# Patient Record
Sex: MALE | Race: WHITE | NOT HISPANIC OR LATINO | Employment: OTHER | URBAN - METROPOLITAN AREA
[De-identification: names, ages, dates, MRNs, and addresses within clinical notes are randomized per-mention and may not be internally consistent; named-entity substitution may affect disease eponyms.]

---

## 2017-01-04 ENCOUNTER — ALLSCRIPTS OFFICE VISIT (OUTPATIENT)
Dept: OTHER | Facility: OTHER | Age: 72
End: 2017-01-04

## 2017-01-04 DIAGNOSIS — R00.1 BRADYCARDIA: ICD-10-CM

## 2017-01-04 DIAGNOSIS — G62.9 POLYNEUROPATHY: ICD-10-CM

## 2017-01-04 DIAGNOSIS — I50.22 CHRONIC SYSTOLIC CONGESTIVE HEART FAILURE (HCC): ICD-10-CM

## 2017-01-04 DIAGNOSIS — Z79.899 OTHER LONG TERM (CURRENT) DRUG THERAPY: ICD-10-CM

## 2017-01-06 ENCOUNTER — APPOINTMENT (OUTPATIENT)
Dept: LAB | Facility: CLINIC | Age: 72
End: 2017-01-06
Payer: COMMERCIAL

## 2017-01-06 ENCOUNTER — TRANSCRIBE ORDERS (OUTPATIENT)
Dept: LAB | Facility: CLINIC | Age: 72
End: 2017-01-06

## 2017-01-06 DIAGNOSIS — G62.9 POLYNEUROPATHY: ICD-10-CM

## 2017-01-06 DIAGNOSIS — G62.9 ACQUIRED POLYNEUROPATHY: Primary | ICD-10-CM

## 2017-01-06 DIAGNOSIS — Z79.899 OTHER LONG TERM (CURRENT) DRUG THERAPY: ICD-10-CM

## 2017-01-06 DIAGNOSIS — R00.1 BRADYCARDIA: ICD-10-CM

## 2017-01-06 DIAGNOSIS — I50.22 CHRONIC SYSTOLIC CONGESTIVE HEART FAILURE (HCC): ICD-10-CM

## 2017-01-06 LAB
ALBUMIN SERPL BCP-MCNC: 3.1 G/DL (ref 3.5–5)
ALP SERPL-CCNC: 54 U/L (ref 46–116)
ALT SERPL W P-5'-P-CCNC: 30 U/L (ref 12–78)
ANION GAP SERPL CALCULATED.3IONS-SCNC: 7 MMOL/L (ref 4–13)
AST SERPL W P-5'-P-CCNC: 17 U/L (ref 5–45)
BACTERIA UR QL AUTO: NORMAL /HPF
BASOPHILS # BLD AUTO: 0.05 THOUSANDS/ΜL (ref 0–0.1)
BASOPHILS NFR BLD AUTO: 1 % (ref 0–1)
BILIRUB SERPL-MCNC: 0.43 MG/DL (ref 0.2–1)
BILIRUB UR QL STRIP: NEGATIVE
BUN SERPL-MCNC: 17 MG/DL (ref 5–25)
CALCIUM SERPL-MCNC: 8.3 MG/DL (ref 8.3–10.1)
CHLORIDE SERPL-SCNC: 107 MMOL/L (ref 100–108)
CLARITY UR: CLEAR
CO2 SERPL-SCNC: 28 MMOL/L (ref 21–32)
COLOR UR: ABNORMAL
CREAT SERPL-MCNC: 1.15 MG/DL (ref 0.6–1.3)
EOSINOPHIL # BLD AUTO: 0.14 THOUSAND/ΜL (ref 0–0.61)
EOSINOPHIL NFR BLD AUTO: 2 % (ref 0–6)
ERYTHROCYTE [DISTWIDTH] IN BLOOD BY AUTOMATED COUNT: 13.9 % (ref 11.6–15.1)
ERYTHROCYTE [SEDIMENTATION RATE] IN BLOOD: 51 MM/HOUR (ref 0–10)
EST. AVERAGE GLUCOSE BLD GHB EST-MCNC: 120 MG/DL
FOLATE SERPL-MCNC: >20 NG/ML (ref 3.1–17.5)
GFR SERPL CREATININE-BSD FRML MDRD: >60 ML/MIN/1.73SQ M
GLUCOSE SERPL-MCNC: 89 MG/DL (ref 65–140)
GLUCOSE UR STRIP-MCNC: NEGATIVE MG/DL
HBA1C MFR BLD: 5.8 % (ref 4.2–6.3)
HCT VFR BLD AUTO: 33.1 % (ref 36.5–49.3)
HGB BLD-MCNC: 10.7 G/DL (ref 12–17)
HGB UR QL STRIP.AUTO: NEGATIVE
IGA SERPL-MCNC: 356 MG/DL (ref 70–400)
IGG SERPL-MCNC: 774 MG/DL (ref 700–1600)
IGM SERPL-MCNC: 147 MG/DL (ref 40–230)
IRON SERPL-MCNC: 57 UG/DL (ref 65–175)
KETONES UR STRIP-MCNC: NEGATIVE MG/DL
LEUKOCYTE ESTERASE UR QL STRIP: NEGATIVE
LYMPHOCYTES # BLD AUTO: 1.29 THOUSANDS/ΜL (ref 0.6–4.47)
LYMPHOCYTES NFR BLD AUTO: 18 % (ref 14–44)
MAGNESIUM SERPL-MCNC: 1.8 MG/DL (ref 1.6–2.6)
MCH RBC QN AUTO: 29.8 PG (ref 26.8–34.3)
MCHC RBC AUTO-ENTMCNC: 32.3 G/DL (ref 31.4–37.4)
MCV RBC AUTO: 92 FL (ref 82–98)
MONOCYTES # BLD AUTO: 0.33 THOUSAND/ΜL (ref 0.17–1.22)
MONOCYTES NFR BLD AUTO: 5 % (ref 4–12)
NEUTROPHILS # BLD AUTO: 5.32 THOUSANDS/ΜL (ref 1.85–7.62)
NEUTS SEG NFR BLD AUTO: 74 % (ref 43–75)
NITRITE UR QL STRIP: NEGATIVE
NON-SQ EPI CELLS URNS QL MICRO: NORMAL /HPF
NRBC BLD AUTO-RTO: 0 /100 WBCS
PH UR STRIP.AUTO: 6 [PH] (ref 4.5–8)
PLATELET # BLD AUTO: 339 THOUSANDS/UL (ref 149–390)
PMV BLD AUTO: 10.5 FL (ref 8.9–12.7)
POTASSIUM SERPL-SCNC: 3.9 MMOL/L (ref 3.5–5.3)
PROT SERPL-MCNC: 7.1 G/DL (ref 6.4–8.2)
PROT UR STRIP-MCNC: ABNORMAL MG/DL
RBC # BLD AUTO: 3.59 MILLION/UL (ref 3.88–5.62)
RBC #/AREA URNS AUTO: NORMAL /HPF
SODIUM SERPL-SCNC: 142 MMOL/L (ref 136–145)
SP GR UR STRIP.AUTO: 1.03 (ref 1–1.03)
T4 FREE SERPL-MCNC: 0.98 NG/DL (ref 0.76–1.46)
TSH SERPL DL<=0.05 MIU/L-ACNC: 0.17 UIU/ML (ref 0.36–3.74)
UROBILINOGEN UR QL STRIP.AUTO: 1 E.U./DL
VIT B12 SERPL-MCNC: 631 PG/ML (ref 100–900)
WBC # BLD AUTO: 7.17 THOUSAND/UL (ref 4.31–10.16)
WBC #/AREA URNS AUTO: NORMAL /HPF

## 2017-01-06 PROCEDURE — 84165 PROTEIN E-PHORESIS SERUM: CPT

## 2017-01-06 PROCEDURE — 81001 URINALYSIS AUTO W/SCOPE: CPT

## 2017-01-06 PROCEDURE — 84443 ASSAY THYROID STIM HORMONE: CPT

## 2017-01-06 PROCEDURE — 84207 ASSAY OF VITAMIN B-6: CPT

## 2017-01-06 PROCEDURE — 82746 ASSAY OF FOLIC ACID SERUM: CPT

## 2017-01-06 PROCEDURE — 86618 LYME DISEASE ANTIBODY: CPT

## 2017-01-06 PROCEDURE — 83735 ASSAY OF MAGNESIUM: CPT

## 2017-01-06 PROCEDURE — 83036 HEMOGLOBIN GLYCOSYLATED A1C: CPT

## 2017-01-06 PROCEDURE — 85652 RBC SED RATE AUTOMATED: CPT

## 2017-01-06 PROCEDURE — 83540 ASSAY OF IRON: CPT

## 2017-01-06 PROCEDURE — 85025 COMPLETE CBC W/AUTO DIFF WBC: CPT

## 2017-01-06 PROCEDURE — 36415 COLL VENOUS BLD VENIPUNCTURE: CPT

## 2017-01-06 PROCEDURE — 82607 VITAMIN B-12: CPT

## 2017-01-06 PROCEDURE — 86592 SYPHILIS TEST NON-TREP QUAL: CPT

## 2017-01-06 PROCEDURE — 82784 ASSAY IGA/IGD/IGG/IGM EACH: CPT

## 2017-01-06 PROCEDURE — 84439 ASSAY OF FREE THYROXINE: CPT

## 2017-01-06 PROCEDURE — 80053 COMPREHEN METABOLIC PANEL: CPT

## 2017-01-09 ENCOUNTER — APPOINTMENT (OUTPATIENT)
Dept: LAB | Facility: CLINIC | Age: 72
End: 2017-01-09
Payer: COMMERCIAL

## 2017-01-09 DIAGNOSIS — G62.9 ACQUIRED POLYNEUROPATHY: ICD-10-CM

## 2017-01-09 LAB
ALBUMIN SERPL ELPH-MCNC: 3.54 G/DL (ref 3.5–5)
ALBUMIN SERPL ELPH-MCNC: 53.6 % (ref 52–65)
ALPHA1 GLOB SERPL ELPH-MCNC: 0.4 G/DL (ref 0.1–0.4)
ALPHA1 GLOB SERPL ELPH-MCNC: 6.1 % (ref 2.5–5)
ALPHA2 GLOB SERPL ELPH-MCNC: 0.94 G/DL (ref 0.4–1.2)
ALPHA2 GLOB SERPL ELPH-MCNC: 14.2 % (ref 7–13)
B BURGDOR IGG SER IA-ACNC: 0.21
B BURGDOR IGM SER IA-ACNC: 0.2
BETA GLOB ABNORMAL SERPL ELPH-MCNC: 0.51 G/DL (ref 0.4–0.8)
BETA1 GLOB SERPL ELPH-MCNC: 7.8 % (ref 5–13)
BETA2 GLOB SERPL ELPH-MCNC: 6.1 % (ref 2–8)
BETA2+GAMMA GLOB SERPL ELPH-MCNC: 0.4 G/DL (ref 0.2–0.5)
CORTIS AM PEAK SERPL-MCNC: 11.6 UG/ML (ref 4.2–22.4)
GAMMA GLOB ABNORMAL SERPL ELPH-MCNC: 0.81 G/DL (ref 0.5–1.6)
GAMMA GLOB SERPL ELPH-MCNC: 12.2 % (ref 12–22)
IGG/ALB SER: 1.16 {RATIO} (ref 1.1–1.8)
PROT PATTERN SERPL ELPH-IMP: ABNORMAL
PROT SERPL-MCNC: 6.6 G/DL (ref 6.4–8.2)
RPR SER QL: NORMAL
VIT B6 SERPL-MCNC: 17.6 UG/L (ref 5.3–46.7)

## 2017-01-09 PROCEDURE — 82533 TOTAL CORTISOL: CPT

## 2017-01-10 ENCOUNTER — ALLSCRIPTS OFFICE VISIT (OUTPATIENT)
Dept: OTHER | Facility: OTHER | Age: 72
End: 2017-01-10

## 2017-01-25 ENCOUNTER — ALLSCRIPTS OFFICE VISIT (OUTPATIENT)
Dept: OTHER | Facility: OTHER | Age: 72
End: 2017-01-25

## 2017-01-25 PROCEDURE — 88342 IMHCHEM/IMCYTCHM 1ST ANTB: CPT | Performed by: DERMATOLOGY

## 2017-01-25 PROCEDURE — 88341 IMHCHEM/IMCYTCHM EA ADD ANTB: CPT | Performed by: DERMATOLOGY

## 2017-01-25 PROCEDURE — 88313 SPECIAL STAINS GROUP 2: CPT | Performed by: DERMATOLOGY

## 2017-01-25 PROCEDURE — 88305 TISSUE EXAM BY PATHOLOGIST: CPT | Performed by: DERMATOLOGY

## 2017-01-26 ENCOUNTER — LAB REQUISITION (OUTPATIENT)
Dept: LAB | Facility: HOSPITAL | Age: 72
End: 2017-01-26
Payer: COMMERCIAL

## 2017-01-26 DIAGNOSIS — L98.9 DISORDER OF SKIN OR SUBCUTANEOUS TISSUE: ICD-10-CM

## 2017-02-21 ENCOUNTER — ALLSCRIPTS OFFICE VISIT (OUTPATIENT)
Dept: OTHER | Facility: OTHER | Age: 72
End: 2017-02-21

## 2017-02-21 ENCOUNTER — GENERIC CONVERSION - ENCOUNTER (OUTPATIENT)
Dept: OTHER | Facility: OTHER | Age: 72
End: 2017-02-21

## 2017-02-24 ENCOUNTER — GENERIC CONVERSION - ENCOUNTER (OUTPATIENT)
Dept: OTHER | Facility: OTHER | Age: 72
End: 2017-02-24

## 2017-02-27 RX ORDER — SODIUM CHLORIDE 9 MG/ML
20 INJECTION, SOLUTION INTRAVENOUS CONTINUOUS
Status: DISCONTINUED | OUTPATIENT
Start: 2017-02-28 | End: 2017-03-03 | Stop reason: HOSPADM

## 2017-02-28 ENCOUNTER — HOSPITAL ENCOUNTER (OUTPATIENT)
Dept: INFUSION CENTER | Facility: CLINIC | Age: 72
Discharge: HOME | End: 2017-02-28
Payer: COMMERCIAL

## 2017-02-28 VITALS
RESPIRATION RATE: 18 BRPM | HEART RATE: 76 BPM | DIASTOLIC BLOOD PRESSURE: 68 MMHG | TEMPERATURE: 98.5 F | SYSTOLIC BLOOD PRESSURE: 108 MMHG

## 2017-02-28 PROCEDURE — 96365 THER/PROPH/DIAG IV INF INIT: CPT

## 2017-02-28 RX ORDER — ATORVASTATIN CALCIUM 80 MG/1
80 TABLET, FILM COATED ORAL DAILY
COMMUNITY
End: 2018-03-25 | Stop reason: SDUPTHER

## 2017-02-28 RX ORDER — LISINOPRIL 2.5 MG/1
2.5 TABLET ORAL DAILY
COMMUNITY
End: 2017-09-04 | Stop reason: HOSPADM

## 2017-02-28 RX ORDER — SPIRONOLACTONE 25 MG/1
25 TABLET ORAL DAILY
COMMUNITY
End: 2017-09-04 | Stop reason: HOSPADM

## 2017-02-28 RX ORDER — LEVOTHYROXINE SODIUM 0.12 MG/1
125 TABLET ORAL DAILY
COMMUNITY
End: 2018-01-30 | Stop reason: SDUPTHER

## 2017-02-28 RX ORDER — ALBUTEROL SULFATE 2.5 MG/3ML
2.5 SOLUTION RESPIRATORY (INHALATION) EVERY 6 HOURS PRN
COMMUNITY
End: 2018-07-13 | Stop reason: SDUPTHER

## 2017-02-28 RX ORDER — POTASSIUM CHLORIDE 750 MG/1
20 TABLET, FILM COATED, EXTENDED RELEASE ORAL DAILY PRN
COMMUNITY
End: 2018-02-22 | Stop reason: SDUPTHER

## 2017-02-28 RX ORDER — TORSEMIDE 20 MG/1
20 TABLET ORAL DAILY PRN
Status: ON HOLD | COMMUNITY
End: 2017-09-04

## 2017-02-28 RX ORDER — MELOXICAM 15 MG/1
15 TABLET ORAL DAILY
COMMUNITY
End: 2017-09-04 | Stop reason: HOSPADM

## 2017-02-28 RX ORDER — METOPROLOL SUCCINATE 25 MG/1
50 TABLET, EXTENDED RELEASE ORAL DAILY
COMMUNITY
End: 2017-09-04 | Stop reason: HOSPADM

## 2017-02-28 RX ORDER — LANOLIN ALCOHOL/MO/W.PET/CERES
1000 CREAM (GRAM) TOPICAL DAILY
COMMUNITY

## 2017-02-28 RX ADMIN — SODIUM CHLORIDE 20 ML/HR: 0.9 INJECTION, SOLUTION INTRAVENOUS at 14:05

## 2017-02-28 RX ADMIN — IRON SUCROSE 200 MG: 20 INJECTION, SOLUTION INTRAVENOUS at 14:13

## 2017-03-02 ENCOUNTER — ALLSCRIPTS OFFICE VISIT (OUTPATIENT)
Dept: OTHER | Facility: OTHER | Age: 72
End: 2017-03-02

## 2017-03-05 ENCOUNTER — ANESTHESIA EVENT (OUTPATIENT)
Dept: PERIOP | Facility: HOSPITAL | Age: 72
End: 2017-03-05

## 2017-03-06 ENCOUNTER — ANESTHESIA (OUTPATIENT)
Dept: PERIOP | Facility: HOSPITAL | Age: 72
End: 2017-03-06

## 2017-03-06 RX ORDER — SODIUM CHLORIDE 9 MG/ML
20 INJECTION, SOLUTION INTRAVENOUS CONTINUOUS
Status: DISCONTINUED | OUTPATIENT
Start: 2017-03-07 | End: 2017-03-10 | Stop reason: HOSPADM

## 2017-03-06 NOTE — ANESTHESIA PREPROCEDURE EVALUATION
Patient summary reviewed  Mallampati: II  TM distance: >3 FB  Neck ROM: full    PULMONARY  Patient's breath sounds clear to auscultation.   PULMONARY Positives: COPD    NEURO/PSYCH - negative    ENDO/OTHER  Additional Endo/Other Comments: Anemia      BPH (benign prostatic hyperplasia)     Bradycardia     Cardiomyopathy     Chronic bronchitis     Hyperlipidemia     HTN (hypertension)     Insomnia     Hx of lymphoma     COPD (chronic obstructive pulmonary disease)     ICD (implantable cardioverter-defibrillator) in place       ENDO/OTHER Positives: hypothyroidism,     DENTAL     CARDIOVASCULAR  CARDIOVASCULAR Positives: regular normalpacemaker, hypertension     Comments:  Additional Cardiovascular Comments: SUMMARY   LEFT VENTRICLE:   The ventricle was dilated.   Systolic function was severely reduced. Ejection fraction was estimated to be   30 % - 35%   There was severe diffuse hypokinesis.   Left ventricular diastolic function parameters were normal.   RIGHT VENTRICLE:   The size was normal.   Systolic function was normal.   A pacing wire was present.   LEFT ATRIUM:   The atrium was dilated.   RIGHT ATRIUM:   A pacing wire was present.   MITRAL VALVE:   There was mild to moderate regurgitation    2015      GI/HEPATIC/RENAL - negative    Anesthesia type: IV sedation with anesthesia  intravenous induction   Risks, benefits, and alternatives discussed with patient.  ASA 3

## 2017-03-07 ENCOUNTER — HOSPITAL ENCOUNTER (OUTPATIENT)
Dept: INFUSION CENTER | Facility: CLINIC | Age: 72
Discharge: HOME | End: 2017-03-07
Payer: COMMERCIAL

## 2017-03-07 VITALS
TEMPERATURE: 98.3 F | HEART RATE: 66 BPM | DIASTOLIC BLOOD PRESSURE: 58 MMHG | SYSTOLIC BLOOD PRESSURE: 102 MMHG | RESPIRATION RATE: 20 BRPM

## 2017-03-07 PROCEDURE — 96365 THER/PROPH/DIAG IV INF INIT: CPT

## 2017-03-07 RX ORDER — ALBUTEROL SULFATE 90 UG/1
2 AEROSOL, METERED RESPIRATORY (INHALATION) EVERY 4 HOURS PRN
COMMUNITY
End: 2020-06-24 | Stop reason: SDUPTHER

## 2017-03-07 RX ORDER — TRIAMCINOLONE ACETONIDE 1 MG/G
1 CREAM TOPICAL 2 TIMES DAILY
COMMUNITY
End: 2018-02-06 | Stop reason: CLARIF

## 2017-03-07 RX ADMIN — SODIUM CHLORIDE 20 ML/HR: 900 INJECTION, SOLUTION INTRAVENOUS at 12:23

## 2017-03-07 RX ADMIN — IRON SUCROSE 200 MG: 20 INJECTION, SOLUTION INTRAVENOUS at 12:23

## 2017-03-07 NOTE — PLAN OF CARE
Problem: Potential for Falls  Goal: Patient will remain free of falls  INTERVENTIONS:  - Assess patient frequently for physical needs  - Identify cognitive and physical deficits and behaviors that affect risk of falls  - [place] fall precautions as indicated by assessment   - Educate patient/family on patient safety including physical limitations  - Instruct patient to call for assistance with activity based on assessment  - Modify environment to reduce risk of injury  - Consider OT/PT consult to assist with strengthening/mobility   Outcome: Progressing    Problem: Knowledge Deficit  Goal: Patient/family/caregiver demonstrates understanding of disease process, treatment plan, medications, and discharge instructions  Complete learning assessment and assess knowledge base    Interventions:  - Provide teaching at level of understanding  - Provide teaching via preferred learning methods   Outcome: Progressing

## 2017-03-16 ENCOUNTER — HOSPITAL ENCOUNTER (OUTPATIENT)
Dept: CT IMAGING | Facility: HOSPITAL | Age: 72
Discharge: HOME | End: 2017-03-16
Attending: INTERNAL MEDICINE
Payer: COMMERCIAL

## 2017-03-16 DIAGNOSIS — R91.1 SOLITARY PULMONARY NODULE: ICD-10-CM

## 2017-03-16 PROCEDURE — 71250 CT THORAX DX C-: CPT

## 2017-03-20 RX ORDER — SODIUM CHLORIDE 9 MG/ML
20 INJECTION, SOLUTION INTRAVENOUS CONTINUOUS
Status: DISCONTINUED | OUTPATIENT
Start: 2017-03-21 | End: 2017-03-24 | Stop reason: HOSPADM

## 2017-03-21 ENCOUNTER — HOSPITAL ENCOUNTER (OUTPATIENT)
Dept: INFUSION CENTER | Facility: CLINIC | Age: 72
Discharge: HOME | End: 2017-03-21
Payer: COMMERCIAL

## 2017-03-21 VITALS
OXYGEN SATURATION: 97 % | TEMPERATURE: 98 F | DIASTOLIC BLOOD PRESSURE: 68 MMHG | SYSTOLIC BLOOD PRESSURE: 97 MMHG | RESPIRATION RATE: 14 BRPM | HEART RATE: 66 BPM

## 2017-03-21 PROCEDURE — 96365 THER/PROPH/DIAG IV INF INIT: CPT

## 2017-03-21 RX ADMIN — SODIUM CHLORIDE 20 ML/HR: 0.9 INJECTION, SOLUTION INTRAVENOUS at 14:45

## 2017-03-21 RX ADMIN — IRON SUCROSE 200 MG: 20 INJECTION, SOLUTION INTRAVENOUS at 14:50

## 2017-03-23 ENCOUNTER — ALLSCRIPTS OFFICE VISIT (OUTPATIENT)
Dept: OTHER | Facility: OTHER | Age: 72
End: 2017-03-23

## 2017-04-04 RX ORDER — SODIUM CHLORIDE 9 MG/ML
20 INJECTION, SOLUTION INTRAVENOUS CONTINUOUS
Status: DISCONTINUED | OUTPATIENT
Start: 2017-04-05 | End: 2017-04-08 | Stop reason: HOSPADM

## 2017-04-05 ENCOUNTER — HOSPITAL ENCOUNTER (OUTPATIENT)
Dept: INFUSION CENTER | Facility: CLINIC | Age: 72
Discharge: HOME | End: 2017-04-05
Payer: COMMERCIAL

## 2017-04-05 VITALS
RESPIRATION RATE: 16 BRPM | OXYGEN SATURATION: 97 % | SYSTOLIC BLOOD PRESSURE: 100 MMHG | TEMPERATURE: 97.6 F | DIASTOLIC BLOOD PRESSURE: 62 MMHG | HEART RATE: 62 BPM

## 2017-04-05 PROCEDURE — 96365 THER/PROPH/DIAG IV INF INIT: CPT

## 2017-04-05 RX ADMIN — IRON SUCROSE 200 MG: 20 INJECTION, SOLUTION INTRAVENOUS at 11:32

## 2017-04-05 RX ADMIN — SODIUM CHLORIDE 20 ML/HR: 0.9 INJECTION, SOLUTION INTRAVENOUS at 11:25

## 2017-04-11 RX ORDER — SODIUM CHLORIDE 9 MG/ML
20 INJECTION, SOLUTION INTRAVENOUS CONTINUOUS
Status: DISCONTINUED | OUTPATIENT
Start: 2017-04-12 | End: 2017-04-15 | Stop reason: HOSPADM

## 2017-04-12 ENCOUNTER — HOSPITAL ENCOUNTER (OUTPATIENT)
Dept: INFUSION CENTER | Facility: CLINIC | Age: 72
Discharge: HOME | End: 2017-04-12
Payer: COMMERCIAL

## 2017-04-12 VITALS
DIASTOLIC BLOOD PRESSURE: 72 MMHG | RESPIRATION RATE: 16 BRPM | TEMPERATURE: 97.3 F | OXYGEN SATURATION: 99 % | HEART RATE: 63 BPM | SYSTOLIC BLOOD PRESSURE: 112 MMHG

## 2017-04-12 PROCEDURE — 96365 THER/PROPH/DIAG IV INF INIT: CPT

## 2017-04-12 RX ADMIN — IRON SUCROSE 200 MG: 20 INJECTION, SOLUTION INTRAVENOUS at 15:00

## 2017-04-12 RX ADMIN — SODIUM CHLORIDE 20 ML/HR: 0.9 INJECTION, SOLUTION INTRAVENOUS at 14:35

## 2017-04-12 NOTE — PROGRESS NOTES
Tolerated infusion without incident: No adverse reactions noted: Verified follow up appt with patient

## 2017-04-12 NOTE — PROGRESS NOTES
Patient to Kimberly Rosario: Offers no complaints at present time: Right FA PIV initiated without incident

## 2017-04-17 RX ORDER — SODIUM CHLORIDE 9 MG/ML
20 INJECTION, SOLUTION INTRAVENOUS CONTINUOUS
Status: DISCONTINUED | OUTPATIENT
Start: 2017-04-18 | End: 2017-04-21 | Stop reason: HOSPADM

## 2017-04-18 ENCOUNTER — HOSPITAL ENCOUNTER (OUTPATIENT)
Dept: INFUSION CENTER | Facility: CLINIC | Age: 72
Discharge: HOME | End: 2017-04-18
Payer: COMMERCIAL

## 2017-04-18 VITALS
TEMPERATURE: 97.2 F | SYSTOLIC BLOOD PRESSURE: 118 MMHG | OXYGEN SATURATION: 97 % | HEART RATE: 72 BPM | DIASTOLIC BLOOD PRESSURE: 64 MMHG | RESPIRATION RATE: 18 BRPM

## 2017-04-18 PROCEDURE — 96365 THER/PROPH/DIAG IV INF INIT: CPT

## 2017-04-18 RX ADMIN — SODIUM CHLORIDE 20 ML/HR: 0.9 INJECTION, SOLUTION INTRAVENOUS at 11:58

## 2017-04-18 RX ADMIN — IRON SUCROSE 200 MG: 20 INJECTION, SOLUTION INTRAVENOUS at 12:05

## 2017-04-18 NOTE — PROGRESS NOTES
Pt tolerated his venofer without any adverse reactons  Today was his last treatment so no further appointments at this time   Pt declined avs

## 2017-04-20 ENCOUNTER — ALLSCRIPTS OFFICE VISIT (OUTPATIENT)
Dept: OTHER | Facility: OTHER | Age: 72
End: 2017-04-20

## 2017-04-24 ENCOUNTER — GENERIC CONVERSION - ENCOUNTER (OUTPATIENT)
Dept: OTHER | Facility: OTHER | Age: 72
End: 2017-04-24

## 2017-04-26 ENCOUNTER — ANESTHESIA EVENT (OUTPATIENT)
Dept: PERIOP | Facility: HOSPITAL | Age: 72
End: 2017-04-26

## 2017-04-27 ENCOUNTER — ANESTHESIA (OUTPATIENT)
Dept: PERIOP | Facility: HOSPITAL | Age: 72
End: 2017-04-27

## 2017-05-03 ENCOUNTER — ALLSCRIPTS OFFICE VISIT (OUTPATIENT)
Dept: OTHER | Facility: OTHER | Age: 72
End: 2017-05-03

## 2017-05-04 ENCOUNTER — ALLSCRIPTS OFFICE VISIT (OUTPATIENT)
Dept: OTHER | Facility: OTHER | Age: 72
End: 2017-05-04

## 2017-05-04 ENCOUNTER — TRANSCRIBE ORDERS (OUTPATIENT)
Dept: LAB | Facility: CLINIC | Age: 72
End: 2017-05-04

## 2017-05-04 ENCOUNTER — APPOINTMENT (OUTPATIENT)
Dept: LAB | Facility: CLINIC | Age: 72
End: 2017-05-04
Payer: COMMERCIAL

## 2017-05-04 DIAGNOSIS — Z79.899 OTHER LONG TERM (CURRENT) DRUG THERAPY: ICD-10-CM

## 2017-05-04 DIAGNOSIS — C84.00 MYCOSIS FUNGOIDES (HCC): ICD-10-CM

## 2017-05-04 DIAGNOSIS — E61.1 IRON DEFICIENCY: ICD-10-CM

## 2017-05-04 LAB
ALBUMIN SERPL BCP-MCNC: 3.2 G/DL (ref 3.5–5)
ALP SERPL-CCNC: 58 U/L (ref 46–116)
ALT SERPL W P-5'-P-CCNC: 34 U/L (ref 12–78)
ANION GAP SERPL CALCULATED.3IONS-SCNC: 9 MMOL/L (ref 4–13)
AST SERPL W P-5'-P-CCNC: 21 U/L (ref 5–45)
BASOPHILS # BLD AUTO: 0.03 THOUSANDS/ΜL (ref 0–0.1)
BASOPHILS NFR BLD AUTO: 1 % (ref 0–1)
BILIRUB SERPL-MCNC: 0.32 MG/DL (ref 0.2–1)
BUN SERPL-MCNC: 25 MG/DL (ref 5–25)
CALCIUM SERPL-MCNC: 8.9 MG/DL (ref 8.3–10.1)
CHLORIDE SERPL-SCNC: 103 MMOL/L (ref 100–108)
CHOLEST SERPL-MCNC: 228 MG/DL (ref 50–200)
CO2 SERPL-SCNC: 29 MMOL/L (ref 21–32)
CREAT SERPL-MCNC: 1.01 MG/DL (ref 0.6–1.3)
EOSINOPHIL # BLD AUTO: 0.14 THOUSAND/ΜL (ref 0–0.61)
EOSINOPHIL NFR BLD AUTO: 2 % (ref 0–6)
ERYTHROCYTE [DISTWIDTH] IN BLOOD BY AUTOMATED COUNT: 14.5 % (ref 11.6–15.1)
FERRITIN SERPL-MCNC: 582 NG/ML (ref 8–388)
GFR SERPL CREATININE-BSD FRML MDRD: >60 ML/MIN/1.73SQ M
GLUCOSE P FAST SERPL-MCNC: 104 MG/DL (ref 65–99)
HCT VFR BLD AUTO: 39.2 % (ref 36.5–49.3)
HDLC SERPL-MCNC: 57 MG/DL (ref 40–60)
HGB BLD-MCNC: 12.3 G/DL (ref 12–17)
IRON SATN MFR SERPL: 27 %
IRON SERPL-MCNC: 104 UG/DL (ref 65–175)
LDLC SERPL CALC-MCNC: 141 MG/DL (ref 0–100)
LYMPHOCYTES # BLD AUTO: 1.66 THOUSANDS/ΜL (ref 0.6–4.47)
LYMPHOCYTES NFR BLD AUTO: 26 % (ref 14–44)
MCH RBC QN AUTO: 29.6 PG (ref 26.8–34.3)
MCHC RBC AUTO-ENTMCNC: 31.4 G/DL (ref 31.4–37.4)
MCV RBC AUTO: 95 FL (ref 82–98)
MONOCYTES # BLD AUTO: 0.4 THOUSAND/ΜL (ref 0.17–1.22)
MONOCYTES NFR BLD AUTO: 6 % (ref 4–12)
NEUTROPHILS # BLD AUTO: 4.27 THOUSANDS/ΜL (ref 1.85–7.62)
NEUTS SEG NFR BLD AUTO: 65 % (ref 43–75)
NRBC BLD AUTO-RTO: 0 /100 WBCS
PLATELET # BLD AUTO: 288 THOUSANDS/UL (ref 149–390)
PMV BLD AUTO: 11.1 FL (ref 8.9–12.7)
POTASSIUM SERPL-SCNC: 4.2 MMOL/L (ref 3.5–5.3)
PROT SERPL-MCNC: 7.6 G/DL (ref 6.4–8.2)
RBC # BLD AUTO: 4.15 MILLION/UL (ref 3.88–5.62)
SODIUM SERPL-SCNC: 141 MMOL/L (ref 136–145)
T4 FREE SERPL-MCNC: 0.89 NG/DL (ref 0.76–1.46)
TIBC SERPL-MCNC: 391 UG/DL (ref 250–450)
TRIGL SERPL-MCNC: 148 MG/DL
TSH SERPL DL<=0.05 MIU/L-ACNC: 0.07 UIU/ML (ref 0.36–3.74)
WBC # BLD AUTO: 6.51 THOUSAND/UL (ref 4.31–10.16)

## 2017-05-04 PROCEDURE — 84439 ASSAY OF FREE THYROXINE: CPT

## 2017-05-04 PROCEDURE — 83918 ORGANIC ACIDS TOTAL QUANT: CPT

## 2017-05-04 PROCEDURE — 83540 ASSAY OF IRON: CPT

## 2017-05-04 PROCEDURE — 85025 COMPLETE CBC W/AUTO DIFF WBC: CPT

## 2017-05-04 PROCEDURE — 80053 COMPREHEN METABOLIC PANEL: CPT

## 2017-05-04 PROCEDURE — 84443 ASSAY THYROID STIM HORMONE: CPT

## 2017-05-04 PROCEDURE — 83550 IRON BINDING TEST: CPT

## 2017-05-04 PROCEDURE — 36415 COLL VENOUS BLD VENIPUNCTURE: CPT

## 2017-05-04 PROCEDURE — 80061 LIPID PANEL: CPT

## 2017-05-04 PROCEDURE — 82728 ASSAY OF FERRITIN: CPT

## 2017-05-09 ENCOUNTER — GENERIC CONVERSION - ENCOUNTER (OUTPATIENT)
Dept: OTHER | Facility: OTHER | Age: 72
End: 2017-05-09

## 2017-05-09 LAB — METHYLMALONATE SERPL-SCNC: 154 NMOL/L (ref 0–378)

## 2017-05-17 ENCOUNTER — GENERIC CONVERSION - ENCOUNTER (OUTPATIENT)
Dept: OTHER | Facility: OTHER | Age: 72
End: 2017-05-17

## 2017-05-19 DIAGNOSIS — E61.1 IRON DEFICIENCY: ICD-10-CM

## 2017-05-30 ENCOUNTER — ALLSCRIPTS OFFICE VISIT (OUTPATIENT)
Dept: OTHER | Facility: OTHER | Age: 72
End: 2017-05-30

## 2017-05-31 ENCOUNTER — GENERIC CONVERSION - ENCOUNTER (OUTPATIENT)
Dept: OTHER | Facility: OTHER | Age: 72
End: 2017-05-31

## 2017-06-07 ENCOUNTER — GENERIC CONVERSION - ENCOUNTER (OUTPATIENT)
Dept: OTHER | Facility: OTHER | Age: 72
End: 2017-06-07

## 2017-07-05 ENCOUNTER — ALLSCRIPTS OFFICE VISIT (OUTPATIENT)
Dept: OTHER | Facility: OTHER | Age: 72
End: 2017-07-05

## 2017-07-05 ENCOUNTER — APPOINTMENT (OUTPATIENT)
Dept: RADIOLOGY | Facility: CLINIC | Age: 72
End: 2017-07-05
Payer: COMMERCIAL

## 2017-07-05 DIAGNOSIS — J44.1 CHRONIC OBSTRUCTIVE PULMONARY DISEASE WITH ACUTE EXACERBATION (HCC): ICD-10-CM

## 2017-07-05 PROCEDURE — 71020 HB CHEST X-RAY 2VW FRONTAL&LATL: CPT

## 2017-07-10 ENCOUNTER — TRANSCRIBE ORDERS (OUTPATIENT)
Dept: ADMINISTRATIVE | Facility: HOSPITAL | Age: 72
End: 2017-07-10

## 2017-07-10 ENCOUNTER — HOSPITAL ENCOUNTER (OUTPATIENT)
Dept: RADIOLOGY | Facility: HOSPITAL | Age: 72
Discharge: HOME | End: 2017-07-10
Payer: COMMERCIAL

## 2017-07-10 ENCOUNTER — ALLSCRIPTS OFFICE VISIT (OUTPATIENT)
Dept: OTHER | Facility: OTHER | Age: 72
End: 2017-07-10

## 2017-07-10 DIAGNOSIS — J44.1 CHRONIC OBSTRUCTIVE PULMONARY DISEASE WITH ACUTE EXACERBATION (HCC): ICD-10-CM

## 2017-07-10 PROCEDURE — 70220 X-RAY EXAM OF SINUSES: CPT

## 2017-08-07 ENCOUNTER — ALLSCRIPTS OFFICE VISIT (OUTPATIENT)
Dept: OTHER | Facility: OTHER | Age: 72
End: 2017-08-07

## 2017-08-11 ENCOUNTER — ALLSCRIPTS OFFICE VISIT (OUTPATIENT)
Dept: OTHER | Facility: OTHER | Age: 72
End: 2017-08-11

## 2017-08-16 ENCOUNTER — ALLSCRIPTS OFFICE VISIT (OUTPATIENT)
Dept: OTHER | Facility: OTHER | Age: 72
End: 2017-08-16

## 2017-08-31 ENCOUNTER — APPOINTMENT (EMERGENCY)
Dept: RADIOLOGY | Facility: HOSPITAL | Age: 72
DRG: 280 | End: 2017-08-31
Payer: COMMERCIAL

## 2017-08-31 ENCOUNTER — HOSPITAL ENCOUNTER (INPATIENT)
Facility: HOSPITAL | Age: 72
LOS: 4 days | Discharge: HOME | DRG: 280 | End: 2017-09-04
Attending: EMERGENCY MEDICINE | Admitting: ANESTHESIOLOGY
Payer: COMMERCIAL

## 2017-08-31 ENCOUNTER — ALLSCRIPTS OFFICE VISIT (OUTPATIENT)
Dept: OTHER | Facility: OTHER | Age: 72
End: 2017-08-31

## 2017-08-31 DIAGNOSIS — I50.9 CHF, ACUTE ON CHRONIC (HCC): Primary | ICD-10-CM

## 2017-08-31 LAB
ALBUMIN SERPL BCP-MCNC: 3.4 G/DL (ref 3.5–5)
ALP SERPL-CCNC: 54 U/L (ref 46–116)
ALT SERPL W P-5'-P-CCNC: 45 U/L (ref 12–78)
ANION GAP SERPL CALCULATED.3IONS-SCNC: 13 MMOL/L (ref 4–13)
APTT PPP: 47 SECONDS (ref 23–35)
AST SERPL W P-5'-P-CCNC: 42 U/L (ref 5–45)
BASOPHILS # BLD AUTO: 0.05 THOUSANDS/ΜL (ref 0–0.1)
BASOPHILS NFR BLD AUTO: 1 % (ref 0–1)
BILIRUB SERPL-MCNC: 0.5 MG/DL (ref 0.2–1)
BUN SERPL-MCNC: 28 MG/DL (ref 5–25)
CALCIUM SERPL-MCNC: 8.5 MG/DL (ref 8.3–10.1)
CHLORIDE SERPL-SCNC: 104 MMOL/L (ref 100–108)
CO2 SERPL-SCNC: 22 MMOL/L (ref 21–32)
CREAT SERPL-MCNC: 1.27 MG/DL (ref 0.6–1.3)
DEPRECATED D DIMER PPP: 305 NG/ML (FEU) (ref 0–424)
EOSINOPHIL # BLD AUTO: 0.03 THOUSAND/ΜL (ref 0–0.61)
EOSINOPHIL NFR BLD AUTO: 0 % (ref 0–6)
ERYTHROCYTE [DISTWIDTH] IN BLOOD BY AUTOMATED COUNT: 14.1 % (ref 11.6–15.1)
GFR SERPL CREATININE-BSD FRML MDRD: 57 ML/MIN/1.73SQ M
GLUCOSE SERPL-MCNC: 146 MG/DL (ref 65–140)
HCT VFR BLD AUTO: 40.3 % (ref 36.5–49.3)
HGB BLD-MCNC: 12.9 G/DL (ref 12–17)
INR PPP: 1.54 (ref 0.86–1.16)
LACTATE SERPL-SCNC: 2 MMOL/L (ref 0.5–2)
LYMPHOCYTES # BLD AUTO: 1.09 THOUSANDS/ΜL (ref 0.6–4.47)
LYMPHOCYTES NFR BLD AUTO: 12 % (ref 14–44)
MAGNESIUM SERPL-MCNC: 1.8 MG/DL (ref 1.6–2.6)
MCH RBC QN AUTO: 29.3 PG (ref 26.8–34.3)
MCHC RBC AUTO-ENTMCNC: 32 G/DL (ref 31.4–37.4)
MCV RBC AUTO: 92 FL (ref 82–98)
MONOCYTES # BLD AUTO: 0.52 THOUSAND/ΜL (ref 0.17–1.22)
MONOCYTES NFR BLD AUTO: 6 % (ref 4–12)
NEUTROPHILS # BLD AUTO: 7.25 THOUSANDS/ΜL (ref 1.85–7.62)
NEUTS SEG NFR BLD AUTO: 81 % (ref 43–75)
NRBC BLD AUTO-RTO: 0 /100 WBCS
NT-PROBNP SERPL-MCNC: ABNORMAL PG/ML
PLATELET # BLD AUTO: 262 THOUSANDS/UL (ref 149–390)
PMV BLD AUTO: 11.1 FL (ref 8.9–12.7)
POTASSIUM SERPL-SCNC: 4.7 MMOL/L (ref 3.5–5.3)
PROT SERPL-MCNC: 7.6 G/DL (ref 6.4–8.2)
PROTHROMBIN TIME: 18.9 SECONDS (ref 12.1–14.4)
RBC # BLD AUTO: 4.4 MILLION/UL (ref 3.88–5.62)
SODIUM SERPL-SCNC: 139 MMOL/L (ref 136–145)
TROPONIN I SERPL-MCNC: 0.05 NG/ML
TROPONIN I SERPL-MCNC: 0.06 NG/ML
TROPONIN I SERPL-MCNC: 0.07 NG/ML
TSH SERPL DL<=0.05 MIU/L-ACNC: 0.44 UIU/ML (ref 0.36–3.74)
WBC # BLD AUTO: 8.97 THOUSAND/UL (ref 4.31–10.16)

## 2017-08-31 PROCEDURE — 84484 ASSAY OF TROPONIN QUANT: CPT | Performed by: EMERGENCY MEDICINE

## 2017-08-31 PROCEDURE — 71020 HB CHEST X-RAY 2VW FRONTAL&LATL: CPT

## 2017-08-31 PROCEDURE — 96365 THER/PROPH/DIAG IV INF INIT: CPT

## 2017-08-31 PROCEDURE — 80053 COMPREHEN METABOLIC PANEL: CPT | Performed by: EMERGENCY MEDICINE

## 2017-08-31 PROCEDURE — 93005 ELECTROCARDIOGRAM TRACING: CPT | Performed by: EMERGENCY MEDICINE

## 2017-08-31 PROCEDURE — 99285 EMERGENCY DEPT VISIT HI MDM: CPT

## 2017-08-31 PROCEDURE — 85610 PROTHROMBIN TIME: CPT | Performed by: EMERGENCY MEDICINE

## 2017-08-31 PROCEDURE — 83880 ASSAY OF NATRIURETIC PEPTIDE: CPT | Performed by: EMERGENCY MEDICINE

## 2017-08-31 PROCEDURE — 36415 COLL VENOUS BLD VENIPUNCTURE: CPT | Performed by: EMERGENCY MEDICINE

## 2017-08-31 PROCEDURE — 94664 DEMO&/EVAL PT USE INHALER: CPT

## 2017-08-31 PROCEDURE — 85025 COMPLETE CBC W/AUTO DIFF WBC: CPT | Performed by: EMERGENCY MEDICINE

## 2017-08-31 PROCEDURE — 83605 ASSAY OF LACTIC ACID: CPT | Performed by: NURSE PRACTITIONER

## 2017-08-31 PROCEDURE — 84484 ASSAY OF TROPONIN QUANT: CPT | Performed by: NURSE PRACTITIONER

## 2017-08-31 PROCEDURE — 94760 N-INVAS EAR/PLS OXIMETRY 1: CPT

## 2017-08-31 PROCEDURE — 83735 ASSAY OF MAGNESIUM: CPT | Performed by: NURSE PRACTITIONER

## 2017-08-31 PROCEDURE — 96374 THER/PROPH/DIAG INJ IV PUSH: CPT

## 2017-08-31 PROCEDURE — 85730 THROMBOPLASTIN TIME PARTIAL: CPT | Performed by: EMERGENCY MEDICINE

## 2017-08-31 PROCEDURE — 71010 HB CHEST X-RAY 1 VIEW FRONTAL (PORTABLE): CPT

## 2017-08-31 PROCEDURE — 96366 THER/PROPH/DIAG IV INF ADDON: CPT

## 2017-08-31 PROCEDURE — 85379 FIBRIN DEGRADATION QUANT: CPT | Performed by: EMERGENCY MEDICINE

## 2017-08-31 PROCEDURE — 94640 AIRWAY INHALATION TREATMENT: CPT

## 2017-08-31 PROCEDURE — 84443 ASSAY THYROID STIM HORMONE: CPT | Performed by: NURSE PRACTITIONER

## 2017-08-31 RX ORDER — ACETAMINOPHEN 325 MG/1
650 TABLET ORAL ONCE
Status: COMPLETED | OUTPATIENT
Start: 2017-08-31 | End: 2017-08-31

## 2017-08-31 RX ORDER — TRIAMCINOLONE ACETONIDE 1 MG/G
1 CREAM TOPICAL 2 TIMES DAILY
Status: DISCONTINUED | OUTPATIENT
Start: 2017-08-31 | End: 2017-09-04 | Stop reason: HOSPADM

## 2017-08-31 RX ORDER — LISINOPRIL 2.5 MG/1
2.5 TABLET ORAL DAILY
Status: DISCONTINUED | OUTPATIENT
Start: 2017-09-01 | End: 2017-09-02

## 2017-08-31 RX ORDER — LEVOTHYROXINE SODIUM 0.12 MG/1
125 TABLET ORAL DAILY
Status: DISCONTINUED | OUTPATIENT
Start: 2017-09-01 | End: 2017-09-04 | Stop reason: HOSPADM

## 2017-08-31 RX ORDER — CHOLECALCIFEROL (VITAMIN D3) 125 MCG
1000 CAPSULE ORAL DAILY
Status: DISCONTINUED | OUTPATIENT
Start: 2017-09-01 | End: 2017-09-04 | Stop reason: HOSPADM

## 2017-08-31 RX ORDER — FUROSEMIDE 10 MG/ML
40 INJECTION INTRAMUSCULAR; INTRAVENOUS ONCE
Status: COMPLETED | OUTPATIENT
Start: 2017-08-31 | End: 2017-08-31

## 2017-08-31 RX ORDER — ALBUTEROL SULFATE 2.5 MG/3ML
5 SOLUTION RESPIRATORY (INHALATION) ONCE
Status: COMPLETED | OUTPATIENT
Start: 2017-08-31 | End: 2017-08-31

## 2017-08-31 RX ORDER — DOCUSATE SODIUM 100 MG/1
100 CAPSULE, LIQUID FILLED ORAL 2 TIMES DAILY
Status: DISCONTINUED | OUTPATIENT
Start: 2017-08-31 | End: 2017-09-04 | Stop reason: HOSPADM

## 2017-08-31 RX ORDER — FERROUS SULFATE 325(65) MG
325 TABLET ORAL
COMMUNITY
End: 2018-02-15 | Stop reason: SDUPTHER

## 2017-08-31 RX ORDER — ASPIRIN 81 MG/1
81 TABLET, CHEWABLE ORAL DAILY
Status: DISCONTINUED | OUTPATIENT
Start: 2017-09-01 | End: 2017-09-04 | Stop reason: HOSPADM

## 2017-08-31 RX ORDER — FUROSEMIDE 10 MG/ML
40 INJECTION INTRAMUSCULAR; INTRAVENOUS 2 TIMES DAILY
Status: DISCONTINUED | OUTPATIENT
Start: 2017-08-31 | End: 2017-09-01

## 2017-08-31 RX ORDER — ATORVASTATIN CALCIUM 40 MG/1
80 TABLET, FILM COATED ORAL DAILY
Status: DISCONTINUED | OUTPATIENT
Start: 2017-09-01 | End: 2017-09-04 | Stop reason: HOSPADM

## 2017-08-31 RX ORDER — FERROUS SULFATE 325(65) MG
325 TABLET ORAL
Status: DISCONTINUED | OUTPATIENT
Start: 2017-09-01 | End: 2017-09-04 | Stop reason: HOSPADM

## 2017-08-31 RX ORDER — LEVALBUTEROL 1.25 MG/.5ML
1.25 SOLUTION, CONCENTRATE RESPIRATORY (INHALATION)
Status: DISCONTINUED | OUTPATIENT
Start: 2017-08-31 | End: 2017-09-04 | Stop reason: HOSPADM

## 2017-08-31 RX ORDER — PROMETHAZINE HYDROCHLORIDE AND CODEINE PHOSPHATE 6.25; 1 MG/5ML; MG/5ML
5 SYRUP ORAL EVERY 4 HOURS PRN
COMMUNITY
End: 2018-02-15 | Stop reason: SDUPTHER

## 2017-08-31 RX ORDER — ACETAMINOPHEN 325 MG/1
650 TABLET ORAL EVERY 6 HOURS PRN
Status: DISCONTINUED | OUTPATIENT
Start: 2017-08-31 | End: 2017-09-04 | Stop reason: HOSPADM

## 2017-08-31 RX ORDER — ONDANSETRON 2 MG/ML
4 INJECTION INTRAMUSCULAR; INTRAVENOUS EVERY 6 HOURS PRN
Status: DISCONTINUED | OUTPATIENT
Start: 2017-08-31 | End: 2017-09-04 | Stop reason: HOSPADM

## 2017-08-31 RX ORDER — NITROGLYCERIN 20 MG/100ML
5-200 INJECTION INTRAVENOUS
Status: DISCONTINUED | OUTPATIENT
Start: 2017-08-31 | End: 2017-09-01

## 2017-08-31 RX ORDER — HEPARIN SODIUM 5000 [USP'U]/ML
5000 INJECTION, SOLUTION INTRAVENOUS; SUBCUTANEOUS EVERY 8 HOURS SCHEDULED
Status: DISCONTINUED | OUTPATIENT
Start: 2017-08-31 | End: 2017-09-04 | Stop reason: HOSPADM

## 2017-08-31 RX ORDER — SPIRONOLACTONE 25 MG/1
25 TABLET ORAL DAILY
Status: DISCONTINUED | OUTPATIENT
Start: 2017-09-01 | End: 2017-09-03

## 2017-08-31 RX ORDER — CHLORAL HYDRATE 500 MG
1000 CAPSULE ORAL DAILY
Status: DISCONTINUED | OUTPATIENT
Start: 2017-09-01 | End: 2017-09-04 | Stop reason: HOSPADM

## 2017-08-31 RX ORDER — SODIUM CHLORIDE FOR INHALATION 0.9 %
VIAL, NEBULIZER (ML) INHALATION
Status: COMPLETED
Start: 2017-08-31 | End: 2017-08-31

## 2017-08-31 RX ORDER — MAGNESIUM SULFATE HEPTAHYDRATE 40 MG/ML
2 INJECTION, SOLUTION INTRAVENOUS ONCE
Status: COMPLETED | OUTPATIENT
Start: 2017-08-31 | End: 2017-09-03

## 2017-08-31 RX ADMIN — LEVALBUTEROL HYDROCHLORIDE 1.25 MG: 1.25 SOLUTION, CONCENTRATE RESPIRATORY (INHALATION) at 19:41

## 2017-08-31 RX ADMIN — ACETAMINOPHEN 650 MG: 325 TABLET ORAL at 17:25

## 2017-08-31 RX ADMIN — HEPARIN SODIUM 5000 UNITS: 5000 INJECTION, SOLUTION INTRAVENOUS; SUBCUTANEOUS at 21:25

## 2017-08-31 RX ADMIN — ISODIUM CHLORIDE 3 ML: 0.03 SOLUTION RESPIRATORY (INHALATION) at 19:41

## 2017-08-31 RX ADMIN — NITROGLYCERIN 50 MCG/MIN: 20 INJECTION INTRAVENOUS at 11:46

## 2017-08-31 RX ADMIN — IPRATROPIUM BROMIDE 0.5 MG: 0.5 SOLUTION RESPIRATORY (INHALATION) at 14:23

## 2017-08-31 RX ADMIN — METOPROLOL TARTRATE 12.5 MG: 25 TABLET ORAL at 21:25

## 2017-08-31 RX ADMIN — ACETAMINOPHEN 650 MG: 325 TABLET ORAL at 22:48

## 2017-08-31 RX ADMIN — ALBUTEROL SULFATE 5 MG: 2.5 SOLUTION RESPIRATORY (INHALATION) at 14:23

## 2017-08-31 RX ADMIN — FUROSEMIDE 40 MG: 10 INJECTION, SOLUTION INTRAMUSCULAR; INTRAVENOUS at 19:47

## 2017-08-31 RX ADMIN — MAGNESIUM SULFATE HEPTAHYDRATE 2 G: 40 INJECTION, SOLUTION INTRAVENOUS at 22:19

## 2017-08-31 RX ADMIN — FUROSEMIDE 40 MG: 10 INJECTION, SOLUTION INTRAMUSCULAR; INTRAVENOUS at 11:21

## 2017-09-01 ENCOUNTER — APPOINTMENT (INPATIENT)
Dept: NON INVASIVE DIAGNOSTICS | Facility: HOSPITAL | Age: 72
DRG: 280 | End: 2017-09-01
Payer: COMMERCIAL

## 2017-09-01 ENCOUNTER — APPOINTMENT (INPATIENT)
Dept: RADIOLOGY | Facility: HOSPITAL | Age: 72
DRG: 280 | End: 2017-09-01
Payer: COMMERCIAL

## 2017-09-01 PROBLEM — I50.23 ACUTE ON CHRONIC SYSTOLIC HEART FAILURE (HCC): Status: ACTIVE | Noted: 2017-09-01

## 2017-09-01 LAB
ALBUMIN SERPL BCP-MCNC: 3.1 G/DL (ref 3.5–5)
ALP SERPL-CCNC: 47 U/L (ref 46–116)
ALT SERPL W P-5'-P-CCNC: 51 U/L (ref 12–78)
ANION GAP SERPL CALCULATED.3IONS-SCNC: 9 MMOL/L (ref 4–13)
APTT PPP: 40 SECONDS (ref 23–35)
AST SERPL W P-5'-P-CCNC: 54 U/L (ref 5–45)
BASE EX.OXY STD BLDV CALC-SCNC: 81 % (ref 60–80)
BASE EXCESS BLDV CALC-SCNC: 3.2 MMOL/L
BASOPHILS # BLD AUTO: 0.07 THOUSANDS/ΜL (ref 0–0.1)
BASOPHILS NFR BLD AUTO: 1 % (ref 0–1)
BILIRUB SERPL-MCNC: 0.6 MG/DL (ref 0.2–1)
BUN SERPL-MCNC: 30 MG/DL (ref 5–25)
CA-I BLD-SCNC: 1.03 MMOL/L (ref 1.12–1.32)
CALCIUM SERPL-MCNC: 8.2 MG/DL (ref 8.3–10.1)
CHLORIDE SERPL-SCNC: 101 MMOL/L (ref 100–108)
CO2 SERPL-SCNC: 30 MMOL/L (ref 21–32)
CREAT SERPL-MCNC: 1.43 MG/DL (ref 0.6–1.3)
EOSINOPHIL # BLD AUTO: 0.08 THOUSAND/ΜL (ref 0–0.61)
EOSINOPHIL NFR BLD AUTO: 1 % (ref 0–6)
ERYTHROCYTE [DISTWIDTH] IN BLOOD BY AUTOMATED COUNT: 14.2 % (ref 11.6–15.1)
EST. AVERAGE GLUCOSE BLD GHB EST-MCNC: 131 MG/DL
GFR SERPL CREATININE-BSD FRML MDRD: 50 ML/MIN/1.73SQ M
GLUCOSE SERPL-MCNC: 110 MG/DL (ref 65–140)
HBA1C MFR BLD: 6.2 % (ref 4.2–6.3)
HCO3 BLDV-SCNC: 30.2 MMOL/L (ref 24–30)
HCT VFR BLD AUTO: 38.6 % (ref 36.5–49.3)
HGB BLD-MCNC: 12.4 G/DL (ref 12–17)
INR PPP: 1.53 (ref 0.86–1.16)
LYMPHOCYTES # BLD AUTO: 1.38 THOUSANDS/ΜL (ref 0.6–4.47)
LYMPHOCYTES NFR BLD AUTO: 14 % (ref 14–44)
MAGNESIUM SERPL-MCNC: 2.4 MG/DL (ref 1.6–2.6)
MCH RBC QN AUTO: 29.1 PG (ref 26.8–34.3)
MCHC RBC AUTO-ENTMCNC: 32.1 G/DL (ref 31.4–37.4)
MCV RBC AUTO: 91 FL (ref 82–98)
MONOCYTES # BLD AUTO: 0.72 THOUSAND/ΜL (ref 0.17–1.22)
MONOCYTES NFR BLD AUTO: 7 % (ref 4–12)
NEUTROPHILS # BLD AUTO: 7.61 THOUSANDS/ΜL (ref 1.85–7.62)
NEUTS SEG NFR BLD AUTO: 77 % (ref 43–75)
NRBC BLD AUTO-RTO: 0 /100 WBCS
O2 CT BLDV-SCNC: 15.1 ML/DL
PCO2 BLDV: 56.3 MM HG (ref 42–50)
PH BLDV: 7.35 [PH] (ref 7.3–7.4)
PHOSPHATE SERPL-MCNC: 3.4 MG/DL (ref 2.3–4.1)
PLATELET # BLD AUTO: 231 THOUSANDS/UL (ref 149–390)
PMV BLD AUTO: 11 FL (ref 8.9–12.7)
PO2 BLDV: 53.3 MM HG (ref 35–45)
POTASSIUM SERPL-SCNC: 3.9 MMOL/L (ref 3.5–5.3)
PROT SERPL-MCNC: 6.8 G/DL (ref 6.4–8.2)
PROTHROMBIN TIME: 18.8 SECONDS (ref 12.1–14.4)
RBC # BLD AUTO: 4.26 MILLION/UL (ref 3.88–5.62)
SODIUM SERPL-SCNC: 140 MMOL/L (ref 136–145)
TROPONIN I SERPL-MCNC: 0.07 NG/ML
TROPONIN I SERPL-MCNC: 0.08 NG/ML
TROPONIN I SERPL-MCNC: 0.08 NG/ML
WBC # BLD AUTO: 9.88 THOUSAND/UL (ref 4.31–10.16)

## 2017-09-01 PROCEDURE — 97163 PT EVAL HIGH COMPLEX 45 MIN: CPT

## 2017-09-01 PROCEDURE — 84484 ASSAY OF TROPONIN QUANT: CPT | Performed by: NURSE PRACTITIONER

## 2017-09-01 PROCEDURE — 82805 BLOOD GASES W/O2 SATURATION: CPT | Performed by: NURSE PRACTITIONER

## 2017-09-01 PROCEDURE — 85025 COMPLETE CBC W/AUTO DIFF WBC: CPT | Performed by: NURSE PRACTITIONER

## 2017-09-01 PROCEDURE — 94640 AIRWAY INHALATION TREATMENT: CPT

## 2017-09-01 PROCEDURE — C8929 TTE W OR WO FOL WCON,DOPPLER: HCPCS

## 2017-09-01 PROCEDURE — 94760 N-INVAS EAR/PLS OXIMETRY 1: CPT

## 2017-09-01 PROCEDURE — 71010 HB CHEST X-RAY 1 VIEW FRONTAL (PORTABLE): CPT

## 2017-09-01 PROCEDURE — 85730 THROMBOPLASTIN TIME PARTIAL: CPT | Performed by: NURSE PRACTITIONER

## 2017-09-01 PROCEDURE — 80053 COMPREHEN METABOLIC PANEL: CPT | Performed by: NURSE PRACTITIONER

## 2017-09-01 PROCEDURE — 85610 PROTHROMBIN TIME: CPT | Performed by: NURSE PRACTITIONER

## 2017-09-01 PROCEDURE — 84100 ASSAY OF PHOSPHORUS: CPT | Performed by: NURSE PRACTITIONER

## 2017-09-01 PROCEDURE — 83036 HEMOGLOBIN GLYCOSYLATED A1C: CPT | Performed by: NURSE PRACTITIONER

## 2017-09-01 PROCEDURE — 82330 ASSAY OF CALCIUM: CPT | Performed by: NURSE PRACTITIONER

## 2017-09-01 PROCEDURE — 83735 ASSAY OF MAGNESIUM: CPT | Performed by: NURSE PRACTITIONER

## 2017-09-01 PROCEDURE — G8979 MOBILITY GOAL STATUS: HCPCS

## 2017-09-01 PROCEDURE — G8978 MOBILITY CURRENT STATUS: HCPCS

## 2017-09-01 RX ORDER — DIPHENHYDRAMINE HCL 25 MG
25 TABLET ORAL EVERY 6 HOURS PRN
Status: DISCONTINUED | OUTPATIENT
Start: 2017-09-01 | End: 2017-09-04 | Stop reason: HOSPADM

## 2017-09-01 RX ORDER — FUROSEMIDE 10 MG/ML
40 INJECTION INTRAMUSCULAR; INTRAVENOUS DAILY
Status: DISCONTINUED | OUTPATIENT
Start: 2017-09-02 | End: 2017-09-04 | Stop reason: HOSPADM

## 2017-09-01 RX ORDER — SODIUM CHLORIDE FOR INHALATION 0.9 %
VIAL, NEBULIZER (ML) INHALATION
Status: COMPLETED
Start: 2017-09-01 | End: 2017-09-01

## 2017-09-01 RX ORDER — SODIUM CHLORIDE FOR INHALATION 0.9 %
3 VIAL, NEBULIZER (ML) INHALATION
Status: DISCONTINUED | OUTPATIENT
Start: 2017-09-01 | End: 2017-09-04 | Stop reason: HOSPADM

## 2017-09-01 RX ORDER — POTASSIUM CHLORIDE 20 MEQ/1
20 TABLET, EXTENDED RELEASE ORAL ONCE
Status: COMPLETED | OUTPATIENT
Start: 2017-09-01 | End: 2017-09-01

## 2017-09-01 RX ADMIN — HEPARIN SODIUM 5000 UNITS: 5000 INJECTION, SOLUTION INTRAVENOUS; SUBCUTANEOUS at 05:42

## 2017-09-01 RX ADMIN — ISODIUM CHLORIDE 3 ML: 0.03 SOLUTION RESPIRATORY (INHALATION) at 10:07

## 2017-09-01 RX ADMIN — CYANOCOBALAMIN TAB 500 MCG 1000 MCG: 500 TAB at 08:48

## 2017-09-01 RX ADMIN — METOPROLOL TARTRATE 12.5 MG: 25 TABLET ORAL at 08:48

## 2017-09-01 RX ADMIN — DIPHENHYDRAMINE HCL 25 MG: 25 TABLET ORAL at 21:47

## 2017-09-01 RX ADMIN — HEPARIN SODIUM 5000 UNITS: 5000 INJECTION, SOLUTION INTRAVENOUS; SUBCUTANEOUS at 21:47

## 2017-09-01 RX ADMIN — CALCIUM GLUCONATE 1 G: 94 INJECTION, SOLUTION INTRAVENOUS at 08:00

## 2017-09-01 RX ADMIN — Medication 325 MG: at 08:00

## 2017-09-01 RX ADMIN — HEPARIN SODIUM 5000 UNITS: 5000 INJECTION, SOLUTION INTRAVENOUS; SUBCUTANEOUS at 13:21

## 2017-09-01 RX ADMIN — SPIRONOLACTONE 25 MG: 25 TABLET, FILM COATED ORAL at 08:47

## 2017-09-01 RX ADMIN — METOPROLOL TARTRATE 12.5 MG: 25 TABLET ORAL at 20:29

## 2017-09-01 RX ADMIN — ISODIUM CHLORIDE 3 ML: 0.03 SOLUTION RESPIRATORY (INHALATION) at 17:01

## 2017-09-01 RX ADMIN — FUROSEMIDE 40 MG: 10 INJECTION, SOLUTION INTRAMUSCULAR; INTRAVENOUS at 08:48

## 2017-09-01 RX ADMIN — POTASSIUM CHLORIDE 20 MEQ: 1500 TABLET, EXTENDED RELEASE ORAL at 07:30

## 2017-09-01 RX ADMIN — PERFLUTREN 1 ML/MIN: 6.52 INJECTION, SUSPENSION INTRAVENOUS at 09:43

## 2017-09-01 RX ADMIN — LEVALBUTEROL HYDROCHLORIDE 1.25 MG: 1.25 SOLUTION, CONCENTRATE RESPIRATORY (INHALATION) at 20:21

## 2017-09-01 RX ADMIN — DOCUSATE SODIUM 100 MG: 100 CAPSULE, LIQUID FILLED ORAL at 17:28

## 2017-09-01 RX ADMIN — ISODIUM CHLORIDE 3 ML: 0.03 SOLUTION RESPIRATORY (INHALATION) at 20:20

## 2017-09-01 RX ADMIN — Medication 1000 MG: at 08:48

## 2017-09-01 RX ADMIN — DOCUSATE SODIUM 100 MG: 100 CAPSULE, LIQUID FILLED ORAL at 08:47

## 2017-09-01 RX ADMIN — TIOTROPIUM BROMIDE 18 MCG: 18 CAPSULE ORAL; RESPIRATORY (INHALATION) at 08:49

## 2017-09-01 RX ADMIN — LEVOTHYROXINE SODIUM 125 MCG: 125 TABLET ORAL at 08:53

## 2017-09-01 RX ADMIN — ATORVASTATIN CALCIUM 80 MG: 40 TABLET, FILM COATED ORAL at 08:48

## 2017-09-01 RX ADMIN — LEVALBUTEROL HYDROCHLORIDE 1.25 MG: 1.25 SOLUTION, CONCENTRATE RESPIRATORY (INHALATION) at 17:01

## 2017-09-01 RX ADMIN — ASPIRIN 81 MG 81 MG: 81 TABLET ORAL at 08:48

## 2017-09-01 RX ADMIN — LEVALBUTEROL HYDROCHLORIDE 1.25 MG: 1.25 SOLUTION, CONCENTRATE RESPIRATORY (INHALATION) at 10:06

## 2017-09-01 RX ADMIN — LISINOPRIL 2.5 MG: 2.5 TABLET ORAL at 08:48

## 2017-09-02 LAB
ANION GAP SERPL CALCULATED.3IONS-SCNC: 8 MMOL/L (ref 4–13)
BUN SERPL-MCNC: 32 MG/DL (ref 5–25)
CALCIUM SERPL-MCNC: 8.1 MG/DL (ref 8.3–10.1)
CHLORIDE SERPL-SCNC: 101 MMOL/L (ref 100–108)
CO2 SERPL-SCNC: 30 MMOL/L (ref 21–32)
CREAT SERPL-MCNC: 1.3 MG/DL (ref 0.6–1.3)
GFR SERPL CREATININE-BSD FRML MDRD: 56 ML/MIN/1.73SQ M
GLUCOSE SERPL-MCNC: 112 MG/DL (ref 65–140)
MAGNESIUM SERPL-MCNC: 2 MG/DL (ref 1.6–2.6)
NT-PROBNP SERPL-MCNC: 6068 PG/ML
POTASSIUM SERPL-SCNC: 3.5 MMOL/L (ref 3.5–5.3)
SODIUM SERPL-SCNC: 139 MMOL/L (ref 136–145)

## 2017-09-02 PROCEDURE — 94640 AIRWAY INHALATION TREATMENT: CPT

## 2017-09-02 PROCEDURE — 80048 BASIC METABOLIC PNL TOTAL CA: CPT | Performed by: PHYSICIAN ASSISTANT

## 2017-09-02 PROCEDURE — 83880 ASSAY OF NATRIURETIC PEPTIDE: CPT | Performed by: PHYSICIAN ASSISTANT

## 2017-09-02 PROCEDURE — 83735 ASSAY OF MAGNESIUM: CPT | Performed by: NURSE PRACTITIONER

## 2017-09-02 PROCEDURE — 94760 N-INVAS EAR/PLS OXIMETRY 1: CPT

## 2017-09-02 RX ORDER — METOPROLOL SUCCINATE 50 MG/1
50 TABLET, EXTENDED RELEASE ORAL DAILY
Status: DISCONTINUED | OUTPATIENT
Start: 2017-09-03 | End: 2017-09-03

## 2017-09-02 RX ORDER — BEXAROTENE 75 MG/1
600 CAPSULE, LIQUID FILLED ORAL DAILY
Status: DISCONTINUED | OUTPATIENT
Start: 2017-09-03 | End: 2017-09-04 | Stop reason: HOSPADM

## 2017-09-02 RX ORDER — POTASSIUM CHLORIDE 20 MEQ/1
40 TABLET, EXTENDED RELEASE ORAL ONCE
Status: COMPLETED | OUTPATIENT
Start: 2017-09-02 | End: 2017-09-02

## 2017-09-02 RX ORDER — LISINOPRIL 2.5 MG/1
2.5 TABLET ORAL DAILY
Status: DISCONTINUED | OUTPATIENT
Start: 2017-09-02 | End: 2017-09-03

## 2017-09-02 RX ORDER — POTASSIUM CHLORIDE 20 MEQ/1
20 TABLET, EXTENDED RELEASE ORAL ONCE
Status: DISCONTINUED | OUTPATIENT
Start: 2017-09-02 | End: 2017-09-02

## 2017-09-02 RX ORDER — ZOLPIDEM TARTRATE 5 MG/1
10 TABLET ORAL
Status: DISCONTINUED | OUTPATIENT
Start: 2017-09-02 | End: 2017-09-04 | Stop reason: HOSPADM

## 2017-09-02 RX ADMIN — ZOLPIDEM TARTRATE 10 MG: 5 TABLET, COATED ORAL at 01:30

## 2017-09-02 RX ADMIN — LEVALBUTEROL HYDROCHLORIDE 1.25 MG: 1.25 SOLUTION, CONCENTRATE RESPIRATORY (INHALATION) at 08:29

## 2017-09-02 RX ADMIN — LEVOTHYROXINE SODIUM 125 MCG: 125 TABLET ORAL at 09:00

## 2017-09-02 RX ADMIN — DIPHENHYDRAMINE HCL 25 MG: 25 TABLET ORAL at 21:45

## 2017-09-02 RX ADMIN — DOCUSATE SODIUM 100 MG: 100 CAPSULE, LIQUID FILLED ORAL at 17:35

## 2017-09-02 RX ADMIN — HEPARIN SODIUM 5000 UNITS: 5000 INJECTION, SOLUTION INTRAVENOUS; SUBCUTANEOUS at 21:44

## 2017-09-02 RX ADMIN — METOPROLOL TARTRATE 12.5 MG: 25 TABLET ORAL at 09:00

## 2017-09-02 RX ADMIN — CALCIUM GLUCONATE 1 G: 94 INJECTION, SOLUTION INTRAVENOUS at 07:45

## 2017-09-02 RX ADMIN — DOCUSATE SODIUM 100 MG: 100 CAPSULE, LIQUID FILLED ORAL at 09:00

## 2017-09-02 RX ADMIN — ISODIUM CHLORIDE 3 ML: 0.03 SOLUTION RESPIRATORY (INHALATION) at 08:29

## 2017-09-02 RX ADMIN — FUROSEMIDE 40 MG: 10 INJECTION, SOLUTION INTRAMUSCULAR; INTRAVENOUS at 09:00

## 2017-09-02 RX ADMIN — TIOTROPIUM BROMIDE 18 MCG: 18 CAPSULE ORAL; RESPIRATORY (INHALATION) at 09:01

## 2017-09-02 RX ADMIN — ATORVASTATIN CALCIUM 80 MG: 40 TABLET, FILM COATED ORAL at 09:00

## 2017-09-02 RX ADMIN — HEPARIN SODIUM 5000 UNITS: 5000 INJECTION, SOLUTION INTRAVENOUS; SUBCUTANEOUS at 13:05

## 2017-09-02 RX ADMIN — ASPIRIN 81 MG 81 MG: 81 TABLET ORAL at 09:00

## 2017-09-02 RX ADMIN — LISINOPRIL 2.5 MG: 2.5 TABLET ORAL at 09:01

## 2017-09-02 RX ADMIN — POTASSIUM CHLORIDE 40 MEQ: 1500 TABLET, EXTENDED RELEASE ORAL at 07:45

## 2017-09-02 RX ADMIN — HEPARIN SODIUM 5000 UNITS: 5000 INJECTION, SOLUTION INTRAVENOUS; SUBCUTANEOUS at 06:04

## 2017-09-02 RX ADMIN — ISODIUM CHLORIDE 3 ML: 0.03 SOLUTION RESPIRATORY (INHALATION) at 14:14

## 2017-09-02 RX ADMIN — CYANOCOBALAMIN TAB 500 MCG 1000 MCG: 500 TAB at 09:00

## 2017-09-02 RX ADMIN — SPIRONOLACTONE 25 MG: 25 TABLET, FILM COATED ORAL at 09:00

## 2017-09-02 RX ADMIN — LEVALBUTEROL HYDROCHLORIDE 1.25 MG: 1.25 SOLUTION, CONCENTRATE RESPIRATORY (INHALATION) at 20:06

## 2017-09-02 RX ADMIN — LEVALBUTEROL HYDROCHLORIDE 1.25 MG: 1.25 SOLUTION, CONCENTRATE RESPIRATORY (INHALATION) at 14:14

## 2017-09-02 RX ADMIN — Medication 1000 MG: at 09:00

## 2017-09-02 RX ADMIN — Medication 325 MG: at 08:00

## 2017-09-02 RX ADMIN — ZOLPIDEM TARTRATE 10 MG: 5 TABLET, COATED ORAL at 21:45

## 2017-09-03 LAB
ANION GAP SERPL CALCULATED.3IONS-SCNC: 6 MMOL/L (ref 4–13)
BUN SERPL-MCNC: 29 MG/DL (ref 5–25)
CALCIUM SERPL-MCNC: 8.2 MG/DL (ref 8.3–10.1)
CHLORIDE SERPL-SCNC: 101 MMOL/L (ref 100–108)
CO2 SERPL-SCNC: 31 MMOL/L (ref 21–32)
CREAT SERPL-MCNC: 1.11 MG/DL (ref 0.6–1.3)
GFR SERPL CREATININE-BSD FRML MDRD: 67 ML/MIN/1.73SQ M
GLUCOSE SERPL-MCNC: 110 MG/DL (ref 65–140)
MAGNESIUM SERPL-MCNC: 1.9 MG/DL (ref 1.6–2.6)
POTASSIUM SERPL-SCNC: 3.9 MMOL/L (ref 3.5–5.3)
SODIUM SERPL-SCNC: 138 MMOL/L (ref 136–145)

## 2017-09-03 PROCEDURE — 94760 N-INVAS EAR/PLS OXIMETRY 1: CPT

## 2017-09-03 PROCEDURE — 80048 BASIC METABOLIC PNL TOTAL CA: CPT | Performed by: NURSE PRACTITIONER

## 2017-09-03 PROCEDURE — 83735 ASSAY OF MAGNESIUM: CPT | Performed by: NURSE PRACTITIONER

## 2017-09-03 PROCEDURE — 94640 AIRWAY INHALATION TREATMENT: CPT

## 2017-09-03 RX ORDER — POTASSIUM CHLORIDE 20 MEQ/1
20 TABLET, EXTENDED RELEASE ORAL ONCE
Status: COMPLETED | OUTPATIENT
Start: 2017-09-03 | End: 2017-09-03

## 2017-09-03 RX ORDER — MAGNESIUM SULFATE HEPTAHYDRATE 40 MG/ML
2 INJECTION, SOLUTION INTRAVENOUS ONCE
Status: COMPLETED | OUTPATIENT
Start: 2017-09-03 | End: 2017-09-03

## 2017-09-03 RX ADMIN — HEPARIN SODIUM 5000 UNITS: 5000 INJECTION, SOLUTION INTRAVENOUS; SUBCUTANEOUS at 13:32

## 2017-09-03 RX ADMIN — LEVALBUTEROL HYDROCHLORIDE 1.25 MG: 1.25 SOLUTION, CONCENTRATE RESPIRATORY (INHALATION) at 15:43

## 2017-09-03 RX ADMIN — BEXAROTENE 600 MG: 75 CAPSULE, LIQUID FILLED ORAL at 09:44

## 2017-09-03 RX ADMIN — ZOLPIDEM TARTRATE 10 MG: 5 TABLET, COATED ORAL at 21:11

## 2017-09-03 RX ADMIN — MAGNESIUM SULFATE HEPTAHYDRATE 2 G: 40 INJECTION, SOLUTION INTRAVENOUS at 06:16

## 2017-09-03 RX ADMIN — HEPARIN SODIUM 5000 UNITS: 5000 INJECTION, SOLUTION INTRAVENOUS; SUBCUTANEOUS at 21:10

## 2017-09-03 RX ADMIN — POTASSIUM CHLORIDE 20 MEQ: 1500 TABLET, EXTENDED RELEASE ORAL at 06:16

## 2017-09-03 RX ADMIN — ATORVASTATIN CALCIUM 80 MG: 40 TABLET, FILM COATED ORAL at 08:48

## 2017-09-03 RX ADMIN — Medication 1000 MG: at 08:53

## 2017-09-03 RX ADMIN — HEPARIN SODIUM 5000 UNITS: 5000 INJECTION, SOLUTION INTRAVENOUS; SUBCUTANEOUS at 05:18

## 2017-09-03 RX ADMIN — TIOTROPIUM BROMIDE 18 MCG: 18 CAPSULE ORAL; RESPIRATORY (INHALATION) at 10:00

## 2017-09-03 RX ADMIN — DOCUSATE SODIUM 100 MG: 100 CAPSULE, LIQUID FILLED ORAL at 08:54

## 2017-09-03 RX ADMIN — TRIAMCINOLONE ACETONIDE 1 APPLICATION: 1 CREAM TOPICAL at 18:21

## 2017-09-03 RX ADMIN — TRIAMCINOLONE ACETONIDE 1 APPLICATION: 1 CREAM TOPICAL at 10:00

## 2017-09-03 RX ADMIN — ISODIUM CHLORIDE 3 ML: 0.03 SOLUTION RESPIRATORY (INHALATION) at 19:22

## 2017-09-03 RX ADMIN — ISODIUM CHLORIDE 3 ML: 0.03 SOLUTION RESPIRATORY (INHALATION) at 08:27

## 2017-09-03 RX ADMIN — SACUBITRIL AND VALSARTAN 1 TABLET: 24; 26 TABLET, FILM COATED ORAL at 18:18

## 2017-09-03 RX ADMIN — LEVALBUTEROL HYDROCHLORIDE 1.25 MG: 1.25 SOLUTION, CONCENTRATE RESPIRATORY (INHALATION) at 19:22

## 2017-09-03 RX ADMIN — DOCUSATE SODIUM 100 MG: 100 CAPSULE, LIQUID FILLED ORAL at 18:18

## 2017-09-03 RX ADMIN — CYANOCOBALAMIN TAB 500 MCG 1000 MCG: 500 TAB at 08:50

## 2017-09-03 RX ADMIN — ISODIUM CHLORIDE 3 ML: 0.03 SOLUTION RESPIRATORY (INHALATION) at 15:43

## 2017-09-03 RX ADMIN — SPIRONOLACTONE 25 MG: 25 TABLET, FILM COATED ORAL at 08:47

## 2017-09-03 RX ADMIN — LISINOPRIL 2.5 MG: 2.5 TABLET ORAL at 08:49

## 2017-09-03 RX ADMIN — LEVOTHYROXINE SODIUM 125 MCG: 125 TABLET ORAL at 08:50

## 2017-09-03 RX ADMIN — METOPROLOL SUCCINATE 50 MG: 50 TABLET, FILM COATED, EXTENDED RELEASE ORAL at 08:51

## 2017-09-03 RX ADMIN — Medication 325 MG: at 08:47

## 2017-09-03 RX ADMIN — FUROSEMIDE 40 MG: 10 INJECTION, SOLUTION INTRAMUSCULAR; INTRAVENOUS at 08:57

## 2017-09-03 RX ADMIN — LEVALBUTEROL HYDROCHLORIDE 1.25 MG: 1.25 SOLUTION, CONCENTRATE RESPIRATORY (INHALATION) at 08:27

## 2017-09-03 RX ADMIN — ASPIRIN 81 MG 81 MG: 81 TABLET ORAL at 08:47

## 2017-09-04 VITALS
TEMPERATURE: 97.8 F | HEART RATE: 80 BPM | OXYGEN SATURATION: 93 % | HEIGHT: 72 IN | SYSTOLIC BLOOD PRESSURE: 97 MMHG | BODY MASS INDEX: 31.41 KG/M2 | DIASTOLIC BLOOD PRESSURE: 66 MMHG | RESPIRATION RATE: 17 BRPM | WEIGHT: 231.92 LBS

## 2017-09-04 LAB
ANION GAP SERPL CALCULATED.3IONS-SCNC: 9 MMOL/L (ref 4–13)
BUN SERPL-MCNC: 27 MG/DL (ref 5–25)
CALCIUM SERPL-MCNC: 8.4 MG/DL (ref 8.3–10.1)
CHLORIDE SERPL-SCNC: 99 MMOL/L (ref 100–108)
CO2 SERPL-SCNC: 26 MMOL/L (ref 21–32)
CREAT SERPL-MCNC: 1.26 MG/DL (ref 0.6–1.3)
GFR SERPL CREATININE-BSD FRML MDRD: 58 ML/MIN/1.73SQ M
GLUCOSE SERPL-MCNC: 124 MG/DL (ref 65–140)
MAGNESIUM SERPL-MCNC: 2.2 MG/DL (ref 1.6–2.6)
POTASSIUM SERPL-SCNC: 4.5 MMOL/L (ref 3.5–5.3)
SODIUM SERPL-SCNC: 134 MMOL/L (ref 136–145)

## 2017-09-04 PROCEDURE — 94640 AIRWAY INHALATION TREATMENT: CPT

## 2017-09-04 PROCEDURE — 80048 BASIC METABOLIC PNL TOTAL CA: CPT | Performed by: NURSE PRACTITIONER

## 2017-09-04 PROCEDURE — 83735 ASSAY OF MAGNESIUM: CPT | Performed by: NURSE PRACTITIONER

## 2017-09-04 PROCEDURE — 94760 N-INVAS EAR/PLS OXIMETRY 1: CPT

## 2017-09-04 RX ORDER — METOPROLOL SUCCINATE 25 MG/1
75 TABLET, EXTENDED RELEASE ORAL DAILY
Qty: 90 TABLET | Refills: 0 | Status: SHIPPED | OUTPATIENT
Start: 2017-09-04 | End: 2018-02-22 | Stop reason: SDUPTHER

## 2017-09-04 RX ORDER — TORSEMIDE 20 MG/1
20 TABLET ORAL DAILY
Qty: 30 TABLET | Refills: 0 | Status: SHIPPED | OUTPATIENT
Start: 2017-09-04 | End: 2021-04-01 | Stop reason: SDUPTHER

## 2017-09-04 RX ADMIN — METOPROLOL SUCCINATE 75 MG: 25 TABLET, FILM COATED, EXTENDED RELEASE ORAL at 09:00

## 2017-09-04 RX ADMIN — Medication 325 MG: at 08:30

## 2017-09-04 RX ADMIN — SACUBITRIL AND VALSARTAN 1 TABLET: 24; 26 TABLET, FILM COATED ORAL at 09:37

## 2017-09-04 RX ADMIN — BEXAROTENE 600 MG: 75 CAPSULE, LIQUID FILLED ORAL at 08:46

## 2017-09-04 RX ADMIN — DOCUSATE SODIUM 100 MG: 100 CAPSULE, LIQUID FILLED ORAL at 08:46

## 2017-09-04 RX ADMIN — LEVOTHYROXINE SODIUM 125 MCG: 125 TABLET ORAL at 08:46

## 2017-09-04 RX ADMIN — LEVALBUTEROL HYDROCHLORIDE 1.25 MG: 1.25 SOLUTION, CONCENTRATE RESPIRATORY (INHALATION) at 13:13

## 2017-09-04 RX ADMIN — ATORVASTATIN CALCIUM 80 MG: 40 TABLET, FILM COATED ORAL at 08:45

## 2017-09-04 RX ADMIN — ISODIUM CHLORIDE 3 ML: 0.03 SOLUTION RESPIRATORY (INHALATION) at 07:14

## 2017-09-04 RX ADMIN — Medication 1000 MG: at 08:45

## 2017-09-04 RX ADMIN — LEVALBUTEROL HYDROCHLORIDE 1.25 MG: 1.25 SOLUTION, CONCENTRATE RESPIRATORY (INHALATION) at 07:14

## 2017-09-04 RX ADMIN — HEPARIN SODIUM 5000 UNITS: 5000 INJECTION, SOLUTION INTRAVENOUS; SUBCUTANEOUS at 05:18

## 2017-09-04 RX ADMIN — ISODIUM CHLORIDE 3 ML: 0.03 SOLUTION RESPIRATORY (INHALATION) at 13:13

## 2017-09-04 RX ADMIN — ASPIRIN 81 MG 81 MG: 81 TABLET ORAL at 08:46

## 2017-09-04 RX ADMIN — FUROSEMIDE 40 MG: 10 INJECTION, SOLUTION INTRAMUSCULAR; INTRAVENOUS at 08:45

## 2017-09-04 RX ADMIN — TIOTROPIUM BROMIDE 18 MCG: 18 CAPSULE ORAL; RESPIRATORY (INHALATION) at 08:46

## 2017-09-04 RX ADMIN — CYANOCOBALAMIN TAB 500 MCG 1000 MCG: 500 TAB at 08:46

## 2017-09-09 ENCOUNTER — ALLSCRIPTS OFFICE VISIT (OUTPATIENT)
Dept: OTHER | Facility: OTHER | Age: 72
End: 2017-09-09

## 2017-09-12 ENCOUNTER — ALLSCRIPTS OFFICE VISIT (OUTPATIENT)
Dept: OTHER | Facility: OTHER | Age: 72
End: 2017-09-12

## 2017-09-14 ENCOUNTER — ALLSCRIPTS OFFICE VISIT (OUTPATIENT)
Dept: OTHER | Facility: OTHER | Age: 72
End: 2017-09-14

## 2017-09-14 ENCOUNTER — TRANSCRIBE ORDERS (OUTPATIENT)
Dept: LAB | Facility: CLINIC | Age: 72
End: 2017-09-14

## 2017-09-14 ENCOUNTER — APPOINTMENT (OUTPATIENT)
Dept: LAB | Facility: CLINIC | Age: 72
End: 2017-09-14
Payer: COMMERCIAL

## 2017-09-14 DIAGNOSIS — I50.20 SYSTOLIC CONGESTIVE HEART FAILURE, UNSPECIFIED CONGESTIVE HEART FAILURE CHRONICITY: ICD-10-CM

## 2017-09-14 DIAGNOSIS — Z79.899 ENCOUNTER FOR LONG-TERM (CURRENT) USE OF OTHER MEDICATIONS: ICD-10-CM

## 2017-09-14 DIAGNOSIS — C84.00 MYCOSIS FUNGOIDES, UNSPECIFIED BODY REGION (HCC): ICD-10-CM

## 2017-09-14 DIAGNOSIS — C84.00 MYCOSIS FUNGOIDES, UNSPECIFIED BODY REGION (HCC): Primary | ICD-10-CM

## 2017-09-14 LAB
ALBUMIN SERPL BCP-MCNC: 3.2 G/DL (ref 3.5–5)
ALP SERPL-CCNC: 48 U/L (ref 46–116)
ALT SERPL W P-5'-P-CCNC: 28 U/L (ref 12–78)
ANION GAP SERPL CALCULATED.3IONS-SCNC: 5 MMOL/L (ref 4–13)
AST SERPL W P-5'-P-CCNC: 22 U/L (ref 5–45)
BASOPHILS # BLD AUTO: 0.06 THOUSANDS/ΜL (ref 0–0.1)
BASOPHILS NFR BLD AUTO: 1 % (ref 0–1)
BILIRUB DIRECT SERPL-MCNC: 0.11 MG/DL (ref 0–0.2)
BILIRUB SERPL-MCNC: 0.29 MG/DL (ref 0.2–1)
BUN SERPL-MCNC: 36 MG/DL (ref 5–25)
CALCIUM SERPL-MCNC: 8.1 MG/DL (ref 8.3–10.1)
CHLORIDE SERPL-SCNC: 105 MMOL/L (ref 100–108)
CHOLEST SERPL-MCNC: 195 MG/DL (ref 50–200)
CO2 SERPL-SCNC: 29 MMOL/L (ref 21–32)
CREAT SERPL-MCNC: 1.61 MG/DL (ref 0.6–1.3)
EOSINOPHIL # BLD AUTO: 0.22 THOUSAND/ΜL (ref 0–0.61)
EOSINOPHIL NFR BLD AUTO: 4 % (ref 0–6)
ERYTHROCYTE [DISTWIDTH] IN BLOOD BY AUTOMATED COUNT: 14.9 % (ref 11.6–15.1)
GFR SERPL CREATININE-BSD FRML MDRD: 43 ML/MIN/1.73SQ M
GLUCOSE P FAST SERPL-MCNC: 107 MG/DL (ref 65–99)
HCT VFR BLD AUTO: 42.9 % (ref 36.5–49.3)
HGB BLD-MCNC: 13.4 G/DL (ref 12–17)
LYMPHOCYTES # BLD AUTO: 1.48 THOUSANDS/ΜL (ref 0.6–4.47)
LYMPHOCYTES NFR BLD AUTO: 28 % (ref 14–44)
MCH RBC QN AUTO: 29.4 PG (ref 26.8–34.3)
MCHC RBC AUTO-ENTMCNC: 31.2 G/DL (ref 31.4–37.4)
MCV RBC AUTO: 94 FL (ref 82–98)
MONOCYTES # BLD AUTO: 0.42 THOUSAND/ΜL (ref 0.17–1.22)
MONOCYTES NFR BLD AUTO: 8 % (ref 4–12)
NEUTROPHILS # BLD AUTO: 3.14 THOUSANDS/ΜL (ref 1.85–7.62)
NEUTS SEG NFR BLD AUTO: 59 % (ref 43–75)
NRBC BLD AUTO-RTO: 0 /100 WBCS
PLATELET # BLD AUTO: 256 THOUSANDS/UL (ref 149–390)
PMV BLD AUTO: 11.5 FL (ref 8.9–12.7)
POTASSIUM SERPL-SCNC: 4.7 MMOL/L (ref 3.5–5.3)
PROT SERPL-MCNC: 7.3 G/DL (ref 6.4–8.2)
RBC # BLD AUTO: 4.56 MILLION/UL (ref 3.88–5.62)
SODIUM SERPL-SCNC: 139 MMOL/L (ref 136–145)
T4 FREE SERPL-MCNC: 0.71 NG/DL (ref 0.76–1.46)
TRIGL SERPL-MCNC: 218 MG/DL
WBC # BLD AUTO: 5.33 THOUSAND/UL (ref 4.31–10.16)

## 2017-09-14 PROCEDURE — 82465 ASSAY BLD/SERUM CHOLESTEROL: CPT

## 2017-09-14 PROCEDURE — 80048 BASIC METABOLIC PNL TOTAL CA: CPT

## 2017-09-14 PROCEDURE — 84439 ASSAY OF FREE THYROXINE: CPT

## 2017-09-14 PROCEDURE — 84478 ASSAY OF TRIGLYCERIDES: CPT

## 2017-09-14 PROCEDURE — 36415 COLL VENOUS BLD VENIPUNCTURE: CPT

## 2017-09-14 PROCEDURE — 80076 HEPATIC FUNCTION PANEL: CPT

## 2017-09-14 PROCEDURE — 85025 COMPLETE CBC W/AUTO DIFF WBC: CPT

## 2017-09-29 DIAGNOSIS — E61.1 IRON DEFICIENCY: ICD-10-CM

## 2017-09-29 DIAGNOSIS — I42.9 CARDIOMYOPATHY (HCC): ICD-10-CM

## 2017-09-29 DIAGNOSIS — I49.3 VENTRICULAR PREMATURE DEPOLARIZATION: ICD-10-CM

## 2017-10-04 ENCOUNTER — GENERIC CONVERSION - ENCOUNTER (OUTPATIENT)
Dept: OTHER | Facility: OTHER | Age: 72
End: 2017-10-04

## 2017-10-19 ENCOUNTER — ALLSCRIPTS OFFICE VISIT (OUTPATIENT)
Dept: OTHER | Facility: OTHER | Age: 72
End: 2017-10-19

## 2017-10-20 NOTE — PROGRESS NOTES
Plan  Cardiomyopathy    · From  Torsemide 20 MG Oral Tablet Take 1 tablet daily To Torsemide 20 MG  Oral Tablet take 1 tablet every other day   Rx By: Nelson Garcia; Dispense: 30 Days ; #:15 Tablet; Refill: 0;For: Cardiomyopathy; [place] = N; Record  Cardiomyopathy, Chronic obstructive pulmonary disease, Chronic systolic congestive  heart failure, PVC (premature ventricular contraction)    · Cardiopulmonary Exercise Test; Status:Active; Requested KVNG:[id number];    Perform:[facility]; Due:19Oct2018; Ordered; For:Cardiomyopathy, Chronic obstructive pulmonary disease, Chronic systolic congestive heart failure, PVC (premature ventricular contraction); Ordered By:Brenden Paige;  Cardiomyopathy, PVC (premature ventricular contraction)    · HOLTER MONITOR - 24 HOUR; Status:Hold For - Scheduling; Requested MEKA:[id number];     Perform:[facility]; Due:19Oct2018; Ordered; For:Cardiomyopathy, PVC (premature ventricular contraction); Ordered By:Brenden Paige;    Discussion/Summary  Cardiology Discussion Summary Free Text Note Form [facility]:   # NIDCM (diagnosed >15 years ago) w/ LVEF --15%, LVIDd 6 7 cm, reduced RVSF, NYHA III, ACC/AHA Stage C/D s/p BiV AICD: Unclear etiology, however, has been long standing  Arrhythmia induced (PVCs) +/- HTN +/- toxin (unclear exposure hx in the past as patient was a , unclear EtOH or other toxins) +/- idopathic  Last TTE (9/2017) shows LVEF --15% w/ reduced RVSF, PASP 50 mmHg, AMADA, and mod MR w/ mild TR  Likely overdiuresed in clinic today based on exam, low BP, and worsening LH  Hx c/w possible low output (poor exercise tolerance, narrow pulse pressure this AM -although can be overdiuresis as well, and poor appetite)  BiV pacing--95%   Plan as below:to assess degree of lung and cardiac dysfunctionhour holter to assess PVC burdenconsider RHC in the future depending on patient's GOCMetoprolol succinate 25 mg PO BID, with additional dose based on PVC sensationEntresto 24-26 mg PO BIDtorsemide today and tomorrow  Then restart QOD, as likely has enhanced diuretic effect of sacubitril; ideal home weight probably closer to 210-214to torsemide 20 mg PO QOD w/ QOD K repletion  Therapies: Hx is c/w borderline to low output  Given age, he is a candidate for LVAD and/or OHT  He is not sure he is interested in any of those therapies  We discussed pros and cons of each therapy  He has family, but all live in [place], which complicates his care, and would make either LVAD or OHT difficult  With respect to LVAD, his RV shows reduced RVSF which would need to be assessed further should he be interested in LVAD  We will continue to follow closely  ELLYN on last lab check: ? overdiuresis +/- Entresto  Baseline --1 2  Was 1 6 on last check  Continue to monitor  Hx of PVCs (symptomatic) and VT: Last device interrogation 9/2017 w/ 4 episodes of 6-7 beats of NSVT @ varying rates  Dr Dona Sifuentes, Given likely scar, no fleicainide  Sotalol vs Amio  as abovehold off on uptitration of BB at this timeChronic bronchitis/COPD (30 pack year smoking history): most recent PFTs done 11/2016 show FEV1 46%, FVC 65%, post broncho dilator -> 56% and 79%  , respectively  TLC 91%  DLCOuncorrected reduced @ 50%  6MWT - walked --800 feet w/ saturations staying above 90%  HR increased from resting --87->124 beats/min  Done on RA  per pulmonaryHTNHLDHx of SqCCHx of mycosis fungoidesHx of NH lymphomapolyneuropathyAnemia: Workup per Heme  in 1 month        Chief Complaint  Chief Complaint Free Text Note Form: F/U      History of Present Illness  Cardiology \[facility] [place] [facility]\"" Free Text Note Form [facility]: Very pleasant 74 y/o gentleman w/ PMHx of NICM (diagnosed >15 years ago) w/ LVEF --15%, NYHA III, ACC/AHA Stage C s/p BiV AICD, Hx of PVCs (symptomatic) and VT, COPD (30 pack year smoking history - quit one year ago), hx of NH lymphoma (agent orange related), polyneuropathy, HTN, HLD, Hx of SqCC, CÉSAR on nocturnal O2 p/f evaluation and management of HFrEF   recently admitted 8/31 to 9/4/2017 for CHF exacerbation at which time he was transitioned to [facility]  Last device check on 9/12/17 showed BiV paced --85% of the time  He did have 4 6-7 beat runs of NSVT on device interrogation  states he feels well  Especially since he started [facility]  He has lost --16 lbs  He states today for the first time he became [facility] and dizzy  His BP @ home is 90/60 mmHg typically  He can walk about 100 yards and then gets SOB  It is limited by weather as well  He has been walking farther and faster than he could a month ago, since starting Entresto and getting fluid off in the hospital  He states that he feels his PVCs --1x/week  No CP, presyncope, or syncope  He denies orthopnea, PND, or LE edema currently  He sleeps well  He is on nocturnal O2   is 208 lbs on home scale today  Was at 65 when he left the hospital  Appetite is ok  Becomes full easily  Review of Systems  Cardiology Male ROS:     Cardiac: as noted in HPI  Skin: No complaints of nonhealing sores or skin rash  Genitourinary: No complaints of recurrent urinary tract infections, frequent urination at night, difficult urination, blood in urine, kidney stones, loss of bladder control, no kidney or prostate problems, no erectile dysfunction  Psychological: No complaints of feeling depressed, anxiety, panic attacks, or difficulty concentrating  General: as noted in HPI  Respiratory: as noted in HPI  HEENT: No complaints of serious problems, hearing problems, nose problems, throat problems, or snoring  Gastrointestinal: No complaints of liver problems, nausea, vomiting, heartburn, constipation, bloody stools, diarrhea, problems swallowing, adbominal pain, or rectal bleeding  Hematologic: No complaints of bleeding disorders, anemia, blood clots, or excessive brusing     Neurological: No complaints of numbness, tingling, dizziness, weakness, seizures, headaches, syncope or fainting, AM fatigue, daytime sleepiness, no witnessed apnea episodes  Musculoskeletal: No complaints of arthritis, back pain, or painfull swelling  ROS Reviewed:   ROS reviewed  Active Problems  Problems    1  Anemia of chronic disease (285 29) (D63 8)   2  Basal cell carcinoma of skin of forearm, right (173 61) (C44 612)   3  BPH (benign prostatic hyperplasia) (600 00) (N40 0)   4  Bradycardia (427 89) (R00 1)   5  Bronchitis (490) (J40)   6  Cardiomyopathy (425 4) (I42 9)   7  Changing skin lesion (709 9) (L98 9)   8  Chest discomfort (786 59) (R07 89)   9  Chronic bronchitis (491 9) (J42)   10  Chronic obstructive pulmonary disease (496) (J44 9)   11  Chronic systolic congestive heart failure (428 22,428 0) (I50 22)   12  Constipation (564 00) (K59 00)   13  COPD exacerbation (491 21) (J44 1)   14  Encounter for prostate cancer screening (V76 44) (Z12 5)   15  Encounter for screening colonoscopy (V76 51) (Z12 11)   16  Erectile dysfunction (607 84) (N52 9)   17  Flu vaccine need (V04 81) (Z23)   18  Hyperlipidemia (272 4) (E78 5)   19  Hypertension (401 9) (I10)   20  Hypertensive heart disease (402 90) (I11 9)   21  ICD (implantable cardioverter-defibrillator) in place (V45 02) (Z95 810)   22  Insomnia (780 52) (G47 00)   23  Iron deficiency (280 9) (E61 1)   24  Lightheadedness (780 4) (R42)   25  Long term use of drug (V58 69) (Z79 899)   26  Lung nodule, solitary (793 11) (R91 1)   27  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)   28  Musculoskeletal thigh pain, right (729 5) (M79 651)   29  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)   30  Nocturnal hypoxia (327 24) (G47 34)   31  Paroxysmal ventricular tachycardia (427 1) (I47 2)   32  Peripheral polyneuropathy (356 9) (G62 9)   33  Polydipsia (783 5) (R63 1)   34  Screening for genitourinary condition (V81 6) (Z13 89)   35  Screening for neurological condition (V80 09) (Z13 89)   36  Screening for skin condition (V82 0) (Z13 89)   37  Seborrheic keratosis (702 19) (L82 1)   38   Shortness of breath (786 05) (R06 02)   39  Squamous cell cancer of skin of forearm, left (173 62) (C44 629)   40  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 (173 22) (C44 222)   41  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 (173 62) (C44 629)   42  Tobacco use (305 1) (Z72 0)    Past Medical History  Problems    1  History of Benign colon polyp (211 3) (K63 5)   2  Chronic obstructive pulmonary disease (496) (J44 9)   3  H/O nonmelanoma skin cancer (V10 83) (T35 235)   4  History of Urinary retention (788 20) (R33 9)  Active Problems And Past Medical History Reviewed: The active problems and past medical history were reviewed and updated today  Surgical History  Problems    1  History of Pacemaker Placement  Surgical History Reviewed: The surgical history was reviewed and updated today  Family History  Mother    1  Family history of Diabetes  Father    2  Family history of Colon cancer  Family History    3  Family history of Diabetes mellitus (250 00) (E11 9)  Family History Reviewed: The family history was reviewed and updated today  Social History  Problems    · Current every day smoker (305 1) (F17 200)   · No alcohol use   · No drug use   · Tobacco use (305 1) (Z72 0)  Social History Reviewed: The social history was reviewed and updated today  The social history was reviewed and is unchanged  Current Meds   1  Albuterol Sulfate (2 5 MG/3ML) 0 083% Inhalation Nebulization Solution; USE ONE VIAL   IN NEBULIZER 4 TIMES DAILY AS NEEDED; Therapy: 09Iwf8711 to (Carola Jordan)  Requested for: 46UEL5455; Last   Rx:11Oct2017 Ordered   2  Aspirin 81 MG TABS; TAKE 1 TABLET DAILY; Therapy: 43Kcw4204 to (Evaluate:80Oeq6376); Last Rx:10Tml0357 Ordered   3  Atorvastatin Calcium 80 MG Oral Tablet; TAKE 1/2 TABLET DAILY; Last Rx:12Jan2016   Ordered   4  [employer] Vitamin D3 CAPS; TAKE 1 CAPSULE Daily Recorded   5  DrRx Phenergan with Codeine 118 ML; 5 ml po q 4-6 hrs prn cough;    Therapy: 84PQK0173 to (Last Rx:06Lne8423) Ordered   6  Entresto 24-26 MG Oral Tablet; TAKE 1 TABLET BY MOUTH TWICE A DAY; Therapy: 69ULU2606 to (Evaluate:11Jun2018)  Requested for: 33Bjm7612; Last   Rx:14Sep2017 Ordered   7  Ferrous Sulfate 325 (65 Fe) MG Oral Tablet; TAKE 1 TABLET TWICE DAILY WITH MEALS; Therapy: 25Hce5998 to (Evaluate:30Sep2015); Last Rx:76Hph2451 Ordered   8  Levothyroxine Sodium 125 MCG Oral Tablet; Take 1 tablet daily; Therapy: 15Azw7257 to (Last Rx:90Xec8219)  Requested for: 51Uuf2177 Ordered   9  Lovaza 1 GM Oral Capsule; TAKE 2 CAPSULES TWICE DAILY Recorded   10  Metoprolol Succinate ER 25 MG Oral Tablet Extended Release 24 Hour; TAKE 1 TABLET    3 times daily; Therapy: 51IAC1617 to (Hunter Gautam)  Requested for: 54TZE8058; Last    Rx:06Aoq6475 Ordered   11  PEG 3350-KCl-Na Bicarb-NaCl 420 GM Oral Solution Reconstituted; TAKE AS    DIRECTED; Therapy: 60FZB3872 to (Last Rx:02Mar2017)  Requested for: 65ZBG5634 Ordered   12  Potassium Chloride Ev ER 20 MEQ Oral Tablet Extended Release; TAKE 1 TABLET    DAILY; Therapy: 72UUD8183 to (Evaluate:13Mar2018)  Requested for: 14Sep2017; Last    Rx:14Sep2017 Ordered   13  Spiriva Respimat 2 5 MCG/ACT Inhalation Aerosol Solution; USE 2 INHALATIONS DAILY; Therapy: 50XHN0331 to (Last Rx:19Jun2017)  Requested for: 72IIA6827 Ordered   14  Targretin 75 MG Oral Capsule; TAKE 8 pills q am with breakfast  Requested for:    10Aug2017; Last Rx:10Aug2017 Ordered   15  Torsemide 20 MG Oral Tablet; Take 1 tablet daily; Therapy: 89Vvw5376 to (Last Rx:24Apr2017)  Requested for: 24Apr2017 Ordered   16  Triamcinolone Acetonide 0 1 % External Ointment; APPLY AND GENTLY MASSAGE INTO    AFFECTED AREA(S) TWICE DAILY; Therapy: 96APJ0191 to (Last HR:36FZZ9483)  Requested for: 01GYS0465 Ordered   17  Ventolin  (90 Base) MCG/ACT Inhalation Aerosol Solution; USE 1 TO 2    INHALATIONS EVERY 4 TO 6 HOURS AS NEEDED;     Therapy: 31Aug2015 to (Last Rx:22Aug2017) Requested for: 54Cxg0584 Ordered   18  Vitamin B-12 1000 MCG Oral Tablet; TAKE 1 TABLET DAILY AS DIRECTED; Therapy: 36Loh7302 to (Evaluate:30Sep2015); Last Rx:64Nqb3922 Ordered   19  Zolpidem Tartrate 10 MG Oral Tablet; take 1 tablet daily at bedtime; Therapy: 00TKB6492 to (Evaluate:07Jan2018); Last Rx:90Sgb0295 Ordered  Medication List Reviewed: The medication list was reviewed and updated today  Allergies  Medication    1  No Known Drug Allergies    Vitals  Vital Signs    Recorded: 62SNJ8503 11:25AM   Heart Rate 66   Systolic 80   Diastolic 62   Height 5 ft 11 in   Weight 229 lb 4 0 oz   BMI Calculated 31 97   BSA Calculated 2 23   O2 Saturation 97     Physical Exam    Constitutional   General appearance: No acute distress, well appearing and well nourished  Eyes   Conjunctiva and Sclera examination: Conjunctiva pink, sclera anicteric  Ears, Nose, Mouth, and Throat - Oropharynx: Clear, nares are clear, mucous membranes are moist    Neck   Neck and thyroid: Normal, supple, trachea midline, no thyromegaly  -- JVD not visible w/o HJR  Pulmonary   Respiratory effort: No increased work of breathing or signs of respiratory distress  Auscultation of lungs: Clear to auscultation, no rales, no rhonchi, no wheezing, good air movement  Cardiovascular   Auscultation of heart: Abnormal  -- displaced PMI  Carotid pulses: Normal, 2+ bilaterally  Peripheral vascular exam: Normal pulses throughout, no tenderness, erythema or swelling  Pedal pulses: Normal, 2+ bilaterally  Examination of extremities for edema and/or varicosities: Normal  -- WWP  Chest - Chest: Normal    Abdomen   Abdomen: Non-tender and no distention  Liver and spleen: No hepatomegaly or splenomegaly  Musculoskeletal Gait and station: Normal gait  Skin - Skin and subcutaneous tissue: Normal without rashes or lesions  Skin is warm and well perfused, normal turgor      Neurologic - Speech: Normal     Psychiatric - Orientation to person, place, and time: Normal -- Mood and affect: Normal       Results/Data  Diagnostic Studies Reviewed Cardio: I personally reviewed the recording/images in the office today  My interpretation follows  Health Management  Encounter for screening colonoscopy   COLONOSCOPY; every 5 years; Last 75ARL2454; Next Due: 67Htu8614; Active    Future Appointments    Date/Time Provider Specialty Site   12/18/2017 09:30 AM Cardiology, Device Remote   [address]   03/19/2018 01:30 PM Cardiology, Device Remote   [address]   06/20/2018 09:00 AM Cardiology, Device Remote   [address]   04/16/2018 12:45 PM GENARO Alba  Pulmonary Medicine [facility]   12/14/2017 02:00 PM GENARO Gallo  Cardiology [facility]OLOG[facility]   11/07/2017 03:15 PM GENARO Jackson  Dermatology  [facility]     Signatures   Electronically signed by : GENARO Stevens  Oct 19 2017 12:09PM EST                       (Author)

## 2017-10-23 ENCOUNTER — GENERIC CONVERSION - ENCOUNTER (OUTPATIENT)
Dept: OTHER | Facility: OTHER | Age: 72
End: 2017-10-23

## 2017-10-23 ENCOUNTER — TRANSCRIBE ORDERS (OUTPATIENT)
Dept: PULMONOLOGY | Facility: HOSPITAL | Age: 72
End: 2017-10-23

## 2017-10-23 DIAGNOSIS — I42.9 FAMILIAL CARDIOMYOPATHY (HCC): Primary | ICD-10-CM

## 2017-10-24 ENCOUNTER — GENERIC CONVERSION - ENCOUNTER (OUTPATIENT)
Dept: OTHER | Facility: OTHER | Age: 72
End: 2017-10-24

## 2017-10-24 ENCOUNTER — HOSPITAL ENCOUNTER (OUTPATIENT)
Dept: NON INVASIVE DIAGNOSTICS | Facility: CLINIC | Age: 72
Discharge: HOME | End: 2017-10-24
Payer: COMMERCIAL

## 2017-10-24 DIAGNOSIS — I49.3 VENTRICULAR PREMATURE DEPOLARIZATION: ICD-10-CM

## 2017-10-24 DIAGNOSIS — I42.9 CARDIOMYOPATHY (HCC): ICD-10-CM

## 2017-10-24 PROCEDURE — 93226 XTRNL ECG REC<48 HR SCAN A/R: CPT

## 2017-10-24 PROCEDURE — 93225 XTRNL ECG REC<48 HRS REC: CPT

## 2017-10-26 ENCOUNTER — GENERIC CONVERSION - ENCOUNTER (OUTPATIENT)
Dept: OTHER | Facility: OTHER | Age: 72
End: 2017-10-26

## 2017-11-02 ENCOUNTER — HOSPITAL ENCOUNTER (OUTPATIENT)
Dept: PULMONOLOGY | Facility: HOSPITAL | Age: 72
Discharge: HOME | End: 2017-11-02
Payer: COMMERCIAL

## 2017-11-02 ENCOUNTER — GENERIC CONVERSION - ENCOUNTER (OUTPATIENT)
Dept: OTHER | Facility: OTHER | Age: 72
End: 2017-11-02

## 2017-11-02 DIAGNOSIS — I42.9 FAMILIAL CARDIOMYOPATHY (HCC): ICD-10-CM

## 2017-11-02 PROCEDURE — 94729 DIFFUSING CAPACITY: CPT

## 2017-11-02 PROCEDURE — 94060 EVALUATION OF WHEEZING: CPT

## 2017-11-02 PROCEDURE — 94727 GAS DIL/WSHOT DETER LNG VOL: CPT

## 2017-11-02 RX ORDER — ALBUTEROL SULFATE 2.5 MG/3ML
2.5 SOLUTION RESPIRATORY (INHALATION) ONCE
Status: COMPLETED | OUTPATIENT
Start: 2017-11-02 | End: 2017-11-02

## 2017-11-02 RX ADMIN — ALBUTEROL SULFATE 2.5 MG: 2.5 SOLUTION RESPIRATORY (INHALATION) at 10:33

## 2017-11-07 ENCOUNTER — ALLSCRIPTS OFFICE VISIT (OUTPATIENT)
Dept: OTHER | Facility: OTHER | Age: 72
End: 2017-11-07

## 2017-11-08 NOTE — PROGRESS NOTES
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)   2  Screening for skin condition (V82 0) (Z13 89)   3  Seborrheic keratosis (702 19) (L82 1)   4  H/O nonmelanoma skin cancer (V10 83) (Z85 828)    Plan   · Ventolin  (90 Base) MCG/ACT Inhalation Aerosol Solution; USE 1 TO 2  INHALATIONS EVERY 4 TO 6 HOURS AS NEEDED   · Levothyroxine Sodium 125 MCG Oral Tablet; Take 1 tablet daily   · Targretin 75 MG Oral Capsule (Bexarotene); TAKE 8 pills q am with breakfast   · Triamcinolone Acetonide 0 1 % External Ointment; APPLY AND GENTLY  MASSAGE INTO AFFECTED AREA(S) TWICE DAILY   · Follow-up visit in 3 months Evaluation and Treatment  Follow-up  Status: Complete   Done: 23VHE1501    Discussion/Summary  Discussion Summary- [facility]:   Assessment #1: Mycosis fungoides  Care Plan:   Appears stable and less indurated than previous continue to monitor his thyroid function tests and other blood tests related to his treatment with Targretin  Tolerating medications without problems  Assessment #2: History of skin cancer  Care Plan:   No recurrence nothing else atypical continue to monitor closely  Assessment #3: Screening for dermatologic disorders  Care Plan:   Nothing else of concern noted continue to monitor three-month basis  Chief Complaint  Chief Complaint Free Text Note Form: 3 month follow up      History of Present Illness  HPI: 61-year-old male with history of stage IB cutaneous T-cell lymphoma and history of numerous skin cancers  Presents for follow-up  Patient notes no real changes on his skin   Patient has been taking the bexarotene without problems his thyroid seems to be under control with replacement therapy  His skin appears to be less irritated at this time and appears to be less symptomatic  Patient recently in the hospital for congestive heart failure      Review of Systems  Complete Male Dermatology Janee Casarez Binh Patient:   Constitutional: Denies constitutional symptoms     Eyes: Denies eye symptoms  ENT:  denies ear symptoms, nasal symptoms, mouth or throat symptoms  Cardiovascular: Denies cardiovascular symptoms  Respiratory: Denies respiratory symptoms  Gastrointestinal: Denies gastrointestinal symptoms  Musculoskeletal: Denies musculoskeletal symptoms  Integumentary: Denies skin, hair and nail symptoms  Neurological: Denies neurologic symptoms  Psychiatric: Denies psychiatric symptoms  Endocrine: Denies endocrine symptoms  Hematologic/Lymphatic: Denies hematologic symptoms  Active Problems  1  Anemia of chronic disease (285 29) (D63 8)   2  Basal cell carcinoma of skin of forearm, right (173 61) (C44 612)   3  BPH (benign prostatic hyperplasia) (600 00) (N40 0)   4  Bradycardia (427 89) (R00 1)   5  Bronchitis (490) (J40)   6  Cardiomyopathy (425 4) (I42 9)   7  Changing skin lesion (709 9) (L98 9)   8  Chest discomfort (786 59) (R07 89)   9  Chronic bronchitis (491 9) (J42)   10  Chronic obstructive pulmonary disease (496) (J44 9)   11  Chronic systolic congestive heart failure (428 22,428 0) (I50 22)   12  Constipation (564 00) (K59 00)   13  COPD exacerbation (491 21) (J44 1)   14  Encounter for prostate cancer screening (V76 44) (Z12 5)   15  Encounter for screening colonoscopy (V76 51) (Z12 11)   16  Erectile dysfunction (607 84) (N52 9)   17  Flu vaccine need (V04 81) (Z23)   18  Hyperlipidemia (272 4) (E78 5)   19  Hypertension (401 9) (I10)   20  Hypertensive heart disease (402 90) (I11 9)   21  ICD (implantable cardioverter-defibrillator) in place (V45 02) (Z95 810)   22  Insomnia (780 52) (G47 00)   23  Iron deficiency (280 9) (E61 1)   24  Lightheadedness (780 4) (R42)   25  Long term use of drug (V58 69) (Z79 899)   26  Lung nodule, solitary (793 11) (R91 1)   27  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)   28  Musculoskeletal thigh pain, right (729 5) (M79 651)   29  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)   30  Nocturnal hypoxia (327 24) (G47 34)   31   Paroxysmal ventricular tachycardia (427 1) (I47 2)   32  Peripheral polyneuropathy (356 9) (G62 9)   33  Polydipsia (783 5) (R63 1)   34  PVC (premature ventricular contraction) (427 69) (I49 3)   35  Screening for genitourinary condition (V81 6) (Z13 89)   36  Screening for neurological condition (V80 09) (Z13 89)   37  Screening for skin condition (V82 0) (Z13 89)   38  Seborrheic keratosis (702 19) (L82 1)   39  Shortness of breath (786 05) (R06 02)   40  Squamous cell cancer of skin of forearm, left (173 62) (C44 629)   41  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 (173 22) (C44 222)   42  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 (173 62) (C44 629)   43  Tobacco use (305 1) (Z72 0)    Past Medical History  1  History of Benign colon polyp (211 3) (K63 5)   2  Chronic obstructive pulmonary disease (496) (J44 9)   3  H/O nonmelanoma skin cancer (V10 83) (M32 302)   4  History of Urinary retention (788 20) (R33 9)  Past Medical History Reviewed- Derm:   The past medical history was reviewed  Surgical History  1  History of Pacemaker Placement  Surgical History Reviewed [address] [place] [address] 14- Derm:   Surgical History reviewed      Family History  Mother    1  Family history of Diabetes  Father    2  Family history of Colon cancer  Family History    3  Family history of Diabetes mellitus (250 00) (E11 9)  Family History Reviewed- Derm:   Family History was reviewed      Social History   · Current every day smoker (305 1) (F17 200)   · No alcohol use   · No drug use   · Tobacco use (305 1) (Z72 0)  Social History Reviewed [address] [place] [address] 14- Derm: The social history was reviewed      Current Meds   1  Albuterol Sulfate (2 5 MG/3ML) 0 083% Inhalation Nebulization Solution; USE ONE VIAL   IN NEBULIZER 4 TIMES DAILY AS NEEDED; Therapy: 71Vhe1724 to [phone])  Requested for: 45AIN4061; Last   Rx:11Oct2017 Ordered   2  Aspirin 81 MG TABS; TAKE 1 TABLET DAILY; Therapy: 47Sxi4255 to (Evaluate:40Lov9345);  Last [employer] Ordered   3  Atorvastatin Calcium 80 MG Oral Tablet; TAKE 1/2 TABLET DAILY; Last Rx:12Jan2016   Ordered   4  [facility] Vitamin D3 CAPS; TAKE 1 CAPSULE Daily Recorded   5  DrRx Phenergan with Codeine 118 ML; 5 ml po q 4-6 hrs prn cough; Therapy: 34VPL8389 to (Last Rx:68Foo6073) Ordered   6  Entresto 24-26 MG Oral Tablet; TAKE 1 TABLET BY MOUTH TWICE A DAY; Therapy: 39KLZ7426 to (Evaluate:11Jun2018)  Requested for: 91Ogj9506; Last   Rx:14Sep2017 Ordered   7  Ferrous Sulfate 325 (65 Fe) MG Oral Tablet; TAKE 1 TABLET TWICE DAILY WITH MEALS; Therapy: 19Stn7522 to (Evaluate:30Sep2015); Last Rx:10Lzo9098 Ordered   8  Levothyroxine Sodium 125 MCG Oral Tablet; Take 1 tablet daily; Therapy: 73Tyh3201 to (Last Rx:01Nov2017)  Requested for: 98EXF7183 Ordered   9  Lovaza 1 GM Oral Capsule; TAKE 2 CAPSULES TWICE DAILY Recorded   10  Metoprolol Succinate ER 25 MG Oral Tablet Extended Release 24 Hour; TAKE 1 TABLET    3 times daily; Therapy: 56WLT1657 to (03 [phone])  Requested for: 84RUA5284; Last    Rx:09Sep2017 Ordered   11  PEG 3350-KCl-Na Bicarb-NaCl 420 GM Oral Solution Reconstituted; TAKE AS    DIRECTED; Therapy: 62AUZ8664 to (Last Rx:02Mar2017)  Requested for: 65OFL7791 Ordered   12  Potassium Chloride Ev ER 20 MEQ Oral Tablet Extended Release; TAKE 1 TABLET    DAILY; Therapy: 76CXF8719 to (Evaluate:13Mar2018)  Requested for: 39Bps4449; Last    Rx:81Xcp1797 Ordered   13  Spiriva Respimat 2 5 MCG/ACT Inhalation Aerosol Solution; USE 2 INHALATIONS DAILY; Therapy: 35XFY5152 to (Last Rx:19Jun2017)  Requested for: 07KUP6323 Ordered   14  Targretin 75 MG Oral Capsule; TAKE 8 pills q am with breakfast  Requested for:    10Aug2017; Last Rx:10Aug2017 Ordered   15  Torsemide 20 MG Oral Tablet; take 1/2 tablet daily; Therapy: 71Yir1632 to (Evaluate:03Pgr6544)  Requested for: 26Oct2017 Recorded   16   Triamcinolone Acetonide 0 1 % External Ointment; APPLY AND GENTLY MASSAGE INTO    AFFECTED AREA(S) TWICE DAILY; Therapy: 91IUV6123 to (Last IQ:27FIG0791)  Requested for: 22MJP8531 Ordered   17  Ventolin  (90 Base) MCG/ACT Inhalation Aerosol Solution; USE 1 TO 2    INHALATIONS EVERY 4 TO 6 HOURS AS NEEDED; Therapy: 31Kkv3373 to (Last Wade Damian)  Requested for: 65Zcy7419 Ordered   18  Vitamin B-12 1000 MCG Oral Tablet; TAKE 1 TABLET DAILY AS DIRECTED; Therapy: 95Tuw0853 to (Evaluate:30Sep2015); Last Rx:61Xqh2675 Ordered   19  Zolpidem Tartrate 10 MG Oral Tablet; take 1 tablet daily at bedtime; Therapy: 16NER0547 to (Evaluate:07Jan2018); Last Rx:43Pzv0988 Ordered  Medication List Reviewed: The medication list was reviewed and updated today  Allergies  1  No Known Drug Allergies    Physical Exam    Constitutional   General appearance: Appears healthy and well developed  Lymphatic   Palpation of lymph nodes in neck: No lymphadenopathy  Palpation of lymph nodes in axillae: No lymphadenopathy  Palpation of lymph nodes in groin: No lymphadenopathy  No visible disturbance  Musculoskeletal   Digits and nails: No clubbing, cyanosis or edema  Cutaneous and nail exam normal     Skin   Scalp skin texture and hair distribution: Normal skin texture on scalp, normal hair distribution  Head: Normal turgor, no rashes, no lesions  Neck: Abnormal     Chest: Abnormal     Abdomen: Abnormal     Genitalia, groin, and buttocks: Abnormal     Back: Abnormal     Right upper extremity: Abnormal     Left upper extremity: Abnormal     Right lower extremity: Abnormal     Left lower extremity: Abnormal     Neuro/Psych   Alert and oriented x 3  Displays comfort and cooperation during encounterl   Affect is normal     Finding previous sites of skin cancers well-healed without recurrence normal keratotic papules with greasy stuck on appearance arciform scaling poikilodermatous patches involving 50% body surface area no significant changes noted no lymphadenopathy or hepatosplenomegaly noted overall areas look less inflamed  Health Management  Encounter for screening colonoscopy   COLONOSCOPY; every 5 years; Last 51NTF9210; Next Due: 03Qbe5789; Active    Future Appointments    Date/Time Provider Specialty Site   11/30/2017 11:00 AM GENARO Barber , PhD Cardiology [facility]   12/14/2017 02:00 PM GENARO Reich  Cardiology [facility] C[facility]OLOG[facility]   12/18/2017 09:30 AM Cardiology, Device Remote   [address]   02/06/2018 02:35 PM GENARO Vo  Dermatology [facility] MED [facility]   03/19/2018 01:30 PM Cardiology, [address] [place] [address]   04/16/2018 12:45 PM GENARO Hinds   [facility]   06/20/2018 09:00 AM Cardiology, Device Remote   [address]     Signatures   Electronically signed by : GENARO Gonzalez ; Nov 7 2017  5:25PM EST                       (Author)

## 2017-11-09 ENCOUNTER — HOSPITAL ENCOUNTER (OUTPATIENT)
Dept: PULMONOLOGY | Facility: HOSPITAL | Age: 72
Discharge: HOME | End: 2017-11-09
Attending: INTERNAL MEDICINE

## 2017-11-22 ENCOUNTER — GENERIC CONVERSION - ENCOUNTER (OUTPATIENT)
Dept: OTHER | Facility: OTHER | Age: 72
End: 2017-11-22

## 2017-11-30 ENCOUNTER — ALLSCRIPTS OFFICE VISIT (OUTPATIENT)
Dept: OTHER | Facility: OTHER | Age: 72
End: 2017-11-30

## 2017-12-05 NOTE — PROGRESS NOTES
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) w/ LVEF ~15%, LVIDd 6 7 cm, reduced RVSF, NYHA III, ACC/AHA Stage C/D s/p BiV AICD: Unclear etiology, however, has been long standing  Arrhythmia induced (PVCs) +/- HTN +/- toxin (unclear exposure hx in the past as patient was a , unclear EtOH or other toxins) +/- idopathic  Last TTE (9/2017) shows LVEF ~15% w/ reduced RVSF, PASP 50 mmHg, AMADA, and mod MR w/ mild TR  Euvolemic on exam  Hx c/w possible low output (poor exercise tolerance, narrow pulse pressure)  BiV pacing~95%  Plan as below:  Diag:  --24 hour holter shows significant PVC burden -->31%  --Does not want to go to [place] [facility] so precludes CPET and admission for Sotalol  --WIll consider RHC in the future depending on patient's GOC and response to therapies   --PFTs w/ mixed obstructive (predominant) and restrictive pattern w/ DLCO 54%  1    Tx:  --Continue Metoprolol succinate 25 mg PO BID, with additional dose based on PVC sensation  --Continue Entresto 24-26 mg PO BID  --Continue torsemide 20 mg PO QOD w/ QOD K repletion  --Patient does not want admission for in [facility]  Will d/w Dr Jennifer Browne and Angelita Griffiths re: amiodarone  --Repeat 24 hour Holter after amiodarone load complete - in ~ 1-2 months; if PVC burden decreased, then repeat TTE 1 month later to see if EF improving   --Continue to F/U with pulmonary for optimization  1    Advanced Therapies: Hx is c/w borderline to low output  Given age, he is a candidate for LVAD and/or OHT  He is currently not interested as he feels better   Also, does not want to go to [facility] for care as he wants to stay local, this would preclude LVAD as well  Should he be willing to travel, we discussed pros and cons of each therapy  He has family, but all live in [place], which complicates his care, and would make either LVAD or OHT difficult  With respect to LVAD, his RV shows reduced RVSF which would need to be assessed further should he be interested in LVAD  We will continue to follow closely  # Pulmonary HTN: Review of TTE shows reduced RVSF w/ e/o RVOT notching (although signal is suboptimal)  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 overdiuresis +/- Entresto  Baseline ~1 2  Was 1 6 on last check  Continue to monitor  # Hx of PVCs (symptomatic) and VT: Last device interrogation 9/2017 w/ 4 episodes of 6-7 beats of NSVT @ varying rates  --Per Dr Willian Randle, Given likely scar, no fleicainide  Sotalol vs Amio  --Will hold off on uptitration of BB at this time given BP  # Chronic bronchitis/COPD (30 pack year smoking history): most recent PFTs done 11/2016 show FEV1 46%, FVC 65%, post broncho dilator -> 56% and 79%  , respectively  TLC 91%  DLCOuncorrected reduced @ 50%  6MWT - walked ~800 feet w/ saturations staying above 90%  HR increased from resting ~87->124 beats/min  Done on RA    --Mgmt per pulmonary  # HTN  # HLD  # Hx of SqCC  # Hx of mycosis fungoides  # Hx of NH lymphoma  # polyneuropathy  # Anemia: Workup per Heme    RTC in 3 months       1 Amended By: Fred Yeh;  Nov 30 2017 11:17 AM EST    Chief Complaint  Chief Complaint Free Text Note Form: F/U      History of Present Illness  Cardiology HPI Free Text Note Form [facility]: Very pleasant 74 y/o gentleman w/ PMHx of NICM (diagnosed >15 years ago) w/ LVEF ~15%, NYHA III, ACC/AHA Stage C s/p BiV AICD, Hx of PVCs (symptomatic) and VT, COPD (30 pack year smoking history - quit one year ago), hx of NH lymphoma (agent orange related), polyneuropathy, HTN, HLD, Hx of SqCC, CÉSAR on nocturnal O2 p/f evaluation and management of HFrEF  Was recently admitted 8/31 to 9/4/2017 for CHF exacerbation at which time he was transitioned to [facility]  Last device check on 9/12/17 showed BiV paced ~85% of the time  He did have 4 6-7 beat runs of NSVT on device interrogation  He states he feels well  Especially since he started [facility]  He has lost ~16 lbs  He states today for the first time he became [address] and dizzy  His BP @ home is 90/60 mmHg typically  He can walk about 100 yards and then gets SOB  It is limited by weather as well  He has been walking farther and faster than he could a month ago, since starting Entresto and getting fluid off in the hospital  He states that he feels his PVCs ~1x/week  No CP, presyncope, or syncope  He denies orthopnea, PND, or LE edema currently  He sleeps well  He is on nocturnal O2  Today 11/30/17, he states he is feeling better  He is doing better with cardio rehab  His weight is stable from last visit  He does not want to go to [place] for any admissions  He feels much better since starting Entresto  He occasionally gets LH and Dz  He His PFTs showed a mixed obstructive/restrictive pattern, but predominately obstructive  Had good response to bronchodilators  DLCO was 54%  He states he is walking much further as well  1        1 Amended By: Silvia Araiza; Nov 30 2017 11:16 AM EST    Review of Systems  Cardiology Male ROS:     Cardiac: as noted in HPI  Skin: No complaints of nonhealing sores or skin rash     Genitourinary: No complaints of recurrent urinary tract infections, frequent urination at night, difficult urination, blood in urine, kidney stones, loss of bladder control, no kidney or prostate problems, no erectile dysfunction  Psychological: No complaints of feeling depressed, anxiety, panic attacks, or difficulty concentrating  General: as noted in HPI  Respiratory: as noted in HPI  HEENT: No complaints of serious problems, hearing problems, nose problems, throat problems, or snoring  Gastrointestinal: No complaints of liver problems, nausea, vomiting, heartburn, constipation, bloody stools, diarrhea, problems swallowing, adbominal pain, or rectal bleeding  Hematologic: No complaints of bleeding disorders, anemia, blood clots, or excessive brusing  Neurological: No complaints of numbness, tingling, dizziness, weakness, seizures, headaches, syncope or fainting, AM fatigue, daytime sleepiness, no witnessed apnea episodes  Musculoskeletal: No complaints of arthritis, back pain, or painfull swelling  ROS Reviewed:   ROS reviewed  Active Problems  Problems   1  Anemia of chronic disease (285 29) (D63 8)  2  Basal cell carcinoma of skin of forearm, right (173 61) (C44 612)  3  BPH (benign prostatic hyperplasia) (600 00) (N40 0)  4  Bradycardia (427 89) (R00 1)  5  Bronchitis (490) (J40)  6  Cardiomyopathy (425 4) (I42 9)  7  Changing skin lesion (709 9) (L98 9)  8  Chest discomfort (786 59) (R07 89)  9  Chronic bronchitis (491 9) (J42)  10  Chronic obstructive pulmonary disease (496) (J44 9)  11  Chronic systolic congestive heart failure (428 22,428 0) (I50 22)  12  Constipation (564 00) (K59 00)  13  COPD exacerbation (491 21) (J44 1)  14  Encounter for prostate cancer screening (V76 44) (Z12 5)  15  Encounter for screening colonoscopy (V76 51) (Z12 11)  16  Erectile dysfunction (607 84) (N52 9)  17  Flu vaccine need (V04 81) (Z23)  18  Hyperlipidemia (272 4) (E78 5)  19  Hypertension (401 9) (I10)  20  Hypertensive heart disease (402 90) (I11 9)  21  ICD (implantable cardioverter-defibrillator) in place (V45 02) (Z95 810)  22  Insomnia (780 52) (G47 00)  23  Iron deficiency (280 9) (E61 1)  24  Lightheadedness (780 4) (R42)  25  Long term use of drug (V58 69) (Z79 899)  26  Lung nodule, solitary (793 11) (R91 1)  27  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)  28  Musculoskeletal thigh pain, right (729 5) (M79 651)  29  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)  30  Nocturnal hypoxia (327 24) (G47 34)  31  Paroxysmal ventricular tachycardia (427 1) (I47 2)  32  Peripheral polyneuropathy (356 9) (G62 9)  33  Polydipsia (783 5) (R63 1)  34  PVC (premature ventricular contraction) (427 69) (I49 3)  35  Screening for genitourinary condition (V81 6) (Z13 89)  36  Screening for neurological condition (V80 09) (Z13 89)  37  Screening for skin condition (V82 0) (Z13 89)  38  Seborrheic keratosis (702 19) (L82 1)  39  Shortness of breath (786 05) (R06 02)  40  Squamous cell cancer of skin of forearm, left (173 62) (C44 629)  41  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 (173 22) (C44 222)  42  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 (173 62) (C44 629)  43  Tobacco use (305 1) (Z72 0)    Past Medical History  Problems   1  History of Benign colon polyp (211 3) (K63 5)  2  Chronic obstructive pulmonary disease (496) (J44 9)  3  H/O nonmelanoma skin cancer (V10 83) (F30 596)  4  History of Urinary retention (788 20) (R33 9)  Active Problems And Past Medical History Reviewed: The active problems and past medical history were reviewed and updated today  Surgical History  Problems   1  History of Pacemaker Placement  Surgical History Reviewed: The surgical history was reviewed and updated today  Family History  Mother   1  Family history of Diabetes  Father   2  Family history of Colon cancer  Family History   3  Family history of Diabetes mellitus (250 00) (E11 9)  Family History Reviewed: The family history was reviewed and updated today         Social History  Problems    · Current every day smoker (305 1) (F17 200)   · No alcohol use   · No drug use   · Tobacco use (305 1) (Z72 0)  Social History Reviewed: The social history was reviewed and updated today  The social history was reviewed and is unchanged  Current Meds  1  Albuterol Sulfate (2 5 MG/3ML) 0 083% Inhalation Nebulization Solution; USE ONE VIAL   IN NEBULIZER 4 TIMES DAILY AS NEEDED; Therapy: 79Bzp0606 to (Frank Clementine)  Requested for: 16IIJ6412; Last   Rx:11Oct2017 Ordered  2  Aspirin 81 MG TABS; TAKE 1 TABLET DAILY; Therapy: 11Zkg2247 to (Evaluate:05Wei9728); Last Rx:44Nex3423 Ordered  3  Atorvastatin Calcium 80 MG Oral Tablet; TAKE 1/2 TABLET DAILY; Last Rx:12Jan2016   Ordered  4  [facility] Vitamin D3 CAPS; TAKE 1 CAPSULE Daily Recorded  5  DrRx Phenergan with Codeine 118 ML; 5 ml po q 4-6 hrs prn cough; Therapy: 94GYD3413 to (Last Rx:40Vun0462) Ordered  6  Entresto 24-26 MG Oral Tablet; TAKE 1 TABLET BY MOUTH TWICE A DAY; Therapy: 35ROM4744 to (Evaluate:11Jun2018)  Requested for: 36Vau8025; Last   Rx:92Lak6598 Ordered  7  Ferrous Sulfate 325 (65 Fe) MG Oral Tablet; TAKE 1 TABLET TWICE DAILY WITH MEALS; Therapy: 01Lfk6075 to (Evaluate:15Mby9496); Last Rx:08Ltt8730 Ordered  8  Levothyroxine Sodium 125 MCG Oral Tablet; Take 1 tablet daily; Therapy: 48Ohm0708 to (Last AQ:71FTD6828)  Requested for: 32YJA4959 Ordered  9  Lovaza 1 GM Oral Capsule; TAKE 2 CAPSULES TWICE DAILY Recorded  10  Metoprolol Succinate ER 50 MG Oral Tablet Extended Release 24 Hour; TAKE 1 TABLET    DAILY; Therapy: 81HNR5051 to (Evaluate:25Nov2018) Recorded  11  PEG 3350-KCl-Na Bicarb-NaCl 420 GM Oral Solution Reconstituted; TAKE AS    DIRECTED; Therapy: 69DCX7573 to (Last Rx:02Mar2017)  Requested for: 34KZR8811 Ordered  12  Potassium Chloride Ev ER 20 MEQ Oral Tablet Extended Release; TAKE 1 TABLET    DAILY; Therapy: 64LBK6511 to (Evaluate:13Mar2018)  Requested for: 02Nrp3785; Last    Rx:91Jpf5162 Ordered  13   Spiriva Respimat 2 5 MCG/ACT Inhalation Aerosol Solution; USE 2 INHALATIONS DAILY; Therapy: 89HEQ0990 to (Last Rx:19Jun2017)  Requested for: 53EMM6416 Ordered  14  Targretin 75 MG Oral Capsule; TAKE 8 pills q am with breakfast  Requested for:    29AZF3460; Last Rx:07Nov2017 Ordered  15  Torsemide 20 MG Oral Tablet; TAKE 1 TABLET ONCE DAILY; Therapy: 70SKW1198 to (Evaluate:16Sgl8934) Recorded  16  Triamcinolone Acetonide 0 1 % External Ointment; APPLY AND GENTLY MASSAGE INTO    AFFECTED AREA(S) TWICE DAILY; Therapy: 50QQH6074 to (Last VO:75ORP8212)  Requested for: 18ZVE6007 Ordered  17  Ventolin  (90 Base) MCG/ACT Inhalation Aerosol Solution; USE 1 TO 2    INHALATIONS EVERY 4 TO 6 HOURS AS NEEDED; Therapy: 11Ngd0931 to (Last Rx:07Nov2017)  Requested for: 69BIZ3544 Ordered  18  Vitamin B-12 1000 MCG Oral Tablet; TAKE 1 TABLET DAILY AS DIRECTED; Therapy: 94Osb8789 to (Evaluate:30Sep2015); Last Rx:48Qcz3194 Ordered  19  Zolpidem Tartrate 10 MG Oral Tablet; take 1 tablet daily at bedtime; Therapy: 61GGL8264 to (Evaluate:07Jan2018); Last Rx:67Coj6532 Ordered  Medication List Reviewed: The medication list was reviewed and updated today  Allergies  Medication   1  No Known Drug Allergies    Vitals  Vital Signs    Recorded: 06BVI0387 10:55AM   Heart Rate 76   Systolic 98   Diastolic 62   Height 5 ft 11 in   Weight 228 lb 6 0 oz   BMI Calculated 31 85   BSA Calculated 2 23   O2 Saturation 98     Physical Exam    Constitutional   General appearance: No acute distress, well appearing and well nourished  Eyes   Conjunctiva and Sclera examination: Conjunctiva pink, sclera anicteric  Ears, Nose, Mouth, and Throat - Oropharynx: Clear, nares are clear, mucous membranes are moist    Neck   Neck and thyroid: Normal, supple, trachea midline, no thyromegaly  JVD not visible w/o HJR  Pulmonary   Respiratory effort: No increased work of breathing or signs of respiratory distress      Auscultation of lungs: Clear to auscultation, no rales, no rhonchi, no wheezing, good air movement  Cardiovascular   Auscultation of heart: Abnormal   displaced PMI  Carotid pulses: Normal, 2+ bilaterally  Peripheral vascular exam: Normal pulses throughout, no tenderness, erythema or swelling  Pedal pulses: Normal, 2+ bilaterally  Examination of extremities for edema and/or varicosities: Normal   WWP  Chest - Chest: Normal    Abdomen   Abdomen: Non-tender and no distention  Liver and spleen: No hepatomegaly or splenomegaly  Musculoskeletal Gait and station: Normal gait  Skin - Skin and subcutaneous tissue: Normal without rashes or lesions  Skin is warm and well perfused, normal turgor  Neurologic - Speech: Normal     Psychiatric - Orientation to person, place, and time: Normal  Mood and affect: Normal       Results/Data  Diagnostic Studies Reviewed Cardio: I personally reviewed the recording/images in the office today  My interpretation follows  Health Management  Encounter for screening colonoscopy   COLONOSCOPY; every 5 years; Last 69RIU2014; Next Due: 76Xue7820; Active    Future Appointments    Date/Time Provider Specialty Site   12/18/2017 09:30 AM Cardiology, Device Remote   [address]   03/19/2018 01:30 PM Cardiology, Device Remote   [address]   06/20/2018 09:00 AM Cardiology, Device Remote   [address]   04/16/2018 12:45 PM GENARO Apodaca  Pulmonary Medicine [facility] PULMONARY ASS[address]   12/14/2017 02:00 PM GENARO Rae  [facility]   02/06/2018 02:35 PM GENARO Appiah  Dermatology [address]     Signatures   Electronically signed by : GENARO Crews PhD; Nov 30 2017 11:14AM EST                       (Author)    Electronically signed by : GENARO Crews PhD; Nov 30 2017 11:24AM EST                       (Author)

## 2017-12-14 ENCOUNTER — ALLSCRIPTS OFFICE VISIT (OUTPATIENT)
Dept: OTHER | Facility: OTHER | Age: 72
End: 2017-12-14

## 2017-12-14 ENCOUNTER — APPOINTMENT (OUTPATIENT)
Dept: LAB | Facility: CLINIC | Age: 72
End: 2017-12-14
Payer: COMMERCIAL

## 2017-12-14 DIAGNOSIS — I10 ESSENTIAL (PRIMARY) HYPERTENSION: ICD-10-CM

## 2017-12-14 DIAGNOSIS — I11.9 HYPERTENSIVE HEART DISEASE WITHOUT HEART FAILURE: ICD-10-CM

## 2017-12-14 LAB
ANION GAP SERPL CALCULATED.3IONS-SCNC: 9 MMOL/L (ref 4–13)
BUN SERPL-MCNC: 27 MG/DL (ref 5–25)
CALCIUM SERPL-MCNC: 8.6 MG/DL (ref 8.3–10.1)
CHLORIDE SERPL-SCNC: 103 MMOL/L (ref 100–108)
CO2 SERPL-SCNC: 26 MMOL/L (ref 21–32)
CREAT SERPL-MCNC: 1.3 MG/DL (ref 0.6–1.3)
GFR SERPL CREATININE-BSD FRML MDRD: 55 ML/MIN/1.73SQ M
GLUCOSE SERPL-MCNC: 90 MG/DL (ref 65–140)
POTASSIUM SERPL-SCNC: 4.2 MMOL/L (ref 3.5–5.3)
SODIUM SERPL-SCNC: 138 MMOL/L (ref 136–145)

## 2017-12-14 PROCEDURE — 36415 COLL VENOUS BLD VENIPUNCTURE: CPT

## 2017-12-14 PROCEDURE — 80048 BASIC METABOLIC PNL TOTAL CA: CPT

## 2017-12-15 ENCOUNTER — GENERIC CONVERSION - ENCOUNTER (OUTPATIENT)
Dept: OTHER | Facility: OTHER | Age: 72
End: 2017-12-15

## 2017-12-15 ENCOUNTER — GENERIC CONVERSION - ENCOUNTER (OUTPATIENT)
Dept: CARDIOLOGY CLINIC | Facility: CLINIC | Age: 72
End: 2017-12-15

## 2017-12-15 NOTE — PROGRESS NOTES
Assessment  Assessed    1  Cardiomyopathy (425 4) (I42 9)   2  Chronic systolic congestive heart failure (428 22,428 0) (I50 22)   3  PVC (premature ventricular contraction) (427 69) (I49 3)   4  Fatigue (780 79) (R53 83)   5  Hyperlipidemia (272 4) (E78 5)   6  ICD (implantable cardioverter-defibrillator) in place (V45 02) (Z95 810)   7  Pulmonary hypertension (416 8) (I27 20)   8  Non-rheumatic mitral regurgitation (424 0) (I34 0)    Discussion/Summary  Goals and Barriers: The patient has the current Goals: Acute HF free  The patent has the current Barriers: None  Patient's Capacity to Self-Care: Patient is able to Self-Care  Medication SE Review and Pt Understands Tx: Possible side effects of new medications were reviewed with the patient/guardian today  The treatment plan was reviewed with the patient/guardian  The patient/guardian understands and agrees with the treatment plan   Counseling Documentation With Imm: The patient was counseled regarding instructions for management,-- risk factor reductions,-- patient and family education,-- importance of compliance with treatment  total time of encounter was 40 minutes-- and-- 35 minutes was spent counseling  Discussion Summary:   2D echo (Sep 2017) - dilated LV  EF 15%  Severe diffuse hypokinesis  RV systolic function mildly reduced  PASP 50 mmHg  Dilated LA and RA  Moderate MR  Mild TR  Chronic systolic HF - patient doing better since the start of Entresto though now getting symptoms of low CO state  Continue Torsemide, potassium, BB  Low salt diet  Daily weights  Fluid restriction  All these discussed in detail with the patient  Pt to continue cardiac rehab  BMP script given as his last blood work had showed increasing creatinine valueNICMP s/p CRT-D - no c/o ICD shocks  Patient on home monitoring of his ICD  F/U with EP and Dr Collin Gonzales - input reviewed  Continue beta blocker, Entresto   Patient does not want to be considered for cardiac transplant and also states that he is not ready for an LVAD yetHLP - continue Tc Paul  Dr Nancy Conroy closely monitors his blood work  HTN, HHD - BP soft  Recommend aggressive risk factor modification and therapeutic lifestyle changes  Discussed concepts of atherosclerosis, including signs and symptoms of cardiac disease  Previous studies were reviewed  Safety measures were reviewed  Questions were entertained and answered  Patient was advised to report any problem requiring medical attention  Follow up with appropriate specialists and lab work as discussed  Return for follow up visit as scheduled or earlier, if needed  Thank you for allowing me to participate in the care and evaluation of your patient  Should you have any questions, please feel free to contact me  Chief Complaint  Chief Complaint Chronic Condition [facility]: Patient is here today for follow up of chronic conditions described in HPI  History of Present Illness  HPI: Patient comes for followup stating that he is feeling ok but a couple of times he felt lightheaded, though he did not pass out  He is feeling better after the start of Entresto  Chronic systolic HF - States currently his wt has gone down to his baseline  Patient denies any chest pain/pressure, palpitations, syncope, swelling feet, orthopnea, PND, claudication  2 episodes of lightheadedness  SOB at baseline  Occasional fatigue  On BB, Entretso, torsemide, potassium  Following up with Dr Bam Corea   input appreciated  NICMP s/p CRT-D - He had a [employer] BiV ICD implanted in March 2011 and changed in Feb 2014  He has been following up with Dr Carine Anna for the devise  On BB, EntrestoHTN, HHD - Taking his meds regularly now  BP has been stableHLP - On Lovaza and Lipitor  Dr Nancy Conroy closely monitors his blood work      Review of Systems  Cardiology Male ROS:    Cardiac: as noted in HPI  Skin: No complaints of nonhealing sores or skin rash    Genitourinary: No complaints of recurrent urinary tract infections, frequent urination at night, difficult urination, blood in urine, kidney stones, loss of bladder control, no kidney or prostate problems, no erectile dysfunction  Psychological: No complaints of feeling depressed, anxiety, panic attacks, or difficulty concentrating  General: as noted in HPI  Respiratory: No complaints of shortness of breath, cough with sputum, or wheezing  HEENT: No complaints of serious problems, hearing problems, nose problems, throat problems, or snoring  Gastrointestinal: No complaints of liver problems, nausea, vomiting, heartburn, constipation, bloody stools, diarrhea, problems swallowing, adbominal pain, or rectal bleeding  Hematologic: No complaints of bleeding disorders, anemia, blood clots, or excessive brusing  Neurological: No complaints of numbness, tingling, dizziness, weakness, seizures, headaches, syncope or fainting, AM fatigue, daytime sleepiness, no witnessed apnea episodes  Musculoskeletal: No complaints of arthritis, back pain, or painfull swelling  ROS Reviewed:   ROS reviewed  Active Problems  Problems    1  Anemia of chronic disease (285 29) (D63 8)   2  Basal cell carcinoma of skin of forearm, right (173 61) (C44 612)   3  BPH (benign prostatic hyperplasia) (600 00) (N40 0)   4  Bradycardia (427 89) (R00 1)   5  Bronchitis (490) (J40)   6  Cardiomyopathy (425 4) (I42 9)   7  Changing skin lesion (709 9) (L98 9)   8  Chest discomfort (786 59) (R07 89)   9  Chronic bronchitis (491 9) (J42)   10  Chronic obstructive pulmonary disease (496) (J44 9)   11  Chronic systolic congestive heart failure (428 22,428 0) (I50 22)   12  Constipation (564 00) (K59 00)   13  COPD exacerbation (491 21) (J44 1)   14  Encounter for prostate cancer screening (V76 44) (Z12 5)   15  Encounter for screening colonoscopy (V76 51) (Z12 11)   16  Erectile dysfunction (607 84) (N52 9)   17  Flu vaccine need (V04 81) (Z23)   18  Hyperlipidemia (272 4) (E78 5)   19   Hypertension (401 9) (I10)   20  Hypertensive heart disease (402 90) (I11 9)   21  ICD (implantable cardioverter-defibrillator) in place (V45 02) (Z95 810)   22  Insomnia (780 52) (G47 00)   23  Iron deficiency (280 9) (E61 1)   24  Lightheadedness (780 4) (R42)   25  Long term use of drug (V58 69) (Z79 899)   26  Lung nodule, solitary (793 11) (R91 1)   27  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)   28  Musculoskeletal thigh pain, right (729 5) (M79 651)   29  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)   30  Nocturnal hypoxia (327 24) (G47 34)   31  Paroxysmal ventricular tachycardia (427 1) (I47 2)   32  Peripheral polyneuropathy (356 9) (G62 9)   33  Polydipsia (783 5) (R63 1)   34  PVC (premature ventricular contraction) (427 69) (I49 3)   35  Screening for genitourinary condition (V81 6) (Z13 89)   36  Screening for neurological condition (V80 09) (Z13 89)   37  Screening for skin condition (V82 0) (Z13 89)   38  Seborrheic keratosis (702 19) (L82 1)   39  Shortness of breath (786 05) (R06 02)   40  Squamous cell cancer of skin of forearm, left (173 62) (C44 629)   41  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 (173 22) (C44 222)   42  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 (173 62) (C44 629)   43  Tobacco use (305 1) (Z72 0)    Past Medical History  Problems    1  History of Benign colon polyp (211 3) (K63 5)   2  Chronic obstructive pulmonary disease (496) (J44 9)   3  H/O nonmelanoma skin cancer (V10 83) (K55 319)   4  History of Urinary retention (788 20) (R33 9)  Active Problems And Past Medical History Reviewed: The active problems and past medical history were reviewed and updated today  Surgical History  Problems    1  History of Pacemaker Placement  Surgical History Reviewed: The surgical history was reviewed and updated today  Family History  Mother    1  Family history of Diabetes  Father    2  Family history of Colon cancer  Family History    3   Family history of Diabetes mellitus (250 00) (E11 9)  Family History Reviewed: The family history was reviewed and updated today  Social History  Problems    · Current every day smoker (305 1) (F17 200)   · No alcohol use   · No drug use   · Tobacco use (305 1) (Z72 0)  Social History Reviewed: The social history was reviewed and updated today  The social history was reviewed and is unchanged  Current Meds   1  Albuterol Sulfate (2 5 MG/3ML) 0 083% Inhalation Nebulization Solution; USE ONE VIAL IN NEBULIZER 4 TIMES DAILY AS NEEDED; Therapy: 51Mwj6462 to (Rigo Tabor)  Requested for: 63CJZ4987; Last Rx:11Oct2017 Ordered   2  Aspirin 81 MG TABS; TAKE 1 TABLET DAILY; Therapy: 28Uwb0013 to (Evaluate:70Sjc7162); Last Rx:18Xhx4967 Ordered   3  Atorvastatin Calcium 80 MG Oral Tablet; TAKE 1/2 TABLET DAILY  Requested for: 84UYG7378; Last Rx:30Nov2017 Ordered   4  [employer] Vitamin D3 CAPS; TAKE 1 CAPSULE Daily Recorded   5  DrRx Phenergan with Codeine 118 ML; 5 ml po q 4-6 hrs prn cough; Therapy: 28CME3804 to (Last Rx:30Gqa7586) Ordered   6  Entresto 24-26 MG Oral Tablet; TAKE 1 TABLET BY MOUTH TWICE A DAY; Therapy: 00ERE5761 to (Evaluate:11Jun2018)  Requested for: 86Tsk3839; Last Rx:32Ytw6730 Ordered   7  Ferrous Sulfate 325 (65 Fe) MG Oral Tablet; TAKE 1 TABLET TWICE DAILY WITH MEALS; Therapy: 51Zrs8347 to (Evaluate:14Kpd4547); Last Rx:26Grc1695 Ordered   8  Levothyroxine Sodium 125 MCG Oral Tablet; Take 1 tablet daily; Therapy: 11Clt7285 to (Last CT:72FWS0750)  Requested for: 40FJO7830 Ordered   9  Lovaza 1 GM Oral Capsule; TAKE 2 CAPSULES TWICE DAILY Recorded   10  Metoprolol Succinate ER 50 MG Oral Tablet Extended Release 24 Hour; TAKE 1 TABLET  DAILY; Therapy: 40IOT6858 to (Evaluate:25Nov2018) Recorded   11  PEG 3350-KCl-Na Bicarb-NaCl 420 GM Oral Solution Reconstituted; TAKE AS  DIRECTED; Therapy: 82VDX1059 to (Last Rx:02Mar2017)  Requested for: 86JVV9195 Ordered   12   Potassium Chloride Ev ER 20 MEQ Oral Tablet Extended Release; TAKE 1 TABLET  DAILY; Therapy: 89CMK1720 to (Evaluate:13Mar2018)  Requested for: 85Xno4705; Last  Rx:16Pde0564 Ordered   13  Spiriva Respimat 2 5 MCG/ACT Inhalation Aerosol Solution; USE 2 INHALATIONS DAILY; Therapy: 78SQA7954 to (Last Rx:19Jun2017)  Requested for: 22SNJ8234 Ordered   14  Targretin 75 MG Oral Capsule; TAKE 8 pills q am with breakfast  Requested for:  75NBX7783; Last Rx:07Nov2017 Ordered   15  Torsemide 20 MG Oral Tablet; TAKE 1 TABLET ONCE DAILY; Therapy: 60APR7129 to (Evaluate:25Xrx8001) Recorded   16  Triamcinolone Acetonide 0 1 % External Ointment; APPLY AND GENTLY MASSAGE INTO  AFFECTED AREA(S) TWICE DAILY; Therapy: 83NSP1735 to (Last WC:87TMG1051)  Requested for: 38HOB0053 Ordered   17  Ventolin  (90 Base) MCG/ACT Inhalation Aerosol Solution; USE 1 TO 2  INHALATIONS EVERY 4 TO 6 HOURS AS NEEDED; Therapy: 13Kiq6704 to (Last Rx:07Nov2017)  Requested for: 28SBN9679 Ordered   18  Vitamin B-12 1000 MCG Oral Tablet; TAKE 1 TABLET DAILY AS DIRECTED; Therapy: 65Vic4431 to (Evaluate:30Sep2015); Last Rx:14Tcj6462 Ordered   19  Zolpidem Tartrate 10 MG Oral Tablet; take 1 tablet daily at bedtime; Therapy: 31KFV3524 to (Evaluate:07Jan2018); Last Rx:75Pvw1006 Ordered  Medication List Reviewed: The medication list was reviewed and updated today  Medication List Reviewed: The medication list was reviewed and updated today  Allergies  Medication    1  No Known Drug Allergies    Vitals  Vital Signs    Recorded: 26Jpb9332 01:56PM   Heart Rate 85   Systolic 854   Diastolic 62   Height 5 ft 11 in   Weight 235 lb    BMI Calculated 32 78   BSA Calculated 2 26   O2 Saturation 97     Physical Exam   Constitutional  General appearance: No acute distress, well appearing and well nourished  Eyes  Conjunctiva and Sclera examination: Conjunctiva pink, sclera anicteric  Ears, Nose, Mouth, and Throat - External inspection of ears and nose: Normal without deformities or discharge  -- Oropharynx: Clear, nares are clear, mucous membranes are moist   Neck  Neck and thyroid: Normal, supple, trachea midline, no thyromegaly  Pulmonary  Respiratory effort: No increased work of breathing or signs of respiratory distress  Auscultation of lungs: Clear to auscultation, no rales, no rhonchi, no wheezing, good air movement  Cardiovascular  Palpation of heart: Normal PMI, no thrills  Auscultation of heart: Normal rate and rhythm, normal S1 and S2, no murmurs  Carotid pulses: Normal, 2+ bilaterally  Pedal pulses: Normal, 2+ bilaterally  Examination of extremities for edema and/or varicosities: Normal    Chest - Chest: Abnormal -- ICD scar +  Abdomen  Abdomen: Non-tender and no distention  Liver and spleen: No hepatomegaly or splenomegaly  Musculoskeletal Gait and station: Normal gait  -- Digits and nails: Normal without clubbing or cyanosis  -- Inspection/palpation of joints, bones, and muscles: Normal, ROM normal    Skin - Skin and subcutaneous tissue: Normal without rashes or lesions  Skin is warm and well perfused, normal turgor  Neurologic - Speech: Normal    Psychiatric - Orientation to person, place, and time: Normal -- Mood and affect: Normal       Health Management  Encounter for screening colonoscopy   COLONOSCOPY; every 5 years; Last 59MUW2687; Next Due: 37Htp3214; Active    Future Appointments    Date/Time Provider Specialty Site   12/18/2017 09:30 AM Cardiology, Device Remote   [address]   03/19/2018 01:30 PM Cardiology, Device Remote   [address]   06/20/2018 09:00 AM Cardiology, Device Remote   [address]   04/16/2018 12:45 PM GENARO Pantoja  Pulmonary Medicine [facility]   02/22/2018 11:00 AM GENARO Duque , PhD Cardiology [address]   02/06/2018 02:35 PM GENARO Sherman   Dermatology  [facility]     Signatures   Electronically signed by : GENARO Morrell ; Dec 14 2017  3:56PM EST                       (Author)

## 2017-12-18 ENCOUNTER — ALLSCRIPTS OFFICE VISIT (OUTPATIENT)
Dept: OTHER | Facility: OTHER | Age: 72
End: 2017-12-18

## 2017-12-18 ENCOUNTER — GENERIC CONVERSION - ENCOUNTER (OUTPATIENT)
Dept: OTHER | Facility: OTHER | Age: 72
End: 2017-12-18

## 2018-01-10 NOTE — PROGRESS NOTES
History of Present Illness  Care Coordination Encounter Information:   Type of Encounter: Telephonic   Contact: Initial Contact    Spoke to Patient  Care Coordination SL Nurse [facility]:   The reason for call is to discuss outreach for follow up/needed services and coordination of meeting care plan treatment goals  Patient alert and in stable condition  Denied pain or discomfort  Tolerating medications as ordered  Educated patient on good medication management; verbalized understanding  Stated vitals have been stable  Did not have any questions, concerns or needed services at this time  Does not want another f/u phone call but took down contact information again in case any may arise  Will close from care coordination  Active Problems    1  Anemia of chronic disease (285 29) (D63 8)   2  Basal cell carcinoma of skin of forearm, right (173 61) (C44 612)   3  BPH (benign prostatic hyperplasia) (600 00) (N40 0)   4  Bradycardia (427 89) (R00 1)   5  Cardiomyopathy (425 4) (I42 9)   6  Changing skin lesion (709 9) (L98 9)   7  Chest discomfort (786 59) (R07 89)   8  Chronic bronchitis (491 9) (J42)   9  Chronic obstructive pulmonary disease (496) (J44 9)   10  Chronic systolic congestive heart failure (428 22,428 0) (I50 22)   11  Constipation (564 00) (K59 00)   12  Encounter for prostate cancer screening (V76 44) (Z12 5)   13  Encounter for screening colonoscopy (V76 51) (Z12 11)   14  Erectile dysfunction (607 84) (N52 9)   15  Hyperlipidemia (272 4) (E78 5)   16  Hypertension (401 9) (I10)   17  Hypertensive heart disease (402 90) (I11 9)   18  ICD (implantable cardioverter-defibrillator) in place (V45 02) (Z95 810)   19  Insomnia (780 52) (G47 00)   20  Iron deficiency (280 9) (E61 1)   21  Lightheadedness (780 4) (R42)   22  Long term use of drug (V58 69) (Z79 899)   23  Lung nodule, solitary (793 11) (R91 1)   24  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)   25   Musculoskeletal thigh pain, right (729 5) (M79 651) 26  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)   27  Nocturnal hypoxia (327 24) (G47 34)   28  Paroxysmal ventricular tachycardia (427 1) (I47 2)   29  Peripheral polyneuropathy (356 9) (G62 9)   30  Polydipsia (783 5) (R63 1)   31  Screening for genitourinary condition (V81 6) (Z13 89)   32  Screening for neurological condition (V80 09) (Z13 89)   33  Screening for skin condition (V82 0) (Z13 89)   34  Seborrheic keratosis (702 19) (L82 1)   35  Shortness of breath (786 05) (R06 02)   36  Squamous cell cancer of skin of forearm, left (173 62) (C44 629)   37  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 (173 22) (C44 222)   38  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 (173 62) (C44 629)   39  Tobacco use (305 1) (Z72 0)    Past Medical History    1  History of Benign colon polyp (211 3) (K63 5)   2  Chronic obstructive pulmonary disease (496) (J44 9)   3  H/O nonmelanoma skin cancer (V10 83) (E50 428)   4  History of Urinary retention (788 20) (R33 9)    Surgical History    1  History of Pacemaker Placement    Family History  Mother    1  Family history of Diabetes  Father    2  Family history of Colon cancer  Family History    3  Family history of Diabetes mellitus (250 00) (E11 9)    Social History    · Current every day smoker (305 1) (F17 200)   · No alcohol use   · No drug use   · Tobacco use (305 1) (Z72 0)    Current Meds    1  Polyethylene Glycol 3350 Oral Powder (MiraLax); mix 1 capful in 8 ounces of water and   drink at bedtime as needed for constipation; Therapy: 48ANA4490 to (Evaluate:30Jun2017); Last Rx:02Mar2017 Ordered    2  Aspirin 81 MG TABS; TAKE 1 TABLET DAILY; Therapy: 80Pls8195 to (Evaluate:06Fiq0415); Last Rx:09Ctw0951 Ordered   3  Metoprolol Succinate ER 50 MG Oral Tablet Extended Release 24 Hour; TAKE 1 TABLET   DAILY; Therapy: 75YYU5586 to (Evaluate:18Jan2018)  Requested for: 57LPZ4147; Last   Rx:23Jan2017 Ordered   4   Potassium Chloride Ev ER 20 MEQ Oral Tablet Extended Release; Take 1 tablet daily; Therapy: 39KCN9577 to (Last Rx:20Jan2017)  Requested for: 20Jan2017 Ordered   5  Torsemide 20 MG Oral Tablet; Take 1 tablet daily; Therapy: 10Zmj2704 to (Last Rx:24Apr2017)  Requested for: 50Kht5324 Ordered    6  Albuterol Sulfate (2 5 MG/3ML) 0 083% Inhalation Nebulization Solution; USE ONE VIAL   IN NEBULIZER 4 TIMES DAILY AS NEEDED; Therapy: 80Zag7902 to (Evaluate:79Gcz3413)  Requested for: 20ERF8258; Last   Rx:15May2017 Ordered   7  Bevespi Aerosphere 9-4 8 MCG/ACT Inhalation Aerosol; INHALE 2 PUFFS Every twelve   hours PRN; Therapy: 20Apr2017 to (Evaluate:20May2017); Last Rx:20Apr2017 Ordered   8  Spiriva Respimat 2 5 MCG/ACT Inhalation Aerosol Solution; USE 2 INHALATIONS DAILY; Therapy: 11ZIM2998 to (Last Rx:22Sep2016)  Requested for: 28ISK1456 Ordered   9  Ventolin  (90 Base) MCG/ACT Inhalation Aerosol Solution; USE 1 TO 2   INHALATIONS EVERY 4 TO 6 HOURS AS NEEDED; Therapy: 52Won5649 to (Last Rx:25Nov2016)  Requested for: 45XCM5398 Ordered    10  Spironolactone 25 MG Oral Tablet; take 1 tablet every day; Therapy: 33CLX6506 to (Evaluate:07Lrw1175)  Requested for: 08WIA8697; Last    Rx:84Kvc5202 Ordered    11  Lisinopril 2 5 MG Oral Tablet; TAKE 1 TABLET DAILY; Therapy: 54Cir4250 to [phone])  Requested for: 12CIV2745; Last    Rx:02May2017 Ordered    12  PEG 3350-KCl-Na Bicarb-NaCl 420 GM Oral Solution Reconstituted (Nulytely with Flavor    Packs); TAKE AS DIRECTED; Therapy: 90WSW4929 to (Last Rx:02Mar2017)  Requested for: 03CBF4078 Ordered    13  Ferrous Sulfate 325 (65 Fe) MG Oral Tablet; TAKE 1 TABLET TWICE DAILY WITH MEALS; Therapy: 88Rxq1411 to (Evaluate:37Fch9400); Last Rx:19Gss6278 Ordered   14  Vitamin B-12 1000 MCG Oral Tablet; TAKE 1 TABLET DAILY AS DIRECTED; Therapy: 31Aug2015 to (Evaluate:30Sep2015); Last Rx:31Aug2015 Ordered    15  Atorvastatin Calcium 80 MG Oral Tablet (Lipitor); TAKE 1/2 TABLET DAILY;  Last    Rx:12Jan2016 Ordered    16  Zolpidem Tartrate 10 MG Oral Tablet (Ambien); TAKE 1 TABLET DAILY AT BEDTIME; Therapy: 35SDV9927 to (Evaluate:17Jun2016); Last Rx:18Feb2016 Ordered    17  Meloxicam 15 MG Oral Tablet; TAKE 1 TABLET DAILY; Therapy: 37GCI8921 to (Evaluate:19Mar2016)  Requested for: 41MBP1540; Last    Rx:18Feb2016 Ordered    18  Levothyroxine Sodium 125 MCG Oral Tablet; TAKE 1 TABLET DAILY  Requested for:    90UUW2774; Last Rx:03May2017 Ordered   19  Targretin 75 MG Oral Capsule (Bexarotene); TAKE 8 pills q am with breakfast  Requested    for: 02Jun2017; Last Rx:03May2017 Ordered   20  Triamcinolone Acetonide 0 1 % External Ointment; APPLY AND GENTLY MASSAGE INTO    AFFECTED AREA(S) TWICE DAILY; Therapy: 94IOB7931 to (Last QC:52WJO7223)  Requested for: 24MXB7418 Ordered    21  [employer] Vitamin D3 CAPS; TAKE 1 CAPSULE Daily Recorded   22  Lovaza 1 GM Oral Capsule (Omega-3-acid Ethyl Esters); TAKE 2 CAPSULES TWICE    DAILY Recorded    Allergies    1  No Known Drug Allergies    Health Management   COLONOSCOPY; every 5 years; Last 47TBC0895; Next Due: 88Fpv9812; Active    End of Encounter Meds    1  Polyethylene Glycol 3350 Oral Powder (MiraLax); mix 1 capful in 8 ounces of water and   drink at bedtime as needed for constipation; Therapy: 32GHB6390 to (Evaluate:30Jun2017); Last Rx:02Mar2017 Ordered    2  Aspirin 81 MG TABS; TAKE 1 TABLET DAILY; Therapy: 05Snx3400 to (Evaluate:36Fqo4304); Last Rx:68Wpq7115 Ordered   3  Metoprolol Succinate ER 50 MG Oral Tablet Extended Release 24 Hour; TAKE 1 TABLET   DAILY; Therapy: 82HEO6068 to (Evaluate:18Jan2018)  Requested for: 63HTY8383; Last   Rx:23Jan2017 Ordered   4  Potassium Chloride Ev ER 20 MEQ Oral Tablet Extended Release; Take 1 tablet daily; Therapy: 27USI1670 to (Last Rx:20Jan2017)  Requested for: 20Jan2017 Ordered   5  Torsemide 20 MG Oral Tablet; Take 1 tablet daily; Therapy: 83Wgc2371 to (Last Rx:24Apr2017)  Requested for: 24Apr2017 Ordered    6   Albuterol Sulfate (2 5 MG/3ML) 0 083% Inhalation Nebulization Solution; USE ONE VIAL   IN NEBULIZER 4 TIMES DAILY AS NEEDED; Therapy: 34Dgp5942 to (Evaluate:47Ine3881)  Requested for: 79IDF9426; Last   Rx:48Klf7508 Ordered   7  Bevespi Aerosphere 9-4 8 MCG/ACT Inhalation Aerosol; INHALE 2 PUFFS Every twelve   hours PRN; Therapy: 53Uma2113 to (Evaluate:61Yxy7209); Last Rx:35Umq1517 Ordered   8  Spiriva Respimat 2 5 MCG/ACT Inhalation Aerosol Solution; USE 2 INHALATIONS DAILY; Therapy: 60STB3596 to (Last Rx:10Bsm5146)  Requested for: 62KPC3844 Ordered   9  Ventolin  (90 Base) MCG/ACT Inhalation Aerosol Solution; USE 1 TO 2   INHALATIONS EVERY 4 TO 6 HOURS AS NEEDED; Therapy: 65Itq9754 to (Last Rx:25Nov2016)  Requested for: 24MUR6421 Ordered    10  Spironolactone 25 MG Oral Tablet; take 1 tablet every day; Therapy: 46TZT2182 to (Evaluate:02Cxl1681)  Requested for: 52CNW9456; Last    Rx:30May2017 Ordered    11  Lisinopril 2 5 MG Oral Tablet; TAKE 1 TABLET DAILY; Therapy: 49Jnb6234 to [phone]  Requested for: 18MLW7270; Last    Rx:02May2017 Ordered    12  PEG 3350-KCl-Na Bicarb-NaCl 420 GM Oral Solution Reconstituted (Nulytely with Flavor    Packs); TAKE AS DIRECTED; Therapy: 77TBW0989 to (Last Rx:02Mar2017)  Requested for: 64VIJ1922 Ordered    13  Ferrous Sulfate 325 (65 Fe) MG Oral Tablet; TAKE 1 TABLET TWICE DAILY WITH MEALS; Therapy: 18Ymv7139 to (Evaluate:17Alq7037); Last Rx:85Uxc8989 Ordered   14  Vitamin B-12 1000 MCG Oral Tablet; TAKE 1 TABLET DAILY AS DIRECTED; Therapy: 55Uko1331 to (Evaluate:86Pls2659); Last Rx:82Yec1261 Ordered    15  Atorvastatin Calcium 80 MG Oral Tablet (Lipitor); TAKE 1/2 TABLET DAILY; Last    Rx:12Jan2016 Ordered    16  Zolpidem Tartrate 10 MG Oral Tablet (Ambien); TAKE 1 TABLET DAILY AT BEDTIME; Therapy: 32CNM1052 to (Evaluate:17Jun2016); Last Rx:18Feb2016 Ordered    17  Meloxicam 15 MG Oral Tablet; TAKE 1 TABLET DAILY;     Therapy: 83NVR0844 to (Evaluate:19Mar2016)  Requested for: 31IVB9794; Last    Rx:18Feb2016 Ordered    18  Levothyroxine Sodium 125 MCG Oral Tablet; TAKE 1 TABLET DAILY  Requested for:    68ZOO0965; Last Rx:03May2017 Ordered   19  Targretin 75 MG Oral Capsule (Bexarotene); TAKE 8 pills q am with breakfast  Requested    for: 02Jun2017; Last Rx:03May2017 Ordered   20  Triamcinolone Acetonide 0 1 % External Ointment; APPLY AND GENTLY MASSAGE INTO    AFFECTED AREA(S) TWICE DAILY; Therapy: 67JVK6192 to (Last UJ:83UPC0130)  Requested for: 92XVU8941 Ordered    21  [employer] Vitamin D3 CAPS; TAKE 1 CAPSULE Daily Recorded   22  Lovaza 1 GM Oral Capsule (Omega-3-acid Ethyl Esters); TAKE 2 CAPSULES TWICE    DAILY Recorded    Future Appointments    Date/Time Provider Specialty Site   10/10/2017 01:45 PM GENARO Link  Hematology [facility]   04/16/2018 12:45 PM GENARO Urias  Pulmonary Medicine [facility]   07/10/2017 03:00 PM GENARO Shah  Internal Medicine [facility]   07/13/2017 10:40 AM GENARO Olmos  Cardiology [address]   08/07/2017 03:45 PM GENARO Joseph  Dermatology  [facility]     Patient Care Team    Care Team Member Role Specialty Office Number   Maggy HANSON  Dermatology [phone]   Soila HANSON  Cardiology [phone]   [facility]  Cardiology [phone]   Jessica HANSON    Pulmonary Medicine [phone]   CardiologyJayy MD  Internal Medicine [phone]     Signatures   Electronically signed by : Mei Irene RN; Jun 7 2017  1:58PM EST                       (Author)

## 2018-01-11 ENCOUNTER — ALLSCRIPTS OFFICE VISIT (OUTPATIENT)
Dept: OTHER | Facility: OTHER | Age: 73
End: 2018-01-11

## 2018-01-12 VITALS
DIASTOLIC BLOOD PRESSURE: 75 MMHG | DIASTOLIC BLOOD PRESSURE: 70 MMHG | HEIGHT: 71 IN | WEIGHT: 236.13 LBS | HEIGHT: 71 IN | SYSTOLIC BLOOD PRESSURE: 118 MMHG | WEIGHT: 243 LBS | SYSTOLIC BLOOD PRESSURE: 112 MMHG | BODY MASS INDEX: 34.02 KG/M2 | HEART RATE: 65 BPM | BODY MASS INDEX: 33.06 KG/M2

## 2018-01-12 VITALS
HEART RATE: 76 BPM | DIASTOLIC BLOOD PRESSURE: 62 MMHG | WEIGHT: 228.38 LBS | BODY MASS INDEX: 31.97 KG/M2 | SYSTOLIC BLOOD PRESSURE: 98 MMHG | HEIGHT: 71 IN | OXYGEN SATURATION: 98 %

## 2018-01-12 NOTE — RESULT NOTES
PFT Results v2:   Diagnosis/Reason For Study: COPD   Referring Provider: Dr Roberto Gutierrez   Spirometry: Forced vital capacity: 2 96L and 65% Predicted Values  Forced expiratory volume in one second: 1 57L and 46% Predicted Value  FEV1/FVC ratio is 71% Predicted Values  F EF 25-75 percent, 0 47 L, 18% predicted    Post Bronchodilator Spirometry: Forced vital capacity : 3 61L and 79% Predicted Values  Forced expiratory volume in one second : 1 90L and 56% Predicted Value  FEV1/FVC ratio is 70% Predicted Values  F EF 25-75 percent, 1 02 L, 39% predicted    Lung Volumes: Total lung capacity : 6 52L and 91% Predicted Values  RV: 126% Predicted Values  RV/T% Predicted Values  DLCO:   DLCO 50% Predicted Values  DLCO corrected for volume is 64%     PFT Interpretation:   Patient had a full lung function testing with spirometry lung volumes and DLCO  Patient gave a good effort  Results meet the ATS standards for acceptability and repeat ability  The flow volume curve demonstrates an obstructive pattern  There is moderate obstructive ventilatory limitation with an appreciable response to the bronchodilator  The lung volumes are normal   The residual volume is increased consistent with hyperinflation  The DLCO is mildly decreased  Findings are consistent with COPD  Clinical correlation is required  Future Appointments    Date/Time Provider Specialty Site   2017 12:30 PM [facility]   2017 02:30 PM GENARO Hollingsworth  Cardiology [facility]ARDIOLOGY [facility]   2016 02:00 PM GENARO Willoughby  Dermatology [facility] MED [facility]   2017 03:35 PM GENARO Willoughby   Dermatology [facility]      Electronically signed by : GENARO Gonzalez ; Nov 15 2016  9:02AM EST                       (Author)

## 2018-01-12 NOTE — PROGRESS NOTES
Assessment   Assessed    1  Cardiomyopathy (425 4) (I42 9)   2  ICD (implantable cardioverter-defibrillator) in place (V45 02) (Z95 810)   3  Paroxysmal ventricular tachycardia (427 1) (I47 2)   4  PVC (premature ventricular contraction) (427 69) (I49 3)   5  Tobacco use (305 1) (Z72 0)    Plan   Bradycardia    · EKG/ECG- POC; Status:Complete;   Done: 40NCY7934   Perform: In Office; Due:11Jan2019; Last Updated By:Lexus Kolb; 1/11/2018 10:30:51 AM;Ordered; For:Bradycardia; Ordered By:Jack Sampson; Discussion/Summary   Cardiology Discussion Summary Free Text Note Form [facility]:    1  Nonischemic cardiomyopathy, biventricular ICD,    stable, improving with aggressive management of CHF cardiac rehab   patient does have a history of nonischemic cardiomyopathy >15 years is currently relatively asymptomatic, though limited in physical activities    is on a combined regimen of entresto, metoprolol succinate, torsemide  with the same   headed with sudden orthostatic changes - recommend gradual changes needs diuresis to prevent heart failure symptoms       Premature ventricular contractions, ventricular tachycardia   are decreased today compared to prior EKG strips  1 every 10 beats now compared to 1 every 3 beats previously    pvc's are coming from the left ventricle, free wall, base most likely epicardial   repeat the device check   the current time the following changes have been done: metoprolol succinate 50 mg daily    In days when he feels palpitations or forceful beat he is to take an additional 25 mg   He has about 95% biventricular pacing In his last device interrogation in oct 2016 interfering with same  treatment of PVC blocker Increased  flecainide to be avoided as scar is expected sotalol can be tried , His creatinine is 1 15 Amiodarone is a possibility but will be 2nd choice , DLCO is 54%          smoking cessation patient finally quit smoking in October 2016 has remained away from the same has used nicotine patch for only a week       the current time summary of my recommendations are: continue cardiac rehabilitation and f/u with Dr Marina Hatfield  repeat a device check PVC better controlled now, wait and watch  Chief Complaint   follow up of CMP, PVC    Chief Complaint Free Text Note Form: Patient is here for a follow up and has stated that he is has some dizzy spells  EKG was completed today        History of Present Illness    Patient doing well with aggressive management of heart failure     Also doing cardiac rehab     Occasionally gets light headed if gets up too fast          He quit smoking about a year ago     Still continuing same - not smoking               He does have a history of nonischemic cardiomyopathy And has a biventricular ICD in place     He occasionally feels a forceful beat     However he has not had any significant palpitations, presyncope or syncope     There is no history of getting shocked recently     He does not complain of angina like chest pain     He is not complaining of newly worsening orthopnea or paroxysmal nocturnal dyspnea               The patient does complain of fatigue     chronic and lasting more than 2 years and currently improving     He has a history of smoking for over 55 years and recently quit in October 2016     He does have COPD     He also has mycosis fungoides and is on therapy for the same, Follows up at [facility]     He does have nonischemic cardiomyopathy               The patient is a pleasant 61-year-old who has moved from [place] to [facility]     He has been seen by Dr Altagracia Williamson and referred to me for management of his nonischemic cardiomyopathy and biventricular ICD     The patient informs that he has this diagnosis of nonischemic cardiomyopathy for almost 15 years     He has the device for a long period and recently in 2011 it was changed to a [employer] device               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     This has been diagnosed for almost 15 years      he is on a medication targretin / bexarotene For the same         Review of Systems   As described in my history of present illness     All other systems reviewed and currently negative     Patient's activity is limited by the heat outside as he prefers to use the air conditioning         Active Problems   Problems    1  Anemia of chronic disease (285 29) (D63 8)   2  Basal cell carcinoma of skin of forearm, right (173 61) (C44 612)   3  BPH (benign prostatic hyperplasia) (600 00) (N40 0)   4  Bradycardia (427 89) (R00 1)   5  Bronchitis (490) (J40)   6  Cardiomyopathy (425 4) (I42 9)   7  Changing skin lesion (709 9) (L98 9)   8  Chest discomfort (786 59) (R07 89)   9  Chronic bronchitis (491 9) (J42)   10  Chronic obstructive pulmonary disease (496) (J44 9)   11  Chronic systolic congestive heart failure (428 22,428 0) (I50 22)   12  Constipation (564 00) (K59 00)   13  COPD exacerbation (491 21) (J44 1)   14  Encounter for prostate cancer screening (V76 44) (Z12 5)   15  Encounter for screening colonoscopy (V76 51) (Z12 11)   16  Erectile dysfunction (607 84) (N52 9)   17  Fatigue (780 79) (R53 83)   18  Flu vaccine need (V04 81) (Z23)   19  Hyperlipidemia (272 4) (E78 5)   20  Hypertension (401 9) (I10)   21  Hypertensive heart disease (402 90) (I11 9)   22  ICD (implantable cardioverter-defibrillator) in place (V45 02) (Z95 810)   23  Insomnia (780 52) (G47 00)   24  Iron deficiency (280 9) (E61 1)   25  Lightheadedness (780 4) (R42)   26  Long term use of drug (V58 69) (Z79 899)   27  Lung nodule, solitary (793 11) (R91 1)   28  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)   29  Musculoskeletal thigh pain, right (729 5) (M79 651)   30  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)   31  Nocturnal hypoxia (327 24) (G47 34)   32  Non-rheumatic mitral regurgitation (424 0) (I34 0)   33  Paroxysmal ventricular tachycardia (427 1) (I47 2)   34  Peripheral polyneuropathy (356 9) (G62 9)   35   Polydipsia (783  5) (R63 1)   36  Pulmonary hypertension (416 8) (I27 20)   37  PVC (premature ventricular contraction) (427 69) (I49 3)   38  Screening for genitourinary condition (V81 6) (Z13 89)   39  Screening for neurological condition (V80 09) (Z13 89)   40  Screening for skin condition (V82 0) (Z13 89)   41  Seborrheic keratosis (702 19) (L82 1)   42  Shortness of breath (786 05) (R06 02)   43  Squamous cell cancer of skin of forearm, left (173 62) (C44 629)   44  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 (173 22) (C44 222)   45  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 (173 62) (C44 629)   46  Tobacco use (305 1) (Z72 0)    Past Medical History   Problems    1  History of Benign colon polyp (211 3) (K63 5)   2  Chronic obstructive pulmonary disease (496) (J44 9)   3  H/O nonmelanoma skin cancer (V10 83) (A74 891)   4  History of Urinary retention (788 20) (R33 9)  Active Problems And Past Medical History Reviewed: The active problems and past medical history were reviewed and updated today  Surgical History   Problems    1  History of Pacemaker Placement  Surgical History Reviewed: The surgical history was reviewed and updated today  Family History   Mother    1  Family history of Diabetes  Father    2  Family history of Colon cancer  Family History    3  Family history of Diabetes mellitus (250 00) (E11 9)  Family History Reviewed: The family history was reviewed and updated today  Social History   Problems    · Current every day smoker (305 1) (F17 200)   · No alcohol use   · No drug use   · Tobacco use (305 1) (Z72 0)  Social History Reviewed: The social history was reviewed and updated today  Current Meds    1  Albuterol Sulfate (2 5 MG/3ML) 0 083% Inhalation Nebulization Solution; USE ONE VIAL     IN NEBULIZER 4 TIMES DAILY AS NEEDED; Therapy: 80Xzy7053 to (Dayana Allred)  Requested for: 26UDH1780; Last     Rx:11Oct2017 Ordered   2   Aspirin 81 MG TABS; TAKE 1 TABLET DAILY; Therapy: 86Wfa7405 to (Evaluate:10Shh4898); Last Rx:25Lsz9579 Ordered   3  Atorvastatin Calcium 80 MG Oral Tablet; TAKE 1/2 TABLET DAILY  Requested for:     65OBG2582; Last Rx:2017 Ordered   4  [employer] Vitamin D3 CAPS; TAKE 1 CAPSULE Daily Recorded   5  DrRx Phenergan with Codeine 118 ML; 5 ml po q 4-6 hrs prn cough; Therapy: 07AJC7266 to (Last Rx:71Xum5743) Ordered   6  Entresto 24-26 MG Oral Tablet; TAKE 1 TABLET BY MOUTH TWICE A DAY; Therapy: 85KJS8674 to (Evaluate:2018)  Requested for: 15Krq1178; Last     Rx:21Ybn7313 Ordered   7  Ferrous Sulfate 325 (65 Fe) MG Oral Tablet; TAKE 1 TABLET TWICE DAILY WITH MEALS; Therapy: 63Gbs8084 to (Evaluate:2015); Last Rx:48Hlc9292 Ordered   8  Levothyroxine Sodium 125 MCG Oral Tablet; Take 1 tablet daily; Therapy: 20Ffw5848 to (Last X14PCQ9987)  Requested for: 43JUZ5758 Ordered   9  Lovaza 1 GM Oral Capsule; TAKE 2 CAPSULES TWICE DAILY Recorded   10  Metoprolol Succinate ER 50 MG Oral Tablet Extended Release 24 Hour; TAKE 1 TABLET      DAILY (CHANGING FROM 25 MG TWICE A DAY TO 50 MG DAILY); Therapy: 46RJO0590 to (Last Rx:2018)  Requested for: 2018 Ordered   11  PEG 3350-KCl-Na Bicarb-NaCl 420 GM Oral Solution Reconstituted; TAKE AS      DIRECTED; Therapy: 50BGF9068 to (Last Rx:2017)  Requested for: 51XNW9265 Ordered   12  Potassium Chloride Ev ER 20 MEQ Oral Tablet Extended Release; TAKE 1 TABLET      DAILY; Therapy: 12NPO9746 to (Evaluate:2018)  Requested for: 57Mfv0514; Last      Rx:36Eho6326 Ordered   13  Spiriva Respimat 2 5 MCG/ACT Inhalation Aerosol Solution; USE 2 INHALATIONS DAILY; Therapy: 66LWZ5463 to (Last Rx:02Oce9142)  Requested for: 95VWI4164 Ordered   14  Targretin 75 MG Oral Capsule; TAKE 8 pills q am with breakfast  Requested for:      51OOQ8657; Last Rx:2017 Ordered   15  Torsemide 20 MG Oral Tablet; TAKE 1 TABLET ONCE DAILY;       Therapy: 25YMG0166 to (Moisés Hernandez) Recorded   16  Triamcinolone Acetonide 0 1 % External Ointment; APPLY AND GENTLY MASSAGE INTO      AFFECTED AREA(S) TWICE DAILY; Therapy: 92QEL1724 to (Last RZ:12JYL5053)  Requested for: 80BUA6134 Ordered   17  Ventolin  (90 Base) MCG/ACT Inhalation Aerosol Solution; USE 1 TO 2      INHALATIONS EVERY 4 TO 6 HOURS AS NEEDED; Therapy: 30Fcm6527 to (Last Rx:07Nov2017)  Requested for: 57JFU1072 Ordered   18  Vitamin B-12 1000 MCG Oral Tablet; TAKE 1 TABLET DAILY AS DIRECTED; Therapy: 15Ijh4855 to (Evaluate:30Sep2015); Last Rx:56Tqs8054 Ordered   19  Zolpidem Tartrate 10 MG Oral Tablet; take 1 tablet daily at bedtime; Therapy: 43BXA4492 to (Evaluate:07Jan2018); Last Rx:20Kqe8184 Ordered  Medication List Reviewed: The medication list was reviewed and updated today  Allergies   Medication    1  No Known Drug Allergies    Vitals   Vital Signs    Recorded: 82VMY6000 10:32AM   Heart Rate 88   Systolic 791, LUE, Sitting   Diastolic 76, LUE, Sitting   Height 5 ft 11 in   Weight 236 lb 6 oz   BMI Calculated 32 97   BSA Calculated 2 26   O2 Saturation 98     Physical Exam        Constitutional      General appearance: No acute distress, well appearing and well nourished  -- Not in any acute distress at the current time, smells of tobacco       Eyes      Conjunctiva and Sclera examination: Conjunctiva pink, sclera anicteric  Ears, Nose, Mouth, and Throat - nicotine staining of gums  -- Oropharynx: Clear, nares are clear, mucous membranes are moist       Neck      Neck and thyroid: Normal, supple, trachea midline, no thyromegaly  Pulmonary air entry decreased at bases  No significant crackles  Cardiovascular S1 S2 well heard  infrequent ectopics  No S3 S4 -- mild edema  Abdomen      Abdomen: Non-tender and no distention  Musculoskeletal Gait and station: Normal gait  Skin - mycoses fungoides        Neurologic - facial symmetry retained, EOM full -- Speech: Normal        Psychiatric - Orientation to person, place, and time: Normal -- Mood and affect: Normal       Results/Data   ECG Report: EKG reviewed by myself, Bi ventricular pacing, significant decline in PVC in rhythm strip      Health Management   Encounter for screening colonoscopy   COLONOSCOPY; every 5 years; Last 31ZLQ4752; Next Due: 74Wea9914; Active    Future Appointments      Date/Time Provider Specialty Site   03/19/2018 01:30 PM Cardiology, Device Remote   [address]   06/20/2018 09:00 AM Cardiology, Device Remote  [address]   04/16/2018 12:45 PM GENARO Wilhelm  Pulmonary Medicine [facility] [address] [place] [address]   02/22/2018 11:00 AM GENARO De La Rosa , PhD Cardiology [facility]   02/06/2018 02:35 PM GENARO Colon   Dermatology [facility] OF [address] [place] [address] INPATIENT REHABILITATION     Signatures    Electronically signed by : GENARO Bartholomew ; Jan 11 2018 11:06AM EST                       (Author)

## 2018-01-12 NOTE — PROGRESS NOTES
History of Present Illness  Care Coordination Encounter Information:   Type of Encounter: Telephonic   Contact: Initial Contact    Spoke to Patient  Care Coordination SL Nurse Kathie Eddy:   The reason for call is to discuss outreach for follow up/needed services and coordination of meeting care plan treatment goals  Patient auto enrolled to care coordination  Patient alert and in stable condition  Denied pain or discomfort  Stated that he is doing well at home  Patient has been f/u with pulmonogist due to history of COPD  Patient stated he is tolerating medications and is doing well  Had no questions or concerns at this time but took down contact information in case any questions may arise in the future  Does have a f/u appointment on 5/3/17 with PCP scheduled  Active Problems    1  Anemia of chronic disease (285 29) (D63 8)   2  Basal cell carcinoma of skin of forearm, right (173 61) (C44 612)   3  BPH (benign prostatic hyperplasia) (600 00) (N40 0)   4  Bradycardia (427 89) (R00 1)   5  Cardiomyopathy (425 4) (I42 9)   6  Changing skin lesion (709 9) (L98 9)   7  Chest discomfort (786 59) (R07 89)   8  Chronic bronchitis (491 9) (J42)   9  Chronic obstructive pulmonary disease (496) (J44 9)   10  Chronic systolic congestive heart failure (428 22,428 0) (I50 22)   11  Constipation (564 00) (K59 00)   12  Encounter for prostate cancer screening (V76 44) (Z12 5)   13  Encounter for screening colonoscopy (V76 51) (Z12 11)   14  Erectile dysfunction (607 84) (N52 9)   15  Hyperlipidemia (272 4) (E78 5)   16  Hypertension (401 9) (I10)   17  Hypertensive heart disease (402 90) (I11 9)   18  ICD (implantable cardioverter-defibrillator) in place (V45 02) (Z95 810)   19  Insomnia (780 52) (G47 00)   20  Iron deficiency (280 9) (E61 1)   21  Lightheadedness (780 4) (R42)   22  Long term use of drug (V58 69) (Z79 899)   23  Lung nodule, solitary (793 11) (R91 1)   24  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)   25  Musculoskeletal thigh pain, right (729 5) (M79 651)   26  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)   27  Nocturnal hypoxia (327 24) (G47 34)   28  Paroxysmal ventricular tachycardia (427 1) (I47 2)   29  Peripheral polyneuropathy (356 9) (G62 9)   30  Polydipsia (783 5) (R63 1)   31  Screening for genitourinary condition (V81 6) (Z13 89)   32  Screening for neurological condition (V80 09) (Z13 89)   33  Screening for skin condition (V82 0) (Z13 89)   34  Seborrheic keratosis (702 19) (L82 1)   35  Shortness of breath (786 05) (R06 02)   36  Squamous cell cancer of skin of forearm, left (173 62) (C44 629)   37  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 (173 22) (C44 222)   38  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 (173 62) (C44 629)   39  Tobacco use (305 1) (Z72 0)    Past Medical History    1  History of Benign colon polyp (211 3) (K63 5)   2  Chronic obstructive pulmonary disease (496) (J44 9)   3  H/O nonmelanoma skin cancer (V10 83) (V25 446)   4  History of Urinary retention (788 20) (R33 9)    Surgical History    1  History of Pacemaker Placement    Family History  Mother    1  Family history of Diabetes  Father    2  Family history of Colon cancer  Family History    3  Family history of Diabetes mellitus (250 00) (E11 9)    Social History    · Current every day smoker (305 1) (F17 200)   · No alcohol use   · No drug use   · Tobacco use (305 1) (Z72 0)    Current Meds    1  Polyethylene Glycol 3350 Oral Powder (MiraLax); mix 1 capful in 8 ounces of water and   drink at bedtime as needed for constipation; Therapy: 75CGS1121 to (Evaluate:30Jun2017); Last Rx:02Mar2017 Ordered    2  Aspirin 81 MG TABS; TAKE 1 TABLET DAILY; Therapy: 02Vwq4474 to (Evaluate:94Urp2940); Last Rx:27Jal3125 Ordered   3  Metoprolol Succinate ER 50 MG Oral Tablet Extended Release 24 Hour; TAKE 1 TABLET   DAILY; Therapy: 92BYM1328 to (Evaluate:18Jan2018)  Requested for: 67BJY0482; Last   Rx:23Jan2017 Ordered   4  Potassium Chloride Ev ER 20 MEQ Oral Tablet Extended Release; Take 1 tablet daily; Therapy: 87SOW9132 to (Last Rx:20Jan2017)  Requested for: 20Jan2017 Ordered   5  Torsemide 20 MG Oral Tablet; Take 1 tablet daily; Therapy: 18Tkm9621 to (Last Rx:26Jan2017)  Requested for: 26Jan2017 Ordered    6  Albuterol Sulfate (2 5 MG/3ML) 0 083% Inhalation Nebulization Solution; INHALE 1 VIAL   VIA NEBULIZER EVERY 6 HOURS; Therapy: 30Qhu8629 to (Evaluate:12Jan2017)  Requested for: 14Oct2016; Last   Rx:14Oct2016 Ordered   7  Bevespi Aerosphere 9-4 8 MCG/ACT Inhalation Aerosol; INHALE 2 PUFFS Every twelve   hours PRN; Therapy: 20Apr2017 to (Evaluate:20May2017); Last Rx:20Apr2017 Ordered   8  Spiriva Respimat 2 5 MCG/ACT Inhalation Aerosol Solution; USE 2 INHALATIONS DAILY; Therapy: 42HIQ9808 to (Last Rx:22Sep2016)  Requested for: 79GWM6094 Ordered   9  Ventolin  (90 Base) MCG/ACT Inhalation Aerosol Solution; USE 1 TO 2   INHALATIONS EVERY 4 TO 6 HOURS AS NEEDED; Therapy: 24Znh6288 to (Last Rx:25Nov2016)  Requested for: 16FFJ0431 Ordered    10  Spironolactone 25 MG Oral Tablet; take 1 tablet every day; Therapy: 60JHR1175 to (Evaluate:04Jun2017)  Requested for: 36PGB4198; Last    Rx:06Mar2017 Ordered    11  Lisinopril 2 5 MG Oral Tablet; TAKE 1 TABLET DAILY; Therapy: 25Met7053 to (Evaluate:29Apr2017)  Requested for: 31Oct2016; Last    Rx:31Oct2016 Ordered    12  PEG 3350-KCl-Na Bicarb-NaCl 420 GM Oral Solution Reconstituted (Nulytely with Flavor    Packs); TAKE AS DIRECTED; Therapy: 85XIK6816 to (Last Rx:02Mar2017)  Requested for: 03ACO1777 Ordered    13  Ferrous Sulfate 325 (65 Fe) MG Oral Tablet; TAKE 1 TABLET TWICE DAILY WITH MEALS; Therapy: 41Svz5930 to (Evaluate:44Oqb0945); Last Rx:53Syl5726 Ordered   14  Vitamin B-12 1000 MCG Oral Tablet; TAKE 1 TABLET DAILY AS DIRECTED; Therapy: 31Aug2015 to (Evaluate:30Sep2015); Last Rx:31Aug2015 Ordered    15   Atorvastatin Calcium 80 MG Oral Tablet (Lipitor); TAKE 1/2 TABLET DAILY; Last    Rx:12Jan2016 Ordered    16  Zolpidem Tartrate 10 MG Oral Tablet (Ambien); TAKE 1 TABLET DAILY AT BEDTIME; Therapy: 20BHX3291 to (Evaluate:17Jun2016); Last Rx:18Feb2016 Ordered    17  Meloxicam 15 MG Oral Tablet; TAKE 1 TABLET DAILY; Therapy: 44HMH4987 to (Evaluate:19Mar2016)  Requested for: 52NJO3650; Last    Rx:18Feb2016 Ordered    18  Levothyroxine Sodium 125 MCG Oral Tablet; TAKE 1 TABLET DAILY  Requested for:    93Tly3917; Last Rx:27Feb2017 Ordered   19  Targretin 75 MG Oral Capsule (Bexarotene); TAKE 8 pills q am with breakfast  Requested    for: 65BPX6851; Last Rx:25Jan2017 Ordered   20  Triamcinolone Acetonide 0 1 % External Ointment; APPLY AND GENTLY MASSAGE INTO    AFFECTED AREA(S) TWICE DAILY; Therapy: 04RQS0413 to (Last Ángela Rylan)  Requested for: 33DBU7853 Ordered    21  [employer] Vitamin D3 CAPS; TAKE 1 CAPSULE Daily Recorded   22  Lovaza 1 GM Oral Capsule (Omega-3-acid Ethyl Esters); TAKE 2 CAPSULES TWICE    DAILY Recorded    Allergies    1  No Known Drug Allergies    Health Management   COLONOSCOPY; every 5 years; Last 62ATW1405; Next Due: 72Tqt2674; Active    End of Encounter Meds    1  Polyethylene Glycol 3350 Oral Powder (MiraLax); mix 1 capful in 8 ounces of water and   drink at bedtime as needed for constipation; Therapy: 29ABV7292 to (Evaluate:30Jun2017); Last Rx:02Mar2017 Ordered    2  Aspirin 81 MG TABS; TAKE 1 TABLET DAILY; Therapy: 12Swx1461 to (Evaluate:11Cbd1978); Last Rx:20Buh9157 Ordered   3  Metoprolol Succinate ER 50 MG Oral Tablet Extended Release 24 Hour; TAKE 1 TABLET   DAILY; Therapy: 63RCH6555 to (Evaluate:18Jan2018)  Requested for: 56BCU1212; Last   Rx:23Jan2017 Ordered   4  Potassium Chloride Ev ER 20 MEQ Oral Tablet Extended Release; Take 1 tablet daily; Therapy: 37WDW3570 to (Last Rx:20Jan2017)  Requested for: 20Jan2017 Ordered   5  Torsemide 20 MG Oral Tablet; Take 1 tablet daily;    Therapy: 72Ind0205 to (Last GZ:12ADT2239)  Requested for: 38HIC9737 Ordered    6  Albuterol Sulfate (2 5 MG/3ML) 0 083% Inhalation Nebulization Solution; INHALE 1 VIAL   VIA NEBULIZER EVERY 6 HOURS; Therapy: 91Kin6476 to (Evaluate:12Jan2017)  Requested for: 14Oct2016; Last   Rx:14Oct2016 Ordered   7  Bevespi Aerosphere 9-4 8 MCG/ACT Inhalation Aerosol; INHALE 2 PUFFS Every twelve   hours PRN; Therapy: 89Baj1411 to (Evaluate:68Sfl6915); Last Rx:00Usv4866 Ordered   8  Spiriva Respimat 2 5 MCG/ACT Inhalation Aerosol Solution; USE 2 INHALATIONS DAILY; Therapy: 21TFG1437 to (Last Rx:22Sep2016)  Requested for: 35RWI7219 Ordered   9  Ventolin  (90 Base) MCG/ACT Inhalation Aerosol Solution; USE 1 TO 2   INHALATIONS EVERY 4 TO 6 HOURS AS NEEDED; Therapy: 48Qwi0371 to (Last Rx:25Nov2016)  Requested for: 53XOL5625 Ordered    10  Spironolactone 25 MG Oral Tablet; take 1 tablet every day; Therapy: 12YBQ5466 to (Evaluate:04Jun2017)  Requested for: 12SYU7045; Last    Rx:06Mar2017 Ordered    11  Lisinopril 2 5 MG Oral Tablet; TAKE 1 TABLET DAILY; Therapy: 87Oyy1951 to (Evaluate:29Apr2017)  Requested for: 31Oct2016; Last    Rx:31Oct2016 Ordered    12  PEG 3350-KCl-Na Bicarb-NaCl 420 GM Oral Solution Reconstituted (Nulytely with Flavor    Packs); TAKE AS DIRECTED; Therapy: 73VAR0190 to (Last Rx:02Mar2017)  Requested for: 99WLK9601 Ordered    13  Ferrous Sulfate 325 (65 Fe) MG Oral Tablet; TAKE 1 TABLET TWICE DAILY WITH MEALS; Therapy: 09Ikr4418 to (Evaluate:87Jsc9636); Last Rx:65Yji6702 Ordered   14  Vitamin B-12 1000 MCG Oral Tablet; TAKE 1 TABLET DAILY AS DIRECTED; Therapy: 22Trh4372 to (Evaluate:20Lwa1241); Last Rx:01Uom4123 Ordered    15  Atorvastatin Calcium 80 MG Oral Tablet (Lipitor); TAKE 1/2 TABLET DAILY; Last    Rx:12Jan2016 Ordered    16  Zolpidem Tartrate 10 MG Oral Tablet (Ambien); TAKE 1 TABLET DAILY AT BEDTIME; Therapy: 65BMS5072 to (Evaluate:17Jun2016); Last Rx:18Feb2016 Ordered    17   Meloxicam 15 MG Oral Tablet; TAKE 1 TABLET DAILY; Therapy: 94FRI7383 to (Evaluate:19Mar2016)  Requested for: 93FMA6172; Last    Rx:31Ren8451 Ordered    18  Levothyroxine Sodium 125 MCG Oral Tablet; TAKE 1 TABLET DAILY  Requested for:    76Vef4061; Last Rx:13Ntj9371 Ordered   19  Targretin 75 MG Oral Capsule (Bexarotene); TAKE 8 pills q am with breakfast  Requested    for: 66DOS8615; Last Rx:25Jan2017 Ordered   20  Triamcinolone Acetonide 0 1 % External Ointment; APPLY AND GENTLY MASSAGE INTO    AFFECTED AREA(S) TWICE DAILY; Therapy: 12AGM5547 to (Last Collene Loveless)  Requested for: 54ZDJ1090 Ordered    21  CVS Vitamin D3 CAPS; TAKE 1 CAPSULE Daily Recorded   22  Lovaza 1 GM Oral Capsule (Omega-3-acid Ethyl Esters); TAKE 2 CAPSULES TWICE    DAILY Recorded    Future Appointments    Date/Time Provider Specialty Site   05/30/2017 01:15 PM GENARO Reese  Hematology [facility]   04/16/2018 12:45 PM GENARO Hinds  Pulmonary Medicine [facility]   07/10/2017 03:00 PM GENARO Fuller  [address] [place] [address] MED [facility]   05/04/2017 10:20 AM EGNARO Reich  Cardiology [facility] CARDIOLOGY ASS[facility]   04/27/2017 07:30 AM GENARO Yoon  Gastroenterology Adult [place] [address] OUTPATIENT   05/03/2017 03:45 PM GENARO Vo  Dermatology [address] REHABILITATION     Patient Care Team    Care Team Member Role Specialty Office Number   Braden HANSON  Dermatology [phone]   Adalid HANSON  Cardiology [phone]   [facility]  Cardiology [phone]   Tanja HANSON    Pulmonary Medicine [phone]   Cardiology, Aida Ac MD  Internal Medicine [phone]     Signatures   Electronically signed by : Josselyn Crawford RN; Apr 24 2017  1:57PM EST                       (Author)
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
## 2018-01-13 VITALS
DIASTOLIC BLOOD PRESSURE: 72 MMHG | WEIGHT: 242.25 LBS | SYSTOLIC BLOOD PRESSURE: 108 MMHG | HEART RATE: 96 BPM | BODY MASS INDEX: 33.79 KG/M2

## 2018-01-13 VITALS
BODY MASS INDEX: 32.1 KG/M2 | WEIGHT: 229.25 LBS | HEIGHT: 71 IN | OXYGEN SATURATION: 97 % | SYSTOLIC BLOOD PRESSURE: 80 MMHG | HEART RATE: 66 BPM | DIASTOLIC BLOOD PRESSURE: 62 MMHG

## 2018-01-13 VITALS
HEIGHT: 71 IN | BODY MASS INDEX: 33.92 KG/M2 | HEART RATE: 87 BPM | OXYGEN SATURATION: 96 % | WEIGHT: 242.31 LBS | SYSTOLIC BLOOD PRESSURE: 104 MMHG | DIASTOLIC BLOOD PRESSURE: 70 MMHG

## 2018-01-13 VITALS
WEIGHT: 244.13 LBS | OXYGEN SATURATION: 94 % | BODY MASS INDEX: 34.18 KG/M2 | SYSTOLIC BLOOD PRESSURE: 124 MMHG | HEART RATE: 65 BPM | DIASTOLIC BLOOD PRESSURE: 80 MMHG | HEIGHT: 71 IN

## 2018-01-13 VITALS
HEIGHT: 71 IN | OXYGEN SATURATION: 95 % | WEIGHT: 232 LBS | BODY MASS INDEX: 32.48 KG/M2 | DIASTOLIC BLOOD PRESSURE: 68 MMHG | HEART RATE: 45 BPM | SYSTOLIC BLOOD PRESSURE: 124 MMHG

## 2018-01-13 VITALS
HEART RATE: 52 BPM | WEIGHT: 239.31 LBS | HEIGHT: 71 IN | BODY MASS INDEX: 33.5 KG/M2 | DIASTOLIC BLOOD PRESSURE: 64 MMHG | SYSTOLIC BLOOD PRESSURE: 112 MMHG | OXYGEN SATURATION: 97 %

## 2018-01-13 VITALS
BODY MASS INDEX: 33.34 KG/M2 | SYSTOLIC BLOOD PRESSURE: 118 MMHG | HEART RATE: 52 BPM | OXYGEN SATURATION: 91 % | WEIGHT: 238.13 LBS | DIASTOLIC BLOOD PRESSURE: 74 MMHG | HEIGHT: 71 IN

## 2018-01-13 VITALS
HEART RATE: 77 BPM | DIASTOLIC BLOOD PRESSURE: 70 MMHG | BODY MASS INDEX: 33.74 KG/M2 | HEIGHT: 71 IN | WEIGHT: 241 LBS | OXYGEN SATURATION: 98 % | SYSTOLIC BLOOD PRESSURE: 96 MMHG

## 2018-01-13 NOTE — RESULT NOTES
Verified Results  (1) IgG,IgA,IgM QUANTITATIVE, BLOOD 21BZM4979 10:51AM Buster Fritz     Test Name Result Flag Reference    0 mg/dL  70 0-400 0   GAMMAGLOBULIN;  0 mg/dL  700 0-1600 0    0 mg/dL  40 0-230 0   - Patient Instructions: This bloodwork is non-fasting  Please drink two glasses of water morning of bloodwork

## 2018-01-14 VITALS
OXYGEN SATURATION: 98 % | DIASTOLIC BLOOD PRESSURE: 78 MMHG | TEMPERATURE: 97.1 F | RESPIRATION RATE: 16 BRPM | BODY MASS INDEX: 33.04 KG/M2 | WEIGHT: 236 LBS | SYSTOLIC BLOOD PRESSURE: 142 MMHG | HEIGHT: 71 IN | HEART RATE: 92 BPM

## 2018-01-14 VITALS
BODY MASS INDEX: 33.9 KG/M2 | HEART RATE: 81 BPM | WEIGHT: 242.13 LBS | OXYGEN SATURATION: 97 % | DIASTOLIC BLOOD PRESSURE: 68 MMHG | TEMPERATURE: 97.6 F | SYSTOLIC BLOOD PRESSURE: 130 MMHG | HEIGHT: 71 IN

## 2018-01-14 VITALS
TEMPERATURE: 97.8 F | HEART RATE: 73 BPM | DIASTOLIC BLOOD PRESSURE: 76 MMHG | SYSTOLIC BLOOD PRESSURE: 118 MMHG | HEIGHT: 71 IN | WEIGHT: 245.25 LBS | BODY MASS INDEX: 34.33 KG/M2 | OXYGEN SATURATION: 95 % | RESPIRATION RATE: 16 BRPM

## 2018-01-14 VITALS
WEIGHT: 235 LBS | SYSTOLIC BLOOD PRESSURE: 130 MMHG | HEART RATE: 97 BPM | HEIGHT: 71 IN | DIASTOLIC BLOOD PRESSURE: 76 MMHG | BODY MASS INDEX: 32.9 KG/M2

## 2018-01-15 NOTE — RESULT NOTES
Verified Results  HOLTER MONITOR - [facility] 29JGA1809 10:14AM Gela Arango Order Number: [id number]    - Patient Instructions: To schedule this appointment, please contact Central Scheduling at [phone]  Test Name Result Flag Reference   HOLTER MONITOR - 24 HOUR (Report)     INDICATIONS:    cardiomyopathy, PVCs     1  This Holter monitoring and analysis was done for a period of 23 hours and 59 minutes  2  The rhythm was sinus  Average heart rate was 96 bpm  Minimum heart rate was 74 bpm  Maximum heart rate was 120 bpm    3  Ventricular ectopic activity consisted of 83202 beats (30 6%), of which 346 were in 80 runs, 4999 were single PVCs, 2984 were in ventricular couplets, 8480 were in ventricular bigeminy, 94744 were in ventricular trigeminy, 2 were interpolated PVCs,    1248 were in triplets, 5650 were single VEs, 12 were in late  The longest ventricular run occurred at 7:26:14 am consisting of 7 beats with maximum heart rate of 148 bpm  The fastest ventricular run occurred at 2:12:00 am consisting of 4 beats with    maximum heart rate of 182 bpm   4  Supraventricular ectopic activity consisted of 501 beats, of which 479 were single PACs, 4 were in atrial couplets, 18 were late beats  5  No irregular rhythm episodes were detected  6  Patient's rhythm included 10 minutes 19 seconds of tachycardia  The fastest single episode of tachycardia lasted 6 seconds with a maximum heart rate of 120 bpm    7  No complaints were noted in the patient's diary during the study  CONCLUSIONS:   Sinus rhythm with significant ventricular ectopic activity and minimal supraventricular ectopic activity as detailed above     Absence of symptoms during the study

## 2018-01-15 NOTE — RESULT NOTES
Message   margins clear     Verified Results  (1) TISSUE EXAM [id number] 03:12PM Christi Jacobo Order Number: [id number]     Test Name Result Flag Reference   LAB AP CASE REPORT (Report)     Surgical Pathology Report             Case: [id number]                   Authorizing Provider: Davon Shaikh MD     Collected:      11/22/2016 1512        Pathologist:      Pernell Carty MD      Received:      11/25/2016 1136        Specimen:  Skin, Other, Left arm   LAB AP FINAL DIAGNOSIS (Report)     A  Skin, Left arm, excision:  - No residual invasive squamous cell carcinoma identified; negative for   malignancy    - No perineural or lymph-vascular invasion identified   - Scar and changes consistent with prior biopsy site  - Hypertrophic actinic keratosis and solar lentigo  Interpretation performed at [facility], [address] [place] [address]  Electronically signed by Pernell Carty MD on 11/29/2016 at 5:11 PM   LAB AP SURGICAL ADDITIONAL INFORMATION (Report)     These tests were developed and their performance characteristics   determined by [facility] or [facility]  They may not be cleared or approved by the [place]  Food and   Drug Administration  The FDA has determined that such clearance or   approval is not necessary  These tests are used for clinical purposes  They should not be regarded as investigational or for research  This   laboratory has been approved by [facility], designated as a high-complexity   laboratory and is qualified to perform these tests  LAB AP GROSS DESCRIPTION (Report)     A  The specimen is received in formalin, labeled with the patient's name   and hospital number, and is designated left arm  The specimen consists   of an unoriented tan ellipse of skin measuring 2 5 x 1 1 cm, excised to a   maximum depth of 0 2 cm  The skin surface appears somewhat keratotic; no   discrete lesions are seen   The specimen is sectioned revealing unremarkable tan cut surfaces  Entirely submitted  Three cassettes  Tips   in cassette 1; center sequentially submitted in cassettes 2-3, 3 pieces in   each cassette  Note: The estimated total formalin fixation time based upon information   provided by the submitting clinician and the standard processing schedule   is over 72 hours   [facility] [place]   [place] AP CLINICAL INFORMATION      TW Order Number: [id number]  [facility] check margins

## 2018-01-16 NOTE — MISCELLANEOUS
Assessment    1  Chronic systolic congestive heart failure (428 22,428 0) (I50 22)   2  Cardiomyopathy (425 4) (I42 9)   3  Bradycardia (427 89) (R00 1)   4  Chronic obstructive pulmonary disease (496) (J44 9)   5  Hyperlipidemia (272 4) (E78 5)   6  Insomnia (780 52) (G47 00)   7  ICD (implantable cardioverter-defibrillator) in place (V45 02) (Z95 810)   8  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)    Plan  Cardiomyopathy    · Metoprolol Succinate ER 25 MG Oral Tablet Extended Release 24 Hour; TAKE 1  TABLET 3 times daily   Rx By: Melody Thapa; Dispense: 30 Days ; #:90 Tablet Extended Release 24 Hour; Refill: 0; For: Cardiomyopathy; [place] = N; Record; Msg to Pharmacy: patient switching from 25mg b i d  to 50mg q d  Chronic systolic congestive heart failure    · Follow-up as previously scheduled Evaluation and Treatment  Follow-up  Status:  Complete  Done: 73PXX5074   Ordered; For: Chronic systolic congestive heart failure; Ordered By: Melody Thapa Performed:  Due: 14Sep2017  Flu vaccine need    · Fluzone High-Dose 0 5 ML Intramuscular Suspension Prefilled Syringe   For: Flu vaccine need; Ordered By:Jovanny Avery; Effective Date:09Sep2017; Administered by: Kalen Jimenez: 9/9/2017 9:55:00 AM; Last Updated By: Alek Rain; 9/9/2017 10:02:29 AM  Insomnia    · From  Zolpidem Tartrate 10 MG Oral Tablet TAKE 1 TABLET DAILY AT BEDTIME  To Zolpidem Tartrate 10 MG Oral Tablet (Ambien) take 1 tablet daily at bedtime   Rx By: Melody Thapa; Dispense: 30 Days ; #:90 Tablet; Refill: 3; For: Insomnia; [place] = N; Print Rx    Discussion/Summary  Discussion Summary:   He is doing very well I reviewed all of his hospital data  He is taking medications as listed above  He is following closely with cardiology  Medication SE Review and Pt Understands Tx: Possible side effects of new medications were reviewed with the patient/guardian today  The treatment plan was reviewed with the patient/guardian   The patient/guardian understands and agrees with the treatment plan      Chief Complaint  Chief Complaint Free Text Note Form: Hospital follow-up      History of Present Illness  TCM Communication [facility]: The patient is being contacted for follow-up after hospitalization and needs appt  He was hospitalized at [facility]  The date of admission: 8/31, date of discharge: 9/4  Diagnosis: CHF exac  He was discharged to home  Medications reviewed and updated today  He did not schedule a follow up appointment  Follow-up appointments with other specialists: cardiology  Counseling was provided to the patient  doing OK  Communication performed and completed by amber salguero   HPI: He was hospitalizedFor 3 days last week because of worsening shortness of breath orthopnea  Due to CHF  Known severe cardiomyopathy  History of COPD He has a defibrillator  He was treated with diuretics  His medications were adjusted  Since his discharge he has been doing very well  He has not felt this good in a long time no shortness of breath orthopnea or PND his weight is stable his appetite is good ambulatory no chest pain palpitations or dizziness   Congestive Heart Failure (Initial): The patient is being seen for a routine clinic follow-up of congestive heart failure  The patient has previously been diagnosed with acute on chronic heart failure  The patient is currently asymptomatic  No associated symptoms are reported  Insomnia (Brief): The patient is being seen for a routine clinic follow-up of insomnia  Symptoms:  difficulty falling asleep  The patient is currently experiencing symptoms  No associated symptoms are reported  COPD (Follow-Up): The patient states he has been doing well with his COPD control since the last visit  He has no comorbid illnesses  He has no significant interval events  Symptoms: The patient is currently asymptomatic  Disease Management: the patient is doing well with his COPD goals  Hyperlipidemia (Follow-Up):  The patient states his hyperlipidemia has been under good control since the last visit  He has no significant interval events  Symptoms: The patient is currently asymptomatic  The patient is doing well with his hyperlipidemia goals  Review of Systems  Complete-Male:   Constitutional: No fever or chills, feels well, no tiredness, no recent weight gain or weight loss  Eyes: No complaints of eye pain, no red eyes, no discharge from eyes, no itchy eyes  ENT: no complaints of earache, no hearing loss, no nosebleeds, no nasal discharge, no sore throat, no hoarseness  Cardiovascular: as noted in HPI  Respiratory: as noted in HPI  Gastrointestinal: No complaints of abdominal pain, no constipation, no nausea or vomiting, no diarrhea or bloody stools  Genitourinary: No complaints of dysuria, no incontinence, no hesitancy, no nocturia, no genital lesion, no testicular pain  Musculoskeletal: No complaints of arthralgia, no myalgias, no joint swelling or stiffness, no limb pain or swelling  Integumentary: No complaints of skin rash or skin lesions, no itching, no skin wound, no dry skin  Neurological: No compliants of headache, no confusion, no convulsions, no numbness or tingling, no dizziness or fainting, no limb weakness, no difficulty walking  Psychiatric: Is not suicidal, no sleep disturbances, no anxiety or depression, no change in personality, no emotional problems  Endocrine: No complaints of proptosis, no hot flashes, no muscle weakness, no erectile dysfunction, no deepening of the voice, no feelings of weakness  Hematologic/Lymphatic: No complaints of swollen glands, no swollen glands in the neck, does not bleed easily, no easy bruising  ROS Reviewed:   ROS reviewed  Active Problems    1  Anemia of chronic disease (285 29) (D63 8)   2  Basal cell carcinoma of skin of forearm, right (173 61) (C44 612)   3  BPH (benign prostatic hyperplasia) (600 00) (N40 0)   4  Bradycardia (427 89) (R00 1)   5  Bronchitis (490) (J40)   6  Cardiomyopathy (425 4) (I42 9)   7  Changing skin lesion (709 9) (L98 9)   8  Chest discomfort (786 59) (R07 89)   9  Chronic bronchitis (491 9) (J42)   10  Chronic obstructive pulmonary disease (496) (J44 9)   11  Chronic systolic congestive heart failure (428 22,428 0) (I50 22)   12  Constipation (564 00) (K59 00)   13  COPD exacerbation (491 21) (J44 1)   14  Encounter for prostate cancer screening (V76 44) (Z12 5)   15  Encounter for screening colonoscopy (V76 51) (Z12 11)   16  Erectile dysfunction (607 84) (N52 9)   17  Hyperlipidemia (272 4) (E78 5)   18  Hypertension (401 9) (I10)   19  Hypertensive heart disease (402 90) (I11 9)   20  ICD (implantable cardioverter-defibrillator) in place (V45 02) (Z95 810)   21  Insomnia (780 52) (G47 00)   22  Iron deficiency (280 9) (E61 1)   23  Lightheadedness (780 4) (R42)   24  Long term use of drug (V58 69) (Z79 899)   25  Lung nodule, solitary (793 11) (R91 1)   26  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)   27  Musculoskeletal thigh pain, right (729 5) (M79 651)   28  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)   29  Nocturnal hypoxia (327 24) (G47 34)   30  Paroxysmal ventricular tachycardia (427 1) (I47 2)   31  Peripheral polyneuropathy (356 9) (G62 9)   32  Polydipsia (783 5) (R63 1)   33  Screening for genitourinary condition (V81 6) (Z13 89)   34  Screening for neurological condition (V80 09) (Z13 89)   35  Screening for skin condition (V82 0) (Z13 89)   36  Seborrheic keratosis (702 19) (L82 1)   37  Shortness of breath (786 05) (R06 02)   38  Squamous cell cancer of skin of forearm, left (173 62) (C44 629)   39  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 (173 22) (C44 222)   40  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 (173 62) (C41 629)   41  Tobacco use (305 1) (Z72 0)    Past Medical History    1  History of Benign colon polyp (211 3) (K63 5)   2  Chronic obstructive pulmonary disease (496) (J44 9)   3   H/O nonmelanoma skin cancer (V10 83) (Z85 828)   4  History of Urinary retention (788 20) (R33 9)    Surgical History    1  History of Pacemaker Placement  Surgical History Reviewed: The surgical history was reviewed and updated today  Family History  Mother    1  Family history of Diabetes  Father    2  Family history of Colon cancer  Family History    3  Family history of Diabetes mellitus (250 00) (E11 9)  Family History Reviewed: The family history was reviewed and updated today  Social History    · Current every day smoker (305 1) (F17 200)   · No alcohol use   · No drug use   · Tobacco use (305 1) (Z72 0)  Social History Reviewed: The social history was reviewed and updated today  Current Meds   1  Albuterol Sulfate (2 5 MG/3ML) 0 083% Inhalation Nebulization Solution; USE ONE VIAL   IN NEBULIZER 4 TIMES DAILY AS NEEDED; Therapy: 09Cpz1710 to (Evaluate:79Qbd3681)  Requested for: 26Tlj9584; Last   Rx:78Ajq9226 Ordered   2  Aspirin 81 MG TABS; TAKE 1 TABLET DAILY; Therapy: 36Hwq1950 to (Evaluate:62Ouq5504); Last Rx:09Cks2665 Ordered   3  Atorvastatin Calcium 80 MG Oral Tablet; TAKE 1/2 TABLET DAILY; Last Rx:12Jan2016   Ordered   4  [employer] Vitamin D3 CAPS; TAKE 1 CAPSULE Daily Recorded   5  DrRx Phenergan with Codeine 118 ML; 5 ml po q 4-6 hrs prn cough; Therapy: 65EXD3382 to (Last Rx:22Byc4363) Ordered   6  Entresto 24-26 MG Oral Tablet; TAKE 1 TABLET BY MOUTH TWICE A DAY; Therapy: 55XRL5146 to Recorded   7  Ferrous Sulfate 325 (65 Fe) MG Oral Tablet; TAKE 1 TABLET TWICE DAILY WITH MEALS; Therapy: 02Mec4269 to (Evaluate:97Smc4942); Last Rx:96Qve9576 Ordered   8  Gabapentin 100 MG Oral Capsule; take 1 capsule by mouth three times a day; Therapy: 58EIN7735 to (Pilar Sparrow)  Requested for: 14FHN1400; Last   Rx:22Twr9543 Ordered   9  Levothyroxine Sodium 125 MCG Oral Tablet; Take 1 tablet daily; Therapy: 30Nzt8438 to (Last Rx:55Mbv9117)  Requested for: 99Fek4529 Ordered   10   Lisinopril 2 5 MG Oral Tablet; TAKE 1 TABLET DAILY; Therapy: 18Apr2016 to (21 )  Requested for: 48UGB3198; Last    Rx:02May2017 Ordered   11  Lovaza 1 GM Oral Capsule; TAKE 2 CAPSULES TWICE DAILY Recorded   12  Meloxicam 15 MG Oral Tablet; TAKE 1 TABLET DAILY; Therapy: 61JUP2993 to (Evaluate:19Mar2016)  Requested for: 50ZBZ1537; Last    Rx:45Wcq8695 Ordered   13  Metoprolol Succinate ER 50 MG Oral Tablet Extended Release 24 Hour; TAKE 1 TABLET    DAILY; Therapy: 18UWJ0717 to (Evaluate:18Jan2018)  Requested for: 81TTS5769; Last    PZ:37PSJ5571 Ordered   14  PEG 3350-KCl-Na Bicarb-NaCl 420 GM Oral Solution Reconstituted; TAKE AS    DIRECTED; Therapy: 82NFO2111 to (Last Rx:02Mar2017)  Requested for: 27DFF5885 Ordered   15  Potassium Chloride Ev ER 20 MEQ Oral Tablet Extended Release; Take 1 tablet daily; Therapy: 71GYX3149 to (Last Rx:20Jan2017)  Requested for: 20Jan2017 Ordered   16  Spiriva Respimat 2 5 MCG/ACT Inhalation Aerosol Solution; USE 2 INHALATIONS DAILY; Therapy: 81CPJ5268 to (Last Rx:19Jun2017)  Requested for: 27LDV3633 Ordered   17  Spironolactone 25 MG Oral Tablet; take 1 tablet every day; Therapy: 69MJW6042 to (Evaluate:69Usg4387)  Requested for: 61FOR7353; Last    Rx:53Ugw8864 Ordered   18  Targretin 75 MG Oral Capsule; TAKE 8 pills q am with breakfast  Requested for:    10Aug2017; Last Rx:10Aug2017 Ordered   19  Torsemide 20 MG Oral Tablet; Take 1 tablet daily; Therapy: 69Pyy6472 to (Last Rx:24Apr2017)  Requested for: 24Apr2017 Ordered   20  Triamcinolone Acetonide 0 1 % External Ointment; APPLY AND GENTLY MASSAGE INTO    AFFECTED AREA(S) TWICE DAILY; Therapy: 57KPB8773 to (Last KB:13XEP2626)  Requested for: 47WHH7928 Ordered   21  Ventolin  (90 Base) MCG/ACT Inhalation Aerosol Solution; USE 1 TO 2    INHALATIONS EVERY 4 TO 6 HOURS AS NEEDED; Therapy: 22Amy4806 to (Last Lyell Barges)  Requested for: 43Jcm7791 Ordered   22   Vitamin B-12 1000 MCG Oral Tablet; TAKE 1 TABLET DAILY AS DIRECTED; Therapy: 11Bdw0846 to (Evaluate:30Sep2015); Last Rx:62Kvm2106 Ordered   23  Zolpidem Tartrate 10 MG Oral Tablet; TAKE 1 TABLET DAILY AT BEDTIME; Therapy: 13LOV5849 to (Evaluate:17Jun2016); Last Rx:18Feb2016 Ordered  Medication List Reviewed: The medication list was reviewed and updated today  Allergies    1  No Known Drug Allergies    Vitals  Signs   Recorded: 09Sep2017 09:07AM   Temperature: 97 5 F  Heart Rate: 48  Respiration: 20  Systolic: 843  Diastolic: 68  Weight: 624 lb 2 08 oz  O2 Saturation: 95    Physical Exam    Constitutional   General appearance: No acute distress, well appearing and well nourished  Eyes   Conjunctiva and lids: No swelling, erythema, or discharge  Pupils and irises: Equal, round and reactive to light  Ears, Nose, Mouth, and Throat   External inspection of ears and nose: Normal     Otoscopic examination: Tympanic membrance translucent with normal light reflex  Canals patent without erythema  Nasal mucosa, septum, and turbinates: Normal without edema or erythema  Oropharynx: Normal with no erythema, edema, exudate or lesions  Pulmonary   Respiratory effort: No increased work of breathing or signs of respiratory distress  Auscultation of lungs: Clear to auscultation, equal breath sounds bilaterally, no wheezes, no rales, no rhonci  Cardiovascular   Palpation of heart: Normal PMI, no thrills  Auscultation of heart: Abnormal   The heart rate was bradycardic  The rhythm was regular  Defibrillator left upper chest    Examination of extremities for edema and/or varicosities: Abnormal   bilateral ankle tr+ pitting edema  Carotid pulses: Normal     Abdomen   Abdomen: Non-tender, no masses  Liver and spleen: No hepatomegaly or splenomegaly  Lymphatic   Palpation of lymph nodes in neck: No lymphadenopathy  Musculoskeletal   Gait and station: Normal     Digits and nails: Normal without clubbing or cyanosis      Inspection/palpation of joints, bones, and muscles: Normal     Skin   Skin and subcutaneous tissue: Abnormal   Scattered areas of reddish scaly plaque mycosis fungoides  Neurologic   Cranial nerves: Cranial nerves 2-12 intact  Reflexes: 2+ and symmetric  Sensation: No sensory loss  Psychiatric   Orientation to person, place and time: Normal     Mood and affect: Normal          Health Management  Encounter for screening colonoscopy   COLONOSCOPY; every 5 years; Last 93QZW5764; Next Due: 21Rmt3777; Active    Future Appointments    Date/Time Provider Specialty Site   10/10/2017 01:45 PM GENARO Szymanski  Hematology [facility]  [facility]   09/12/2017 02:00 PM Cardiology, [address]   12/18/2017 09:30 AM Cardiology, Device Remote   [address]   03/19/2018 01:30 PM Cardiology, Device Remote   [address]   06/20/2018 09:00 AM Cardiology, Device Remote   [address]   04/16/2018 12:45 PM GENARO Ovalles  Pulmonary Medicine [facility]   09/14/2017 10:00 AM GENARO Cain  [facility]   11/07/2017 03:15 PM GENARO Gonzalez   Dermatology [facility]     Signatures   Electronically signed by : Laurel Dubin, [address]; Sep  9 2017 10:44AM EST                       (Author)    Electronically signed by : GENARO Lawrence ; Sep 18 2017  9:54AM EST

## 2018-01-16 NOTE — PROGRESS NOTES
History of Present Illness  Care Coordination Encounter Information:   Type of Encounter: Telephonic    Spoke to Patient  Care Coordination SL Nurse [facility]:   The reason for call is to discuss outreach for follow up/needed services and coordination of meeting care plan treatment goals  Contacted patient for f/u regarding health  Patient in good spirits  Denied pain or discomfort  Tolerating medications as ordered  Educated patient on good medication management  Also educated patient on the importance of a low sodium diet with some examples  Patient verbalized understanding  Patient stated his vitals have been stabled and has no cough, weight gain, swelling, SOB noted  Patient does not need any services at this time and had no questions or concerns  Will f/u with patient regarding health in a few weeks  Active Problems    1  Anemia of chronic disease (285 29) (D63 8)   2  Basal cell carcinoma of skin of forearm, right (173 61) (C44 612)   3  BPH (benign prostatic hyperplasia) (600 00) (N40 0)   4  Bradycardia (427 89) (R00 1)   5  Cardiomyopathy (425 4) (I42 9)   6  Changing skin lesion (709 9) (L98 9)   7  Chest discomfort (786 59) (R07 89)   8  Chronic bronchitis (491 9) (J42)   9  Chronic obstructive pulmonary disease (496) (J44 9)   10  Chronic systolic congestive heart failure (428 22,428 0) (I50 22)   11  Constipation (564 00) (K59 00)   12  Encounter for prostate cancer screening (V76 44) (Z12 5)   13  Encounter for screening colonoscopy (V76 51) (Z12 11)   14  Erectile dysfunction (607 84) (N52 9)   15  Hyperlipidemia (272 4) (E78 5)   16  Hypertension (401 9) (I10)   17  Hypertensive heart disease (402 90) (I11 9)   18  ICD (implantable cardioverter-defibrillator) in place (V45 02) (Z95 810)   19  Insomnia (780 52) (G47 00)   20  Iron deficiency (280 9) (E61 1)   21  Lightheadedness (780 4) (R42)   22  Long term use of drug (V58 69) (Z79 899)   23  Lung nodule, solitary (793 11) (R91 1)   24  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)   25  Musculoskeletal thigh pain, right (729 5) (M79 651)   26  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)   27  Nocturnal hypoxia (327 24) (G47 34)   28  Paroxysmal ventricular tachycardia (427 1) (I47 2)   29  Peripheral polyneuropathy (356 9) (G62 9)   30  Polydipsia (783 5) (R63 1)   31  Screening for genitourinary condition (V81 6) (Z13 89)   32  Screening for neurological condition (V80 09) (Z13 89)   33  Screening for skin condition (V82 0) (Z13 89)   34  Seborrheic keratosis (702 19) (L82 1)   35  Shortness of breath (786 05) (R06 02)   36  Squamous cell cancer of skin of forearm, left (173 62) (C44 629)   37  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 (173 22) (C44 222)   38  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 (173 62) (C44 629)   39  Tobacco use (305 1) (Z72 0)    Past Medical History    1  History of Benign colon polyp (211 3) (K63 5)   2  Chronic obstructive pulmonary disease (496) (J44 9)   3  H/O nonmelanoma skin cancer (V10 83) (P93 383)   4  History of Urinary retention (788 20) (R33 9)    Surgical History    1  History of Pacemaker Placement    Family History  Mother    1  Family history of Diabetes  Father    2  Family history of Colon cancer  Family History    3  Family history of Diabetes mellitus (250 00) (E11 9)    Social History    · Current every day smoker (305 1) (F17 200)   · No alcohol use   · No drug use   · Tobacco use (305 1) (Z72 0)    Current Meds    1  Polyethylene Glycol 3350 Oral Powder (MiraLax); mix 1 capful in 8 ounces of water and   drink at bedtime as needed for constipation; Therapy: 86KID9055 to (Evaluate:30Jun2017); Last Rx:02Mar2017 Ordered    2  Aspirin 81 MG TABS; TAKE 1 TABLET DAILY; Therapy: 68Txs8549 to (Evaluate:47Bww7375); Last Rx:34Kcy4566 Ordered   3  Metoprolol Succinate ER 50 MG Oral Tablet Extended Release 24 Hour; TAKE 1 TABLET   DAILY;    Therapy: 78GSE4769 to [phone])  Requested for: 88OTG5731; Last FL:67WSQ1739 Ordered   4  Potassium Chloride Ev ER 20 MEQ Oral Tablet Extended Release; Take 1 tablet daily; Therapy: 81VVV8281 to (Last Rx:20Jan2017)  Requested for: 20Jan2017 Ordered   5  Torsemide 20 MG Oral Tablet; Take 1 tablet daily; Therapy: 04Twj2206 to (Last Rx:24Apr2017)  Requested for: 24Apr2017 Ordered    6  Albuterol Sulfate (2 5 MG/3ML) 0 083% Inhalation Nebulization Solution; INHALE 1 VIAL   VIA NEBULIZER EVERY 6 HOURS; Therapy: 56Dax0730 to (Evaluate:12Jan2017)  Requested for: 14Oct2016; Last   Rx:14Oct2016 Ordered   7  Bevespi Aerosphere 9-4 8 MCG/ACT Inhalation Aerosol; INHALE 2 PUFFS Every twelve   hours PRN; Therapy: 20Apr2017 to (Evaluate:20May2017); Last Rx:20Apr2017 Ordered   8  Spiriva Respimat 2 5 MCG/ACT Inhalation Aerosol Solution; USE 2 INHALATIONS DAILY; Therapy: 99COP2156 to (Last Rx:22Sep2016)  Requested for: 72CCM0935 Ordered   9  Ventolin  (90 Base) MCG/ACT Inhalation Aerosol Solution; USE 1 TO 2   INHALATIONS EVERY 4 TO 6 HOURS AS NEEDED; Therapy: 87Fzq3700 to (Last Rx:25Nov2016)  Requested for: 41IBL1315 Ordered    10  Spironolactone 25 MG Oral Tablet; take 1 tablet every day; Therapy: 95XLY3312 to (Evaluate:04Jun2017)  Requested for: 60FXS8172; Last    Rx:06Mar2017 Ordered    11  Lisinopril 2 5 MG Oral Tablet; TAKE 1 TABLET DAILY; Therapy: 18Apr2016 to ([phone])  Requested for: 93VGO8781; Last    Rx:02May2017 Ordered    12  PEG 3350-KCl-Na Bicarb-NaCl 420 GM Oral Solution Reconstituted (Nulytely with Flavor    Packs); TAKE AS DIRECTED; Therapy: 35IWH7252 to (Last Rx:02Mar2017)  Requested for: 99KES2522 Ordered    13  Ferrous Sulfate 325 (65 Fe) MG Oral Tablet; TAKE 1 TABLET TWICE DAILY WITH MEALS; Therapy: 02Msm0007 to (Evaluate:30Sep2015); Last Rx:16Ljy3449 Ordered   14  Vitamin B-12 1000 MCG Oral Tablet; TAKE 1 TABLET DAILY AS DIRECTED; Therapy: 31Aug2015 to (Evaluate:30Sep2015); Last Rx:31Aug2015 Ordered    15   Atorvastatin Calcium 80 MG Oral Tablet (Lipitor); TAKE 1/2 TABLET DAILY; Last    Rx:12Jan2016 Ordered    16  Zolpidem Tartrate 10 MG Oral Tablet (Ambien); TAKE 1 TABLET DAILY AT BEDTIME; Therapy: 81SUV2123 to (Evaluate:17Jun2016); Last Rx:18Feb2016 Ordered    17  Meloxicam 15 MG Oral Tablet; TAKE 1 TABLET DAILY; Therapy: 11TMM6135 to (Evaluate:19Mar2016)  Requested for: 14TFI9909; Last    Rx:18Feb2016 Ordered    18  Levothyroxine Sodium 125 MCG Oral Tablet; TAKE 1 TABLET DAILY  Requested for:    59CWY6039; Last Rx:03May2017 Ordered   19  Targretin 75 MG Oral Capsule (Bexarotene); TAKE 8 pills q am with breakfast  Requested    for: 94DTN9672; Last TI:12VIR9074 Ordered   20  Triamcinolone Acetonide 0 1 % External Ointment; APPLY AND GENTLY MASSAGE INTO    AFFECTED AREA(S) TWICE DAILY; Therapy: 24FZB9474 to (Last FT:16PSR1746)  Requested for: 38RNB5147 Ordered    21  [employer] Vitamin D3 CAPS; TAKE 1 CAPSULE Daily Recorded   22  Lovaza 1 GM Oral Capsule (Omega-3-acid Ethyl Esters); TAKE 2 CAPSULES TWICE    DAILY Recorded    Allergies    1  No Known Drug Allergies    Health Management   COLONOSCOPY; every 5 years; Last 06TOK2063; Next Due: 12Qwq9986; Active    End of Encounter Meds    1  Polyethylene Glycol 3350 Oral Powder (MiraLax); mix 1 capful in 8 ounces of water and   drink at bedtime as needed for constipation; Therapy: 42ABJ0122 to (Evaluate:30Jun2017); Last Rx:02Mar2017 Ordered    2  Aspirin 81 MG TABS; TAKE 1 TABLET DAILY; Therapy: 30Dbq3320 to (Evaluate:74Pck3545); Last Rx:37Ybk9642 Ordered   3  Metoprolol Succinate ER 50 MG Oral Tablet Extended Release 24 Hour; TAKE 1 TABLET   DAILY; Therapy: 75RTE6268 to (Evaluate:18Jan2018)  Requested for: 54WVQ2797; Last   Rx:23Jan2017 Ordered   4  Potassium Chloride Ev ER 20 MEQ Oral Tablet Extended Release; Take 1 tablet daily; Therapy: 78EPB4580 to (Last Rx:20Jan2017)  Requested for: 08Wtl6436 Ordered   5  Torsemide 20 MG Oral Tablet; Take 1 tablet daily;    Therapy: 85Pji8376 to (Last Rx:24Apr2017)  Requested for: 80Krm8277 Ordered    6  Albuterol Sulfate (2 5 MG/3ML) 0 083% Inhalation Nebulization Solution; INHALE 1 VIAL   VIA NEBULIZER EVERY 6 HOURS; Therapy: 58Vsh4698 to (Evaluate:12Jan2017)  Requested for: 14Oct2016; Last   Rx:14Oct2016 Ordered   7  Bevespi Aerosphere 9-4 8 MCG/ACT Inhalation Aerosol; INHALE 2 PUFFS Every twelve   hours PRN; Therapy: 20Apr2017 to (Evaluate:20May2017); Last Rx:20Apr2017 Ordered   8  Spiriva Respimat 2 5 MCG/ACT Inhalation Aerosol Solution; USE 2 INHALATIONS DAILY; Therapy: 10EXW5415 to (Last Rx:22Sep2016)  Requested for: 55LHV9373 Ordered   9  Ventolin  (90 Base) MCG/ACT Inhalation Aerosol Solution; USE 1 TO 2   INHALATIONS EVERY 4 TO 6 HOURS AS NEEDED; Therapy: 73Prt2457 to (Last Rx:25Nov2016)  Requested for: 43WCG4753 Ordered    10  Spironolactone 25 MG Oral Tablet; take 1 tablet every day; Therapy: 79HMB3190 to (Evaluate:04Jun2017)  Requested for: 38MFK6922; Last    Rx:06Mar2017 Ordered    11  Lisinopril 2 5 MG Oral Tablet; TAKE 1 TABLET DAILY; Therapy: 18Apr2016 to ([phone] [id number])  Requested for: 83YBJ2202; Last    Rx:02May2017 Ordered    12  PEG 3350-KCl-Na Bicarb-NaCl 420 GM Oral Solution Reconstituted (Nulytely with Flavor    Packs); TAKE AS DIRECTED; Therapy: 45UDU1236 to (Last Rx:02Mar2017)  Requested for: 87QGN7414 Ordered    13  Ferrous Sulfate 325 (65 Fe) MG Oral Tablet; TAKE 1 TABLET TWICE DAILY WITH MEALS; Therapy: 43Min9081 to (Evaluate:62Dqs7413); Last Rx:36Rrs3237 Ordered   14  Vitamin B-12 1000 MCG Oral Tablet; TAKE 1 TABLET DAILY AS DIRECTED; Therapy: 50Iqk2559 to (Evaluate:05Qti6876); Last Rx:69Nmm7853 Ordered    15  Atorvastatin Calcium 80 MG Oral Tablet (Lipitor); TAKE 1/2 TABLET DAILY; Last    Rx:12Jan2016 Ordered    16  Zolpidem Tartrate 10 MG Oral Tablet (Ambien); TAKE 1 TABLET DAILY AT BEDTIME; Therapy: 49JVQ0772 to (Evaluate:17Jun2016); Last Rx:18Feb2016 Ordered    17   Meloxicam 15 MG Oral Tablet; TAKE 1 TABLET DAILY; Therapy: 33TYC4807 to (Evaluate:19Mar2016)  Requested for: 68DVQ8237; Last    Rx:09Qpf1037 Ordered    18  Levothyroxine Sodium 125 MCG Oral Tablet; TAKE 1 TABLET DAILY  Requested for:    78AHA0709; Last Rx:52Htw3769 Ordered   19  Targretin 75 MG Oral Capsule (Bexarotene); TAKE 8 pills q am with breakfast  Requested    for: 37AFK9429; Last ZI:84SOX1301 Ordered   20  Triamcinolone Acetonide 0 1 % External Ointment; APPLY AND GENTLY MASSAGE INTO    AFFECTED AREA(S) TWICE DAILY; Therapy: 94UAI9933 to (Last OU:75YVD8098)  Requested for: 97CGD8981 Ordered    21  CVS Vitamin D3 CAPS; TAKE 1 CAPSULE Daily Recorded   22  Lovaza 1 GM Oral Capsule (Omega-3-acid Ethyl Esters); TAKE 2 CAPSULES TWICE    DAILY Recorded    Future Appointments    Date/Time Provider Specialty Site   05/30/2017 01:15 PM GENARO Benson  Hematology [facility]  [facility]   04/16/2018 12:45 PM GENARO Perez  Pulmonary Medicine [facility]   07/10/2017 03:00 PM GENARO Shirley  Internal Medicine [facility] MED [facility]   08/07/2017 03:45 PM GENARO Torres  Dermatology [address] REHABILITATION     Patient Care Team    Care Team Member Role Specialty Office Number   Beti HANSON  Dermatology [phone]   Carroll HANSON  Cardiology [phone]   [facility]  Cardiology [phone]   Christy HANSON    Pulmonary Medicine [phone]   Cardiology, Hannah Lincoln MD  Internal Medicine [phone]     Signatures   Electronically signed by : Sandeep Cruz RN; May  9 2017 11:33AM EST                       (Author)

## 2018-01-16 NOTE — RESULT NOTES
Message   discussed with pt     Verified Results  (1) TISSUE EXAM [id number] 12:00AM Christi Jacobo Order Number: [id number]     Test Name Result Flag Reference   LAB AP CASE REPORT (Report)     Surgical Pathology Report             Case: [id number]                   Authorizing Provider: Davon Shaikh MD     Collected:      01/25/2017 0000        Pathologist:      Carvin Bence, MD        Received:      01/27/2017 1952        Specimen:  Skin, Other, Left arm   LAB AP FINAL DIAGNOSIS (Report)     A  Skin, Left arm, punch biopsy:  - CD30+ lymphomatoid infiltrate, see note  Note: A dense lichenoid infiltrate is seen mainly in one of the two   tissue profiles of this bisected punch biopsy  The infiltrate is in the   upper dermis, with overlying ulcerated epidermis  The cells are large,   with irregular nuclear contour (cerebriform nuclei)  The atypical cells   are positive for CD3, and diffusely positive for CD30; negative for ALK-1  At least some atypical cells stained for BF-1  TIA-1 stain seems to be   mostly positive in the small lymphocytes  Per discussion with Dr Rajan Anthony, the patient has a known history of mycosis fungoides  With an   isolated, relatively small lesion, this could represent CD30+   lymphoproliferative disorder, however, the differential diagnosis includes   a CD30+ tumor lesion of mycosis fungoides  The lack of TIA-1 expression in   the lesional cells is less typical of a CD30+ lymphoproliferative   disorder  Clinical correlation is recommended  Interpretation performed at [facility], [address] [place], [place], [address]        Electronically signed by Carvin Bence, MD on 2/9/2017 at [address] [place] result electronically signed by Carvin Bence, MD on 2/2/2017 at 12:58 PM   LAB AP SURGICAL ADDITIONAL INFORMATION (Report)     These tests were developed and their performance characteristics   determined by [facility] or [facility]  They may not be cleared or approved by the [place]  Food and   Drug Administration  The FDA has determined that such clearance or   approval is not necessary  These tests are used for clinical purposes  They should not be regarded as investigational or for research  This   laboratory has been approved by [place], designated as a high-complexity   laboratory and is qualified to perform these tests  LAB AP GROSS DESCRIPTION (Report)     A  The specimen is received in formalin, labeled with the patient's name   and hospital number, and is designated skin punch biopsy L arm  The   specimen consists of a tan fragment of skin measuring 0 4 x 0 3 cm of   attached underlying soft tissue to a depth of 0 4 centimeters  The   epithelial surface is not grossly remarkable  The margin of resection is   painted with blue ink, and the specimen is bisected revealing no grossly   remarkable findings  Entirely submitted  One cassette  Note: The estimated total formalin fixation time based upon information   provided by the submitting clinician and the standard processing schedule   is over 72 hours      BMV   LAB AP CLINICAL INFORMATION      TW Order Number: [id number]

## 2018-01-16 NOTE — RESULT NOTES
Message   appt made     Verified Results  (1) TISSUE EXAM 25Oct2016 12:00AM Pinky Sharp     Test Name Result Flag Reference   LAB AP CASE REPORT (Report)     Surgical Pathology Report             Case: [id number]                   Authorizing Provider: Yaquelin Boles MD     Collected:      10/25/2016           Pathologist:      Omar Diamond MD        Received:      10/27/2016 1221        Specimen:  Skin, Other, Left forearm   LAB AP FINAL DIAGNOSIS (Report)     A  Skin, Left forearm, shave biopsy:  - Squamous cell carcinoma, extends to the base of the biopsy  Interpretation performed at [facility], [address] [place] [address], [address] [place] [address] Drive        Electronically signed by Omar Diamond MD on 11/1/2016 at [address] [place] [address],[facility] INFORMATION (Report)     These tests were developed and their performance characteristics   determined by [facility] or [facility]  They may not be cleared or approved by the [place]  Food and   Drug Administration  The FDA has determined that such clearance or   approval is not necessary  These tests are used for clinical purposes  They should not be regarded as investigational or for research  This   laboratory has been approved by [place], designated as a high-complexity   laboratory and is qualified to perform these tests  LAB AP GROSS DESCRIPTION (Report)     A  The specimen is received in formalin, labeled with the patient's name   and hospital number, and is designated skin shave left forearm  The   specimen consists of a tan superficial shave of skin measuring 0 8 x 0 6 x   0 1 cm  The skin surface appears keratotic and exhibits a pale tan to   white papule measuring 0 6 x 0 5 x 0 1 cm  The margin is inked blue  The   skin surface is inked red  The specimen is bisected lengthwise  Entirely   submitted  One cassette      Note: The estimated total formalin fixation time based upon information   provided by the submitting clinician and the standard processing schedule   is 51 5 hours      MAC

## 2018-01-16 NOTE — RESULT NOTES
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 border and base of biopsy  LAB AP SURGICAL ADDITIONAL INFORMATION (Report)     These tests were developed and their performance characteristics   determined by [address] [place] [address] or [facility]  They may not be cleared or approved by the [place]  Food and   Drug Administration  The FDA has determined that such clearance or   approval is not necessary  These tests are used for clinical purposes  They should not be regarded as investigational or for research  This   laboratory has been approved by [place] [address], designated as a high-complexity   laboratory and is qualified to perform these tests  LAB AP GROSS DESCRIPTION (Report)     A  The specimen is received in formalin, labeled with the patient's name   and hospital number, and is designated right ear  The specimen consists   of a tan superficial shave of skin measuring 0 4 x 0 2 x 0 1 cm  The skin   surface appears keratotic  The margin is inked blue  The skin surface is   inked red  Entirely submitted  One cassette  Note: The estimated total formalin fixation time based upon information   provided by the submitting clinician and the standard processing schedule   is over 72 hours   MAC   LAB AP CLINICAL INFORMATION R/O BCC

## 2018-01-22 VITALS
RESPIRATION RATE: 20 BRPM | SYSTOLIC BLOOD PRESSURE: 104 MMHG | WEIGHT: 230.13 LBS | OXYGEN SATURATION: 95 % | DIASTOLIC BLOOD PRESSURE: 68 MMHG | HEART RATE: 48 BPM | BODY MASS INDEX: 31.21 KG/M2 | TEMPERATURE: 97.5 F

## 2018-01-23 VITALS
SYSTOLIC BLOOD PRESSURE: 112 MMHG | OXYGEN SATURATION: 98 % | HEART RATE: 88 BPM | WEIGHT: 236.38 LBS | HEIGHT: 71 IN | DIASTOLIC BLOOD PRESSURE: 76 MMHG | BODY MASS INDEX: 33.09 KG/M2

## 2018-01-23 VITALS
HEIGHT: 71 IN | WEIGHT: 235 LBS | BODY MASS INDEX: 32.9 KG/M2 | HEART RATE: 85 BPM | SYSTOLIC BLOOD PRESSURE: 100 MMHG | OXYGEN SATURATION: 97 % | DIASTOLIC BLOOD PRESSURE: 62 MMHG

## 2018-01-23 NOTE — PROGRESS NOTES
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)   3  Screening for skin condition (V82 0) (Z13 89)   4  Seborrheic keratosis (702 19) (L82 1)   5  H/O nonmelanoma skin cancer (V10 83) (Z85 828)    Plan    · Wound care as instructed ; Status:Complete;   Done: 38DTK5728    · Targretin 75 MG Oral Capsule (Bexarotene); TAKE 8 pills q am with breakfast   · Triamcinolone Acetonide 0 1 % External Ointment; APPLY AND GENTLY  MASSAGE INTO AFFECTED AREA(S) TWICE DAILY    · Follow-up visit in 3 months Evaluation and Treatment  Follow-up  Status: Complete   Done: 45DLN1548    · Use a sun block product with an SPF of 15 or more ; Status:Complete;   Done:  17BOE9457    Discussion/Summary  Discussion Summary- [facility]:   Assessment #1: Changing skin lesion  Care Plan:   Since lesion appears indurated I'm not sure if this is inflammatory we'll go ahead and obtain biopsied to rule out possible involvement with T-cell lymphoma versus squamous cell carcinoma  Assessment #2: Mycosis fungoides  Care Plan:   Appears stable continue same therapy with topical steroids and bexarotene no changes at this time  Assessment #3: History of skin cancer  Care Plan:   No recurrence nothing else atypical sunblock recommended follow-up in 3 months  Assessment #4: Seborrheic keratosis  Care Plan:   Patient reassured these are normal growths we acquire with age no treatment needed  Assessment #5: Screening for dermatologic disorders  Care Plan:   Nothing else of concern noted on exam follow-up in 3 months  Chief Complaint  Chief Complaint Free Text Note Form: 3 MONTH SKIN CANCER EXAM      History of Present Illness  HPI: 51-year-old male with history of stage IB cutaneous T-cell lymphoma and history of numerous skin cancers  Presents for follow-up  Patient notes the area on his left arm has not resolved appears to be indurated   No other concerns noted skin appears to be the same from his point of view taking the bexarotene without problems his thyroid seems to be under control with replacement therapy      Review of Systems  Complete Male Dermatology Ketan Purcell Patient:   Constitutional: Denies constitutional symptoms  Eyes: Denies eye symptoms  ENT:  denies ear symptoms, nasal symptoms, mouth or throat symptoms  Cardiovascular: Denies cardiovascular symptoms  Respiratory: Denies respiratory symptoms  Gastrointestinal: Denies gastrointestinal symptoms  Musculoskeletal: Denies musculoskeletal symptoms  Integumentary: Denies skin, hair and nail symptoms  Neurological: Denies neurologic symptoms  Psychiatric: Denies psychiatric symptoms  Endocrine: Denies endocrine symptoms  Hematologic/Lymphatic: Denies hematologic symptoms  Active Problems    1  Anemia of chronic disease (285 29) (D63 8)   2  Basal cell carcinoma of skin of forearm, right (173 61) (C44 612)   3  BPH (benign prostatic hyperplasia) (600 00) (N40 0)   4  Bradycardia (427 89) (R00 1)   5  Cardiomyopathy (425 4) (I42 9)   6  Changing skin lesion (709 9) (L98 9)   7  Chronic bronchitis (491 9) (J42)   8  Chronic obstructive pulmonary disease (496) (J44 9)   9  Chronic systolic congestive heart failure (428 22,428 0) (I50 22)   10  Encounter for prostate cancer screening (V76 44) (Z12 5)   11  Encounter for screening colonoscopy (V76 51) (Z12 11)   12  Erectile dysfunction (607 84) (N52 9)   13  Hyperlipidemia (272 4) (E78 5)   14  Hypertension (401 9) (I10)   15  Hypertensive heart disease (402 90) (I11 9)   16  ICD (implantable cardioverter-defibrillator) in place (V45 02) (Z95 810)   17  Insomnia (780 52) (G47 00)   18  Lightheadedness (780 4) (R42)   19  Long term use of drug (V58 69) (Z79 899)   20  Lung nodule, solitary (793 11) (R91 1)   21  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)   22  Musculoskeletal thigh pain, right (729 5) (M79 651)   23  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)   24   Nocturnal hypoxia (327 24) (G47 34)   25  Paroxysmal ventricular tachycardia (427 1) (I47 2)   26  Peripheral polyneuropathy (356 9) (G62 9)   27  Polydipsia (783 5) (R63 1)   28  Screening for genitourinary condition (V81 6) (Z13 89)   29  Screening for neurological condition (V80 09) (Z13 89)   30  Screening for skin condition (V82 0) (Z13 89)   31  Seborrheic keratosis (702 19) (L82 1)   32  Squamous cell cancer of skin of forearm, left (173 62) (C44 629)   33  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 (173 22) (C44 222)   34  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 (173 62) (C44 629)   35  Tobacco use (305 1) (Z72 0)    Past Medical History    1  History of Benign colon polyp (211 3) (K63 5)   2  Chronic obstructive pulmonary disease (496) (J44 9)   3  H/O nonmelanoma skin cancer (V10 83) (Z52 794)   4  History of Urinary retention (788 20) (R33 9)  Past Medical History Reviewed- Derm:   The past medical history was reviewed  Surgical History    1  History of Pacemaker Placement  Surgical History Reviewed Jeanne Guzman- Derm:   Surgical History reviewed      Family History  Mother    1  Family history of Diabetes  Father    2  Family history of Colon cancer  Family History    3  Family history of Diabetes mellitus (250 00) (E11 9)  Family History Reviewed- Derm:   Family History was reviewed      Social History    · Current every day smoker (305 1) (F17 200)   · No alcohol use   · No drug use   · Tobacco use (305 1) (Z72 0)  Social History Reviewed Jeanne Guzman- Derm: The social history was reviewed      Current Meds   1  Albuterol Sulfate (2 5 MG/3ML) 0 083% Inhalation Nebulization Solution; INHALE 1 VIAL   VIA NEBULIZER EVERY 6 HOURS; Therapy: 99Uqa3480 to (Evaluate:12Jan2017)  Requested for: 14Oct2016; Last   Rx:14Oct2016 Ordered   2  Aspirin 81 MG TABS; TAKE 1 TABLET DAILY; Therapy: 01Gqw8057 to (Evaluate:07Djo8964); Last Rx:39Pon5852 Ordered   3   Atorvastatin Calcium 80 MG Oral Tablet; TAKE 1/2 TABLET DAILY; Last Rx:12Jan2016   Ordered   4  [facility] Vitamin D3 CAPS; TAKE 1 CAPSULE Daily Recorded   5  Ferrous Sulfate 325 (65 Fe) MG Oral Tablet; TAKE 1 TABLET TWICE DAILY WITH MEALS; Therapy: 31Tba2290 to (Evaluate:82Qbz2131); Last Rx:02Osc0110 Ordered   6  Levothyroxine Sodium 125 MCG Oral Tablet; Take 1 tablet daily Recorded   7  Lisinopril 2 5 MG Oral Tablet; TAKE 1 TABLET DAILY; Therapy: 34Tnt3491 to (Evaluate:29Apr2017)  Requested for: 31Oct2016; Last   Rx:31Oct2016 Ordered   8  Lovaza 1 GM Oral Capsule; TAKE 2 CAPSULES TWICE DAILY Recorded   9  Meloxicam 15 MG Oral Tablet; TAKE 1 TABLET DAILY; Therapy: 23AEA2446 to (Evaluate:19Mar2016)  Requested for: 13ERB0063; Last   Rx:80Fxu2581 Ordered   10  Metoprolol Succinate ER 25 MG Oral Tablet Extended Release 24 Hour; take 2 tablet    daily; Therapy: 34GIN2263 to ([facility])  Requested for: 90YTQ2751; Last    Rx:69Kiz0812 Ordered   11  Metoprolol Succinate ER 50 MG Oral Tablet Extended Release 24 Hour; TAKE 1 TABLET    DAILY; Therapy: 38FDU8644 to (Evaluate:18Jan2018)  Requested for: 22MKH8654; Last    FU:72DZF8203 Ordered   12  Nicotine 21 MG/24HR Transdermal Patch 24 Hour; APPLY 1 PATCH DAILY AS    DIRECTED; Therapy: 69SIB5989 to (Evaluate:00Ahr2448)  Requested for: 76UTS2803; Last    Rx:20Oct2016 Ordered   13  Potassium Chloride Ev ER 20 MEQ Oral Tablet Extended Release; Take 1 tablet daily; Therapy: 43IEG6150 to (Last Rx:20Jan2017)  Requested for: 20Jan2017 Ordered   14  Spiriva Respimat 2 5 MCG/ACT Inhalation Aerosol Solution; USE 2 INHALATIONS DAILY; Therapy: 65JAI2159 to (Last Rx:79Wrw8660)  Requested for: 52ONK7691 Ordered   15  Spironolactone 25 MG Oral Tablet; take 1 tablet every day; Therapy: 12HXO8537 to (Evaluate:50Fpf5605)  Requested for: 24QEI9297; Last    Rx:29Nov2016 Ordered   16  Targretin 75 MG Oral Capsule; TAKE 8 pills q am with breakfast  Requested for:    72MCN7829; Last Rx:25Oct2016 Ordered   17   Torsemide 20 MG Oral Tablet; Take 1 tablet daily; Therapy: 55Gso0421 to (Last Rx:28Jjt1334)  Requested for: 01Aug2016 Ordered   18  Triamcinolone Acetonide 0 1 % External Ointment; APPLY AND GENTLY MASSAGE INTO    AFFECTED AREA(S) TWICE DAILY; Therapy: 09XTW1200 to (Last Juanen Billyro)  Requested for: 25Oct2016 Ordered   19  Ventolin  (90 Base) MCG/ACT Inhalation Aerosol Solution; USE 1 TO 2    INHALATIONS EVERY 4 TO 6 HOURS AS NEEDED; Therapy: 95Lko0918 to (Last Rx:25Nov2016)  Requested for: 81XIJ0872 Ordered   20  Vitamin B-12 1000 MCG Oral Tablet; TAKE 1 TABLET DAILY AS DIRECTED; Therapy: 67Ihg2006 to (Evaluate:02Vlb2957); Last Rx:65Jxv7532 Ordered   21  Zolpidem Tartrate 10 MG Oral Tablet; TAKE 1 TABLET DAILY AT BEDTIME; Therapy: 60SSB4046 to (Evaluate:17Jun2016); Last Rx:94Waj5603 Ordered  Medication List Reviewed: The medication list was reviewed and updated today  Allergies    1  No Known Drug Allergies    Physical Exam    Constitutional   General appearance: Appears healthy and well developed  Lymphatic   No visible disturbance  Musculoskeletal   Digits and nails: No clubbing, cyanosis or edema  Cutaneous and nail exam normal     Skin   Scalp skin texture and hair distribution: Normal skin texture on scalp, normal hair distribution  Head: Normal turgor, no rashes, no lesions  Neck: Abnormal     Chest: Abnormal     Abdomen: Abnormal     Genitalia, groin, and buttocks: Abnormal     Back: Abnormal     Right upper extremity: Abnormal     Left upper extremity: Abnormal     Right lower extremity: Abnormal     Left lower extremity: Abnormal     Neuro/Psych   Alert and oriented x 3  Displays comfort and cooperation during encounterl   Affect is normal     Finding Indurated 6 mm erythematous papule noted on the left upper arm previous sites of skin cancers well-healed without recurrence normal keratotic papules with greasy stuck on appearance arciform scaling photodermatitis patches involving 30% body surface area no significant changes noted  Procedure    Procedure: skin biopsy  Indications for the procedure include the lesion has changed  Risks, benefits, alternatives, infection risk, bleeding risk, risk of allergic reaction and risk of scarring were discussed with the patient   verbal consent was obtained prior to the procedure  Procedure Note:   Anesthesia: 1 ml of lidocaine 1% with epinephrine  The lesion was located on the Left upper arm  The patient was prepped using alcohol  a 4 mm punch biopsy of the lesion was taken  The hemostasis of the wound was achieved with aluminum chloride  Dressing: The wound was cleaned and a sterile gel foam dressing was placed  Specimen: the excised lesion was place in buffered formalin and sent for pathology  Post-Procedure:   Patient Status: the patient tolerated the procedure well  Complications: there were no complications  Follow-up in the office  patient will be called in event skin cancer is found  Patient can call the office in 7-10 days for results if not contacted  Health Management  Encounter for screening colonoscopy   COLONOSCOPY; every 5 years; Last 55DEZ3480; Next Due: 04Qwo2487; Active    Future Appointments    Date/Time Provider Specialty Site   02/21/2017 02:45 PM GENARO Reaves  Hematology [facility]   04/20/2017 12:30 PM GENARO Manzanares  Pulmonary Medicine [facility]   05/02/2017 02:30 PM GENARO Robert  Cardiology [facility]ARDI[facility]   05/03/2017 03:45 PM GENARO De La Fuente  Dermatology [facility]   07/10/2017 03:00 PM GENARO Berrios   Internal Medicine [facility] ASS[facility] OF [facility]     Signatures   Electronically signed by : GENARO Santa ; Jan 25 2017  5:20PM EST                       (Author)

## 2018-01-30 DIAGNOSIS — E03.2 HYPOTHYROIDISM DUE TO MEDICATION: Primary | ICD-10-CM

## 2018-01-30 RX ORDER — LEVOTHYROXINE SODIUM 0.12 MG/1
TABLET ORAL
Qty: 90 TABLET | Refills: 0 | Status: SHIPPED | OUTPATIENT
Start: 2018-01-30 | End: 2018-02-06

## 2018-02-06 ENCOUNTER — OFFICE VISIT (OUTPATIENT)
Dept: DERMATOLOGY | Facility: CLINIC | Age: 73
End: 2018-02-06
Payer: COMMERCIAL

## 2018-02-06 DIAGNOSIS — E03.2 HYPOTHYROIDISM DUE TO MEDICATION: ICD-10-CM

## 2018-02-06 DIAGNOSIS — Z85.828 HISTORY OF SKIN CANCER: ICD-10-CM

## 2018-02-06 DIAGNOSIS — Z13.89 SCREENING FOR SKIN CONDITION: ICD-10-CM

## 2018-02-06 DIAGNOSIS — C84.08 MYCOSIS FUNGOIDES INVOLVING LYMPH NODES OF MULTIPLE REGIONS (HCC): Primary | ICD-10-CM

## 2018-02-06 DIAGNOSIS — L82.1 SEBORRHEIC KERATOSIS: ICD-10-CM

## 2018-02-06 PROCEDURE — 99214 OFFICE O/P EST MOD 30 MIN: CPT | Performed by: DERMATOLOGY

## 2018-02-06 RX ORDER — TORSEMIDE 20 MG/1
1 TABLET ORAL DAILY
COMMUNITY
Start: 2017-11-30 | End: 2018-02-15 | Stop reason: SDUPTHER

## 2018-02-06 RX ORDER — ALBUTEROL SULFATE 2.5 MG/3ML
SOLUTION RESPIRATORY (INHALATION)
COMMUNITY
Start: 2015-09-15 | End: 2018-02-15 | Stop reason: SDUPTHER

## 2018-02-06 RX ORDER — POTASSIUM CHLORIDE 20 MEQ/1
1 TABLET, EXTENDED RELEASE ORAL DAILY
COMMUNITY
Start: 2016-02-01 | End: 2020-03-24 | Stop reason: SDUPTHER

## 2018-02-06 RX ORDER — OMEGA-3-ACID ETHYL ESTERS 1 G/1
2 CAPSULE, LIQUID FILLED ORAL 2 TIMES DAILY
COMMUNITY
End: 2018-02-15 | Stop reason: SDUPTHER

## 2018-02-06 RX ORDER — ALBUTEROL SULFATE 90 UG/1
AEROSOL, METERED RESPIRATORY (INHALATION)
COMMUNITY
Start: 2015-08-31 | End: 2018-02-15 | Stop reason: SDUPTHER

## 2018-02-06 RX ORDER — BEXAROTENE 75 MG/1
CAPSULE, LIQUID FILLED ORAL
COMMUNITY
End: 2018-02-06 | Stop reason: SDUPTHER

## 2018-02-06 RX ORDER — ATORVASTATIN CALCIUM 80 MG/1
0.5 TABLET, FILM COATED ORAL DAILY
COMMUNITY
End: 2018-02-15 | Stop reason: SDUPTHER

## 2018-02-06 RX ORDER — FERROUS SULFATE TAB EC 324 MG (65 MG FE EQUIVALENT) 324 (65 FE) MG
1 TABLET DELAYED RESPONSE ORAL 2 TIMES DAILY
COMMUNITY
Start: 2015-08-31

## 2018-02-06 RX ORDER — METOPROLOL SUCCINATE 50 MG/1
TABLET, EXTENDED RELEASE ORAL DAILY
COMMUNITY
Start: 2017-11-30 | End: 2019-02-06 | Stop reason: SDUPTHER

## 2018-02-06 RX ORDER — BEXAROTENE 75 MG/1
8 CAPSULE, LIQUID FILLED ORAL DAILY
Qty: 720 EACH | Refills: 3 | Status: SHIPPED | OUTPATIENT
Start: 2018-02-06 | End: 2019-04-02 | Stop reason: SDUPTHER

## 2018-02-06 RX ORDER — ZOLPIDEM TARTRATE 10 MG/1
1 TABLET ORAL
COMMUNITY
Start: 2015-06-01 | End: 2018-03-13 | Stop reason: SDUPTHER

## 2018-02-06 RX ORDER — LEVOTHYROXINE SODIUM 0.12 MG/1
1 TABLET ORAL DAILY
COMMUNITY
Start: 2017-08-03 | End: 2018-02-06

## 2018-02-06 RX ORDER — LEVOTHYROXINE SODIUM 0.12 MG/1
125 TABLET ORAL DAILY
Qty: 90 TABLET | Refills: 3 | Status: SHIPPED | OUTPATIENT
Start: 2018-02-06 | End: 2018-02-15 | Stop reason: SDUPTHER

## 2018-02-06 RX ORDER — PROMETHAZINE HYDROCHLORIDE 12.5 MG/1
5 SUPPOSITORY RECTAL
COMMUNITY
Start: 2017-07-05 | End: 2018-05-29 | Stop reason: ALTCHOICE

## 2018-02-06 RX ORDER — POLYETHYLENE GLYCOL 3350, SODIUM CHLORIDE, SODIUM BICARBONATE, POTASSIUM CHLORIDE 420; 11.2; 5.72; 1.48 G/4L; G/4L; G/4L; G/4L
POWDER, FOR SOLUTION ORAL
COMMUNITY
Start: 2017-03-02 | End: 2018-05-29 | Stop reason: ALTCHOICE

## 2018-02-06 RX ORDER — MAGNESIUM 200 MG
1 TABLET ORAL DAILY
COMMUNITY
Start: 2015-08-31 | End: 2018-02-15 | Stop reason: SDUPTHER

## 2018-02-06 NOTE — PATIENT INSTRUCTIONS
Mycosis fungoides appears stable no evidence of any clearing but no progression noted    The itching on the scalp does not appear to be related no real treatment at this point indicated for this process  Hyperthyroidism related to bexarotene continue thyroid replacement will repeat blood work at next visit  Seborrheic keratosis patient reassured these are normal growths we acquire with age no treatment needed  History of skin cancer in no recurrence nothing else atypical sunblock recommended follow-up in 6 months  Screening for dermatologic disorders nothing else of concern noted on complete exam follow-up in 6 months

## 2018-02-06 NOTE — PROGRESS NOTES
[address] [facility]  [address] [place] [address]     MRN: [id number] : 1945  Encounter: [id number]  Patient Information: Sidra Means  Chief complaint: 3 month check up    History of present illness: 81-year-old male with history of stage IB cutaneous T-cell lymphoma and history of numerous skin cancers  Presents for follow-up  Patient notes no real changes on his skin   Patient has been taking the bexarotene without problems his thyroid seems to be under control with replacement therapy  His skin appears to be less irritated at this time and appears to be less symptomatic  Patient complains of itching scalp  Patient getting cardiac rehab  Past Medical History:   Diagnosis Date    Agent orange exposure     Anemia     BPH (benign prostatic hyperplasia)     Bradycardia     Cardiomyopathy [facility] [address] )     [facility]     COPD (chronic obstructive pulmonary disease) (HCC)     HTN (hypertension)     Hx of lymphoma     Hyperlipidemia     ICD (implantable cardioverter-defibrillator) in place     Insomnia     Mycosis fungoides ([facility] )      Past Surgical History:   Procedure Laterality Date    CARDIAC PACEMAKER PLACEMENT       Social History   History   Alcohol Use    Yes     Comment: 2-3 drinks per night     History   Drug Use No     History   Smoking Status    Former Smoker   Smokeless Tobacco    Not on file     No family history on file  Meds/Allergies   No Known Allergies    Meds:  Prior to Admission medications    Medication Sig Start Date End Date Taking?  Authorizing Provider   albuterol (2 5 mg/3 mL) 0 083 % nebulizer solution Inhale 9/15/15  Yes Historical Provider, MD   albuterol (VENTOLIN HFA) 90 mcg/act inhaler Inhale 8/31/15  Yes Historical Provider, MD   aspirin 81 MG tablet Take 1 tablet by mouth daily 8/28/15  Yes Historical Provider, MD   Cyanocobalamin (VITAMIN B-12) 1000 MCG SUBL Take 1 tablet by mouth daily 8/31/15  Yes Historical Provider, MD   ferrous sulfate 324 (65 Fe) mg Take 1 tablet by mouth Twice daily 8/31/15  Yes Historical Provider, MD   levothyroxine 125 mcg tablet Take 1 tablet by mouth daily 8/3/17  Yes Historical Provider, MD   metoprolol succinate (TOPROL-XL) 50 mg 24 hr tablet Take by mouth Daily 11/30/17  Yes Historical Provider, MD   polyethylene glycol-electrolytes (NULYTELY) 4000 mL solution Take by mouth 3/2/17  Yes Historical Provider, MD   potassium chloride (K-DUR,KLOR-CON) 20 mEq tablet Take 1 tablet by mouth daily 2/1/16  Yes Historical Provider, MD   promethazine (PHENERGAN) 12 5 mg suppository Take 5 mL by mouth 7/5/17  Yes Historical Provider, MD   sacubitril-valsartan (ENTRESTO) 24-26 MG TABS Take 1 tablet by mouth 2 (two) times a day 9/9/17  Yes Historical Provider, MD   Tiotropium Bromide Monohydrate (SPIRIVA RESPIMAT) 2 5 MCG/ACT AERS Inhale daily 5/12/15  Yes Historical Provider, MD   torsemide (DEMADEX) 20 mg tablet Take 1 tablet by mouth daily 11/30/17  Yes Historical Provider, MD   triamcinolone (KENALOG) 0 1 % ointment Apply topically 2 (two) times a day 1/26/16  Yes Historical Provider, MD   zolpidem (AMBIEN) 10 mg tablet Take 1 tablet by mouth 6/1/15  Yes Historical Provider, MD   albuterol (2 5 mg/3 mL) 0 083 % nebulizer solution Take 2 5 mg by nebulization every 6 (six) hours as needed for wheezing    Historical Provider, MD   albuterol (PROVENTIL HFA) 90 mcg/act inhaler Inhale 2 puffs every 4 (four) hours as needed for wheezing    Historical Provider, MD   aspirin 81 MG tablet Take 81 mg by mouth daily    Historical Provider, MD   atorvastatin (LIPITOR) 80 mg tablet Take 80 mg by mouth daily Half tablet daily    Historical Provider, MD   atorvastatin (LIPITOR) 80 mg tablet Take 0 5 tablets by mouth daily    Historical Provider, MD   Bexarotene (TARGRETIN PO) Take 75 mg by mouth 8 tablets daily    Historical Provider, MD   Bexarotene (TARGRETIN) 75 MG CAPS Take by mouth    Historical Provider, MD   Cholecalciferol ([facility]S D3) 1000 units capsule Take 1,000 Units by mouth daily    Historical Provider, MD   Cholecalciferol (CVS VITAMIN D3) 1000 units capsule Take 1 capsule by mouth daily    Historical Provider, MD   cyanocobalamin (VITAMIN B-12) 1,000 mcg tablet Take 1,000 mcg by mouth daily    Historical Provider, MD   ferrous sulfate 325 (65 Fe) mg tablet Take 325 mg by mouth daily with breakfast    Historical Provider, MD   levothyroxine 125 mcg tablet TAKE 1 TABLET DAILY 1/30/18   Toy Conde MD   metoprolol succinate (TOPROL-XL) 25 mg 24 hr tablet Take 3 tablets by mouth daily 9/4/17   SHRUTHI Boyd   Omega-3-acid Ethyl Esters (LOVAZA PO) Take 1,000 mg by mouth 2 (two) times a day      Historical Provider, MD   omega-3-acid ethyl esters (LOVAZA) 1 g capsule Take 2 capsules by mouth 2 (two) times a day    Historical Provider, MD   PEG 3350-KCL-NA BICARB-NACL PO Take by mouth    Historical Provider, MD   potassium chloride (K-DUR) 10 mEq tablet Take 20 mEq by mouth daily as needed      Historical Provider, MD   promethazine-codeine (PHENERGAN WITH CODEINE) 6 25-10 mg/5 mL syrup Take 5 mL by mouth every 4 (four) hours as needed for cough    Historical Provider, MD   sacubitril-valsartan (ENTRESTO) 24-26 MG TABS Take 1 tablet by mouth 2 (two) times a day 9/4/17   SHRUTHI Boyd   tiotropium (SPIRIVA) 18 mcg inhalation capsule Place 18 mcg into inhaler and inhale daily    Historical Provider, MD   torsemide (DEMADEX) 20 mg tablet Take 1 tablet by mouth daily 9/4/17   SHRUTHI Boyd   triamcinolone (KENALOG) 0 1 % cream Apply 1 application topically 2 (two) times a day    Historical Provider, MD   Zolpidem Tartrate 10 MG SUBL Place under the tongue    Historical Provider, MD   nicotine (NICODERM CQ) 21 mg/24 hr TD 24 hr patch Place 1 patch on the skin every 24 hours  3/6/17  Historical Provider, MD       Subjective:     Review of Systems:    General: negative for - chills, fatigue, fever,  weight gain or weight loss  Psychological: negative for - anxiety, behavioral disorder, concentration difficulties, decreased libido, depression, irritability, memory difficulties, mood swings, sleep disturbances or suicidal ideation  ENT: negative for - hearing difficulties , nasal congestion, nasal discharge, oral lesions, sinus pain, sneezing, sore throat  Allergy and Immunology: negative for - hives, insect bite sensitivity,  Hematological and Lymphatic: negative for - bleeding problems, blood clots,bruising, swollen lymph nodes  Endocrine: negative for - hair pattern changes, hot flashes, malaise/lethargy, mood swings, palpitations, polydipsia/polyuria, skin changes, temperature intolerance or unexpected weight change  Respiratory: negative for - cough, hemoptysis, orthopnea, shortness of breath, or wheezing  Cardiovascular: negative for - chest pain, dyspnea on exertion, edema,  Gastrointestinal: negative for - abdominal pain, nausea/vomiting  Genito-Urinary: negative for - dysuria, incontinence, irregular/heavy menses or urinary frequency/urgency  Musculoskeletal: negative for - gait disturbance, joint pain, joint stiffness, joint swelling, muscle pain, muscular weakness  Dermatological:  As in HPI  Neurological: negative for confusion, dizziness, headaches, impaired coordination/balance, memory loss, numbness/tingling, seizures, speech problems, tremors or weakness       Objective: There were no vitals taken for this visit      Physical Exam:    General Appearance:    Alert, cooperative, no distress   Head:    Normocephalic, without obvious abnormality, atraumatic   Lymphatics:    No lymphadenopathy noted      Abdomen:   No hepatosplenomegaly   Skin:   A full skin exam was performed including scalp, head scalp, eyes, ears, nose, lips, neck, chest, axilla, abdomen, back, buttocks, bilateral upper extremities, bilateral lower extremities, hands, feet, fingers, toes, fingernails, and toenails  erythema arciform geographic scaling patches noted on widespread area of his back chest and rest of his body probably close to 50% of involvement at this time no plaques noted  Normal keratotic papules with greasy stuck on appearance  Previous sites of skin cancer well healed without recurrence nothing else atypical noted on exam no sign of any scaling rash noted on the scalp     Assessment:     1  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)  Bexarotene (TARGRETIN) 75 MG CAPS    triamcinolone (KENALOG) 0 1 % ointment   2  Seborrheic keratosis     3  History of skin cancer     4  Screening for skin condition     5  Hypothyroidism due to medication  levothyroxine 125 mcg tablet         Plan:   Mycosis fungoides appears stable no evidence of any clearing but no progression noted  The itching on the scalp does not appear to be related no real treatment at this point indicated for this process  Hyperthyroidism related to bexarotene continue thyroid replacement will repeat blood work at next visit  Seborrheic keratosis patient reassured these are normal growths we acquire with age no treatment needed  History of skin cancer in no recurrence nothing else atypical sunblock recommended follow-up in 6 months  Screening for dermatologic disorders nothing else of concern noted on complete exam follow-up in 6 months    Mono Lima MD  2/6/2018,3:56 PM    Portions of the record may have been created with voice recognition software   Occasional wrong word or "sound a like" substitutions may have occurred due to the inherent limitations of voice recognition software   Read the chart carefully and recognize, using context, where substitutions have occurred

## 2018-02-07 ENCOUNTER — TELEPHONE (OUTPATIENT)
Dept: DERMATOLOGY | Facility: CLINIC | Age: 73
End: 2018-02-07

## 2018-02-07 DIAGNOSIS — C84.08 MYCOSIS FUNGOIDES INVOLVING LYMPH NODES OF MULTIPLE REGIONS (HCC): Primary | ICD-10-CM

## 2018-02-07 NOTE — TELEPHONE ENCOUNTER
----- Message from Adriana Stahl MD sent at 2/7/2018  7:53 AM EST -----  Regarding: blood work  Call pt to let him know I ordered bloodwork

## 2018-02-07 NOTE — TELEPHONE ENCOUNTER
----- Message from Yoel Klein MD sent at 2/7/2018  7:53 AM EST -----  Regarding: blood work  Call pt to let him know I ordered bloodwork

## 2018-02-12 ENCOUNTER — LAB (OUTPATIENT)
Dept: LAB | Facility: CLINIC | Age: 73
End: 2018-02-12
Payer: COMMERCIAL

## 2018-02-12 DIAGNOSIS — C84.08 MYCOSIS FUNGOIDES INVOLVING LYMPH NODES OF MULTIPLE REGIONS (HCC): ICD-10-CM

## 2018-02-12 LAB
ALBUMIN SERPL BCP-MCNC: 3.5 G/DL (ref 3.5–5)
ALP SERPL-CCNC: 71 U/L (ref 46–116)
ALT SERPL W P-5'-P-CCNC: 102 U/L (ref 12–78)
ANION GAP SERPL CALCULATED.3IONS-SCNC: 8 MMOL/L (ref 4–13)
AST SERPL W P-5'-P-CCNC: 64 U/L (ref 5–45)
BASOPHILS # BLD AUTO: 0.04 THOUSANDS/ΜL (ref 0–0.1)
BASOPHILS NFR BLD AUTO: 1 % (ref 0–1)
BILIRUB SERPL-MCNC: 0.25 MG/DL (ref 0.2–1)
BUN SERPL-MCNC: 36 MG/DL (ref 5–25)
CALCIUM SERPL-MCNC: 7.7 MG/DL (ref 8.3–10.1)
CHLORIDE SERPL-SCNC: 103 MMOL/L (ref 100–108)
CHOLEST SERPL-MCNC: 232 MG/DL (ref 50–200)
CO2 SERPL-SCNC: 29 MMOL/L (ref 21–32)
CREAT SERPL-MCNC: 1.31 MG/DL (ref 0.6–1.3)
EOSINOPHIL # BLD AUTO: 0.14 THOUSAND/ΜL (ref 0–0.61)
EOSINOPHIL NFR BLD AUTO: 3 % (ref 0–6)
ERYTHROCYTE [DISTWIDTH] IN BLOOD BY AUTOMATED COUNT: 13.3 % (ref 11.6–15.1)
GFR SERPL CREATININE-BSD FRML MDRD: 55 ML/MIN/1.73SQ M
GLUCOSE P FAST SERPL-MCNC: 96 MG/DL (ref 65–99)
HCT VFR BLD AUTO: 38 % (ref 36.5–49.3)
HGB BLD-MCNC: 12.2 G/DL (ref 12–17)
LYMPHOCYTES # BLD AUTO: 1.48 THOUSANDS/ΜL (ref 0.6–4.47)
LYMPHOCYTES NFR BLD AUTO: 28 % (ref 14–44)
MCH RBC QN AUTO: 30.5 PG (ref 26.8–34.3)
MCHC RBC AUTO-ENTMCNC: 32.1 G/DL (ref 31.4–37.4)
MCV RBC AUTO: 95 FL (ref 82–98)
MONOCYTES # BLD AUTO: 0.35 THOUSAND/ΜL (ref 0.17–1.22)
MONOCYTES NFR BLD AUTO: 7 % (ref 4–12)
NEUTROPHILS # BLD AUTO: 3.28 THOUSANDS/ΜL (ref 1.85–7.62)
NEUTS SEG NFR BLD AUTO: 61 % (ref 43–75)
NRBC BLD AUTO-RTO: 0 /100 WBCS
PLATELET # BLD AUTO: 295 THOUSANDS/UL (ref 149–390)
PMV BLD AUTO: 10.5 FL (ref 8.9–12.7)
POTASSIUM SERPL-SCNC: 3.9 MMOL/L (ref 3.5–5.3)
PROT SERPL-MCNC: 7.6 G/DL (ref 6.4–8.2)
RBC # BLD AUTO: 4 MILLION/UL (ref 3.88–5.62)
SODIUM SERPL-SCNC: 140 MMOL/L (ref 136–145)
T4 FREE SERPL-MCNC: 0.74 NG/DL (ref 0.76–1.46)
TRIGL SERPL-MCNC: 285 MG/DL
TSH SERPL DL<=0.05 MIU/L-ACNC: 0.02 UIU/ML (ref 0.36–3.74)
WBC # BLD AUTO: 5.3 THOUSAND/UL (ref 4.31–10.16)

## 2018-02-12 PROCEDURE — 84439 ASSAY OF FREE THYROXINE: CPT

## 2018-02-12 PROCEDURE — 80053 COMPREHEN METABOLIC PANEL: CPT

## 2018-02-12 PROCEDURE — 84443 ASSAY THYROID STIM HORMONE: CPT

## 2018-02-12 PROCEDURE — 85025 COMPLETE CBC W/AUTO DIFF WBC: CPT

## 2018-02-12 PROCEDURE — 36415 COLL VENOUS BLD VENIPUNCTURE: CPT

## 2018-02-12 PROCEDURE — 82465 ASSAY BLD/SERUM CHOLESTEROL: CPT

## 2018-02-12 PROCEDURE — 84478 ASSAY OF TRIGLYCERIDES: CPT

## 2018-02-15 ENCOUNTER — OFFICE VISIT (OUTPATIENT)
Dept: INTERNAL MEDICINE CLINIC | Facility: CLINIC | Age: 73
End: 2018-02-15
Payer: COMMERCIAL

## 2018-02-15 ENCOUNTER — APPOINTMENT (OUTPATIENT)
Dept: LAB | Facility: CLINIC | Age: 73
End: 2018-02-15
Payer: COMMERCIAL

## 2018-02-15 VITALS
RESPIRATION RATE: 16 BRPM | HEIGHT: 72 IN | DIASTOLIC BLOOD PRESSURE: 62 MMHG | WEIGHT: 240 LBS | HEART RATE: 62 BPM | TEMPERATURE: 97.6 F | BODY MASS INDEX: 32.51 KG/M2 | SYSTOLIC BLOOD PRESSURE: 122 MMHG

## 2018-02-15 DIAGNOSIS — R74.02 NONSPECIFIC ELEVATION OF LEVELS OF TRANSAMINASE OR LACTIC ACID DEHYDROGENASE (LDH): Primary | ICD-10-CM

## 2018-02-15 DIAGNOSIS — R74.01 ELEVATED TRANSAMINASE LEVEL: ICD-10-CM

## 2018-02-15 DIAGNOSIS — E03.2 HYPOTHYROIDISM DUE TO MEDICATION: Primary | ICD-10-CM

## 2018-02-15 DIAGNOSIS — R74.01 NONSPECIFIC ELEVATION OF LEVELS OF TRANSAMINASE OR LACTIC ACID DEHYDROGENASE (LDH): Primary | ICD-10-CM

## 2018-02-15 PROCEDURE — 87340 HEPATITIS B SURFACE AG IA: CPT

## 2018-02-15 PROCEDURE — 86803 HEPATITIS C AB TEST: CPT

## 2018-02-15 PROCEDURE — 99214 OFFICE O/P EST MOD 30 MIN: CPT | Performed by: PHYSICIAN ASSISTANT

## 2018-02-15 PROCEDURE — 86704 HEP B CORE ANTIBODY TOTAL: CPT

## 2018-02-15 PROCEDURE — 80074 ACUTE HEPATITIS PANEL: CPT

## 2018-02-15 PROCEDURE — 86705 HEP B CORE ANTIBODY IGM: CPT

## 2018-02-15 PROCEDURE — 36415 COLL VENOUS BLD VENIPUNCTURE: CPT

## 2018-02-15 RX ORDER — LEVOTHYROXINE SODIUM 112 UG/1
112 TABLET ORAL DAILY
Qty: 90 TABLET | Refills: 3 | Status: SHIPPED | OUTPATIENT
Start: 2018-02-15 | End: 2018-06-26 | Stop reason: SDUPTHER

## 2018-02-16 LAB
HAV IGM SER QL: NORMAL
HBV CORE AB SER QL: NORMAL
HBV CORE IGM SER QL: NORMAL
HBV CORE IGM SER QL: NORMAL
HBV SURFACE AG SER QL: NORMAL
HBV SURFACE AG SER QL: NORMAL
HCV AB SER QL: NORMAL
HCV AB SER QL: NORMAL

## 2018-02-16 NOTE — PROGRESS NOTES
Assessment/Plan:  Will adjust his thyroid supplement based on his lab results  Mildly elevated transaminase will check for hepatitis and followed up in 6 months    Cardiomyopathy ([address] )  Followed by Cardiology stable very low ejection fraction but he does well       Diagnoses and all orders for this visit:    Hypothyroidism due to medication  -     levothyroxine 112 mcg tablet; Take 1 tablet (112 mcg total) by mouth daily for 90 days    Elevated transaminase level  -     Acute Hepatitis Panel (Refl); Future  -     Chronic Hepatitis Panel; Future          Subjective:      Patient ID: Lili Steele  is a 81 y o  male  He feels well has no complaints he was followed by Dermatology regarding his mycosis fungoides  He was cold at home until the come in for follow-up of some abnormals in his lab work  I reviewed all of his lab work his triglycerides are somewhat elevated but stable he was noted to have slightly elevated transaminase and TSH was a bit low      Hypertension   Pertinent negatives include no chest pain, headaches, neck pain, palpitations or shortness of breath  The following portions of the patient's history were reviewed and updated as appropriate: allergies, current medications, past family history, past medical history, past social history, past surgical history and problem list     Review of Systems   Constitutional: Negative for activity change, appetite change, chills, diaphoresis, fatigue, fever and unexpected weight change  HENT: Negative for congestion, dental problem, drooling, ear discharge, ear pain, facial swelling, hearing loss, nosebleeds, postnasal drip, rhinorrhea, sinus pain, sinus pressure, sneezing, sore throat, tinnitus, trouble swallowing and voice change  Eyes: Negative for photophobia, pain, discharge, redness, itching and visual disturbance  Respiratory: Negative for apnea, cough, choking, chest tightness, shortness of breath, wheezing and stridor  Cardiovascular: Negative for chest pain, palpitations and leg swelling  Gastrointestinal: Negative for abdominal distention, abdominal pain, anal bleeding, blood in stool, constipation, diarrhea, nausea, rectal pain and vomiting  Endocrine: Negative for cold intolerance, heat intolerance, polydipsia, polyphagia and polyuria  Genitourinary: Negative for decreased urine volume, difficulty urinating, dysuria, enuresis, flank pain, frequency, genital sores, hematuria and urgency  Musculoskeletal: Negative for arthralgias, back pain, gait problem, joint swelling, myalgias, neck pain and neck stiffness  Skin: Positive for rash  Negative for color change, pallor and wound  Neurological: Negative for dizziness, tremors, seizures, syncope, facial asymmetry, speech difficulty, weakness, light-headedness, numbness and headaches  Hematological: Negative for adenopathy  Does not bruise/bleed easily  Psychiatric/Behavioral: Negative for agitation, behavioral problems, confusion, decreased concentration, dysphoric mood, hallucinations, self-injury, sleep disturbance and suicidal ideas  The patient is not nervous/anxious and is not hyperactive  Objective:    /62 (BP Location: Left arm, Patient Position: Sitting)   Pulse 62   Temp 97 6 °F (36 4 °C)   Resp 16   Ht 6' (1 829 m)   Wt 109 kg (240 lb)   BMI 32 55 kg/m²      Physical Exam   Constitutional: He is oriented to person, place, and time  He appears well-developed  HENT:   Head: Normocephalic  Right Ear: External ear normal    Left Ear: External ear normal    Mouth/Throat: Oropharynx is clear and moist    Eyes: Conjunctivae are normal  Pupils are equal, round, and reactive to light  Neck: Neck supple  No thyromegaly present  Cardiovascular: Normal rate, regular rhythm and intact distal pulses  Murmur heard  Pulmonary/Chest: Effort normal and breath sounds normal  Tenderness: ICD noted right upper chest    Abdominal: Soft   Bowel sounds are normal    Musculoskeletal: Normal range of motion  Neurological: He is alert and oriented to person, place, and time  He has normal reflexes  Skin: Skin is warm and dry   Erythema: Scattered areas mycosis fungoides

## 2018-02-22 ENCOUNTER — OFFICE VISIT (OUTPATIENT)
Dept: CARDIOLOGY CLINIC | Facility: CLINIC | Age: 73
End: 2018-02-22
Payer: COMMERCIAL

## 2018-02-22 VITALS
HEIGHT: 72 IN | SYSTOLIC BLOOD PRESSURE: 90 MMHG | BODY MASS INDEX: 32.37 KG/M2 | HEART RATE: 81 BPM | DIASTOLIC BLOOD PRESSURE: 60 MMHG | OXYGEN SATURATION: 97 % | WEIGHT: 239 LBS

## 2018-02-22 DIAGNOSIS — I50.23 ACUTE ON CHRONIC SYSTOLIC HEART FAILURE (HCC): Primary | ICD-10-CM

## 2018-02-22 DIAGNOSIS — I42.8 OTHER CARDIOMYOPATHY (HCC): ICD-10-CM

## 2018-02-22 PROCEDURE — 99214 OFFICE O/P EST MOD 30 MIN: CPT | Performed by: INTERNAL MEDICINE

## 2018-02-22 NOTE — PROGRESS NOTES
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 ([place] [address] ) 02/06/2018    History of skin cancer 02/06/2018    Screening for skin condition 02/06/2018    Hypothyroidism due to medication 02/06/2018    Insomnia     ICD (implantable cardioverter-defibrillator) in place     Hyperlipidemia     Hx of lymphoma     HTN (hypertension)     COPD (chronic obstructive pulmonary disease) (HCC)     Chronic bronchitis (HCC)     Cardiomyopathy ([place] [address] )     BPH (benign prostatic hyperplasia)     Acute on chronic systolic heart failure ([place] [address] ) 09/01/2017    Seborrheic keratosis 07/26/2016     # NIDCM (diagnosed >15 years ago) w/ LVEF ~15%, LVIDd 6 7 cm, reduced RVSF, NYHA III, ACC/AHA Stage C/D s/p BiV AICD: Unclear etiology, however, has been long standing  Arrhythmia induced (PVCs) +/- HTN +/- toxin (unclear exposure hx in the past as patient was a , unclear EtOH or other toxins) +/- idopathic  Last TTE (9/2017) shows LVEF ~15% w/ reduced RVSF, PASP 50 mmHg, AMADA, and mod MR w/ mild TR  Euvolemic on exam  Hx c/w possible low output (poor exercise tolerance, narrow pulse pressure)  BiV pacing~95%  Plan as below:  Diag:  --24 hour holter shows significant PVC burden -->31%; Last ECG w/ Dr Mayur Mabry showed PVCs ~1 q10 beats which is significantly reduced  If needed in the future, fleicainide to be avoided  Sotalol vs amiodarone    --Does not want to go to [facility] so precludes CPET  --Repeat TTE in 3 months as PVC burden decreased  --WIll consider RHC in the future depending on patient's GOC and response to therapies  --PFTs w/ mixed obstructive (predominant) and restrictive pattern w/ DLCO 54%    Tx:  --2g sodium diet  --2 L fluid restriction  --Continue to F/U with pulmonary for optimization    Neurohormonal Blockade:  --Beta-Blocker: Continue metoprolol succinate 50 mg PO daily, with additional dose based on PVC sensation  --ACEi, ARB or ARNi: Continue Entresto 24-26 mg PO BID; BP precludes uptitration  --Aldosterone Receptor Blocker: BP precludes MRA  --Diuretic: Continue torsemide 20 mg PO QOD w/ QOD K repletion    Electrical Resynchronization/Sudden Cardiac Death Risk Reduction:  --BiV-ICD: BiV paced>95% based on last check    Advanced Therapies: Hx is c/w borderline to low output  Given age, he is a candidate for LVAD and/or OHT  He is currently not interested as he feels better  Also, does not want to go to [facility] for care as he wants to stay local, this would preclude LVAD as well  Should he be willing to travel, we discussed pros and cons of each therapy  He has family, but all live in [place], which complicates his care, and would make either LVAD or OHT difficult  With respect to LVAD, his RV shows reduced RVSF which would need to be assessed further should he be interested in LVAD  We will continue to follow closely  # Pulmonary HTN: Review of TTE shows reduced RVSF w/ e/o RVOT notching (although signal is suboptimal)  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/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 and lung disease  Also DLCO reduced out of proportion to lung disease (CT scan from 2017 shows unremarkable pleura) which is suggestive of possible pulmonary vascular disease  He may benefit from [facility] specific therapies  --After adjustment of medications above, would consider referral for RHC to evaluate PVR if patient is agreeable    # ELLYN resolved: Cr ~1 3 Continue to monitor  # Hx of PVCs (symptomatic) and VT: Last device interrogation 9/2017 w/ 4 episodes of 6-7 beats of NSVT @ varying rates  --Per Dr Andie Duran, given likely scar, no fleicainide  Sotalol vs Amio     --Will hold off on uptitration of BB at this time given BP    # Chronic bronchitis/COPD (30 pack year smoking history): most recent PFTs done 11/2016 show FEV1 46%, FVC 65%, post broncho dilator -> 56% and 79%  , respectively  TLC 91%  DLCOuncorrected reduced @ 50%  6MWT - walked ~800 feet w/ saturations staying above 90%  HR increased from resting ~87->124 beats/min  Done on RA    --Mgmt per pulmonary  # HTN  # HLD  # Hx of SqCC  # Hx of mycosis fungoides  # Hx of NH lymphoma  # polyneuropathy  # Anemia: Workup per Heme     RTC in 6 months; F/U w/ Dr Lyudmila Ramirez in 3 months; TTE after visit w/ Dr Lyudmila Ramirez    HPI: Very pleasant 82 y/o gentleman w/ PMHx of NICM (diagnosed >15 years ago) w/ LVEF ~15%, NYHA III, ACC/AHA Stage C s/p BiV AICD, Hx of PVCs (symptomatic) and VT, COPD (30 pack year smoking history - quit one year ago), hx of NH lymphoma (agent orange related), polyneuropathy, HTN, HLD, Hx of SqCC, CÉSAR on nocturnal O2 p/f evaluation and management of HFrEF  Was recently admitted 8/31 to 9/4/2017 for CHF exacerbation at which time he was transitioned to [facility]  Last device check on 9/12/17 showed BiV paced ~85% of the time  He did have 4 6-7 beat runs of NSVT on device interrogation  He states he feels well  Especially since he started [facility]  He has lost ~16 lbs  He states today for the first time he became [facility] and dizzy  His BP @ home is 90/60 mmHg typically  He can walk about 100 yards and then gets SOB  It is limited by weather as well  He has been walking farther and faster than he could a month ago, since starting Entresto and getting fluid off in the hospital  He states that he feels his PVCs ~1x/week  No CP, presyncope, or syncope  He denies orthopnea, PND, or LE edema currently  He sleeps well  He is on nocturnal O2      Today 11/30/17, he states he is feeling better  He is doing better with cardio rehab  His weight is stable from last visit  He does not want to go to [place] for any admissions  He feels much better since starting Entresto  He occasionally gets LH and Dz   He His PFTs showed a mixed obstructive/restrictive pattern, but predominately obstructive  Had good response to bronchodilators  DLCO was 54%  He states he is walking much further as well  Today 2/22/18, he states he is feeling better  He is doing better with cardio rehab  His weight is stable from last visit  He states he is walking about 4000 steps per day  He can walk about 1/2 a mile at a time without stopping  Stairs are ok  [place]  He is able to do it, but he has to walk slower  Past Medical History:   Diagnosis Date    Agent orange exposure     Anemia     BPH (benign prostatic hyperplasia)     Bradycardia     [facility]     Chronic bronchitis (HCC)     COPD (chronic obstructive pulmonary disease) (HCC)     HTN (hypertension)     Hx of lymphoma     Hyperlipidemia     ICD (implantable cardioverter-defibrillator) in place     Insomnia     Mycosis fungoides ([facility] )        Review of Systems - 12 point ROS was done and is negative, except as noted above  No Known Allergies        Current Outpatient Prescriptions:     albuterol (2 5 mg/3 mL) 0 083 % nebulizer solution, Take 2 5 mg by nebulization every 6 (six) hours as needed for wheezing, Disp: , Rfl:     albuterol (PROVENTIL HFA) 90 mcg/act inhaler, Inhale 2 puffs every 4 (four) hours as needed for wheezing, Disp: , Rfl:     aspirin 81 MG tablet, Take 81 mg by mouth daily, Disp: , Rfl:     atorvastatin (LIPITOR) 80 mg tablet, Take 80 mg by mouth daily Half tablet daily, Disp: , Rfl:     Bexarotene (TARGRETIN) 75 MG CAPS, Take 8 capsules (600 mg total) by mouth daily, Disp: 720 each, Rfl: 3    Cholecalciferol (CVS VITAMIN D3) 1000 units capsule, Take 1 capsule by mouth daily, Disp: , Rfl:     cyanocobalamin (VITAMIN B-12) 1,000 mcg tablet, Take 1,000 mcg by mouth daily, Disp: , Rfl:     ferrous sulfate 324 (65 Fe) mg, Take 1 tablet by mouth Twice daily, Disp: , Rfl:     levothyroxine 112 mcg tablet, Take 1 tablet (112 mcg total) by mouth daily for 90 days, Disp: 90 tablet, Rfl: 3    metoprolol succinate (TOPROL-XL) 50 mg 24 hr tablet, Take by mouth Daily, Disp: , Rfl:     Omega-3-acid Ethyl Esters (LOVAZA PO), Take 1,000 mg by mouth 2 (two) times a day  , Disp: , Rfl:     potassium chloride (K-DUR,KLOR-CON) 20 mEq tablet, Take 1 tablet by mouth daily, Disp: , Rfl:     sacubitril-valsartan (ENTRESTO) 24-26 MG TABS, Take 1 tablet by mouth 2 (two) times a day, Disp: 60 tablet, Rfl: 0    tiotropium (SPIRIVA) 18 mcg inhalation capsule, Place 18 mcg into inhaler and inhale daily, Disp: , Rfl:     torsemide (DEMADEX) 20 mg tablet, Take 1 tablet by mouth daily, Disp: 30 tablet, Rfl: 0    zolpidem (AMBIEN) 10 mg tablet, Take 1 tablet by mouth, Disp: , Rfl:     PEG 3350-KCL-NA BICARB-NACL PO, Take by mouth, Disp: , Rfl:     polyethylene glycol-electrolytes (NULYTELY) 4000 mL solution, Take by mouth, Disp: , Rfl:     promethazine (PHENERGAN) 12 5 mg suppository, Take 5 mL by mouth, Disp: , Rfl:     triamcinolone (KENALOG) 0 1 % ointment, Apply topically 2 (two) times 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, Disp: 454 g, Rfl: 3    Social History     Social History    Marital status:      Spouse name: N/A    Number of children: N/A    Years of education: N/A     Occupational History    Not on file  Social History Main Topics    Smoking status: Former Smoker    Smokeless tobacco: Never Used    Alcohol use Yes      Comment: 2-3 drinks per night    Drug use: No    Sexual activity: Not on file     Other Topics Concern    Not on file     Social History Narrative    No narrative on file       History reviewed  No pertinent family history  Physical Exam:    Vitals: Blood pressure 90/60, pulse 81, height 6' (1 829 m), weight 108 kg (239 lb), SpO2 97 %  , Body mass index is 32 41 kg/m² ,   Wt Readings from Last 3 Encounters:   02/22/18 108 kg (239 lb)   02/15/18 109 kg (240 lb)   01/11/18 107 kg (236 lb 6 oz)         Physical Exam:  Vitals:    02/22/18 1125   BP: 90/60   Pulse: 81   SpO2: 97%   Weight: 108 kg (239 lb)   Height: 6' (1 829 m)       GEN: Ching Carter  appears well, alert and oriented x 3, pleasant and cooperative   HEENT: pupils equal, round, and reactive to light; extraocular muscles intact  NECK: supple, no carotid bruits   HEART: regular rhythm, normal S1 and S2, no murmurs, clicks, gallops or rubs, JVD is flat    LUNGS: clear to auscultation bilaterally; no wheezes, rales, or rhonchi   ABDOMEN: normal bowel sounds, soft, no tenderness, no distention  EXTREMITIES: peripheral pulses normal; no clubbing, cyanosis, or edema  NEURO: no focal findings   SKIN: normal without suspicious lesions on exposed skin    Labs & Results:  Lab Results   Component Value Date    WBC 5 30 02/12/2018    HGB 12 2 02/12/2018    HCT 38 0 02/12/2018    MCV 95 02/12/2018     02/12/2018       Chemistry        Component Value Date/Time     02/12/2018 1023     12/11/2015 1410    K 3 9 02/12/2018 1023    K 4 1 12/11/2015 1410     02/12/2018 1023     12/11/2015 1410    CO2 29 02/12/2018 1023    CO2 28 12/11/2015 1410    BUN 36 (H) 02/12/2018 1023    BUN 27 (H) 12/11/2015 1410    CREATININE 1 31 (H) 02/12/2018 1023    CREATININE 1 02 12/11/2015 1410        Component Value Date/Time    CALCIUM 7 7 (L) 02/12/2018 1023    CALCIUM 8 8 12/11/2015 1410    ALKPHOS 71 02/12/2018 1023    ALKPHOS 71 06/01/2015 1133    AST 64 (H) 02/12/2018 1023    AST 19 06/01/2015 1133     (H) 02/12/2018 1023    ALT 27 06/01/2015 1133    BILITOT 0 25 02/12/2018 1023    BILITOT 0 3 06/01/2015 1133        EKG personally reviewed by Roz Lion MD      Counseling / Coordination of Care  Total floor / unit time spent today 40 minutes  Greater than 50% of total time was spent with the patient and / or family counseling and / or coordination of care  A description of the counseling / coordination of care: 20 min        Thank you for the opportunity to participate in the care of this patient      Joanne Zarate MD, PhD   [facility]

## 2018-02-28 NOTE — RESULT NOTES
Verified Results  (1) BASIC METABOLIC PROFILE [id number] 02:41PM Gracie Nunn Order Number: [id number]     Test Name Result Flag Reference   GLUCOSE,RANDM 90 mg/dL     If the patient is fasting, the ADA then defines impaired fasting glucose as > 100 mg/dL and diabetes as > or equal to 123 mg/dL  Specimen collection should occur prior to Sulfasalazine administration due to the potential for falsely depressed results  Specimen collection should occur prior to Sulfapyridine administration due to the potential for falsely elevated results  SODIUM 138 mmol/L  136-145   POTASSIUM 4 2 mmol/L  3 5-5 3   CHLORIDE 103 mmol/L  100-108   CARBON DIOXIDE 26 mmol/L  21-32   ANION GAP (CALC) 9 mmol/L  4-13   BLOOD UREA NITROGEN 27 mg/dL H 5-25   CREATININE 1 30 mg/dL  0 60-1 30   Standardized to IDMS reference method   CALCIUM 8 6 mg/dL  8 3-10 1   eGFR 55 ml/min/1 73sq [facility] Disease Education Program recommendations are as follows:  GFR calculation is accurate only with a steady state creatinine  Chronic Kidney disease less than 60 ml/min/1 73 sq  meters  Kidney failure less than 15 ml/min/1 73 sq  meters

## 2018-03-07 NOTE — PROGRESS NOTES
"  Discussion/Summary  Normal device function     Will bring VT 1 zone down to 160bpm since having long NSVT 166bpm  Will maximimze NID >70beats to avoid inappropriate shocks and add 3 ATP  Results/Data  Cardiac Device Remote [id number] 05:25AM Guillermo Diaz     Test Name Result Flag Reference   MISCELLANEOUS COMMENT (Report)     CARELINK TRANSMISSION: BATTERY VOLTAGE ADEQUATE (15 MOS)  AP-96%, BVP-95% (TOTAL -98 5% + VSR PACE 7%)  ALL AVAILABLE LEAD PARAMETERS WITHIN NORMAL LIMITS  4 NSVT EPISODES, LONGEST 20 BEATS, AVG CL~360MS  NO THERAPIES  EF-30-35% (ECHO 6/29/15)  PT ON ASA 81MG & METOPROLOL  1 VENT SENSING EPISODE FOR NSVT  OPTI-VOL WITHIN NORMAL LIMITS  NORMAL DEVICE FUNCTION  PT RECENTLY SEEN BY DR JANESSA MAYERS   Cardiac Electrophysiology Report      [id number]  pdf   DEVICE TYPE CRT-D       Cardiac Electrophysiology Report [id number] 05:25AM Guillermo Diaz     Test Name Result Flag Reference   Cardiac Electrophysiology Report      [id number]  pdf     Signatures   Electronically signed by : Maya Alaniz RN; Aug 17 2017 11:38AM EST                       (Author)    Electronically signed by : GENARO Sanchez ; Aug 17 2017  1:39PM EST                       (Author)    "

## 2018-03-07 NOTE — PROGRESS NOTES
"  Discussion/Summary  Normal device function     Multiple NSVT  admit to try sotalol  Results/Data  Cardiac Device Remote 20Jun2016 10:39PM [facility]     Test Name Result Flag Reference   MISCELLANEOUS COMMENT (Report)     CARELINK TRANSMISSION: BATTERY STATUS "OK"  AP 95 2% BVP 91 3% ( 86 1 VSR PACE 5 2)  1,184 (UP FROM 1/2016) VT-NS NOTED, LONGEST 2 SECS  [address] PT ON METO SUCC & EF 30% (2015)  PVC COUNT DOWN FROM PREVIOUS 1/2016 REMOTE CHECK  OPTI-VOL FLUID THRESHOLD CROSSED  ALL AVAILABLE LEAD PARAMETERS WITHIN NORMAL LIMITS  S/W DR RIDDLE WHO SAW PT IN [place]  NORMAL DEVICE FUNCTION  NC   Cardiac Electrophysiology Report      [id number]  pdf   DEVICE TYPE CRT-D       Cardiac Electrophysiology Report 20Jun2016 10:39PM [facility]     Test Name Result Flag Reference   Cardiac Electrophysiology Report      [id number]     Signatures   Electronically signed by : Ana Nicholas, ; Jun 22 2016  3:33PM EST                       (Author)    Electronically signed by : GENARO Fine ; Jul 24 2016 10:36PM EST                       (Author)    "

## 2018-03-07 NOTE — PROGRESS NOTES
"  Discussion/Summary  Normal device function     Adequate BiV     limited egms to evaluate  in those available SVT and NSVT with NSVT being very brief      Beta blockers increased for NSVT  device reprogrammed will lower detection and therapy zones  Results/Data  Cardiac Device Remote [id number] 05:16AM Airam Verdugo     Test Name Result Flag Reference   MISCELLANEOUS COMMENT (Report)     CARELINK TRANSMISSION: BATTERY VOLTAGE ADEQUATE (12 MOS)  AP - 95 1% BVP - 97 7% [ - 89 % + VSRP - 7 8%]  ALL AVAILABLE LEAD PARAMETERS APPEAR WITHIN NORMAL LIMITS & STABLE  120 NSVT EPISODES WITH MOST RECENT EPISODE DURATION @ 12 BEAT/AVG RATE 168 BPM  SOME RATES NOTED >= 200 BPM WITH NO EGRM STORED FOR REVIEW  EF - 15% (9/2017 ECHO)  PT TAKES ASA 81, METOPROLOL SUCC  TASK TO DR  AS  104 VENT  SENSING EPISODES ALSO NOTED ([employer]ERS) FOR V FUSION, VSRP, PAT, NSVT  OPTI-VOL WITHIN NORMAL LIMITS  APPROPRIATELY FUNCTIONING ICD  NORMAL DEVICE FUNCTION  eb   Cardiac Electrophysiology Report      [id number]  pdf   DEVICE TYPE CRT-D       Cardiac Electrophysiology Report [id number] 05:16AM Airam Verdugo     Test Name Result Flag Reference   Cardiac Electrophysiology Report      [id number]  pdf     Signatures   Electronically signed by : Myrna Chaparro, ; Dec 20 2017  2:31PM EST                       (Author)    Electronically signed by : GENARO Del Real ; Dec 22 2017  7:59AM EST                       (Author)    "

## 2018-03-07 NOTE — PROGRESS NOTES
"  Discussion/Summary  Normal device function     Beta blockers increased for NSVT  device reprogrammed will lower detection and therapy zones  Results/Data  Cardiac Device In Clinic 12Sep2017 06:17PM Zubican Rebelles     Test Name Result Flag Reference   MISCELLANEOUS COMMENT (Report)     *NONBILLABLE* DEVICE INTERROGATED IN THE [place] OFFICE FOR REPROGRAMMING A/P DR RUELAS  BATTERY VOLTAGE ADEQUATE (14 MOS)  AP 96 7% BP 95 4% ( 87 6+VSRP 7 8%)  ALL LEAD PARAMETERS WITHIN NORMAL LIMITS  4 VT-NS EPISODES WITH EGMS SHOWING NSVT (6 @ 200BPM, 6 @ 197BPM, 7 @ 194BPM, 6 @ 162BPM)  PT TAKES METOPROLOL SUCC    EF 30 -35% (ECHO 6/2015)  VT SET  BPM, NID = 32 (MAX ALLOWED WITHOUT CONFLICTS), 3 ATP A/P DR RUELAS  NO OTHER PROGRAMMING CHANGES MADE TO DEVICE PARAMETERS  NORMAL DEVICE FUNCTION  RG   Cardiac Electrophysiology Report      [id number]  pdf   DEVICE TYPE CRT-D       Cardiac Electrophysiology Report [id number] 06:17PM Zubican Rebelles     Test Name Result Flag Reference   Cardiac Electrophysiology Report      [id number]  pdf     Signatures   Electronically signed by : Memory Balls, ; Sep 12 2017  3:52PM EST                       (Author)    Electronically signed by : GENARO Fine ; Sep 17 2017  5:08PM EST                       (Author)    "

## 2018-03-07 NOTE — PROGRESS NOTES
"  Discussion/Summary  Normal device function      Results/Data  Cardiac Device Remote 24Oct2016 04:22AM Edwin Nguyễn     Test Name Result Flag Reference   MISCELLANEOUS COMMENT (Report)     CARELINK TRANSMISSION: BATTERY VOLTAGE ADEQUATE  (2 7 YRS)  AP 95% BVP 94%  ALL AVAILABLE LEAD PARAMETERS WITHIN NORMAL LIMITS  288 NSVT EPISODES DETECTED  NO THERAPIES GIVEN  PATIENT IS ON METOPROLOL SUCC  VENTRICULAR SENSING EPISODES DETECTED  OPTI-VOL WITHIN NORMAL LIMITS  NORMAL DEVICE FUNCTION  ---DINERO   Cardiac Electrophysiology Report      [id number]  pdf   DEVICE TYPE CRT-D       Cardiac Electrophysiology Report [id number] 04:22AM Edwin SurgeonKidz     Test Name Result Flag Reference   Cardiac Electrophysiology Report      [id number]  pdf     Signatures   Electronically signed by : Brent Alston, ; Oct 25 2016 12:34PM EST                       (Author)    Electronically signed by : GENARO Lilly ; Oct 27 2016  9:57PM EST                       (Author)    "

## 2018-03-07 NOTE — PROGRESS NOTES
"  Discussion/Summary  Normal device function     Increase beta blockers to further increase BiV pacing  Results/Data  Results   Cardiac Device Remote 20Jan2016 06:25AM Sari Leyden     Test Name Result Flag Reference   MISCELLANEOUS COMMENT      CARELINK TRANSMISSION: BATTERY STATUS "OK"  BVP 94 8% ( 91 1 VSR PACE 3 7) AP 95 1%  NO SIGNIFICANT HIGH RATE EPISODES  ALL AVAILABLE LEAD PARAMETERS WITHIN NORMAL LIMITS  NORMAL DEVICE FUNCTION  NC   Cardiac Electrophysiology Report      [id number]}  pdf   DEVICE TYPE CRT-D       Cardiac Electrophysiology Report [id number] 06:25AM Sari Leyden     Test Name Result Flag Reference   Cardiac Electrophysiology Report      [id number]  pdf     Signatures   Electronically signed by : Jaswinder Arrieta, ; Jan 22 2016  9:33AM EST                       (Author)    Electronically signed by : GENARO Yang ; Jan 24 2016  2:46PM EST                       (Author)    "

## 2018-03-13 DIAGNOSIS — G47.00 INSOMNIA, UNSPECIFIED TYPE: Primary | ICD-10-CM

## 2018-03-14 RX ORDER — ZOLPIDEM TARTRATE 10 MG/1
TABLET ORAL
Qty: 90 TABLET | Refills: 0 | Status: SHIPPED | OUTPATIENT
Start: 2018-03-14 | End: 2018-08-28 | Stop reason: SDUPTHER

## 2018-03-19 ENCOUNTER — CLINICAL SUPPORT (OUTPATIENT)
Dept: CARDIOLOGY CLINIC | Facility: CLINIC | Age: 73
End: 2018-03-19
Payer: COMMERCIAL

## 2018-03-19 DIAGNOSIS — I50.23 ACUTE ON CHRONIC SYSTOLIC HEART FAILURE (HCC): ICD-10-CM

## 2018-03-19 DIAGNOSIS — Z95.810 AICD (AUTOMATIC CARDIOVERTER/DEFIBRILLATOR) PRESENT: ICD-10-CM

## 2018-03-19 DIAGNOSIS — I25.5 ISCHEMIC CARDIOMYOPATHY: Primary | ICD-10-CM

## 2018-03-19 PROCEDURE — 93296 REM INTERROG EVL PM/IDS: CPT | Performed by: INTERNAL MEDICINE

## 2018-03-19 PROCEDURE — 93295 DEV INTERROG REMOTE 1/2/MLT: CPT | Performed by: INTERNAL MEDICINE

## 2018-03-20 NOTE — PROGRESS NOTES
CARELINK TRANSMISSION: BATTERY VOLTAGE NEARING LIZZY   WILL SCHEDULE MONTHLY BATTERY CHECKS  AP 97%  95 3% VSRP 3 6%  ALL AVAILABLE LEAD PARAMETERS WITHIN NORMAL LIMITS  11 VHRS NOTED, NO THERAPIES GIVEN  AVAIL EGRAMS PRESENT AS NSVT & SVT  [address], MARKERS ONLY  OPTI-VOL WITHIN NORMAL LIMITS  NORMAL DEVICE FUNCTION   NC

## 2018-03-21 DIAGNOSIS — R91.1 LUNG NODULE: Primary | ICD-10-CM

## 2018-03-25 DIAGNOSIS — E78.5 HYPERLIPIDEMIA, UNSPECIFIED HYPERLIPIDEMIA TYPE: Primary | ICD-10-CM

## 2018-03-26 RX ORDER — ATORVASTATIN CALCIUM 80 MG/1
TABLET, FILM COATED ORAL
Qty: 15 TABLET | Refills: 3 | Status: SHIPPED | OUTPATIENT
Start: 2018-03-26 | End: 2018-05-20 | Stop reason: SDUPTHER

## 2018-04-09 ENCOUNTER — HOSPITAL ENCOUNTER (OUTPATIENT)
Dept: CT IMAGING | Facility: CLINIC | Age: 73
Discharge: HOME | End: 2018-04-09
Payer: COMMERCIAL

## 2018-04-09 DIAGNOSIS — R91.1 LUNG NODULE: ICD-10-CM

## 2018-04-09 PROCEDURE — 71250 CT THORAX DX C-: CPT

## 2018-04-16 ENCOUNTER — OFFICE VISIT (OUTPATIENT)
Dept: PULMONOLOGY | Facility: CLINIC | Age: 73
End: 2018-04-16
Payer: COMMERCIAL

## 2018-04-16 VITALS
SYSTOLIC BLOOD PRESSURE: 104 MMHG | OXYGEN SATURATION: 95 % | WEIGHT: 244 LBS | HEIGHT: 71 IN | BODY MASS INDEX: 34.16 KG/M2 | HEART RATE: 91 BPM | DIASTOLIC BLOOD PRESSURE: 84 MMHG

## 2018-04-16 DIAGNOSIS — Z87.891 FORMER SMOKER: ICD-10-CM

## 2018-04-16 DIAGNOSIS — J43.2 CENTRILOBULAR EMPHYSEMA (HCC): ICD-10-CM

## 2018-04-16 DIAGNOSIS — J41.0 SIMPLE CHRONIC BRONCHITIS (HCC): Primary | ICD-10-CM

## 2018-04-16 DIAGNOSIS — R91.1 LUNG NODULE: ICD-10-CM

## 2018-04-16 PROCEDURE — 99214 OFFICE O/P EST MOD 30 MIN: CPT | Performed by: INTERNAL MEDICINE

## 2018-04-18 NOTE — PROGRESS NOTES
Assessment/Plan:   Diagnoses and all orders for this visit:    Simple chronic bronchitis ([address] )    Centrilobular emphysema ([address] )    Lung nodule    Former smoker        Chronic bronchitis/COPD patient has recently quit smoking  Congratulated the patient on that  He is currently using Spiriva 1 puff daily  He will continue to use the albuterol via nebulizer 4 times daily as needed  He does have nocturnal oxygen 2 liters/minute to continue lung nodule currently stable, repeat CT of the chest in bilateral centrilobular emphysema, stable lung nodule, which is calcified which does not need follow-up   Repeat CT of the chest in 1 year for lung cancer screening  Recent PFTs in November of 2017 with severe small airway obstructive ventilatory limitation with response to the bronchodilator and increased lung volumes consistent with hyper inflation and air trapping and moderately decreased DLCO  Vaccinations up-to-date  Follow-up in 1 year or p r n  earlier as needed  Return in about 1 year (around 4/16/2019)  All questions are answered to the patient's satisfaction and understanding  He verbalizes understanding  He is encouraged to call with any further questions or concerns  Portions of the record may have been created with voice recognition software  Occasional wrong word or "sound a like" substitutions may have occurred due to the inherent limitations of voice recognition software  Read the chart carefully and recognize, using context, where substitutions have occurred  ______________________________________________________________________    Chief Complaint:   Chief Complaint   Patient presents with    Follow-up       Patient ID: Deana Richter is a 81 y o  y o  male has a past medical history of Agent orange exposure; Anemia; BPH (benign prostatic hyperplasia); Bradycardia; Cardiomyopathy ([address] );  Chronic bronchitis ([address] ); COPD (chronic obstructive pulmonary disease) ([address] ); HTN (hypertension); lymphoma; Hyperlipidemia; ICD (implantable cardioverter-defibrillator) in place; Insomnia; and Mycosis fungoides ([facility] )  4/16/2018  Patient is a very pleasant 59-year-old gentleman former smoker who quit recently, with history of chronic bronchitis COPD on bronchodilators here for follow-up  Review of Systems   Constitutional: Positive for fatigue  Negative for activity change, appetite change, chills, diaphoresis, fever and unexpected weight change  HENT: Negative for congestion, dental problem, drooling, nosebleeds, postnasal drip, rhinorrhea, sinus pressure, sore throat and voice change  Eyes: Negative for discharge, itching and visual disturbance  Respiratory: Positive for cough (clear sputum, no hemoptysis), shortness of breath and wheezing  Cardiovascular: Negative for chest pain, palpitations and leg swelling  Endocrine: Negative for cold intolerance and heat intolerance  Allergic/Immunologic: Negative for food allergies and immunocompromised state  Neurological: Negative for dizziness, facial asymmetry, speech difficulty and weakness  Hematological: Negative for adenopathy  Psychiatric/Behavioral: Negative for agitation, confusion and sleep disturbance  The patient is not nervous/anxious  Smoking history: He reports that he has quit smoking   He has never used smokeless tobacco     The following portions of the patient's history were reviewed and updated as appropriate: allergies, current medications, past family history, past medical history, past social history, past surgical history and problem list     Immunization History   Administered Date(s) Administered    Influenza Split High Dose Preservative Free IM 10/17/2016, 09/09/2017    Influenza TIV (IM) 10/14/2015    Pneumococcal Conjugate PCV 7 10/14/2015    Tdap 1945     Current Outpatient Prescriptions   Medication Sig Dispense Refill    albuterol (2 5 mg/3 mL) 0 083 % nebulizer solution Take 2 5 mg by nebulization every 6 (six) hours as needed for wheezing      albuterol (PROVENTIL HFA) 90 mcg/act inhaler Inhale 2 puffs every 4 (four) hours as needed for wheezing      aspirin 81 MG tablet Take 81 mg by mouth daily      atorvastatin (LIPITOR) 80 mg tablet TAKE 1/2 TABLET BY MOUTH DAILY  15 tablet 3    Bexarotene (TARGRETIN) 75 MG CAPS Take 8 capsules (600 mg total) by mouth daily 720 each 3    Cholecalciferol ([employer] VITAMIN D3) 1000 units capsule Take 1 capsule by mouth daily      cyanocobalamin (VITAMIN B-12) 1,000 mcg tablet Take 1,000 mcg by mouth daily      ferrous sulfate 324 (65 Fe) mg Take 1 tablet by mouth Twice daily      levothyroxine 112 mcg tablet Take 1 tablet (112 mcg total) by mouth daily for 90 days 90 tablet 3    metoprolol succinate (TOPROL-XL) 50 mg 24 hr tablet Take by mouth Daily      Omega-3-acid Ethyl Esters (LOVAZA PO) Take 1,000 mg by mouth 2 (two) times a day        potassium chloride (K-DUR,KLOR-CON) 20 mEq tablet Take 1 tablet by mouth daily      sacubitril-valsartan (ENTRESTO) 24-26 MG TABS Take 1 tablet by mouth 2 (two) times a day 60 tablet 0    tiotropium (SPIRIVA) 18 mcg inhalation capsule Place 18 mcg into inhaler and inhale daily      torsemide (DEMADEX) 20 mg tablet Take 1 tablet by mouth daily 30 tablet 0    triamcinolone (KENALOG) 0 1 % ointment Apply topically 2 (two) times 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 454 g 3    zolpidem (AMBIEN) 10 mg tablet TAKE 1 TABLET BY MOUTH AT BEDTIME 90 tablet 0    PEG 3350-KCL-NA BICARB-NACL PO Take by mouth      polyethylene glycol-electrolytes (NULYTELY) 4000 mL solution Take by mouth      promethazine (PHENERGAN) 12 5 mg suppository Take 5 mL by mouth       No current facility-administered medications for this visit  Allergies: Patient has no known allergies  Objective:  Vitals:    04/16/18 1245   BP: 104/84   Pulse: 91   SpO2: 95%   Weight: 111 kg (244 lb)   Height: 5' 11" (1 803 m)   Oxygen Therapy  SpO2: 95 %      Wt Readings from Last 3 Encounters:   04/16/18 111 kg (244 lb)   02/22/18 108 kg (239 lb)   02/15/18 109 kg (240 lb)     Body mass index is 34 03 kg/m²  Physical Exam   Constitutional: He is oriented to person, place, and time  He appears well-developed and well-nourished  HENT:   Head: Normocephalic and atraumatic  Eyes: Conjunctivae are normal  Pupils are equal, round, and reactive to light  Neck: Normal range of motion  Neck supple  No JVD present  No thyromegaly present  Cardiovascular: Normal rate, regular rhythm and normal heart sounds  Exam reveals no gallop and no friction rub  No murmur heard  Pulmonary/Chest: Effort normal and breath sounds normal  No respiratory distress  He has no wheezes  He has no rales  He exhibits no tenderness  Abdominal: Soft  Bowel sounds are normal    Musculoskeletal: Normal range of motion  He exhibits no edema, tenderness or deformity  Lymphadenopathy:     He has no cervical adenopathy  Neurological: He is alert and oriented to person, place, and time  Skin: Skin is warm and dry  Psychiatric: He has a normal mood and affect  Nursing note and vitals reviewed       :Ct Chest Without Contrast    Result Date: 4/12/2018  Narrative: CT CHEST WITHOUT IV CONTRAST INDICATION:   R91 1: Solitary pulmonary nodule  COMPARISON: 3/16/2017 TECHNIQUE: CT examination of the chest was performed without intravenous contrast   Axial, sagittal, and coronal 2D reformatted images were created from the source data and submitted for interpretation  Radiation dose length product (DLP) for this visit:  202 4 mGy-cm   This examination, like all CT scans performed in the [facility], was performed utilizing techniques to minimize radiation dose exposure, including the use of iterative reconstruction and automated exposure control  FINDINGS: LUNGS:  Right lung base 2 mm pulmonary nodule in 205/47 with coarse calcification consistent with granuloma is redemonstrated    Lungs otherwise appear clear  Right calcified 5 mm granuloma on 201/44 also appears stable  Minimal background emphysema  PLEURA:  Unremarkable  HEART/GREAT VESSELS:  Unremarkable for patient's age  MEDIASTINUM AND HINA:  Right hilar calcifications noted as before  Overall findings consistent with previous granulomatous infection  CHEST WALL AND LOWER NECK:   Unremarkable  VISUALIZED STRUCTURES IN THE UPPER ABDOMEN:  Unremarkable  OSSEOUS STRUCTURES:  No acute fracture or destructive osseous lesion  Impression: 1  Stable right hilar calcifications and stable 10 mm right basilar calcified granuloma  Findings are consistent with prior granulomatous infection  2   Mild background COPD as before   Workstation performed: [id number]

## 2018-04-26 DIAGNOSIS — G62.9 NEUROPATHY: Primary | ICD-10-CM

## 2018-04-26 RX ORDER — GABAPENTIN 100 MG/1
100 CAPSULE ORAL 3 TIMES DAILY
Qty: 270 CAPSULE | Refills: 0 | Status: SHIPPED | OUTPATIENT
Start: 2018-04-26 | End: 2018-05-14 | Stop reason: SDUPTHER

## 2018-04-26 RX ORDER — GABAPENTIN 100 MG/1
100 CAPSULE ORAL 3 TIMES DAILY
Refills: 3 | COMMUNITY
Start: 2018-04-08 | End: 2018-04-26 | Stop reason: SDUPTHER

## 2018-04-26 NOTE — TELEPHONE ENCOUNTER
NEEDS  GABAPENTIN 100MG TID #270  INSURANCE REQUIRES A 90 DAY SUPPLY  [facility]   [phone]

## 2018-05-07 ENCOUNTER — OFFICE VISIT (OUTPATIENT)
Dept: DERMATOLOGY | Facility: CLINIC | Age: 73
End: 2018-05-07
Payer: COMMERCIAL

## 2018-05-07 DIAGNOSIS — Z13.89 SCREENING FOR SKIN CONDITION: ICD-10-CM

## 2018-05-07 DIAGNOSIS — Z85.828 HISTORY OF SKIN CANCER: ICD-10-CM

## 2018-05-07 DIAGNOSIS — C84.00 MYCOSIS FUNGOIDES, UNSPECIFIED BODY REGION (HCC): Primary | ICD-10-CM

## 2018-05-07 DIAGNOSIS — L82.1 SEBORRHEIC KERATOSIS: ICD-10-CM

## 2018-05-07 PROBLEM — Z12.83 SCREENING FOR SKIN CANCER: Status: ACTIVE | Noted: 2018-02-06

## 2018-05-07 PROCEDURE — 99214 OFFICE O/P EST MOD 30 MIN: CPT | Performed by: DERMATOLOGY

## 2018-05-07 NOTE — PROGRESS NOTES
[address] [facility] [address] [facility] DERMATOLOGY  [address] [facility] [address]     MRN: [id number] : 1945  Encounter: [id number]  Patient Information: Dante Wylie  Chief complaint:3 month check up    History of present illness:  74-year-old male with history of skin mycosis fungoides and skin cancers presents for follow-up patient continues to have involvement of the skin no real clearing noted but however no progression is noted either patient bothered by occasional itching especially on the scalp no other problems noted he controls his itching right now with Head and Shoulder shampoo  Past Medical History:   Diagnosis Date    Agent orange exposure     Anemia     BPH (benign prostatic hyperplasia)     Bradycardia     [facility]     Chronic bronchitis ([facility] [address] )     COPD (chronic obstructive pulmonary disease) ([facility] )     HTN (hypertension)     Hx of lymphoma     Hyperlipidemia     ICD (implantable cardioverter-defibrillator) in place     Insomnia     [facility]      Past Surgical History:   Procedure Laterality Date    CARDIAC PACEMAKER PLACEMENT       Social History   History   Alcohol Use    0 6 oz/week    1 Glasses of wine per week     Comment: 2-3 drinks per night     History   Drug Use No     History   Smoking Status    Former Smoker   Smokeless Tobacco    Never Used     No family history on file  Meds/Allergies   No Known Allergies    Meds:  Prior to Admission medications    Medication Sig Start Date End Date Taking?  Authorizing Provider   albuterol (2 5 mg/3 mL) 0 083 % nebulizer solution Take 2 5 mg by nebulization every 6 (six) hours as needed for wheezing   Yes Historical Provider, MD   albuterol (PROVENTIL HFA) 90 mcg/act inhaler Inhale 2 puffs every 4 (four) hours as needed for wheezing   Yes Historical Provider, MD   aspirin 81 MG tablet Take 81 mg by mouth daily   Yes Historical Provider, MD   atorvastatin (LIPITOR) 80 mg tablet TAKE 1/2 TABLET BY MOUTH DAILY   3/26/18  Yes Jeremy Clinton MD   Bexarotene (TARGRETIN) 75 MG CAPS Take 8 capsules (600 mg total) by mouth daily 2/6/18  Yes Vincent North MD   Cholecalciferol ([employer]S VITAMIN D3) 1000 units capsule Take 1 capsule by mouth daily   Yes Historical Provider, MD   cyanocobalamin (VITAMIN B-12) 1,000 mcg tablet Take 1,000 mcg by mouth daily   Yes Historical Provider, MD   ferrous sulfate 324 (65 Fe) mg Take 1 tablet by mouth Twice daily 8/31/15  Yes Historical Provider, MD   gabapentin (NEURONTIN) 100 mg capsule Take 1 capsule (100 mg total) by mouth 3 (three) times a day 4/26/18  Yes Naheed Lang MD   levothyroxine 112 mcg tablet Take 1 tablet (112 mcg total) by mouth daily for 90 days 2/15/18 5/16/18 Yes Sasha Webb PA-C   metoprolol succinate (TOPROL-XL) 50 mg 24 hr tablet Take by mouth Daily 11/30/17  Yes Historical Provider, MD   Omega-3-acid Ethyl Esters (LOVAZA PO) Take 1,000 mg by mouth 2 (two) times a day     Yes Historical Provider, MD   PEG 3350-KCL-NA BICARB-NACL PO Take by mouth   Yes Historical Provider, MD   polyethylene glycol-electrolytes (NULYTELY) 4000 mL solution Take by mouth 3/2/17  Yes Historical Provider, MD   potassium chloride (K-DUR,KLOR-CON) 20 mEq tablet Take 1 tablet by mouth daily 2/1/16  Yes Historical Provider, MD   promethazine (PHENERGAN) 12 5 mg suppository Take 5 mL by mouth 7/5/17  Yes Historical Provider, MD   sacubitril-valsartan (ENTRESTO) 24-26 MG TABS Take 1 tablet by mouth 2 (two) times a day 9/4/17  Yes SHRUTHI Steele   tiotropium (SPIRIVA) 18 mcg inhalation capsule Place 18 mcg into inhaler and inhale daily   Yes Historical Provider, MD   torsemide (DEMADEX) 20 mg tablet Take 1 tablet by mouth daily 9/4/17  Yes SHRUTHI Steele   triamcinolone (KENALOG) 0 1 % ointment Apply topically 2 (two) times 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 2/6/18  Yes Vincent North MD   zolpidem (AMBIEN) 10 mg tablet TAKE 1 TABLET BY MOUTH AT BEDTIME 3/14/18  Yes Alyssa Gardner PA-C   nicotine (NICODERM CQ) 21 mg/24 hr TD 24 hr patch Place 1 patch on the skin every 24 hours  3/6/17  Historical Provider, MD       Subjective:     Review of Systems:    General: negative for - chills, fatigue, fever,  weight gain or weight loss  Psychological: negative for - anxiety, behavioral disorder, concentration difficulties, decreased libido, depression, irritability, memory difficulties, mood swings, sleep disturbances or suicidal ideation  ENT: negative for - hearing difficulties , nasal congestion, nasal discharge, oral lesions, sinus pain, sneezing, sore throat  Allergy and Immunology: negative for - hives, insect bite sensitivity,  Hematological and Lymphatic: negative for - bleeding problems, blood clots,bruising, swollen lymph nodes  Endocrine: negative for - hair pattern changes, hot flashes, malaise/lethargy, mood swings, palpitations, polydipsia/polyuria, skin changes, temperature intolerance or unexpected weight change  Respiratory: negative for - cough, hemoptysis, orthopnea, shortness of breath, or wheezing  Cardiovascular: negative for - chest pain, dyspnea on exertion, edema,  Gastrointestinal: negative for - abdominal pain, nausea/vomiting  Genito-Urinary: negative for - dysuria, incontinence, irregular/heavy menses or urinary frequency/urgency  Musculoskeletal: negative for - gait disturbance, joint pain, joint stiffness, joint swelling, muscle pain, muscular weakness  Dermatological:  As in HPI  Neurological: negative for confusion, dizziness, headaches, impaired coordination/balance, memory loss, numbness/tingling, seizures, speech problems, tremors or weakness       Objective: There were no vitals taken for this visit      Physical Exam:    General Appearance:    Alert, cooperative, no distress   Head:    Normocephalic, without obvious abnormality, atraumatic   Lymphatics:    No lymphadenopathy noted      Abdomen:   No hepatosplenomegaly   Skin: A full skin exam was performed including scalp, head scalp, eyes, ears, nose, lips, neck, chest, axilla, abdomen, back, buttocks, bilateral upper extremities, bilateral lower extremities, hands, feet, fingers, toes, fingernails, and toenails  Scaling erythematous patches noted on widespread areas of  Body involving probably 40% of surface area no real plaques noted no lymphadenopathy noted appears to be similar than previous normal keratotic papules with greasy stuck on appearance previous sites of skin cancer well healed without recurrence     Assessment:     1  Mycosis fungoides, unspecified body region ([facility] [address] )     2  Seborrheic keratosis     3  Screening for skin condition     4  History of skin cancer           Plan:   A history of mycosis fungoides still quite active but does not appear to be progressing lymphadenopathy noted will continue same treatment  With bexarotene,  Topical steroid continue to monitor for thyroid issues and will continue same treatment at this point and re-evaluate in 3 months  Seborrheic keratosis patient reassured these are normal growths we acquire with age no treatment needed  History of skin cancer in no recurrence nothing else atypical sunblock recommended follow-up in 3 months  Screening for dermatologic disorders nothing else of concern noted on complete exam follow-up in 3 months will repeat blood work at this time    Davon Shaikh MD  5/7/2018,3:24 PM    Portions of the record may have been created with voice recognition software   Occasional wrong word or "sound a like" substitutions may have occurred due to the inherent limitations of voice recognition software   Read the chart carefully and recognize, using context, where substitutions have occurred

## 2018-05-07 NOTE — PATIENT INSTRUCTIONS
A history of mycosis fungoides still quite active but does not appear to be progressing lymphadenopathy noted will continue same treatment  With bexarotene,  Topical steroid continue to monitor for thyroid issues and will continue same treatment at this point and re-evaluate in 3 months  Seborrheic keratosis patient reassured these are normal growths we acquire with age no treatment needed  History of skin cancer in no recurrence nothing else atypical sunblock recommended follow-up in 3 months  Screening for dermatologic disorders nothing else of concern noted on complete exam follow-up in 3 months will repeat blood work at this time

## 2018-05-14 DIAGNOSIS — G62.9 NEUROPATHY: ICD-10-CM

## 2018-05-14 RX ORDER — GABAPENTIN 100 MG/1
100 CAPSULE ORAL 3 TIMES DAILY
Qty: 270 CAPSULE | Refills: 3 | Status: SHIPPED | OUTPATIENT
Start: 2018-05-14 | End: 2019-05-03 | Stop reason: SDUPTHER

## 2018-05-17 DIAGNOSIS — I50.22 CHRONIC SYSTOLIC CONGESTIVE HEART FAILURE (HCC): Primary | ICD-10-CM

## 2018-05-17 RX ORDER — SACUBITRIL AND VALSARTAN 24; 26 MG/1; MG/1
TABLET, FILM COATED ORAL
Qty: 180 TABLET | Refills: 1 | Status: SHIPPED | OUTPATIENT
Start: 2018-05-17 | End: 2018-06-11 | Stop reason: SDUPTHER

## 2018-05-20 DIAGNOSIS — E78.5 HYPERLIPIDEMIA, UNSPECIFIED HYPERLIPIDEMIA TYPE: ICD-10-CM

## 2018-05-21 RX ORDER — ATORVASTATIN CALCIUM 80 MG/1
TABLET, FILM COATED ORAL
Qty: 15 TABLET | Refills: 1 | Status: SHIPPED | OUTPATIENT
Start: 2018-05-21 | End: 2018-06-14 | Stop reason: SDUPTHER

## 2018-05-29 ENCOUNTER — OFFICE VISIT (OUTPATIENT)
Dept: CARDIOLOGY CLINIC | Facility: CLINIC | Age: 73
End: 2018-05-29
Payer: COMMERCIAL

## 2018-05-29 VITALS
WEIGHT: 244.4 LBS | BODY MASS INDEX: 34.22 KG/M2 | OXYGEN SATURATION: 95 % | HEIGHT: 71 IN | DIASTOLIC BLOOD PRESSURE: 50 MMHG | HEART RATE: 87 BPM | SYSTOLIC BLOOD PRESSURE: 92 MMHG

## 2018-05-29 DIAGNOSIS — I50.22 CHRONIC SYSTOLIC CHF (CONGESTIVE HEART FAILURE) (HCC): Primary | ICD-10-CM

## 2018-05-29 DIAGNOSIS — E78.5 HYPERLIPIDEMIA, UNSPECIFIED HYPERLIPIDEMIA TYPE: ICD-10-CM

## 2018-05-29 DIAGNOSIS — Z95.810 ICD (IMPLANTABLE CARDIOVERTER-DEFIBRILLATOR) IN PLACE: ICD-10-CM

## 2018-05-29 DIAGNOSIS — I10 HYPERTENSION, UNSPECIFIED TYPE: ICD-10-CM

## 2018-05-29 DIAGNOSIS — I42.8 NONISCHEMIC CARDIOMYOPATHY (HCC): ICD-10-CM

## 2018-05-29 PROCEDURE — 99214 OFFICE O/P EST MOD 30 MIN: CPT | Performed by: INTERNAL MEDICINE

## 2018-05-29 NOTE — PROGRESS NOTES
[facility] [address]  [facility] [address]  Cardiology Consultation     Evaristo Nichols   [id number]  1945      1  Chronic systolic CHF (congestive heart failure) ([facility] [address] )     2  Nonischemic cardiomyopathy ([facility] )     3  ICD (implantable cardioverter-defibrillator) in place     4  Hypertension, unspecified type     5  Hyperlipidemia, unspecified hyperlipidemia type         Chief Complaint   Patient presents with    Follow-up     6 month       HPI:  Patient with history of chronic systolic CHF, nonischemic CM EF 15% s/p ICD, HTN, HLD, hx of PVCs and VT, COPD presents for follow up visit  Denies chest pain, SOB, lightheadedness, palpitations, leg swelling, syncope  No issues  Compliant with medications  Follows Dr Yo Morel for EP and has scheduled ICD interrogation  Follows Dr Kwasi Goodwin for CHF  Chronic systolic CHF- euvolemic, asymptomatic  Not on BB due to hypotension  States currently his wt has gone down to his baseline  Patient denies any chest pain/pressure, palpitations, syncope, swelling feet, LH, orthopnea, PND, claudication  SOB at baseline  Occasional fatigue  On Entretso, torsemide, potassium  Following up with Dr Kwasi Goodwin   input appreciated  Nonischemic CM s/p CRT-D - last EF 15% per ECHO in 2017  ICD interrogated remotely  He had a [employer] BiV ICD implanted in March 2011 and changed in Feb 2014  He has been following up with Dr Yo Morel for the devise  On Entresto    HTN, HHD - BP on soft side today       HLD-on statin        Patient Active Problem List   Diagnosis    Acute on chronic systolic heart failure ([facility] )    Insomnia    ICD (implantable cardioverter-defibrillator) in place    Hyperlipidemia    Hx of lymphoma    HTN (hypertension)    COPD (chronic obstructive pulmonary disease) (HCC)    Chronic bronchitis ([facility] )    Cardiomyopathy ([facility] )    BPH (benign prostatic hyperplasia)    Seborrheic keratosis    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 [facility])    History of skin cancer    Screening for skin cancer    Hypothyroidism due to medication    Mycosis fungoides ([place] [address] )    Screening for skin condition     Past Medical History:   Diagnosis Date    Agent orange exposure     Anemia     BPH (benign prostatic hyperplasia)     Bradycardia     Cardiomyopathy ([place] [address] )     Chest discomfort     Chronic bronchitis (HCC)     COPD (chronic obstructive pulmonary disease) (HCC)     HHD (hypertensive heart disease)     HTN (hypertension)     Hx of lymphoma     Hyperlipidemia     ICD (implantable cardioverter-defibrillator) in place     Insomnia     Mycosis fungoides (HCC)     PVC (premature ventricular contraction)     PVT (paroxysmal ventricular tachycardia) (HCC)     SOB (shortness of breath)      Social History     Social History    Marital status:      Spouse name: N/A    Number of children: 0    Years of education: N/A     Occupational History    retired      Social History Main Topics    Smoking status: Former Smoker    Smokeless tobacco: Never Used    Alcohol use 0 6 oz/week     1 Glasses of wine per week      Comment: 2-3 drinks per night    Drug use: No    Sexual activity: Not on file     Other Topics Concern    Not on file     Social History Narrative    No narrative on file      History reviewed  No pertinent family history  Past Surgical History:   Procedure Laterality Date    CARDIAC PACEMAKER PLACEMENT         Current Outpatient Prescriptions:     albuterol (2 5 mg/3 mL) 0 083 % nebulizer solution, Take 2 5 mg by nebulization every 6 (six) hours as needed for wheezing, Disp: , Rfl:     albuterol (PROVENTIL HFA) 90 mcg/act inhaler, Inhale 2 puffs every 4 (four) hours as needed for wheezing, Disp: , Rfl:     aspirin 81 MG tablet, Take 81 mg by mouth daily, Disp: , Rfl:     atorvastatin (LIPITOR) 80 mg tablet, TAKE 1/2 TABLET BY MOUTH DAILY  , Disp: 15 tablet, Rfl: 1    Bexarotene (TARGRETIN) 75 MG CAPS, Take 8 capsules (600 mg total) by mouth daily, Disp: 720 each, Rfl: 3    Cholecalciferol ([employer]S VITAMIN D3) 1000 units capsule, Take 1 capsule by mouth daily, Disp: , Rfl:     cyanocobalamin (VITAMIN B-12) 1,000 mcg tablet, Take 1,000 mcg by mouth daily, Disp: , Rfl:     ENTRESTO 24-26 MG TABS, TAKE 1 TABLET TWICE A DAY, Disp: 180 tablet, Rfl: 1    ferrous sulfate 324 (65 Fe) mg, Take 1 tablet by mouth Twice daily, Disp: , Rfl:     gabapentin (NEURONTIN) 100 mg capsule, Take 1 capsule (100 mg total) by mouth 3 (three) times a day, Disp: 270 capsule, Rfl: 3    levothyroxine 112 mcg tablet, Take 1 tablet (112 mcg total) by mouth daily for 90 days, Disp: 90 tablet, Rfl: 3    metoprolol succinate (TOPROL-XL) 50 mg 24 hr tablet, Take by mouth Daily, Disp: , Rfl:     Omega-3-acid Ethyl Esters (LOVAZA PO), Take 1,000 mg by mouth 2 (two) times a day  , Disp: , Rfl:     potassium chloride (K-DUR,KLOR-CON) 20 mEq tablet, Take 1 tablet by mouth daily, Disp: , Rfl:     tiotropium (SPIRIVA) 18 mcg inhalation capsule, Place 18 mcg into inhaler and inhale daily, Disp: , Rfl:     torsemide (DEMADEX) 20 mg tablet, Take 1 tablet by mouth daily, Disp: 30 tablet, Rfl: 0    triamcinolone (KENALOG) 0 1 % ointment, Apply topically 2 (two) times 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, Disp: 454 g, Rfl: 3    zolpidem (AMBIEN) 10 mg tablet, TAKE 1 TABLET BY MOUTH AT BEDTIME, Disp: 90 tablet, Rfl: 0  No Known Allergies  Vitals:    05/29/18 1327   BP: 92/50   BP Location: Left arm   Patient Position: Sitting   Cuff Size: Adult   Pulse: 87   SpO2: 95%   Weight: 111 kg (244 lb 6 4 oz)   Height: 5' 11" (1 803 m)       Labs:  No visits with results within 2 Month(s) from this visit     Latest known visit with results is:   Orders Only on 02/15/2018   Component Date Value    Hepatitis B Surface Ag 02/15/2018 Non-reactive     Hep A IgM 02/15/2018 Non-reactive     Hepatitis C Ab 02/15/2018 Non-reactive     Hep B C IgM 02/15/2018 Non-reactive Imaging: No results found  Review of Systems:  Review of Systems   Constitutional: Negative for diaphoresis, fatigue and fever  HENT: Negative for congestion, ear discharge, hearing loss, sinus pain and sore throat  Eyes: Negative for pain, redness and visual disturbance  Respiratory: Negative for cough, chest tightness, shortness of breath and wheezing  Cardiovascular: Negative for chest pain, palpitations and leg swelling  Gastrointestinal: Negative for abdominal distention, abdominal pain, constipation, diarrhea, nausea and vomiting  Endocrine: Negative for cold intolerance and heat intolerance  Genitourinary: Negative for difficulty urinating and hematuria  Musculoskeletal: Negative for arthralgias, back pain, gait problem, joint swelling, myalgias, neck pain and neck stiffness  Skin: Negative for color change, pallor, rash and wound  Neurological: Negative for dizziness, syncope, weakness, numbness and headaches  Hematological: Does not bruise/bleed easily  Psychiatric/Behavioral: Negative for agitation, behavioral problems and confusion  The patient is not nervous/anxious  BP 92/50 (BP Location: Left arm, Patient Position: Sitting, Cuff Size: Adult)   Pulse 87   Ht 5' 11" (1 803 m)   Wt 111 kg (244 lb 6 4 oz)   SpO2 95%   BMI 34 09 kg/m²     Physical Exam:  Physical Exam   Constitutional: He is oriented to person, place, and time  He appears well-developed and well-nourished  HENT:   Head: Normocephalic and atraumatic  Eyes: Conjunctivae and EOM are normal  Pupils are equal, round, and reactive to light  Neck: Normal range of motion  Neck supple  Cardiovascular: Normal rate, regular rhythm, normal heart sounds and intact distal pulses  Exam reveals no gallop and no friction rub  No murmur heard  Pulmonary/Chest: Effort normal and breath sounds normal  No respiratory distress  He has no wheezes  He has no rales  He exhibits no tenderness     Abdominal: Soft  Bowel sounds are normal  He exhibits no distension  There is no rebound  Musculoskeletal: Normal range of motion  He exhibits no edema  Neurological: He is alert and oriented to person, place, and time  He has normal reflexes  Skin: Skin is warm and dry  Psychiatric: He has a normal mood and affect  His behavior is normal  Judgment and thought content normal        Discussion/Summary:  1  Chronic systolic CHF, nonischemic CM s/p ICD:  -Euvolemic  -Last EF 15%  -Continue Entresto per CHF specialist Dr Hayde Serrano  -No BB due to hypotension  -Low salt diet, daily weights  -ICD interrogated remotely, has scheduled interrogation with Dr Franny Robbins    2  HTN: BP on soft side  3  HLD: Continue statin    Recommend aggressive risk factor modification and therapeutic lifestyle changes  Low salt, low calorie, low fat, low cholesterol diet with regular exercise and to optimize weight  I will defer the ordering and monitoring of necessary lab studies to you, but I am available and happy to review and manage any of that data at your request in the future  Discussed concepts of atherosclerosis, including signs and symptoms of cardiac disease  Previous studies were reviewed  Safety measures were reviewed  Questions were entertained and answered  Patient was advised to report any problem requiring medical attention  Follow up with appropriate specialists and lab work as discussed  Return for follow up visit as scheduled or earlier, if needed  Thank you for allowing me to participate in the care and evaluation of your patient  Should you have any questions, please feel free to contact me      F/u 6 months, patient has scheduled appointment with Dr Hayde Serarno in 3 months

## 2018-06-11 DIAGNOSIS — I50.22 CHRONIC SYSTOLIC CONGESTIVE HEART FAILURE (HCC): ICD-10-CM

## 2018-06-14 DIAGNOSIS — E78.5 HYPERLIPIDEMIA, UNSPECIFIED HYPERLIPIDEMIA TYPE: ICD-10-CM

## 2018-06-15 RX ORDER — ATORVASTATIN CALCIUM 80 MG/1
TABLET, FILM COATED ORAL
Qty: 15 TABLET | Refills: 0 | Status: SHIPPED | OUTPATIENT
Start: 2018-06-15 | End: 2018-08-03 | Stop reason: SDUPTHER

## 2018-06-20 ENCOUNTER — IN-CLINIC DEVICE VISIT (OUTPATIENT)
Dept: CARDIOLOGY CLINIC | Facility: CLINIC | Age: 73
End: 2018-06-20
Payer: COMMERCIAL

## 2018-06-20 DIAGNOSIS — I42.9 CARDIOMYOPATHY, UNSPECIFIED TYPE (HCC): Primary | ICD-10-CM

## 2018-06-20 DIAGNOSIS — Z95.810 PRESENCE OF IMPLANTABLE CARDIOVERTER-DEFIBRILLATOR (ICD): ICD-10-CM

## 2018-06-20 DIAGNOSIS — I50.23 ACUTE ON CHRONIC SYSTOLIC CONGESTIVE HEART FAILURE (HCC): ICD-10-CM

## 2018-06-20 PROCEDURE — 93296 REM INTERROG EVL PM/IDS: CPT | Performed by: INTERNAL MEDICINE

## 2018-06-20 PROCEDURE — 93295 DEV INTERROG REMOTE 1/2/MLT: CPT | Performed by: INTERNAL MEDICINE

## 2018-06-20 NOTE — PROGRESS NOTES
MDT/BIV-ICD  CARELINK TRANSMISSION:  BATTERY VOLTAGE NEARING LIZZY (8 MOS  )   WILL SCHEDULE MONTHLY BATTERY CHECKS   AP 97 7% BP 94 0%   ALL LEAD PARAMETERS WITHIN NORMAL LIMITS   13 NEW VT-NS EPISODES WITH 4 EGMS SHOWING NSVT (7 @ 192 BPM, 12 @ 162 BPM, 16 @ 211 BPM, 12 @ 150 BPM) AND 1 EGM SHOWING PAT (12 @ 154 BPM)    38 V SENSE EPISODES (3 SEC/D) WITH 5 AVAILABLE MARKERS SHOWING PAT WITH  - 150 BPM   OPTI-VOL WITHIN NORMAL LIMITS   NORMAL DEVICE FUNCTION   RG

## 2018-06-26 DIAGNOSIS — E03.2 HYPOTHYROIDISM DUE TO MEDICATION: ICD-10-CM

## 2018-06-26 RX ORDER — LEVOTHYROXINE SODIUM 112 UG/1
112 TABLET ORAL DAILY
Qty: 90 TABLET | Refills: 0 | Status: SHIPPED | OUTPATIENT
Start: 2018-06-26 | End: 2018-09-12 | Stop reason: SDUPTHER

## 2018-07-13 DIAGNOSIS — J44.9 CHRONIC OBSTRUCTIVE PULMONARY DISEASE, UNSPECIFIED COPD TYPE (HCC): Primary | ICD-10-CM

## 2018-07-13 RX ORDER — ALBUTEROL SULFATE 2.5 MG/3ML
SOLUTION RESPIRATORY (INHALATION)
Qty: 120 VIAL | Refills: 2 | Status: SHIPPED | OUTPATIENT
Start: 2018-07-13 | End: 2018-08-28 | Stop reason: SDUPTHER

## 2018-07-17 ENCOUNTER — TELEPHONE (OUTPATIENT)
Dept: PULMONOLOGY | Facility: CLINIC | Age: 73
End: 2018-07-17

## 2018-07-23 ENCOUNTER — REMOTE DEVICE CLINIC VISIT (OUTPATIENT)
Dept: CARDIOLOGY CLINIC | Facility: CLINIC | Age: 73
End: 2018-07-23
Payer: COMMERCIAL

## 2018-07-23 DIAGNOSIS — Z95.810 AICD (AUTOMATIC CARDIOVERTER/DEFIBRILLATOR) PRESENT: ICD-10-CM

## 2018-07-23 DIAGNOSIS — I42.9 CARDIOMYOPATHY, UNSPECIFIED TYPE (HCC): ICD-10-CM

## 2018-07-23 DIAGNOSIS — I50.22 CHRONIC SYSTOLIC CONGESTIVE HEART FAILURE (HCC): Primary | ICD-10-CM

## 2018-07-23 PROCEDURE — 93299 PR REM INTERROG ICPMS/SCRMS <30 D TECH REVIEW: CPT | Performed by: INTERNAL MEDICINE

## 2018-07-23 PROCEDURE — 93297 REM INTERROG DEV EVAL ICPMS: CPT | Performed by: INTERNAL MEDICINE

## 2018-07-23 NOTE — PROGRESS NOTES
Results for orders placed or performed in visit on 07/23/18   Cardiac EP device report    Narrative    MDT/BIV-ICD  CARELINK TRANSMISSION - OPTI-VOL ONLY: OPTI-VOL FLUID THRESHOLD CROSSED & ONGOING  PT ON TORSEMIDE  WILL RECHECK IN 1 MONTH  TASKED NP  BATTERY VOLTAGE NEARING LIZZY (7 MOS)  WILL SCHEDULE MONTHLY BATTERY CHECKS  AP-98%, BVP-99% (TOTAL -95 6%+VSR PACE-3 4%)  ALL AVAILABLE LEAD PARAMETERS WITHIN NORMAL LIMITS  8 DEVICE CLASSIFIED NSVT EPISODES- NSVT & SVT ON EGM'S, LONGEST NSVT (EPISODE#2767) FOR 10 BEATS, AVG CL~350MS  EF-15% (ECHO 9/1/17)  PT ON ASA 81MG & METOPROLOL  [facility]  NORMAL DEVICE FUNCTION   GV

## 2018-08-03 DIAGNOSIS — E78.5 HYPERLIPIDEMIA, UNSPECIFIED HYPERLIPIDEMIA TYPE: ICD-10-CM

## 2018-08-03 RX ORDER — ATORVASTATIN CALCIUM 80 MG/1
40 TABLET, FILM COATED ORAL DAILY
Qty: 45 TABLET | Refills: 3 | Status: SHIPPED | OUTPATIENT
Start: 2018-08-03 | End: 2018-09-19 | Stop reason: SDUPTHER

## 2018-08-03 RX ORDER — ATORVASTATIN CALCIUM 80 MG/1
40 TABLET, FILM COATED ORAL DAILY
Qty: 15 TABLET | Refills: 0 | Status: CANCELLED | OUTPATIENT
Start: 2018-08-03

## 2018-08-13 ENCOUNTER — OFFICE VISIT (OUTPATIENT)
Dept: DERMATOLOGY | Facility: CLINIC | Age: 73
End: 2018-08-13
Payer: COMMERCIAL

## 2018-08-13 DIAGNOSIS — L82.1 SEBORRHEIC KERATOSIS: ICD-10-CM

## 2018-08-13 DIAGNOSIS — Z13.89 SCREENING FOR SKIN CONDITION: ICD-10-CM

## 2018-08-13 DIAGNOSIS — C84.00 MYCOSIS FUNGOIDES, UNSPECIFIED BODY REGION (HCC): Primary | ICD-10-CM

## 2018-08-13 DIAGNOSIS — L98.9 UNKNOWN SKIN LESION: ICD-10-CM

## 2018-08-13 DIAGNOSIS — Z85.828 HISTORY OF SKIN CANCER: ICD-10-CM

## 2018-08-13 PROCEDURE — 99214 OFFICE O/P EST MOD 30 MIN: CPT | Performed by: DERMATOLOGY

## 2018-08-13 PROCEDURE — 88305 TISSUE EXAM BY PATHOLOGIST: CPT | Performed by: PATHOLOGY

## 2018-08-13 PROCEDURE — 11100 PR BIOPSY OF SKIN LESION: CPT | Performed by: DERMATOLOGY

## 2018-08-13 PROCEDURE — 11101 PR BIOPSY, EACH ADDED LESION: CPT | Performed by: DERMATOLOGY

## 2018-08-13 NOTE — PROGRESS NOTES
[facility] DERMATOLOGY  [facility]  [facility] [address]  [phone]  [phone]     MRN: [id number] : 1945  Encounter: [id number]  Patient Information: Abhijeet Alejandra    Chief complaint: follow-up for mycosis fungoides these and concerned regarding nonhealing lesions    History of present illness:  81-year-old male with known mycosis fungoides these on topical treatment along with  Targretin presents for follow-up concerned regarding couple lesions that have not resolved patient with history of skin ca  Past Medical History:   Diagnosis Date    Agent orange exposure     Anemia     Benign colon polyp     BPH (benign prostatic hyperplasia)     Bradycardia     Cardiomyopathy ([facility] [address] )     Chest discomfort     Chronic bronchitis ([facility] [address] )     [facility]     LAST ASSESSED: 17    H/O nonmelanoma skin cancer     LAST ASSESSED: 17    HHD (hypertensive heart disease)     HTN (hypertension)     Hx of lymphoma     Hyperlipidemia     ICD (implantable cardioverter-defibrillator) in place     Insomnia     Mycosis fungoides ([facility] [address] )     PVC (premature ventricular contraction)     PVT (paroxysmal ventricular tachycardia) (HCC)     SOB (shortness of breath)     Urinary retention      Past Surgical History:   Procedure Laterality Date    CARDIAC PACEMAKER PLACEMENT       Social History   History   Alcohol Use    0 6 oz/week    1 Glasses of wine per week     Comment: 2-3 drinks per night; NO ALCOHOL USE AS PER [employer]     History   Drug Use No     History   Smoking Status    Former Smoker   Smokeless Tobacco    Never Used     Comment: CURRENT EVERY DAY SMOKER, TOBACCO USE AS PER [employer]SCRIPTS     Family History   Problem Relation Age of Onset    Diabetes Mother     Colon cancer Father         DIAGNOSED IN HIS [de-identified]    Diabetes Family         MELLITUS     Meds/Allergies   No Known Allergies    Meds:  Prior to Admission medications    Medication Sig Start Date End Date Taking?  Authorizing Provider   albuterol (2 5 mg/3 mL) 0 083 % nebulizer solution USE 1 VIAL IN NEBULIZER  FOUR TIMES A DAY AS NEEDED 7/13/18   Jhon Pickens PA-C   albuterol (PROVENTIL HFA) 90 mcg/act inhaler Inhale 2 puffs every 4 (four) hours as needed for wheezing    Historical Provider, MD   aspirin 81 MG tablet Take 81 mg by mouth daily    Historical Provider, MD   atorvastatin (LIPITOR) 80 mg tablet Take 0 5 tablets (40 mg total) by mouth daily 8/3/18   Elyssa Pichardo MD   Bexarotene (TARGRETIN) 75 MG CAPS Take 8 capsules (600 mg total) by mouth daily 2/6/18   Nicole Cohen MD   Cholecalciferol (CVS VITAMIN D3) 1000 units capsule Take 1 capsule by mouth daily    Historical Provider, MD   cyanocobalamin (VITAMIN B-12) 1,000 mcg tablet Take 1,000 mcg by mouth daily    Historical Provider, MD   ferrous sulfate 324 (65 Fe) mg Take 1 tablet by mouth Twice daily 8/31/15   Historical Provider, MD   gabapentin (NEURONTIN) 100 mg capsule Take 1 capsule (100 mg total) by mouth 3 (three) times a day 5/14/18   Jl Woodson MD   levothyroxine 112 mcg tablet Take 1 tablet (112 mcg total) by mouth daily for 90 days 6/26/18 9/24/18  Gray Brown PA-C   metoprolol succinate (TOPROL-XL) 50 mg 24 hr tablet Take by mouth Daily 11/30/17   Historical Provider, MD   Omega-3-acid Ethyl Esters (LOVAZA PO) Take 1,000 mg by mouth 2 (two) times a day      Historical Provider, MD   potassium chloride (K-DUR,KLOR-CON) 20 mEq tablet Take 1 tablet by mouth daily 2/1/16   Historical Provider, MD   sacubitril-valsartan (ENTRESTO) 24-26 MG TABS Take 1 tablet twice a day 6/11/18   Elyssa Pichardo MD   tiotropium [facility]) 18 mcg inhalation capsule Place 18 mcg into inhaler and inhale daily    Historical Provider, MD   torsemide (DEMADEX) 20 mg tablet Take 1 tablet by mouth daily 9/4/17   SHRUTHI Powers   triamcinolone (KENALOG) 0 1 % ointment Apply topically 2 (two) times 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 2/6/18   Nick Rai MD   zolpidem (AMBIEN) 10 mg tablet TAKE 1 TABLET BY MOUTH AT BEDTIME 3/14/18   Alla Ramirez PA-C   nicotine (NICODERM CQ) 21 mg/24 hr TD 24 hr patch Place 1 patch on the skin every 24 hours  3/6/17  Historical Provider, MD       Subjective:     Review of Systems:    General: negative for - chills, fatigue, fever,  weight gain or weight loss  Psychological: negative for - anxiety, behavioral disorder, concentration difficulties, decreased libido, depression, irritability, memory difficulties, mood swings, sleep disturbances or suicidal ideation  ENT: negative for - hearing difficulties , nasal congestion, nasal discharge, oral lesions, sinus pain, sneezing, sore throat  Allergy and Immunology: negative for - hives, insect bite sensitivity,  Hematological and Lymphatic: negative for - bleeding problems, blood clots,bruising, swollen lymph nodes  Endocrine: negative for - hair pattern changes, hot flashes, malaise/lethargy, mood swings, palpitations, polydipsia/polyuria, skin changes, temperature intolerance or unexpected weight change  Respiratory: negative for - cough, hemoptysis, orthopnea, shortness of breath, or wheezing  Cardiovascular: negative for - chest pain, dyspnea on exertion, edema,  Gastrointestinal: negative for - abdominal pain, nausea/vomiting  Genito-Urinary: negative for - dysuria, incontinence, irregular/heavy menses or urinary frequency/urgency  Musculoskeletal: negative for - gait disturbance, joint pain, joint stiffness, joint swelling, muscle pain, muscular weakness  Dermatological:  As in HPI  Neurological: negative for confusion, dizziness, headaches, impaired coordination/balance, memory loss, numbness/tingling, seizures, speech problems, tremors or weakness       Objective: There were no vitals taken for this visit      Physical Exam:    General Appearance:    Alert, cooperative, no distress   Head:    Normocephalic, without obvious abnormality, atraumatic   Lymphatics:    No lymphadenopathy noted      Abdomen:   No hepatosplenomegaly   Skin:   A full skin exam was performed including scalp, head scalp, eyes, ears, nose, lips, neck, chest, axilla, abdomen, back, buttocks, bilateral upper extremities, bilateral lower extremities, hands, feet, fingers, toes, fingernails, and toenails Indurated 4 mm papule red noted on the left forearm indurated with 6 mm erythematous process I papule noted on the right thigh dry scaling erythematous patches noted on 40% of body surface area pretty much unchanged from previous nothing else atypical no plaques noted at this time           Shave Biopsy Procedure Note    Pre-operative Diagnosis: rule out squamous cell/basal cell carcinoma    Plan:  1  Instructed to keep the wound dry and covered for 24 and clean thereafter  2  Warning signs of infection were reviewed  3  Recommended that the patient use OTC acetaminophen as needed for pain  4  Return  Pending results of biopsy(ies)    Locations: left forearm and right thigh    Indications:  Nonhealing lesion    Anesthesia: Lidocaine 1% without epinephrine without added sodium bicarbonate    Procedure Details     Patient informed of the risks (including bleeding and infection) and benefits of the   procedure and Verbal informed consent obtained  The lesion and surrounding area were given a sterile prep using alcohol and draped in the usual sterile fashion  A Blue blade razor was used to obtain a specimen  Hemostasis achieved with aluminum chloride  Petrolatum and a sterile dressing applied  The specimen was sent for pathologic examination  The patient tolerated the procedure(s) well  Complications:  none  Assessment:     1  Mycosis fungoides, unspecified body region ([facility] [address] )     2  Screening for skin condition     3  Unknown skin lesion     4  History of skin cancer     5   Seborrheic keratosis           Plan:    skin lesions possible squamous cell/basal cell carcinoma may require excision if positive   mycosis fungoides appears stable no plaques at this time no progression no really no clearing either will continue same treatment  Seborrheic keratosis patient reassured these are normal growths we acquire with age no treatment needed  History of skin cancer in no recurrence nothing else atypical sunblock recommended follow-up in 3 months  Screening for dermatologic disorders nothing else of concern noted on complete exam follow-up in 3 months    Yaquelin Boles MD  8/13/2018,3:01 PM    Portions of the record may have been created with voice recognition software   Occasional wrong word or "sound a like" substitutions may have occurred due to the inherent limitations of voice recognition software   Read the chart carefully and recognize, using context, where substitutions have occurred

## 2018-08-13 NOTE — PATIENT INSTRUCTIONS
skin lesions possible squamous cell/basal cell carcinoma may require excision if positive   mycosis fungoides appears stable no plaques at this time no progression no really no clearing either will continue same treatment  Seborrheic keratosis patient reassured these are normal growths we acquire with age no treatment needed  History of skin cancer in no recurrence nothing else atypical sunblock recommended follow-up in 3 months  Screening for dermatologic disorders nothing else of concern noted on complete exam follow-up in 3 months  Wound care instructions given to patient

## 2018-08-16 ENCOUNTER — TELEPHONE (OUTPATIENT)
Dept: CARDIOLOGY CLINIC | Facility: CLINIC | Age: 73
End: 2018-08-16

## 2018-08-27 RX ORDER — OLOPATADINE HYDROCHLORIDE 7 MG/ML
SOLUTION OPHTHALMIC
COMMUNITY
Start: 2018-08-08 | End: 2022-01-10

## 2018-08-27 RX ORDER — TIOTROPIUM BROMIDE INHALATION SPRAY 3.12 UG/1
SPRAY, METERED RESPIRATORY (INHALATION)
COMMUNITY
Start: 2018-06-21 | End: 2019-04-16 | Stop reason: ALTCHOICE

## 2018-08-28 ENCOUNTER — OFFICE VISIT (OUTPATIENT)
Dept: INTERNAL MEDICINE CLINIC | Facility: CLINIC | Age: 73
End: 2018-08-28
Payer: COMMERCIAL

## 2018-08-28 ENCOUNTER — REMOTE DEVICE CLINIC VISIT (OUTPATIENT)
Dept: CARDIOLOGY CLINIC | Facility: CLINIC | Age: 73
End: 2018-08-28
Payer: COMMERCIAL

## 2018-08-28 VITALS
BODY MASS INDEX: 34.86 KG/M2 | HEART RATE: 93 BPM | DIASTOLIC BLOOD PRESSURE: 90 MMHG | HEIGHT: 71 IN | OXYGEN SATURATION: 94 % | WEIGHT: 249 LBS | SYSTOLIC BLOOD PRESSURE: 102 MMHG

## 2018-08-28 DIAGNOSIS — J44.9 CHRONIC OBSTRUCTIVE PULMONARY DISEASE, UNSPECIFIED COPD TYPE (HCC): ICD-10-CM

## 2018-08-28 DIAGNOSIS — E03.9 ACQUIRED HYPOTHYROIDISM: ICD-10-CM

## 2018-08-28 DIAGNOSIS — Z12.11 COLON CANCER SCREENING: ICD-10-CM

## 2018-08-28 DIAGNOSIS — Z12.5 PROSTATE CANCER SCREENING: ICD-10-CM

## 2018-08-28 DIAGNOSIS — Z00.00 HEALTH CARE MAINTENANCE: ICD-10-CM

## 2018-08-28 DIAGNOSIS — E78.5 HYPERLIPIDEMIA, UNSPECIFIED HYPERLIPIDEMIA TYPE: ICD-10-CM

## 2018-08-28 DIAGNOSIS — I50.22 HYPERTENSIVE HEART DISEASE WITH CHRONIC SYSTOLIC CONGESTIVE HEART FAILURE (HCC): ICD-10-CM

## 2018-08-28 DIAGNOSIS — N40.1 BENIGN PROSTATIC HYPERPLASIA WITH URINARY FREQUENCY: ICD-10-CM

## 2018-08-28 DIAGNOSIS — I11.0 HYPERTENSIVE HEART DISEASE WITH CHRONIC SYSTOLIC CONGESTIVE HEART FAILURE (HCC): ICD-10-CM

## 2018-08-28 DIAGNOSIS — C84.08 MYCOSIS FUNGOIDES INVOLVING LYMPH NODES OF MULTIPLE REGIONS (HCC): ICD-10-CM

## 2018-08-28 DIAGNOSIS — G47.00 INSOMNIA, UNSPECIFIED TYPE: ICD-10-CM

## 2018-08-28 DIAGNOSIS — I47.2 VENTRICULAR TACHYCARDIA (HCC): ICD-10-CM

## 2018-08-28 DIAGNOSIS — I10 HYPERTENSION, UNSPECIFIED TYPE: ICD-10-CM

## 2018-08-28 DIAGNOSIS — I42.9 CARDIOMYOPATHY, UNSPECIFIED TYPE (HCC): ICD-10-CM

## 2018-08-28 DIAGNOSIS — I42.8 NON-ISCHEMIC CARDIOMYOPATHY (HCC): Primary | ICD-10-CM

## 2018-08-28 DIAGNOSIS — R35.0 BENIGN PROSTATIC HYPERPLASIA WITH URINARY FREQUENCY: ICD-10-CM

## 2018-08-28 DIAGNOSIS — Z95.810 BIVENTRICULAR IMPLANTABLE CARDIOVERTER-DEFIBRILLATOR IN SITU: ICD-10-CM

## 2018-08-28 DIAGNOSIS — I50.22 CHRONIC SYSTOLIC CONGESTIVE HEART FAILURE (HCC): ICD-10-CM

## 2018-08-28 PROBLEM — I11.9 HHD (HYPERTENSIVE HEART DISEASE): Status: ACTIVE | Noted: 2018-08-28

## 2018-08-28 PROCEDURE — 93299 PR REM INTERROG ICPMS/SCRMS <30 D TECH REVIEW: CPT | Performed by: INTERNAL MEDICINE

## 2018-08-28 PROCEDURE — 3008F BODY MASS INDEX DOCD: CPT | Performed by: INTERNAL MEDICINE

## 2018-08-28 PROCEDURE — 99214 OFFICE O/P EST MOD 30 MIN: CPT | Performed by: INTERNAL MEDICINE

## 2018-08-28 PROCEDURE — G0438 PPPS, INITIAL VISIT: HCPCS | Performed by: INTERNAL MEDICINE

## 2018-08-28 PROCEDURE — 1125F AMNT PAIN NOTED PAIN PRSNT: CPT | Performed by: INTERNAL MEDICINE

## 2018-08-28 PROCEDURE — 1170F FXNL STATUS ASSESSED: CPT | Performed by: INTERNAL MEDICINE

## 2018-08-28 PROCEDURE — 93297 REM INTERROG DEV EVAL ICPMS: CPT | Performed by: INTERNAL MEDICINE

## 2018-08-28 RX ORDER — ZOLPIDEM TARTRATE 10 MG/1
TABLET ORAL
Qty: 90 TABLET | Refills: 0 | Status: SHIPPED | OUTPATIENT
Start: 2018-08-28 | End: 2018-08-28 | Stop reason: SDUPTHER

## 2018-08-28 RX ORDER — ALBUTEROL SULFATE 2.5 MG/3ML
SOLUTION RESPIRATORY (INHALATION)
Qty: 120 VIAL | Refills: 0 | Status: SHIPPED | OUTPATIENT
Start: 2018-08-28 | End: 2018-10-16 | Stop reason: SDUPTHER

## 2018-08-28 RX ORDER — ZOLPIDEM TARTRATE 10 MG/1
TABLET ORAL
Qty: 90 TABLET | Refills: 1 | Status: SHIPPED | OUTPATIENT
Start: 2018-08-28 | End: 2019-08-30 | Stop reason: SDUPTHER

## 2018-08-28 NOTE — PROGRESS NOTES
Assessment and Plan:    Problem List Items Addressed This Visit     Insomnia    COPD (chronic obstructive pulmonary disease) ([facility] [address] )    Relevant Medications    SPIRIVA RESPIMAT 2 5 MCG/ACT AERS inhaler        Health Maintenance Due   Topic Date Due    Depression Screening PHQ-9  1945    Medicare Annual Wellness Visit (AWV)  1945    CRC Screening: Colonoscopy  1945    DTaP,Tdap,and Td Vaccines (1 - Tdap) 11/10/1968    Fall Risk  11/10/2012    Pneumococcal PPSV23/PCV13 65+ Years / High and Highest Risk (1 of 2 - PCV13) 11/10/2012         HPI:  Sbaa Lee  is a 81 y o  male here for his Subsequent Wellness Visit      Patient Active Problem List   Diagnosis    Acute on chronic systolic heart failure ([facility] [address] )    Insomnia    ICD (implantable cardioverter-defibrillator) in place    Hyperlipidemia    Hx of lymphoma    HTN (hypertension)    COPD (chronic obstructive pulmonary disease) (HCC)    Chronic bronchitis ([facility] [address] )    Cardiomyopathy ([facility] [address] [facility] )    BPH (benign prostatic hyperplasia)    Seborrheic keratosis    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 ([facility] [address] )    History of skin cancer    Screening for skin cancer    Hypothyroidism due to medication    Mycosis fungoides ([facility] [address] )    Screening for skin condition     Past Medical History:   Diagnosis Date    Agent orange exposure     Anemia     Benign colon polyp     BPH (benign prostatic hyperplasia)     Bradycardia     Cardiomyopathy ([facility] [address] )     Chest discomfort     Chronic bronchitis ([facility] [address] )     COPD (chronic obstructive pulmonary disease) ([facility] [address] )     LAST ASSESSED: 9/9/17    H/O nonmelanoma skin cancer     LAST ASSESSED: 11/7/17    HHD (hypertensive heart disease)     HTN (hypertension)     Hx of lymphoma     Hyperlipidemia     ICD (implantable cardioverter-defibrillator) in place     Insomnia     Mycosis fungoides (HCC)     PVC (premature ventricular contraction)     PVT (paroxysmal ventricular tachycardia) ([facility] [address] [facility]     SOB (shortness of breath)     Urinary retention      Past Surgical History:   Procedure Laterality Date    CARDIAC PACEMAKER PLACEMENT       Family History   Problem Relation Age of Onset    Diabetes Mother     Colon cancer Father         DIAGNOSED IN HIS [de-identified]    Diabetes Family         MELLITUS     History   Smoking Status    Former Smoker   Smokeless Tobacco    Never Used     Comment: CURRENT EVERY DAY SMOKER, TOBACCO USE AS PER [employer]     History   Alcohol Use    0 6 oz/week    1 Glasses of wine per week     Comment: 2-3 drinks per night; NO ALCOHOL USE AS PER [employer]      History   Drug Use No       Current Outpatient Prescriptions   Medication Sig Dispense Refill    albuterol (2 5 mg/3 mL) 0 083 % nebulizer solution USE 1 VIAL IN NEBULIZER  FOUR TIMES A DAY AS NEEDED 120 vial 2    albuterol (PROVENTIL HFA) 90 mcg/act inhaler Inhale 2 puffs every 4 (four) hours as needed for wheezing      aspirin 81 MG tablet Take 81 mg by mouth daily      atorvastatin (LIPITOR) 80 mg tablet Take 0 5 tablets (40 mg total) by mouth daily 45 tablet 3    Bexarotene (TARGRETIN) 75 MG CAPS Take 8 capsules (600 mg total) by mouth daily 720 each 3    Cholecalciferol ([employer] VITAMIN D3) 1000 units capsule Take 1 capsule by mouth daily      cyanocobalamin (VITAMIN B-12) 1,000 mcg tablet Take 1,000 mcg by mouth daily      ferrous sulfate 324 (65 Fe) mg Take 1 tablet by mouth Twice daily      gabapentin (NEURONTIN) 100 mg capsule Take 1 capsule (100 mg total) by mouth 3 (three) times a day 270 capsule 3    levothyroxine 112 mcg tablet Take 1 tablet (112 mcg total) by mouth daily for 90 days 90 tablet 0    metoprolol succinate (TOPROL-XL) 50 mg 24 hr tablet Take by mouth Daily      Omega-3-acid Ethyl Esters (LOVAZA PO) Take 1,000 mg by mouth 2 (two) times a day        PAZEO 0 7 % SOLN       potassium chloride (K-DUR,KLOR-CON) 20 mEq tablet Take 1 tablet by mouth daily      sacubitril-valsartan (ENTRESTO) 24-26 MG TABS Take 1 tablet twice a day 180 tablet 3    SPIRIVA RESPIMAT 2 5 MCG/ACT AERS inhaler       tiotropium (SPIRIVA) 18 mcg inhalation capsule Place 18 mcg into inhaler and inhale daily      torsemide (DEMADEX) 20 mg tablet Take 1 tablet by mouth daily 30 tablet 0    triamcinolone (KENALOG) 0 1 % ointment Apply topically 2 (two) times 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 454 g 3    zolpidem (AMBIEN) 10 mg tablet TAKE 1 TABLET BY MOUTH AT BEDTIME 90 tablet 0     No current facility-administered medications for this visit  No Known Allergies  Immunization History   Administered Date(s) Administered    Influenza Split High Dose Preservative Free IM 10/17/2016, 09/09/2017    Influenza TIV (IM) 10/14/2015    Pneumococcal Conjugate PCV 7 10/14/2015    Tdap 1945       Patient Care Team:  Vanessa Diggs MD as PCP - MD Keith Newman MD Amanda Heinz, MD Laurinda Sleet, MD Gailen Nip, MD    Medicare Screening Tests and Risk Assessments:  Abdelrahman Chavez is here for his Subsequent Wellness visit  Health Risk Assessment:  Patient rates overall health as fair  Patient feels that their physical health rating is Slightly better  Eyesight was rated as Slightly worse  Hearing was rated as Same  Patient feels that their emotional and mental health rating is Same  Pain experienced by patient in the last 7 days has been None  Patient states that he has experienced weight loss or gain in last 6 months  Emotional/Mental Health:  Patient has been feeling nervous/anxious  PHQ-9 Depression Screening:    Frequency of the following problems over the past two weeks:      1  Little interest or pleasure in doing things: 0 - not at all      2  Feeling down, depressed, or hopeless: 0 - not at all  PHQ-2 Score: 0          Broken Bones/Falls:     Fall Risk Assessment:    In the past year, patient has experienced: No history of falling in past year          Bladder/Bowel:  Patient has not leaked urine accidently in the last six months  Patient reports no loss of bowel control  Immunizations:  Patient has had a flu vaccination within the last year  Patient has not received a pneumonia shot  Patient has not received a shingles shot  Patient has not received tetanus/diphtheria shot  Home Safety:  Patient has trouble with stairs inside or outside of their home  Patient currently reports that there are no safety hazards present in home, working smoke alarms, working carbon monoxide detectors  Preventative Screenings:   prostate cancer screen performed, colon cancer screen completed, 2/17/2015  cholesterol screen completed, glaucoma eye exam completed,     Nutrition:  Current diet: Regular with servings of the following:    Medications:  Patient is currently taking over-the-counter supplements  List of OTC medications includes: melotin   Patient is able to manage medications  Lifestyle Choices:  Patient reports no tobacco use  Patient has smoked or used tobacco in the past   Patient has stopped his tobacco use  Tobacco use quit date: 3 yrs ago   Patient reports alcohol use  Alcohol use per week: max 21   Patient drives a vehicle  Patient wears seat belt  Current level of exercise of physical activity described by patient as: minimal         Activities of Daily Living:  Can get out of bed by his or her self, able to dress self, able to make own meals, able to do own shopping, able to bathe self, can do own laundry/housekeeping, can manage own money, pay bills and track expenses    Previous Hospitalizations:  Hospitalization or ED visit in past 12 months  Number of hospitalizations within the last year: 1-2        Advanced Directives:  Patient has decided on a power of   Patient has spoken to designated power of   Patient has completed advanced directive          Preventative Screening/Counseling:      Cardiovascular:      General: Screening Not Indicated          Diabetes:      Due for labs: Blood Glucose          Colorectal Cancer:      General: Risks and Benefits Discussed      Due for studies: Fecal Occult Blood          Prostate Cancer:      General: Risks and Benefits Discussed      Due for labs: PSA          Osteoporosis:      Counseling: Calcium and Vitamin D Intake          AAA:      General: Screening Current          Glaucoma:      General: Screening Current          HIV:      General: Screening Not Indicated          Hepatitis C:      General: Screening Not Indicated        Advanced Directives:   Patient has no living will for healthcare, has durable POA for healthcare, patient does not have an advanced directive  Information on ACP and/or AD provided  5 wishes given  End of life assessment reviewed with patient  Provider agrees with end of life decisions        Immunizations:      Influenza: Influenza Recommended Annually      Pneumococcal: Lifetime Vaccine Completed      Shingrix: Patient Declines      Hepatitis B (Low risk patients): Series Not Indicated

## 2018-08-28 NOTE — PROGRESS NOTES
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)    Benign prostatic hyperplasia with urinary frequency    Other orders  -     PAZEO 0 7 % SOLN;   -     SPIRIVA RESPIMAT 2 5 MCG/ACT AERS inhaler; There are no Patient Instructions on file for this visit  Subjective:      Patient ID: Gage Becerril  is a 81 y o  male  A patient with chronic obstructive lung disease,l mycosis fungoides and chronic systolic heart failure on multiple medications     Postnasal drip is chronic    Neuropathy with tingling and burning pain of his legs ;nocturnal paresthesias and itching of the feet    Excessive thirst and dry mouth but he did not have diabetes    Iron deficiency and treated by hematology with intravenous iron    On multiple pharmaceuticals including a chemotherapy agent  Denies alcohol consumption  Denies illicit drugs  Is not working and has no known exposure to organic chemicals or heavy metals  He stopped smoking abou a year ago  The following portions of the patient's history were reviewed and updated as appropriate:   He has a past medical history of Agent orange exposure; Anemia; Benign colon polyp; BPH (benign prostatic hyperplasia); Bradycardia; Cardiomyopathy ([address] ); Chest discomfort; Chronic bronchitis ([address] ); COPD (chronic obstructive pulmonary disease) ([address] ); H/O nonmelanoma skin cancer; HHD (hypertensive heart disease); HTN (hypertension); lymphoma;  Hyperlipidemia; ICD (implantable cardioverter-defibrillator) in place; Insomnia; Mycosis fungoides ([facility] [address] ); PVC (premature ventricular contraction); PV[facility]oxysmal ventricular tachycardia) [facility] ); SOB (shortness of breath); and Urinary retention  ,   does not have any pertinent problems on file  ,   has a past surgical history that includes Cardiac pacemaker placement  ,  family history includes Colon cancer in his father; Diabetes in his family and mother  ,   reports that he has quit smoking  He has never used smokeless tobacco  He reports that he drinks about 0 6 oz of alcohol per week   He reports that he does not use drugs  ,  has No Known Allergies     Current Outpatient Prescriptions   Medication Sig Dispense Refill    albuterol (2 5 mg/3 mL) 0 083 % nebulizer solution USE 1 VIAL IN NEBULIZER  FOUR TIMES A DAY AS NEEDED 120 vial 2    albuterol (PROVENTIL HFA) 90 mcg/act inhaler Inhale 2 puffs every 4 (four) hours as needed for wheezing      aspirin 81 MG tablet Take 81 mg by mouth daily      atorvastatin (LIPITOR) 80 mg tablet Take 0 5 tablets (40 mg total) by mouth daily 45 tablet 3    Bexarotene (TARGRETIN) 75 MG CAPS Take 8 capsules (600 mg total) by mouth daily 720 each 3    Cholecalciferol ([employer]S VITAMIN D3) 1000 units capsule Take 1 capsule by mouth daily      cyanocobalamin (VITAMIN B-12) 1,000 mcg tablet Take 1,000 mcg by mouth daily      ferrous sulfate 324 (65 Fe) mg Take 1 tablet by mouth Twice daily      gabapentin (NEURONTIN) 100 mg capsule Take 1 capsule (100 mg total) by mouth 3 (three) times a day 270 capsule 3    levothyroxine 112 mcg tablet Take 1 tablet (112 mcg total) by mouth daily for 90 days 90 tablet 0    metoprolol succinate (TOPROL-XL) 50 mg 24 hr tablet Take by mouth Daily      Omega-3-acid Ethyl Esters (LOVAZA PO) Take 1,000 mg by mouth 2 (two) times a day        PAZEO 0 7 % SOLN       potassium chloride (K-DUR,KLOR-CON) 20 mEq tablet Take 1 tablet by mouth daily      sacubitril-valsartan (ENTRESTO) 24-26 MG TABS Take 1 tablet twice a day 180 tablet 3    SPIRIVA RESPIMAT 2 5 MCG/ACT AERS inhaler       tiotropium (SPIRIVA) 18 mcg inhalation capsule Place 18 mcg into inhaler and inhale daily      torsemide (DEMADEX) 20 mg tablet Take 1 tablet by mouth daily 30 tablet 0    triamcinolone (KENALOG) 0 1 % ointment Apply topically 2 (two) times 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 454 g 3    zolpidem (AMBIEN) 10 mg tablet TAKE 1 TABLET BY MOUTH AT BEDTIME 90 tablet 0     No current facility-administered medications for this visit          Review of Systems      Objective:  Vitals:    08/28/18 1511   BP: 102/90   Pulse: 93   SpO2: 94%      Physical Exam

## 2018-08-28 NOTE — PROGRESS NOTES
Assessment/Plan:       Diagnoses and all orders for this visit:    Insomnia, unspecified type  -     zolpidem (AMBIEN) 10 mg tablet; 1 tablet at bedtime    Chronic obstructive pulmonary disease, unspecified COPD type (HCC)  -     albuterol (2 5 mg/3 mL) 0 083 % nebulizer solution; 4 times daily as needed    Acquired hypothyroidism  -     CBC and differential; Future  -     Lipid Panel with Direct LDL reflex; Future  -     Comprehensive metabolic panel; Future  -     TSH, 3rd generation with Free T4 reflex; Future  -     Urinalysis with reflex to microscopic    Hypertension, unspecified type    Hypertensive heart disease with chronic systolic congestive heart failure (HCC)  -     CBC and differential; Future  -     Lipid Panel with Direct LDL reflex; Future  -     Comprehensive metabolic panel; Future  -     TSH, 3rd generation with Free T4 reflex; Future  -     Urinalysis with reflex to microscopic    Cardiomyopathy, unspecified type ([facility] [address] )    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 ([facility] )    Benign prostatic hyperplasia with urinary frequency    Hyperlipidemia, unspecified hyperlipidemia type    Health care maintenance    Colon cancer screening  -     Occult Blood, Fecal Immunochemical; Future    Prostate cancer screening  -     PSA, Total Screen; Future    Other orders  -     PAZEO 0 7 % SOLN;   -     SPIRIVA RESPIMAT 2 5 MCG/ACT AERS inhaler;           Patient Instructions   A patient with above chronic diagnoses  Continues to follow with specialist  Recommendation is for screening lab work and diagnostic lab work including metabolic panel, CBC, PSA, and F ID  Influenza vaccine recommended yearly  Follow-up with primary care 1/ year or earlier if needed  Subjective:      Patient ID: Jeff Ybarra  is a 81 y o  male  Follow-up visit  A 61-year-old man with chronic systolic heart failure; last documented ejection fraction 15%    He was hospitalized about a year ago for exacerbation of CHF  At the conclusion of that hospitalization he was placed on and tress toe  Since that time, he has been much less symptomatic  Continues to follow with specialty providers including Cardiology, and Pulmonary for COPD, many times a year  Globally speaking we have an individual with bare heart failure who was never the less compensated  He has chronic dyspnea which is worse with more the days but no orthopnea nor PND  He does have chronic exertional fatigue which is his baseline status  Mycosis fungoides he is treated by Dermatology; no immediate issues referable to this  Some minor arthropathy  Globally, considering the severity of his diagnoses, he feels surprisingly well  The following portions of the patient's history were reviewed and updated as appropriate:   He has a past medical history of Agent orange exposure; Anemia; Benign colon polyp; BPH (benign prostatic hyperplasia); Bradycardia; Cardiomyopathy ([address] ); Chest discomfort; Chronic bronchitis ([address] ); COPD (chronic obstructive pulmonary disease) ([address] ); H/O nonmelanoma skin cancer; HHD (hypertensive heart disease); HTN (hypertension); lymphoma; Hyperlipidemia; ICD (implantable cardioverter-defibrillator) in place; Insomnia; Mycosis fungoides ([address] ); PVC (premature ventricular contraction); PVT (paroxysmal ventricular tachycardia) ([address] ); SOB (shortness of breath); and Urinary retention  ,   does not have any pertinent problems on file  ,   has a past surgical history that includes Cardiac pacemaker placement  ,  family history includes Colon cancer in his father; Diabetes in his family and mother  ,   reports that he has quit smoking  He has never used smokeless tobacco  He reports that he drinks about 0 6 oz of alcohol per week   He reports that he does not use drugs  ,  has No Known Allergies     Current Outpatient Prescriptions   Medication Sig Dispense Refill    albuterol (2 5 mg/3 mL) 0 083 % nebulizer solution 4 times daily as needed 120 vial 0    albuterol (PROVENTIL HFA) 90 mcg/act inhaler Inhale 2 puffs every 4 (four) hours as needed for wheezing      aspirin 81 MG tablet Take 81 mg by mouth daily      atorvastatin (LIPITOR) 80 mg tablet Take 0 5 tablets (40 mg total) by mouth daily 45 tablet 3    Bexarotene (TARGRETIN) 75 MG CAPS Take 8 capsules (600 mg total) by mouth daily 720 each 3    Cholecalciferol (CVS VITAMIN D3) 1000 units capsule Take 1 capsule by mouth daily      cyanocobalamin (VITAMIN B-12) 1,000 mcg tablet Take 1,000 mcg by mouth daily      ferrous sulfate 324 (65 Fe) mg Take 1 tablet by mouth Twice daily      gabapentin (NEURONTIN) 100 mg capsule Take 1 capsule (100 mg total) by mouth 3 (three) times a day 270 capsule 3    levothyroxine 112 mcg tablet Take 1 tablet (112 mcg total) by mouth daily for 90 days 90 tablet 0    metoprolol succinate (TOPROL-XL) 50 mg 24 hr tablet Take by mouth Daily      Omega-3-acid Ethyl Esters (LOVAZA PO) Take 1,000 mg by mouth 2 (two) times a day        PAZEO 0 7 % SOLN       potassium chloride (K-DUR,KLOR-CON) 20 mEq tablet Take 1 tablet by mouth daily      sacubitril-valsartan (ENTRESTO) 24-26 MG TABS Take 1 tablet twice a day 180 tablet 3    SPIRIVA RESPIMAT 2 5 MCG/ACT AERS inhaler       tiotropium (SPIRIVA) 18 mcg inhalation capsule Place 18 mcg into inhaler and inhale daily      torsemide (DEMADEX) 20 mg tablet Take 1 tablet by mouth daily 30 tablet 0    triamcinolone (KENALOG) 0 1 % ointment Apply topically 2 (two) times 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 454 g 3    zolpidem (AMBIEN) 10 mg tablet 1 tablet at bedtime 90 tablet 0     No current facility-administered medications for this visit  Review of Systems   Constitutional: Positive for fatigue  Negative for chills and fever  HENT: Negative for sore throat and trouble swallowing  Eyes: Negative for pain  Respiratory: Positive for shortness of breath  Negative for cough      Cardiovascular: Positive for leg swelling  Negative for chest pain and palpitations  Gastrointestinal: Negative for abdominal pain, blood in stool, diarrhea, nausea and vomiting  Endocrine: Negative for cold intolerance and heat intolerance  Genitourinary: Negative for dysuria, frequency and testicular pain  Musculoskeletal: Negative for joint swelling  Allergic/Immunologic: Negative for immunocompromised state  Neurological: Negative for dizziness, syncope and headaches  Hematological: Negative for adenopathy  Does not bruise/bleed easily  Psychiatric/Behavioral: Negative for dysphoric mood  The patient is not nervous/anxious  Objective:  Vitals:    08/28/18 1511   BP: 102/90   Pulse: 93   SpO2: 94%      Physical Exam   Constitutional: He is oriented to person, place, and time  He appears well-developed and well-nourished  An alert cooperative man who appears stated age  He looks tired, but considering the severity of his diagnoses, he never the less looks approximately well  Not in distress  Not tachypneic, not dyspneic, not using accessory muscles  Speaking clearly  HENT:   Head: Normocephalic and atraumatic  Eyes: Pupils are equal, round, and reactive to light  Neck: Normal range of motion  Neck supple  No tracheal deviation present  No thyromegaly present  Cardiovascular: Normal rate and regular rhythm  Exam reveals distant heart sounds  Exam reveals no gallop  No murmur heard  Extremely distant cardiacsounds   Pulmonary/Chest: Effort normal  No respiratory distress  He has decreased breath sounds  He has no wheezes  He has no rales  Abdominal: Soft  Bowel sounds are normal  There is no tenderness  Musculoskeletal: Normal range of motion  He exhibits edema  He exhibits no tenderness or deformity  2+ edema both lower extremities  Neurological: He is alert and oriented to person, place, and time  He has normal reflexes  Coordination normal    Skin: Skin is warm and dry   Rash noted  Rash is macular  There is erythema  Diffuse confluent areas of reddish discoloration of the skin with some these combination  A few areas of much darker brownish red discoloration  These are large confluent patches extending as much as   Psychiatric: He has a normal mood and affect

## 2018-08-28 NOTE — PATIENT INSTRUCTIONS
A patient with above chronic diagnoses  Continues to follow with specialist  Recommendation is for screening lab work and diagnostic lab work including metabolic panel, CBC, PSA, and F ID  Influenza vaccine recommended yearly  Follow-up with primary care 1/ year or earlier if needed

## 2018-08-28 NOTE — PROGRESS NOTES
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 ([facility] )    Acquired hypothyroidism - Primary    HHD (hypertensive heart disease)        Health Maintenance Due   Topic Date Due    Medicare Annual Wellness Visit (AWV)  1945    CRC Screening: Colonoscopy  1945    DTaP,Tdap,and Td Vaccines (1 - Tdap) 11/10/1968    Pneumococcal PPSV23/PCV13 65+ Years / High and Highest Risk (1 of 2 - PCV13) 11/10/2012         HPI:  Sharla Maldonado  is a 81 y o  male here for his Subsequent Wellness Visit      Patient Active Problem List   Diagnosis    Insomnia    ICD (implantable cardioverter-defibrillator) in place    Hyperlipidemia    Hx of lymphoma    HTN (hypertension)    COPD (chronic obstructive pulmonary disease) (HCC)    Chronic bronchitis ([facility] )    Cardiomyopathy ([facility] )    BPH (benign prostatic hyperplasia)    Seborrheic keratosis    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 ([facility]    History of skin cancer    Screening for skin cancer    Screening for skin condition    Acquired hypothyroidism    HHD (hypertensive heart disease)     Past Medical History:   Diagnosis Date    Agent orange exposure     Anemia     Benign colon polyp     BPH (benign prostatic hyperplasia)     Bradycardia     Cardiomyopathy ([facility] )     Chest discomfort     Chronic bronchitis ([facility] )     COPD (chronic obstructive pulmonary disease) ([facility] )     LAST ASSESSED: 9/9/17    H/O nonmelanoma skin cancer     LAST ASSESSED: 11/7/17    HHD (hypertensive heart disease)     HTN (hypertension)     Hx of lymphoma     Hyperlipidemia     ICD (implantable cardioverter-defibrillator) in place     Insomnia     Mycosis fungoides ([facility] )     PVC (premature ventricular contraction)     PVT (paroxysmal ventricular tachycardia) (HCC)     SOB (shortness of breath)     Urinary retention      Past Surgical History:   Procedure Laterality Date    CARDIAC PACEMAKER PLACEMENT       Family History   Problem Relation Age of Onset    Diabetes Mother     Colon cancer Father         DIAGNOSED IN HIS [de-identified]    Diabetes Family         MELLITUS     History   Smoking Status    Former Smoker   Smokeless Tobacco    Never Used     Comment: CURRENT EVERY DAY SMOKER, TOBACCO USE AS PER [employer]     History   Alcohol Use    0 6 oz/week    1 Glasses of wine per week     Comment: 2-3 drinks per night; NO ALCOHOL USE AS PER [employer]      History   Drug Use No       Current Outpatient Prescriptions   Medication Sig Dispense Refill    albuterol (2 5 mg/3 mL) 0 083 % nebulizer solution USE 1 VIAL IN NEBULIZER  FOUR TIMES A DAY AS NEEDED 120 vial 2    albuterol (PROVENTIL HFA) 90 mcg/act inhaler Inhale 2 puffs every 4 (four) hours as needed for wheezing      aspirin 81 MG tablet Take 81 mg by mouth daily      atorvastatin (LIPITOR) 80 mg tablet Take 0 5 tablets (40 mg total) by mouth daily 45 tablet 3    Bexarotene (TARGRETIN) 75 MG CAPS Take 8 capsules (600 mg total) by mouth daily 720 each 3    Cholecalciferol ([facility] VITAMIN D3) 1000 units capsule Take 1 capsule by mouth daily      cyanocobalamin (VITAMIN B-12) 1,000 mcg tablet Take 1,000 mcg by mouth daily      ferrous sulfate 324 (65 Fe) mg Take 1 tablet by mouth Twice daily      gabapentin (NEURONTIN) 100 mg capsule Take 1 capsule (100 mg total) by mouth 3 (three) times a day 270 capsule 3    levothyroxine 112 mcg tablet Take 1 tablet (112 mcg total) by mouth daily for 90 days 90 tablet 0    metoprolol succinate (TOPROL-XL) 50 mg 24 hr tablet Take by mouth Daily      Omega-3-acid Ethyl Esters (LOVAZA PO) Take 1,000 mg by mouth 2 (two) times a day        PAZEO 0 7 % SOLN       potassium chloride (K-DUR,KLOR-CON) 20 mEq tablet Take 1 tablet by mouth daily      sacubitril-valsartan (ENTRESTO) 24-26 MG TABS Take 1 tablet twice a day 180 tablet 3    SPIRIVA RESPIMAT 2 5 MCG/ACT AERS inhaler       tiotropium (SPIRIVA) 18 mcg inhalation capsule Place 18 mcg into inhaler and inhale daily      torsemide (DEMADEX) 20 mg tablet Take 1 tablet by mouth daily 30 tablet 0    triamcinolone (KENALOG) 0 1 % ointment Apply topically 2 (two) times 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 454 g 3    zolpidem (AMBIEN) 10 mg tablet TAKE 1 TABLET BY MOUTH AT BEDTIME 90 tablet 0     No current facility-administered medications for this visit        No Known Allergies  Immunization History   Administered Date(s) Administered    Influenza Split High Dose Preservative Free IM 10/17/2016, 09/09/2017    Influenza TIV (IM) 10/14/2015    Pneumococcal Conjugate PCV 7 10/14/2015    Tdap 1945       Patient Care Team:  Marcin Maxwell MD as PCP - Delories Haff, MD Sid Mcalpine, MD Ltanya Mellow, MD Liana Gitelman, MD Marilee Bring, MD    Medicare Screening Tests and Risk Assessments:  Medicare Annual Wellness Visit

## 2018-08-28 NOTE — PROGRESS NOTES
Results for orders placed or performed in visit on 08/28/18   Cardiac EP device report    Narrative    MDT/BIV-ICD  CARELINK TRANSMISSION - OPTI-VOL ONLY: OPTI-VOL WITHIN NORMAL LIMITS  BATTERY VOLTAGE NEARING LIZZY (6 MOS)  WILL SCHEDULE MONTHLY BATTERY CHECKS  AP-96%, BVP-99% (TOTAL -94 3%+VSR PACE- 4 5%)  ALL AVAILABLE LEAD PARAMETERS WITHIN NORMAL LIMITS  6 DEVICE CLASSIFIED NSVT EPISODES- NSVT & SVT ON EGM'S, LONGEST NSVT (EPISODE#2772) FOR 10 BEATS, AVG CL~350MS  EF-15% (ECHO 9/1/17)  PT ON ASA 81MG, METOPROLOL SUCC  17 V SENSING EPISODE (3 SEC/DAY) MARKERS SHOWING - SVT, NSVT &  V FUSION BEATS  NORMAL DEVICE FUNCTION    eb

## 2018-09-04 ENCOUNTER — PROCEDURE VISIT (OUTPATIENT)
Dept: DERMATOLOGY | Facility: CLINIC | Age: 73
End: 2018-09-04
Payer: COMMERCIAL

## 2018-09-04 DIAGNOSIS — C44.629 SQUAMOUS CELL CANCER OF SKIN OF FOREARM, LEFT: Primary | ICD-10-CM

## 2018-09-04 PROCEDURE — 88305 TISSUE EXAM BY PATHOLOGIST: CPT | Performed by: PATHOLOGY

## 2018-09-04 PROCEDURE — 11602 EXC TR-EXT MAL+MARG 1.1-2 CM: CPT | Performed by: DERMATOLOGY

## 2018-09-04 PROCEDURE — 12032 INTMD RPR S/A/T/EXT 2.6-7.5: CPT | Performed by: DERMATOLOGY

## 2018-09-04 NOTE — PROGRESS NOTES
[facility] DERMATOLOGY  [address] [facility] [address]  [phone]  [phone]     MRN: [id number] : 1945  Encounter: [id number]  Patient Information: [place]  Subjective:     74-year-old male presents for planned excision of previously biopsied invasive squamous cell carcinoma left forearm     Objective: There were no vitals taken for this visit  Physical Exam:    General Appearance:    Alert, cooperative, no distress   Skin:    Previous site of biopsy noted    Procedure name: Excision with intermediate layered closure        Location: left forearm    Diagnosis: Squamous cell carcinoma    Size of lesion: 5 mm    Margins: 4 mm    Size + Margins: 13 mm    I explained the diagnosis to the patient and we recommend an excision of the lesion for diagnosis and/or treatment  Potential complications include, but are not limited to: scarring, bleeding, infection, incomplete removal, recurrence, and nerve damage  The risks, benefits, and alternatives were discussed with the patient in detail  The location of the tumor was identified, circled, and confirmed by the patient  The correct patient, site, and procedure were confirmed  Anesthesia: 1% xylocaine with 1:100,000 epinephrine 6  cc  The patient was brought into the room, prepped and draped sterilely in the usual manner and anesthesia was administered by local infiltration  A fusiform shape was drawn around the lesion, and the margins were incised to the level of the subcutaneous fat with a number 15cc Bard-Octaviano blade  The tissue was removed with sharp and blunt dissection  The lateral margins of the resulting defect were undermined with sharp and blunt dissection  Hemostasis was achieved with electrocautery  The deeper layers of the defect, including subcutaneous fat and fascia, had to be approximated to reduce tension on the suture line    Layered wound closure was performed  The wound was cleaned with saline, dried off, petroleum applied, and the wound was covered with a pressure dressing  The patient was given detailed verbal and written instructions on postoperative care  Suture for adipose/fascial/dermal layer closure: 4-0  vicryl 2 sutures interrupted    Suture used to approximate epidermis: 5-0  nylon 8 sutures interrupted    Final wound length: 4 2 cm    Follow-up in: 9 days  Patient tolerated procedure well without complications  Assessment:     1  Squamous cell cancer of skin of forearm, left           Plan:   Wound care instructions given to patient        Prior to Admission medications    Medication Sig Start Date End Date Taking?  Authorizing Provider   albuterol (2 5 mg/3 mL) 0 083 % nebulizer solution 4 times daily as needed 8/28/18  Yes Megan Lira MD   albuterol (PROVENTIL HFA) 90 mcg/act inhaler Inhale 2 puffs every 4 (four) hours as needed for wheezing   Yes Historical Provider, MD   aspirin 81 MG tablet Take 81 mg by mouth daily   Yes Historical Provider, MD   atorvastatin (LIPITOR) 80 mg tablet Take 0 5 tablets (40 mg total) by mouth daily 8/3/18  Yes Christel Huerta MD   Bexarotene (TARGRETIN) 75 MG CAPS Take 8 capsules (600 mg total) by mouth daily 2/6/18  Yes Edita Da Silva MD   Cholecalciferol ([facility] VITAMIN D3) 1000 units capsule Take 1 capsule by mouth daily   Yes Historical Provider, MD   cyanocobalamin (VITAMIN B-12) 1,000 mcg tablet Take 1,000 mcg by mouth daily   Yes Historical Provider, MD   ferrous sulfate 324 (65 Fe) mg Take 1 tablet by mouth Twice daily 8/31/15  Yes Historical Provider, MD   gabapentin (NEURONTIN) 100 mg capsule Take 1 capsule (100 mg total) by mouth 3 (three) times a day 5/14/18  Yes Megan Lira MD   levothyroxine 112 mcg tablet Take 1 tablet (112 mcg total) by mouth daily for 90 days 6/26/18 9/24/18 Yes Padmini Kumar PA-C   metoprolol succinate (TOPROL-XL) 50 mg 24 hr tablet Take by mouth Daily 11/30/17 Yes Historical Provider, MD   Omega-3-acid Ethyl Esters (LOVAZA PO) Take 1,000 mg by mouth 2 (two) times a day     Yes Historical Provider, MD   PAZEO 0 7 % SOLN  8/8/18  Yes Historical Provider, MD   potassium chloride (K-DUR,KLOR-CON) 20 mEq tablet Take 1 tablet by mouth daily 2/1/16  Yes Historical Provider, MD   sacubitril-valsartan (ENTRESTO) 24-26 MG TABS Take 1 tablet twice a day 6/11/18  Yes Vickey Pink MD   SPIRIVA RESPIMAT 2 5 MCG/ACT AERS inhaler  6/21/18  Yes Historical Provider, MD   tiotropium (SPIRIVA) 18 mcg inhalation capsule Place 18 mcg into inhaler and inhale daily   Yes Historical Provider, MD   torsemide (DEMADEX) 20 mg tablet Take 1 tablet by mouth daily 9/4/17  Yes Rose NeighborSHRUTHI   triamcinolone (KENALOG) 0 1 % ointment Apply topically 2 (two) times 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 2/6/18  Yes Jose Brink MD   zolpidem (AMBIEN) 10 mg tablet 1 tablet at bedtime 8/28/18  Yes Maryjo Garcia MD   nicotine (NICODERM CQ) 21 mg/24 hr TD 24 hr patch Place 1 patch on the skin every 24 hours  3/6/17  Historical Provider, MD     No Known Allergies    Jose Brink MD  9/4/2018,2:32 PM    Portions of the record may have been created with voice recognition software   Occasional wrong word or "sound a like" substitutions may have occurred due to the inherent limitations of voice recognition software   Read the chart carefully and recognize, using context, where substitutions have occurred

## 2018-09-12 ENCOUNTER — APPOINTMENT (OUTPATIENT)
Dept: LAB | Facility: CLINIC | Age: 73
End: 2018-09-12
Payer: COMMERCIAL

## 2018-09-12 ENCOUNTER — TELEPHONE (OUTPATIENT)
Dept: INTERNAL MEDICINE CLINIC | Facility: CLINIC | Age: 73
End: 2018-09-12

## 2018-09-12 ENCOUNTER — TRANSCRIBE ORDERS (OUTPATIENT)
Dept: LAB | Facility: CLINIC | Age: 73
End: 2018-09-12

## 2018-09-12 DIAGNOSIS — I11.0 HYPERTENSIVE HEART DISEASE WITH CHRONIC SYSTOLIC CONGESTIVE HEART FAILURE (HCC): ICD-10-CM

## 2018-09-12 DIAGNOSIS — E03.2 HYPOTHYROIDISM DUE TO MEDICATION: ICD-10-CM

## 2018-09-12 DIAGNOSIS — R97.20 ELEVATED PSA: Primary | ICD-10-CM

## 2018-09-12 DIAGNOSIS — E03.9 ACQUIRED HYPOTHYROIDISM: ICD-10-CM

## 2018-09-12 DIAGNOSIS — I50.22 HYPERTENSIVE HEART DISEASE WITH CHRONIC SYSTOLIC CONGESTIVE HEART FAILURE (HCC): ICD-10-CM

## 2018-09-12 DIAGNOSIS — Z12.5 PROSTATE CANCER SCREENING: ICD-10-CM

## 2018-09-12 DIAGNOSIS — C84.00 MYCOSIS FUNGOIDES, UNSPECIFIED BODY REGION (HCC): Primary | ICD-10-CM

## 2018-09-12 DIAGNOSIS — C84.00 MYCOSIS FUNGOIDES, UNSPECIFIED BODY REGION (HCC): ICD-10-CM

## 2018-09-12 LAB
ALBUMIN SERPL BCP-MCNC: 3.3 G/DL (ref 3.5–5)
ALP SERPL-CCNC: 73 U/L (ref 46–116)
ALT SERPL W P-5'-P-CCNC: 103 U/L (ref 12–78)
ANION GAP SERPL CALCULATED.3IONS-SCNC: 7 MMOL/L (ref 4–13)
AST SERPL W P-5'-P-CCNC: 64 U/L (ref 5–45)
BASOPHILS # BLD AUTO: 0.08 THOUSANDS/ΜL (ref 0–0.1)
BASOPHILS NFR BLD AUTO: 1 % (ref 0–1)
BILIRUB SERPL-MCNC: 0.32 MG/DL (ref 0.2–1)
BUN SERPL-MCNC: 25 MG/DL (ref 5–25)
CALCIUM SERPL-MCNC: 8.1 MG/DL (ref 8.3–10.1)
CHLORIDE SERPL-SCNC: 106 MMOL/L (ref 100–108)
CHOLEST SERPL-MCNC: 216 MG/DL (ref 50–200)
CO2 SERPL-SCNC: 25 MMOL/L (ref 21–32)
CREAT SERPL-MCNC: 1.21 MG/DL (ref 0.6–1.3)
EOSINOPHIL # BLD AUTO: 0.17 THOUSAND/ΜL (ref 0–0.61)
EOSINOPHIL NFR BLD AUTO: 3 % (ref 0–6)
ERYTHROCYTE [DISTWIDTH] IN BLOOD BY AUTOMATED COUNT: 13.6 % (ref 11.6–15.1)
GFR SERPL CREATININE-BSD FRML MDRD: 60 ML/MIN/1.73SQ M
GLUCOSE P FAST SERPL-MCNC: 112 MG/DL (ref 65–99)
HCT VFR BLD AUTO: 38.4 % (ref 36.5–49.3)
HDLC SERPL-MCNC: 39 MG/DL (ref 40–60)
HGB BLD-MCNC: 11.8 G/DL (ref 12–17)
IMM GRANULOCYTES # BLD AUTO: 0.02 THOUSAND/UL (ref 0–0.2)
IMM GRANULOCYTES NFR BLD AUTO: 0 % (ref 0–2)
LDLC SERPL CALC-MCNC: 134 MG/DL (ref 0–100)
LYMPHOCYTES # BLD AUTO: 1.39 THOUSANDS/ΜL (ref 0.6–4.47)
LYMPHOCYTES NFR BLD AUTO: 22 % (ref 14–44)
MCH RBC QN AUTO: 29.6 PG (ref 26.8–34.3)
MCHC RBC AUTO-ENTMCNC: 30.7 G/DL (ref 31.4–37.4)
MCV RBC AUTO: 97 FL (ref 82–98)
MONOCYTES # BLD AUTO: 0.38 THOUSAND/ΜL (ref 0.17–1.22)
MONOCYTES NFR BLD AUTO: 6 % (ref 4–12)
NEUTROPHILS # BLD AUTO: 4.19 THOUSANDS/ΜL (ref 1.85–7.62)
NEUTS SEG NFR BLD AUTO: 68 % (ref 43–75)
NRBC BLD AUTO-RTO: 0 /100 WBCS
PLATELET # BLD AUTO: 273 THOUSANDS/UL (ref 149–390)
PMV BLD AUTO: 10.2 FL (ref 8.9–12.7)
POTASSIUM SERPL-SCNC: 4.2 MMOL/L (ref 3.5–5.3)
PROT SERPL-MCNC: 7.4 G/DL (ref 6.4–8.2)
PSA SERPL-MCNC: 6.2 NG/ML (ref 0–4)
RBC # BLD AUTO: 3.98 MILLION/UL (ref 3.88–5.62)
SODIUM SERPL-SCNC: 138 MMOL/L (ref 136–145)
T3FREE SERPL-MCNC: 1.56 PG/ML (ref 2.3–4.2)
T4 FREE SERPL-MCNC: 0.68 NG/DL (ref 0.76–1.46)
TRIGL SERPL-MCNC: 214 MG/DL
TSH SERPL DL<=0.05 MIU/L-ACNC: 0.06 UIU/ML (ref 0.36–3.74)
WBC # BLD AUTO: 6.23 THOUSAND/UL (ref 4.31–10.16)

## 2018-09-12 PROCEDURE — 36415 COLL VENOUS BLD VENIPUNCTURE: CPT

## 2018-09-12 PROCEDURE — 84481 FREE ASSAY (FT-3): CPT

## 2018-09-12 PROCEDURE — 80061 LIPID PANEL: CPT

## 2018-09-12 PROCEDURE — 80053 COMPREHEN METABOLIC PANEL: CPT

## 2018-09-12 PROCEDURE — 84439 ASSAY OF FREE THYROXINE: CPT

## 2018-09-12 PROCEDURE — 84443 ASSAY THYROID STIM HORMONE: CPT

## 2018-09-12 PROCEDURE — 85025 COMPLETE CBC W/AUTO DIFF WBC: CPT

## 2018-09-12 PROCEDURE — G0103 PSA SCREENING: HCPCS

## 2018-09-12 RX ORDER — LEVOTHYROXINE SODIUM 112 UG/1
TABLET ORAL
Qty: 90 TABLET | Refills: 3 | Status: SHIPPED | OUTPATIENT
Start: 2018-09-12 | End: 2019-08-12 | Stop reason: SDUPTHER

## 2018-09-12 NOTE — TELEPHONE ENCOUNTER
----- Message from Juice Koenig MD sent at 9/12/2018  4:38 PM EDT -----  Elevated PSA of 6 2%; prostate cancer risk at a minimum 20%  Recommend referral to Urology

## 2018-09-13 ENCOUNTER — CLINICAL SUPPORT (OUTPATIENT)
Dept: DERMATOLOGY | Facility: CLINIC | Age: 73
End: 2018-09-13

## 2018-09-13 DIAGNOSIS — C44.629 SQUAMOUS CELL CANCER OF SKIN OF FOREARM, LEFT: Primary | ICD-10-CM

## 2018-09-13 PROCEDURE — 99024 POSTOP FOLLOW-UP VISIT: CPT | Performed by: DERMATOLOGY

## 2018-09-13 NOTE — PATIENT INSTRUCTIONS
previous site of excision healing well await results of excisional biopsy to review with patient follow-up as previously scheduled

## 2018-09-13 NOTE — PROGRESS NOTES
[facility] DERMATOLOGY  [address] [facility] [address]  [phone]     MRN: [id number] : 1945  Encounter: [id number]  Patient Information: Hilary Parson  Subjective:     62-year-old male presents for follow-up secondary to his squamous cell carcinoma excised from left forearm no problems noted     Objective: There were no vitals taken for this visit  Physical Exam:    General Appearance:    Alert, cooperative, no distress   Skin:   Previous site of excision with some inflammation  Procedure:  Suture removal  Site of excision:  Left forearm  Wound was cleansed with saline  Wound is intact  Sutures removed  Steri-Strips applied  Biopsy  Still pending     Assessment:     1  Squamous cell cancer of skin of forearm, left           Plan:    previous site of excision healing well await results of excisional biopsy to review with patient follow-up as previously scheduled      Prior to Admission medications    Medication Sig Start Date End Date Taking?  Authorizing Provider   albuterol (2 5 mg/3 mL) 0 083 % nebulizer solution 4 times daily as needed 18   Grace Tirado MD   albuterol (PROVENTIL HFA) 90 mcg/act inhaler Inhale 2 puffs every 4 (four) hours as needed for wheezing    Historical Provider, MD   aspirin 81 MG tablet Take 81 mg by mouth daily    Historical Provider, MD   atorvastatin (LIPITOR) 80 mg tablet Take 0 5 tablets (40 mg total) by mouth daily 8/3/18   Nehal Denise MD   Bexarotene (TARGRETIN) 75 MG CAPS Take 8 capsules (600 mg total) by mouth daily 18   Semaj Elizabeth MD   Cholecalciferol ([facility]S VITAMIN D3) 1000 units capsule Take 1 capsule by mouth daily    Historical Provider, MD   cyanocobalamin (VITAMIN B-12) 1,000 mcg tablet Take 1,000 mcg by mouth daily    Historical Provider, MD   ferrous sulfate 324 (65 Fe) mg Take 1 tablet by mouth Twice daily 8/31/15   Historical Provider, MD   gabapentin (NEURONTIN) 100 mg capsule Take 1 capsule (100 mg total) by mouth 3 (three) times a day 5/14/18   Mary Shelton MD   levothyroxine 112 mcg tablet TAKE 1 TABLET BY MOUTH  DAILY 9/12/18   Ernesto Hester PA-C   metoprolol succinate (TOPROL-XL) 50 mg 24 hr tablet Take by mouth Daily 11/30/17   Historical Provider, MD   Omega-3-acid Ethyl Esters (LOVAZA PO) Take 1,000 mg by mouth 2 (two) times a day      Historical Provider, MD   PAZEO 0 7 % SOLN  8/8/18   Historical Provider, MD   potassium chloride (K-DUR,KLOR-CON) 20 mEq tablet Take 1 tablet by mouth daily 2/1/16   Historical Provider, MD   sacubitril-valsartan (ENTRESTO) 24-26 MG TABS Take 1 tablet twice a day 6/11/18   Jerica Sandra MD   SPIRIVA RESPIMAT 2 5 MCG/ACT AERS inhaler  6/21/18   Historical Provider, MD   tiotropium (SPIRIVA) 18 mcg inhalation capsule Place 18 mcg into inhaler and inhale daily    Historical Provider, MD   torsemide (DEMADEX) 20 mg tablet Take 1 tablet by mouth daily 9/4/17   SHRUTHI Yang   triamcinolone (KENALOG) 0 1 % ointment Apply topically 2 (two) times 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 2/6/18   Katie Garber MD   zolpidem (AMBIEN) 10 mg tablet 1 tablet at bedtime 8/28/18   Mary Shelton MD   nicotine (NICODERM CQ) 21 mg/24 hr TD 24 hr patch Place 1 patch on the skin every 24 hours  3/6/17  Historical Provider, MD     No Known Allergies    Katie Garber MD  9/13/2018,1:56 PM    Portions of the record may have been created with voice recognition software   Occasional wrong word or "sound a like" substitutions may have occurred due to the inherent limitations of voice recognition software   Read the chart carefully and recognize, using context, where substitutions have occurred

## 2018-09-18 ENCOUNTER — TELEPHONE (OUTPATIENT)
Dept: INTERNAL MEDICINE CLINIC | Facility: CLINIC | Age: 73
End: 2018-09-18

## 2018-09-18 NOTE — TELEPHONE ENCOUNTER
Thyroid numbers continue to so the on situation where TSH, T3, and T4 are all low  I have asked the thyroid doctor to look at his chart

## 2018-09-19 ENCOUNTER — TELEPHONE (OUTPATIENT)
Dept: CARDIOLOGY CLINIC | Facility: CLINIC | Age: 73
End: 2018-09-19

## 2018-09-19 DIAGNOSIS — E78.5 HYPERLIPIDEMIA, UNSPECIFIED HYPERLIPIDEMIA TYPE: ICD-10-CM

## 2018-09-19 RX ORDER — ATORVASTATIN CALCIUM 40 MG/1
40 TABLET, FILM COATED ORAL DAILY
Qty: 90 TABLET | Refills: 3 | Status: SHIPPED | OUTPATIENT
Start: 2018-09-19 | End: 2019-09-11 | Stop reason: SDUPTHER

## 2018-09-19 NOTE — TELEPHONE ENCOUNTER
Please advise  Per pt's last office visit on 8-29-18 atorvastatin is 80mg and is directed to take 1/2 a tablet daily  Would you like me to change atorvastatin to 40mg daily? Med pending

## 2018-09-19 NOTE — TELEPHONE ENCOUNTER
[facility] is requesting a refill on patient's Atorvastatin   His insurance requires it go through as a 90 day supply

## 2018-09-21 ENCOUNTER — OFFICE VISIT (OUTPATIENT)
Dept: CARDIOLOGY CLINIC | Facility: CLINIC | Age: 73
End: 2018-09-21
Payer: COMMERCIAL

## 2018-09-21 VITALS
HEART RATE: 92 BPM | DIASTOLIC BLOOD PRESSURE: 64 MMHG | SYSTOLIC BLOOD PRESSURE: 122 MMHG | HEIGHT: 71 IN | OXYGEN SATURATION: 97 % | BODY MASS INDEX: 34.61 KG/M2 | WEIGHT: 247.2 LBS

## 2018-09-21 DIAGNOSIS — E78.2 MIXED HYPERLIPIDEMIA: ICD-10-CM

## 2018-09-21 DIAGNOSIS — Z95.810 ICD (IMPLANTABLE CARDIOVERTER-DEFIBRILLATOR) IN PLACE: ICD-10-CM

## 2018-09-21 DIAGNOSIS — I42.9 CARDIOMYOPATHY, UNSPECIFIED TYPE (HCC): Primary | ICD-10-CM

## 2018-09-21 DIAGNOSIS — I10 HYPERTENSION, UNSPECIFIED TYPE: ICD-10-CM

## 2018-09-21 PROCEDURE — 99214 OFFICE O/P EST MOD 30 MIN: CPT | Performed by: INTERNAL MEDICINE

## 2018-09-21 PROCEDURE — 93000 ELECTROCARDIOGRAM COMPLETE: CPT | Performed by: INTERNAL MEDICINE

## 2018-09-22 PROBLEM — I49.3 PVC (PREMATURE VENTRICULAR CONTRACTION): Status: ACTIVE | Noted: 2018-09-22

## 2018-09-22 NOTE — PROGRESS NOTES
Cardiology Follow Up    Clari White   1945  [id number]  [address] [facility] [address] [place] [address]  [phone]    1  Cardiomyopathy, unspecified type ([address] )  POCT ECG   2  Hypertension, unspecified type  POCT ECG   3  ICD (implantable cardioverter-defibrillator) in place     4   Mixed hyperlipidemia         Interval History:     Feels relatively okay  Does have shortness of breath with minimal exertion    Patient doing well with aggressive management of heart failure  He quit smoking in 2016    He does have a history of nonischemic cardiomyopathy And has a biventricular ICD in place  He occasionally feels a forceful beat  However he has not had any significant palpitations, presyncope or syncope  There is no history of getting shocked recently  He does not complain of angina like chest pain  He is not complaining of newly worsening orthopnea or paroxysmal nocturnal dyspnea      The patient does complain of fatigue  chronic and lasting more than 2 years and currently improving  He has a history of smoking for over 55 years and recently quit in October 2016  He does have COPD  He also has mycosis fungoides and is on therapy for the same, Follows up at [facility]  He does have nonischemic cardiomyopathy      The patient is a pleasant 47-year-old who has moved from [place] to [facility]  He has been seen by Dr Selam Mcrae and referred to me for management of his nonischemic cardiomyopathy and biventricular ICD  The patient informs that he has this diagnosis of nonischemic cardiomyopathy for almost 15 years  He has the device for a long period and recently in 2011 it was changed to a [employer] device      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  This has been diagnosed for almost 15 years   he is on a medication targretin / bexarotene For the same           Patient Active Problem List   Diagnosis    Insomnia    ICD (implantable cardioverter-defibrillator) in place    Hyperlipidemia    Hx of lymphoma    HTN (hypertension)    COPD (chronic obstructive pulmonary disease) (HCC)    [place] bronchitis ([place] [address] )    Cardiomyopathy ([place] [address] )    BPH (benign prostatic hyperplasia)    Seborrheic keratosis    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 ([place] [address] )    History of skin cancer    Screening for skin cancer    Screening for skin condition    Acquired hypothyroidism    HHD (hypertensive heart disease)    Prostate cancer screening    Colon cancer screening    Health care maintenance    Squamous cell cancer of skin of forearm, left    PVC (premature ventricular contraction)     Past Medical History:   Diagnosis Date    Agent orange exposure     Anemia     Benign colon polyp     BPH (benign prostatic hyperplasia)     Bradycardia     Cardiomyopathy ([place] [address] )     Chest discomfort     Chronic bronchitis (HCC)     COPD (chronic obstructive pulmonary disease) ([place] [address] )     LAST ASSESSED: 9/9/17    H/O nonmelanoma skin cancer     LAST ASSESSED: 11/7/17    HHD (hypertensive heart disease)     HTN (hypertension)     Hx of lymphoma     Hyperlipidemia     ICD (implantable cardioverter-defibrillator) in place     Insomnia     Mycosis fungoides (HCC)     PVC (premature ventricular contraction)     PV[facility] tachycardia) [facility]     SOB (shortness of breath)     Urinary retention      Social History     Social History    Marital status:       Spouse name: N/A    Number of children: 0    Years of education: N/A     Occupational History    retired      Social History Main Topics    Smoking status: Former Smoker    Smokeless tobacco: Never Used      Comment: CURRENT EVERY DAY SMOKER, TOBACCO USE AS PER [employer]    Alcohol use 0 6 oz/week     1 Glasses of wine per week      Comment: 2-3 drinks per night; NO ALCOHOL USE AS PER [employer]    Drug use: No    Sexual activity: Not on file     Other Topics Concern    Not on file     Social History Narrative    No narrative on file      Family History   Problem Relation Age of Onset    Diabetes Mother     Colon cancer Father         DIAGNOSED IN HIS [de-identified]    Heart failure Father     Coronary artery disease Father     Diabetes Family         MELLITUS    Heart attack Neg Hx     Stroke Neg Hx     Anuerysm Neg Hx     Clotting disorder Neg Hx     Arrhythmia Neg Hx     Hypertension Neg Hx         pt unsure     Hyperlipidemia Neg Hx     Sudden death Neg Hx         scd     Past Surgical History:   Procedure Laterality Date    CARDIAC PACEMAKER PLACEMENT      SKIN SURGERY      skin cancer removal        Current Outpatient Prescriptions:     albuterol (2 5 mg/3 mL) 0 083 % nebulizer solution, 4 times daily as needed, Disp: 120 vial, Rfl: 0    albuterol (PROVENTIL HFA) 90 mcg/act inhaler, Inhale 2 puffs every 4 (four) hours as needed for wheezing, Disp: , Rfl:     aspirin 81 MG tablet, Take 81 mg by mouth daily, Disp: , Rfl:     atorvastatin (LIPITOR) 40 mg tablet, Take 1 tablet (40 mg total) by mouth daily, Disp: 90 tablet, Rfl: 3    Bexarotene (TARGRETIN) 75 MG CAPS, Take 8 capsules (600 mg total) by mouth daily, Disp: 720 each, Rfl: 3    Cholecalciferol ([employer] VITAMIN D3) 1000 units capsule, Take 1 capsule by mouth daily, Disp: , Rfl:     cyanocobalamin (VITAMIN B-12) 1,000 mcg tablet, Take 1,000 mcg by mouth daily, Disp: , Rfl:     ferrous sulfate 324 (65 Fe) mg, Take 1 tablet by mouth Twice daily, Disp: , Rfl:     gabapentin (NEURONTIN) 100 mg capsule, Take 1 capsule (100 mg total) by mouth 3 (three) times a day, Disp: 270 capsule, Rfl: 3    levothyroxine 112 mcg tablet, TAKE 1 TABLET BY MOUTH  DAILY, Disp: 90 tablet, Rfl: 3    metoprolol succinate (TOPROL-XL) 50 mg 24 hr tablet, Take by mouth Daily, Disp: , Rfl:     Omega-3-acid Ethyl Esters (LOVAZA PO), Take 1,000 mg by mouth 2 (two) times a day  , Disp: , Rfl:     PAZEO 0 7 % SOLN, , Disp: , Rfl:     potassium chloride (K-DUR,KLOR-CON) 20 mEq tablet, Take 1 tablet by mouth daily, Disp: , Rfl:     sacubitril-valsartan (ENTRESTO) 24-26 MG TABS, Take 1 tablet twice a day, Disp: 180 tablet, Rfl: 3    SPIRIVA RESPIMAT 2 5 MCG/ACT AERS inhaler, , Disp: , Rfl:     tiotropium (SPIRIVA) 18 mcg inhalation capsule, Place 18 mcg into inhaler and inhale daily, Disp: , Rfl:     torsemide (DEMADEX) 20 mg tablet, Take 1 tablet by mouth daily, Disp: 30 tablet, Rfl: 0    triamcinolone (KENALOG) 0 1 % ointment, Apply topically 2 (two) times 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, Disp: 454 g, Rfl: 3    zolpidem (AMBIEN) 10 mg tablet, 1 tablet at bedtime, Disp: 90 tablet, Rfl: 1  No Known Allergies    Labs:  Lab Results   Component Value Date     09/12/2018     12/11/2015    K 4 2 09/12/2018    K 4 1 12/11/2015     09/12/2018     12/11/2015    CO2 25 09/12/2018    CO2 28 12/11/2015    BUN 25 09/12/2018    BUN 27 (H) 12/11/2015    CREATININE 1 21 09/12/2018    CREATININE 1 02 12/11/2015    GLUCOSE 93 12/11/2015    CALCIUM 8 1 (L) 09/12/2018    CALCIUM 8 8 12/11/2015     Lab Results   Component Value Date    CKTOTAL 85 01/28/2016    TROPONINI 0 07 (H) 09/01/2017     Lab Results   Component Value Date    WBC 6 23 09/12/2018    WBC 5 7 06/01/2015    HGB 11 8 (L) 09/12/2018    HGB 13 9 06/01/2015    HCT 38 4 09/12/2018    HCT 41 6 06/01/2015    MCV 97 09/12/2018    MCV 88 06/01/2015     09/12/2018     06/01/2015     Lab Results   Component Value Date    CHOL 197 06/01/2015    TRIG 214 (H) 09/12/2018    TRIG 156 06/01/2015    HDL 39 (L) 09/12/2018    HDL 37 06/01/2015     Imaging: No results found  Review of Systems:  Review of Systems   All other systems reviewed and are negative  as described in my history of present illness      Physical Exam:  Physical Exam   Constitutional: He is oriented to person, place, and time   He appears well-developed and well-nourished  No distress  Not in any distress at the current time   HENT:   Head: Normocephalic and atraumatic  Right Ear: External ear normal    Left Ear: External ear normal    Nose: Nose normal    Mouth/Throat: Uvula is midline and mucous membranes are normal    Posterior pharynx is crowded   Eyes: Conjunctivae, EOM and lids are normal  Pupils are equal, round, and reactive to light  No scleral icterus  No pallor  No cyanosis  No icterus   Neck: Trachea normal and normal range of motion  No JVD present  Carotid bruit is not present  No thyromegaly present  No jugular lymphadenopathy   Cardiovascular: Normal rate, regular rhythm, S1 normal, S2 normal, normal heart sounds, intact distal pulses and normal pulses  PMI is not displaced  Exam reveals no gallop, no S3, no S4 and no friction rub  No murmur heard  Pulmonary/Chest: Effort normal  No accessory muscle usage  No respiratory distress  He has decreased breath sounds in the right middle field, the right lower field, the left middle field and the left lower field  He has wheezes in the right lower field and the left lower field  He has rhonchi in the right middle field, the right lower field, the left middle field and the left lower field  He has no rales  He exhibits no tenderness  Abdominal: Soft  Normal appearance and bowel sounds are normal  He exhibits no distension and no mass  There is no splenomegaly or hepatomegaly  There is no tenderness  Musculoskeletal: Normal range of motion  He exhibits no tenderness or deformity  Lymphadenopathy:     He has no cervical adenopathy  Neurological: He is alert and oriented to person, place, and time  Facial symmetry is retained  Extraocular movements are retained  Head neck tongue and palate movement are retained and symmetric   Skin: Skin is intact  No abrasion, no lesion and no rash noted  No erythema  Nails show no clubbing     Psychiatric: He has a normal mood and affect  His speech is normal and behavior is normal  Thought content normal        Discussion/Summary:    1  Nonischemic cardiomyopathy, biventricular ICD  Approaching LIZZY- regular checks needed     clinically stable, improving with aggressive management of CHF  undergoing cardiac rehab    he patient does have a history of nonischemic cardiomyopathy >15 years  He is currently relatively asymptomatic, though limited in physical activities     He is on a combined regimen of entresto, metoprolol succinate, torsemide   Continue with the same    Light headed with sudden orthostatic changes - recommend gradual changes  He needs diuresis to prevent heart failure symptoms        2  Premature ventricular contractions, ventricular tachycardia  PVCs are decreased today compared to prior EKG strips   None on rhythm strip - compared to only 1 every 10 beats last visit - and  1 every 3 beats previously     The pvc's were coming from the left ventricle, free wall, base most likely epicardial          3  smoking cessation  The patient finally quit smoking in October 2016  he has remained away from the same  He has used nicotine patch for only a week      4  Hypertension  Currently well controlled      5   Hyperlipidemia   On statin

## 2018-10-01 ENCOUNTER — REMOTE DEVICE CLINIC VISIT (OUTPATIENT)
Dept: CARDIOLOGY CLINIC | Facility: CLINIC | Age: 73
End: 2018-10-01
Payer: COMMERCIAL

## 2018-10-01 DIAGNOSIS — I47.2 VENTRICULAR TACHYARRHYTHMIA (HCC): ICD-10-CM

## 2018-10-01 DIAGNOSIS — Z95.810 PRESENCE OF AUTOMATIC CARDIOVERTER/DEFIBRILLATOR (AICD): ICD-10-CM

## 2018-10-01 DIAGNOSIS — I50.22 CHRONIC SYSTOLIC HEART FAILURE (HCC): Primary | ICD-10-CM

## 2018-10-01 PROCEDURE — 93295 DEV INTERROG REMOTE 1/2/MLT: CPT | Performed by: INTERNAL MEDICINE

## 2018-10-01 PROCEDURE — 93297 REM INTERROG DEV EVAL ICPMS: CPT | Performed by: INTERNAL MEDICINE

## 2018-10-01 PROCEDURE — 93296 REM INTERROG EVL PM/IDS: CPT | Performed by: INTERNAL MEDICINE

## 2018-10-01 NOTE — PROGRESS NOTES
Results for orders placed or performed in visit on 10/01/18   Cardiac EP device report    Narrative    MDT/BIV-ICD  CARELINK TRANSMISSION: BATTERY VOLTAGE NEARING LIZZY  WILL SCHEDULE MONTHLY BATTERY CHECKS  (4 MON)  AP 98% BVP 95 8% VSR 3 4%  ALL AVAILABLE LEAD PARAMETERS WITHIN NORMAL LIMITS  6 NSVT EPISODES DETECTED  MOST RECENT EGM SHOWS SVT  LONGEST NSVT EPISODE 17 BEATS @ 300ms  NO THERAPIES GIVEN  VENTRICULAR SENSE EPISODES NOTED  PATIENT IS ON ASA 81mg AND METOPROLOL SUCC  NORMAL DEVICE FUNCTION  ---DINERO

## 2018-10-04 ENCOUNTER — OFFICE VISIT (OUTPATIENT)
Dept: CARDIOLOGY CLINIC | Facility: CLINIC | Age: 73
End: 2018-10-04
Payer: COMMERCIAL

## 2018-10-04 VITALS
OXYGEN SATURATION: 98 % | BODY MASS INDEX: 34.66 KG/M2 | HEIGHT: 71 IN | DIASTOLIC BLOOD PRESSURE: 70 MMHG | HEART RATE: 80 BPM | SYSTOLIC BLOOD PRESSURE: 100 MMHG | WEIGHT: 247.6 LBS

## 2018-10-04 DIAGNOSIS — I50.22 CHRONIC SYSTOLIC HEART FAILURE (HCC): ICD-10-CM

## 2018-10-04 DIAGNOSIS — R06.81 APNEA: Primary | ICD-10-CM

## 2018-10-04 PROCEDURE — 99214 OFFICE O/P EST MOD 30 MIN: CPT | Performed by: INTERNAL MEDICINE

## 2018-10-04 NOTE — PROGRESS NOTES
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 ([facility] ) 02/06/2018    History of skin cancer 02/06/2018    Screening for skin cancer 02/06/2018    Insomnia     ICD (implantable cardioverter-defibrillator) in place     Hyperlipidemia     Hx of lymphoma     HTN (hypertension)     COPD (chronic obstructive pulmonary disease) (HCC)     Chronic bronchitis (HCC)     Cardiomyopathy ([facility] )     BPH (benign prostatic hyperplasia)     Seborrheic keratosis 07/26/2016     # Poor sleep: Unclear  Is taking ambien currently, can't fall asleep and wakes up at night multiple times  Also notes muscle cramps  Very tired during the day  Dry mouth in AM  Oral anatomy consistent with possible CÉSAR   --PSG as noted below    # NIDCM (diagnosed >15 years ago) w/ LVEF ~15%, LVIDd 6 7 cm, reduced RVSF, NYHA III, ACC/AHA Stage C/D s/p BiV AICD: Unclear etiology, however, has been long standing  Arrhythmia induced (PVCs) +/- HTN +/- toxin (unclear exposure hx in the past as patient was a , unclear EtOH or other toxins) +/- idopathic  Last TTE (9/2017) shows LVEF ~15% w/ reduced RVSF, PASP 50 mmHg, AMADA, and mod MR w/ mild TR  Euvolemic on exam  Hx c/w possible low output (poor exercise tolerance, narrow pulse pressure)  BiV pacing~96%  Plan as below:  Diag:  --24 hour holter shows significant PVC burden -->31%; now resolved with medication adjustment   Last ECG w/   Ananth Lucas showed PVCs ~1 q10 beats which is significantly reduced  If needed in the future, fleicainide to be avoided  Sotalol vs amiodarone  --PFTs w/ mixed obstructive (predominant) and restrictive pattern w/ DLCO 54%  --Does not want to go to [facility] so precludes CPET  --WIll consider RHC in the future depending on patient's GOC and response to therapies    To do:  --PSG  --TTE    Treatment:  --2g sodium diet  --2 L fluid restriction  --Continue to F/U with pulmonary for optimization    Neurohormonal Blockade:  --Beta-Blocker: Continue metoprolol succinate 50 mg PO daily, with additional dose based on PVC sensation  --ACEi, ARB or ARNi: Continue Entresto 24-26 mg PO BID; BP precludes uptitration  --Aldosterone Receptor Blocker: BP precludes MRA  --Diuretic: Continue torsemide 20 mg PO QOD w/ QOD K repletion    Electrical Resynchronization/Sudden Cardiac Death Risk Reduction:  --BiV-ICD: BiV paced>95% based on last check    Advanced Therapies: Hx is c/w borderline to low output  Given age, he is a candidate for LVAD and/or OHT  He is currently not interested as he feels better  Also, does not want to go to \[facility]\"" for care as he wants to stay local, this would preclude LVAD as well  Should he be willing to travel, we discussed pros and cons of each therapy  He has family, but all live in [place], which complicates his care, and would make either LVAD or OHT difficult  With respect to LVAD, his RV shows reduced RVSF which would need to be assessed further should he be interested in LVAD  We will continue to follow closely  # Pulmonary HTN: Review of TTE shows reduced RVSF w/ e/o RVOT notching (although signal is suboptimal)  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/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 and lung disease  CT scan from 2017 shows unremarkable pleura  Warrents further evaluation    --After adjustment of medications above, would consider referral for RHC to evaluate PVR if patient is agreeable    # Hx of PVCs (symptomatic) and VT: Decreased burden  None on rhythm strip in Dr Christianson Lyme office on 9/21/2018  --Per Dr Anton Verde, given likely scar, no fleicainide  Sotalol vs Amio  --Will hold off on uptitration of BB at this time given BP    # Chronic bronchitis/COPD (30 pack year smoking history): Stable  PFTs 11/2016: FEV1 46%, FVC 52%, FEV1/FVC 89%, TLC 91%, RV %, DLCO 50%  PFTs 11/2/2017: FEV1 40%, FVC 52%, FEV1/FVC 77%, TLC 83%, %, DLCO 54%  6MWT - walked ~800 feet w/ saturations staying above 90%  HR increased from resting ~87->124 beats/min  Done on RA   --Mgmt per pulmonary    # HTN  # HLD  # Hx of SqCC  # Hx of mycosis fungoides  # Hx of NH lymphoma  # polyneuropathy  # Anemia: Workup per Heme    RTC in 6 months; F/U w/ Dr Maria Ines Raygoza in 3 months; TTE after visit w/ Dr Maria Ines Raygoza    HPI: Very pleasant 82 y/o gentleman w/ PMHx of NICM (diagnosed >15 years ago) w/ LVEF ~15%, NYHA III, ACC/AHA Stage C s/p BiV AICD, Hx of PVCs (symptomatic) and VT, COPD (30 pack year smoking history - quit one year ago), hx of NH lymphoma (agent orange related), polyneuropathy, HTN, HLD, Hx of SqCC, CÉSAR on nocturnal O2 p/f evaluation and management of HFrEF  Was recently admitted 8/31 to 9/4/2017 for CHF exacerbation at which time he was transitioned to Loral[facility] Thompson  Last device check on 9/12/17 showed BiV paced ~85% of the time  He did have 4 6-7 beat runs of NSVT on device interrogation  He states he feels well  Especially since he started Loralie Thompson  He has lost ~16 lbs  He states today for the first time he became [facility] and dizzy  His BP @ home is 90/60 mmHg typically  He can walk about 100 yards and then gets SOB  It is limited by weather as well  He has been walking farther and faster than he could a month ago, since starting Entresto and getting fluid off in the hospital  He states that he feels his PVCs ~1x/week  No CP, presyncope, or syncope  He denies orthopnea, PND, or LE edema currently  He sleeps well   He is on nocturnal O2      Today 11/30/17, he states he is feeling better  He is doing better with cardio rehab  His weight is stable from last visit  He does not want to go to [place] for any admissions  He feels much better since starting Entresto  He occasionally gets LH and Dz  He His PFTs showed a mixed obstructive/restrictive pattern, but predominately obstructive  Had good response to bronchodilators  DLCO was 54%  He states he is walking much further as well  Today 2/22/18, he states he is feeling better  He is doing better with cardio rehab  His weight is stable from last visit  He states he is walking about 4000 steps per day  He can walk about 1/2 a mile at a time without stopping  Stairs are ok  [place]  He is [place] to do it, but he has to walk slower  Today, 10/4/2018, he states he feels worse because he is not walking due to the heat  He also is not sleeping  He wakes up with muscle cramps  Past Medical History:   Diagnosis Date    Agent orange exposure     Anemia     Benign colon polyp     BPH (benign prostatic hyperplasia)     Bradycardia     [facility]omy[facility] )     Chest discomfort     Chronic bronchitis (HCC)     COPD (chronic obstructive pulmonary disease) ([facility] )     LAST ASSESSED: 9/9/17    H/O nonmelanoma skin cancer     LAST ASSESSED: 11/7/17    HHD (hypertensive heart disease)     HTN (hypertension)     Hx of lymphoma     Hyperlipidemia     ICD (implantable cardioverter-defibrillator) in place     Insomnia     Mycosis fungoides (HCC)     PVC (premature ventricular contraction)     PVT (paroxysmal ventricular tachycardia) (HCC)     SOB (shortness of breath)     Urinary retention        Review of Systems - 12 point ROS was done and is negative, except as noted above       No Known Allergies      Current Outpatient Prescriptions:     albuterol (2 5 mg/3 mL) 0 083 % nebulizer solution, 4 times daily as needed, Disp: 120 vial, Rfl: 0    albuterol (PROVENTIL HFA) 90 mcg/act inhaler, Inhale 2 puffs every 4 (four) hours as needed for wheezing, Disp: , Rfl:     aspirin 81 MG tablet, Take 81 mg by mouth daily, Disp: , Rfl:     atorvastatin (LIPITOR) 40 mg tablet, Take 1 tablet (40 mg total) by mouth daily, Disp: 90 tablet, Rfl: 3    Bexarotene (TARGRETIN) 75 MG CAPS, Take 8 capsules (600 mg total) by mouth daily, Disp: 720 each, Rfl: 3    Cholecalciferol (CVS VITAMIN D3) 1000 units capsule, Take 1 capsule by mouth daily, Disp: , Rfl:     cyanocobalamin (VITAMIN B-12) 1,000 mcg tablet, Take 1,000 mcg by mouth daily, Disp: , Rfl:     ferrous sulfate 324 (65 Fe) mg, Take 1 tablet by mouth Twice daily, Disp: , Rfl:     gabapentin (NEURONTIN) 100 mg capsule, Take 1 capsule (100 mg total) by mouth 3 (three) times a day, Disp: 270 capsule, Rfl: 3    levothyroxine 112 mcg tablet, TAKE 1 TABLET BY MOUTH  DAILY, Disp: 90 tablet, Rfl: 3    metoprolol succinate (TOPROL-XL) 50 mg 24 hr tablet, Take by mouth Daily, Disp: , Rfl:     Omega-3-acid Ethyl Esters (LOVAZA PO), Take 1,000 mg by mouth 2 (two) times a day  , Disp: , Rfl:     potassium chloride (K-DUR,KLOR-CON) 20 mEq tablet, Take 1 tablet by mouth daily, Disp: , Rfl:     sacubitril-valsartan (ENTRESTO) 24-26 MG TABS, Take 1 tablet twice a day, Disp: 180 tablet, Rfl: 3    SPIRIVA RESPIMAT 2 5 MCG/ACT AERS inhaler, , Disp: , Rfl:     tiotropium (SPIRIVA) 18 mcg inhalation capsule, Place 18 mcg into inhaler and inhale daily, Disp: , Rfl:     torsemide (DEMADEX) 20 mg tablet, Take 1 tablet by mouth daily, Disp: 30 tablet, Rfl: 0    zolpidem (AMBIEN) 10 mg tablet, 1 tablet at bedtime, Disp: 90 tablet, Rfl: 1    PAZEO 0 7 % SOLN, , Disp: , Rfl:     triamcinolone (KENALOG) 0 1 % ointment, Apply topically 2 (two) times 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 (Patient not taking: Reported on 10/4/2018 ), Disp: 454 g, Rfl: 3    Social History     Social History    Marital status:       Spouse name: N/A    Number of children: 0    Years of education: N/A     Occupational History    retired      Social History Main Topics    Smoking status: Former Smoker    Smokeless tobacco: Never Used      Comment: CURRENT EVERY DAY SMOKER, TOBACCO USE AS PER [employer]    Alcohol use 0 6 oz/week     1 Glasses of wine per week      Comment: 2-3 drinks per night; NO ALCOHOL USE AS PER [employer]    Drug use: No    Sexual activity: Not on file     Other Topics Concern    Not on file     Social History Narrative    No narrative on file       Family History   Problem Relation Age of Onset    Diabetes Mother     Colon cancer Father         DIAGNOSED IN HIS [de-identified]    Heart failure Father     Coronary artery disease Father     Diabetes Family         MELLITUS    Heart attack Neg Hx     Stroke Neg Hx     Anuerysm Neg Hx     Clotting disorder Neg Hx     Arrhythmia Neg Hx     Hypertension Neg Hx         pt unsure     Hyperlipidemia Neg Hx     Sudden death Neg Hx         scd       Physical Exam:    Vitals: Blood pressure 100/70, pulse 80, height 5' 11" (1 803 m), weight 112 kg (247 lb 9 6 oz), SpO2 98 %  , Body mass index is 34 53 kg/m² ,   Wt Readings from Last 3 Encounters:   10/04/18 112 kg (247 lb 9 6 oz)   09/21/18 112 kg (247 lb 3 2 oz)   08/28/18 113 kg (249 lb)     Vitals:    10/04/18 1051   BP: 100/70   BP Location: Left arm   Patient Position: Sitting   Cuff Size: Large   Pulse: 80   SpO2: 98%   Weight: 112 kg (247 lb 9 6 oz)   Height: 5' 11" (1 803 m)       GEN: Alaina Smith   appears well, alert and oriented x 3, pleasant and cooperative   HEENT: pupils equal, round, and reactive to light; extraocular muscles intact  NECK: supple, no carotid bruits   HEART: regular rhythm, normal S1 and S2, no murmurs, clicks, gallops or rubs, JVD is flat  LUNGS: clear to auscultation bilaterally; no wheezes, rales, or rhonchi   ABDOMEN: normal bowel sounds, soft, no tenderness, no distention  EXTREMITIES: peripheral pulses normal; no clubbing, cyanosis, or edema  NEURO: no focal findings   SKIN: normal without suspicious lesions on exposed skin    Labs & Results:  Lab Results   Component Value Date    WBC 6 23 09/12/2018    HGB 11 8 (L) 09/12/2018    HCT 38 4 09/12/2018    MCV 97 09/12/2018     09/12/2018       Chemistry        Component Value Date/Time     09/12/2018 1126     12/11/2015 1410    K 4 2 09/12/2018 1126    K 4 1 12/11/2015 1410     09/12/2018 1126     12/11/2015 1410    CO2 25 09/12/2018 1126    CO2 28 12/11/2015 1410    BUN 25 09/12/2018 1126    BUN 27 (H) 12/11/2015 1410    CREATININE 1 21 09/12/2018 1126    CREATININE 1 02 12/11/2015 1410        Component Value Date/Time    CALCIUM 8 1 (L) 09/12/2018 1126    CALCIUM 8 8 12/11/2015 1410    ALKPHOS 73 09/12/2018 1126    ALKPHOS 71 06/01/2015 1133    AST 64 (H) 09/12/2018 1126    AST 19 06/01/2015 1133     (H) 09/12/2018 1126    ALT 27 06/01/2015 1133    BILITOT 0 3 06/01/2015 1133        EKG personally reviewed by Heather Lemus MD      Counseling / Coordination of Care  Total floor / unit time spent today 40 minutes  Greater than 50% of total time was spent with the patient and / or family counseling and / or coordination of care  A description of the counseling / coordination of care: 20 min  Thank you for the opportunity to participate in the care of this patient      Heather Lemus MD, PhD   Betty Dominguez

## 2018-10-16 DIAGNOSIS — J44.9 CHRONIC OBSTRUCTIVE PULMONARY DISEASE, UNSPECIFIED COPD TYPE (HCC): ICD-10-CM

## 2018-10-17 RX ORDER — ALBUTEROL SULFATE 2.5 MG/3ML
SOLUTION RESPIRATORY (INHALATION)
Qty: 120 VIAL | Refills: 2 | Status: SHIPPED | OUTPATIENT
Start: 2018-10-17 | End: 2019-03-25 | Stop reason: SDUPTHER

## 2018-10-17 NOTE — TELEPHONE ENCOUNTER
From: Chante Gerber  Sent: 10/16/2018 7:26 PM EDT  Subject: Medication Renewal Request    Sherly GENARO   Juan C García  would like a refill of the following medications:     albuterol (2 5 mg/3 mL) 0 083 % nebulizer solution Bre Bunch MD]   Patient Comment: Please send to [facility] my insurance requires prior authorization to cover    Preferred pharmacy: [id number]

## 2018-10-18 ENCOUNTER — IN-CLINIC DEVICE VISIT (OUTPATIENT)
Dept: CARDIOLOGY CLINIC | Facility: CLINIC | Age: 73
End: 2018-10-18
Payer: COMMERCIAL

## 2018-10-18 DIAGNOSIS — I50.22 CHRONIC SYSTOLIC CONGESTIVE HEART FAILURE (HCC): ICD-10-CM

## 2018-10-18 DIAGNOSIS — I42.8 NONISCHEMIC CARDIOMYOPATHY (HCC): Primary | ICD-10-CM

## 2018-10-18 DIAGNOSIS — I47.2 VENTRICULAR TACHYCARDIA (HCC): ICD-10-CM

## 2018-10-18 DIAGNOSIS — Z95.810 BIVENTRICULAR IMPLANTABLE CARDIOVERTER-DEFIBRILLATOR IN SITU: ICD-10-CM

## 2018-10-18 PROCEDURE — 93284 PRGRMG EVAL IMPLANTABLE DFB: CPT | Performed by: INTERNAL MEDICINE

## 2018-10-18 NOTE — PROGRESS NOTES
Results for orders placed or performed in visit on 10/18/18   Cardiac EP device report    Narrative    MDT/BIV-ICD  DEVICE INTERROGATED IN THE [facility] OFFICE: BATTERY VOLTAGE NEARING LIZZY (~ 4 MOS )  WILL CONTINUE MONTHLY BATTERY CHECKS  AP 97% BVP 98 7% ( - 93 8% + VSR PACE 4 9%)  ALL LEAD PARAMETERS TEST WITHIN NORMAL LIMITS & STABLE  ALL OTHER TESTING WITHIN NORMAL LIMITS  2 NEW VT-NS EPISODES DETECTED  MOST RECENT EGRM FOR NSVT @ 7 BEATS/AVG RATE 168 BPM  OTHER STORED FOR SVT @ ~ 17 BEATS/AVG RATE 169 BPM  VENT  SENSE EPISODES ALSO NOTED (MARKERS) FOR SVT, V FUSION, VSR PACE, NSVT  EF - 15% (9/2017 ECHO)  PATIENT TAKES ASA 81mg, METOPROLOL SUCC  OPTI-VOL WITHIN NORMAL LIMITS  NO PROGRAMMING CHANGES MADE TO DEVICE PARAMETERS   APPROPRIATELY FUNCTIONING ICD   eb

## 2018-10-25 ENCOUNTER — HOSPITAL ENCOUNTER (OUTPATIENT)
Dept: NON INVASIVE DIAGNOSTICS | Facility: CLINIC | Age: 73
Discharge: HOME | End: 2018-10-25
Payer: COMMERCIAL

## 2018-10-25 DIAGNOSIS — I50.22 CHRONIC SYSTOLIC HEART FAILURE (HCC): ICD-10-CM

## 2018-10-25 PROCEDURE — C8929 TTE W OR WO FOL WCON,DOPPLER: HCPCS

## 2018-10-25 RX ADMIN — PERFLUTREN 0.4 ML/MIN: 6.52 INJECTION, SUSPENSION INTRAVENOUS at 15:41

## 2018-10-26 PROCEDURE — 93306 TTE W/DOPPLER COMPLETE: CPT | Performed by: INTERNAL MEDICINE

## 2018-12-03 ENCOUNTER — OFFICE VISIT (OUTPATIENT)
Dept: CARDIOLOGY CLINIC | Facility: CLINIC | Age: 73
End: 2018-12-03
Payer: COMMERCIAL

## 2018-12-03 VITALS
OXYGEN SATURATION: 98 % | HEIGHT: 71 IN | BODY MASS INDEX: 35.56 KG/M2 | WEIGHT: 254 LBS | DIASTOLIC BLOOD PRESSURE: 68 MMHG | HEART RATE: 64 BPM | SYSTOLIC BLOOD PRESSURE: 126 MMHG

## 2018-12-03 DIAGNOSIS — I50.22 CHRONIC SYSTOLIC CONGESTIVE HEART FAILURE (HCC): Primary | ICD-10-CM

## 2018-12-03 DIAGNOSIS — I47.2 VENTRICULAR TACHYCARDIA (HCC): ICD-10-CM

## 2018-12-03 DIAGNOSIS — E78.2 MIXED HYPERLIPIDEMIA: ICD-10-CM

## 2018-12-03 DIAGNOSIS — E66.9 OBESITY (BMI 30-39.9): ICD-10-CM

## 2018-12-03 DIAGNOSIS — Z95.810 BIVENTRICULAR IMPLANTABLE CARDIOVERTER-DEFIBRILLATOR IN SITU: ICD-10-CM

## 2018-12-03 DIAGNOSIS — I10 ESSENTIAL HYPERTENSION: ICD-10-CM

## 2018-12-03 DIAGNOSIS — I42.8 NONISCHEMIC CARDIOMYOPATHY (HCC): ICD-10-CM

## 2018-12-03 PROCEDURE — 4040F PNEUMOC VAC/ADMIN/RCVD: CPT | Performed by: INTERNAL MEDICINE

## 2018-12-03 PROCEDURE — 99214 OFFICE O/P EST MOD 30 MIN: CPT | Performed by: INTERNAL MEDICINE

## 2018-12-03 NOTE — PROGRESS NOTES
CARDIOLOGY OFFICE VISIT  [facility]  [address] [place] [address], [address] [place] [address], [facility]ly [facility]  Tel: [phone]      NAME: Cynthia Lucas  AGE: 72 y o  SEX: male  : 1945   MRN: [id number]      Chief Complaint:  Chief Complaint   Patient presents with    Follow-up    Shortness of Breath         History of Present Illness:   Patient comes for follow up  States he is doing okay from cardiac stand point  SOB is at baseline  Denies chest pain / pressure, palpitations, lightheadedness, syncope, swelling feet, orthopnea, PND, claudication  Chronic systolic HF - States currently his wt has gone down to his baseline  SOB is at baseline  Occasional fatigue  On BB, Entretso, torsemide, potassium  Following up with Dr Fco Mims   input appreciated  NICMP s/p CRT-D - He had a [employer] BiV ICD implanted in 2011 and changed in 2014  He has been following up with Dr Nora Dumont for the devise  On BB, Entresto    HTN -  Has been hypertensive for many years  Taking medications regularly  Denies lightheadedness, headache, medication side effects  HLP -  Has had hyperlipidemia for many years  Taking statin regularly along with diet control  Denies myalgia  PCP closely monitoring the blood work  Obesity -  Unable to loose fat weight        Past Medical History:  Past Medical History:   Diagnosis Date    Agent orange exposure     Anemia     Benign colon polyp     BPH (benign prostatic hyperplasia)     Bradycardia     Cardiomyopathy ([facility] )     Chest discomfort     Chronic bronchitis (HCC)     COPD (chronic obstructive pulmonary disease) ([facility] )     LAST ASSESSED: 17    H/O nonmelanoma skin cancer     LAST ASSESSED: 17    HHD (hypertensive heart disease)     HTN (hypertension)     Hx of lymphoma     Hyperlipidemia     ICD (implantable cardioverter-defibrillator) in place     Insomnia     Mycosis fungoides (HCC)     PVC (premature ventricular contraction)     PVT (paroxysmal ventricular tachycardia) (HCC)     SOB (shortness of breath)     Urinary retention          Past Surgical History:  Past Surgical History:   Procedure Laterality Date    CARDIAC PACEMAKER PLACEMENT      SKIN SURGERY      skin cancer removal          Family History:  Family History   Problem Relation Age of Onset    Diabetes Mother     Colon cancer Father         DIAGNOSED IN HIS [de-identified]    Heart failure Father     Coronary artery disease Father     Diabetes Family         MELLITUS    Heart attack Neg Hx     Stroke Neg Hx     Anuerysm Neg Hx     Clotting disorder Neg Hx     Arrhythmia Neg Hx     Hypertension Neg Hx         pt unsure     Hyperlipidemia Neg Hx     Sudden death Neg Hx         scd         Social History:  Social History     Social History    Marital status:       Spouse name: N/A    Number of children: 0    Years of education: N/A     Occupational History    retired      Social History Main Topics    Smoking status: Former Smoker    Smokeless tobacco: Never Used      Comment: CURRENT EVERY DAY SMOKER, TOBACCO USE AS PER [employer]    Alcohol use 0 6 oz/week     1 Glasses of wine per week      Comment: 2-3 drinks per night; NO ALCOHOL USE AS PER [employer]    Drug use: No    Sexual activity: Not Asked     Other Topics Concern    None     Social History Narrative    None         Active Problems:  Patient Active Problem List   Diagnosis    Insomnia    ICD (implantable cardioverter-defibrillator) in place    Hyperlipidemia    Hx of lymphoma    HTN (hypertension)    COPD (chronic obstructive pulmonary disease) (HCC)    Chronic bronchitis ([address]    Cardiomyopathy ([address]    BPH (benign prostatic hyperplasia)    Seborrheic keratosis    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)    History of skin cancer    Screening for skin cancer    Screening for skin condition    Acquired hypothyroidism  HHD (hypertensive heart disease)    Prostate cancer screening    Colon cancer screening    Health care maintenance    Squamous cell cancer of skin of forearm, left    PVC (premature ventricular contraction)         The following portions of the patient's history were reviewed and updated as appropriate: past medical history, past surgical history, past family history,  past social history, current medications, allergies and problem list       Review of Systems:  Constitutional: Denies fever, chills  +fatigue  Eyes: Denies eye redness, eye discharge, double vision  ENT: Denies hearing loss, tinnitus, sneezing, nasal discharge, sore throat   Respiratory: Denies cough, expectoration, hemoptysis  +shortness of breath  Cardiovascular: Denies chest pain, palpitations, orthopnea, PND, lower extremity swelling  Gastrointestinal: Denies abdominal pain, nausea, vomiting, hematemesis, diarrhea, bloody stools  Genito-Urinary: Denies dysuria, incontinence  Musculoskeletal: Denies back pain, joint pain, muscle pain  Neurologic: Denies confusion, lightheadedness, syncope, headache, focal weakness, sensory changes, seizures  Endocrine: Denies polyuria, polydipsia, temperature intolerance  Allergy and Immunology: Denies hives, insect bite sensitivity  Hematological and Lymphatic: Denies bleeding problems, swollen glands   Psychological: Denies depression, suicidal ideation, anxiety, panic  Dermatological: Denies pruritus, rash, skin lesion changes      Vitals:  Vitals:    12/03/18 1540   BP: 126/68   Pulse: 64   SpO2: 98%       Body mass index is 35 43 kg/m²  Weight (last 2 days)     Date/Time   Weight    12/03/18 1540  115 (254)                Physical Examination:  General: Patient is not in acute distress  Awake, alert, oriented in time, place and person  Responding to commands  Head: Normocephalic  Atraumatic  Eyes: Both pupils normal sized, round and reactive to light  Nonicteric  ENT: Normal external ear canals  Nares normal, no drainage  Lips and oral mucosa normal  Neck: Supple  JVP not raised  Trachea central  No thyromegaly  Lungs: Bilateral bronchovascular breath sounds with no crackles or rhonchi  Chest wall: No tenderness  Cardiovascular: RRR  S1 and S2 normal  Grade 3/6 PSM at apex  No rub or gallop  Gastrointestinal: Abdomen soft, nontender  No guarding or rigidity  Liver and spleen not palpable  Bowel sounds present  Neurologic: Patient is awake, alert, oriented in time, place and person  Responding to command  Moving all extremities  Integumentary:  No skin rash  Lymphatic: No cervical lymphadenopathy  Back: Symmetric   No CVA tenderness  Extremities: No clubbing, cyanosis or edema      Laboratory Results:  CBC with diff:   Lab Results   Component Value Date    WBC 6 23 09/12/2018    WBC 5 7 06/01/2015    RBC 3 98 09/12/2018    RBC 4 70 06/01/2015    HGB 11 8 (L) 09/12/2018    HGB 13 9 06/01/2015    HCT 38 4 09/12/2018    HCT 41 6 06/01/2015    MCV 97 09/12/2018    MCV 88 06/01/2015    MCH 29 6 09/12/2018    MCH 29 5 06/01/2015    RDW 13 6 09/12/2018    RDW 12 6 06/01/2015     09/12/2018     06/01/2015       CMP:  Lab Results   Component Value Date    CREATININE 1 21 09/12/2018    CREATININE 1 02 12/11/2015    BUN 25 09/12/2018    BUN 27 (H) 12/11/2015     12/11/2015    K 4 2 09/12/2018    K 4 1 12/11/2015     09/12/2018     12/11/2015    CO2 25 09/12/2018    CO2 28 12/11/2015    GLUCOSE 93 12/11/2015    PROT 7 3 06/01/2015    ALKPHOS 73 09/12/2018    ALKPHOS 71 06/01/2015     (H) 09/12/2018    ALT 27 06/01/2015    AST 64 (H) 09/12/2018    AST 19 06/01/2015    BILIDIR 0 11 09/14/2017       Lab Results   Component Value Date    HGBA1C 6 2 09/01/2017    MG 2 2 09/04/2017    PHOS 3 4 09/01/2017       Lab Results   Component Value Date    TROPONINI 0 07 (H) 09/01/2017    TROPONINI 0 08 (H) 09/01/2017    TROPONINI 0 08 (H) 09/01/2017    CKTOTAL 85 01/28/2016       Lipid Profile: Lab Results   Component Value Date    CHOL 197 2015     Lab Results   Component Value Date    HDL 39 (L) 2018    HDL 57 2017    HDL 51 2016     Lab Results   Component Value Date    LDLCALC 134 (H) 2018    LDLCALC 141 (H) 2017    LDLCALC 72 2016     Lab Results   Component Value Date    TRIG 214 (H) 2018    TRIG 285 (H) 2018    TRIG 218 (H) 2017       Cardiac testing:   Results for orders placed during the hospital encounter of 10/25/18   Echo complete with contrast if indicated    Narrative [facility] [facility]  [phone]    Transthoracic Echocardiogram  2D, M-mode, and Color Doppler    Study date:  25-Oct-2018    Patient: Raman Connelly  MR number: [id number]  Account number: [de-identified]  : [id number]  Age: 81 years  Gender: Male  Status: Outpatient  Location: [facility]  Height: 71 in  Weight: 247 lb  BP: 100/ 70 mmHg    Indications: CHF    Diagnoses: E71 09 - Chronic systolic (congestive) heart failure    Sonographer:  Mishra RCS  Interpreting Physician:  Gabe Vang MD  Primary Physician:  Shayla Le MD  Referring Physician:  Sudhir Hager MD  Group:  [facility]    SUMMARY    LEFT VENTRICLE:  The ventricle was dilated  Ejection fraction was estimated to be 20 %  There was severe diffuse hypokinesis  RIGHT VENTRICLE:  The ventricle was mildly dilated  Systolic function was mildly reduced  ICD lead noted  LEFT ATRIUM:  The atrium was mildly dilated  RIGHT ATRIUM:  The atrium was mildly dilated  MITRAL VALVE:  There was at least moderate regurgitation with an eccentric jet  TRICUSPID VALVE:  There was trace to mild regurgitation  HISTORY: PRIOR HISTORY: PVCs  Hyperlipidemia  COPD  Former smoker  NYHA class III congestive heart failure  Cardiomyopathy  Risk factors: a family history of coronary artery disease  PRIOR PROCEDURES: ICD implantation  PROCEDURE: The study was performed in the [address] [place] [address]  This was a routine study  The transthoracic approach was used  The study included complete 2D imaging, M-mode, and color Doppler  Images were obtained from the  parasternal, apical, and subcostal acoustic windows  Intravenous contrast ( 1 2 mL Definity in NSS) was administered to opacify the left ventricle  Echocardiographic views were limited due to poor acoustic window availability, decreased  penetration, and lung interference  This was a technically difficult study  LEFT VENTRICLE: The ventricle was dilated  Ejection fraction was estimated to be 20 %  There was severe diffuse hypokinesis  RIGHT VENTRICLE: The ventricle was mildly dilated  Systolic function was mildly reduced  ICD lead noted  Wall thickness was normal     LEFT ATRIUM: The atrium was mildly dilated  RIGHT ATRIUM: The atrium was mildly dilated  MITRAL VALVE: There was annular calcification  DOPPLER: There was at least moderate regurgitation with an eccentric jet  AORTIC VALVE: The valve was not well visualized  DOPPLER: There was no evidence for stenosis  TRICUSPID VALVE: The valve structure was normal  There was normal leaflet separation  DOPPLER: The transtricuspid velocity was within the normal range  There was no evidence for stenosis  There was trace to mild regurgitation  PULMONIC VALVE: Not well visualized  PERICARDIUM: There was no pericardial effusion  The pericardium was normal in appearance  AORTA: The root exhibited normal size  SYSTEM MEASUREMENT TABLES    Unspecified Scan Mode  Aortic Root Diameter; End Systole;: 42 6 mm  Gradient Pressure, Peak; Simplified Bernoulli; Antegrade Flow; Systole;: 5 9 mm[Hg]  Gradient pressure, average; Simplified Bernoulli; Antegrade Flow; Systole;: 3 8 mm[Hg]  Left Atrium to Aortic Root Ratio;: 1 21  Left atrial diameter;  End Diastole;: 51 5 mm  Interventricular Septum Diastolic Thickness; Teichholz; End Diastole;: 5 6 mm  Left Ventricle Internal End Diastolic Dimension; Teichholz;: 60 6 mm  Left Ventricle Internal Systolic Dimension; Teichholz; End Systole;: 51 mm  Left Ventricle Mass; Mass AVCube with Teichholz; End Diastole;: 178 g  Left Ventricle Posterior Wall Diastolic Thickness; Teichholz; End Diastole;: 9 6 mm  Left Ventricular Ejection Fraction; Teichholz;: 32 8 %  Left Ventricular End Diastolic Volume; Teichholz;: 184 1 ml  Left Ventricular End Systolic Volume; Teichholz;: 123 8 ml  Left Ventricular Fractional Shortening;: 15 8 %  Stroke volume;  Teichholz; Systole;: 60 3 ml  Mitral Valve Area; Area by Pressure Half-Time; Systole;: 4 07 cm2  Mitral Valve E to A Ratio; Systole;: 1 01  Pressure half time; Diastole;: 0 05 s  Maximum Tricuspid valve regurgitation pressure gradient; Regurgitant Flow; Systole;: 23 2 mm[Hg]    [facility]    Prepared and electronically signed by    Reddy Navarrete MD  Signed 26-Oct-2018 22:17:20         Medications:    Current Outpatient Prescriptions:     albuterol (2 5 mg/3 mL) 0 083 % nebulizer solution, 4 times daily as needed, Disp: 120 vial, Rfl: 2    albuterol (PROVENTIL HFA) 90 mcg/act inhaler, Inhale 2 puffs every 4 (four) hours as needed for wheezing, Disp: , Rfl:     aspirin 81 MG tablet, Take 81 mg by mouth daily, Disp: , Rfl:     atorvastatin (LIPITOR) 40 mg tablet, Take 1 tablet (40 mg total) by mouth daily, Disp: 90 tablet, Rfl: 3    Bexarotene (TARGRETIN) 75 MG CAPS, Take 8 capsules (600 mg total) by mouth daily, Disp: 720 each, Rfl: 3    Cholecalciferol ([employer]S VITAMIN D3) 1000 units capsule, Take 1 capsule by mouth daily, Disp: , Rfl:     cyanocobalamin (VITAMIN B-12) 1,000 mcg tablet, Take 1,000 mcg by mouth daily, Disp: , Rfl:     ferrous sulfate 324 (65 Fe) mg, Take 1 tablet by mouth Twice daily, Disp: , Rfl:     gabapentin (NEURONTIN) 100 mg capsule, Take 1 capsule (100 mg total) by mouth 3 (three) times a day, Disp: 270 capsule, Rfl: 3    levothyroxine 112 mcg tablet, TAKE 1 TABLET BY MOUTH  DAILY, Disp: 90 tablet, Rfl: 3    metoprolol succinate (TOPROL-XL) 50 mg 24 hr tablet, Take by mouth Daily, Disp: , Rfl:     Omega-3-acid Ethyl Esters (LOVAZA PO), Take 1,000 mg by mouth 2 (two) times a day  , Disp: , Rfl:     PAZEO 0 7 % SOLN, , Disp: , Rfl:     potassium chloride (K-DUR,KLOR-CON) 20 mEq tablet, Take 1 tablet by mouth daily, Disp: , Rfl:     sacubitril-valsartan (ENTRESTO) 24-26 MG TABS, Take 1 tablet twice a day, Disp: 180 tablet, Rfl: 3    SPIRIVA RESPIMAT 2 5 MCG/ACT AERS inhaler, , Disp: , Rfl:     tiotropium (SPIRIVA) 18 mcg inhalation capsule, Place 18 mcg into inhaler and inhale daily, Disp: , Rfl:     torsemide (DEMADEX) 20 mg tablet, Take 1 tablet by mouth daily, Disp: 30 tablet, Rfl: 0    triamcinolone (KENALOG) 0 1 % ointment, Apply topically 2 (two) times 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, Disp: 454 g, Rfl: 3    zolpidem (AMBIEN) 10 mg tablet, 1 tablet at bedtime, Disp: 90 tablet, Rfl: 1      Allergies:  No Known Allergies      Assessment and Plan:  Chronic systolic HF - patient doing better since the start of Entresto though now getting symptoms of low CO state  Continue Torsemide, potassium, BB  Low salt diet  Daily weights  Fluid restriction  All these have been discussed in detail with the patient  NICMP s/p CRT-D - no recent ICD shocks  Patient on home monitoring of his ICD  F/U with Dr Lila Fairchild and Dr Jesús Hairston - input reviewed  Continue beta blocker, Entresto  Patient does not want to be considered for cardiac transplant and also states that he is not ready for an LVAD yet  He also refused sleep study    HLP - continue lipitor  Dr Kat Alex closely monitors his blood work  HTN, HHD - BP stable  Cont current meds  Recommend aggressive risk factor modification and therapeutic lifestyle changes    Low-salt, low-calorie, low-fat, low-cholesterol diet with regular exercise and to optimize weight  I will defer the ordering and monitoring of necessity lab studies to you, but I am available and happy to review and manage any of the data at your request in the future  Discussed concepts of atherosclerosis, including signs and symptoms of cardiac disease  Previous studies were reviewed  Safety measures were reviewed  Questions were entertained and answered  Patient was advised to report any problems requiring medical attention  Follow-up with PCP and appropriate specialist and lab work as discussed  Return for follow up visit as scheduled or earlier, if needed  Thank you for allowing me to participate in the care and evaluation of your patient  Should you have any questions, please feel free to contact me        Mariella Manzanares MD  12/3/2018,4:20 PM

## 2019-01-02 ENCOUNTER — OFFICE VISIT (OUTPATIENT)
Dept: DERMATOLOGY | Facility: CLINIC | Age: 74
End: 2019-01-02
Payer: COMMERCIAL

## 2019-01-02 DIAGNOSIS — L82.1 SEBORRHEIC KERATOSIS: ICD-10-CM

## 2019-01-02 DIAGNOSIS — Z13.89 SCREENING FOR SKIN CONDITION: ICD-10-CM

## 2019-01-02 DIAGNOSIS — C84.08 MYCOSIS FUNGOIDES INVOLVING LYMPH NODES OF MULTIPLE REGIONS (HCC): Primary | ICD-10-CM

## 2019-01-02 DIAGNOSIS — Z85.828 HISTORY OF SKIN CANCER: ICD-10-CM

## 2019-01-02 PROCEDURE — 99214 OFFICE O/P EST MOD 30 MIN: CPT | Performed by: DERMATOLOGY

## 2019-01-02 NOTE — PATIENT INSTRUCTIONS
Mycosis fungoides on stable no real changes noted continue same treatment repeat blood work    Seborrheic keratosis patient reassured these are normal growths we acquire with age no treatment needed  History of skin cancer in no recurrence nothing else atypical sunblock recommended follow-up in 3months  Screening for dermatologic disorders nothing else of concern noted on complete exam follow-up in 3 months

## 2019-01-02 NOTE — PROGRESS NOTES
[facility] DERMATOLOGY  [address] [place] [address]  [phone]  [phone]     MRN: [id number] : 1945  Encounter: [id number]  Patient Information: Marino Bowman  Chief complaint:  Follow-up for mycosis fungoides and non melanoma skin cancer    History of present illness:  72-year-old male with known history of mycosis fungoides skin overall no changes noted skin previously excised skin cancer well-healed no signs of any recurrence nothing else of concern    Patient continues to be on Targretin  Past Medical History:   Diagnosis Date    Agent orange exposure     Anemia     Benign colon polyp     BPH (benign prostatic hyperplasia)     Bradycardia     Cardiomyopathy ([facility] )     Chest discomfort     Chronic bronchitis (HCC)     COPD (chronic obstructive pulmonary disease) ([facility] )     LAST ASSESSED: 17    H/O nonmelanoma skin cancer     LAST ASSESSED: 17    HHD (hypertensive heart disease)     HTN (hypertension)     Hx of lymphoma     Hyperlipidemia     ICD (implantable cardioverter-defibrillator) in place     Insomnia     Mycosis fungoides (HCC)     PVC (premature ventricular contraction)     PVT (paroxysmal ventricular tachycardia) (HCC)     SOB (shortness of breath)     Urinary retention      Past Surgical History:   Procedure Laterality Date    CARDIAC PACEMAKER PLACEMENT      SKIN SURGERY      skin cancer removal      Social History   History   Alcohol Use    0 6 oz/week    1 Glasses of wine per week     Comment: 2-3 drinks per night; NO ALCOHOL USE AS PER ALLSCRIPTS     History   Drug Use No     History   Smoking Status    Former Smoker   Smokeless Tobacco    Never Used     Comment: CURRENT EVERY DAY SMOKER, TOBACCO USE AS PER ALLSCRIPTS     Family History   Problem Relation Age of Onset    Diabetes Mother     Colon cancer Father         DIAGNOSED IN HIS [de-identified]    Heart failure Father     Coronary artery disease Father     Diabetes Family         MELLITUS    Heart attack Neg Hx     Stroke Neg Hx     Anuerysm Neg Hx     Clotting disorder Neg Hx     Arrhythmia Neg Hx     Hypertension Neg Hx         pt unsure     Hyperlipidemia Neg Hx     Sudden death Neg Hx         scd     Meds/Allergies   No Known Allergies    Meds:  Prior to Admission medications    Medication Sig Start Date End Date Taking?  Authorizing Provider   albuterol (2 5 mg/3 mL) 0 083 % nebulizer solution 4 times daily as needed 10/17/18  Yes Crissie Habermann, MD   albuterol (PROVENTIL HFA) 90 mcg/act inhaler Inhale 2 puffs every 4 (four) hours as needed for wheezing   Yes Historical Provider, MD   aspirin 81 MG tablet Take 81 mg by mouth daily   Yes Historical Provider, MD   atorvastatin (LIPITOR) 40 mg tablet Take 1 tablet (40 mg total) by mouth daily 9/19/18  Yes Jamal Heaton MD   Bexarotene (TARGRETIN) 75 MG CAPS Take 8 capsules (600 mg total) by mouth daily 2/6/18  Yes Krzysztof Graham MD   Cholecalciferol ([employer]S VITAMIN D3) 1000 units capsule Take 1 capsule by mouth daily   Yes Historical Provider, MD   cyanocobalamin (VITAMIN B-12) 1,000 mcg tablet Take 1,000 mcg by mouth daily   Yes Historical Provider, MD   ferrous sulfate 324 (65 Fe) mg Take 1 tablet by mouth Twice daily 8/31/15  Yes Historical Provider, MD   gabapentin (NEURONTIN) 100 mg capsule Take 1 capsule (100 mg total) by mouth 3 (three) times a day 5/14/18  Yes Crissie Habermann, MD   levothyroxine 112 mcg tablet TAKE 1 TABLET BY MOUTH  DAILY 9/12/18  Yes Susi Kuhn PA-C   metoprolol succinate (TOPROL-XL) 50 mg 24 hr tablet Take by mouth Daily 11/30/17  Yes Historical Provider, MD   Omega-3-acid Ethyl Esters (LOVAZA PO) Take 1,000 mg by mouth 2 (two) times a day     Yes Historical Provider, MD   PAZEO 0 7 % SOLN  8/8/18  Yes Historical Provider, MD   potassium chloride (K-DUR,KLOR-CON) 20 mEq tablet Take 1 tablet by mouth daily 2/1/16  Yes Historical Provider, MD   sacubitril-valsartan (ENTRESTO) 24-26 MG TABS Take 1 tablet twice a day 6/11/18  Yes Ashlyn Brunson MD   SPIRIVA RESPIMAT 2 5 MCG/ACT AERS inhaler  6/21/18  Yes Historical Provider, MD   tiotropium (SPIRIVA) 18 mcg inhalation capsule Place 18 mcg into inhaler and inhale daily   Yes Historical Provider, MD   torsemide (DEMADEX) 20 mg tablet Take 1 tablet by mouth daily 9/4/17  Yes SHRUTHI Barraza   triamcinolone (KENALOG) 0 1 % ointment Apply topically 2 (two) times 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 2/6/18  Yes Melonie Herndon MD   zolpidem (AMBIEN) 10 mg tablet 1 tablet at bedtime 8/28/18  Yes Ирина Parsons MD   nicotine (NICODERM CQ) 21 mg/24 hr TD 24 hr patch Place 1 patch on the skin every 24 hours  3/6/17  Historical Provider, MD       Subjective:     Review of Systems:    General: negative for - chills, fatigue, fever,  weight gain or weight loss  Psychological: negative for - anxiety, behavioral disorder, concentration difficulties, decreased libido, depression, irritability, memory difficulties, mood swings, sleep disturbances or suicidal ideation  ENT: negative for - hearing difficulties , nasal congestion, nasal discharge, oral lesions, sinus pain, sneezing, sore throat  Allergy and Immunology: negative for - hives, insect bite sensitivity,  Hematological and Lymphatic: negative for - bleeding problems, blood clots,bruising, swollen lymph nodes  Endocrine: negative for - hair pattern changes, hot flashes, malaise/lethargy, mood swings, palpitations, polydipsia/polyuria, skin changes, temperature intolerance or unexpected weight change  Respiratory: negative for - cough, hemoptysis, orthopnea, shortness of breath, or wheezing  Cardiovascular: negative for - chest pain, dyspnea on exertion, edema,  Gastrointestinal: negative for - abdominal pain, nausea/vomiting  Genito-Urinary: negative for - dysuria, incontinence, irregular/heavy menses or urinary frequency/urgency  Musculoskeletal: negative for - gait disturbance, joint pain, joint stiffness, joint swelling, muscle pain, muscular weakness  Dermatological:  As in HPI  Neurological: negative for confusion, dizziness, headaches, impaired coordination/balance, memory loss, numbness/tingling, seizures, speech problems, tremors or weakness       Objective: There were no vitals taken for this visit  Physical Exam:    General Appearance:    Alert, cooperative, no distress   Head:    Normocephalic, without obvious abnormality, atraumatic   Lymphatics:    No lymphadenopathy noted      Abdomen:   No hepatosplenomegaly   Skin:   A full skin exam was performed including scalp, head scalp, eyes, ears, nose, lips, neck, chest, axilla, abdomen, back, buttocks, bilateral upper extremities, bilateral lower extremities, hands, feet, fingers, toes, fingernails, and toenails scaling erythematous chief graphic patches noted on widespread areas of his body probably close to 40% of surface area no signs of any plaques at this time no signs of any progression no lymphadenopathy or hepatosplenomegaly noted previous sites skin cancers well healed without recurrence nothing else remarkable noted exam     Assessment:     1  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 ([facility] [address] )     2  Screening for skin condition     3  Seborrheic keratosis     4  History of skin cancer           Plan:   Mycosis fungoides on stable no real changes noted continue same treatment repeat blood work    Seborrheic keratosis patient reassured these are normal growths we acquire with age no treatment needed  History of skin cancer in no recurrence nothing else atypical sunblock recommended follow-up in 3months  Screening for dermatologic disorders nothing else of concern noted on complete exam follow-up in 3 months      Stefano Reid MD  1/2/2019,2:35 PM    Portions of the record may have been created with voice recognition software   Occasional wrong word or "sound a like" substitutions may have occurred due to the inherent limitations of voice recognition software   Read the chart carefully and recognize, using context, where substitutions have occurred

## 2019-02-06 DIAGNOSIS — I10 ESSENTIAL HYPERTENSION: Primary | ICD-10-CM

## 2019-02-06 RX ORDER — METOPROLOL SUCCINATE 50 MG/1
50 TABLET, EXTENDED RELEASE ORAL DAILY
Qty: 90 TABLET | Refills: 3 | Status: SHIPPED | OUTPATIENT
Start: 2019-02-06 | End: 2019-05-30

## 2019-02-06 NOTE — TELEPHONE ENCOUNTER
----- Message from Jacquelyn Rai sent at 2/6/2019 12:31 PM EST -----  Regarding: Prescription Question  Contact: [phone]  I went to refill my Metoprolol 50mg and I see it is no longer on m med list  Was I supposed to stop taking it? If not would you please refill it using [facility] my mail order [address] can respond to me here or directly at [email or web address]    Thanks,  Ricardo Dominique

## 2019-02-07 ENCOUNTER — REMOTE DEVICE CLINIC VISIT (OUTPATIENT)
Dept: CARDIOLOGY CLINIC | Facility: CLINIC | Age: 74
End: 2019-02-07
Payer: COMMERCIAL

## 2019-02-07 ENCOUNTER — TELEPHONE (OUTPATIENT)
Dept: CARDIOLOGY CLINIC | Facility: CLINIC | Age: 74
End: 2019-02-07

## 2019-02-07 DIAGNOSIS — I42.9 CARDIOMYOPATHY, UNSPECIFIED TYPE (HCC): Primary | ICD-10-CM

## 2019-02-07 DIAGNOSIS — Z95.810 AICD (AUTOMATIC CARDIOVERTER/DEFIBRILLATOR) PRESENT: ICD-10-CM

## 2019-02-07 DIAGNOSIS — I47.2 VENTRICULAR TACHYARRHYTHMIA (HCC): ICD-10-CM

## 2019-02-07 DIAGNOSIS — I50.22 CHRONIC SYSTOLIC CONGESTIVE HEART FAILURE (HCC): ICD-10-CM

## 2019-02-07 PROCEDURE — 93297 REM INTERROG DEV EVAL ICPMS: CPT | Performed by: INTERNAL MEDICINE

## 2019-02-07 PROCEDURE — 93295 DEV INTERROG REMOTE 1/2/MLT: CPT | Performed by: INTERNAL MEDICINE

## 2019-02-07 PROCEDURE — 93296 REM INTERROG EVL PM/IDS: CPT | Performed by: INTERNAL MEDICINE

## 2019-02-07 NOTE — TELEPHONE ENCOUNTER
----- Message from Bhavna Teran RN sent at 2/7/2019  2:21 PM EST -----  Regarding: OPTIVOL  Fyi:  OPTI-VOL FLUID THRESHOLD CROSSED & ONGOING  PT ON TORSEMIDE  WILL RECHECK IN 1 MONTH  Thanks

## 2019-02-07 NOTE — TELEPHONE ENCOUNTER
S/w Sherly, denied any current complaints  No LE edema and no worsening SOB  Admits to not taking diuretics daily due to not wanting to go to the bathroom  Last time he took Torsemide was Monday  I advised taking Torsemide as prescribed  Daily weights- patient has a scale  Monitoring sodium intake and fluid intake  Discussed importance of medication adherence  Advised when to call office  He will call w/ any concerns

## 2019-02-07 NOTE — PROGRESS NOTES
Results for orders placed or performed in visit on 02/07/19   Cardiac EP device report    Narrative    MDT/BIV-ICD  CARELINK TRANSMISSION: BATTERY VOLTAGE NEARING LIZZY (2 MOS)  WILL SCHEDULE MONTHLY BATTERY CHECKS  AP-98%, BVP>99% (TOTAL -97 5%+VSR PACE-2 1%)  ALL AVAILABLE LEAD PARAMETERS WITHIN NORMAL LIMITS  13 DEVICE CLASSIFIED NSVT EPISODES- MOST RECENT & LONGEST FOR 20 BEAT NSVT, AVG CL~360MS; SVT (1:1) ON EPISODE #0028  EF-20% (ECHO 10/25/18)  PT ON ASA 81MG & METOPROLOL  [address] NSVT, SVT & FUSION BEATS  OPTI-VOL FLUID THRESHOLD CROSSED & ONGOING  PT ON TORSEMIDE  WILL RECHECK IN 1 MONTH  TASKED CHF RN  NORMAL DEVICE FUNCTION   GV

## 2019-02-27 ENCOUNTER — TELEPHONE (OUTPATIENT)
Dept: CARDIOLOGY CLINIC | Facility: CLINIC | Age: 74
End: 2019-02-27

## 2019-02-27 NOTE — TELEPHONE ENCOUNTER
Spoke with patient he said someone called him to schedule with Dr Genesis Orr, he will need to be put on call list to schedule with Genesis Orr in may

## 2019-02-28 ENCOUNTER — APPOINTMENT (OUTPATIENT)
Dept: LAB | Facility: CLINIC | Age: 74
End: 2019-02-28
Payer: COMMERCIAL

## 2019-02-28 DIAGNOSIS — C84.08 MYCOSIS FUNGOIDES INVOLVING LYMPH NODES OF MULTIPLE REGIONS (HCC): ICD-10-CM

## 2019-02-28 DIAGNOSIS — Z12.11 COLON CANCER SCREENING: Primary | ICD-10-CM

## 2019-02-28 LAB
ALBUMIN SERPL BCP-MCNC: 3.6 G/DL (ref 3.5–5)
ALP SERPL-CCNC: 76 U/L (ref 46–116)
ALT SERPL W P-5'-P-CCNC: 134 U/L (ref 12–78)
ANION GAP SERPL CALCULATED.3IONS-SCNC: 7 MMOL/L (ref 4–13)
AST SERPL W P-5'-P-CCNC: 93 U/L (ref 5–45)
BACTERIA UR QL AUTO: ABNORMAL /HPF
BASOPHILS # BLD AUTO: 0.07 THOUSANDS/ΜL (ref 0–0.1)
BASOPHILS NFR BLD AUTO: 1 % (ref 0–1)
BILIRUB SERPL-MCNC: 0.37 MG/DL (ref 0.2–1)
BILIRUB UR QL STRIP: NEGATIVE
BUN SERPL-MCNC: 27 MG/DL (ref 5–25)
CALCIUM SERPL-MCNC: 7.3 MG/DL (ref 8.3–10.1)
CHLORIDE SERPL-SCNC: 110 MMOL/L (ref 100–108)
CHOLEST SERPL-MCNC: 235 MG/DL (ref 50–200)
CLARITY UR: CLEAR
CO2 SERPL-SCNC: 25 MMOL/L (ref 21–32)
COLOR UR: YELLOW
CREAT SERPL-MCNC: 1.03 MG/DL (ref 0.6–1.3)
EOSINOPHIL # BLD AUTO: 0.22 THOUSAND/ΜL (ref 0–0.61)
EOSINOPHIL NFR BLD AUTO: 4 % (ref 0–6)
ERYTHROCYTE [DISTWIDTH] IN BLOOD BY AUTOMATED COUNT: 13.9 % (ref 11.6–15.1)
GFR SERPL CREATININE-BSD FRML MDRD: 73 ML/MIN/1.73SQ M
GLUCOSE P FAST SERPL-MCNC: 105 MG/DL (ref 65–99)
GLUCOSE UR STRIP-MCNC: NEGATIVE MG/DL
HCT VFR BLD AUTO: 38.6 % (ref 36.5–49.3)
HGB BLD-MCNC: 11.5 G/DL (ref 12–17)
HGB UR QL STRIP.AUTO: NEGATIVE
HYALINE CASTS #/AREA URNS LPF: ABNORMAL /LPF
IMM GRANULOCYTES # BLD AUTO: 0.01 THOUSAND/UL (ref 0–0.2)
IMM GRANULOCYTES NFR BLD AUTO: 0 % (ref 0–2)
KETONES UR STRIP-MCNC: NEGATIVE MG/DL
LEUKOCYTE ESTERASE UR QL STRIP: NEGATIVE
LYMPHOCYTES # BLD AUTO: 1.38 THOUSANDS/ΜL (ref 0.6–4.47)
LYMPHOCYTES NFR BLD AUTO: 25 % (ref 14–44)
MCH RBC QN AUTO: 30.1 PG (ref 26.8–34.3)
MCHC RBC AUTO-ENTMCNC: 29.8 G/DL (ref 31.4–37.4)
MCV RBC AUTO: 101 FL (ref 82–98)
MONOCYTES # BLD AUTO: 0.47 THOUSAND/ΜL (ref 0.17–1.22)
MONOCYTES NFR BLD AUTO: 8 % (ref 4–12)
NEUTROPHILS # BLD AUTO: 3.47 THOUSANDS/ΜL (ref 1.85–7.62)
NEUTS SEG NFR BLD AUTO: 62 % (ref 43–75)
NITRITE UR QL STRIP: NEGATIVE
NON-SQ EPI CELLS URNS QL MICRO: ABNORMAL /HPF
NRBC BLD AUTO-RTO: 0 /100 WBCS
PH UR STRIP.AUTO: 5.5 [PH] (ref 4.5–8)
PLATELET # BLD AUTO: 255 THOUSANDS/UL (ref 149–390)
PMV BLD AUTO: 11 FL (ref 8.9–12.7)
POTASSIUM SERPL-SCNC: 4.5 MMOL/L (ref 3.5–5.3)
PROT SERPL-MCNC: 7.6 G/DL (ref 6.4–8.2)
PROT UR STRIP-MCNC: ABNORMAL MG/DL
RBC # BLD AUTO: 3.82 MILLION/UL (ref 3.88–5.62)
RBC #/AREA URNS AUTO: ABNORMAL /HPF
SODIUM SERPL-SCNC: 142 MMOL/L (ref 136–145)
SP GR UR STRIP.AUTO: 1.03 (ref 1–1.03)
T4 FREE SERPL-MCNC: 0.65 NG/DL (ref 0.76–1.46)
TRIGL SERPL-MCNC: 391 MG/DL
UROBILINOGEN UR QL STRIP.AUTO: 0.2 E.U./DL
WBC # BLD AUTO: 5.62 THOUSAND/UL (ref 4.31–10.16)
WBC #/AREA URNS AUTO: ABNORMAL /HPF

## 2019-02-28 PROCEDURE — 84478 ASSAY OF TRIGLYCERIDES: CPT

## 2019-02-28 PROCEDURE — 85025 COMPLETE CBC W/AUTO DIFF WBC: CPT

## 2019-02-28 PROCEDURE — 84439 ASSAY OF FREE THYROXINE: CPT

## 2019-02-28 PROCEDURE — 80053 COMPREHEN METABOLIC PANEL: CPT

## 2019-02-28 PROCEDURE — 82465 ASSAY BLD/SERUM CHOLESTEROL: CPT

## 2019-02-28 PROCEDURE — 36415 COLL VENOUS BLD VENIPUNCTURE: CPT

## 2019-02-28 PROCEDURE — 81001 URINALYSIS AUTO W/SCOPE: CPT | Performed by: INTERNAL MEDICINE

## 2019-03-07 NOTE — PROGRESS NOTES
Patient tolerated treatment well and discharged, next dosing in 1 week  When removing a piece of tape from RFA IV tubing beneath IV site a small amount of patient skin came with the tape  He states he doesn't recall this happening previous but states he has lymphoma in that area and I did note multiple lesions on skin BUE,  Dry dressing applied with securing device to both sites  Patient states he had some burning to skin tear when dressing applied but denies at discharge  Assumed pt care at 1205.  pt awake, alert and oriented x3 sent here from [facility] for hyperglycemia.  pt states he was here yesterday for same symptoms, was prescribed insulin yesterday but was unable to pick it up.  Went to [facility] for his follow up visit today and was told he must come to ED for elevated sugar >500 in office.  Pt c/o increased thirst, dizziness, increased urination.  Respirations even and unlabored, denies chest pain.  Abdomen soft, nontender, nondistended.  Skin warm and dry.  Moving all extremities well and with purpose.

## 2019-03-25 ENCOUNTER — REMOTE DEVICE CLINIC VISIT (OUTPATIENT)
Dept: CARDIOLOGY CLINIC | Facility: CLINIC | Age: 74
End: 2019-03-25
Payer: COMMERCIAL

## 2019-03-25 DIAGNOSIS — I42.9 CARDIOMYOPATHY, UNSPECIFIED TYPE (HCC): Primary | ICD-10-CM

## 2019-03-25 DIAGNOSIS — I47.2 VENTRICULAR TACHYCARDIA (HCC): ICD-10-CM

## 2019-03-25 DIAGNOSIS — Z95.810 PRESENCE OF AUTOMATIC CARDIOVERTER/DEFIBRILLATOR (AICD): ICD-10-CM

## 2019-03-25 DIAGNOSIS — J44.9 CHRONIC OBSTRUCTIVE PULMONARY DISEASE, UNSPECIFIED COPD TYPE (HCC): ICD-10-CM

## 2019-03-25 DIAGNOSIS — I50.22 CHRONIC SYSTOLIC CONGESTIVE HEART FAILURE (HCC): ICD-10-CM

## 2019-03-25 PROCEDURE — 93297 REM INTERROG DEV EVAL ICPMS: CPT | Performed by: INTERNAL MEDICINE

## 2019-03-25 PROCEDURE — 93296 REM INTERROG EVL PM/IDS: CPT | Performed by: INTERNAL MEDICINE

## 2019-03-25 RX ORDER — ALBUTEROL SULFATE 2.5 MG/3ML
SOLUTION RESPIRATORY (INHALATION)
Qty: 120 VIAL | Refills: 0 | Status: SHIPPED | OUTPATIENT
Start: 2019-03-25 | End: 2019-04-16 | Stop reason: SDUPTHER

## 2019-03-25 NOTE — PROGRESS NOTES
Results for orders placed or performed in visit on 03/25/19   Cardiac EP device report    Narrative    MDT/BIV-ICD  CARELINK TRANSMISSION - OPTI-VOL ONLY: BATTERY VOLTAGE LIZZY (REACHED ON 03/10/19~ PT TO BE SCHEDULED FOR H&P IN NEAR FUTURE)  AP: 97 7%  : 97 5% + VSRP: 2 2%  ALL AVAILABLE LEAD PARAMETERS WITHIN NORMAL LIMITS  3 VT-NS EPISODES W/ EGRAMS SHOWING NSVT 7 BEATS @ 162 BPM, 7 BEATS @ 169 BPM, 5 BEATS @ 150 BPM   16 V-SENSE EPISODES (6 SEC/D) W/ AVAIL  MARKERS SHOWING NSVT 13 BEATS @ 130 BPM, 14 BEATS @ 154 BPM, 10 BEATS @ 158 BPM, SVT W/ -154 BPM AND FUSION  PT TAKES METOPROLOL SUCC  EF: 20% (ECHO 10/25/18)  OPTI-VOL WITHIN NORMAL LIMITS  APPROPRIATELY FUNCTIONING BI-V ICD FOR LIZZY    [address] [place] [address]

## 2019-03-27 ENCOUNTER — IN-CLINIC DEVICE VISIT (OUTPATIENT)
Dept: CARDIOLOGY CLINIC | Facility: CLINIC | Age: 74
End: 2019-03-27

## 2019-03-27 DIAGNOSIS — Z95.810 PRESENCE OF IMPLANTABLE CARDIOVERTER-DEFIBRILLATOR (ICD): ICD-10-CM

## 2019-03-27 DIAGNOSIS — I50.22 CHRONIC SYSTOLIC CONGESTIVE HEART FAILURE (HCC): Primary | ICD-10-CM

## 2019-03-27 DIAGNOSIS — I47.2 VT (VENTRICULAR TACHYCARDIA) (HCC): ICD-10-CM

## 2019-03-27 PROCEDURE — 99024 POSTOP FOLLOW-UP VISIT: CPT | Performed by: INTERNAL MEDICINE

## 2019-03-27 NOTE — PROGRESS NOTES
MDT/BIV-ICD  NON-BILLABLE DEVICE INTERROGATED IN THE [place] OFFICE TO DEACTIVATE PT ALARM FOR RRT   NORMAL DEVICE FUNCTION  Celsa Powell

## 2019-03-28 ENCOUNTER — TELEPHONE (OUTPATIENT)
Dept: CARDIOLOGY CLINIC | Facility: CLINIC | Age: 74
End: 2019-03-28

## 2019-03-28 NOTE — TELEPHONE ENCOUNTER
S/w pt to schedule ICD gen change with Dr Anton Verde- pt requested to have procedure done on a Tuesday and he will be on vacation until 4/15  Pt is scheduled for 4/23 @ [facility] with Dr Anton Verde  Instructions mailed to pt

## 2019-04-02 ENCOUNTER — OFFICE VISIT (OUTPATIENT)
Dept: DERMATOLOGY | Facility: CLINIC | Age: 74
End: 2019-04-02
Payer: COMMERCIAL

## 2019-04-02 DIAGNOSIS — L82.1 SEBORRHEIC KERATOSIS: Primary | ICD-10-CM

## 2019-04-02 DIAGNOSIS — Z13.89 SCREENING FOR SKIN CONDITION: ICD-10-CM

## 2019-04-02 DIAGNOSIS — Z85.828 HISTORY OF SKIN CANCER: ICD-10-CM

## 2019-04-02 DIAGNOSIS — C84.08 MYCOSIS FUNGOIDES INVOLVING LYMPH NODES OF MULTIPLE REGIONS (HCC): ICD-10-CM

## 2019-04-02 PROBLEM — C84.00 MYCOSIS FUNGOIDES (HCC): Status: ACTIVE | Noted: 2018-02-06

## 2019-04-02 PROCEDURE — 99214 OFFICE O/P EST MOD 30 MIN: CPT | Performed by: DERMATOLOGY

## 2019-04-02 RX ORDER — BEXAROTENE 75 MG/1
600 CAPSULE, LIQUID FILLED ORAL DAILY
Qty: 240 CAPSULE | Refills: 3 | Status: SHIPPED | OUTPATIENT
Start: 2019-04-02 | End: 2019-07-08 | Stop reason: SDUPTHER

## 2019-04-10 DIAGNOSIS — Z45.02 IMPLANTABLE CARDIOVERTER-DEFIBRILLATOR (ICD) AT END OF BATTERY LIFE: ICD-10-CM

## 2019-04-10 DIAGNOSIS — I42.0 DILATED CARDIOMYOPATHY (HCC): Primary | ICD-10-CM

## 2019-04-16 ENCOUNTER — OFFICE VISIT (OUTPATIENT)
Dept: PULMONOLOGY | Facility: CLINIC | Age: 74
End: 2019-04-16
Payer: COMMERCIAL

## 2019-04-16 VITALS
DIASTOLIC BLOOD PRESSURE: 80 MMHG | HEIGHT: 71 IN | HEART RATE: 98 BPM | BODY MASS INDEX: 36.12 KG/M2 | OXYGEN SATURATION: 95 % | WEIGHT: 258 LBS | SYSTOLIC BLOOD PRESSURE: 104 MMHG

## 2019-04-16 DIAGNOSIS — J41.0 SIMPLE CHRONIC BRONCHITIS (HCC): Primary | ICD-10-CM

## 2019-04-16 DIAGNOSIS — J44.9 CHRONIC OBSTRUCTIVE PULMONARY DISEASE, UNSPECIFIED COPD TYPE (HCC): ICD-10-CM

## 2019-04-16 DIAGNOSIS — Z87.891 FORMER SMOKER: ICD-10-CM

## 2019-04-16 PROCEDURE — 99214 OFFICE O/P EST MOD 30 MIN: CPT | Performed by: INTERNAL MEDICINE

## 2019-04-16 RX ORDER — ALBUTEROL SULFATE 2.5 MG/3ML
SOLUTION RESPIRATORY (INHALATION)
Qty: 120 VIAL | Refills: 1 | Status: SHIPPED | OUTPATIENT
Start: 2019-04-16 | End: 2019-04-22 | Stop reason: SDUPTHER

## 2019-04-22 DIAGNOSIS — J44.9 CHRONIC OBSTRUCTIVE PULMONARY DISEASE, UNSPECIFIED COPD TYPE (HCC): ICD-10-CM

## 2019-04-23 RX ORDER — ALBUTEROL SULFATE 2.5 MG/3ML
SOLUTION RESPIRATORY (INHALATION)
Qty: 120 VIAL | Refills: 0 | Status: SHIPPED | OUTPATIENT
Start: 2019-04-23 | End: 2019-08-22 | Stop reason: SDUPTHER

## 2019-04-25 RX ORDER — CEFAZOLIN SODIUM 2 G/50ML
2000 SOLUTION INTRAVENOUS ONCE
Status: CANCELLED | OUTPATIENT
Start: 2019-04-25 | End: 2019-04-25

## 2019-04-26 ENCOUNTER — ANESTHESIA (OUTPATIENT)
Dept: INPATIENT UNIT | Facility: HOSPITAL | Age: 74
End: 2019-04-26
Payer: COMMERCIAL

## 2019-04-26 ENCOUNTER — TELEPHONE (OUTPATIENT)
Dept: CARDIOLOGY CLINIC | Facility: CLINIC | Age: 74
End: 2019-04-26

## 2019-04-26 ENCOUNTER — ANESTHESIA EVENT (OUTPATIENT)
Dept: INPATIENT UNIT | Facility: HOSPITAL | Age: 74
End: 2019-04-26
Payer: COMMERCIAL

## 2019-04-26 ENCOUNTER — HOSPITAL ENCOUNTER (OUTPATIENT)
Dept: NON INVASIVE DIAGNOSTICS | Facility: HOSPITAL | Age: 74
Discharge: HOME | End: 2019-04-26
Attending: INTERNAL MEDICINE | Admitting: INTERNAL MEDICINE
Payer: COMMERCIAL

## 2019-04-26 ENCOUNTER — TELEPHONE (OUTPATIENT)
Dept: NON INVASIVE DIAGNOSTICS | Facility: HOSPITAL | Age: 74
End: 2019-04-26

## 2019-04-26 VITALS
TEMPERATURE: 98.4 F | BODY MASS INDEX: 35.14 KG/M2 | RESPIRATION RATE: 18 BRPM | HEIGHT: 71 IN | OXYGEN SATURATION: 95 % | DIASTOLIC BLOOD PRESSURE: 46 MMHG | HEART RATE: 84 BPM | SYSTOLIC BLOOD PRESSURE: 90 MMHG | WEIGHT: 251 LBS

## 2019-04-26 DIAGNOSIS — I42.0 DILATED CARDIOMYOPATHY (HCC): ICD-10-CM

## 2019-04-26 DIAGNOSIS — Z45.02 IMPLANTABLE CARDIOVERTER-DEFIBRILLATOR (ICD) AT END OF BATTERY LIFE: ICD-10-CM

## 2019-04-26 LAB
ANION GAP SERPL CALCULATED.3IONS-SCNC: 7 MMOL/L (ref 4–13)
ATRIAL RATE: 79 BPM
BASOPHILS # BLD AUTO: 0.07 THOUSANDS/ΜL (ref 0–0.1)
BASOPHILS NFR BLD AUTO: 1 % (ref 0–1)
BUN SERPL-MCNC: 23 MG/DL (ref 5–25)
CALCIUM SERPL-MCNC: 7.6 MG/DL (ref 8.3–10.1)
CHLORIDE SERPL-SCNC: 107 MMOL/L (ref 100–108)
CO2 SERPL-SCNC: 25 MMOL/L (ref 21–32)
CREAT SERPL-MCNC: 1.09 MG/DL (ref 0.6–1.3)
EOSINOPHIL # BLD AUTO: 0.16 THOUSAND/ΜL (ref 0–0.61)
EOSINOPHIL NFR BLD AUTO: 3 % (ref 0–6)
ERYTHROCYTE [DISTWIDTH] IN BLOOD BY AUTOMATED COUNT: 13.9 % (ref 11.6–15.1)
GFR SERPL CREATININE-BSD FRML MDRD: 68 ML/MIN/1.73SQ M
GLUCOSE P FAST SERPL-MCNC: 105 MG/DL (ref 65–99)
GLUCOSE SERPL-MCNC: 105 MG/DL (ref 65–140)
HCT VFR BLD AUTO: 37.8 % (ref 36.5–49.3)
HGB BLD-MCNC: 11.8 G/DL (ref 12–17)
IMM GRANULOCYTES # BLD AUTO: 0.01 THOUSAND/UL (ref 0–0.2)
IMM GRANULOCYTES NFR BLD AUTO: 0 % (ref 0–2)
INR PPP: 1.23 (ref 0.86–1.17)
LYMPHOCYTES # BLD AUTO: 1.67 THOUSANDS/ΜL (ref 0.6–4.47)
LYMPHOCYTES NFR BLD AUTO: 29 % (ref 14–44)
MCH RBC QN AUTO: 31.2 PG (ref 26.8–34.3)
MCHC RBC AUTO-ENTMCNC: 31.2 G/DL (ref 31.4–37.4)
MCV RBC AUTO: 100 FL (ref 82–98)
MONOCYTES # BLD AUTO: 0.39 THOUSAND/ΜL (ref 0.17–1.22)
MONOCYTES NFR BLD AUTO: 7 % (ref 4–12)
NEUTROPHILS # BLD AUTO: 3.41 THOUSANDS/ΜL (ref 1.85–7.62)
NEUTS SEG NFR BLD AUTO: 60 % (ref 43–75)
NRBC BLD AUTO-RTO: 0 /100 WBCS
P AXIS: 77 DEGREES
PLATELET # BLD AUTO: 255 THOUSANDS/UL (ref 149–390)
PMV BLD AUTO: 10.2 FL (ref 8.9–12.7)
POTASSIUM SERPL-SCNC: 4.1 MMOL/L (ref 3.5–5.3)
PROTHROMBIN TIME: 15.6 SECONDS (ref 11.8–14.2)
QRS AXIS: 168 DEGREES
QRSD INTERVAL: 146 MS
QT INTERVAL: 470 MS
QTC INTERVAL: 538 MS
RBC # BLD AUTO: 3.78 MILLION/UL (ref 3.88–5.62)
SODIUM SERPL-SCNC: 139 MMOL/L (ref 136–145)
T WAVE AXIS: 87 DEGREES
VENTRICULAR RATE: 79 BPM
WBC # BLD AUTO: 5.71 THOUSAND/UL (ref 4.31–10.16)

## 2019-04-26 PROCEDURE — 85610 PROTHROMBIN TIME: CPT | Performed by: PHYSICIAN ASSISTANT

## 2019-04-26 PROCEDURE — 85025 COMPLETE CBC W/AUTO DIFF WBC: CPT | Performed by: PHYSICIAN ASSISTANT

## 2019-04-26 PROCEDURE — 33264 RMVL & RPLCMT DFB GEN MLT LD: CPT | Performed by: INTERNAL MEDICINE

## 2019-04-26 PROCEDURE — 93005 ELECTROCARDIOGRAM TRACING: CPT

## 2019-04-26 PROCEDURE — 80048 BASIC METABOLIC PNL TOTAL CA: CPT | Performed by: PHYSICIAN ASSISTANT

## 2019-04-26 PROCEDURE — 93010 ELECTROCARDIOGRAM REPORT: CPT | Performed by: INTERNAL MEDICINE

## 2019-04-26 PROCEDURE — C1882 AICD, OTHER THAN SING/DUAL: HCPCS

## 2019-04-26 PROCEDURE — 99024 POSTOP FOLLOW-UP VISIT: CPT | Performed by: INTERNAL MEDICINE

## 2019-04-26 RX ORDER — ACETAMINOPHEN 325 MG/1
650 TABLET ORAL EVERY 4 HOURS PRN
Status: DISCONTINUED | OUTPATIENT
Start: 2019-04-26 | End: 2019-04-26 | Stop reason: HOSPADM

## 2019-04-26 RX ORDER — CEFAZOLIN SODIUM 2 G/50ML
2000 SOLUTION INTRAVENOUS ONCE
Status: COMPLETED | OUTPATIENT
Start: 2019-04-26 | End: 2019-04-26

## 2019-04-26 RX ORDER — SODIUM CHLORIDE 9 MG/ML
INJECTION, SOLUTION INTRAVENOUS CONTINUOUS PRN
Status: DISCONTINUED | OUTPATIENT
Start: 2019-04-26 | End: 2019-04-26 | Stop reason: SURG

## 2019-04-26 RX ORDER — PROPOFOL 10 MG/ML
INJECTION, EMULSION INTRAVENOUS CONTINUOUS PRN
Status: DISCONTINUED | OUTPATIENT
Start: 2019-04-26 | End: 2019-04-26 | Stop reason: SURG

## 2019-04-26 RX ORDER — GENTAMICIN SULFATE 40 MG/ML
INJECTION, SOLUTION INTRAMUSCULAR; INTRAVENOUS CODE/TRAUMA/SEDATION MEDICATION
Status: COMPLETED | OUTPATIENT
Start: 2019-04-26 | End: 2019-04-26

## 2019-04-26 RX ORDER — LIDOCAINE HYDROCHLORIDE 10 MG/ML
INJECTION, SOLUTION INFILTRATION; PERINEURAL CODE/TRAUMA/SEDATION MEDICATION
Status: COMPLETED | OUTPATIENT
Start: 2019-04-26 | End: 2019-04-26

## 2019-04-26 RX ORDER — KETAMINE HCL IN NACL, ISO-OSM 100MG/10ML
SYRINGE (ML) INJECTION AS NEEDED
Status: DISCONTINUED | OUTPATIENT
Start: 2019-04-26 | End: 2019-04-26 | Stop reason: SURG

## 2019-04-26 RX ORDER — MIDAZOLAM HYDROCHLORIDE 1 MG/ML
INJECTION INTRAMUSCULAR; INTRAVENOUS AS NEEDED
Status: DISCONTINUED | OUTPATIENT
Start: 2019-04-26 | End: 2019-04-26 | Stop reason: SURG

## 2019-04-26 RX ADMIN — Medication 10 MG: at 11:51

## 2019-04-26 RX ADMIN — PROPOFOL 20 MCG/KG/MIN: 10 INJECTION, EMULSION INTRAVENOUS at 11:40

## 2019-04-26 RX ADMIN — SODIUM CHLORIDE: 9 INJECTION, SOLUTION INTRAVENOUS at 11:10

## 2019-04-26 RX ADMIN — CEFAZOLIN SODIUM 2000 MG: 2 SOLUTION INTRAVENOUS at 11:28

## 2019-04-26 RX ADMIN — LIDOCAINE HYDROCHLORIDE 20 ML: 10 INJECTION, SOLUTION INFILTRATION; PERINEURAL at 11:51

## 2019-04-26 RX ADMIN — Medication 20 MG: at 11:41

## 2019-04-26 RX ADMIN — GENTAMICIN SULFATE 80 MG: 40 INJECTION, SOLUTION INTRAMUSCULAR; INTRAVENOUS at 12:10

## 2019-04-26 RX ADMIN — MIDAZOLAM 1 MG: 1 INJECTION INTRAMUSCULAR; INTRAVENOUS at 11:31

## 2019-04-27 DIAGNOSIS — I50.22 CHRONIC SYSTOLIC CONGESTIVE HEART FAILURE (HCC): ICD-10-CM

## 2019-05-03 DIAGNOSIS — G62.9 NEUROPATHY: ICD-10-CM

## 2019-05-03 RX ORDER — GABAPENTIN 100 MG/1
CAPSULE ORAL
Qty: 270 CAPSULE | Refills: 3 | Status: SHIPPED | OUTPATIENT
Start: 2019-05-03 | End: 2020-04-30

## 2019-05-09 ENCOUNTER — IN-CLINIC DEVICE VISIT (OUTPATIENT)
Dept: CARDIOLOGY CLINIC | Facility: CLINIC | Age: 74
End: 2019-05-09

## 2019-05-09 DIAGNOSIS — I50.22 CHRONIC SYSTOLIC CONGESTIVE HEART FAILURE (HCC): ICD-10-CM

## 2019-05-09 DIAGNOSIS — Z95.810 PRESENCE OF IMPLANTABLE CARDIOVERTER-DEFIBRILLATOR (ICD): ICD-10-CM

## 2019-05-09 DIAGNOSIS — I47.2 VT (VENTRICULAR TACHYCARDIA) (HCC): ICD-10-CM

## 2019-05-09 DIAGNOSIS — I42.9 CARDIOMYOPATHY, UNSPECIFIED TYPE (HCC): Primary | ICD-10-CM

## 2019-05-09 PROCEDURE — 99024 POSTOP FOLLOW-UP VISIT: CPT | Performed by: INTERNAL MEDICINE

## 2019-05-21 DIAGNOSIS — I42.0 DILATED CARDIOMYOPATHY (HCC): Primary | ICD-10-CM

## 2019-05-21 DIAGNOSIS — Z95.810 ICD (IMPLANTABLE CARDIOVERTER-DEFIBRILLATOR) IN PLACE: ICD-10-CM

## 2019-05-30 ENCOUNTER — OFFICE VISIT (OUTPATIENT)
Dept: CARDIOLOGY CLINIC | Facility: CLINIC | Age: 74
End: 2019-05-30

## 2019-05-30 VITALS
OXYGEN SATURATION: 97 % | WEIGHT: 252 LBS | HEIGHT: 71 IN | SYSTOLIC BLOOD PRESSURE: 128 MMHG | BODY MASS INDEX: 35.28 KG/M2 | HEART RATE: 89 BPM | DIASTOLIC BLOOD PRESSURE: 70 MMHG

## 2019-05-30 DIAGNOSIS — I50.22 CHRONIC SYSTOLIC CONGESTIVE HEART FAILURE (HCC): ICD-10-CM

## 2019-05-30 DIAGNOSIS — I10 ESSENTIAL HYPERTENSION: ICD-10-CM

## 2019-05-30 PROCEDURE — 99024 POSTOP FOLLOW-UP VISIT: CPT | Performed by: INTERNAL MEDICINE

## 2019-05-30 RX ORDER — METOPROLOL SUCCINATE 50 MG/1
50 TABLET, EXTENDED RELEASE ORAL EVERY 12 HOURS
Qty: 180 TABLET | Refills: 3 | Status: SHIPPED | OUTPATIENT
Start: 2019-05-30 | End: 2020-03-31 | Stop reason: SDUPTHER

## 2019-05-31 ENCOUNTER — HOSPITAL ENCOUNTER (OUTPATIENT)
Dept: CT IMAGING | Facility: HOSPITAL | Age: 74
Discharge: HOME | End: 2019-05-31
Attending: INTERNAL MEDICINE
Payer: COMMERCIAL

## 2019-05-31 DIAGNOSIS — Z87.891 FORMER SMOKER: ICD-10-CM

## 2019-06-12 ENCOUNTER — TELEPHONE (OUTPATIENT)
Dept: CARDIOLOGY CLINIC | Facility: CLINIC | Age: 74
End: 2019-06-12

## 2019-06-26 ENCOUNTER — APPOINTMENT (OUTPATIENT)
Dept: LAB | Facility: CLINIC | Age: 74
End: 2019-06-26
Payer: COMMERCIAL

## 2019-06-26 LAB
ANION GAP SERPL CALCULATED.3IONS-SCNC: 7 MMOL/L (ref 4–13)
BUN SERPL-MCNC: 23 MG/DL (ref 5–25)
CALCIUM SERPL-MCNC: 8.4 MG/DL (ref 8.3–10.1)
CHLORIDE SERPL-SCNC: 106 MMOL/L (ref 100–108)
CO2 SERPL-SCNC: 25 MMOL/L (ref 21–32)
CREAT SERPL-MCNC: 1.16 MG/DL (ref 0.6–1.3)
GFR SERPL CREATININE-BSD FRML MDRD: 63 ML/MIN/1.73SQ M
GLUCOSE P FAST SERPL-MCNC: 117 MG/DL (ref 65–99)
POTASSIUM SERPL-SCNC: 4.1 MMOL/L (ref 3.5–5.3)
SODIUM SERPL-SCNC: 138 MMOL/L (ref 136–145)

## 2019-06-26 PROCEDURE — 80048 BASIC METABOLIC PNL TOTAL CA: CPT | Performed by: INTERNAL MEDICINE

## 2019-06-26 PROCEDURE — 36415 COLL VENOUS BLD VENIPUNCTURE: CPT | Performed by: INTERNAL MEDICINE

## 2019-06-27 ENCOUNTER — TELEPHONE (OUTPATIENT)
Dept: CARDIOLOGY CLINIC | Facility: CLINIC | Age: 74
End: 2019-06-27

## 2019-07-08 ENCOUNTER — OFFICE VISIT (OUTPATIENT)
Dept: DERMATOLOGY | Facility: CLINIC | Age: 74
End: 2019-07-08
Payer: COMMERCIAL

## 2019-07-08 ENCOUNTER — APPOINTMENT (OUTPATIENT)
Dept: LAB | Facility: CLINIC | Age: 74
End: 2019-07-08
Payer: COMMERCIAL

## 2019-07-08 DIAGNOSIS — C84.08 MYCOSIS FUNGOIDES INVOLVING LYMPH NODES OF MULTIPLE REGIONS (HCC): ICD-10-CM

## 2019-07-08 DIAGNOSIS — Z12.83 SCREENING FOR SKIN CANCER: ICD-10-CM

## 2019-07-08 DIAGNOSIS — C84.08 MYCOSIS FUNGOIDES INVOLVING LYMPH NODES OF MULTIPLE REGIONS (HCC): Primary | ICD-10-CM

## 2019-07-08 DIAGNOSIS — L82.1 SEBORRHEIC KERATOSIS: ICD-10-CM

## 2019-07-08 LAB
ALBUMIN SERPL BCP-MCNC: 3.6 G/DL (ref 3.5–5)
ALP SERPL-CCNC: 75 U/L (ref 46–116)
ALT SERPL W P-5'-P-CCNC: 78 U/L (ref 12–78)
ANION GAP SERPL CALCULATED.3IONS-SCNC: 6 MMOL/L (ref 4–13)
AST SERPL W P-5'-P-CCNC: 47 U/L (ref 5–45)
BASOPHILS # BLD AUTO: 0.08 THOUSANDS/ΜL (ref 0–0.1)
BASOPHILS NFR BLD AUTO: 1 % (ref 0–1)
BILIRUB SERPL-MCNC: 0.23 MG/DL (ref 0.2–1)
BUN SERPL-MCNC: 23 MG/DL (ref 5–25)
CALCIUM SERPL-MCNC: 8.6 MG/DL (ref 8.3–10.1)
CHLORIDE SERPL-SCNC: 106 MMOL/L (ref 100–108)
CO2 SERPL-SCNC: 27 MMOL/L (ref 21–32)
CREAT SERPL-MCNC: 1.15 MG/DL (ref 0.6–1.3)
EOSINOPHIL # BLD AUTO: 0.09 THOUSAND/ΜL (ref 0–0.61)
EOSINOPHIL NFR BLD AUTO: 1 % (ref 0–6)
ERYTHROCYTE [DISTWIDTH] IN BLOOD BY AUTOMATED COUNT: 13.3 % (ref 11.6–15.1)
GFR SERPL CREATININE-BSD FRML MDRD: 64 ML/MIN/1.73SQ M
GLUCOSE SERPL-MCNC: 104 MG/DL (ref 65–140)
HCT VFR BLD AUTO: 43.8 % (ref 36.5–49.3)
HGB BLD-MCNC: 13.5 G/DL (ref 12–17)
IMM GRANULOCYTES # BLD AUTO: 0.03 THOUSAND/UL (ref 0–0.2)
IMM GRANULOCYTES NFR BLD AUTO: 0 % (ref 0–2)
LYMPHOCYTES # BLD AUTO: 1.95 THOUSANDS/ΜL (ref 0.6–4.47)
LYMPHOCYTES NFR BLD AUTO: 29 % (ref 14–44)
MCH RBC QN AUTO: 30.8 PG (ref 26.8–34.3)
MCHC RBC AUTO-ENTMCNC: 30.8 G/DL (ref 31.4–37.4)
MCV RBC AUTO: 100 FL (ref 82–98)
MONOCYTES # BLD AUTO: 0.42 THOUSAND/ΜL (ref 0.17–1.22)
MONOCYTES NFR BLD AUTO: 6 % (ref 4–12)
NEUTROPHILS # BLD AUTO: 4.27 THOUSANDS/ΜL (ref 1.85–7.62)
NEUTS SEG NFR BLD AUTO: 63 % (ref 43–75)
NRBC BLD AUTO-RTO: 0 /100 WBCS
PLATELET # BLD AUTO: 272 THOUSANDS/UL (ref 149–390)
PMV BLD AUTO: 10.9 FL (ref 8.9–12.7)
POTASSIUM SERPL-SCNC: 4.8 MMOL/L (ref 3.5–5.3)
PROT SERPL-MCNC: 7.6 G/DL (ref 6.4–8.2)
RBC # BLD AUTO: 4.39 MILLION/UL (ref 3.88–5.62)
SODIUM SERPL-SCNC: 139 MMOL/L (ref 136–145)
TSH SERPL DL<=0.05 MIU/L-ACNC: 0.57 UIU/ML (ref 0.36–3.74)
WBC # BLD AUTO: 6.84 THOUSAND/UL (ref 4.31–10.16)

## 2019-07-08 PROCEDURE — 85025 COMPLETE CBC W/AUTO DIFF WBC: CPT

## 2019-07-08 PROCEDURE — 88305 TISSUE EXAM BY PATHOLOGIST: CPT | Performed by: PATHOLOGY

## 2019-07-08 PROCEDURE — 88341 IMHCHEM/IMCYTCHM EA ADD ANTB: CPT | Performed by: PATHOLOGY

## 2019-07-08 PROCEDURE — 88342 IMHCHEM/IMCYTCHM 1ST ANTB: CPT | Performed by: PATHOLOGY

## 2019-07-08 PROCEDURE — 99214 OFFICE O/P EST MOD 30 MIN: CPT | Performed by: DERMATOLOGY

## 2019-07-08 PROCEDURE — 11104 PUNCH BX SKIN SINGLE LESION: CPT | Performed by: DERMATOLOGY

## 2019-07-08 PROCEDURE — 84443 ASSAY THYROID STIM HORMONE: CPT

## 2019-07-08 PROCEDURE — 36415 COLL VENOUS BLD VENIPUNCTURE: CPT

## 2019-07-08 PROCEDURE — 80053 COMPREHEN METABOLIC PANEL: CPT

## 2019-07-08 RX ORDER — BEXAROTENE 75 MG/1
600 CAPSULE, LIQUID FILLED ORAL DAILY
Qty: 240 CAPSULE | Refills: 3 | Status: SHIPPED | OUTPATIENT
Start: 2019-07-08 | End: 2019-07-10 | Stop reason: SDUPTHER

## 2019-07-08 NOTE — PROGRESS NOTES
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 also history of non melanoma skin cancer presents for overall checkup patient's condition has overall been improving however he now notes some itching nodules that seem to be developing scattered on the skin account of all the sudden appear and are quite itchy  Patient notes them for last 3 months    No other concerns noted  Past Medical History:   Diagnosis Date    Agent orange exposure     Anemia     Benign colon polyp     BPH (benign prostatic hyperplasia)     Bradycardia     Cardiomyopathy ([facility] )     Chest discomfort     Chronic bronchitis (HCC)     [facility]     LAST ASSESSED: 17    H/O nonmelanoma skin cancer     LAST ASSESSED: 17    HHD (hypertensive heart disease)     HTN (hypertension)     Hx of lymphoma     Hyperlipidemia     ICD (implantable cardioverter-defibrillator) in place     Insomnia     Mycosis fungoides (HCC)     PVC (premature ventricular contraction)     PVT (paroxysmal ventricular tachycardia) (HCC)     SOB (shortness of breath)     Urinary retention      Past Surgical History:   Procedure Laterality Date    CARDIAC PACEMAKER PLACEMENT      SKIN SURGERY      skin cancer removal      Social History   Social History     Substance and Sexual Activity   Alcohol Use Yes    Alcohol/week: 1 0 standard drinks    Types: 1 Glasses of wine per week    Comment: 2-3 drinks per night; NO ALCOHOL USE AS PER ALLSCRIPTS     Social History     Substance and Sexual Activity   Drug Use No     Social History     Tobacco Use   Smoking Status Former Smoker    Last attempt to quit: 2017  Years since quittin 2   Smokeless Tobacco Never Used   Tobacco Comment    CURRENT EVERY DAY SMOKER, TOBACCO USE AS PER ALLSCRIPTS     Family History   Problem Relation Age of Onset    Diabetes Mother     Colon cancer Father         DIAGNOSED IN HIS [de-identified]    Heart failure Father     Coronary artery disease Father     Diabetes Family         MELLITUS    Heart attack Neg Hx     Stroke Neg Hx     Anuerysm Neg Hx     Clotting disorder Neg Hx     Arrhythmia Neg Hx     Hypertension Neg Hx         pt unsure     Hyperlipidemia Neg Hx     Sudden death Neg Hx         scd     Meds/Allergies   No Known Allergies    Meds:  Prior to Admission medications    Medication Sig Start Date End Date Taking? Authorizing Provider   albuterol (2 5 mg/3 mL) 0 083 % nebulizer solution 4 times daily as needed 19   Trev Malloy PA-C   albuterol (PROVENTIL HFA) 90 mcg/act inhaler Inhale 2 puffs every 4 (four) hours as needed for wheezing    Historical Provider, MD   aspirin 81 MG tablet Take 81 mg by mouth daily    Historical Provider, MD   atorvastatin (LIPITOR) 40 mg tablet Take 1 tablet (40 mg total) by mouth daily 18   Ermelinda Lester MD   bexarotene (TARGRETIN) 75 mg capsule Take 8 capsules (600 mg total) by mouth daily 19   Allen Curran MD   Cholecalciferol (CVS VITAMIN D3) 1000 units capsule Take 1 capsule by mouth daily    Historical Provider, MD   cyanocobalamin (VITAMIN B-12) 1,000 mcg tablet Take 1,000 mcg by mouth daily    Historical Provider, MD   ferrous sulfate 324 (65 Fe) mg Take 1 tablet by mouth Twice daily 8/31/15   Historical Provider, MD   fluticasone-umeclidinium-vilanterol (TRELEGY ELLIPTA) 100-62 5-25 MCG/INH inhaler Inhale 1 puff daily Rinse mouth after use   19   Zainab Roman MD   gabapentin (NEURONTIN) 100 mg capsule TAKE 1 CAPSULE BY MOUTH THREE TIMES A DAY 5/3/19   Ja Levine PA-C   levothyroxine 112 mcg tablet TAKE 1 TABLET BY MOUTH  DAILY 18   Ja Levine PA-C metoprolol succinate (TOPROL-XL) 50 mg 24 hr tablet Take 1 tablet (50 mg total) by mouth every 12 (twelve) hours 5/30/19   Suri San MD   Omega-3-acid Ethyl Esters (LOVAZA PO) Take 1,000 mg by mouth 2 (two) times a day      Historical Provider, MD   PAZEO 0 7 % SOLN  8/8/18   Historical Provider, MD   potassium chloride (K-DUR,KLOR-CON) 20 mEq tablet Take 1 tablet by mouth daily 2/1/16   Historical Provider, MD   sacubitril-valsartan (ENTRESTO) 49-51 MG TABS Take 1 tablet by mouth 2 (two) times a day 5/30/19   Suri San MD   torsemide (DEMADEX) 20 mg tablet Take 1 tablet by mouth daily 9/4/17   SHRUTHI Schneider   triamcinolone (KENALOG) 0 1 % ointment Apply topically 2 (two) times 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 2/6/18   Magui Andrea MD   zolpidem Fredia Vesta) 10 mg tablet 1 tablet at bedtime 8/28/18   Alexandria Rdz MD   Bexarotene (TARGRETIN) 75 MG CAPS Take 8 capsules (600 mg total) by mouth daily 4/2/19 7/8/19  Magui Andrea MD   nicotine (NICODERM CQ) 21 mg/24 hr TD 24 hr patch Place 1 patch on the skin every 24 hours  3/6/17  Historical Provider, MD       Subjective:     Review of Systems:    General: negative for - chills, fatigue, fever,  weight gain or weight loss  Psychological: negative for - anxiety, behavioral disorder, concentration difficulties, decreased libido, depression, irritability, memory difficulties, mood swings, sleep disturbances or suicidal ideation  ENT: negative for - hearing difficulties , nasal congestion, nasal discharge, oral lesions, sinus pain, sneezing, sore throat  Allergy and Immunology: negative for - hives, insect bite sensitivity,  Hematological and Lymphatic: negative for - bleeding problems, blood clots,bruising, swollen lymph nodes  Endocrine: negative for - hair pattern changes, hot flashes, malaise/lethargy, mood swings, palpitations, polydipsia/polyuria, skin changes, temperature intolerance or unexpected weight change  Respiratory: negative for - cough, hemoptysis, orthopnea, shortness of breath, or wheezing  Cardiovascular: negative for - chest pain, dyspnea on exertion, edema,  Gastrointestinal: negative for - abdominal pain, nausea/vomiting  Genito-Urinary: negative for - dysuria, incontinence, irregular/heavy menses or urinary frequency/urgency  Musculoskeletal: negative for - gait disturbance, joint pain, joint stiffness, joint swelling, muscle pain, muscular weakness  Dermatological:  As in HPI  Neurological: negative for confusion, dizziness, headaches, impaired coordination/balance, memory loss, numbness/tingling, seizures, speech problems, tremors or weakness       Objective: There were no vitals taken for this visit  Physical Exam:    General Appearance:    Alert, cooperative, no distress   Head:    Normocephalic, without obvious abnormality, atraumatic   Lymphatics:    No lymphadenopathy noted      Abdomen:   No hepatosplenomegaly   Skin:   A full skin exam was performed including scalp, head scalp, eyes, ears, nose, lips, neck, chest, axilla, abdomen, back, buttocks, bilateral upper extremities, bilateral lower extremities, hands, feet, fingers, toes, fingernails, and toenails areas of erythema scaling appears diminished and less plaque like appearance to most the areas however there are occasional papule areas some with some breakdown of the overlying skin noted on the abdomen and also on the arm and scattered smaller papules noted elsewhere  Punch Biopsy Procedure Note    Pre-operative Diagnosis:  T-cell lymphoma  Plan:  1  Instructed to keep the wound dry and covered for 24-48h and clean thereafter  2  Warning signs of infection were reviewed  3  Recommended that the patient use acetaminophen as needed for pain     Location(s):  Left abdomen    Indications:  Delineate any possible change regarding his disease    Anesthesia: Lidocaine 1% with epinephrine without added sodium bicarbonate    Procedure Details     Patient informed of the risks (including bleeding,scaring and infection) and benefits of the procedure explained  Verbal informed consent obtained  The lesion and surrounding area was given a sterile prep using alcohol  The skin was then stretched perpendicular to the skin tension lines and the specimen obtained  using the 4mm punch with a forceps and iris scissor  Gel foam was used for hemostasis along with a pressure bandage  The specimen was sent for pathologic examination  The patient tolerated the procedure well  Wound care instructions given to patient  Condition:  Stable    Complications:  none  Assessment:     1  Screening for skin cancer     2  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)  bexarotene (TARGRETIN) 75 mg capsule   3  Seborrheic keratosis           Plan:   Skin lesion question related to his mycosis fungoides or possible transformation to a CD 30 disease at present will continue same treatment continue to monitor with blood work  Seborrheic Keratosis  Patient reasurred these are normal growths we acquire with age no treatment needed  Screening for Dermatologic Disorders: Nothing else of concern noted on complete exam follow up in 3 months    Edita Da Silva MD  7/8/2019,3:00 PM    Portions of the record may have been created with voice recognition software   Occasional wrong word or "sound a like" substitutions may have occurred due to the inherent limitations of voice recognition software   Read the chart carefully and recognize, using context, where substitutions have occurred

## 2019-07-08 NOTE — PATIENT INSTRUCTIONS
Skin lesion question related to his mycosis fungoides or possible transformation to a CD 30 disease at present will continue same treatment continue to monitor with blood work  Seborrheic Keratosis  Patient reasurred these are normal growths we acquire with age no treatment needed    Screening for Dermatologic Disorders: Nothing else of concern noted on complete exam follow up in 3 months

## 2019-07-10 DIAGNOSIS — C84.08 MYCOSIS FUNGOIDES INVOLVING LYMPH NODES OF MULTIPLE REGIONS (HCC): ICD-10-CM

## 2019-07-10 RX ORDER — BEXAROTENE 75 MG/1
600 CAPSULE, LIQUID FILLED ORAL DAILY
Qty: 240 CAPSULE | Refills: 3 | Status: SHIPPED | OUTPATIENT
Start: 2019-07-10 | End: 2019-12-06 | Stop reason: SDUPTHER

## 2019-08-01 ENCOUNTER — TELEPHONE (OUTPATIENT)
Dept: CARDIOLOGY CLINIC | Facility: CLINIC | Age: 74
End: 2019-08-01

## 2019-08-01 NOTE — TELEPHONE ENCOUNTER
SPOKE TO  & [facility] W/ SE[facility] NEXT AVAIL & THAT IS 9/25/19

## 2019-08-01 NOTE — TELEPHONE ENCOUNTER
----- Message from Tati Jones MA sent at 8/1/2019  8:13 AM EDT -----  Quick Question was this patient on recall list for august henri with SE6? Dr Luis Daniel saw him in May and wanted a three month f/u with SE6 and then wants him to have an echo after he follows up with SE6  I would like to try and get his echo scheduled so I can do his auth but I did not see any henri with SE6  Can someone check on this for me?   Thanks   Odalis Thurston

## 2019-08-12 DIAGNOSIS — E03.2 HYPOTHYROIDISM DUE TO MEDICATION: ICD-10-CM

## 2019-08-13 RX ORDER — LEVOTHYROXINE SODIUM 112 UG/1
TABLET ORAL
Qty: 90 TABLET | Refills: 3 | Status: SHIPPED | OUTPATIENT
Start: 2019-08-13 | End: 2020-06-19

## 2019-08-22 DIAGNOSIS — J44.9 CHRONIC OBSTRUCTIVE PULMONARY DISEASE, UNSPECIFIED COPD TYPE (HCC): ICD-10-CM

## 2019-08-22 RX ORDER — ALBUTEROL SULFATE 2.5 MG/3ML
SOLUTION RESPIRATORY (INHALATION)
Qty: 120 VIAL | Refills: 3 | Status: SHIPPED | OUTPATIENT
Start: 2019-08-22 | End: 2020-06-22 | Stop reason: SDUPTHER

## 2019-08-23 DIAGNOSIS — J41.0 SIMPLE CHRONIC BRONCHITIS (HCC): ICD-10-CM

## 2019-08-23 RX ORDER — FLUTICASONE FUROATE, UMECLIDINIUM BROMIDE AND VILANTEROL TRIFENATATE 100; 62.5; 25 UG/1; UG/1; UG/1
POWDER RESPIRATORY (INHALATION)
Qty: 180 EACH | Refills: 3 | Status: SHIPPED | OUTPATIENT
Start: 2019-08-23 | End: 2019-09-18 | Stop reason: SDUPTHER

## 2019-08-28 ENCOUNTER — REMOTE DEVICE CLINIC VISIT (OUTPATIENT)
Dept: CARDIOLOGY CLINIC | Facility: CLINIC | Age: 74
End: 2019-08-28
Payer: COMMERCIAL

## 2019-08-28 DIAGNOSIS — Z95.810 PRESENCE OF IMPLANTABLE CARDIOVERTER-DEFIBRILLATOR (ICD): Primary | ICD-10-CM

## 2019-08-28 PROCEDURE — 93295 DEV INTERROG REMOTE 1/2/MLT: CPT | Performed by: INTERNAL MEDICINE

## 2019-08-28 PROCEDURE — 93296 REM INTERROG EVL PM/IDS: CPT | Performed by: INTERNAL MEDICINE

## 2019-08-29 NOTE — PROGRESS NOTES
MDT/BIV-ICD  CARELINK TRANSMISSION:  BATTERY VOLTAGE ADEQUATE (4 3 YR)   AP 98 6% BP 98 6% (VSRP 1 0%)   ALL LEAD PARAMETERS WITHIN NORMAL LIMITS   NO SIGNIFICANT HIGH RATE EPISODES   1 VT-NS EPISODE WITH EGM SHOWING NSVT (8 @ 188 BPM)    1 V SENSE EPISODE WITH MARKERS SHOWING NSVT (5 @ 140 BPM)   OPTI-VOL WITHIN NORMAL LIMITS   NORMAL DEVICE FUNCTION   RG

## 2019-08-30 ENCOUNTER — OFFICE VISIT (OUTPATIENT)
Dept: INTERNAL MEDICINE CLINIC | Facility: CLINIC | Age: 74
End: 2019-08-30
Payer: COMMERCIAL

## 2019-08-30 ENCOUNTER — APPOINTMENT (OUTPATIENT)
Dept: LAB | Facility: CLINIC | Age: 74
End: 2019-08-30
Payer: COMMERCIAL

## 2019-08-30 VITALS
OXYGEN SATURATION: 93 % | WEIGHT: 244 LBS | DIASTOLIC BLOOD PRESSURE: 60 MMHG | HEIGHT: 71 IN | SYSTOLIC BLOOD PRESSURE: 100 MMHG | HEART RATE: 77 BPM | BODY MASS INDEX: 34.16 KG/M2

## 2019-08-30 DIAGNOSIS — J41.0 SIMPLE CHRONIC BRONCHITIS (HCC): ICD-10-CM

## 2019-08-30 DIAGNOSIS — I10 ESSENTIAL HYPERTENSION: ICD-10-CM

## 2019-08-30 DIAGNOSIS — E03.9 ACQUIRED HYPOTHYROIDISM: ICD-10-CM

## 2019-08-30 DIAGNOSIS — G47.00 INSOMNIA, UNSPECIFIED TYPE: ICD-10-CM

## 2019-08-30 DIAGNOSIS — I50.22 HYPERTENSIVE HEART DISEASE WITH CHRONIC SYSTOLIC CONGESTIVE HEART FAILURE (HCC): ICD-10-CM

## 2019-08-30 DIAGNOSIS — I11.0 HYPERTENSIVE HEART DISEASE WITH CHRONIC SYSTOLIC CONGESTIVE HEART FAILURE (HCC): Primary | ICD-10-CM

## 2019-08-30 DIAGNOSIS — I11.0 HYPERTENSIVE HEART DISEASE WITH CHRONIC SYSTOLIC CONGESTIVE HEART FAILURE (HCC): ICD-10-CM

## 2019-08-30 DIAGNOSIS — I50.22 HYPERTENSIVE HEART DISEASE WITH CHRONIC SYSTOLIC CONGESTIVE HEART FAILURE (HCC): Primary | ICD-10-CM

## 2019-08-30 LAB
ALBUMIN SERPL BCP-MCNC: 3.7 G/DL (ref 3.5–5)
ALP SERPL-CCNC: 71 U/L (ref 46–116)
ALT SERPL W P-5'-P-CCNC: 74 U/L (ref 12–78)
ANION GAP SERPL CALCULATED.3IONS-SCNC: 8 MMOL/L (ref 4–13)
AST SERPL W P-5'-P-CCNC: 43 U/L (ref 5–45)
BASOPHILS # BLD AUTO: 0.07 THOUSANDS/ΜL (ref 0–0.1)
BASOPHILS NFR BLD AUTO: 1 % (ref 0–1)
BILIRUB SERPL-MCNC: 0.19 MG/DL (ref 0.2–1)
BUN SERPL-MCNC: 32 MG/DL (ref 5–25)
CALCIUM SERPL-MCNC: 7.9 MG/DL (ref 8.3–10.1)
CHLORIDE SERPL-SCNC: 110 MMOL/L (ref 100–108)
CHOLEST SERPL-MCNC: 206 MG/DL (ref 50–200)
CO2 SERPL-SCNC: 26 MMOL/L (ref 21–32)
CREAT SERPL-MCNC: 1.23 MG/DL (ref 0.6–1.3)
EOSINOPHIL # BLD AUTO: 0.11 THOUSAND/ΜL (ref 0–0.61)
EOSINOPHIL NFR BLD AUTO: 2 % (ref 0–6)
ERYTHROCYTE [DISTWIDTH] IN BLOOD BY AUTOMATED COUNT: 13.7 % (ref 11.6–15.1)
GFR SERPL CREATININE-BSD FRML MDRD: 59 ML/MIN/1.73SQ M
GLUCOSE SERPL-MCNC: 102 MG/DL (ref 65–140)
HCT VFR BLD AUTO: 43.2 % (ref 36.5–49.3)
HDLC SERPL-MCNC: 39 MG/DL (ref 40–60)
HGB BLD-MCNC: 13.4 G/DL (ref 12–17)
IMM GRANULOCYTES # BLD AUTO: 0.02 THOUSAND/UL (ref 0–0.2)
IMM GRANULOCYTES NFR BLD AUTO: 0 % (ref 0–2)
LDLC SERPL CALC-MCNC: 99 MG/DL (ref 0–100)
LYMPHOCYTES # BLD AUTO: 1.75 THOUSANDS/ΜL (ref 0.6–4.47)
LYMPHOCYTES NFR BLD AUTO: 25 % (ref 14–44)
MCH RBC QN AUTO: 31 PG (ref 26.8–34.3)
MCHC RBC AUTO-ENTMCNC: 31 G/DL (ref 31.4–37.4)
MCV RBC AUTO: 100 FL (ref 82–98)
MONOCYTES # BLD AUTO: 0.37 THOUSAND/ΜL (ref 0.17–1.22)
MONOCYTES NFR BLD AUTO: 5 % (ref 4–12)
NEUTROPHILS # BLD AUTO: 4.81 THOUSANDS/ΜL (ref 1.85–7.62)
NEUTS SEG NFR BLD AUTO: 67 % (ref 43–75)
NRBC BLD AUTO-RTO: 0 /100 WBCS
PLATELET # BLD AUTO: 307 THOUSANDS/UL (ref 149–390)
PMV BLD AUTO: 10.4 FL (ref 8.9–12.7)
POTASSIUM SERPL-SCNC: 4.4 MMOL/L (ref 3.5–5.3)
PROT SERPL-MCNC: 7.6 G/DL (ref 6.4–8.2)
RBC # BLD AUTO: 4.32 MILLION/UL (ref 3.88–5.62)
SODIUM SERPL-SCNC: 144 MMOL/L (ref 136–145)
T4 FREE SERPL-MCNC: 0.82 NG/DL (ref 0.76–1.46)
TRIGL SERPL-MCNC: 338 MG/DL
TSH SERPL DL<=0.05 MIU/L-ACNC: 0.29 UIU/ML (ref 0.36–3.74)
WBC # BLD AUTO: 7.13 THOUSAND/UL (ref 4.31–10.16)

## 2019-08-30 PROCEDURE — 84443 ASSAY THYROID STIM HORMONE: CPT

## 2019-08-30 PROCEDURE — 84439 ASSAY OF FREE THYROXINE: CPT

## 2019-08-30 PROCEDURE — 80061 LIPID PANEL: CPT

## 2019-08-30 PROCEDURE — 80053 COMPREHEN METABOLIC PANEL: CPT

## 2019-08-30 PROCEDURE — 1101F PT FALLS ASSESS-DOCD LE1/YR: CPT | Performed by: INTERNAL MEDICINE

## 2019-08-30 PROCEDURE — 85025 COMPLETE CBC W/AUTO DIFF WBC: CPT

## 2019-08-30 PROCEDURE — 36415 COLL VENOUS BLD VENIPUNCTURE: CPT

## 2019-08-30 PROCEDURE — 99213 OFFICE O/P EST LOW 20 MIN: CPT | Performed by: INTERNAL MEDICINE

## 2019-08-30 RX ORDER — ZOLPIDEM TARTRATE 10 MG/1
TABLET ORAL
Qty: 90 TABLET | Refills: 1 | Status: SHIPPED | OUTPATIENT
Start: 2019-08-30 | End: 2020-05-26 | Stop reason: SDUPTHER

## 2019-08-30 NOTE — PROGRESS NOTES
Assessment/Plan:       Diagnoses and all orders for this visit:    Hypertensive heart disease with chronic systolic congestive heart failure (HCC)  -     CBC and differential; Future  -     Lipid Panel with Direct LDL reflex; Future  -     Comprehensive metabolic panel; Future  -     TSH, 3rd generation with Free T4 reflex; Future  -     Urinalysis with reflex to microscopic    Acquired hypothyroidism  -     CBC and differential; Future  -     Lipid Panel with Direct LDL reflex; Future  -     Comprehensive metabolic panel; Future  -     TSH, 3rd generation with Free T4 reflex; Future  -     Urinalysis with reflex to microscopic    Simple chronic bronchitis (HCC)    Essential hypertension  -     CBC and differential; Future  -     Lipid Panel with Direct LDL reflex; Future  -     Comprehensive metabolic panel; Future  -     TSH, 3rd generation with Free T4 reflex; Future  -     Urinalysis with reflex to microscopic          Patient Instructions   A patient with severe heart failure nevertheless doing quite well in the sense of minimal symptoms  For a person with a low EF of 20% he is getting about quite well  He has some exertional fatigue and exertional dyspnea with humidity but no chest pain  Addition of diuresis has alleviated severe edema  He needs to do some routine lab work  I think colonoscopy should not be done because of the risk of decompensation  Follow-up here yearly or as needed  Subjective:      Patient ID: Lane Oscar is a 72 y o  male  Follow-up visit  A 57-year-old man with chronic systolic heart failure; last documented ejection fraction 15%  The last echo done on October almost a year ago showed EF 20%  He was hospitalized 2 years ago for exacerbation of CHF  At the conclusion of that hospitalization he was placed Entresto  Since that time, he has been much less symptomatic    In the interval year, the patient was placed on regular diuresis and his edema which was noticeable last year is much less apparent    Continues to follow with specialty providers including Cardiology, and Pulmonary for COPD, many times a year  Globally speaking we have an individual with bare heart failure who was never the less compensated  He has chronic dyspnea which is worse with more the days but no orthopnea nor PND  He does have chronic exertional fatigue which is his baseline status  Mycosis fungoides he is treated by Dermatology; no immediate issues referable to this  Some minor arthropathy  Globally, considering the severity of his diagnoses, he feels surprisingly well  The following portions of the patient's history were reviewed and updated as appropriate:   He has a past medical history of Agent orange exposure, Anemia, Benign colon polyp, BPH (benign prostatic hyperplasia), Bradycardia, Cardiomyopathy ([address] ), Chest discomfort, Chronic bronchitis ([address] ), COPD (chronic obstructive pulmonary disease) ([address] ), H/O nonmelanoma skin cancer, HHD (hypertensive heart disease), HTN (hypertension), lymphoma, Hyperlipidemia, ICD (implantable cardioverter-defibrillator) in place, Insomnia, Mycosis fungoides ([address] ), PVC (premature ventricular contraction), PVT (paroxysmal ventricular tachycardia) ([address] ), and SOB (shortness of breath)  ,  does not have any pertinent problems on file  ,   has a past surgical history that includes Cardiac pacemaker placement and Skin surgery  ,  family history includes Colon cancer in his father; Coronary artery disease in his father; Diabetes in his family and mother; Heart failure in his father  ,   reports that he quit smoking about 2 years ago  He has never used smokeless tobacco  He reports that he drinks about 1 0 standard drinks of alcohol per week  He reports that he does not use drugs  ,  has No Known Allergies     Current Outpatient Medications   Medication Sig Dispense Refill    albuterol (2 5 mg/3 mL) 0 083 % nebulizer solution USE 1 VIAL VIA NEBULIZER 4 TIMES A DAY AS NEEDED 120 vial 3    albuterol (PROVENTIL HFA) 90 mcg/act inhaler Inhale 2 puffs every 4 (four) hours as needed for wheezing      aspirin 81 MG tablet Take 81 mg by mouth daily      atorvastatin (LIPITOR) 40 mg tablet Take 1 tablet (40 mg total) by mouth daily 90 tablet 3    bexarotene (TARGRETIN) 75 mg capsule Take 8 capsules (600 mg total) by mouth daily 240 capsule 3    Cholecalciferol ([employer] VITAMIN D3) 1000 units capsule Take 1 capsule by mouth daily      cyanocobalamin (VITAMIN B-12) 1,000 mcg tablet Take 1,000 mcg by mouth daily      ferrous sulfate 324 (65 Fe) mg Take 1 tablet by mouth Twice daily      gabapentin (NEURONTIN) 100 mg capsule TAKE 1 CAPSULE BY MOUTH THREE TIMES A  capsule 3    levothyroxine 112 mcg tablet TAKE 1 TABLET BY MOUTH  DAILY 90 tablet 3    metoprolol succinate (TOPROL-XL) 50 mg 24 hr tablet Take 1 tablet (50 mg total) by mouth every 12 (twelve) hours 180 tablet 3    Omega-3-acid Ethyl Esters (LOVAZA PO) Take 1,000 mg by mouth 2 (two) times a day        potassium chloride (K-DUR,KLOR-CON) 20 mEq tablet Take 1 tablet by mouth daily      sacubitril-valsartan (ENTRESTO) 49-51 MG TABS Take 1 tablet by mouth 2 (two) times a day 180 tablet 3    torsemide (DEMADEX) 20 mg tablet Take 1 tablet by mouth daily 30 tablet 0    TRELEGY ELLIPTA 100-62 5-25 MCG/INH inhaler INHALE 1 PUFF BY MOUTH  DAILY, RINSE MOUTH AFTER  USE  180 each 3    triamcinolone (KENALOG) 0 1 % ointment Apply topically 2 (two) times 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 454 g 3    zolpidem (AMBIEN) 10 mg tablet 1 tablet at bedtime 90 tablet 1    PAZEO 0 7 % SOLN        No current facility-administered medications for this visit  Review of Systems   Constitutional: Positive for fatigue  Negative for chills and fever  HENT: Negative for sore throat and trouble swallowing  Eyes: Negative for pain  Respiratory: Positive for shortness of breath  Negative for cough      Cardiovascular: Positive for leg swelling  Negative for chest pain and palpitations  Gastrointestinal: Negative for abdominal pain, blood in stool, diarrhea, nausea and vomiting  Endocrine: Negative for cold intolerance and heat intolerance  Genitourinary: Negative for dysuria, frequency and testicular pain  Musculoskeletal: Negative for joint swelling  Allergic/Immunologic: Negative for immunocompromised state  Neurological: Negative for dizziness, syncope and headaches  Hematological: Negative for adenopathy  Does not bruise/bleed easily  Psychiatric/Behavioral: Negative for dysphoric mood  The patient is not nervous/anxious  Objective:  Vitals:    08/30/19 1425   BP: 100/60   Pulse: 77   SpO2: 93%      Physical Exam   Constitutional: He is oriented to person, place, and time  He appears well-developed and well-nourished  An alert cooperative man who appears stated age  He looks tired, but considering the severity of his diagnoses, he never the less looks approximately well  Not in distress  Not tachypneic, not dyspneic, not using accessory muscles  Speaking clearly  HENT:   Head: Normocephalic and atraumatic  Eyes: Pupils are equal, round, and reactive to light  Neck: Normal range of motion  Neck supple  No tracheal deviation present  No thyromegaly present  Cardiovascular: Normal rate and regular rhythm  Exam reveals distant heart sounds  Exam reveals no gallop  No murmur heard  Extremely distant cardiacsounds   Pulmonary/Chest: Effort normal  No respiratory distress  He has decreased breath sounds  He has no wheezes  He has no rales  Abdominal: Soft  Bowel sounds are normal  There is no tenderness  Musculoskeletal: Normal range of motion  He exhibits edema  He exhibits no tenderness or deformity  2+ edema both lower extremities  Neurological: He is alert and oriented to person, place, and time  He has normal reflexes  Coordination normal    Skin: Skin is warm and dry  Rash noted   Rash is macular  There is erythema  Diffuse confluent areas of reddish discoloration of the skin with some these combination  A few areas of much darker brownish red discoloration  These are large confluent patches extending as much as   Psychiatric: He has a normal mood and affect  28-May-2019 13:07

## 2019-09-03 ENCOUNTER — TELEPHONE (OUTPATIENT)
Dept: INTERNAL MEDICINE CLINIC | Facility: CLINIC | Age: 74
End: 2019-09-03

## 2019-09-03 NOTE — TELEPHONE ENCOUNTER
She rec'ed RX for Zolpidem 10 mg    & needs  needs DX code for this    Please refer to case [id number]

## 2019-09-03 NOTE — TELEPHONE ENCOUNTER
Called [facility] and spoke to Terese Kapadia Pt prior auth for Zolpidem was approved until 9/3/2020   [place] [phone]

## 2019-09-11 DIAGNOSIS — E78.5 HYPERLIPIDEMIA, UNSPECIFIED HYPERLIPIDEMIA TYPE: ICD-10-CM

## 2019-09-11 RX ORDER — ATORVASTATIN CALCIUM 40 MG/1
TABLET, FILM COATED ORAL
Qty: 90 TABLET | Refills: 3 | Status: SHIPPED | OUTPATIENT
Start: 2019-09-11 | End: 2019-09-24 | Stop reason: SDUPTHER

## 2019-09-18 DIAGNOSIS — J41.0 SIMPLE CHRONIC BRONCHITIS (HCC): ICD-10-CM

## 2019-09-19 DIAGNOSIS — J41.0 SIMPLE CHRONIC BRONCHITIS (HCC): ICD-10-CM

## 2019-09-19 RX ORDER — FLUTICASONE FUROATE, UMECLIDINIUM BROMIDE AND VILANTEROL TRIFENATATE 100; 62.5; 25 UG/1; UG/1; UG/1
1 POWDER RESPIRATORY (INHALATION) DAILY
Qty: 3 INHALER | Refills: 3 | Status: SHIPPED | OUTPATIENT
Start: 2019-09-19 | End: 2020-07-10

## 2019-09-24 DIAGNOSIS — E78.5 HYPERLIPIDEMIA, UNSPECIFIED HYPERLIPIDEMIA TYPE: ICD-10-CM

## 2019-09-24 RX ORDER — ATORVASTATIN CALCIUM 40 MG/1
40 TABLET, FILM COATED ORAL DAILY
Qty: 90 TABLET | Refills: 3 | Status: SHIPPED | OUTPATIENT
Start: 2019-09-24 | End: 2020-12-22 | Stop reason: SDUPTHER

## 2019-09-25 ENCOUNTER — HOSPITAL ENCOUNTER (OUTPATIENT)
Dept: NON INVASIVE DIAGNOSTICS | Facility: CLINIC | Age: 74
Discharge: HOME | End: 2019-09-25
Payer: COMMERCIAL

## 2019-09-25 ENCOUNTER — OFFICE VISIT (OUTPATIENT)
Dept: CARDIOLOGY CLINIC | Facility: CLINIC | Age: 74
End: 2019-09-25
Payer: COMMERCIAL

## 2019-09-25 VITALS
HEIGHT: 71 IN | OXYGEN SATURATION: 93 % | DIASTOLIC BLOOD PRESSURE: 66 MMHG | SYSTOLIC BLOOD PRESSURE: 100 MMHG | WEIGHT: 245 LBS | BODY MASS INDEX: 34.3 KG/M2 | HEART RATE: 82 BPM

## 2019-09-25 DIAGNOSIS — Z95.810 ICD (IMPLANTABLE CARDIOVERTER-DEFIBRILLATOR) IN PLACE: ICD-10-CM

## 2019-09-25 DIAGNOSIS — E78.2 MIXED HYPERLIPIDEMIA: ICD-10-CM

## 2019-09-25 DIAGNOSIS — E66.9 OBESITY (BMI 30-39.9): ICD-10-CM

## 2019-09-25 DIAGNOSIS — I11.9 HYPERTENSIVE HEART DISEASE WITHOUT HEART FAILURE: ICD-10-CM

## 2019-09-25 DIAGNOSIS — I10 ESSENTIAL HYPERTENSION: ICD-10-CM

## 2019-09-25 DIAGNOSIS — I50.22 CHRONIC SYSTOLIC CONGESTIVE HEART FAILURE (HCC): Primary | ICD-10-CM

## 2019-09-25 DIAGNOSIS — I50.22 CHRONIC SYSTOLIC CONGESTIVE HEART FAILURE (HCC): ICD-10-CM

## 2019-09-25 DIAGNOSIS — I42.8 NON-ISCHEMIC CARDIOMYOPATHY (HCC): ICD-10-CM

## 2019-09-25 PROCEDURE — 3078F DIAST BP <80 MM HG: CPT | Performed by: INTERNAL MEDICINE

## 2019-09-25 PROCEDURE — 93321 DOPPLER ECHO F-UP/LMTD STD: CPT | Performed by: INTERNAL MEDICINE

## 2019-09-25 PROCEDURE — 93325 DOPPLER ECHO COLOR FLOW MAPG: CPT | Performed by: INTERNAL MEDICINE

## 2019-09-25 PROCEDURE — 3074F SYST BP LT 130 MM HG: CPT | Performed by: INTERNAL MEDICINE

## 2019-09-25 PROCEDURE — C8929 TTE W OR WO FOL WCON,DOPPLER: HCPCS

## 2019-09-25 PROCEDURE — 93308 TTE F-UP OR LMTD: CPT | Performed by: INTERNAL MEDICINE

## 2019-09-25 PROCEDURE — 99214 OFFICE O/P EST MOD 30 MIN: CPT | Performed by: INTERNAL MEDICINE

## 2019-09-25 RX ADMIN — PERFLUTREN 1.2 ML/MIN: 6.52 INJECTION, SUSPENSION INTRAVENOUS at 14:46

## 2019-09-25 NOTE — PROGRESS NOTES
CARDIOLOGY OFFICE VISIT  [facility]  [address], [facility], [address] [facility], [place], [facility] Romeo Page  Tel: [phone]      NAME: Wang Rogers  AGE: 72 y o  SEX: male  : 1945   MRN: [id number]      Chief Complaint:  Chief Complaint   Patient presents with    Follow-up     6 month         History of Present Illness:   Patient comes for follow up  States he is doing okay from cardiac stand point  SOB is at baseline  Denies chest pain / pressure, palpitations, lightheadedness, syncope, swelling feet, orthopnea, PND, claudication  Chronic systolic HF - States currently his wt has gone down to his baseline  SOB is at baseline  Occasional fatigue  On BB, Entretso, torsemide, potassium  Following up with Dr Kalia Reid   input appreciated  NICMP s/p CRT-D - He had a [employer] BiV ICD implanted in 2011 and changed in 2014  He has been following up with Dr Bren Maya for the devise  On BB, Entresto    HTN -  Has been hypertensive for many years  Taking medications regularly  Denies lightheadedness, headache, medication side effects  HLP -  Has had hyperlipidemia for many years  Taking statin regularly along with diet control  Denies myalgia  PCP closely monitoring the blood work  Obesity -  Unable to loose fat weight        Past Medical History:  Past Medical History:   Diagnosis Date    Agent orange exposure     Anemia     Benign colon polyp     BPH (benign prostatic hyperplasia)     Bradycardia     Cardiomyopathy ([address] )     Chest discomfort     Chronic bronchitis (HCC)     COPD [facility]ronic obstructive [facility] disease) [facility] )     LAST ASSESSED: 17    H/O nonmelanoma skin cancer     LAST ASSESSED: 17    HHD (hypertensive heart disease)     HTN (hypertension)     Hx of lymphoma     Hyperlipidemia     ICD (implantable cardioverter-defibrillator) in place     Insomnia     Mycosis fungoides (HCC)     PVC (premature ventricular contraction)     PVT (paroxysmal ventricular tachycardia) (HCC)     SOB (shortness of breath)          Past Surgical History:  Past Surgical History:   Procedure Laterality Date    CARDIAC PACEMAKER PLACEMENT      SKIN SURGERY      skin cancer removal          Family History:  Family History   Problem Relation Age of Onset    Diabetes Mother     Colon cancer Father         DIAGNOSED IN HIS [de-identified]    Heart failure Father     Coronary artery disease Father     Diabetes Family         MELLITUS    Heart attack Neg Hx     Stroke Neg Hx     Anuerysm Neg Hx     Clotting disorder Neg Hx     Arrhythmia Neg Hx     Hypertension Neg Hx         pt unsure     Hyperlipidemia Neg Hx     Sudden death Neg Hx         scd         Social History:  Social History     Socioeconomic History    Marital status:      Spouse name: None    Number of children: 0    Years of education: None    Highest education level: None   Occupational History    Occupation: retired   Social Needs    Financial resource strain: None    Food insecurity:     Worry: None     Inability: None    Transportation needs:     Medical: None     Non-medical: None   Tobacco Use    Smoking status: Former Smoker     Last attempt to quit: 2017     Years since quittin 4    Smokeless tobacco: Never Used    Tobacco comment: CURRENT EVERY DAY SMOKER, TOBACCO USE AS PER [employer]CR[employer]TS   Substance and Sexual Activity    Alcohol use:  Yes     Alcohol/week: 1 0 standard drinks     Types: 1 Glasses of wine per week     Frequency: 4 or more times a week     Drinks per session: 3 or 4     Comment: 2-3 drinks per night; NO ALCOHOL USE AS PER [employer]TS    Drug use: No    Sexual activity: None   Lifestyle    Physical activity:     Days per week: 7 days     Minutes per session: 30 min    Stress: Not at all   Relationships    Social connections:     Talks on phone: None     Gets together: None     Attends Druze service: None     Active member of club or organization: None     Attends meetings of clubs or organizations: None     Relationship status: None    Intimate partner violence:     Fear of current or ex partner: None     Emotionally abused: None     Physically abused: None     Forced sexual activity: None   Other Topics Concern    None   Social History Narrative    None         Active Problems:  Patient Active Problem List   Diagnosis    Insomnia    ICD (implantable cardioverter-defibrillator) in place    Hyperlipidemia    Hx of lymphoma    HTN (hypertension)    [facility] disease) ([facility] )    Chronic bronchitis ([facility] )    Cardiomyopathy ([place] [address] )    BPH (benign prostatic hyperplasia)    Seborrheic keratosis    Mycosis fungoides ([place] [address] )    History of skin cancer    Screening for skin cancer    Screening for skin condition    Acquired hypothyroidism    HHD (hypertensive heart disease)    Prostate cancer screening    Colon cancer screening    Health care maintenance    Squamous cell cancer of skin of forearm, left    PVC (premature ventricular contraction)         The following portions of the patient's history were reviewed and updated as appropriate: past medical history, past surgical history, past family history,  past social history, current medications, allergies and problem list       Review of Systems:  Constitutional: Denies fever, chills  +fatigue  Eyes: Denies eye redness, eye discharge, double vision  ENT: Denies hearing loss, tinnitus, sneezing, nasal discharge, sore throat   Respiratory: Denies cough, expectoration, hemoptysis   +shortness of breath  Cardiovascular: Denies chest pain, palpitations, orthopnea, PND, lower extremity swelling  Gastrointestinal: Denies abdominal pain, nausea, vomiting, hematemesis, diarrhea, bloody stools  Genito-Urinary: Denies dysuria, incontinence  Musculoskeletal: Denies back pain, joint pain, muscle pain  Neurologic: Denies confusion, lightheadedness, syncope, headache, focal weakness, sensory changes, seizures  Endocrine: Denies polyuria, polydipsia, temperature intolerance  Allergy and Immunology: Denies hives, insect bite sensitivity  Hematological and Lymphatic: Denies bleeding problems, swollen glands   Psychological: Denies depression, suicidal ideation, anxiety, panic  Dermatological: Denies pruritus, rash, skin lesion changes      Vitals:  Vitals:    09/25/19 1259   BP: 100/66   Pulse: 82   SpO2: 93%       Body mass index is 34 17 kg/m²  Weight (last 2 days)     Date/Time   Weight    09/25/19 1259   111 (245)                Physical Examination:  General: Patient is not in acute distress  Awake, alert, oriented in time, place and person  Responding to commands  Head: Normocephalic  Atraumatic  Eyes: Both pupils normal sized, round and reactive to light  Nonicteric  ENT: Normal external ear canals  Nares normal, no drainage  Lips and oral mucosa normal  Neck: Supple  JVP not raised  Trachea central  No thyromegaly  Lungs: Bilateral bronchovascular breath sounds with no crackles or rhonchi  Chest wall: No tenderness  Cardiovascular: RRR  S1 and S2 normal  Grade 3/6 PSM at apex  No rub or gallop  Gastrointestinal: Abdomen soft, nontender  No guarding or rigidity  Liver and spleen not palpable  Bowel sounds present  Neurologic: Patient is awake, alert, oriented in time, place and person  Responding to command  Moving all extremities  Integumentary:  No skin rash  Lymphatic: No cervical lymphadenopathy  Back: Symmetric   No CVA tenderness  Extremities: No clubbing, cyanosis or edema      Laboratory Results:  CBC with diff:   Lab Results   Component Value Date    WBC 7 13 08/30/2019    WBC 5 7 06/01/2015    RBC 4 32 08/30/2019    RBC 4 70 06/01/2015    HGB 13 4 08/30/2019    HGB 13 9 06/01/2015    HCT 43 2 08/30/2019    HCT 41 6 06/01/2015     (H) 08/30/2019    MCV 88 06/01/2015    MCH 31 0 08/30/2019    MCH 29 5 06/01/2015    RDW 13 7 08/30/2019    RDW 12 6 2015     2019     2015       CMP:  Lab Results   Component Value Date    CREATININE 1 23 2019    CREATININE 1 02 2015    BUN 32 (H) 2019    BUN 27 (H) 2015     2015    K 4 4 2019    K 4 1 2015     (H) 2019     2015    CO2 26 2019    CO2 28 2015    GLUCOSE 93 2015    PROT 7 3 2015    ALKPHOS 71 2019    ALKPHOS 71 2015    ALT 74 2019    ALT 27 2015    AST 43 2019    AST 19 2015    BILIDIR 0 11 2017       Lab Results   Component Value Date    HGBA1C 6 2 2017    MG 2 2 2017    PHOS 3 4 2017       Lab Results   Component Value Date    TROPONINI 0 07 (H) 2017    TROPONINI 0 08 (H) 2017    TROPONINI 0 08 (H) 2017    CKTOTAL 85 2016       Lipid Profile:   Lab Results   Component Value Date    CHOL 197 2015     Lab Results   Component Value Date    HDL 39 (L) 2019    HDL 39 (L) 2018    HDL 57 2017     Lab Results   Component Value Date    LDLCALC 99 2019    LDLCALC 134 (H) 2018    LDLCALC 141 (H) 2017     Lab Results   Component Value Date    TRIG 338 (H) 2019    TRIG 391 (H) 2019    TRIG 214 (H) 2018       Cardiac testing:   Results for orders placed during the hospital encounter of 10/25/18   Echo complete with contrast if indicated    Narrative [facility], [facility]  [phone]    Transthoracic Echocardiogram  2D, M-mode, and Color Doppler    Study date:  25-Oct-2018    Patient: Liya Burt  MR number: [id number]  Account number: [de-identified]  : [id number]  Age: 81 years  Gender: Male  Status: Outpatient  Location: [facility]  Height: 71 in  Weight: 247 lb  BP: 100/ 70 mmHg    Indications: CHF    Diagnoses: O93 54 - Chronic systolic (congestive) heart failure    Sonographer:  Larry Owens Leonel Cancino,, [facility]  Interpreting Physician:  Steffen Turner MD  Primary Physician:  Juanito Simmons MD  Referring Physician:  Tiffany Tran MD  Group:  [facility]    SUMMARY    LEFT VENTRICLE:  The ventricle was dilated  Ejection fraction was estimated to be 20 %  There was severe diffuse hypokinesis  RIGHT VENTRICLE:  The ventricle was mildly dilated  Systolic function was mildly reduced  ICD lead noted  LEFT ATRIUM:  The atrium was mildly dilated  RIGHT ATRIUM:  The atrium was mildly dilated  MITRAL VALVE:  There was at least moderate regurgitation with an eccentric jet  TRICUSPID VALVE:  There was trace to mild regurgitation  HISTORY: PRIOR HISTORY: PVCs  Hyperlipidemia  COPD  Former smoker  NYHA class III congestive heart failure  Cardiomyopathy  Risk factors: a family history of coronary artery disease  PRIOR PROCEDURES: ICD implantation  PROCEDURE: The study was performed in the [address] [place] [address]  This was a routine study  The transthoracic approach was used  The study included complete 2D imaging, M-mode, and color Doppler  Images were obtained from the  parasternal, apical, and subcostal acoustic windows  Intravenous contrast ( 1 2 mL Definity in NSS) was administered to opacify the left ventricle  Echocardiographic views were limited due to poor acoustic window availability, decreased  penetration, and lung interference  This was a technically difficult study  LEFT VENTRICLE: The ventricle was dilated  Ejection fraction was estimated to be 20 %  There was severe diffuse hypokinesis  RIGHT VENTRICLE: The ventricle was mildly dilated  Systolic function was mildly reduced  ICD lead noted  Wall thickness was normal     LEFT ATRIUM: The atrium was mildly dilated  RIGHT ATRIUM: The atrium was mildly dilated  MITRAL VALVE: There was annular calcification  DOPPLER: There was at least moderate regurgitation with an eccentric jet      AORTIC VALVE: The valve was not well visualized  DOPPLER: There was no evidence for stenosis  TRICUSPID VALVE: The valve structure was normal  There was normal leaflet separation  DOPPLER: The transtricuspid velocity was within the normal range  There was no evidence for stenosis  There was trace to mild regurgitation  PULMONIC VALVE: Not well visualized  PERICARDIUM: There was no pericardial effusion  The pericardium was normal in appearance  AORTA: The root exhibited normal size  SYSTEM MEASUREMENT TABLES    Unspecified Scan Mode  Aortic Root Diameter; End Systole;: 42 6 mm  Gradient Pressure, Peak; Simplified Bernoulli; Antegrade Flow; Systole;: 5 9 mm[Hg]  Gradient pressure, average; Simplified Bernoulli; Antegrade Flow; Systole;: 3 8 mm[Hg]  Left Atrium to Aortic Root Ratio;: 1 21  Left atrial diameter; End Diastole;: 51 5 mm  Interventricular Septum Diastolic Thickness; Teichholz; End Diastole;: 5 6 mm  Left Ventricle Internal End Diastolic Dimension; Teichholz;: 60 6 mm  Left Ventricle Internal Systolic Dimension; Teichholz; End Systole;: 51 mm  Left Ventricle Mass; Mass AVCube with Teichholz; End Diastole;: 178 g  Left Ventricle Posterior Wall Diastolic Thickness; Teichholz; End Diastole;: 9 6 mm  Left Ventricular Ejection Fraction; Teichholz;: 32 8 %  Left Ventricular End Diastolic Volume; Teichholz;: 184 1 ml  Left Ventricular End Systolic Volume; Teichholz;: 123 8 ml  Left Ventricular Fractional Shortening;: 15 8 %  Stroke volume;  Teichholz; Systole;: 60 3 ml  Mitral Valve Area; Area by Pressure Half-Time; Systole;: 4 07 cm2  Mitral Valve E to A Ratio; Systole;: 1 01  Pressure half time; Diastole;: 0 05 s  Maximum Tricuspid valve regurgitation pressure gradient; Regurgitant Flow; Systole;: 23 2 mm[Hg]    [employer] Commission Accredited Echocardiography Laboratory    Prepared and electronically signed by    Dinah Mcfadden MD  Signed 26-Oct-2018 22:17:20         Medications:    Current Outpatient Medications:     albuterol (2 5 mg/3 mL) 0 083 % nebulizer solution, USE 1 VIAL VIA NEBULIZER 4 TIMES A DAY AS NEEDED, Disp: 120 vial, Rfl: 3    albuterol (PROVENTIL HFA) 90 mcg/act inhaler, Inhale 2 puffs every 4 (four) hours as needed for wheezing, Disp: , Rfl:     aspirin 81 MG tablet, Take 81 mg by mouth daily, Disp: , Rfl:     atorvastatin (LIPITOR) 40 mg tablet, Take 1 tablet (40 mg total) by mouth daily, Disp: 90 tablet, Rfl: 3    bexarotene (TARGRETIN) 75 mg capsule, Take 8 capsules (600 mg total) by mouth daily, Disp: 240 capsule, Rfl: 3    Cholecalciferol (CVS VITAMIN D3) 1000 units capsule, Take 1 capsule by mouth daily, Disp: , Rfl:     cyanocobalamin (VITAMIN B-12) 1,000 mcg tablet, Take 1,000 mcg by mouth daily, Disp: , Rfl:     ferrous sulfate 324 (65 Fe) mg, Take 1 tablet by mouth Twice daily, Disp: , Rfl:     gabapentin (NEURONTIN) 100 mg capsule, TAKE 1 CAPSULE BY MOUTH THREE TIMES A DAY, Disp: 270 capsule, Rfl: 3    levothyroxine 112 mcg tablet, TAKE 1 TABLET BY MOUTH  DAILY, Disp: 90 tablet, Rfl: 3    metoprolol succinate (TOPROL-XL) 50 mg 24 hr tablet, Take 1 tablet (50 mg total) by mouth every 12 (twelve) hours, Disp: 180 tablet, Rfl: 3    Omega-3-acid Ethyl Esters (LOVAZA PO), Take 1,000 mg by mouth 2 (two) times a day  , Disp: , Rfl:     PAZEO 0 7 % SOLN, , Disp: , Rfl:     potassium chloride (K-DUR,KLOR-CON) 20 mEq tablet, Take 1 tablet by mouth daily, Disp: , Rfl:     sacubitril-valsartan (ENTRESTO) 49-51 MG TABS, Take 1 tablet by mouth 2 (two) times a day, Disp: 180 tablet, Rfl: 3    torsemide (DEMADEX) 20 mg tablet, Take 1 tablet by mouth daily, Disp: 30 tablet, Rfl: 0    TRELEGY ELLIPTA 100-62 5-25 MCG/INH inhaler, Inhale 1 puff daily Rinse mouth after use , Disp: 3 Inhaler, Rfl: 3    triamcinolone (KENALOG) 0 1 % ointment, Apply topically 2 (two) times 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, Disp: 454 g, Rfl: 3    zolpidem (AMBIEN) 10 mg tablet, 1 tablet at bedtime, Disp: 90 tablet, Rfl: 1      Allergies:  No Known Allergies      Assessment and Plan:  Chronic systolic HF - patient doing better since the start of Entresto though now getting symptoms of low CO state  Continue Torsemide, potassium, BB  Low salt diet  Daily weights  Fluid restriction  All these have been discussed in detail with the patient  NICMP s/p CRT-D - no recent ICD shocks  Patient on home monitoring of his ICD  F/U with Dr Lexus Reyes and Dr Rodolfo Zamarripa - input reviewed  Continue beta blocker, Entresto  Patient does not want to be considered for cardiac transplant and also states that he is not ready for an LVAD yet  He also refused sleep study    HLP - continue lipitor  Dr Catrachita Lamas closely monitors his blood work  HTN, HHD - BP stable  Cont current meds  Recommend aggressive risk factor modification and therapeutic lifestyle changes  Low-salt, low-calorie, low-fat, low-cholesterol diet with regular exercise and to optimize weight  I will defer the ordering and monitoring of necessity lab studies to you, but I am available and happy to review and manage any of the data at your request in the future  Discussed concepts of atherosclerosis, including signs and symptoms of cardiac disease  Previous studies were reviewed  Safety measures were reviewed  Questions were entertained and answered  Patient was advised to report any problems requiring medical attention  Follow-up with PCP and appropriate specialist and lab work as discussed  Return for follow up visit as scheduled or earlier, if needed  Thank you for allowing me to participate in the care and evaluation of your patient  Should you have any questions, please feel free to contact me        Christel Huerta MD  9/25/2019,1:19 PM

## 2019-10-17 ENCOUNTER — OFFICE VISIT (OUTPATIENT)
Dept: PULMONOLOGY | Facility: CLINIC | Age: 74
End: 2019-10-17
Payer: COMMERCIAL

## 2019-10-17 VITALS
SYSTOLIC BLOOD PRESSURE: 104 MMHG | HEIGHT: 71 IN | WEIGHT: 248 LBS | BODY MASS INDEX: 34.72 KG/M2 | OXYGEN SATURATION: 94 % | DIASTOLIC BLOOD PRESSURE: 80 MMHG | HEART RATE: 96 BPM

## 2019-10-17 DIAGNOSIS — Z72.0 TOBACCO USE: ICD-10-CM

## 2019-10-17 DIAGNOSIS — R91.1 LUNG NODULE: ICD-10-CM

## 2019-10-17 DIAGNOSIS — J41.0 SIMPLE CHRONIC BRONCHITIS (HCC): Primary | ICD-10-CM

## 2019-10-17 DIAGNOSIS — J43.2 CENTRILOBULAR EMPHYSEMA (HCC): ICD-10-CM

## 2019-10-17 PROCEDURE — 99214 OFFICE O/P EST MOD 30 MIN: CPT | Performed by: INTERNAL MEDICINE

## 2019-10-17 NOTE — PROGRESS NOTES
Assessment/Plan:   Diagnoses and all orders for this visit:    Simple chronic bronchitis (HCC)    Lung nodule    Centrilobular emphysema (HCC)    Tobacco use  -     CT lung screening program; Future          Chronic bronchitis/COPD patient has  quit smoking a year ago    Congratulated the patient on that  He is currently using trelegy  1 puff daily  He will continue to use the albuterol via nebulizer 4 times daily as needed  He does have nocturnal oxygen 2 liters/minute to continue lung nodule currently stable, repeat CT of the chest in bilateral centrilobular emphysema, stable lung nodule, which is calcified   CT lung screening and follow-up  Recent PFTs in November of 2017 with severe small airway obstructive ventilatory limitation with response to the bronchodilator and increased lung volumes consistent with hyper inflation and air trapping and moderately decreased DLCO  Vaccinations up-to-date  Follow-up in 8 months or p r n  earlier as needed    Return in about 8 months (around 6/17/2020)  All questions are answered to the patient's satisfaction and understanding  He verbalizes understanding  He is encouraged to call with any further questions or concerns  Portions of the record may have been created with voice recognition software  Occasional wrong word or "sound a like" substitutions may have occurred due to the inherent limitations of voice recognition software  Read the chart carefully and recognize, using context, where substitutions have occurred      Electronically Signed by Carola Webber MD    ______________________________________________________________________    Chief Complaint:   Chief Complaint   Patient presents with    Follow-up       Patient ID: Agustina Taylor is a 72 y o  y o  male has a past medical history of Agent orange exposure, Anemia, Benign colon polyp, BPH (benign prostatic hyperplasia), Bradycardia, Cardiomyopathy ([address] ), Chest discomfort, Chronic bronchitis ([address] ), COPD (chronic obstructive pulmonary disease) ([facility] ), H/O nonmelanoma skin cancer, HHD (hypertensive heart disease), HTN (hypertension), lymphoma, Hyperlipidemia, ICD (implantable cardioverter-defibrillator) in place, Insomnia, Mycosis fungoides ([facility] ), PVC (premature ventricular contraction), PVT (paroxysmal ventricular tachycardia) ([facility] ), and SOB (shortness of breath)  10/17/2019  Patient presents today for follow-up visit  Patient is a very pleasant 26-year-old gentleman former smoker who quit recently, with history of chronic bronchitis COPD on bronchodilators here for follow-up  Seems to be doing well with his trelegy 1 puff a day  Using the nebulizer once daily with albuterol, he states his shortness of breath has improved  Review of Systems   Constitutional: Positive for fatigue  Negative for activity change, appetite change, chills, diaphoresis, fever and unexpected weight change  HENT: Negative for congestion, dental problem, drooling, nosebleeds, postnasal drip, rhinorrhea, sinus pressure, sore throat and voice change  Eyes: Negative for discharge, itching and visual disturbance  Respiratory: Positive for cough (clear sputum, no hemoptysis), shortness of breath and wheezing  Cardiovascular: Negative for chest pain, palpitations and leg swelling  Endocrine: Negative for cold intolerance and heat intolerance  Allergic/Immunologic: Negative for food allergies and immunocompromised state  Neurological: Negative for dizziness, facial asymmetry, speech difficulty and weakness  Hematological: Negative for adenopathy  Psychiatric/Behavioral: Negative for agitation, confusion and sleep disturbance  The patient is not nervous/anxious  Smoking history: He reports that he has been smoking cigarettes  He has a 25 00 pack-year smoking history   He has never used smokeless tobacco     The following portions of the patient's history were reviewed and updated as appropriate: allergies, current medications, past family history, past medical history, past social history, past surgical history and problem list     Immunization History   Administered Date(s) Administered    INFLUENZA 09/24/2018    Influenza Split High Dose Preservative Free IM 10/17/2016, 09/09/2017, 09/24/2018    Influenza TIV (IM) 10/14/2015    Pneumococcal Conjugate PCV 7 10/14/2015    Tdap 1945     Current Outpatient Medications   Medication Sig Dispense Refill    albuterol (2 5 mg/3 mL) 0 083 % nebulizer solution USE 1 VIAL VIA NEBULIZER 4 TIMES A DAY AS NEEDED 120 vial 3    aspirin 81 MG tablet Take 81 mg by mouth daily      atorvastatin (LIPITOR) 40 mg tablet Take 1 tablet (40 mg total) by mouth daily 90 tablet 3    bexarotene (TARGRETIN) 75 mg capsule Take 8 capsules (600 mg total) by mouth daily 240 capsule 3    Cholecalciferol ([employer] VITAMIN D3) 1000 units capsule Take 1 capsule by mouth daily      cyanocobalamin (VITAMIN B-12) 1,000 mcg tablet Take 1,000 mcg by mouth daily      ferrous sulfate 324 (65 Fe) mg Take 1 tablet by mouth Twice daily      gabapentin (NEURONTIN) 100 mg capsule TAKE 1 CAPSULE BY MOUTH THREE TIMES A  capsule 3    levothyroxine 112 mcg tablet TAKE 1 TABLET BY MOUTH  DAILY 90 tablet 3    metoprolol succinate (TOPROL-XL) 50 mg 24 hr tablet Take 1 tablet (50 mg total) by mouth every 12 (twelve) hours 180 tablet 3    Omega-3-acid Ethyl Esters (LOVAZA PO) Take 1,000 mg by mouth 2 (two) times a day        PAZEO 0 7 % SOLN       potassium chloride (K-DUR,KLOR-CON) 20 mEq tablet Take 1 tablet by mouth daily      sacubitril-valsartan (ENTRESTO) 49-51 MG TABS Take 1 tablet by mouth 2 (two) times a day 180 tablet 3    torsemide (DEMADEX) 20 mg tablet Take 1 tablet by mouth daily 30 tablet 0    TRELEGY ELLIPTA 100-62 5-25 MCG/INH inhaler Inhale 1 puff daily Rinse mouth after use   3 Inhaler 3    triamcinolone (KENALOG) 0 1 % ointment Apply topically 2 (two) times 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 454 g 3    zolpidem (AMBIEN) 10 mg tablet 1 tablet at bedtime 90 tablet 1    albuterol (PROVENTIL HFA) 90 mcg/act inhaler Inhale 2 puffs every 4 (four) hours as needed for wheezing       No current facility-administered medications for this visit  Allergies: Patient has no known allergies  Objective:  Vitals:    10/17/19 0942   BP: 104/80   Pulse: 96   SpO2: 94%   Weight: 112 kg (248 lb)   Height: 5' 11" (1 803 m)   Oxygen Therapy  SpO2: 94 %    Wt Readings from Last 3 Encounters:   10/17/19 112 kg (248 lb)   09/25/19 111 kg (245 lb)   08/30/19 111 kg (244 lb)     Body mass index is 34 59 kg/m²  Physical Exam   Constitutional: He is oriented to person, place, and time  He appears well-developed and well-nourished  HENT:   Head: Normocephalic and atraumatic  Eyes: Pupils are equal, round, and reactive to light  EOM are normal    Neck: Normal range of motion  Neck supple  Cardiovascular: Normal rate and regular rhythm  Pulmonary/Chest: Effort normal  He has wheezes (occ expiratory rhonchi)  He has no rales  He exhibits no tenderness  Abdominal: Soft  Bowel sounds are normal    Musculoskeletal: Normal range of motion  Neurological: He is alert and oriented to person, place, and time  Skin: Skin is warm and dry  Psychiatric: He has a normal mood and affect   His behavior is normal

## 2019-10-23 ENCOUNTER — OFFICE VISIT (OUTPATIENT)
Dept: DERMATOLOGY | Facility: CLINIC | Age: 74
End: 2019-10-23
Payer: COMMERCIAL

## 2019-10-23 DIAGNOSIS — Z13.89 SCREENING FOR SKIN CONDITION: ICD-10-CM

## 2019-10-23 DIAGNOSIS — C84.08 MYCOSIS FUNGOIDES INVOLVING LYMPH NODES OF MULTIPLE REGIONS (HCC): Primary | ICD-10-CM

## 2019-10-23 DIAGNOSIS — L82.1 SEBORRHEIC KERATOSIS: ICD-10-CM

## 2019-10-23 DIAGNOSIS — Z85.828 HISTORY OF SKIN CANCER: ICD-10-CM

## 2019-10-23 PROCEDURE — 99214 OFFICE O/P EST MOD 30 MIN: CPT | Performed by: DERMATOLOGY

## 2019-10-23 NOTE — PROGRESS NOTES
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    No real changes noted  Past Medical History:   Diagnosis Date    Agent orange exposure     Anemia     Benign colon polyp     BPH (benign prostatic hyperplasia)     Bradycardia     Cardiomy[facility] )     Chest discomfort     Chronic bronchitis (HCC)     COPD (chronic obstructive [facility] disease) ([facility] )     LAST ASSESSED: 17    H/O nonmelanoma skin cancer     LAST ASSESSED: 17    HHD (hypertensive heart disease)     HTN (hypertension)     Hx of lymphoma     Hyperlipidemia     ICD (implantable cardioverter-defibrillator) in place     Insomnia     Mycosis fungoides (HCC)     PVC (premature ventricular contraction)     PVT (paroxysmal ventricular tachycardia) (HCC)     SOB (shortness of breath)      Past Surgical History:   Procedure Laterality Date    CARDIAC PACEMAKER PLACEMENT      SKIN SURGERY      skin cancer removal      Social History   Social History     Substance and Sexual Activity   Alcohol Use Yes    Alcohol/week: 1 0 standard drinks    Types: 1 Glasses of wine per week    Frequency: 4 or more times a week    Drinks per session: 3 or 4     Social History     Substance and Sexual Activity   Drug Use No     Social History     Tobacco Use   Smoking Status Current Every Day Smoker    Packs/day: 0 50    Years: 50 00    Pack years: 25 00    Types: Cigarettes   Smokeless Tobacco Never Used     Family History   Problem Relation Age of Onset    Diabetes Mother     Colon cancer Father         DIAGNOSED IN HIS [de-identified]    Heart failure Father     Coronary artery disease Father     Diabetes Family         MELLITUS    Heart attack Neg Hx     Stroke Neg Hx     Anuerysm Neg Hx     Clotting disorder Neg Hx     Arrhythmia Neg Hx     Hypertension Neg Hx         pt unsure     Hyperlipidemia Neg Hx     Sudden death Neg Hx         scd     Meds/Allergies   No Known Allergies    Meds:  Prior to Admission medications    Medication Sig Start Date End Date Taking?  Authorizing Provider   albuterol (2 5 mg/3 mL) 0 083 % nebulizer solution USE 1 VIAL VIA NEBULIZER 4 TIMES A DAY AS NEEDED 8/22/19  Yes Donita Pichardo PA-C   albuterol (PROVENTIL HFA) 90 mcg/act inhaler Inhale 2 puffs every 4 (four) hours as needed for wheezing   Yes Historical Provider, MD   aspirin 81 MG tablet Take 81 mg by mouth daily   Yes Historical Provider, MD   atorvastatin (LIPITOR) 40 mg tablet Take 1 tablet (40 mg total) by mouth daily 9/24/19  Yes Ro Alexandra MD   bexarotene (TARGRETIN) 75 mg capsule Take 8 capsules (600 mg total) by mouth daily 7/10/19  Yes Tia Hester MD   Cholecalciferol ([employer] VITAMIN D3) 1000 units capsule Take 1 capsule by mouth daily   Yes Historical Provider, MD   cyanocobalamin (VITAMIN B-12) 1,000 mcg tablet Take 1,000 mcg by mouth daily   Yes Historical Provider, MD   ferrous sulfate 324 (65 Fe) mg Take 1 tablet by mouth Twice daily 8/31/15  Yes Historical Provider, MD   gabapentin (NEURONTIN) 100 mg capsule TAKE 1 CAPSULE BY MOUTH THREE TIMES A DAY 5/3/19  Yes Yumiko Luque PA-C   levothyroxine 112 mcg tablet TAKE 1 TABLET BY MOUTH  DAILY 8/13/19  Yes Yumiko Luque PA-C   metoprolol succinate (TOPROL-XL) 50 mg 24 hr tablet Take 1 tablet (50 mg total) by mouth every 12 (twelve) hours 5/30/19  Yes Ree Irizarry MD   Tzldc-2-lriq Ethyl Esters (LOVAZA PO) Take 1,000 mg by mouth 2 (two) times a day     Yes Historical Provider, MD   PAZEO 0 7 % SOLN  8/8/18  Yes Historical Provider, MD   potassium chloride (K-DUR,KLOR-CON) 20 mEq tablet Take 1 tablet by mouth daily 2/1/16  Yes Historical Provider, MD   sacubitril-valsartan (ENTRESTO) 49-51 MG TABS Take 1 tablet by mouth 2 (two) times a day 5/30/19  Yes Nadya Lizarraga MD   torsemide (DEMADEX) 20 mg tablet Take 1 tablet by mouth daily 9/4/17  Yes SHRUTHI CastellanosLEGY ELLIPTA 100-62 5-25 MCG/INH inhaler Inhale 1 puff daily Rinse mouth after use   9/19/19  Yes Marshal Morales PA-C   triamcinolone (KENALOG) 0 1 % ointment Apply topically 2 (two) times 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 2/6/18  Yes Derik Lim MD   zolpidem (AMBIEN) 10 mg tablet 1 tablet at bedtime 8/30/19  Yes Silas Villela MD   nicotine (NICODERM CQ) 21 mg/24 hr TD 24 hr patch Place 1 patch on the skin every 24 hours  3/6/17  Historical Provider, MD       Subjective:     Review of Systems:    General: negative for - chills, fatigue, fever,  weight gain or weight loss  Psychological: negative for - anxiety, behavioral disorder, concentration difficulties, decreased libido, depression, irritability, memory difficulties, mood swings, sleep disturbances or suicidal ideation  ENT: negative for - hearing difficulties , nasal congestion, nasal discharge, oral lesions, sinus pain, sneezing, sore throat  Allergy and Immunology: negative for - hives, insect bite sensitivity,  Hematological and Lymphatic: negative for - bleeding problems, blood clots,bruising, swollen lymph nodes  Endocrine: negative for - hair pattern changes, hot flashes, malaise/lethargy, mood swings, palpitations, polydipsia/polyuria, skin changes, temperature intolerance or unexpected weight change  Respiratory: negative for - cough, hemoptysis, orthopnea, shortness of breath, or wheezing  Cardiovascular: negative for - chest pain, dyspnea on exertion, edema,  Gastrointestinal: negative for - abdominal pain, nausea/vomiting  Genito-Urinary: negative for - dysuria, incontinence, irregular/heavy menses or urinary frequency/urgency  Musculoskeletal: negative for - gait disturbance, joint pain, joint stiffness, joint swelling, muscle pain, muscular weakness  Dermatological:  As in HPI  Neurological: negative for confusion, dizziness, headaches, impaired coordination/balance, memory loss, numbness/tingling, seizures, speech problems, tremors or weakness       Objective: There were no vitals taken for this visit  Physical Exam:    General Appearance:    Alert, cooperative, no distress   Head:    Normocephalic, without obvious abnormality, atraumatic   Lymphatics:    No lymphadenopathy noted      Abdomen:   No hepatosplenomegaly   Skin:   A full skin exam was performed including scalp, head scalp, eyes, ears, nose, lips, neck, chest, axilla, abdomen, back, buttocks, bilateral upper extremities, bilateral lower extremities, hands, feet, fingers, toes, fingernails, and toenails previous sites of skin cancer well healed over recurring recurrence normal keratotic papules greasy stuck on appearance scaling erythematous patches noted involving 70 percent of body surface area no nodules noted at this time     Assessment:     1  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)     2  Seborrheic keratosis     3  History of skin cancer     4   Screening for skin condition           Plan:   T-cell lymphoma under control contain use same treatment obtain some screening blood work in the face of his numerous treatments especially Targretin  Seborrheic keratosis patient reassured these are normal growths we acquire with age no treatment needed  History of skin cancer in no recurrence nothing else atypical sunblock recommended follow-up in 3months  Screening for dermatologic disorders nothing else of concern noted on complete exam follow-up in 3 months    Hector Varner MD  10/23/2019,2:50 PM    Portions of the record may have been created with voice recognition software   Occasional wrong word or "sound a like" substitutions may have occurred due to the inherent limitations of voice recognition software   Read the chart carefully and recognize, using context, where substitutions have occurred

## 2019-10-23 NOTE — PATIENT INSTRUCTIONS
T-cell lymphoma under control contain use same treatment obtain some screening blood work in the face of his numerous treatments especially Targretin  Seborrheic keratosis patient reassured these are normal growths we acquire with age no treatment needed  History of skin cancer in no recurrence nothing else atypical sunblock recommended follow-up in 3months  Screening for dermatologic disorders nothing else of concern noted on complete exam follow-up in 3 months

## 2019-11-06 ENCOUNTER — TELEPHONE (OUTPATIENT)
Dept: CARDIOLOGY CLINIC | Facility: CLINIC | Age: 74
End: 2019-11-06

## 2019-11-06 NOTE — TELEPHONE ENCOUNTER
----- Message from Zeina Rosales sent at 11/6/2019 11:10 AM EST -----  Regarding: Prescription Question  Contact: [phone]  Could you please send a prescription to [facility] for my omega 3 fish oil as I am out of that meds    Thank you,  Christiana Rodriguez

## 2019-11-20 ENCOUNTER — REMOTE DEVICE CLINIC VISIT (OUTPATIENT)
Dept: CARDIOLOGY CLINIC | Facility: CLINIC | Age: 74
End: 2019-11-20
Payer: COMMERCIAL

## 2019-11-20 DIAGNOSIS — Z95.810 PRESENCE OF IMPLANTABLE CARDIOVERTER-DEFIBRILLATOR (ICD): Primary | ICD-10-CM

## 2019-11-20 PROCEDURE — 93296 REM INTERROG EVL PM/IDS: CPT | Performed by: INTERNAL MEDICINE

## 2019-11-20 PROCEDURE — 93295 DEV INTERROG REMOTE 1/2/MLT: CPT | Performed by: INTERNAL MEDICINE

## 2019-11-20 NOTE — PROGRESS NOTES
MDT/BIV-ICD  CARELINK TRANSMISSION:  BATTERY VOLTAGE ADEQUATE (5 8 YR)   AP 98 2% BP 99 0% (VSRP 0 6%)   ALL LEAD PARAMETERS WITHIN NORMAL LIMITS   6 VT-NS EPISODES FOR NSVT (15 @ 165 BPM, 11 @ 163 BPM, 13 @ 163 BPM, 7 @ 182 BPM, 7 @ 194 BPM)    20 V SENSE EPISODES WITH AVAILABLE MARKERS SHOWING PAT (10 @ 128 BPM, 12 @ 146 BPM, 17 @ 146 BPM, 15 @ 130 BPM), AND NSVT (#104 - 10 @ 1154 BPM)   OPTI-VOL WITHIN NORMAL LIMITS   NORMAL DEVICE FUNCTION   RG

## 2019-12-05 ENCOUNTER — OFFICE VISIT (OUTPATIENT)
Dept: CARDIOLOGY CLINIC | Facility: CLINIC | Age: 74
End: 2019-12-05
Payer: COMMERCIAL

## 2019-12-05 VITALS
HEIGHT: 71 IN | SYSTOLIC BLOOD PRESSURE: 92 MMHG | WEIGHT: 248 LBS | DIASTOLIC BLOOD PRESSURE: 62 MMHG | BODY MASS INDEX: 34.72 KG/M2 | HEART RATE: 86 BPM

## 2019-12-05 DIAGNOSIS — I10 ESSENTIAL HYPERTENSION: ICD-10-CM

## 2019-12-05 DIAGNOSIS — I50.22 CHRONIC SYSTOLIC CONGESTIVE HEART FAILURE (HCC): ICD-10-CM

## 2019-12-05 DIAGNOSIS — Z95.810 ICD (IMPLANTABLE CARDIOVERTER-DEFIBRILLATOR) IN PLACE: ICD-10-CM

## 2019-12-05 DIAGNOSIS — I42.0 DILATED CARDIOMYOPATHY (HCC): Primary | ICD-10-CM

## 2019-12-05 DIAGNOSIS — E78.2 MIXED HYPERLIPIDEMIA: ICD-10-CM

## 2019-12-05 DIAGNOSIS — I49.3 PVC (PREMATURE VENTRICULAR CONTRACTION): ICD-10-CM

## 2019-12-05 DIAGNOSIS — J41.0 SIMPLE CHRONIC BRONCHITIS (HCC): ICD-10-CM

## 2019-12-05 PROCEDURE — 3078F DIAST BP <80 MM HG: CPT | Performed by: INTERNAL MEDICINE

## 2019-12-05 PROCEDURE — 99213 OFFICE O/P EST LOW 20 MIN: CPT | Performed by: INTERNAL MEDICINE

## 2019-12-05 PROCEDURE — 3074F SYST BP LT 130 MM HG: CPT | Performed by: INTERNAL MEDICINE

## 2019-12-05 PROCEDURE — 93000 ELECTROCARDIOGRAM COMPLETE: CPT | Performed by: INTERNAL MEDICINE

## 2019-12-05 NOTE — PROGRESS NOTES
Cardiology Follow Up    Xochilt Carpenter  1945  [id number]  [address] [facility]  [address] [place]  [address]  [phone]    1  Dilated cardiomyopathy ([address] )     2  Chronic systolic congestive heart failure (HCC)  POCT ECG   3  Essential hypertension     4  Simple chronic bronchitis ([address] )     5  PVC (premature ventricular contraction)     6  ICD (implantable cardioverter-defibrillator) in place     7  Mixed hyperlipidemia         HPI:  Xochilt Carpenter is a 69 y o  male presents to the office today for 1 year follow-up  Patient has a history of COPD, hypertension, cardiomyopathy, hyperthyroidism, PVCs, hyperlipidemia, and lymphoma       Patient is doing reasonably okay  he does not have any cardiac complaints    Unfortunately he has gone back to smoking    Previous History:   Feels relatively okay  Does have shortness of breath with minimal exertion     Patient doing well with aggressive management of heart failure  He quit smoking in 2016     He does have a history of nonischemic cardiomyopathy And has a biventricular ICD in place  He occasionally feels a forceful beat  However he has not had any significant palpitations, presyncope or syncope  There is no history of getting shocked recently  He does not complain of angina like chest pain  He is not complaining of newly worsening orthopnea or paroxysmal nocturnal dyspnea        The patient does complain of fatigue  chronic and lasting more than 2 years and currently improving  He has a history of smoking for over 55 years and recently quit in October 2016  He does have COPD  He also has mycosis fungoides and is on therapy for the same, Follows up at [facility]  He does have nonischemic cardiomyopathy        The patient is a pleasant 42-year-old who has moved from [place] to [facility]  He has been seen by Dr Jose Garcia and referred to me for management of his nonischemic cardiomyopathy and biventricular ICD  The patient informs that he has this diagnosis of nonischemic cardiomyopathy for almost 15 years  He has the device for a long period and recently in 2011 it was changed to a [employer] device        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  This has been diagnosed for almost 15 years   he is on a medication targretin / bexarotene For the same          BP 92/62 (BP Location: Right arm, Patient Position: Sitting, Cuff Size: Large)   Pulse 86   Ht 5' 11" (1 803 m)   Wt 112 kg (248 lb)   BMI 34 59 kg/m²       Patient Active Problem List   Diagnosis    Insomnia    ICD (implantable cardioverter-defibrillator) in place    Hyperlipidemia    Hx of lymphoma    HTN (hypertension)    COPD (chronic obstructive pulmonary disease) ([address] )    Chronic bronchitis ([address] )    Cardiomyopathy ([address] )    BPH (benign prostatic hyperplasia)    Seborrheic keratosis    Mycosis fungoides ([address] )    History of skin cancer    Screening for skin cancer    Screening for skin condition    Acquired hypothyroidism    HHD (hypertensive heart disease)    Prostate cancer screening    Colon cancer screening    Health care maintenance    Squamous cell cancer of skin of forearm, left    PVC (premature ventricular contraction)     Past Medical History:   Diagnosis Date    Agent orange exposure     Anemia     Benign colon polyp     BPH (benign prostatic hyperplasia)     Bradycardia     Cardiomyopathy ([address] )     Chest discomfort     Chronic bronchitis ([address] )     COPD (chronic obstructive pulmonary disease) ([address] )     LAST ASSESSED: 9/9/17    H/O nonmelanoma skin cancer     LAST ASSESSED: 11/7/17    HHD (hypertensive heart disease)     HTN (hypertension)     Hx of lymphoma     Hyperlipidemia     ICD (implantable cardioverter-defibrillator) in place     Insomnia     Mycosis fungoides ([address] )     PVC (premature ventricular contraction)     PVT (paroxysmal ventricular tachycardia) (HCC)     SOB (shortness of breath)      Social History     Socioeconomic History    Marital status:       Spouse name: Not on file    Number of children: 0    Years of education: Not on file    Highest education level: Not on file   Occupational History    Occupation: retired   Social Needs    Financial resource strain: Not on file    Food insecurity:     Worry: Not on file     Inability: Not on file   [place] needs:     Medical: Not on file     Non-medical: Not on file   Tobacco Use    Smoking status: Current Every Day Smoker     Packs/day: 0 50     Years: 50 00     Pack years: 25 00     Types: Cigarettes    Smokeless tobacco: Never Used   Substance and Sexual Activity    Alcohol use: Yes     Comment: vodka nightly    Drug use: No    Sexual activity: Not Currently   Lifestyle    Physical activity:     Days per week: 7 days     Minutes per session: 30 min    Stress: Not at all   Relationships    Social connections:     Talks on phone: Not on file     Gets together: Not on file     Attends Congregation service: Not on file     Active member of club or organization: Not on file     Attends meetings of clubs or organizations: Not on file     Relationship status: Not on file    Intimate partner violence:     Fear of current or ex partner: Not on file     Emotionally abused: Not on file     Physically abused: Not on file     Forced sexual activity: Not on file   Other Topics Concern    Not on file   Social History Narrative    Not on file      Family History   Problem Relation Age of Onset    Diabetes Mother     Colon cancer Father         DIAGNOSED IN HIS [de-identified]    Heart failure Father     Coronary artery disease Father     Diabetes Family         MELLITUS    Heart attack Neg Hx     Stroke Neg Hx     Anuerysm Neg Hx     Clotting disorder Neg Hx     Arrhythmia Neg Hx     Hypertension Neg Hx         pt unsure     Hyperlipidemia Neg Hx     Sudden death Neg Hx         scd     Past Surgical History:   Procedure Laterality Date    CARDIAC PACEMAKER PLACEMENT      SKIN SURGERY      skin cancer removal        Current Outpatient Medications:     albuterol (2 5 mg/3 mL) 0 083 % nebulizer solution, USE 1 VIAL VIA NEBULIZER 4 TIMES A DAY AS NEEDED, Disp: 120 vial, Rfl: 3    albuterol (PROVENTIL HFA) 90 mcg/act inhaler, Inhale 2 puffs every 4 (four) hours as needed for wheezing, Disp: , Rfl:     aspirin 81 MG tablet, Take 81 mg by mouth daily, Disp: , Rfl:     atorvastatin (LIPITOR) 40 mg tablet, Take 1 tablet (40 mg total) by mouth daily, Disp: 90 tablet, Rfl: 3    bexarotene (TARGRETIN) 75 mg capsule, Take 8 capsules (600 mg total) by mouth daily, Disp: 240 capsule, Rfl: 3    Cholecalciferol ([employer] VITAMIN D3) 1000 units capsule, Take 1 capsule by mouth daily, Disp: , Rfl:     cyanocobalamin (VITAMIN B-12) 1,000 mcg tablet, Take 1,000 mcg by mouth daily, Disp: , Rfl:     ferrous sulfate 324 (65 Fe) mg, Take 1 tablet by mouth Twice daily, Disp: , Rfl:     gabapentin (NEURONTIN) 100 mg capsule, TAKE 1 CAPSULE BY MOUTH THREE TIMES A DAY, Disp: 270 capsule, Rfl: 3    levothyroxine 112 mcg tablet, TAKE 1 TABLET BY MOUTH  DAILY, Disp: 90 tablet, Rfl: 3    metoprolol succinate (TOPROL-XL) 50 mg 24 hr tablet, Take 1 tablet (50 mg total) by mouth every 12 (twelve) hours, Disp: 180 tablet, Rfl: 3    potassium chloride (K-DUR,KLOR-CON) 20 mEq tablet, Take 1 tablet by mouth daily, Disp: , Rfl:     sacubitril-valsartan (ENTRESTO) 49-51 MG TABS, Take 1 tablet by mouth 2 (two) times a day, Disp: 180 tablet, Rfl: 3    torsemide (DEMADEX) 20 mg tablet, Take 1 tablet by mouth daily (Patient taking differently: Take 10 mg by mouth daily ), Disp: 30 tablet, Rfl: 0    TRELEGY ELLIPTA 100-62 5-25 MCG/INH inhaler, Inhale 1 puff daily Rinse mouth after use , Disp: 3 Inhaler, Rfl: 3    triamcinolone (KENALOG) 0 1 % ointment, Apply topically 2 (two) times 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, Disp: 454 g, Rfl: 3    zolpidem (AMBIEN) 10 mg tablet, 1 tablet at bedtime, Disp: 90 tablet, Rfl: 1    PAZEO 0 7 % SOLN, , Disp: , Rfl:   No Known Allergies    Labs:  Lab Results   Component Value Date     12/11/2015    K 4 4 08/30/2019    K 4 1 12/11/2015     (H) 08/30/2019     12/11/2015    CO2 26 08/30/2019    CO2 28 12/11/2015    BUN 32 (H) 08/30/2019    BUN 27 (H) 12/11/2015    CREATININE 1 23 08/30/2019    CREATININE 1 02 12/11/2015    GLUCOSE 93 12/11/2015    CALCIUM 7 9 (L) 08/30/2019    CALCIUM 8 8 12/11/2015     Lab Results   Component Value Date    CKTOTAL 85 01/28/2016    TROPONINI 0 07 (H) 09/01/2017     Lab Results   Component Value Date    WBC 7 13 08/30/2019    WBC 5 7 06/01/2015    HGB 13 4 08/30/2019    HGB 13 9 06/01/2015    HCT 43 2 08/30/2019    HCT 41 6 06/01/2015     (H) 08/30/2019    MCV 88 06/01/2015     08/30/2019     06/01/2015     Lab Results   Component Value Date    CHOL 197 06/01/2015    TRIG 338 (H) 08/30/2019    TRIG 156 06/01/2015    HDL 39 (L) 08/30/2019    HDL 37 06/01/2015     Imaging: No results found  Review of Systems:  Review of Systems   All other systems reviewed and are negative  As described in my history of present illness    Physical Exam:  Physical Exam   Constitutional: He is oriented to person, place, and time  He appears well-developed and well-nourished  No distress  Not in any distress at the current time  Unfortunately started smoking again   HENT:   Head: Normocephalic and atraumatic  Right Ear: External ear normal    Left Ear: External ear normal    Nose: Nose normal    Mouth/Throat: Uvula is midline and mucous membranes are normal    Posterior pharynx is crowded   Eyes: Pupils are equal, round, and reactive to light  Conjunctivae, EOM and lids are normal  No scleral icterus  No pallor  No cyanosis  No icterus   Neck: Trachea normal and normal range of motion  Neck supple  No JVD present   Carotid bruit is not present  No thyromegaly present  No jugular lymphadenopathy   Cardiovascular: Normal rate, regular rhythm, S1 normal, S2 normal, normal heart sounds, intact distal pulses and normal pulses  PMI is not displaced  Exam reveals no gallop, no S3, no S4 and no friction rub  No murmur heard  Pulmonary/Chest: Effort normal  No accessory muscle usage  No respiratory distress  He has decreased breath sounds in the right upper field, the right middle field, the right lower field, the left upper field, the left middle field and the left lower field  He has no wheezes  He has no rhonchi  He has no rales  He exhibits no tenderness  Abdominal: Soft  Normal appearance and bowel sounds are normal  He exhibits no distension and no mass  There is no splenomegaly or hepatomegaly  There is no tenderness  Central obesity present   Musculoskeletal: Normal range of motion  He exhibits edema  He exhibits no tenderness or deformity  Lymphadenopathy:     He has no cervical adenopathy  Neurological: He is alert and oriented to person, place, and time  Facial symmetry is retained  Extraocular movements are retained  Head neck tongue and palate movement are retained and symmetric   Skin: Skin is intact  No abrasion, no lesion and no rash noted  No erythema  Nails show no clubbing  Skin lesions of mycosis fungoides   Psychiatric: He has a normal mood and affect  His speech is normal and behavior is normal  Thought content normal    Vitals reviewed  Discussion/Summary:  1   Nonischemic cardiomyopathy, biventricular ICD  clinically stable, improving with aggressive management of CHF  undergoing cardiac rehab     he patient does have a history of nonischemic cardiomyopathy >15 years  He is currently relatively asymptomatic, though limited in physical activities      He is on a combined regimen of entresto, metoprolol succinate, torsemide   Continue with the same     Light headed with sudden orthostatic changes - recommend gradual changes  He needs diuresis to prevent heart failure symptoms           2  Premature ventricular contractions, ventricular tachycardia  PVCs are decreased today compared to prior EKG strips   None on rhythm strip - compared to only 1 every 10 beats last visit - and  1 every 3 beats previously      The pvc's were coming from the left ventricle, free wall, base most likely epicardial              3  smoking cessation  The patient finally quit smoking in October 2016    Patient has restarted smoking        4  Hypertension  Currently well controlled        5   Hyperlipidemia   On statin        Recommendation:  The patient is going to follow up with his primary cardiologist  No significant EP interventions at the current time      Scribe Attestation    I,:   Rio Peralta am acting as a scribe while in the presence of the attending physician :        I,:   Juan Hernandez MD personally performed the services described in this documentation    as scribed in my presence :

## 2019-12-05 NOTE — LETTER
December 6, 2019     Malia Samayoa MD  [address] [address]    Patient: Tray Fraser   YOB: 1945   Date of Visit: 12/5/2019       Dear Dr Pnag Born: Thank you for referring Romario Mayo to me for evaluation  Below are my notes for this consultation  If you have questions, please do not hesitate to call me  I look forward to following your patient along with you  Sincerely,        Charis Collins MD        CC: MD Charis Bass MD  12/6/2019 11:09 PM  Sign at close encounter                                             Cardiology Follow Up    Tray Fraser  1945  [id number]  [address] [address]  [address] [address]    1  Dilated cardiomyopathy ([facility] )     2  Chronic systolic congestive heart failure (HCC)  POCT ECG   3  Essential hypertension     4  Simple chronic bronchitis ([facility] [address] )     5  PVC (premature ventricular contraction)     6  ICD (implantable cardioverter-defibrillator) in place     7  Mixed hyperlipidemia         HPI:  Tray Fraser is a 69 y o  male presents to the office today for 1 year follow-up  Patient has a history of COPD, hypertension, cardiomyopathy, hyperthyroidism, PVCs, hyperlipidemia, and lymphoma       Patient is doing reasonably okay  he does not have any cardiac complaints    Unfortunately he has gone back to smoking    Previous History:   Feels relatively okay  Does have shortness of breath with minimal exertion     Patient doing well with aggressive management of heart failure  He quit smoking in 2016     He does have a history of nonischemic cardiomyopathy And has a biventricular ICD in place  He occasionally feels a forceful beat  However he has not had any significant palpitations, presyncope or syncope  There is no history of getting shocked recently  He does not complain of angina like chest pain  He is not complaining of newly worsening orthopnea or paroxysmal nocturnal dyspnea        The patient does complain of fatigue  chronic and lasting more than 2 years and currently improving  He has a history of smoking for over 55 years and recently quit in October 2016  He does have COPD  He also has mycosis fungoides and is on therapy for the same, Follows [facility] at [facility]  He does have nonischemic cardiomyopathy        The patient is a pleasant 82-year-old who has moved from [place] to [facility]  He has been seen by Dr Brennan Gonzalez and referred to me for management of his nonischemic cardiomyopathy and biventricular ICD  The patient informs that he has this diagnosis of nonischemic cardiomyopathy for almost 15 years  He has the device for a long period and recently in 2011 it was changed to a [employer] device        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  This has been diagnosed for almost 15 years   he is on a medication targretin / bexarotene For the same          BP 92/62 (BP Location: Right arm, Patient Position: Sitting, Cuff Size: Large)   Pulse 86   Ht 5' 11" (1 803 m)   Wt 112 kg (248 lb)   BMI 34 59 kg/m²        Patient Active Problem List   Diagnosis    Insomnia    ICD (implantable cardioverter-defibrillator) in place    Hyperlipidemia    Hx of lymphoma    HTN (hypertension)    COPD [facility]chronic obstructive pulmonary disease) [facility] [address] [facility]    Chronic bronchitis ([facility] [address] )    Cardiomyopathy ([facility] [address] )    BPH (benign prostatic hyperplasia)    Seborrheic keratosis    Mycosis fungoides ([facility] [address] )    History of skin cancer    Screening for skin cancer    Screening for skin condition    Acquired hypothyroidism    HHD (hypertensive heart disease)    Prostate cancer screening    Colon cancer screening    Health care maintenance    Squamous cell cancer of skin of forearm, left    PVC (premature ventricular contraction)     Past Medical History:   Diagnosis Date    Agent orange exposure     Anemia     Benign colon polyp     BPH (benign prostatic hyperplasia)     Bradycardia     Cardiomyopathy (HCC)     Chest discomfort     Chronic bronchitis (HCC)     [facility]     LAST ASSESSED: 9/9/17    H/O nonmelanoma skin cancer     LAST ASSESSED: 11/7/17    HHD (hypertensive heart disease)     HTN (hypertension)     Hx of lymphoma     Hyperlipidemia     ICD (implantable cardioverter-defibrillator) in place     Insomnia     Mycosis fungoides (HCC)     PVC (premature ventricular contraction)     PVT (paroxysmal ventricular tachycardia) (HCC)     SOB (shortness of breath)      Social History     Socioeconomic History    Marital status:       Spouse name: Not on file    Number of children: 0    Years of education: Not on file    Highest education level: Not on file   Occupational History    Occupation: retired   Social Needs    Financial resource strain: Not on file    Food insecurity:     Worry: Not on file     Inability: Not on file   [employer] needs:     Medical: Not on file     Non-medical: Not on file   Tobacco Use    Smoking status: Current Every Day Smoker     Packs/day: 0 50     Years: 50 00     Pack years: 25 00     Types: Cigarettes    Smokeless tobacco: Never Used   Substance and Sexual Activity    Alcohol use: Yes     Comment: vodka nightly    Drug use: No    Sexual activity: Not Currently   Lifestyle    Physical activity:     Days per week: 7 days     Minutes per session: 30 min    Stress: Not at all   Relationships    Social connections:     Talks on phone: Not on file     Gets together: Not on file     Attends [employer] service: Not on file     Active member of club or organization: Not on file     Attends meetings of clubs or organizations: Not on file     Relationship status: Not on file    Intimate partner violence:     Fear of current or ex partner: Not on file     Emotionally abused: Not on file     Physically abused: Not on file     Forced sexual activity: Not on file   Other Topics Concern    Not on file   Social History Narrative    Not on file      Family History   Problem Relation Age of Onset    Diabetes Mother     Colon cancer Father         DIAGNOSED IN HIS [de-identified]    Heart failure Father     Coronary artery disease Father     Diabetes Family         MELLITUS    Heart attack Neg Hx     Stroke Neg Hx     Anuerysm Neg Hx     Clotting disorder Neg Hx     Arrhythmia Neg Hx     Hypertension Neg Hx         pt unsure     Hyperlipidemia Neg Hx     Sudden death Neg Hx         scd     Past Surgical History:   Procedure Laterality Date    CARDIAC PACEMAKER PLACEMENT      SKIN SURGERY      skin cancer removal        Current Outpatient Medications:     albuterol (2 5 mg/3 mL) 0 083 % nebulizer solution, USE 1 VIAL VIA NEBULIZER 4 TIMES A DAY AS NEEDED, Disp: 120 vial, Rfl: 3    albuterol (PROVENTIL HFA) 90 mcg/act inhaler, Inhale 2 puffs every 4 (four) hours as needed for wheezing, Disp: , Rfl:     aspirin 81 MG tablet, Take 81 mg by mouth daily, Disp: , Rfl:     atorvastatin (LIPITOR) 40 mg tablet, Take 1 tablet (40 mg total) by mouth daily, Disp: 90 tablet, Rfl: 3    bexarotene (TARGRETIN) 75 mg capsule, Take 8 capsules (600 mg total) by mouth daily, Disp: 240 capsule, Rfl: 3    Cholecalciferol ([employer] VITAMIN D3) 1000 units capsule, Take 1 capsule by mouth daily, Disp: , Rfl:     cyanocobalamin (VITAMIN B-12) 1,000 mcg tablet, Take 1,000 mcg by mouth daily, Disp: , Rfl:     ferrous sulfate 324 (65 Fe) mg, Take 1 tablet by mouth Twice daily, Disp: , Rfl:     gabapentin (NEURONTIN) 100 mg capsule, TAKE 1 CAPSULE BY MOUTH THREE TIMES A DAY, Disp: 270 capsule, Rfl: 3    levothyroxine 112 mcg tablet, TAKE 1 TABLET BY MOUTH  DAILY, Disp: 90 tablet, Rfl: 3    metoprolol succinate (TOPROL-XL) 50 mg 24 hr tablet, Take 1 tablet (50 mg total) by mouth every 12 (twelve) hours, Disp: 180 tablet, Rfl: 3   potassium chloride (K-DUR,KLOR-CON) 20 mEq tablet, Take 1 tablet by mouth daily, Disp: , Rfl:     sacubitril-valsartan (ENTRESTO) 49-51 MG TABS, Take 1 tablet by mouth 2 (two) times a day, Disp: 180 tablet, Rfl: 3    torsemide (DEMADEX) 20 mg tablet, Take 1 tablet by mouth daily (Patient taking differently: Take 10 mg by mouth daily ), Disp: 30 tablet, Rfl: 0    TRELEGY ELLIPTA 100-62 5-25 MCG/INH inhaler, Inhale 1 puff daily Rinse mouth after use , Disp: 3 Inhaler, Rfl: 3    triamcinolone (KENALOG) 0 1 % ointment, Apply topically 2 (two) times 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, Disp: 454 g, Rfl: 3    zolpidem (AMBIEN) 10 mg tablet, 1 tablet at bedtime, Disp: 90 tablet, Rfl: 1    PAZEO 0 7 % SOLN, , Disp: , Rfl:   No Known Allergies    Labs:  Lab Results   Component Value Date     12/11/2015    K 4 4 08/30/2019    K 4 1 12/11/2015     (H) 08/30/2019     12/11/2015    CO2 26 08/30/2019    CO2 28 12/11/2015    BUN 32 (H) 08/30/2019    BUN 27 (H) 12/11/2015    CREATININE 1 23 08/30/2019    CREATININE 1 02 12/11/2015    GLUCOSE 93 12/11/2015    CALCIUM 7 9 (L) 08/30/2019    CALCIUM 8 8 12/11/2015     Lab Results   Component Value Date    CKTOTAL 85 01/28/2016    TROPONINI 0 07 (H) 09/01/2017     Lab Results   Component Value Date    WBC 7 13 08/30/2019    WBC 5 7 06/01/2015    HGB 13 4 08/30/2019    HGB 13 9 06/01/2015    HCT 43 2 08/30/2019    HCT 41 6 06/01/2015     (H) 08/30/2019    MCV 88 06/01/2015     08/30/2019     06/01/2015     Lab Results   Component Value Date    CHOL 197 06/01/2015    TRIG 338 (H) 08/30/2019    TRIG 156 06/01/2015    HDL 39 (L) 08/30/2019    HDL 37 06/01/2015     Imaging: No results found  Review of Systems:  Review of Systems   All other systems reviewed and are negative  As described in my history of present illness    Physical Exam:  Physical Exam   Constitutional: He is oriented to person, place, and time   He appears well-developed and well-nourished  No distress  Not in any distress at the current time  Unfortunately started smoking again   HENT:   Head: Normocephalic and atraumatic  Right Ear: External ear normal    Left Ear: External ear normal    Nose: Nose normal    Mouth/Throat: Uvula is midline and mucous membranes are normal    Posterior pharynx is crowded   Eyes: Pupils are equal, round, and reactive to light  Conjunctivae, EOM and lids are normal  No scleral icterus  No pallor  No cyanosis  No icterus   Neck: Trachea normal and normal range of motion  Neck supple  No JVD present  Carotid bruit is not present  No thyromegaly present  No jugular lymphadenopathy   Cardiovascular: Normal rate, regular rhythm, S1 normal, S2 normal, normal heart sounds, intact distal pulses and normal pulses  PMI is not displaced  Exam reveals no gallop, no S3, no S4 and no friction rub  No murmur heard  Pulmonary/Chest: Effort normal  No accessory muscle usage  No respiratory distress  He has decreased breath sounds in the right upper field, the right middle field, the right lower field, the left upper field, the left middle field and the left lower field  He has no wheezes  He has no rhonchi  He has no rales  He exhibits no tenderness  Abdominal: Soft  Normal appearance and bowel sounds are normal  He exhibits no distension and no mass  There is no splenomegaly or hepatomegaly  There is no tenderness  Central obesity present   Musculoskeletal: Normal range of motion  He exhibits edema  He exhibits no tenderness or deformity  Lymphadenopathy:     He has no cervical adenopathy  Neurological: He is alert and oriented to person, place, and time  Facial symmetry is retained  Extraocular movements are retained  Head neck tongue and palate movement are retained and symmetric   Skin: Skin is intact  No abrasion, no lesion and no rash noted  No erythema  Nails show no clubbing     Skin lesions of mycosis fungoides   Psychiatric: He has a normal mood and affect  His speech is normal and behavior is normal  Thought content normal    Vitals reviewed  Discussion/Summary:  1  Nonischemic cardiomyopathy, biventricular ICD  clinically stable, improving with aggressive management of CHF  undergoing cardiac rehab     he patient does have a history of nonischemic cardiomyopathy >15 years  He is currently relatively asymptomatic, though limited in physical activities      He is on a combined regimen of entresto, metoprolol succinate, torsemide   Continue with the same     Light headed with sudden orthostatic changes - recommend gradual changes  He needs diuresis to prevent heart failure symptoms           2  Premature ventricular contractions, ventricular tachycardia  PVCs are decreased today compared to prior EKG strips   None on rhythm strip - compared to only 1 every 10 beats last visit - and  1 every 3 beats previously      The pvc's were coming from the left ventricle, free wall, base most likely epicardial              3  smoking cessation  The patient finally quit smoking in October 2016    Patient has restarted smoking        4  Hypertension  Currently well controlled        5   Hyperlipidemia   On statin        Recommendation:  The patient is going to follow up with his primary cardiologist  No significant EP interventions at the current time      Scribe Attestation    I,:   Makayla Hall am acting as a scribe while in the presence of the attending physician :        I,:   Karen Pacheco MD personally performed the services described in this documentation    as scribed in my presence :

## 2019-12-06 DIAGNOSIS — C84.08 MYCOSIS FUNGOIDES INVOLVING LYMPH NODES OF MULTIPLE REGIONS (HCC): ICD-10-CM

## 2019-12-07 RX ORDER — BEXAROTENE 75 MG/1
CAPSULE, LIQUID FILLED ORAL
Qty: 240 CAPSULE | Refills: 3 | Status: SHIPPED | OUTPATIENT
Start: 2019-12-07 | End: 2020-04-06 | Stop reason: SDUPTHER

## 2020-01-24 ENCOUNTER — APPOINTMENT (OUTPATIENT)
Dept: LAB | Facility: CLINIC | Age: 75
End: 2020-01-24
Payer: COMMERCIAL

## 2020-01-24 DIAGNOSIS — C84.08 MYCOSIS FUNGOIDES INVOLVING LYMPH NODES OF MULTIPLE REGIONS (HCC): ICD-10-CM

## 2020-01-24 LAB
ALBUMIN SERPL BCP-MCNC: 3.3 G/DL (ref 3.5–5)
ALP SERPL-CCNC: 75 U/L (ref 46–116)
ALT SERPL W P-5'-P-CCNC: 48 U/L (ref 12–78)
ANION GAP SERPL CALCULATED.3IONS-SCNC: 5 MMOL/L (ref 4–13)
AST SERPL W P-5'-P-CCNC: 32 U/L (ref 5–45)
BASOPHILS # BLD AUTO: 0.06 THOUSANDS/ΜL (ref 0–0.1)
BASOPHILS NFR BLD AUTO: 1 % (ref 0–1)
BILIRUB SERPL-MCNC: 0.35 MG/DL (ref 0.2–1)
BUN SERPL-MCNC: 32 MG/DL (ref 5–25)
CALCIUM SERPL-MCNC: 8.1 MG/DL (ref 8.3–10.1)
CHLORIDE SERPL-SCNC: 109 MMOL/L (ref 100–108)
CHOLEST SERPL-MCNC: 232 MG/DL (ref 50–200)
CO2 SERPL-SCNC: 26 MMOL/L (ref 21–32)
CREAT SERPL-MCNC: 1.22 MG/DL (ref 0.6–1.3)
EOSINOPHIL # BLD AUTO: 0.16 THOUSAND/ΜL (ref 0–0.61)
EOSINOPHIL NFR BLD AUTO: 2 % (ref 0–6)
ERYTHROCYTE [DISTWIDTH] IN BLOOD BY AUTOMATED COUNT: 13.1 % (ref 11.6–15.1)
GFR SERPL CREATININE-BSD FRML MDRD: 59 ML/MIN/1.73SQ M
GLUCOSE P FAST SERPL-MCNC: 114 MG/DL (ref 65–99)
HCT VFR BLD AUTO: 40.8 % (ref 36.5–49.3)
HDLC SERPL-MCNC: 37 MG/DL
HGB BLD-MCNC: 12.7 G/DL (ref 12–17)
IMM GRANULOCYTES # BLD AUTO: 0.02 THOUSAND/UL (ref 0–0.2)
IMM GRANULOCYTES NFR BLD AUTO: 0 % (ref 0–2)
LDLC SERPL CALC-MCNC: 133 MG/DL (ref 0–100)
LYMPHOCYTES # BLD AUTO: 1.85 THOUSANDS/ΜL (ref 0.6–4.47)
LYMPHOCYTES NFR BLD AUTO: 28 % (ref 14–44)
MCH RBC QN AUTO: 31.4 PG (ref 26.8–34.3)
MCHC RBC AUTO-ENTMCNC: 31.1 G/DL (ref 31.4–37.4)
MCV RBC AUTO: 101 FL (ref 82–98)
MONOCYTES # BLD AUTO: 0.61 THOUSAND/ΜL (ref 0.17–1.22)
MONOCYTES NFR BLD AUTO: 9 % (ref 4–12)
NEUTROPHILS # BLD AUTO: 3.85 THOUSANDS/ΜL (ref 1.85–7.62)
NEUTS SEG NFR BLD AUTO: 60 % (ref 43–75)
NONHDLC SERPL-MCNC: 195 MG/DL
NRBC BLD AUTO-RTO: 0 /100 WBCS
PLATELET # BLD AUTO: 290 THOUSANDS/UL (ref 149–390)
PMV BLD AUTO: 10.7 FL (ref 8.9–12.7)
POTASSIUM SERPL-SCNC: 4.6 MMOL/L (ref 3.5–5.3)
PROT SERPL-MCNC: 7.4 G/DL (ref 6.4–8.2)
RBC # BLD AUTO: 4.04 MILLION/UL (ref 3.88–5.62)
SODIUM SERPL-SCNC: 140 MMOL/L (ref 136–145)
TRIGL SERPL-MCNC: 312 MG/DL
TSH SERPL DL<=0.05 MIU/L-ACNC: 0.67 UIU/ML (ref 0.36–3.74)
WBC # BLD AUTO: 6.55 THOUSAND/UL (ref 4.31–10.16)

## 2020-01-24 PROCEDURE — 36415 COLL VENOUS BLD VENIPUNCTURE: CPT

## 2020-01-24 PROCEDURE — 84443 ASSAY THYROID STIM HORMONE: CPT

## 2020-01-24 PROCEDURE — 80053 COMPREHEN METABOLIC PANEL: CPT

## 2020-01-24 PROCEDURE — 80061 LIPID PANEL: CPT

## 2020-01-24 PROCEDURE — 85025 COMPLETE CBC W/AUTO DIFF WBC: CPT

## 2020-01-29 ENCOUNTER — OFFICE VISIT (OUTPATIENT)
Dept: DERMATOLOGY | Facility: CLINIC | Age: 75
End: 2020-01-29
Payer: COMMERCIAL

## 2020-01-29 DIAGNOSIS — L82.1 SEBORRHEIC KERATOSIS: ICD-10-CM

## 2020-01-29 DIAGNOSIS — L57.0 ACTINIC KERATOSIS: ICD-10-CM

## 2020-01-29 DIAGNOSIS — Z12.83 SCREENING FOR SKIN CANCER: ICD-10-CM

## 2020-01-29 DIAGNOSIS — Z85.828 HISTORY OF SKIN CANCER: ICD-10-CM

## 2020-01-29 DIAGNOSIS — C84.00 MYCOSIS FUNGOIDES, UNSPECIFIED BODY REGION (HCC): Primary | ICD-10-CM

## 2020-01-29 DIAGNOSIS — Z13.89 SCREENING FOR SKIN CONDITION: ICD-10-CM

## 2020-01-29 PROCEDURE — 1160F RVW MEDS BY RX/DR IN RCRD: CPT | Performed by: DERMATOLOGY

## 2020-01-29 PROCEDURE — 17003 DESTRUCT PREMALG LES 2-14: CPT | Performed by: DERMATOLOGY

## 2020-01-29 PROCEDURE — 99214 OFFICE O/P EST MOD 30 MIN: CPT | Performed by: DERMATOLOGY

## 2020-01-29 PROCEDURE — 17000 DESTRUCT PREMALG LESION: CPT | Performed by: DERMATOLOGY

## 2020-01-29 NOTE — PATIENT INSTRUCTIONS
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 continue Targretin continue to monitor blood work every 3 months blood work obtained recently was without any real problems thyroid functions under good control  Actinic Keratosis:  Patient advised lesions are precancers  These should resolve with cryosurgery if not to let us know sun block recommended  Seborrheic keratosis patient reassured these are normal growths we acquire with age no treatment needed  History of skin cancer in no recurrence nothing else atypical sunblock recommended follow-up in 3 months  Screening for dermatologic disorders nothing else of concern noted on complete exam follow-up in 3 months  Treatment with Cryotherapy    The doctor has treated your skin with nitrogen, which is 320 degrees Fahrenheit below zero  He has given the treated area "frostbite "    Stinging should subside within a few hours  You can take Tylenol for pain, if needed  Over the next few days, it is normal if the area becomes reddened, a blood blister, or swollen with fluid  If the lesion treated was near the eye - you could get a swollen eye over the next few days  Do not panic! This is all temporary, and will resolve with time  There is no special treatment - just keep the area clean  Makeup and BandAids can be used, if preferred  When the area starts to dry up and peel off, using Vaseline can help healing  It usually takes up to a month for it to heal   Some lesions are recurrent and may require repeat treatments  If a lesion has not healed in one month, please don't hesitate to contact us  If you have any further questions that are not answered here, please call us  [phone]    Thank you for allowing us to care for you

## 2020-01-29 NOTE — PROGRESS NOTES
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 as well as history of skin cancer presents for overall checkup patient notes no change tolerating medications without problem notes some scaling areas on his hands and left forearm previous history of actinic keratosis and squamous cell carcinoma  Past Medical History:   Diagnosis Date    Agent orange exposure     Anemia     Benign colon polyp     BPH (benign prostatic hyperplasia)     Bradycardia     Cardiomyopathy (HCC)     Chest discomfort     Chronic bronchitis (HCC)     [facility] )     LAST ASSESSED: 17    H/O nonmelanoma skin cancer     LAST ASSESSED: 17    HHD (hypertensive heart disease)     HTN (hypertension)     Hx of lymphoma     Hyperlipidemia     ICD (implantable cardioverter-defibrillator) in place     Insomnia     Mycosis fungoides (HCC)     PVC (premature ventricular contraction)     PVT (paroxysmal ventricular tachycardia) (HCC)     SOB (shortness of breath)      Past Surgical History:   Procedure Laterality Date    CARDIAC PACEMAKER PLACEMENT      SKIN SURGERY      skin cancer removal      Social History   Social History     Substance and Sexual Activity   Alcohol Use Yes    Comment: vodka nightly     Social History     Substance and Sexual Activity   Drug Use No     Social History     Tobacco Use   Smoking Status Current Every Day Smoker    Packs/day: 0 50    Years: 50 00    Pack years: 25 00    Types: Cigarettes   Smokeless Tobacco Never Used     Family History   Problem Relation Age of Onset    Diabetes Mother     Colon cancer Father DIAGNOSED IN HIS 80'S    Heart failure Father     Coronary artery disease Father     Diabetes Family         MELLITUS    Heart attack Neg Hx     Stroke Neg Hx     Anuerysm Neg Hx     Clotting disorder Neg Hx     Arrhythmia Neg Hx     Hypertension Neg Hx         pt unsure     Hyperlipidemia Neg Hx     Sudden death Neg Hx         scd     Meds/Allergies   No Known Allergies    Meds:  Prior to Admission medications    Medication Sig Start Date End Date Taking?  Authorizing Provider   albuterol (2 5 mg/3 mL) 0 083 % nebulizer solution USE 1 VIAL VIA NEBULIZER 4 TIMES A DAY AS NEEDED 8/22/19   Remy Fleming PA-C   albuterol (PROVENTIL HFA) 90 mcg/act inhaler Inhale 2 puffs every 4 (four) hours as needed for wheezing    Historical Provider, MD   aspirin 81 MG tablet Take 81 mg by mouth daily    Historical Provider, MD   atorvastatin (LIPITOR) 40 mg tablet Take 1 tablet (40 mg total) by mouth daily 9/24/19   Stevo Weston MD   bexarotene (TARGRETIN) 75 mg capsule TAKE 8 CAPSULES BY MOUTH  DAILY 12/7/19   Kristyn Zambrano MD   Cholecalciferol ([employer]S VITAMIN D3) 1000 units capsule Take 1 capsule by mouth daily    Historical Provider, MD   cyanocobalamin (VITAMIN B-12) 1,000 mcg tablet Take 1,000 mcg by mouth daily    Historical Provider, MD   ferrous sulfate 324 (65 Fe) mg Take 1 tablet by mouth Twice daily 8/31/15   Historical Provider, MD   gabapentin (NEURONTIN) 100 mg capsule TAKE 1 CAPSULE BY MOUTH THREE TIMES A DAY 5/3/19   Rocio Bridges PA-C   levothyroxine 112 mcg tablet TAKE 1 TABLET BY MOUTH  DAILY 8/13/19   Rocio Bridges PA-C   metoprolol succinate (TOPROL-XL) 50 mg 24 hr tablet Take 1 tablet (50 mg total) by mouth every 12 (twelve) hours 5/30/19   Alexandre Chu MD   PAZEO 0 7 % SOLN  8/8/18   Historical Provider, MD   potassium chloride (K-DUR,KLOR-CON) 20 mEq tablet Take 1 tablet by mouth daily 2/1/16   Historical Provider, MD   sacubitril-valsartan (ENTRESTO) 49-51 MG TABS Take 1 tablet by mouth 2 (two) times a day 5/30/19   Jacki Mixon MD   torsemide [facility]) 20 mg tablet Take 1 tablet by mouth daily  Patient taking differently: Take 10 mg by mouth daily  9/4/17   SHRUTHI Tiwari 100-62 5-25 MCG/INH inhaler Inhale 1 puff daily Rinse mouth after use   9/19/19   Sri Manzanares PA-C   triamcinolone (KENALOG) 0 1 % ointment Apply topically 2 (two) times 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 2/6/18   Tarun Villafana MD   zolpidem Olita Ramachandran) 10 mg tablet 1 tablet at bedtime 8/30/19   Florecita Aguilar MD   nicotine (NICODERM CQ) 21 mg/24 hr TD 24 hr patch Place 1 patch on the skin every 24 hours  12/5/19  Historical Provider, MD       Subjective:     Review of Systems:    General: negative for - chills, fatigue, fever,  weight gain or weight loss  Psychological: negative for - anxiety, behavioral disorder, concentration difficulties, decreased libido, depression, irritability, memory difficulties, mood swings, sleep disturbances or suicidal ideation  ENT: negative for - hearing difficulties , nasal congestion, nasal discharge, oral lesions, sinus pain, sneezing, sore throat  Allergy and Immunology: negative for - hives, insect bite sensitivity,  Hematological and Lymphatic: negative for - bleeding problems, blood clots,bruising, swollen lymph nodes  Endocrine: negative for - hair pattern changes, hot flashes, malaise/lethargy, mood swings, palpitations, polydipsia/polyuria, skin changes, temperature intolerance or unexpected weight change  Respiratory: negative for - cough, hemoptysis, orthopnea, shortness of breath, or wheezing  Cardiovascular: negative for - chest pain, dyspnea on exertion, edema,  Gastrointestinal: negative for - abdominal pain, nausea/vomiting  Genito-Urinary: negative for - dysuria, incontinence, irregular/heavy menses or urinary frequency/urgency  Musculoskeletal: negative for - gait disturbance, joint pain, joint stiffness, joint swelling, muscle pain, muscular weakness  Dermatological:  As in HPI  Neurological: negative for confusion, dizziness, headaches, impaired coordination/balance, memory loss, numbness/tingling, seizures, speech problems, tremors or weakness       Objective: There were no vitals taken for this visit  Physical Exam:    General Appearance:    Alert, cooperative, no distress   Head:    Normocephalic, without obvious abnormality, atraumatic   Lymphatics:    No lymphadenopathy noted      Abdomen:   No hepatosplenomegaly   Skin:   A full skin exam was performed including scalp, head scalp, eyes, ears, nose, lips, neck, chest, axilla, abdomen, back, buttocks, bilateral upper extremities, bilateral lower extremities, hands, feet, fingers, toes, fingernails, and toenails patchy scaly patches noted on 50% of body surface area no progression noted no signs of any real plaques at this time in addition there is scaling keratotic areas noted on the left hand and left upper arm as below normal keratotic papules greasy stuck on appearance previous sites skin cancer well healed without recurrence nothing else remarkable on exam  Physical Exam   Constitutional:          Cryotherapy Procedure Note    Pre-operative Diagnosis: actinic keratosis    Plan:  1  Instructed to keep the area dry and clean thereafter  Apply petrolatum if area gets crusty  2  Recommended that the patient use acetaminophen  as needed for pain  Locations:  Left dorsum of the hand left upper arm    Indications: Destruction of actinic keratosis x2    Patient informed of risks (permanent scarring, infection, light or dark discoloration, bleeding, infection, weakness, numbness and recurrence of the lesion) and benefits of the procedure and verbal informed consent obtained  The areas are treated with liquid nitrogen therapy, frozen until ice ball extended 2 mm beyond lesion, allowed to thaw, and treated again  The patient tolerated procedure well    The patient was instructed on post-op care, warned that there may be blister formation, redness and pain  Recommend OTC analgesia as needed for pain  Condition:  Stable    Complications:  none  Assessment:     1  Mycosis fungoides, unspecified body region ([address] )     2  Actinic keratosis     3  History of skin cancer     4  Screening for skin cancer     5  Screening for skin condition     6  Seborrheic keratosis           Plan:   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 continue Targretin continue to monitor blood work every 3 months blood work obtained recently was without any real problems thyroid functions under good control  Actinic Keratosis:  Patient advised lesions are precancers  These should resolve with cryosurgery if not to let us know sun block recommended  Seborrheic keratosis patient reassured these are normal growths we acquire with age no treatment needed  History of skin cancer in no recurrence nothing else atypical sunblock recommended follow-up in 3 months  Screening for dermatologic disorders nothing else of concern noted on complete exam follow-up in 3 months    Jovani Gant MD  1/29/2020,2:00 PM    Portions of the record may have been created with voice recognition software   Occasional wrong word or "sound a like" substitutions may have occurred due to the inherent limitations of voice recognition software   Read the chart carefully and recognize, using context, where substitutions have occurred

## 2020-02-19 ENCOUNTER — REMOTE DEVICE CLINIC VISIT (OUTPATIENT)
Dept: CARDIOLOGY CLINIC | Facility: CLINIC | Age: 75
End: 2020-02-19
Payer: COMMERCIAL

## 2020-02-19 DIAGNOSIS — Z95.810 PRESENCE OF IMPLANTABLE CARDIOVERTER-DEFIBRILLATOR (ICD): Primary | ICD-10-CM

## 2020-02-19 PROCEDURE — 93296 REM INTERROG EVL PM/IDS: CPT | Performed by: INTERNAL MEDICINE

## 2020-02-19 PROCEDURE — 93295 DEV INTERROG REMOTE 1/2/MLT: CPT | Performed by: INTERNAL MEDICINE

## 2020-02-19 NOTE — PROGRESS NOTES
MDT/BIV-ICD  CARELINK TRANSMISSION:  BATTERY VOLTAGE ADEQUATE (5 7 YR)   AP 98 3% BP 97 3% (VSRP 1 6%)   ALL LEAD PARAMETERS WITHIN NORMAL LIMITS   5 VT-NS EPISODES WITH 3 EGMS SHOWING NSVT (9 @ 171 BPM, 8 @ 159 BPM, 8 @ 165 BPM), AND 2 EGMS SHOWING PAT (10 @ 150 BPM, 21 @ 153 BPM)    23 V SENSE EPISODES WITH MARKERS SHOWING PAT AND NSVT (#128 - 12 @ 133 BPM)   OPTI-VOL WITHIN NORMAL LIMITS   NORMAL DEVICE FUNCTION   RG

## 2020-03-24 DIAGNOSIS — I50.22 CHRONIC SYSTOLIC CONGESTIVE HEART FAILURE (HCC): Primary | ICD-10-CM

## 2020-03-24 RX ORDER — POTASSIUM CHLORIDE 20 MEQ/1
20 TABLET, EXTENDED RELEASE ORAL DAILY
Qty: 90 TABLET | Refills: 1 | Status: SHIPPED | OUTPATIENT
Start: 2020-03-24 | End: 2020-08-17

## 2020-03-24 NOTE — TELEPHONE ENCOUNTER
----- Message from Valeri Son sent at 3/24/2020 11:32 AM EDT -----  Regarding: Prescription Question  Contact: [phone]  Hi,  I have run out of Pot Chlor Er (Klor-Con 20MG) could you please refill using [facility] mail order    Thank you,  Gilford Gash

## 2020-03-30 DIAGNOSIS — I10 ESSENTIAL HYPERTENSION: ICD-10-CM

## 2020-03-30 DIAGNOSIS — I50.22 CHRONIC SYSTOLIC CONGESTIVE HEART FAILURE (HCC): ICD-10-CM

## 2020-03-31 RX ORDER — METOPROLOL SUCCINATE 50 MG/1
TABLET, EXTENDED RELEASE ORAL
Qty: 180 TABLET | Refills: 3 | Status: SHIPPED | OUTPATIENT
Start: 2020-03-31 | End: 2021-02-26 | Stop reason: SDUPTHER

## 2020-03-31 RX ORDER — SACUBITRIL AND VALSARTAN 49; 51 MG/1; MG/1
TABLET, FILM COATED ORAL
Qty: 180 TABLET | Refills: 3 | Status: SHIPPED | OUTPATIENT
Start: 2020-03-31 | End: 2021-02-26 | Stop reason: SDUPTHER

## 2020-04-06 DIAGNOSIS — C84.08 MYCOSIS FUNGOIDES INVOLVING LYMPH NODES OF MULTIPLE REGIONS (HCC): ICD-10-CM

## 2020-04-06 RX ORDER — BEXAROTENE 75 MG/1
600 CAPSULE, LIQUID FILLED ORAL DAILY
Qty: 240 CAPSULE | Refills: 1 | Status: SHIPPED | OUTPATIENT
Start: 2020-04-06 | End: 2020-06-30 | Stop reason: SDUPTHER

## 2020-04-16 ENCOUNTER — TELEPHONE (OUTPATIENT)
Dept: CARDIOLOGY CLINIC | Facility: CLINIC | Age: 75
End: 2020-04-16

## 2020-04-16 ENCOUNTER — TELEMEDICINE (OUTPATIENT)
Dept: CARDIOLOGY CLINIC | Facility: CLINIC | Age: 75
End: 2020-04-16
Payer: COMMERCIAL

## 2020-04-16 VITALS — TEMPERATURE: 96.5 F | WEIGHT: 245 LBS | BODY MASS INDEX: 34.17 KG/M2

## 2020-04-16 DIAGNOSIS — I50.22 CHRONIC SYSTOLIC CONGESTIVE HEART FAILURE (HCC): Primary | ICD-10-CM

## 2020-04-16 DIAGNOSIS — I10 ESSENTIAL HYPERTENSION: ICD-10-CM

## 2020-04-16 DIAGNOSIS — Z95.810 PRESENCE OF IMPLANTABLE CARDIOVERTER-DEFIBRILLATOR (ICD): ICD-10-CM

## 2020-04-16 DIAGNOSIS — I42.0 DILATED CARDIOMYOPATHY (HCC): ICD-10-CM

## 2020-04-16 DIAGNOSIS — E66.9 OBESITY (BMI 30-39.9): ICD-10-CM

## 2020-04-16 DIAGNOSIS — E78.2 MIXED HYPERLIPIDEMIA: ICD-10-CM

## 2020-04-16 PROCEDURE — G2012 BRIEF CHECK IN BY MD/QHP: HCPCS | Performed by: INTERNAL MEDICINE

## 2020-04-29 ENCOUNTER — TELEMEDICINE (OUTPATIENT)
Dept: DERMATOLOGY | Facility: CLINIC | Age: 75
End: 2020-04-29
Payer: COMMERCIAL

## 2020-04-29 DIAGNOSIS — Z13.89 SCREENING FOR SKIN CONDITION: ICD-10-CM

## 2020-04-29 DIAGNOSIS — L98.9 UNKNOWN SKIN LESION: ICD-10-CM

## 2020-04-29 DIAGNOSIS — C84.08 MYCOSIS FUNGOIDES INVOLVING LYMPH NODES OF MULTIPLE REGIONS (HCC): Primary | ICD-10-CM

## 2020-04-29 DIAGNOSIS — Z85.828 HISTORY OF SKIN CANCER: ICD-10-CM

## 2020-04-29 PROCEDURE — 99441 PR PHYS/QHP TELEPHONE EVALUATION 5-10 MIN: CPT | Performed by: DERMATOLOGY

## 2020-04-30 DIAGNOSIS — G62.9 NEUROPATHY: ICD-10-CM

## 2020-04-30 RX ORDER — GABAPENTIN 100 MG/1
CAPSULE ORAL
Qty: 270 CAPSULE | Refills: 3 | Status: SHIPPED | OUTPATIENT
Start: 2020-04-30 | End: 2020-05-26 | Stop reason: SDUPTHER

## 2020-05-26 DIAGNOSIS — G47.00 INSOMNIA, UNSPECIFIED TYPE: ICD-10-CM

## 2020-05-26 DIAGNOSIS — G62.9 NEUROPATHY: ICD-10-CM

## 2020-05-26 RX ORDER — GABAPENTIN 100 MG/1
100 CAPSULE ORAL 3 TIMES DAILY
Qty: 270 CAPSULE | Refills: 3 | Status: SHIPPED | OUTPATIENT
Start: 2020-05-26 | End: 2021-10-29 | Stop reason: SDUPTHER

## 2020-05-26 RX ORDER — ZOLPIDEM TARTRATE 10 MG/1
TABLET ORAL
Qty: 90 TABLET | Refills: 1 | Status: SHIPPED | OUTPATIENT
Start: 2020-05-26 | End: 2020-11-17

## 2020-05-27 ENCOUNTER — REMOTE DEVICE CLINIC VISIT (OUTPATIENT)
Dept: CARDIOLOGY CLINIC | Facility: CLINIC | Age: 75
End: 2020-05-27
Payer: COMMERCIAL

## 2020-05-27 DIAGNOSIS — Z95.810 AICD (AUTOMATIC CARDIOVERTER/DEFIBRILLATOR) PRESENT: Primary | ICD-10-CM

## 2020-05-27 PROCEDURE — 93295 DEV INTERROG REMOTE 1/2/MLT: CPT | Performed by: INTERNAL MEDICINE

## 2020-05-27 PROCEDURE — 93296 REM INTERROG EVL PM/IDS: CPT | Performed by: INTERNAL MEDICINE

## 2020-06-10 ENCOUNTER — TELEPHONE (OUTPATIENT)
Dept: DERMATOLOGY | Facility: CLINIC | Age: 75
End: 2020-06-10

## 2020-06-11 ENCOUNTER — OFFICE VISIT (OUTPATIENT)
Dept: DERMATOLOGY | Facility: CLINIC | Age: 75
End: 2020-06-11
Payer: COMMERCIAL

## 2020-06-11 DIAGNOSIS — L82.1 SEBORRHEIC KERATOSIS: ICD-10-CM

## 2020-06-11 DIAGNOSIS — L98.9 UNKNOWN SKIN LESION: Primary | ICD-10-CM

## 2020-06-11 DIAGNOSIS — Z85.828 HISTORY OF SKIN CANCER: ICD-10-CM

## 2020-06-11 DIAGNOSIS — Z12.83 SCREENING FOR SKIN CANCER: ICD-10-CM

## 2020-06-11 DIAGNOSIS — Z13.89 SCREENING FOR SKIN CONDITION: ICD-10-CM

## 2020-06-11 PROCEDURE — 99213 OFFICE O/P EST LOW 20 MIN: CPT | Performed by: DERMATOLOGY

## 2020-06-11 PROCEDURE — 3078F DIAST BP <80 MM HG: CPT | Performed by: DERMATOLOGY

## 2020-06-11 PROCEDURE — 1160F RVW MEDS BY RX/DR IN RCRD: CPT | Performed by: DERMATOLOGY

## 2020-06-11 PROCEDURE — 11102 TANGNTL BX SKIN SINGLE LES: CPT | Performed by: DERMATOLOGY

## 2020-06-11 PROCEDURE — 88305 TISSUE EXAM BY PATHOLOGIST: CPT | Performed by: STUDENT IN AN ORGANIZED HEALTH CARE EDUCATION/TRAINING PROGRAM

## 2020-06-11 PROCEDURE — 3074F SYST BP LT 130 MM HG: CPT | Performed by: DERMATOLOGY

## 2020-06-11 PROCEDURE — 4040F PNEUMOC VAC/ADMIN/RCVD: CPT | Performed by: DERMATOLOGY

## 2020-06-18 DIAGNOSIS — E03.2 HYPOTHYROIDISM DUE TO MEDICATION: ICD-10-CM

## 2020-06-19 RX ORDER — LEVOTHYROXINE SODIUM 112 UG/1
TABLET ORAL
Qty: 90 TABLET | Refills: 3 | Status: SHIPPED | OUTPATIENT
Start: 2020-06-19 | End: 2021-06-21

## 2020-06-22 ENCOUNTER — TELEPHONE (OUTPATIENT)
Dept: PULMONOLOGY | Facility: CLINIC | Age: 75
End: 2020-06-22

## 2020-06-22 DIAGNOSIS — J44.9 CHRONIC OBSTRUCTIVE PULMONARY DISEASE, UNSPECIFIED COPD TYPE (HCC): ICD-10-CM

## 2020-06-22 RX ORDER — ALBUTEROL SULFATE 2.5 MG/3ML
SOLUTION RESPIRATORY (INHALATION)
Qty: 1080 ML | Refills: 3 | Status: SHIPPED | OUTPATIENT
Start: 2020-06-22

## 2020-06-23 ENCOUNTER — TELEPHONE (OUTPATIENT)
Dept: PULMONOLOGY | Facility: CLINIC | Age: 75
End: 2020-06-23

## 2020-06-24 ENCOUNTER — OFFICE VISIT (OUTPATIENT)
Dept: PULMONOLOGY | Facility: CLINIC | Age: 75
End: 2020-06-24
Payer: COMMERCIAL

## 2020-06-24 VITALS
OXYGEN SATURATION: 96 % | DIASTOLIC BLOOD PRESSURE: 80 MMHG | BODY MASS INDEX: 33.88 KG/M2 | HEIGHT: 71 IN | TEMPERATURE: 98.4 F | WEIGHT: 242 LBS | HEART RATE: 86 BPM | SYSTOLIC BLOOD PRESSURE: 136 MMHG

## 2020-06-24 DIAGNOSIS — I42.0 DILATED CARDIOMYOPATHY (HCC): ICD-10-CM

## 2020-06-24 DIAGNOSIS — I10 ESSENTIAL HYPERTENSION: ICD-10-CM

## 2020-06-24 DIAGNOSIS — J44.9 COPD, SEVERE (HCC): Primary | ICD-10-CM

## 2020-06-24 DIAGNOSIS — R91.1 PULMONARY NODULE: ICD-10-CM

## 2020-06-24 DIAGNOSIS — E66.9 OBESITY (BMI 30-39.9): ICD-10-CM

## 2020-06-24 DIAGNOSIS — Z72.0 TOBACCO ABUSE: ICD-10-CM

## 2020-06-24 PROCEDURE — 1160F RVW MEDS BY RX/DR IN RCRD: CPT | Performed by: PHYSICIAN ASSISTANT

## 2020-06-24 PROCEDURE — 3079F DIAST BP 80-89 MM HG: CPT | Performed by: PHYSICIAN ASSISTANT

## 2020-06-24 PROCEDURE — 3008F BODY MASS INDEX DOCD: CPT | Performed by: PHYSICIAN ASSISTANT

## 2020-06-24 PROCEDURE — 3075F SYST BP GE 130 - 139MM HG: CPT | Performed by: PHYSICIAN ASSISTANT

## 2020-06-24 PROCEDURE — 99214 OFFICE O/P EST MOD 30 MIN: CPT | Performed by: PHYSICIAN ASSISTANT

## 2020-06-24 PROCEDURE — 4040F PNEUMOC VAC/ADMIN/RCVD: CPT | Performed by: PHYSICIAN ASSISTANT

## 2020-06-24 RX ORDER — ALBUTEROL SULFATE 90 UG/1
2 AEROSOL, METERED RESPIRATORY (INHALATION) EVERY 4 HOURS PRN
Qty: 1 INHALER | Refills: 3 | Status: SHIPPED | OUTPATIENT
Start: 2020-06-24 | End: 2020-10-27 | Stop reason: SDUPTHER

## 2020-06-29 ENCOUNTER — TELEPHONE (OUTPATIENT)
Dept: DERMATOLOGY | Facility: CLINIC | Age: 75
End: 2020-06-29

## 2020-06-30 ENCOUNTER — PROCEDURE VISIT (OUTPATIENT)
Dept: DERMATOLOGY | Facility: CLINIC | Age: 75
End: 2020-06-30
Payer: COMMERCIAL

## 2020-06-30 VITALS — TEMPERATURE: 97.6 F

## 2020-06-30 DIAGNOSIS — C84.08 MYCOSIS FUNGOIDES INVOLVING LYMPH NODES OF MULTIPLE REGIONS (HCC): ICD-10-CM

## 2020-06-30 DIAGNOSIS — C44.629 SQUAMOUS CELL CARCINOMA OF DORSUM OF LEFT HAND: Primary | ICD-10-CM

## 2020-06-30 PROCEDURE — 12042 INTMD RPR N-HF/GENIT2.6-7.5: CPT | Performed by: DERMATOLOGY

## 2020-06-30 PROCEDURE — 88305 TISSUE EXAM BY PATHOLOGIST: CPT | Performed by: STUDENT IN AN ORGANIZED HEALTH CARE EDUCATION/TRAINING PROGRAM

## 2020-06-30 PROCEDURE — 11623 EXC S/N/H/F/G MAL+MRG 2.1-3: CPT | Performed by: DERMATOLOGY

## 2020-06-30 RX ORDER — BEXAROTENE 75 MG/1
600 CAPSULE, LIQUID FILLED ORAL DAILY
Qty: 240 CAPSULE | Refills: 3 | Status: SHIPPED | OUTPATIENT
Start: 2020-06-30 | End: 2020-06-30 | Stop reason: SDUPTHER

## 2020-06-30 RX ORDER — BEXAROTENE 75 MG/1
600 CAPSULE, LIQUID FILLED ORAL DAILY
Qty: 240 CAPSULE | Refills: 3 | Status: SHIPPED | OUTPATIENT
Start: 2020-06-30 | End: 2020-11-21 | Stop reason: SDUPTHER

## 2020-07-08 ENCOUNTER — TELEPHONE (OUTPATIENT)
Dept: DERMATOLOGY | Facility: CLINIC | Age: 75
End: 2020-07-08

## 2020-07-09 ENCOUNTER — OFFICE VISIT (OUTPATIENT)
Dept: DERMATOLOGY | Facility: CLINIC | Age: 75
End: 2020-07-09

## 2020-07-09 VITALS — TEMPERATURE: 97.7 F

## 2020-07-09 DIAGNOSIS — C44.629 SQUAMOUS CELL CARCINOMA OF DORSUM OF LEFT HAND: Primary | ICD-10-CM

## 2020-07-09 PROCEDURE — 4040F PNEUMOC VAC/ADMIN/RCVD: CPT | Performed by: DERMATOLOGY

## 2020-07-09 PROCEDURE — 1160F RVW MEDS BY RX/DR IN RCRD: CPT | Performed by: DERMATOLOGY

## 2020-07-09 PROCEDURE — 99024 POSTOP FOLLOW-UP VISIT: CPT | Performed by: DERMATOLOGY

## 2020-07-09 PROCEDURE — 3075F SYST BP GE 130 - 139MM HG: CPT | Performed by: DERMATOLOGY

## 2020-07-09 PROCEDURE — 3079F DIAST BP 80-89 MM HG: CPT | Performed by: DERMATOLOGY

## 2020-07-09 NOTE — PROGRESS NOTES
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 no problems noted     Objective:   Temp 97 7 °F (36 5 °C)     Physical Exam:    General Appearance:    Alert, cooperative, no distress   Skin:   Previous site of excision healing well  Procedure:  Suture removal  Site of excision:  Dorsum of left hand  Wound was cleansed with saline  Wound is intact  Sutures removed  Steri-Strips applied  Biopsy revealed squamous cell carcinoma margins clear     Assessment:     1  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  Authorizing Provider   albuterol (2 5 mg/3 mL) 0 083 % nebulizer solution USE 1 VIAL VIA NEBULIZER 4 TIMES A DAY AS NEEDED 20  Yes Jolene Willis PA-C   albuterol (Proventil HFA) 90 mcg/act inhaler Inhale 2 puffs every 4 (four) hours as needed for wheezing 20  Yes Jolene Willis PA-C   aspirin 81 MG tablet Take 81 mg by mouth daily   Yes Historical Provider, MD   atorvastatin (LIPITOR) 40 mg tablet Take 1 tablet (40 mg total) by mouth daily 19  Yes Ju Wallis MD   bexarotene (TARGRETIN) 75 mg capsule Take 8 capsules (600 mg total) by mouth daily 20  Yes Jamir Bentley MD   Cholecalciferol (CVS VITAMIN D3) 1000 units capsule Take 1 capsule by mouth daily   Yes Historical Provider, MD   cyanocobalamin (VITAMIN B-12) 1,000 mcg tablet Take 1,000 mcg by mouth daily   Yes Historical Provider, MD   ENTRESTO 49-51 MG TABS TAKE 1 TABLET BY MOUTH TWO  TIMES DAILY 3/31/20  Yes Jessica Barboza MD   ferrous sulfate 324 (65 Fe) mg Take 1 tablet by mouth Twice daily 8/31/15  Yes Historical Provider, MD   gabapentin (NEURONTIN) 100 mg capsule Take 1 capsule (100 mg total) by mouth 3 (three) times a day 5/26/20  Yes Beth Grullon MD   levothyroxine 112 mcg tablet TAKE 1 TABLET BY MOUTH  DAILY 6/19/20  Yes Kuldip Hobbs PA-C   metoprolol succinate (TOPROL-XL) 50 mg 24 hr tablet TAKE 1 TABLET BY MOUTH  EVERY 12 HOURS 3/31/20  Yes Maya Ponce MD   PAZEO 0 7 % SOLN  8/8/18  Yes Historical Provider, MD   potassium chloride (K-DUR,KLOR-CON) 20 mEq tablet Take 1 tablet (20 mEq total) by mouth daily 3/24/20  Yes Jessica Jenkins MD   torsemide (DEMADEX) 20 mg tablet Take 1 tablet by mouth daily  Patient taking differently: Take 10 mg by mouth daily  9/4/17  Yes SHRUTHI Castellanos ELLIPTA 100-62 5-25 MCG/INH inhaler Inhale 1 puff daily Rinse mouth after use  9/19/19  Yes Félix Denton PA-C   triamcinolone (KENALOG) 0 1 % ointment Apply topically 2 (two) times 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 2/6/18  Yes Deedee Peraza MD   zolpidem (AMBIEN) 10 mg tablet 1 tablet at bedtime 5/26/20  Yes Beth Grullon MD   nicotine (NICODERM CQ) 21 mg/24 hr TD 24 hr patch Place 1 patch on the skin every 24 hours  12/5/19  Historical Provider, MD     No Known Allergies    Deedee Peraza MD  7/9/2020,1:18 PM    Portions of the record may have been created with voice recognition software   Occasional wrong word or "sound a like" substitutions may have occurred due to the inherent limitations of voice recognition software   Read the chart carefully and recognize, using context, where substitutions have occurred

## 2020-07-09 NOTE — PATIENT INSTRUCTIONS
Wound care instructions given to patient  STERI-STRIPS (BUTTERFLY) POST SURGERY        Steri-Strips have been placed over your incision site to give added support  Please continue to bathe the area as usual     Eventually the Steri-Strips will begin to peel off on the ends  Snip the raised ends off with scissors and let the rest fall off on their own  If you have any questions, please call our office   [phone]

## 2020-07-10 DIAGNOSIS — J41.0 SIMPLE CHRONIC BRONCHITIS (HCC): ICD-10-CM

## 2020-07-10 RX ORDER — FLUTICASONE FUROATE, UMECLIDINIUM BROMIDE AND VILANTEROL TRIFENATATE 100; 62.5; 25 UG/1; UG/1; UG/1
POWDER RESPIRATORY (INHALATION)
Qty: 180 EACH | Refills: 3 | Status: SHIPPED | OUTPATIENT
Start: 2020-07-10 | End: 2021-06-01

## 2020-08-14 ENCOUNTER — IN-CLINIC DEVICE VISIT (OUTPATIENT)
Dept: CARDIOLOGY CLINIC | Facility: CLINIC | Age: 75
End: 2020-08-14
Payer: COMMERCIAL

## 2020-08-14 DIAGNOSIS — Z95.810 PRESENCE OF IMPLANTABLE CARDIOVERTER-DEFIBRILLATOR (ICD): Primary | ICD-10-CM

## 2020-08-14 PROCEDURE — 93284 PRGRMG EVAL IMPLANTABLE DFB: CPT | Performed by: INTERNAL MEDICINE

## 2020-08-15 DIAGNOSIS — I50.22 CHRONIC SYSTOLIC CONGESTIVE HEART FAILURE (HCC): ICD-10-CM

## 2020-08-17 RX ORDER — POTASSIUM CHLORIDE 20 MEQ/1
TABLET, EXTENDED RELEASE ORAL
Qty: 90 TABLET | Refills: 3 | Status: SHIPPED | OUTPATIENT
Start: 2020-08-17 | End: 2021-04-01 | Stop reason: SDUPTHER

## 2020-09-04 ENCOUNTER — APPOINTMENT (OUTPATIENT)
Dept: LAB | Facility: CLINIC | Age: 75
End: 2020-09-04
Payer: COMMERCIAL

## 2020-09-04 ENCOUNTER — OFFICE VISIT (OUTPATIENT)
Dept: INTERNAL MEDICINE CLINIC | Facility: CLINIC | Age: 75
End: 2020-09-04
Payer: COMMERCIAL

## 2020-09-04 VITALS
DIASTOLIC BLOOD PRESSURE: 60 MMHG | WEIGHT: 242 LBS | BODY MASS INDEX: 33.88 KG/M2 | OXYGEN SATURATION: 93 % | TEMPERATURE: 97.9 F | HEIGHT: 71 IN | HEART RATE: 82 BPM | SYSTOLIC BLOOD PRESSURE: 110 MMHG

## 2020-09-04 DIAGNOSIS — R35.0 BENIGN PROSTATIC HYPERPLASIA WITH URINARY FREQUENCY: ICD-10-CM

## 2020-09-04 DIAGNOSIS — I10 ESSENTIAL HYPERTENSION: Primary | ICD-10-CM

## 2020-09-04 DIAGNOSIS — I50.22 HYPERTENSIVE HEART DISEASE WITH CHRONIC SYSTOLIC CONGESTIVE HEART FAILURE (HCC): ICD-10-CM

## 2020-09-04 DIAGNOSIS — I42.0 DILATED CARDIOMYOPATHY (HCC): ICD-10-CM

## 2020-09-04 DIAGNOSIS — J41.0 SIMPLE CHRONIC BRONCHITIS (HCC): ICD-10-CM

## 2020-09-04 DIAGNOSIS — I10 ESSENTIAL HYPERTENSION: ICD-10-CM

## 2020-09-04 DIAGNOSIS — N40.1 BENIGN PROSTATIC HYPERPLASIA WITH URINARY FREQUENCY: ICD-10-CM

## 2020-09-04 DIAGNOSIS — I11.0 HYPERTENSIVE HEART DISEASE WITH CHRONIC SYSTOLIC CONGESTIVE HEART FAILURE (HCC): ICD-10-CM

## 2020-09-04 DIAGNOSIS — Z12.11 COLON CANCER SCREENING: ICD-10-CM

## 2020-09-04 DIAGNOSIS — Z72.0 TOBACCO ABUSE: ICD-10-CM

## 2020-09-04 LAB
ALBUMIN SERPL BCP-MCNC: 3.6 G/DL (ref 3.5–5)
ALP SERPL-CCNC: 75 U/L (ref 46–116)
ALT SERPL W P-5'-P-CCNC: 61 U/L (ref 12–78)
ANION GAP SERPL CALCULATED.3IONS-SCNC: 4 MMOL/L (ref 4–13)
AST SERPL W P-5'-P-CCNC: 43 U/L (ref 5–45)
BACTERIA UR QL AUTO: NORMAL /HPF
BASOPHILS # BLD AUTO: 0.07 THOUSANDS/ΜL (ref 0–0.1)
BASOPHILS NFR BLD AUTO: 1 % (ref 0–1)
BILIRUB SERPL-MCNC: 0.39 MG/DL (ref 0.2–1)
BILIRUB UR QL STRIP: NEGATIVE
BUN SERPL-MCNC: 24 MG/DL (ref 5–25)
CALCIUM SERPL-MCNC: 8.5 MG/DL (ref 8.3–10.1)
CHLORIDE SERPL-SCNC: 109 MMOL/L (ref 100–108)
CLARITY UR: CLEAR
CO2 SERPL-SCNC: 26 MMOL/L (ref 21–32)
COLOR UR: ABNORMAL
CREAT SERPL-MCNC: 1.13 MG/DL (ref 0.6–1.3)
EOSINOPHIL # BLD AUTO: 0.19 THOUSAND/ΜL (ref 0–0.61)
EOSINOPHIL NFR BLD AUTO: 2 % (ref 0–6)
ERYTHROCYTE [DISTWIDTH] IN BLOOD BY AUTOMATED COUNT: 13.1 % (ref 11.6–15.1)
GFR SERPL CREATININE-BSD FRML MDRD: 65 ML/MIN/1.73SQ M
GLUCOSE P FAST SERPL-MCNC: 119 MG/DL (ref 65–99)
GLUCOSE UR STRIP-MCNC: NEGATIVE MG/DL
HCT VFR BLD AUTO: 43.7 % (ref 36.5–49.3)
HGB BLD-MCNC: 13.3 G/DL (ref 12–17)
HGB UR QL STRIP.AUTO: NEGATIVE
HYALINE CASTS #/AREA URNS LPF: NORMAL /LPF
IMM GRANULOCYTES # BLD AUTO: 0.02 THOUSAND/UL (ref 0–0.2)
IMM GRANULOCYTES NFR BLD AUTO: 0 % (ref 0–2)
KETONES UR STRIP-MCNC: NEGATIVE MG/DL
LEUKOCYTE ESTERASE UR QL STRIP: NEGATIVE
LYMPHOCYTES # BLD AUTO: 1.76 THOUSANDS/ΜL (ref 0.6–4.47)
LYMPHOCYTES NFR BLD AUTO: 22 % (ref 14–44)
MCH RBC QN AUTO: 30.6 PG (ref 26.8–34.3)
MCHC RBC AUTO-ENTMCNC: 30.4 G/DL (ref 31.4–37.4)
MCV RBC AUTO: 101 FL (ref 82–98)
MONOCYTES # BLD AUTO: 0.45 THOUSAND/ΜL (ref 0.17–1.22)
MONOCYTES NFR BLD AUTO: 6 % (ref 4–12)
NEUTROPHILS # BLD AUTO: 5.37 THOUSANDS/ΜL (ref 1.85–7.62)
NEUTS SEG NFR BLD AUTO: 69 % (ref 43–75)
NITRITE UR QL STRIP: NEGATIVE
NON-SQ EPI CELLS URNS QL MICRO: NORMAL /HPF
NRBC BLD AUTO-RTO: 0 /100 WBCS
PH UR STRIP.AUTO: 5.5 [PH]
PLATELET # BLD AUTO: 256 THOUSANDS/UL (ref 149–390)
PMV BLD AUTO: 11.2 FL (ref 8.9–12.7)
POTASSIUM SERPL-SCNC: 4.8 MMOL/L (ref 3.5–5.3)
PROT SERPL-MCNC: 8.2 G/DL (ref 6.4–8.2)
PROT UR STRIP-MCNC: ABNORMAL MG/DL
PSA SERPL-MCNC: 4.8 NG/ML (ref 0–4)
RBC # BLD AUTO: 4.35 MILLION/UL (ref 3.88–5.62)
RBC #/AREA URNS AUTO: NORMAL /HPF
SODIUM SERPL-SCNC: 139 MMOL/L (ref 136–145)
SP GR UR STRIP.AUTO: 1.02 (ref 1–1.03)
T4 FREE SERPL-MCNC: 0.9 NG/DL (ref 0.76–1.46)
TSH SERPL DL<=0.05 MIU/L-ACNC: 0.14 UIU/ML (ref 0.36–3.74)
UROBILINOGEN UR QL STRIP.AUTO: 0.2 E.U./DL
WBC # BLD AUTO: 7.86 THOUSAND/UL (ref 4.31–10.16)
WBC #/AREA URNS AUTO: NORMAL /HPF

## 2020-09-04 PROCEDURE — 81001 URINALYSIS AUTO W/SCOPE: CPT | Performed by: INTERNAL MEDICINE

## 2020-09-04 PROCEDURE — 3288F FALL RISK ASSESSMENT DOCD: CPT | Performed by: INTERNAL MEDICINE

## 2020-09-04 PROCEDURE — 3074F SYST BP LT 130 MM HG: CPT | Performed by: INTERNAL MEDICINE

## 2020-09-04 PROCEDURE — 80053 COMPREHEN METABOLIC PANEL: CPT

## 2020-09-04 PROCEDURE — G0103 PSA SCREENING: HCPCS

## 2020-09-04 PROCEDURE — 3725F SCREEN DEPRESSION PERFORMED: CPT | Performed by: INTERNAL MEDICINE

## 2020-09-04 PROCEDURE — 1101F PT FALLS ASSESS-DOCD LE1/YR: CPT | Performed by: INTERNAL MEDICINE

## 2020-09-04 PROCEDURE — 3078F DIAST BP <80 MM HG: CPT | Performed by: INTERNAL MEDICINE

## 2020-09-04 PROCEDURE — 36415 COLL VENOUS BLD VENIPUNCTURE: CPT

## 2020-09-04 PROCEDURE — G0439 PPPS, SUBSEQ VISIT: HCPCS | Performed by: INTERNAL MEDICINE

## 2020-09-04 PROCEDURE — 1125F AMNT PAIN NOTED PAIN PRSNT: CPT | Performed by: INTERNAL MEDICINE

## 2020-09-04 PROCEDURE — 84443 ASSAY THYROID STIM HORMONE: CPT

## 2020-09-04 PROCEDURE — 1170F FXNL STATUS ASSESSED: CPT | Performed by: INTERNAL MEDICINE

## 2020-09-04 PROCEDURE — 85025 COMPLETE CBC W/AUTO DIFF WBC: CPT

## 2020-09-04 PROCEDURE — 99214 OFFICE O/P EST MOD 30 MIN: CPT | Performed by: INTERNAL MEDICINE

## 2020-09-04 PROCEDURE — 84439 ASSAY OF FREE THYROXINE: CPT

## 2020-09-04 NOTE — PROGRESS NOTES
Assessment and Plan:     Problem List Items Addressed This Visit        Respiratory    Chronic bronchitis ([facility] )       Cardiovascular and Mediastinum    HTN (hypertension) - Primary    Relevant Orders    CBC and differential    Comprehensive metabolic panel    TSH, 3rd generation with Free T4 reflex    UA (URINE) with reflex to Scope    Cardiomyopathy ([facility] )    HHD (hypertensive heart disease)    Relevant Orders    CBC and differential    Comprehensive metabolic panel    TSH, 3rd generation with Free T4 reflex    UA (URINE) with reflex to Scope       Genitourinary    BPH (benign prostatic hyperplasia)    Relevant Orders    PSA, Total Screen       Other    Colon cancer screening    Relevant Orders    Cologuard           Preventive health issues were discussed with patient, and age appropriate screening tests were ordered as noted in patient's After Visit Summary  Personalized health advice and appropriate referrals for health education or preventive services given if needed, as noted in patient's After Visit Summary       History of Present Illness:     Patient presents for Medicare Annual Wellness visit    Patient Care Team:  Shay Ennis MD as PCP - Juli Mike, MD Beryle Mares, MD Rossie Platter, MD Rozanna Neve, MD Glory Mcburney, MD Cordelia Ferries, MD as Cardiologist (Cardiology)     Problem List:     Patient Active Problem List   Diagnosis    Insomnia    ICD (implantable cardioverter-defibrillator) in place    Hyperlipidemia    Hx of lymphoma    HTN (hypertension)    COPD (chronic obstructive pulmonary disease) ([facility] )    Chronic [facility]itis ([facility]    Cardiomyopathy ([facility] )    BPH (benign prostatic hyperplasia)    Seborrheic keratosis    Mycosis fungoides ([facility] )    History of skin cancer    Screening for skin cancer    Screening for skin condition    Acquired hypothyroidism    HHD (hypertensive heart disease)    Prostate cancer screening    Colon cancer screening    Health care maintenance    Squamous cell cancer of skin of forearm, left    PVC (premature ventricular contraction)      Past Medical and Surgical History:     Past Medical History:   Diagnosis Date    Agent orange exposure     Anemia     Benign colon polyp     BPH (benign prostatic hyperplasia)     Bradycardia     Cardiomyopathy (HCC)     Chest discomfort     Chronic bronchitis (HCC)     COPD (chronic obstructive pulmonary disease) (HCC)     LAST ASSESSED: 9/9/17    H/O nonmelanoma skin cancer     LAST ASSESSED: 11/7/17    HHD (hypertensive heart disease)     HTN (hypertension)     Hx of lymphoma     Hyperlipidemia     ICD (implantable cardioverter-defibrillator) in place     Insomnia     Mycosis fungoides (HCC)     PVC (premature ventricular contraction)     PVT (paroxysmal ventricular tachycardia) (HCC)     SOB (shortness of breath)      Past Surgical History:   Procedure Laterality Date    CARDIAC PACEMAKER PLACEMENT      SKIN SURGERY      skin cancer removal       Family History:     Family History   Problem Relation Age of Onset    Diabetes Mother     Colon cancer Father         DIAGNOSED IN HIS [de-identified]    Heart failure Father     Coronary artery disease Father     Diabetes Family         MELLITUS    Heart attack Neg Hx     Stroke Neg Hx     Anuerysm Neg Hx     Clotting disorder Neg Hx     Arrhythmia Neg Hx     Hypertension Neg Hx         pt unsure     Hyperlipidemia Neg Hx     Sudden death Neg Hx         scd      Social History:     E-Cigarette/Vaping    E-Cigarette Use Never User      E-Cigarette/Vaping Substances    Nicotine No     THC No     CBD No     Flavoring No     Other No     Unknown No      Social History     Socioeconomic History    Marital status:       Spouse name: None    Number of children: 0    Years of education: None    Highest education level: None   Occupational History    Occupation: retired   Social Needs    Financial resource strain: None    Food insecurity     Worry: None     Inability: None    Transportation needs     Medical: None     Non-medical: None   Tobacco Use    Smoking status: Current Every Day Smoker     Packs/day: 0 50     Years: 50 00     Pack years: 25 00     Types: Cigarettes    Smokeless tobacco: Never Used   Substance and Sexual Activity    Alcohol use: Yes     Comment: vodka nightly    Drug use: No    Sexual activity: Not Currently   Lifestyle    Physical activity     Days per week: 0 days     Minutes per session: 0 min    Stress: Not at all   Relationships    Social connections     Talks on phone: None     Gets together: None     Attends [employer] service: None     Active member of club or organization: None     Attends meetings of clubs or organizations: None     Relationship status: None    Intimate partner violence     Fear of current or ex partner: None     Emotionally abused: None     Physically abused: None     Forced sexual activity: None   Other Topics Concern    None   Social History Narrative    None      Medications and Allergies:     Current Outpatient Medications   Medication Sig Dispense Refill    albuterol (2 5 mg/3 mL) 0 083 % nebulizer solution USE 1 VIAL VIA NEBULIZER 4 TIMES A DAY AS NEEDED 1080 mL 3    albuterol (Proventil HFA) 90 mcg/act inhaler Inhale 2 puffs every 4 (four) hours as needed for wheezing 1 Inhaler 3    aspirin 81 MG tablet Take 81 mg by mouth daily      atorvastatin (LIPITOR) 40 mg tablet Take 1 tablet (40 mg total) by mouth daily 90 tablet 3    bexarotene (TARGRETIN) 75 mg capsule Take 8 capsules (600 mg total) by mouth daily 240 capsule 3    Cholecalciferol (CVS VITAMIN D3) 1000 units capsule Take 1 capsule by mouth daily      cyanocobalamin (VITAMIN B-12) 1,000 mcg tablet Take 1,000 mcg by mouth daily      ENTRESTO 49-51 MG TABS TAKE 1 TABLET BY MOUTH TWO  TIMES DAILY 180 tablet 3    ferrous sulfate 324 (65 Fe) mg Take 1 tablet by mouth Twice daily      gabapentin (NEURONTIN) 100 mg capsule Take 1 capsule (100 mg total) by mouth 3 (three) times a day 270 capsule 3    levothyroxine 112 mcg tablet TAKE 1 TABLET BY MOUTH  DAILY 90 tablet 3    metoprolol succinate (TOPROL-XL) 50 mg 24 hr tablet TAKE 1 TABLET BY MOUTH  EVERY 12 HOURS 180 tablet 3    PAZEO 0 7 % SOLN       potassium chloride (K-DUR,KLOR-CON) 20 mEq tablet TAKE 1 TABLET BY MOUTH  DAILY 90 tablet 3    torsemide (DEMADEX) 20 mg tablet Take 1 tablet by mouth daily (Patient taking differently: Take 10 mg by mouth daily ) 30 tablet 0    TRELEGY ELLIPTA 100-62 5-25 MCG/INH inhaler USE 1 INHALATION BY MOUTH  DAILY  RINSE MOUTH AFTER  USE  180 each 3    triamcinolone (KENALOG) 0 1 % ointment Apply topically 2 (two) times 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 454 g 3    zolpidem (AMBIEN) 10 mg tablet 1 tablet at bedtime 90 tablet 1     No current facility-administered medications for this visit  No Known Allergies   Immunizations:     Immunization History   Administered Date(s) Administered    INFLUENZA 09/24/2018    Influenza Split High Dose Preservative Free IM 10/17/2016, 09/09/2017, 09/24/2018, 09/13/2019    Influenza TIV (IM) 10/14/2015    Pneumococcal Conjugate 13-Valent 07/27/2020    Pneumococcal Conjugate PCV 7 10/14/2015    Tdap 1945      Health Maintenance:         Topic Date Due    Hepatitis C Screening  Completed         Topic Date Due    DTaP,Tdap,and Td Vaccines (1 - Tdap) 11/10/1968    Influenza Vaccine  07/01/2020      Medicare Health Risk Assessment:     /60 (BP Location: Left arm, Patient Position: Sitting, Cuff Size: Large)   Pulse 82   Temp 97 9 °F (36 6 °C) (Temporal)   Ht 5' 11" (1 803 m)   Wt 110 kg (242 lb)   SpO2 93%   BMI 33 75 kg/m²      Sherly is here for his Subsequent Wellness visit  Health Risk Assessment:   Patient rates overall health as fair  Patient feels that their physical health rating is same  Eyesight was rated as same  Hearing was rated as same   Patient feels that their emotional and mental health rating is same  Pain experienced in the last 7 days has been some  Patient's pain rating has been 2/10  Patient states that he has experienced no weight loss or gain in last 6 months  occasional low back pain    Depression Screening:   PHQ-2 Score: 0      Fall Risk Screening: In the past year, patient has experienced: no history of falling in past year      Home Safety:  Patient does not have trouble with stairs inside or outside of their home  Patient has working smoke alarms and has working carbon monoxide detector  Home safety hazards include: none  Nutrition:   Current diet is No Added Salt  Medications:   Patient is currently taking over-the-counter supplements  OTC medications include: see medication list  Patient is able to manage medications  Activities of Daily Living (ADLs)/Instrumental Activities of Daily Living (IADLs):   Walk and transfer into and out of bed and chair?: Yes  Dress and groom yourself?: Yes    Bathe or shower yourself?: Yes    Feed yourself? Yes  Do your laundry/housekeeping?: Yes  Manage your money, pay your bills and track your expenses?: Yes  Make your own meals?: Yes    Do your own shopping?: Yes    Previous Hospitalizations:   Any hospitalizations or ED visits within the last 12 months?: Yes      Hospitalization Comments:  AICD replacement in May 2020    Advance Care Planning:   Living will: Yes    Durable POA for healthcare:  Yes    Advanced directive: Yes      Cognitive Screening:   Provider or family/friend/caregiver concerned regarding cognition?: No    PREVENTIVE SCREENINGS      Cardiovascular Screening:    General: Screening Not Indicated and History Lipid Disorder      Diabetes Screening:     General: Screening Current      Colorectal Cancer Screening:     General: Screening Current    Due for: Cologuard      Prostate Cancer Screening:      Due for: PSA      Abdominal Aortic Aneurysm (AAA) Screening:    Risk factors include: age between 73-67 yo and tobacco use        Lung Cancer Screening:       Due for: Low Dose CT (LDCT)      Hepatitis C Screening:    General: Screening Current      Lindsey Savage MD

## 2020-09-04 NOTE — PROGRESS NOTES
Assessment/Plan:       Diagnoses and all orders for this visit:    Essential hypertension  -     CBC and differential; Future  -     Comprehensive metabolic panel; Future  -     TSH, 3rd generation with Free T4 reflex; Future  -     UA (URINE) with reflex to Scope    Hypertensive heart disease with chronic systolic congestive heart failure (HCC)  -     CBC and differential; Future  -     Comprehensive metabolic panel; Future  -     TSH, 3rd generation with Free T4 reflex; Future  -     UA (URINE) with reflex to Scope    Dilated cardiomyopathy (HCC)    Benign prostatic hyperplasia with urinary frequency  -     PSA, Total Screen; Future    Colon cancer screening  -     Cologuard; Future    Simple chronic bronchitis (HCC)    Tobacco abuse  -     CT lung screening program; Future                Subjective:      Patient ID: Maida Castro is a 69 y o  male  Follow-up visit  A 72-year-old man with chronic systolic heart failure with very low ejection fraction whose chief symptom continues to be fatigue and shortness of breath with minimal exertion  He follows with cardiology  Last hospitalization for CHF exacerbation with 3 years ago  Last year, hospitalized for replacement of pacemaker defibrillator  After the hospitalization of 3 years ago he was placed on Entresto and since then he has been more stable  Continues to follow with specialty providers including Cardiology, and Pulmonary for COPD, many times a year  Globally speaking we have an individual with bare heart failure who was never the less compensated  He has chronic dyspnea which is worse with more the days but no orthopnea nor PND  He does have chronic exertional fatigue which is his baseline status  Mycosis fungoides he is treated by Dermatology; no immediate issues referable to this  Some minor arthropathy  Globally, considering the severity of his diagnoses, he feels surprisingly well        The following portions of the patient's history were reviewed and updated as appropriate:   He has a past medical history of Agent orange exposure, Anemia, Benign colon polyp, BPH (benign prostatic hyperplasia), Bradycardia, [facility]omyopathy ([facility] ), Chest discomfort, Chronic bronchitis ([facility] ), COPD (chronic obstructive pulmonary disease) ([facility] ), H/O nonmelanoma skin cancer, HHD (hypertensive heart disease), HTN (hypertension), lymphoma, Hyperlipidemia, ICD (implantable cardioverter-defibrillator) in place, Insomnia, Mycosis fungoides ([facility] ), PVC (premature ventricular contraction), PVT (paroxysmal ventricular tachycardia) ([facility] ), and SOB (shortness of breath)  ,  does not have any pertinent problems on file  ,   has a past surgical history that includes Cardiac pacemaker placement and Skin surgery  ,  family history includes Colon cancer in his father; Coronary artery disease in his father; Diabetes in his family and mother; Heart failure in his father  ,   reports that he has been smoking cigarettes  He has a 25 00 pack-year smoking history  He has never used smokeless tobacco  He reports current alcohol use  He reports that he does not use drugs  ,  has No Known Allergies     Current Outpatient Medications   Medication Sig Dispense Refill    albuterol (2 5 mg/3 mL) 0 083 % nebulizer solution USE 1 VIAL VIA NEBULIZER 4 TIMES A DAY AS NEEDED 1080 mL 3    albuterol (Proventil HFA) 90 mcg/act inhaler Inhale 2 puffs every 4 (four) hours as needed for wheezing 1 Inhaler 3    aspirin 81 MG tablet Take 81 mg by mouth daily      atorvastatin (LIPITOR) 40 mg tablet Take 1 tablet (40 mg total) by mouth daily 90 tablet 3    bexarotene (TARGRETIN) 75 mg capsule Take 8 capsules (600 mg total) by mouth daily 240 capsule 3    Cholecalciferol ([employer] VITAMIN D3) 1000 units capsule Take 1 capsule by mouth daily      cyanocobalamin (VITAMIN B-12) 1,000 mcg tablet Take 1,000 mcg by mouth daily      ENTRESTO 49-51 MG TABS TAKE 1 TABLET BY MOUTH TWO  TIMES DAILY 180 tablet 3    ferrous sulfate 324 (65 Fe) mg Take 1 tablet by mouth Twice daily      gabapentin (NEURONTIN) 100 mg capsule Take 1 capsule (100 mg total) by mouth 3 (three) times a day 270 capsule 3    levothyroxine 112 mcg tablet TAKE 1 TABLET BY MOUTH  DAILY 90 tablet 3    metoprolol succinate (TOPROL-XL) 50 mg 24 hr tablet TAKE 1 TABLET BY MOUTH  EVERY 12 HOURS 180 tablet 3    PAZEO 0 7 % SOLN       potassium chloride (K-DUR,KLOR-CON) 20 mEq tablet TAKE 1 TABLET BY MOUTH  DAILY 90 tablet 3    torsemide (DEMADEX) 20 mg tablet Take 1 tablet by mouth daily (Patient taking differently: Take 10 mg by mouth daily ) 30 tablet 0    TRELEGY ELLIPTA 100-62 5-25 MCG/INH inhaler USE 1 INHALATION BY MOUTH  DAILY  RINSE MOUTH AFTER  USE  180 each 3    triamcinolone (KENALOG) 0 1 % ointment Apply topically 2 (two) times 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 454 g 3    zolpidem (AMBIEN) 10 mg tablet 1 tablet at bedtime 90 tablet 1     No current facility-administered medications for this visit  Review of Systems   Constitutional: Positive for fatigue  Respiratory: Positive for shortness of breath  Cardiovascular: Positive for leg swelling  Musculoskeletal: Positive for arthralgias  Skin: Positive for color change and rash  Psychiatric/Behavioral: Positive for dysphoric mood  All other systems reviewed and are negative  Objective:  Vitals:    09/04/20 1412   BP: 110/60   Pulse: 82   Temp: 97 9 °F (36 6 °C)   SpO2: 93%      Physical Exam  Constitutional:       Appearance: He is not toxic-appearing or diaphoretic  Comments:  A male patient who appears stated age  He does not look ill but he does not look well  Modestly overweight  Overall, a big man   HENT:      Nose: Nose normal       Mouth/Throat:      Mouth: Mucous membranes are moist    Eyes:      General: No scleral icterus  Pupils: Pupils are equal, round, and reactive to light  Cardiovascular:      Rate and Rhythm: Normal rate        Pulses: Decreased pulses  Heart sounds: Heart sounds are distant  Pulmonary:      Breath sounds: Decreased breath sounds present  No wheezing, rhonchi or rales  Abdominal:      Palpations: Abdomen is soft  Musculoskeletal:      Right lower le+ Pitting Edema present  Left lower le+ Pitting Edema present  Skin:     Findings: Erythema present  Comments:  Variegated diffuse blotches with scale   Neurological:      General: No focal deficit present  Mental Status: He is alert  Psychiatric:         Mood and Affect: Mood normal            Patient Instructions    Chronic heart failure, reasonably compensated  COPD, continued smoking  No interest in stopping  Get screening CT  History of BPH with elevated PSA; recheck that   Needs colon screen  Given heart failure will recommend Cologuard   Diagnostic laboratory testing  Continue to follow with pulmonary and with cardiology  See me yearly

## 2020-09-04 NOTE — PATIENT INSTRUCTIONS
Chronic heart failure, reasonably compensated  COPD, continued smoking  No interest in stopping  Get screening CT  History of BPH with elevated PSA; recheck that   Needs colon screen  Given heart failure will recommend Cologuard   Diagnostic laboratory testing  Continue to follow with pulmonary and with cardiology  See me yearly

## 2020-09-04 NOTE — PROGRESS NOTES
BMI Counseling: Body mass index is 33 75 kg/m²  Follow-up plan was not completed due to elderly patient (74 years old) where weight reduction/weight gain would complicate underlying health condition such as: illness or physical disability  Tobacco Cessation Counseling: Tobacco cessation counseling was provided   The patient is sincerely urged to quit consumption of tobacco  He is not ready to quit tobacco

## 2020-09-17 ENCOUNTER — HOSPITAL ENCOUNTER (OUTPATIENT)
Dept: CT IMAGING | Facility: CLINIC | Age: 75
Discharge: HOME | End: 2020-09-17
Payer: COMMERCIAL

## 2020-09-17 DIAGNOSIS — Z72.0 TOBACCO ABUSE: ICD-10-CM

## 2020-09-17 PROCEDURE — G0297 LDCT FOR LUNG CA SCREEN: HCPCS

## 2020-09-17 PROCEDURE — G1004 CDSM NDSC: HCPCS

## 2020-09-22 ENCOUNTER — TELEPHONE (OUTPATIENT)
Dept: INTERNAL MEDICINE CLINIC | Facility: CLINIC | Age: 75
End: 2020-09-22

## 2020-09-22 NOTE — TELEPHONE ENCOUNTER
----- Message from Canelo Mari MD sent at 9/22/2020 11:57 AM EDT -----  Ct lung shows inflammatory changes should see pulmonary

## 2020-10-13 ENCOUNTER — OFFICE VISIT (OUTPATIENT)
Dept: DERMATOLOGY | Facility: CLINIC | Age: 75
End: 2020-10-13
Payer: COMMERCIAL

## 2020-10-13 VITALS — TEMPERATURE: 97.2 F

## 2020-10-13 DIAGNOSIS — C84.08 MYCOSIS FUNGOIDES INVOLVING LYMPH NODES OF MULTIPLE REGIONS (HCC): Primary | ICD-10-CM

## 2020-10-13 DIAGNOSIS — Z12.83 SCREENING FOR SKIN CANCER: ICD-10-CM

## 2020-10-13 DIAGNOSIS — Z85.828 HISTORY OF SKIN CANCER: ICD-10-CM

## 2020-10-13 DIAGNOSIS — L82.1 SEBORRHEIC KERATOSIS: ICD-10-CM

## 2020-10-13 DIAGNOSIS — C84.00 MYCOSIS FUNGOIDES, UNSPECIFIED BODY REGION (HCC): ICD-10-CM

## 2020-10-13 PROCEDURE — 4004F PT TOBACCO SCREEN RCVD TLK: CPT | Performed by: DERMATOLOGY

## 2020-10-13 PROCEDURE — 1160F RVW MEDS BY RX/DR IN RCRD: CPT | Performed by: DERMATOLOGY

## 2020-10-13 PROCEDURE — 99214 OFFICE O/P EST MOD 30 MIN: CPT | Performed by: DERMATOLOGY

## 2020-10-13 RX ORDER — BEXAROTENE 75 MG/1
600 CAPSULE, LIQUID FILLED ORAL DAILY
Qty: 240 CAPSULE | Refills: 3 | Status: SHIPPED | OUTPATIENT
Start: 2020-10-13 | End: 2021-04-29 | Stop reason: SDUPTHER

## 2020-10-27 ENCOUNTER — OFFICE VISIT (OUTPATIENT)
Dept: PULMONOLOGY | Facility: CLINIC | Age: 75
End: 2020-10-27
Payer: COMMERCIAL

## 2020-10-27 VITALS
HEIGHT: 71 IN | WEIGHT: 239 LBS | DIASTOLIC BLOOD PRESSURE: 80 MMHG | HEART RATE: 87 BPM | SYSTOLIC BLOOD PRESSURE: 124 MMHG | BODY MASS INDEX: 33.46 KG/M2 | TEMPERATURE: 97.7 F | OXYGEN SATURATION: 93 %

## 2020-10-27 DIAGNOSIS — J44.9 COPD, SEVERE (HCC): ICD-10-CM

## 2020-10-27 DIAGNOSIS — R06.02 SHORTNESS OF BREATH: Primary | ICD-10-CM

## 2020-10-27 DIAGNOSIS — E66.9 OBESITY (BMI 30-39.9): ICD-10-CM

## 2020-10-27 DIAGNOSIS — F17.210 NICOTINE DEPENDENCE, CIGARETTES, UNCOMPLICATED: ICD-10-CM

## 2020-10-27 DIAGNOSIS — G47.34 NOCTURNAL HYPOXIA: ICD-10-CM

## 2020-10-27 DIAGNOSIS — J30.89 ENVIRONMENTAL AND SEASONAL ALLERGIES: ICD-10-CM

## 2020-10-27 PROCEDURE — 3079F DIAST BP 80-89 MM HG: CPT | Performed by: PHYSICIAN ASSISTANT

## 2020-10-27 PROCEDURE — 1160F RVW MEDS BY RX/DR IN RCRD: CPT | Performed by: PHYSICIAN ASSISTANT

## 2020-10-27 PROCEDURE — 3008F BODY MASS INDEX DOCD: CPT | Performed by: PHYSICIAN ASSISTANT

## 2020-10-27 PROCEDURE — 3074F SYST BP LT 130 MM HG: CPT | Performed by: PHYSICIAN ASSISTANT

## 2020-10-27 PROCEDURE — 99214 OFFICE O/P EST MOD 30 MIN: CPT | Performed by: PHYSICIAN ASSISTANT

## 2020-10-27 RX ORDER — ALBUTEROL SULFATE 90 UG/1
2 AEROSOL, METERED RESPIRATORY (INHALATION) EVERY 4 HOURS PRN
Qty: 3 INHALER | Refills: 3 | Status: SHIPPED | OUTPATIENT
Start: 2020-10-27 | End: 2021-09-22

## 2020-10-27 RX ORDER — MONTELUKAST SODIUM 10 MG/1
10 TABLET ORAL
Qty: 30 TABLET | Refills: 3 | Status: SHIPPED | OUTPATIENT
Start: 2020-10-27 | End: 2021-01-20

## 2020-11-17 DIAGNOSIS — G47.00 INSOMNIA, UNSPECIFIED TYPE: ICD-10-CM

## 2020-11-17 RX ORDER — ZOLPIDEM TARTRATE 10 MG/1
10 TABLET ORAL
Qty: 90 TABLET | Refills: 1 | Status: SHIPPED | OUTPATIENT
Start: 2020-11-17 | End: 2021-07-02

## 2020-11-18 ENCOUNTER — REMOTE DEVICE CLINIC VISIT (OUTPATIENT)
Dept: CARDIOLOGY CLINIC | Facility: CLINIC | Age: 75
End: 2020-11-18
Payer: COMMERCIAL

## 2020-11-18 DIAGNOSIS — Z95.810 AICD (AUTOMATIC CARDIOVERTER/DEFIBRILLATOR) PRESENT: Primary | ICD-10-CM

## 2020-11-18 PROCEDURE — 93295 DEV INTERROG REMOTE 1/2/MLT: CPT | Performed by: INTERNAL MEDICINE

## 2020-11-18 PROCEDURE — 93296 REM INTERROG EVL PM/IDS: CPT | Performed by: INTERNAL MEDICINE

## 2020-11-21 ENCOUNTER — HOSPITAL ENCOUNTER (INPATIENT)
Facility: HOSPITAL | Age: 75
LOS: 3 days | Discharge: HOME | DRG: 190 | End: 2020-11-24
Attending: EMERGENCY MEDICINE | Admitting: STUDENT IN AN ORGANIZED HEALTH CARE EDUCATION/TRAINING PROGRAM
Payer: COMMERCIAL

## 2020-11-21 ENCOUNTER — APPOINTMENT (EMERGENCY)
Dept: RADIOLOGY | Facility: HOSPITAL | Age: 75
DRG: 190 | End: 2020-11-21
Payer: COMMERCIAL

## 2020-11-21 DIAGNOSIS — N17.9 ACUTE KIDNEY INJURY (HCC): ICD-10-CM

## 2020-11-21 DIAGNOSIS — R06.02 SOB (SHORTNESS OF BREATH): Primary | ICD-10-CM

## 2020-11-21 DIAGNOSIS — J44.9 COPD (CHRONIC OBSTRUCTIVE PULMONARY DISEASE) (HCC): ICD-10-CM

## 2020-11-21 DIAGNOSIS — N39.0 URINARY TRACT INFECTION: ICD-10-CM

## 2020-11-21 PROBLEM — I50.20 SYSTOLIC HEART FAILURE (HCC): Status: ACTIVE | Noted: 2020-11-21

## 2020-11-21 PROBLEM — N17.0 ACUTE KIDNEY INJURY (AKI) WITH ACUTE TUBULAR NECROSIS (ATN) (HCC): Status: ACTIVE | Noted: 2020-11-21

## 2020-11-21 LAB
ALBUMIN SERPL BCP-MCNC: 2.9 G/DL (ref 3.5–5)
ALP SERPL-CCNC: 51 U/L (ref 46–116)
ALT SERPL W P-5'-P-CCNC: 41 U/L (ref 12–78)
ANION GAP SERPL CALCULATED.3IONS-SCNC: 14 MMOL/L (ref 4–13)
AST SERPL W P-5'-P-CCNC: 35 U/L (ref 5–45)
ATRIAL RATE: 96 BPM
BACTERIA UR QL AUTO: ABNORMAL /HPF
BASOPHILS # BLD AUTO: 0.03 THOUSANDS/ΜL (ref 0–0.1)
BASOPHILS NFR BLD AUTO: 0 % (ref 0–1)
BILIRUB SERPL-MCNC: 0.3 MG/DL (ref 0.2–1)
BILIRUB UR QL STRIP: NEGATIVE
BUN SERPL-MCNC: 37 MG/DL (ref 5–25)
CALCIUM ALBUM COR SERPL-MCNC: 7.5 MG/DL (ref 8.3–10.1)
CALCIUM SERPL-MCNC: 6.6 MG/DL (ref 8.3–10.1)
CHLORIDE SERPL-SCNC: 107 MMOL/L (ref 100–108)
CLARITY UR: ABNORMAL
CO2 SERPL-SCNC: 20 MMOL/L (ref 21–32)
COLOR UR: YELLOW
CREAT SERPL-MCNC: 1.94 MG/DL (ref 0.6–1.3)
EOSINOPHIL # BLD AUTO: 0.03 THOUSAND/ΜL (ref 0–0.61)
EOSINOPHIL NFR BLD AUTO: 0 % (ref 0–6)
ERYTHROCYTE [DISTWIDTH] IN BLOOD BY AUTOMATED COUNT: 14 % (ref 11.6–15.1)
FLUAV RNA RESP QL NAA+PROBE: NEGATIVE
FLUBV RNA RESP QL NAA+PROBE: NEGATIVE
GFR SERPL CREATININE-BSD FRML MDRD: 33 ML/MIN/1.73SQ M
GLUCOSE SERPL-MCNC: 100 MG/DL (ref 65–140)
GLUCOSE UR STRIP-MCNC: NEGATIVE MG/DL
HCT VFR BLD AUTO: 30.3 % (ref 36.5–49.3)
HGB BLD-MCNC: 9.5 G/DL (ref 12–17)
HGB UR QL STRIP.AUTO: ABNORMAL
IMM GRANULOCYTES # BLD AUTO: 0.06 THOUSAND/UL (ref 0–0.2)
IMM GRANULOCYTES NFR BLD AUTO: 1 % (ref 0–2)
KETONES UR STRIP-MCNC: NEGATIVE MG/DL
LEUKOCYTE ESTERASE UR QL STRIP: ABNORMAL
LYMPHOCYTES # BLD AUTO: 0.89 THOUSANDS/ΜL (ref 0.6–4.47)
LYMPHOCYTES NFR BLD AUTO: 7 % (ref 14–44)
MCH RBC QN AUTO: 30.6 PG (ref 26.8–34.3)
MCHC RBC AUTO-ENTMCNC: 31.4 G/DL (ref 31.4–37.4)
MCV RBC AUTO: 98 FL (ref 82–98)
MONOCYTES # BLD AUTO: 0.72 THOUSAND/ΜL (ref 0.17–1.22)
MONOCYTES NFR BLD AUTO: 6 % (ref 4–12)
NEUTROPHILS # BLD AUTO: 11.2 THOUSANDS/ΜL (ref 1.85–7.62)
NEUTS SEG NFR BLD AUTO: 86 % (ref 43–75)
NITRITE UR QL STRIP: POSITIVE
NON-SQ EPI CELLS URNS QL MICRO: ABNORMAL /HPF
NRBC BLD AUTO-RTO: 0 /100 WBCS
NT-PROBNP SERPL-MCNC: 1364 PG/ML
P AXIS: 79 DEGREES
PH UR STRIP.AUTO: 5.5 [PH]
PLATELET # BLD AUTO: 204 THOUSANDS/UL (ref 149–390)
PMV BLD AUTO: 10.5 FL (ref 8.9–12.7)
POTASSIUM SERPL-SCNC: 4.7 MMOL/L (ref 3.5–5.3)
PR INTERVAL: 104 MS
PROT SERPL-MCNC: 7.3 G/DL (ref 6.4–8.2)
PROT UR STRIP-MCNC: ABNORMAL MG/DL
QRS AXIS: -68 DEGREES
QRSD INTERVAL: 138 MS
QT INTERVAL: 424 MS
QTC INTERVAL: 535 MS
RBC # BLD AUTO: 3.1 MILLION/UL (ref 3.88–5.62)
RBC #/AREA URNS AUTO: ABNORMAL /HPF
RSV RNA RESP QL NAA+PROBE: NEGATIVE
SARS-COV-2 RNA RESP QL NAA+PROBE: NEGATIVE
SODIUM SERPL-SCNC: 141 MMOL/L (ref 136–145)
SP GR UR STRIP.AUTO: 1.02 (ref 1–1.03)
T WAVE AXIS: 88 DEGREES
TROPONIN I SERPL-MCNC: <0.02 NG/ML
TSH SERPL DL<=0.05 MIU/L-ACNC: 1.81 UIU/ML (ref 0.36–3.74)
UROBILINOGEN UR QL STRIP.AUTO: 0.2 E.U./DL
VENTRICULAR RATE: 96 BPM
WBC # BLD AUTO: 12.93 THOUSAND/UL (ref 4.31–10.16)
WBC #/AREA URNS AUTO: ABNORMAL /HPF

## 2020-11-21 PROCEDURE — 87077 CULTURE AEROBIC IDENTIFY: CPT | Performed by: EMERGENCY MEDICINE

## 2020-11-21 PROCEDURE — 83880 ASSAY OF NATRIURETIC PEPTIDE: CPT | Performed by: EMERGENCY MEDICINE

## 2020-11-21 PROCEDURE — 84443 ASSAY THYROID STIM HORMONE: CPT | Performed by: STUDENT IN AN ORGANIZED HEALTH CARE EDUCATION/TRAINING PROGRAM

## 2020-11-21 PROCEDURE — 99285 EMERGENCY DEPT VISIT HI MDM: CPT

## 2020-11-21 PROCEDURE — 87040 BLOOD CULTURE FOR BACTERIA: CPT | Performed by: EMERGENCY MEDICINE

## 2020-11-21 PROCEDURE — 87086 URINE CULTURE/COLONY COUNT: CPT | Performed by: EMERGENCY MEDICINE

## 2020-11-21 PROCEDURE — 93005 ELECTROCARDIOGRAM TRACING: CPT

## 2020-11-21 PROCEDURE — 99291 CRITICAL CARE FIRST HOUR: CPT | Performed by: EMERGENCY MEDICINE

## 2020-11-21 PROCEDURE — 99223 1ST HOSP IP/OBS HIGH 75: CPT | Performed by: STUDENT IN AN ORGANIZED HEALTH CARE EDUCATION/TRAINING PROGRAM

## 2020-11-21 PROCEDURE — 0241U HB NFCT DS VIR RESP RNA 4 TRGT: CPT | Performed by: EMERGENCY MEDICINE

## 2020-11-21 PROCEDURE — 80053 COMPREHEN METABOLIC PANEL: CPT | Performed by: EMERGENCY MEDICINE

## 2020-11-21 PROCEDURE — 85025 COMPLETE CBC W/AUTO DIFF WBC: CPT | Performed by: EMERGENCY MEDICINE

## 2020-11-21 PROCEDURE — 96374 THER/PROPH/DIAG INJ IV PUSH: CPT

## 2020-11-21 PROCEDURE — 94664 DEMO&/EVAL PT USE INHALER: CPT

## 2020-11-21 PROCEDURE — 81001 URINALYSIS AUTO W/SCOPE: CPT | Performed by: EMERGENCY MEDICINE

## 2020-11-21 PROCEDURE — 36415 COLL VENOUS BLD VENIPUNCTURE: CPT | Performed by: EMERGENCY MEDICINE

## 2020-11-21 PROCEDURE — 87186 SC STD MICRODIL/AGAR DIL: CPT | Performed by: EMERGENCY MEDICINE

## 2020-11-21 PROCEDURE — 71045 X-RAY EXAM CHEST 1 VIEW: CPT

## 2020-11-21 PROCEDURE — 93010 ELECTROCARDIOGRAM REPORT: CPT | Performed by: INTERNAL MEDICINE

## 2020-11-21 PROCEDURE — 84484 ASSAY OF TROPONIN QUANT: CPT | Performed by: EMERGENCY MEDICINE

## 2020-11-21 RX ORDER — GABAPENTIN 100 MG/1
100 CAPSULE ORAL 3 TIMES DAILY
Status: DISCONTINUED | OUTPATIENT
Start: 2020-11-21 | End: 2020-11-24 | Stop reason: HOSPADM

## 2020-11-21 RX ORDER — METOPROLOL SUCCINATE 50 MG/1
50 TABLET, EXTENDED RELEASE ORAL EVERY 12 HOURS
Status: DISCONTINUED | OUTPATIENT
Start: 2020-11-21 | End: 2020-11-22

## 2020-11-21 RX ORDER — ALBUTEROL SULFATE 2.5 MG/3ML
2.5 SOLUTION RESPIRATORY (INHALATION) EVERY 6 HOURS PRN
Status: DISCONTINUED | OUTPATIENT
Start: 2020-11-21 | End: 2020-11-22

## 2020-11-21 RX ORDER — ZOLPIDEM TARTRATE 5 MG/1
10 TABLET ORAL
Status: DISCONTINUED | OUTPATIENT
Start: 2020-11-21 | End: 2020-11-24 | Stop reason: HOSPADM

## 2020-11-21 RX ORDER — METHYLPREDNISOLONE SODIUM SUCCINATE 40 MG/ML
40 INJECTION, POWDER, LYOPHILIZED, FOR SOLUTION INTRAMUSCULAR; INTRAVENOUS ONCE
Status: COMPLETED | OUTPATIENT
Start: 2020-11-21 | End: 2020-11-21

## 2020-11-21 RX ORDER — ATORVASTATIN CALCIUM 40 MG/1
40 TABLET, FILM COATED ORAL DAILY
Status: DISCONTINUED | OUTPATIENT
Start: 2020-11-21 | End: 2020-11-24 | Stop reason: HOSPADM

## 2020-11-21 RX ORDER — TORSEMIDE 10 MG/1
10 TABLET ORAL DAILY
Status: DISCONTINUED | OUTPATIENT
Start: 2020-11-22 | End: 2020-11-24 | Stop reason: HOSPADM

## 2020-11-21 RX ORDER — HEPARIN SODIUM 5000 [USP'U]/ML
5000 INJECTION, SOLUTION INTRAVENOUS; SUBCUTANEOUS EVERY 8 HOURS SCHEDULED
Status: DISCONTINUED | OUTPATIENT
Start: 2020-11-21 | End: 2020-11-24 | Stop reason: HOSPADM

## 2020-11-21 RX ORDER — TORSEMIDE 10 MG/1
5 TABLET ORAL DAILY
Status: DISCONTINUED | OUTPATIENT
Start: 2020-11-21 | End: 2020-11-21

## 2020-11-21 RX ORDER — LEVOTHYROXINE SODIUM 112 UG/1
112 TABLET ORAL DAILY
Status: DISCONTINUED | OUTPATIENT
Start: 2020-11-21 | End: 2020-11-24 | Stop reason: HOSPADM

## 2020-11-21 RX ORDER — ASPIRIN 81 MG/1
81 TABLET, CHEWABLE ORAL DAILY
Status: DISCONTINUED | OUTPATIENT
Start: 2020-11-21 | End: 2020-11-24 | Stop reason: HOSPADM

## 2020-11-21 RX ORDER — TAMSULOSIN HYDROCHLORIDE 0.4 MG/1
0.4 CAPSULE ORAL
Status: DISCONTINUED | OUTPATIENT
Start: 2020-11-21 | End: 2020-11-24 | Stop reason: HOSPADM

## 2020-11-21 RX ORDER — TAMSULOSIN HYDROCHLORIDE 0.4 MG/1
0.4 CAPSULE ORAL
Status: DISCONTINUED | OUTPATIENT
Start: 2020-11-21 | End: 2020-11-21

## 2020-11-21 RX ORDER — IPRATROPIUM BROMIDE AND ALBUTEROL SULFATE 2.5; .5 MG/3ML; MG/3ML
3 SOLUTION RESPIRATORY (INHALATION)
Status: DISCONTINUED | OUTPATIENT
Start: 2020-11-21 | End: 2020-11-21

## 2020-11-21 RX ORDER — ACETAMINOPHEN 325 MG/1
650 TABLET ORAL EVERY 6 HOURS PRN
Status: DISCONTINUED | OUTPATIENT
Start: 2020-11-21 | End: 2020-11-24 | Stop reason: HOSPADM

## 2020-11-21 RX ADMIN — HEPARIN SODIUM 5000 UNITS: 5000 INJECTION INTRAVENOUS; SUBCUTANEOUS at 22:17

## 2020-11-21 RX ADMIN — SODIUM CHLORIDE, SODIUM LACTATE, POTASSIUM CHLORIDE, AND CALCIUM CHLORIDE 500 ML: .6; .31; .03; .02 INJECTION, SOLUTION INTRAVENOUS at 12:29

## 2020-11-21 RX ADMIN — ASPIRIN 81 MG CHEWABLE TABLET 81 MG: 81 TABLET CHEWABLE at 16:15

## 2020-11-21 RX ADMIN — ZOLPIDEM TARTRATE 10 MG: 5 TABLET, COATED ORAL at 22:22

## 2020-11-21 RX ADMIN — TAMSULOSIN HYDROCHLORIDE 0.4 MG: 0.4 CAPSULE ORAL at 16:14

## 2020-11-21 RX ADMIN — GABAPENTIN 100 MG: 100 CAPSULE ORAL at 22:17

## 2020-11-21 RX ADMIN — METOPROLOL SUCCINATE 50 MG: 50 TABLET, EXTENDED RELEASE ORAL at 16:14

## 2020-11-21 RX ADMIN — ACETAMINOPHEN 650 MG: 325 TABLET, FILM COATED ORAL at 18:08

## 2020-11-21 RX ADMIN — LEVOTHYROXINE SODIUM 112 MCG: 112 TABLET ORAL at 16:14

## 2020-11-21 RX ADMIN — METHYLPREDNISOLONE SODIUM SUCCINATE 40 MG: 40 INJECTION, POWDER, FOR SOLUTION INTRAMUSCULAR; INTRAVENOUS at 16:15

## 2020-11-21 RX ADMIN — GABAPENTIN 100 MG: 100 CAPSULE ORAL at 16:14

## 2020-11-21 RX ADMIN — CEFTRIAXONE SODIUM 1000 MG: 10 INJECTION, POWDER, FOR SOLUTION INTRAVENOUS at 12:48

## 2020-11-21 RX ADMIN — ATORVASTATIN CALCIUM 40 MG: 40 TABLET, FILM COATED ORAL at 16:14

## 2020-11-22 PROBLEM — E83.42 HYPOMAGNESEMIA: Status: ACTIVE | Noted: 2020-11-22

## 2020-11-22 LAB
ALBUMIN SERPL BCP-MCNC: 2.4 G/DL (ref 3.5–5)
ALP SERPL-CCNC: 41 U/L (ref 46–116)
ALT SERPL W P-5'-P-CCNC: 37 U/L (ref 12–78)
ANION GAP SERPL CALCULATED.3IONS-SCNC: 13 MMOL/L (ref 4–13)
AST SERPL W P-5'-P-CCNC: 36 U/L (ref 5–45)
BASOPHILS # BLD AUTO: 0.01 THOUSANDS/ΜL (ref 0–0.1)
BASOPHILS NFR BLD AUTO: 0 % (ref 0–1)
BILIRUB SERPL-MCNC: 0.2 MG/DL (ref 0.2–1)
BUN SERPL-MCNC: 40 MG/DL (ref 5–25)
CALCIUM ALBUM COR SERPL-MCNC: 7.5 MG/DL (ref 8.3–10.1)
CALCIUM SERPL-MCNC: 6.2 MG/DL (ref 8.3–10.1)
CHLORIDE SERPL-SCNC: 107 MMOL/L (ref 100–108)
CO2 SERPL-SCNC: 20 MMOL/L (ref 21–32)
CREAT SERPL-MCNC: 1.86 MG/DL (ref 0.6–1.3)
EOSINOPHIL # BLD AUTO: 0 THOUSAND/ΜL (ref 0–0.61)
EOSINOPHIL NFR BLD AUTO: 0 % (ref 0–6)
ERYTHROCYTE [DISTWIDTH] IN BLOOD BY AUTOMATED COUNT: 13.6 % (ref 11.6–15.1)
GFR SERPL CREATININE-BSD FRML MDRD: 35 ML/MIN/1.73SQ M
GLUCOSE SERPL-MCNC: 109 MG/DL (ref 65–140)
HCT VFR BLD AUTO: 27.6 % (ref 36.5–49.3)
HGB BLD-MCNC: 8.7 G/DL (ref 12–17)
IMM GRANULOCYTES # BLD AUTO: 0.03 THOUSAND/UL (ref 0–0.2)
IMM GRANULOCYTES NFR BLD AUTO: 0 % (ref 0–2)
LYMPHOCYTES # BLD AUTO: 0.85 THOUSANDS/ΜL (ref 0.6–4.47)
LYMPHOCYTES NFR BLD AUTO: 11 % (ref 14–44)
MAGNESIUM SERPL-MCNC: 1.1 MG/DL (ref 1.6–2.6)
MCH RBC QN AUTO: 30.4 PG (ref 26.8–34.3)
MCHC RBC AUTO-ENTMCNC: 31.5 G/DL (ref 31.4–37.4)
MCV RBC AUTO: 97 FL (ref 82–98)
MONOCYTES # BLD AUTO: 0.36 THOUSAND/ΜL (ref 0.17–1.22)
MONOCYTES NFR BLD AUTO: 5 % (ref 4–12)
NEUTROPHILS # BLD AUTO: 6.55 THOUSANDS/ΜL (ref 1.85–7.62)
NEUTS SEG NFR BLD AUTO: 84 % (ref 43–75)
NRBC BLD AUTO-RTO: 0 /100 WBCS
PLATELET # BLD AUTO: 197 THOUSANDS/UL (ref 149–390)
PMV BLD AUTO: 10.3 FL (ref 8.9–12.7)
POTASSIUM SERPL-SCNC: 4.5 MMOL/L (ref 3.5–5.3)
PROT SERPL-MCNC: 6.4 G/DL (ref 6.4–8.2)
RBC # BLD AUTO: 2.86 MILLION/UL (ref 3.88–5.62)
SODIUM SERPL-SCNC: 140 MMOL/L (ref 136–145)
WBC # BLD AUTO: 7.8 THOUSAND/UL (ref 4.31–10.16)

## 2020-11-22 PROCEDURE — 99232 SBSQ HOSP IP/OBS MODERATE 35: CPT | Performed by: STUDENT IN AN ORGANIZED HEALTH CARE EDUCATION/TRAINING PROGRAM

## 2020-11-22 PROCEDURE — 94760 N-INVAS EAR/PLS OXIMETRY 1: CPT

## 2020-11-22 PROCEDURE — 83735 ASSAY OF MAGNESIUM: CPT | Performed by: STUDENT IN AN ORGANIZED HEALTH CARE EDUCATION/TRAINING PROGRAM

## 2020-11-22 PROCEDURE — 94640 AIRWAY INHALATION TREATMENT: CPT

## 2020-11-22 PROCEDURE — 85025 COMPLETE CBC W/AUTO DIFF WBC: CPT | Performed by: STUDENT IN AN ORGANIZED HEALTH CARE EDUCATION/TRAINING PROGRAM

## 2020-11-22 PROCEDURE — 80053 COMPREHEN METABOLIC PANEL: CPT | Performed by: STUDENT IN AN ORGANIZED HEALTH CARE EDUCATION/TRAINING PROGRAM

## 2020-11-22 RX ORDER — PREDNISONE 20 MG/1
40 TABLET ORAL DAILY
Status: DISCONTINUED | OUTPATIENT
Start: 2020-11-22 | End: 2020-11-24 | Stop reason: HOSPADM

## 2020-11-22 RX ORDER — BEXAROTENE 75 MG/1
600 CAPSULE, LIQUID FILLED ORAL DAILY
Status: DISCONTINUED | OUTPATIENT
Start: 2020-11-22 | End: 2020-11-24 | Stop reason: HOSPADM

## 2020-11-22 RX ORDER — ALBUTEROL SULFATE 2.5 MG/3ML
2.5 SOLUTION RESPIRATORY (INHALATION) EVERY 4 HOURS PRN
Status: DISCONTINUED | OUTPATIENT
Start: 2020-11-22 | End: 2020-11-24 | Stop reason: HOSPADM

## 2020-11-22 RX ORDER — IPRATROPIUM BROMIDE AND ALBUTEROL SULFATE 2.5; .5 MG/3ML; MG/3ML
3 SOLUTION RESPIRATORY (INHALATION)
Status: DISCONTINUED | OUTPATIENT
Start: 2020-11-22 | End: 2020-11-22

## 2020-11-22 RX ORDER — MAGNESIUM SULFATE HEPTAHYDRATE 40 MG/ML
4 INJECTION, SOLUTION INTRAVENOUS ONCE
Status: COMPLETED | OUTPATIENT
Start: 2020-11-22 | End: 2020-11-22

## 2020-11-22 RX ORDER — METOPROLOL SUCCINATE 50 MG/1
50 TABLET, EXTENDED RELEASE ORAL EVERY 12 HOURS
Status: DISCONTINUED | OUTPATIENT
Start: 2020-11-22 | End: 2020-11-24 | Stop reason: HOSPADM

## 2020-11-22 RX ORDER — SODIUM CHLORIDE FOR INHALATION 0.9 %
3 VIAL, NEBULIZER (ML) INHALATION
Status: DISCONTINUED | OUTPATIENT
Start: 2020-11-22 | End: 2020-11-24 | Stop reason: HOSPADM

## 2020-11-22 RX ORDER — LEVALBUTEROL 1.25 MG/.5ML
1.25 SOLUTION, CONCENTRATE RESPIRATORY (INHALATION)
Status: DISCONTINUED | OUTPATIENT
Start: 2020-11-22 | End: 2020-11-24 | Stop reason: HOSPADM

## 2020-11-22 RX ADMIN — GABAPENTIN 100 MG: 100 CAPSULE ORAL at 08:33

## 2020-11-22 RX ADMIN — GABAPENTIN 100 MG: 100 CAPSULE ORAL at 21:20

## 2020-11-22 RX ADMIN — GABAPENTIN 100 MG: 100 CAPSULE ORAL at 16:22

## 2020-11-22 RX ADMIN — ATORVASTATIN CALCIUM 40 MG: 40 TABLET, FILM COATED ORAL at 08:33

## 2020-11-22 RX ADMIN — LEVOTHYROXINE SODIUM 112 MCG: 112 TABLET ORAL at 08:33

## 2020-11-22 RX ADMIN — METOPROLOL SUCCINATE 50 MG: 50 TABLET, EXTENDED RELEASE ORAL at 03:09

## 2020-11-22 RX ADMIN — ZOLPIDEM TARTRATE 10 MG: 5 TABLET, COATED ORAL at 21:20

## 2020-11-22 RX ADMIN — MAGNESIUM SULFATE HEPTAHYDRATE 4 G: 40 INJECTION, SOLUTION INTRAVENOUS at 12:19

## 2020-11-22 RX ADMIN — BEXAROTENE 600 MG: 75 CAPSULE, LIQUID FILLED ORAL at 09:35

## 2020-11-22 RX ADMIN — HEPARIN SODIUM 5000 UNITS: 5000 INJECTION INTRAVENOUS; SUBCUTANEOUS at 14:17

## 2020-11-22 RX ADMIN — SACUBITRIL AND VALSARTAN 1 TABLET: 49; 51 TABLET, FILM COATED ORAL at 08:33

## 2020-11-22 RX ADMIN — PREDNISONE 40 MG: 20 TABLET ORAL at 12:19

## 2020-11-22 RX ADMIN — TAMSULOSIN HYDROCHLORIDE 0.4 MG: 0.4 CAPSULE ORAL at 16:22

## 2020-11-22 RX ADMIN — HEPARIN SODIUM 5000 UNITS: 5000 INJECTION INTRAVENOUS; SUBCUTANEOUS at 21:20

## 2020-11-22 RX ADMIN — ASPIRIN 81 MG CHEWABLE TABLET 81 MG: 81 TABLET CHEWABLE at 08:33

## 2020-11-22 RX ADMIN — LEVALBUTEROL HYDROCHLORIDE 1.25 MG: 1.25 SOLUTION, CONCENTRATE RESPIRATORY (INHALATION) at 20:11

## 2020-11-22 RX ADMIN — HEPARIN SODIUM 5000 UNITS: 5000 INJECTION INTRAVENOUS; SUBCUTANEOUS at 06:16

## 2020-11-22 RX ADMIN — TORSEMIDE 10 MG: 10 TABLET ORAL at 08:33

## 2020-11-22 RX ADMIN — ISODIUM CHLORIDE 3 ML: 0.03 SOLUTION RESPIRATORY (INHALATION) at 20:11

## 2020-11-23 PROBLEM — J44.1 COPD EXACERBATION (HCC): Status: ACTIVE | Noted: 2020-11-21

## 2020-11-23 LAB
ALBUMIN SERPL BCP-MCNC: 2.5 G/DL (ref 3.5–5)
ALP SERPL-CCNC: 40 U/L (ref 46–116)
ALT SERPL W P-5'-P-CCNC: 38 U/L (ref 12–78)
ANION GAP SERPL CALCULATED.3IONS-SCNC: 13 MMOL/L (ref 4–13)
AST SERPL W P-5'-P-CCNC: 36 U/L (ref 5–45)
BACTERIA UR CULT: ABNORMAL
BASOPHILS # BLD AUTO: 0.01 THOUSANDS/ΜL (ref 0–0.1)
BASOPHILS NFR BLD AUTO: 0 % (ref 0–1)
BILIRUB SERPL-MCNC: 0.3 MG/DL (ref 0.2–1)
BUN SERPL-MCNC: 40 MG/DL (ref 5–25)
CALCIUM ALBUM COR SERPL-MCNC: 7.8 MG/DL (ref 8.3–10.1)
CALCIUM SERPL-MCNC: 6.6 MG/DL (ref 8.3–10.1)
CHLORIDE SERPL-SCNC: 106 MMOL/L (ref 100–108)
CO2 SERPL-SCNC: 22 MMOL/L (ref 21–32)
CREAT SERPL-MCNC: 1.63 MG/DL (ref 0.6–1.3)
EOSINOPHIL # BLD AUTO: 0 THOUSAND/ΜL (ref 0–0.61)
EOSINOPHIL NFR BLD AUTO: 0 % (ref 0–6)
ERYTHROCYTE [DISTWIDTH] IN BLOOD BY AUTOMATED COUNT: 13.4 % (ref 11.6–15.1)
GFR SERPL CREATININE-BSD FRML MDRD: 41 ML/MIN/1.73SQ M
GLUCOSE SERPL-MCNC: 109 MG/DL (ref 65–140)
HCT VFR BLD AUTO: 27.2 % (ref 36.5–49.3)
HGB BLD-MCNC: 8.6 G/DL (ref 12–17)
IMM GRANULOCYTES # BLD AUTO: 0.05 THOUSAND/UL (ref 0–0.2)
IMM GRANULOCYTES NFR BLD AUTO: 1 % (ref 0–2)
LYMPHOCYTES # BLD AUTO: 1.18 THOUSANDS/ΜL (ref 0.6–4.47)
LYMPHOCYTES NFR BLD AUTO: 18 % (ref 14–44)
MAGNESIUM SERPL-MCNC: 1.9 MG/DL (ref 1.6–2.6)
MCH RBC QN AUTO: 30.5 PG (ref 26.8–34.3)
MCHC RBC AUTO-ENTMCNC: 31.6 G/DL (ref 31.4–37.4)
MCV RBC AUTO: 97 FL (ref 82–98)
MONOCYTES # BLD AUTO: 0.36 THOUSAND/ΜL (ref 0.17–1.22)
MONOCYTES NFR BLD AUTO: 5 % (ref 4–12)
NEUTROPHILS # BLD AUTO: 5.12 THOUSANDS/ΜL (ref 1.85–7.62)
NEUTS SEG NFR BLD AUTO: 76 % (ref 43–75)
NRBC BLD AUTO-RTO: 0 /100 WBCS
PLATELET # BLD AUTO: 239 THOUSANDS/UL (ref 149–390)
PMV BLD AUTO: 10.6 FL (ref 8.9–12.7)
POTASSIUM SERPL-SCNC: 4.6 MMOL/L (ref 3.5–5.3)
PROT SERPL-MCNC: 6.5 G/DL (ref 6.4–8.2)
RBC # BLD AUTO: 2.82 MILLION/UL (ref 3.88–5.62)
SODIUM SERPL-SCNC: 141 MMOL/L (ref 136–145)
WBC # BLD AUTO: 6.72 THOUSAND/UL (ref 4.31–10.16)

## 2020-11-23 PROCEDURE — 85025 COMPLETE CBC W/AUTO DIFF WBC: CPT | Performed by: STUDENT IN AN ORGANIZED HEALTH CARE EDUCATION/TRAINING PROGRAM

## 2020-11-23 PROCEDURE — 83735 ASSAY OF MAGNESIUM: CPT | Performed by: STUDENT IN AN ORGANIZED HEALTH CARE EDUCATION/TRAINING PROGRAM

## 2020-11-23 PROCEDURE — 94640 AIRWAY INHALATION TREATMENT: CPT

## 2020-11-23 PROCEDURE — 99232 SBSQ HOSP IP/OBS MODERATE 35: CPT | Performed by: STUDENT IN AN ORGANIZED HEALTH CARE EDUCATION/TRAINING PROGRAM

## 2020-11-23 PROCEDURE — 80053 COMPREHEN METABOLIC PANEL: CPT | Performed by: STUDENT IN AN ORGANIZED HEALTH CARE EDUCATION/TRAINING PROGRAM

## 2020-11-23 PROCEDURE — 94760 N-INVAS EAR/PLS OXIMETRY 1: CPT

## 2020-11-23 RX ADMIN — CEFEPIME HYDROCHLORIDE 2000 MG: 2 INJECTION, POWDER, FOR SOLUTION INTRAVENOUS at 15:37

## 2020-11-23 RX ADMIN — GABAPENTIN 100 MG: 100 CAPSULE ORAL at 21:55

## 2020-11-23 RX ADMIN — GABAPENTIN 100 MG: 100 CAPSULE ORAL at 08:46

## 2020-11-23 RX ADMIN — ZOLPIDEM TARTRATE 10 MG: 5 TABLET, COATED ORAL at 22:54

## 2020-11-23 RX ADMIN — LEVALBUTEROL HYDROCHLORIDE 1.25 MG: 1.25 SOLUTION, CONCENTRATE RESPIRATORY (INHALATION) at 07:21

## 2020-11-23 RX ADMIN — BEXAROTENE 600 MG: 75 CAPSULE, LIQUID FILLED ORAL at 08:47

## 2020-11-23 RX ADMIN — HEPARIN SODIUM 5000 UNITS: 5000 INJECTION INTRAVENOUS; SUBCUTANEOUS at 21:55

## 2020-11-23 RX ADMIN — CEFTRIAXONE SODIUM 1000 MG: 10 INJECTION, POWDER, FOR SOLUTION INTRAVENOUS at 09:16

## 2020-11-23 RX ADMIN — HEPARIN SODIUM 5000 UNITS: 5000 INJECTION INTRAVENOUS; SUBCUTANEOUS at 14:07

## 2020-11-23 RX ADMIN — ISODIUM CHLORIDE 3 ML: 0.03 SOLUTION RESPIRATORY (INHALATION) at 07:21

## 2020-11-23 RX ADMIN — ATORVASTATIN CALCIUM 40 MG: 40 TABLET, FILM COATED ORAL at 08:46

## 2020-11-23 RX ADMIN — LEVOTHYROXINE SODIUM 112 MCG: 112 TABLET ORAL at 08:46

## 2020-11-23 RX ADMIN — LEVALBUTEROL HYDROCHLORIDE 1.25 MG: 1.25 SOLUTION, CONCENTRATE RESPIRATORY (INHALATION) at 20:43

## 2020-11-23 RX ADMIN — SACUBITRIL AND VALSARTAN 1 TABLET: 49; 51 TABLET, FILM COATED ORAL at 17:25

## 2020-11-23 RX ADMIN — ISODIUM CHLORIDE 3 ML: 0.03 SOLUTION RESPIRATORY (INHALATION) at 20:43

## 2020-11-23 RX ADMIN — TAMSULOSIN HYDROCHLORIDE 0.4 MG: 0.4 CAPSULE ORAL at 15:37

## 2020-11-23 RX ADMIN — ASPIRIN 81 MG CHEWABLE TABLET 81 MG: 81 TABLET CHEWABLE at 08:47

## 2020-11-23 RX ADMIN — GABAPENTIN 100 MG: 100 CAPSULE ORAL at 15:37

## 2020-11-23 RX ADMIN — METOPROLOL SUCCINATE 50 MG: 50 TABLET, EXTENDED RELEASE ORAL at 17:25

## 2020-11-23 RX ADMIN — PREDNISONE 40 MG: 20 TABLET ORAL at 08:46

## 2020-11-24 VITALS
RESPIRATION RATE: 16 BRPM | HEART RATE: 94 BPM | TEMPERATURE: 98 F | DIASTOLIC BLOOD PRESSURE: 74 MMHG | OXYGEN SATURATION: 96 % | HEIGHT: 71 IN | SYSTOLIC BLOOD PRESSURE: 120 MMHG | BODY MASS INDEX: 34.26 KG/M2 | WEIGHT: 244.71 LBS

## 2020-11-24 LAB
ALBUMIN SERPL BCP-MCNC: 2.7 G/DL (ref 3.5–5)
ALP SERPL-CCNC: 42 U/L (ref 46–116)
ALT SERPL W P-5'-P-CCNC: 37 U/L (ref 12–78)
ANION GAP SERPL CALCULATED.3IONS-SCNC: 13 MMOL/L (ref 4–13)
AST SERPL W P-5'-P-CCNC: 30 U/L (ref 5–45)
BASOPHILS # BLD AUTO: 0.02 THOUSANDS/ΜL (ref 0–0.1)
BASOPHILS NFR BLD AUTO: 0 % (ref 0–1)
BILIRUB SERPL-MCNC: 0.2 MG/DL (ref 0.2–1)
BUN SERPL-MCNC: 44 MG/DL (ref 5–25)
CALCIUM ALBUM COR SERPL-MCNC: 7.9 MG/DL (ref 8.3–10.1)
CALCIUM SERPL-MCNC: 6.9 MG/DL (ref 8.3–10.1)
CHLORIDE SERPL-SCNC: 106 MMOL/L (ref 100–108)
CO2 SERPL-SCNC: 22 MMOL/L (ref 21–32)
CREAT SERPL-MCNC: 1.69 MG/DL (ref 0.6–1.3)
EOSINOPHIL # BLD AUTO: 0.02 THOUSAND/ΜL (ref 0–0.61)
EOSINOPHIL NFR BLD AUTO: 0 % (ref 0–6)
ERYTHROCYTE [DISTWIDTH] IN BLOOD BY AUTOMATED COUNT: 13.5 % (ref 11.6–15.1)
GFR SERPL CREATININE-BSD FRML MDRD: 39 ML/MIN/1.73SQ M
GLUCOSE SERPL-MCNC: 92 MG/DL (ref 65–140)
HCT VFR BLD AUTO: 28.6 % (ref 36.5–49.3)
HGB BLD-MCNC: 8.9 G/DL (ref 12–17)
IMM GRANULOCYTES # BLD AUTO: 0.13 THOUSAND/UL (ref 0–0.2)
IMM GRANULOCYTES NFR BLD AUTO: 2 % (ref 0–2)
LYMPHOCYTES # BLD AUTO: 2.23 THOUSANDS/ΜL (ref 0.6–4.47)
LYMPHOCYTES NFR BLD AUTO: 29 % (ref 14–44)
MAGNESIUM SERPL-MCNC: 1.8 MG/DL (ref 1.6–2.6)
MCH RBC QN AUTO: 30.3 PG (ref 26.8–34.3)
MCHC RBC AUTO-ENTMCNC: 31.1 G/DL (ref 31.4–37.4)
MCV RBC AUTO: 97 FL (ref 82–98)
MONOCYTES # BLD AUTO: 0.64 THOUSAND/ΜL (ref 0.17–1.22)
MONOCYTES NFR BLD AUTO: 8 % (ref 4–12)
NEUTROPHILS # BLD AUTO: 4.69 THOUSANDS/ΜL (ref 1.85–7.62)
NEUTS SEG NFR BLD AUTO: 61 % (ref 43–75)
NRBC BLD AUTO-RTO: 0 /100 WBCS
PLATELET # BLD AUTO: 265 THOUSANDS/UL (ref 149–390)
PMV BLD AUTO: 10.2 FL (ref 8.9–12.7)
POTASSIUM SERPL-SCNC: 4.6 MMOL/L (ref 3.5–5.3)
PROT SERPL-MCNC: 6.8 G/DL (ref 6.4–8.2)
RBC # BLD AUTO: 2.94 MILLION/UL (ref 3.88–5.62)
SODIUM SERPL-SCNC: 141 MMOL/L (ref 136–145)
WBC # BLD AUTO: 7.73 THOUSAND/UL (ref 4.31–10.16)

## 2020-11-24 PROCEDURE — 94760 N-INVAS EAR/PLS OXIMETRY 1: CPT

## 2020-11-24 PROCEDURE — 83735 ASSAY OF MAGNESIUM: CPT | Performed by: STUDENT IN AN ORGANIZED HEALTH CARE EDUCATION/TRAINING PROGRAM

## 2020-11-24 PROCEDURE — 94640 AIRWAY INHALATION TREATMENT: CPT

## 2020-11-24 PROCEDURE — 99239 HOSP IP/OBS DSCHRG MGMT >30: CPT | Performed by: INTERNAL MEDICINE

## 2020-11-24 PROCEDURE — 80053 COMPREHEN METABOLIC PANEL: CPT | Performed by: STUDENT IN AN ORGANIZED HEALTH CARE EDUCATION/TRAINING PROGRAM

## 2020-11-24 PROCEDURE — 85025 COMPLETE CBC W/AUTO DIFF WBC: CPT | Performed by: STUDENT IN AN ORGANIZED HEALTH CARE EDUCATION/TRAINING PROGRAM

## 2020-11-24 RX ORDER — CEPHALEXIN 250 MG/1
250 CAPSULE ORAL EVERY 6 HOURS SCHEDULED
Qty: 20 CAPSULE | Refills: 0 | Status: SHIPPED | OUTPATIENT
Start: 2020-11-24 | End: 2020-11-29

## 2020-11-24 RX ORDER — PREDNISONE 10 MG/1
TABLET ORAL
Qty: 20 TABLET | Refills: 0 | Status: SHIPPED | OUTPATIENT
Start: 2020-11-24 | End: 2021-04-01

## 2020-11-24 RX ADMIN — PREDNISONE 40 MG: 20 TABLET ORAL at 08:23

## 2020-11-24 RX ADMIN — HEPARIN SODIUM 5000 UNITS: 5000 INJECTION INTRAVENOUS; SUBCUTANEOUS at 05:49

## 2020-11-24 RX ADMIN — TORSEMIDE 10 MG: 10 TABLET ORAL at 08:22

## 2020-11-24 RX ADMIN — CEFEPIME HYDROCHLORIDE 2000 MG: 2 INJECTION, POWDER, FOR SOLUTION INTRAVENOUS at 03:15

## 2020-11-24 RX ADMIN — ATORVASTATIN CALCIUM 40 MG: 40 TABLET, FILM COATED ORAL at 08:22

## 2020-11-24 RX ADMIN — LEVOTHYROXINE SODIUM 112 MCG: 112 TABLET ORAL at 08:22

## 2020-11-24 RX ADMIN — ASPIRIN 81 MG CHEWABLE TABLET 81 MG: 81 TABLET CHEWABLE at 08:22

## 2020-11-24 RX ADMIN — BEXAROTENE 600 MG: 75 CAPSULE, LIQUID FILLED ORAL at 08:24

## 2020-11-24 RX ADMIN — SACUBITRIL AND VALSARTAN 1 TABLET: 49; 51 TABLET, FILM COATED ORAL at 08:25

## 2020-11-24 RX ADMIN — ISODIUM CHLORIDE 3 ML: 0.03 SOLUTION RESPIRATORY (INHALATION) at 07:15

## 2020-11-24 RX ADMIN — GABAPENTIN 100 MG: 100 CAPSULE ORAL at 08:21

## 2020-11-24 RX ADMIN — LEVALBUTEROL HYDROCHLORIDE 1.25 MG: 1.25 SOLUTION, CONCENTRATE RESPIRATORY (INHALATION) at 07:15

## 2020-11-25 ENCOUNTER — TRANSITIONAL CARE MANAGEMENT (OUTPATIENT)
Dept: INTERNAL MEDICINE CLINIC | Facility: CLINIC | Age: 75
End: 2020-11-25

## 2020-11-27 LAB
BACTERIA BLD CULT: NORMAL
BACTERIA BLD CULT: NORMAL

## 2020-12-01 ENCOUNTER — OFFICE VISIT (OUTPATIENT)
Dept: INTERNAL MEDICINE CLINIC | Facility: CLINIC | Age: 75
End: 2020-12-01
Payer: COMMERCIAL

## 2020-12-01 VITALS
SYSTOLIC BLOOD PRESSURE: 122 MMHG | WEIGHT: 243.2 LBS | BODY MASS INDEX: 34.05 KG/M2 | HEART RATE: 84 BPM | RESPIRATION RATE: 20 BRPM | TEMPERATURE: 97.5 F | DIASTOLIC BLOOD PRESSURE: 80 MMHG | HEIGHT: 71 IN

## 2020-12-01 DIAGNOSIS — I10 ESSENTIAL HYPERTENSION: ICD-10-CM

## 2020-12-01 DIAGNOSIS — J44.1 COPD EXACERBATION (HCC): Primary | ICD-10-CM

## 2020-12-01 DIAGNOSIS — N17.0 ACUTE KIDNEY INJURY (AKI) WITH ACUTE TUBULAR NECROSIS (ATN) (HCC): ICD-10-CM

## 2020-12-01 DIAGNOSIS — E83.42 HYPOMAGNESEMIA: ICD-10-CM

## 2020-12-01 PROCEDURE — 99496 TRANSJ CARE MGMT HIGH F2F 7D: CPT | Performed by: NURSE PRACTITIONER

## 2020-12-01 PROCEDURE — 3008F BODY MASS INDEX DOCD: CPT | Performed by: PHYSICIAN ASSISTANT

## 2020-12-01 PROCEDURE — 1111F DSCHRG MED/CURRENT MED MERGE: CPT | Performed by: NURSE PRACTITIONER

## 2020-12-17 DIAGNOSIS — R39.9 UTI SYMPTOMS: Primary | ICD-10-CM

## 2020-12-17 RX ORDER — SULFAMETHOXAZOLE AND TRIMETHOPRIM 800; 160 MG/1; MG/1
1 TABLET ORAL 2 TIMES DAILY
Qty: 14 TABLET | Refills: 0 | Status: SHIPPED | OUTPATIENT
Start: 2020-12-17 | End: 2020-12-24

## 2020-12-18 ENCOUNTER — LAB (OUTPATIENT)
Dept: LAB | Facility: CLINIC | Age: 75
End: 2020-12-18
Payer: COMMERCIAL

## 2020-12-18 DIAGNOSIS — R39.9 UTI SYMPTOMS: ICD-10-CM

## 2020-12-18 LAB
BACTERIA UR QL AUTO: ABNORMAL /HPF
BILIRUB UR QL STRIP: NEGATIVE
CLARITY UR: ABNORMAL
COLOR UR: YELLOW
GLUCOSE UR STRIP-MCNC: NEGATIVE MG/DL
HGB UR QL STRIP.AUTO: ABNORMAL
HYALINE CASTS #/AREA URNS LPF: ABNORMAL /LPF
KETONES UR STRIP-MCNC: NEGATIVE MG/DL
LEUKOCYTE ESTERASE UR QL STRIP: ABNORMAL
NITRITE UR QL STRIP: POSITIVE
NON-SQ EPI CELLS URNS QL MICRO: ABNORMAL /HPF
PH UR STRIP.AUTO: 6 [PH]
PROT UR STRIP-MCNC: ABNORMAL MG/DL
RBC #/AREA URNS AUTO: ABNORMAL /HPF
SP GR UR STRIP.AUTO: 1.01 (ref 1–1.03)
UROBILINOGEN UR QL STRIP.AUTO: 0.2 E.U./DL
WBC #/AREA URNS AUTO: ABNORMAL /HPF

## 2020-12-18 PROCEDURE — 87086 URINE CULTURE/COLONY COUNT: CPT

## 2020-12-18 PROCEDURE — 81001 URINALYSIS AUTO W/SCOPE: CPT | Performed by: INTERNAL MEDICINE

## 2020-12-18 PROCEDURE — 87077 CULTURE AEROBIC IDENTIFY: CPT

## 2020-12-18 PROCEDURE — 87186 SC STD MICRODIL/AGAR DIL: CPT

## 2020-12-20 LAB — BACTERIA UR CULT: ABNORMAL

## 2020-12-21 DIAGNOSIS — E78.5 HYPERLIPIDEMIA, UNSPECIFIED HYPERLIPIDEMIA TYPE: ICD-10-CM

## 2020-12-21 RX ORDER — ATORVASTATIN CALCIUM 40 MG/1
40 TABLET, FILM COATED ORAL DAILY
Qty: 90 TABLET | Refills: 3 | Status: CANCELLED | OUTPATIENT
Start: 2020-12-21

## 2020-12-22 ENCOUNTER — OFFICE VISIT (OUTPATIENT)
Dept: PULMONOLOGY | Facility: CLINIC | Age: 75
End: 2020-12-22
Payer: COMMERCIAL

## 2020-12-22 VITALS
HEART RATE: 90 BPM | BODY MASS INDEX: 33.18 KG/M2 | DIASTOLIC BLOOD PRESSURE: 80 MMHG | TEMPERATURE: 97.5 F | OXYGEN SATURATION: 93 % | WEIGHT: 237 LBS | SYSTOLIC BLOOD PRESSURE: 124 MMHG | HEIGHT: 71 IN

## 2020-12-22 DIAGNOSIS — R91.8 LUNG NODULES: ICD-10-CM

## 2020-12-22 DIAGNOSIS — Z72.0 TOBACCO ABUSE: ICD-10-CM

## 2020-12-22 DIAGNOSIS — E78.5 HYPERLIPIDEMIA, UNSPECIFIED HYPERLIPIDEMIA TYPE: ICD-10-CM

## 2020-12-22 DIAGNOSIS — J41.0 SIMPLE CHRONIC BRONCHITIS (HCC): Primary | ICD-10-CM

## 2020-12-22 PROCEDURE — 3074F SYST BP LT 130 MM HG: CPT | Performed by: PHYSICIAN ASSISTANT

## 2020-12-22 PROCEDURE — 3079F DIAST BP 80-89 MM HG: CPT | Performed by: PHYSICIAN ASSISTANT

## 2020-12-22 PROCEDURE — 99214 OFFICE O/P EST MOD 30 MIN: CPT | Performed by: PHYSICIAN ASSISTANT

## 2020-12-22 PROCEDURE — 3008F BODY MASS INDEX DOCD: CPT | Performed by: PHYSICIAN ASSISTANT

## 2020-12-22 PROCEDURE — 99406 BEHAV CHNG SMOKING 3-10 MIN: CPT | Performed by: PHYSICIAN ASSISTANT

## 2020-12-22 PROCEDURE — 1036F TOBACCO NON-USER: CPT | Performed by: PHYSICIAN ASSISTANT

## 2020-12-22 PROCEDURE — 1160F RVW MEDS BY RX/DR IN RCRD: CPT | Performed by: PHYSICIAN ASSISTANT

## 2020-12-22 PROCEDURE — 1111F DSCHRG MED/CURRENT MED MERGE: CPT | Performed by: PHYSICIAN ASSISTANT

## 2020-12-22 RX ORDER — ATORVASTATIN CALCIUM 40 MG/1
40 TABLET, FILM COATED ORAL DAILY
Qty: 90 TABLET | Refills: 3 | Status: SHIPPED | OUTPATIENT
Start: 2020-12-22 | End: 2021-12-27 | Stop reason: SDUPTHER

## 2020-12-30 ENCOUNTER — REMOTE DEVICE CLINIC VISIT (OUTPATIENT)
Dept: CARDIOLOGY CLINIC | Facility: CLINIC | Age: 75
End: 2020-12-30
Payer: COMMERCIAL

## 2020-12-30 DIAGNOSIS — Z95.810 PRESENCE OF IMPLANTABLE CARDIOVERTER-DEFIBRILLATOR (ICD): Primary | ICD-10-CM

## 2020-12-30 PROCEDURE — 93297 REM INTERROG DEV EVAL ICPMS: CPT | Performed by: INTERNAL MEDICINE

## 2020-12-30 PROCEDURE — G2066 INTER DEVC REMOTE 30D: HCPCS | Performed by: INTERNAL MEDICINE

## 2021-01-13 DIAGNOSIS — N39.0 RECURRENT UTI: Primary | ICD-10-CM

## 2021-01-13 RX ORDER — SULFAMETHOXAZOLE AND TRIMETHOPRIM 800; 160 MG/1; MG/1
1 TABLET ORAL EVERY 12 HOURS SCHEDULED
Qty: 14 TABLET | Refills: 0 | Status: SHIPPED | OUTPATIENT
Start: 2021-01-13 | End: 2021-01-20

## 2021-01-14 ENCOUNTER — LAB (OUTPATIENT)
Dept: LAB | Facility: CLINIC | Age: 76
End: 2021-01-14
Payer: COMMERCIAL

## 2021-01-14 DIAGNOSIS — N39.0 RECURRENT UTI: ICD-10-CM

## 2021-01-14 LAB
BACTERIA UR QL AUTO: ABNORMAL /HPF
BILIRUB UR QL STRIP: NEGATIVE
CLARITY UR: ABNORMAL
COLOR UR: YELLOW
GLUCOSE UR STRIP-MCNC: NEGATIVE MG/DL
HGB UR QL STRIP.AUTO: ABNORMAL
KETONES UR STRIP-MCNC: NEGATIVE MG/DL
LEUKOCYTE ESTERASE UR QL STRIP: ABNORMAL
NITRITE UR QL STRIP: POSITIVE
NON-SQ EPI CELLS URNS QL MICRO: ABNORMAL /HPF
PH UR STRIP.AUTO: 6 [PH]
PROT UR STRIP-MCNC: ABNORMAL MG/DL
PSA SERPL-MCNC: 4.2 NG/ML (ref 0–4)
RBC #/AREA URNS AUTO: ABNORMAL /HPF
SP GR UR STRIP.AUTO: 1.01 (ref 1–1.03)
UROBILINOGEN UR QL STRIP.AUTO: 0.2 E.U./DL
WBC #/AREA URNS AUTO: ABNORMAL /HPF

## 2021-01-14 PROCEDURE — 84153 ASSAY OF PSA TOTAL: CPT

## 2021-01-14 PROCEDURE — 87077 CULTURE AEROBIC IDENTIFY: CPT

## 2021-01-14 PROCEDURE — 87186 SC STD MICRODIL/AGAR DIL: CPT

## 2021-01-14 PROCEDURE — 87086 URINE CULTURE/COLONY COUNT: CPT

## 2021-01-14 PROCEDURE — 81001 URINALYSIS AUTO W/SCOPE: CPT | Performed by: INTERNAL MEDICINE

## 2021-01-15 DIAGNOSIS — N39.0 RECURRENT UTI: Primary | ICD-10-CM

## 2021-01-16 LAB — BACTERIA UR CULT: ABNORMAL

## 2021-01-19 DIAGNOSIS — J30.89 ENVIRONMENTAL AND SEASONAL ALLERGIES: ICD-10-CM

## 2021-01-20 RX ORDER — MONTELUKAST SODIUM 10 MG/1
TABLET ORAL
Qty: 90 TABLET | Refills: 1 | Status: SHIPPED | OUTPATIENT
Start: 2021-01-20 | End: 2021-05-28 | Stop reason: SDUPTHER

## 2021-01-28 ENCOUNTER — APPOINTMENT (OUTPATIENT)
Dept: LAB | Facility: CLINIC | Age: 76
End: 2021-01-28
Payer: COMMERCIAL

## 2021-01-28 ENCOUNTER — OFFICE VISIT (OUTPATIENT)
Dept: DERMATOLOGY | Facility: CLINIC | Age: 76
End: 2021-01-28
Payer: COMMERCIAL

## 2021-01-28 VITALS — TEMPERATURE: 97.1 F

## 2021-01-28 DIAGNOSIS — Z85.828 HISTORY OF SKIN CANCER: ICD-10-CM

## 2021-01-28 DIAGNOSIS — L82.1 SEBORRHEIC KERATOSIS: ICD-10-CM

## 2021-01-28 DIAGNOSIS — Z12.83 SCREENING FOR SKIN CANCER: ICD-10-CM

## 2021-01-28 DIAGNOSIS — C84.00 MYCOSIS FUNGOIDES, UNSPECIFIED BODY REGION (HCC): ICD-10-CM

## 2021-01-28 DIAGNOSIS — C84.00 MYCOSIS FUNGOIDES, UNSPECIFIED BODY REGION (HCC): Primary | ICD-10-CM

## 2021-01-28 LAB
ALBUMIN SERPL BCP-MCNC: 3.4 G/DL (ref 3.5–5)
ALP SERPL-CCNC: 77 U/L (ref 46–116)
ALT SERPL W P-5'-P-CCNC: 30 U/L (ref 12–78)
ANION GAP SERPL CALCULATED.3IONS-SCNC: 5 MMOL/L (ref 4–13)
AST SERPL W P-5'-P-CCNC: 23 U/L (ref 5–45)
BASOPHILS # BLD AUTO: 0.09 THOUSANDS/ΜL (ref 0–0.1)
BASOPHILS NFR BLD AUTO: 1 % (ref 0–1)
BILIRUB SERPL-MCNC: 0.2 MG/DL (ref 0.2–1)
BUN SERPL-MCNC: 46 MG/DL (ref 5–25)
CALCIUM ALBUM COR SERPL-MCNC: 7.7 MG/DL (ref 8.3–10.1)
CALCIUM SERPL-MCNC: 7.2 MG/DL (ref 8.3–10.1)
CHLORIDE SERPL-SCNC: 116 MMOL/L (ref 100–108)
CO2 SERPL-SCNC: 19 MMOL/L (ref 21–32)
CREAT SERPL-MCNC: 2.34 MG/DL (ref 0.6–1.3)
EOSINOPHIL # BLD AUTO: 0.17 THOUSAND/ΜL (ref 0–0.61)
EOSINOPHIL NFR BLD AUTO: 2 % (ref 0–6)
ERYTHROCYTE [DISTWIDTH] IN BLOOD BY AUTOMATED COUNT: 13.1 % (ref 11.6–15.1)
GFR SERPL CREATININE-BSD FRML MDRD: 27 ML/MIN/1.73SQ M
GLUCOSE P FAST SERPL-MCNC: 120 MG/DL (ref 65–99)
HCT VFR BLD AUTO: 32.7 % (ref 36.5–49.3)
HGB BLD-MCNC: 9.7 G/DL (ref 12–17)
IMM GRANULOCYTES # BLD AUTO: 0.03 THOUSAND/UL (ref 0–0.2)
IMM GRANULOCYTES NFR BLD AUTO: 0 % (ref 0–2)
LYMPHOCYTES # BLD AUTO: 1.88 THOUSANDS/ΜL (ref 0.6–4.47)
LYMPHOCYTES NFR BLD AUTO: 21 % (ref 14–44)
MCH RBC QN AUTO: 30.9 PG (ref 26.8–34.3)
MCHC RBC AUTO-ENTMCNC: 29.7 G/DL (ref 31.4–37.4)
MCV RBC AUTO: 104 FL (ref 82–98)
MONOCYTES # BLD AUTO: 0.41 THOUSAND/ΜL (ref 0.17–1.22)
MONOCYTES NFR BLD AUTO: 5 % (ref 4–12)
NEUTROPHILS # BLD AUTO: 6.24 THOUSANDS/ΜL (ref 1.85–7.62)
NEUTS SEG NFR BLD AUTO: 71 % (ref 43–75)
NRBC BLD AUTO-RTO: 0 /100 WBCS
PLATELET # BLD AUTO: 291 THOUSANDS/UL (ref 149–390)
PMV BLD AUTO: 10.8 FL (ref 8.9–12.7)
POTASSIUM SERPL-SCNC: 5.5 MMOL/L (ref 3.5–5.3)
PROT SERPL-MCNC: 7.2 G/DL (ref 6.4–8.2)
RBC # BLD AUTO: 3.14 MILLION/UL (ref 3.88–5.62)
SODIUM SERPL-SCNC: 140 MMOL/L (ref 136–145)
T4 FREE SERPL-MCNC: 0.62 NG/DL (ref 0.76–1.46)
TSH SERPL DL<=0.05 MIU/L-ACNC: 0.31 UIU/ML (ref 0.36–3.74)
WBC # BLD AUTO: 8.82 THOUSAND/UL (ref 4.31–10.16)

## 2021-01-28 PROCEDURE — 85025 COMPLETE CBC W/AUTO DIFF WBC: CPT

## 2021-01-28 PROCEDURE — 84439 ASSAY OF FREE THYROXINE: CPT

## 2021-01-28 PROCEDURE — 36415 COLL VENOUS BLD VENIPUNCTURE: CPT

## 2021-01-28 PROCEDURE — 80053 COMPREHEN METABOLIC PANEL: CPT

## 2021-01-28 PROCEDURE — 99214 OFFICE O/P EST MOD 30 MIN: CPT | Performed by: DERMATOLOGY

## 2021-01-28 PROCEDURE — 84443 ASSAY THYROID STIM HORMONE: CPT

## 2021-01-28 NOTE — PROGRESS NOTES
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 )     LAST ASSESSED: 17    H/O nonmelanoma skin cancer     LAST ASSESSED: 17    HHD (hypertensive heart disease)     HTN (hypertension)     Hx of lymphoma     Hyperlipidemia     ICD (implantable cardioverter-defibrillator) in place     Insomnia     Mycosis fungoides (HCC)     PVC (premature ventricular contraction)     PVT (paroxysmal ventricular tachycardia) (HCC)     SOB (shortness of breath)      Past Surgical History:   Procedure Laterality Date    CARDIAC PACEMAKER PLACEMENT      SKIN SURGERY      skin cancer removal      Social History   Social History     Substance and Sexual Activity   Alcohol Use Yes    Comment: vodka nightly     Social History     Substance and Sexual Activity   Drug Use No     Social History     Tobacco Use   Smoking Status Former Smoker    Packs/day: 0 50    Years: 50 00    Pack years: 25 00    Types: Cigarettes    Quit date: 11/10/2020    Years since quittin 2   Smokeless Tobacco Never Used     Family History   Problem Relation Age of Onset    Diabetes Mother     Colon cancer Father         DIAGNOSED IN HIS [de-identified]    Heart failure Father     Coronary artery disease Father     Diabetes Family         MELLITUS    Heart attack Neg Hx     Stroke Neg Hx     Anuerysm Neg Hx     Clotting disorder Neg Hx     Arrhythmia Neg Hx     Hypertension Neg Hx         pt unsure     Hyperlipidemia Neg Hx     Sudden death Neg Hx         scd     Meds/Allergies   No Known Allergies    Meds:  Prior to Admission medications    Medication Sig Start Date End Date Taking?  Authorizing Provider   albuterol (2 5 mg/3 mL) 0 083 % nebulizer solution USE 1 VIAL VIA NEBULIZER 4 TIMES A DAY AS NEEDED 6/22/20  Yes Beth Callahan PA-C   albuterol (Proventil HFA) 90 mcg/act inhaler Inhale 2 puffs every 4 (four) hours as needed for wheezing or shortness of breath 10/27/20  Yes Beth Callahan PA-C   aspirin 81 MG tablet Take 81 mg by mouth daily   Yes Historical Provider, MD   atorvastatin (LIPITOR) 40 mg tablet Take 1 tablet (40 mg total) by mouth daily 12/22/20  Yes Junaid Gutierrez PA-C   bexarotene (Targretin) 75 mg capsule Take 8 capsules (600 mg total) by mouth daily [place] Brand necessary 10/13/20  Yes Basim Edmonds MD   Cholecalciferol ([employer] VITAMIN D3) 1000 units capsule Take 1 capsule by mouth daily   Yes Historical Provider, MD   cyanocobalamin (VITAMIN B-12) 1,000 mcg tablet Take 1,000 mcg by mouth daily   Yes Historical Provider, MD   ENTRESTO 49-51 MG TABS TAKE 1 TABLET BY MOUTH TWO  TIMES DAILY 3/31/20  Yes Fredis Dubon MD   ferrous sulfate 324 (65 Fe) mg Take 1 tablet by mouth Twice daily 8/31/15  Yes Historical Provider, MD   gabapentin (NEURONTIN) 100 mg capsule Take 1 capsule (100 mg total) by mouth 3 (three) times a day 5/26/20  Yes Rosalia Hernandez MD   levothyroxine 112 mcg tablet TAKE 1 TABLET BY MOUTH  DAILY 6/19/20  Yes Joanne Bales PA-C   metoprolol succinate (TOPROL-XL) 50 mg 24 hr tablet TAKE 1 TABLET BY MOUTH  EVERY 12 HOURS 3/31/20  Yes Nadya Lizarraga MD   montelukast (SINGULAIR) 10 mg tablet TAKE 1 TABLET BY MOUTH DAILY AT BEDTIME 1/20/21  Yes Shital Hansen PA-C   PAZEO 0 7 % SOLN  8/8/18  Yes Historical Provider, MD   potassium chloride (K-DUR,KLOR-CON) 20 mEq tablet TAKE 1 TABLET BY MOUTH  DAILY 8/17/20  Yes Arthur Li MD   torsemide (DEMADEX) 20 mg tablet Take 1 tablet by mouth daily  Patient taking differently: Take 5 mg by mouth daily  9/4/17  Yes SHRUTHI Reynolds ELLIPTA 100-62 5-25 MCG/INH inhaler USE 1 INHALATION BY MOUTH  DAILY   RINSE MOUTH AFTER  USE  7/10/20  Yes Nolan Hughes PA-C   triamcinolone (KENALOG) 0 1 % ointment Apply topically 2 (two) times 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 2/6/18  Yes Derik Lim MD   zolpidem (AMBIEN) 10 mg tablet Take 1 tablet (10 mg total) by mouth daily at bedtime as needed for sleep TAKE 1 TABLET BY MOUTH AT  BEDTIME 11/17/20 2/15/21 Yes Catarina Bravo MD   predniSONE 10 mg tablet 40 mg for 2 days, 30 mg for 2 days, 20 mg for 2 days, 10 mg for 2 days and then stop  Patient not taking: Reported on 12/1/2020 11/24/20   Yesenia Adhikari MD   nicotine (NICODERM CQ) 21 mg/24 hr TD 24 hr patch Place 1 patch on the skin every 24 hours  12/5/19  Historical Provider, MD       Subjective:     Review of Systems:    General: negative for - chills, fatigue, fever,  weight gain or weight loss  Psychological: negative for - anxiety, behavioral disorder, concentration difficulties, decreased libido, depression, irritability, memory difficulties, mood swings, sleep disturbances or suicidal ideation  ENT: negative for - hearing difficulties , nasal congestion, nasal discharge, oral lesions, sinus pain, sneezing, sore throat  Allergy and Immunology: negative for - hives, insect bite sensitivity,  Hematological and Lymphatic: negative for - bleeding problems, blood clots,bruising, swollen lymph nodes  Endocrine: negative for - hair pattern changes, hot flashes, malaise/lethargy, mood swings, palpitations, polydipsia/polyuria, skin changes, temperature intolerance or unexpected weight change  Respiratory: negative for - cough, hemoptysis, orthopnea, shortness of breath, or wheezing  Cardiovascular: negative for - chest pain, dyspnea on exertion, edema,  Gastrointestinal: negative for - abdominal pain, nausea/vomiting  Genito-Urinary: negative for - dysuria, incontinence, irregular/heavy menses or urinary frequency/urgency  Musculoskeletal: negative for - gait disturbance, joint pain, joint stiffness, joint swelling, muscle pain, muscular weakness  Dermatological:  As in HPI  Neurological: negative for confusion, dizziness, headaches, impaired coordination/balance, memory loss, numbness/tingling, seizures, speech problems, tremors or weakness       Objective:   Temp (!) 97 1 °F (36 2 °C)     Physical Exam:    General Appearance:    Alert, cooperative, no distress   Head:    Normocephalic, without obvious abnormality, atraumatic   Lymphatics:    No lymphadenopathy noted      Abdomen:   No hepatosplenomegaly   Skin:   A full skin exam was performed including scalp, head scalp, eyes, ears, nose, lips, neck, chest, axilla, abdomen, back, buttocks, bilateral upper extremities, bilateral lower extremities, hands, feet, fingers, toes, fingernails, and toenails erythematous scaling patches with poilklodermatous dermatitis changes noted on 40% of his body surface area new previous sites skin cancer well healed without recurrence normal keratotic papules with greasy stuck on appearance nothing else remarkable noted on complete exam areas on the lower legs appears slightly more indurated     Assessment:     1  History of skin cancer     2  Seborrheic keratosis     3  Screening for skin cancer     4   Mycosis fungoides, unspecified body region [facility])           Plan:   Cutaneous T-cell lymphoma appears relatively stable still quite a bit of cutaneous involvement but does not appear to be plaque involvement except some indurated areas noted on the lower legs patient needs to continue with the same therapy with both topical triamcinolone along with Targretin if there is issues on that sure what alternatively may have  Seborrheic keratosis patient reassured these are normal growths we acquire with age no treatment needed  History of skin cancer in no recurrence nothing else atypical sunblock recommended follow-up in 3 months  Screening for dermatologic disorders nothing else of concern noted on complete exam follow-up in 3 months  James Russell MD  1/28/2021,1:35 PM    Portions of the record may have been created with voice recognition software   Occasional wrong word or "sound a like" substitutions may have occurred due to the inherent limitations of voice recognition software   Read the chart carefully and recognize, using context, where substitutions have occurred

## 2021-01-28 NOTE — PATIENT INSTRUCTIONS
Cutaneous T-cell lymphoma appears relatively stable still quite a bit of cutaneous involvement but does not appear to be plaque involvement except some indurated areas noted on the lower legs patient needs to continue with the same therapy with both topical triamcinolone along with Targretin if there is issues on that sure what alternatively may have  Seborrheic keratosis patient reassured these are normal growths we acquire with age no treatment needed  History of skin cancer in no recurrence nothing else atypical sunblock recommended follow-up in 3 months  Screening for dermatologic disorders nothing else of concern noted on complete exam follow-up in 3 months

## 2021-02-03 DIAGNOSIS — I13.2 CARDIORENAL SYNDROME WITH RENAL FAILURE, STAGE 5 CHRONIC KIDNEY DISEASE OR END STAGE RENAL DISEASE, WITH HEART FAILURE (HCC): Primary | ICD-10-CM

## 2021-02-03 DIAGNOSIS — E83.51 HYPOCALCEMIA: ICD-10-CM

## 2021-02-04 ENCOUNTER — TELEPHONE (OUTPATIENT)
Dept: INTERNAL MEDICINE CLINIC | Facility: CLINIC | Age: 76
End: 2021-02-04

## 2021-02-04 DIAGNOSIS — N39.0 RECURRENT UTI: Primary | ICD-10-CM

## 2021-02-04 RX ORDER — NITROFURANTOIN 25; 75 MG/1; MG/1
100 CAPSULE ORAL 2 TIMES DAILY
Qty: 20 CAPSULE | Refills: 1 | Status: SHIPPED | OUTPATIENT
Start: 2021-02-04 | End: 2022-01-10

## 2021-02-04 NOTE — TELEPHONE ENCOUNTER
Urologist can not get him in until April  Can Dr Caba Seats rx another round of antibiotics  He is not feeling well at all  Please advise

## 2021-02-08 ENCOUNTER — CONSULT (OUTPATIENT)
Dept: NEPHROLOGY | Facility: CLINIC | Age: 76
End: 2021-02-08
Payer: COMMERCIAL

## 2021-02-08 VITALS
DIASTOLIC BLOOD PRESSURE: 60 MMHG | RESPIRATION RATE: 16 BRPM | HEART RATE: 68 BPM | SYSTOLIC BLOOD PRESSURE: 100 MMHG | BODY MASS INDEX: 33.32 KG/M2 | WEIGHT: 238 LBS | HEIGHT: 71 IN | TEMPERATURE: 97.5 F

## 2021-02-08 DIAGNOSIS — J44.9 CHRONIC OBSTRUCTIVE PULMONARY DISEASE, UNSPECIFIED COPD TYPE (HCC): ICD-10-CM

## 2021-02-08 DIAGNOSIS — E83.51 HYPOCALCEMIA: ICD-10-CM

## 2021-02-08 DIAGNOSIS — E83.9 CHRONIC KIDNEY DISEASE-MINERAL AND BONE DISORDER: ICD-10-CM

## 2021-02-08 DIAGNOSIS — I13.2 CARDIORENAL SYNDROME WITH RENAL FAILURE, STAGE 5 CHRONIC KIDNEY DISEASE OR END STAGE RENAL DISEASE, WITH HEART FAILURE (HCC): ICD-10-CM

## 2021-02-08 DIAGNOSIS — N18.9 CHRONIC KIDNEY DISEASE-MINERAL AND BONE DISORDER: ICD-10-CM

## 2021-02-08 DIAGNOSIS — N17.0 ACUTE KIDNEY INJURY (AKI) WITH ACUTE TUBULAR NECROSIS (ATN) (HCC): Primary | ICD-10-CM

## 2021-02-08 DIAGNOSIS — M89.9 CHRONIC KIDNEY DISEASE-MINERAL AND BONE DISORDER: ICD-10-CM

## 2021-02-08 DIAGNOSIS — I42.0 DILATED CARDIOMYOPATHY (HCC): ICD-10-CM

## 2021-02-08 DIAGNOSIS — N18.30 STAGE 3 CHRONIC KIDNEY DISEASE, UNSPECIFIED WHETHER STAGE 3A OR 3B CKD (HCC): ICD-10-CM

## 2021-02-08 DIAGNOSIS — N40.0 BENIGN PROSTATIC HYPERPLASIA WITHOUT LOWER URINARY TRACT SYMPTOMS: ICD-10-CM

## 2021-02-08 DIAGNOSIS — I10 ESSENTIAL HYPERTENSION: ICD-10-CM

## 2021-02-08 PROCEDURE — 99204 OFFICE O/P NEW MOD 45 MIN: CPT | Performed by: INTERNAL MEDICINE

## 2021-02-08 NOTE — ASSESSMENT & PLAN NOTE
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

## 2021-02-08 NOTE — ASSESSMENT & PLAN NOTE
Lab Results   Component Value Date    EGFR 27 01/28/2021    EGFR 39 11/24/2020    EGFR 41 11/23/2020    CREATININE 2 34 (H) 01/28/2021    CREATININE 1 69 (H) 11/24/2020    CREATININE 1 63 (H) 11/23/2020    will check PTH and phosphorus along with vitamin-D as part of the CKD management

## 2021-02-08 NOTE — PROGRESS NOTES
NEPHROLOGY OFFICE CONSULT  Radha Alexis 70 y o  male MRN: [id number]    Encounter: [id number] 2/8/2021    REASON FOR VISIT: Radha Alexis is a 70 y o male who was referred by Linda Gillis MD for evaluation of Chronic Kidney Disease and Consult    HPI:    Leonie Colon in today for evaluation management of acute on chronic renal failure  68-year-old gentleman with history of cardiomyopathy and COPD along with hypertension  During recent blood test he was found to have worsening renal function so was advised to see me    Patient was in hospital in November with some sort of infection for which he got antibiotic  He also had a kidney injury at that time which was thought to be due to cardiorenal syndrome  As kidney function is improving who was discharged home  We will not consult at that time     He had recent blood test done which showed kidney function getting worse so was advised to see me     He does have some UTI and at this point he is on Macrobid but he claims he got different antibiotic couple of weeks ago he does not remember the name     He does have cardiomyopathy EF of about 20%  He has defibrillator and being closely monitored by cardiologist     He also has COPD on multiple nebulizer    He denies any complaint  He does have dysuria but is getting much better with help of antibiotic    Denies ever had a kidney problem in the past   He does take occasional nonsteroidal pain killer but no other nephrotoxic medicine      REVIEW OF SYSTEMS:    Review of Systems   Constitutional: Negative for activity change and fatigue  HENT: Negative for congestion and ear discharge  Eyes: Negative for photophobia and pain  Respiratory: Positive for cough and shortness of breath  Negative for apnea and choking  Cardiovascular: Negative for chest pain and palpitations  Gastrointestinal: Negative for abdominal distention, abdominal pain and blood in stool     Endocrine: Negative for heat intolerance and polyphagia  Genitourinary: Positive for difficulty urinating  Negative for flank pain and urgency  Musculoskeletal: Positive for arthralgias  Negative for neck pain and neck stiffness  Skin: Negative for color change and wound  Allergic/Immunologic: Negative for food allergies and immunocompromised state  Neurological: Negative for dizziness, seizures and facial asymmetry  Hematological: Negative for adenopathy  Does not bruise/bleed easily  Psychiatric/Behavioral: Negative for self-injury and suicidal ideas           PAST MEDICAL HISTORY:  Past Medical History:   Diagnosis Date    Agent orange exposure     Anemia     Benign colon polyp     BPH (benign prostatic hyperplasia)     Bradycardia     Cardiomyopathy ([facility] )     Chest discomfort     CH[facility]estive [facility] failure) [facility]     Chronic bronchitis (HCC)     Chronic kidney disease     COPD (chronic obstructive pulmonary disease) ([facility] )     LAST ASSESSED: 17    H/O nonmelanoma skin cancer     LAST ASSESSED: 17    HHD (hypertensive heart disease)     HTN (hypertension)     Hx of lymphoma     Hyperlipidemia     Hypertension     ICD (implantable cardioverter-defibrillator) in place     Insomnia     Mycosis fungoides (HCC)     PVC (premature ventricular contraction)     PVT (paroxysmal ventricular tachycardia) ([facility]     SOB (shortness of breath)        PAST SURGICAL HISTORY:  Past Surgical History:   Procedure Laterality Date    CARDIAC PACEMAKER PLACEMENT      SKIN SURGERY      skin cancer removal        SOCIAL HISTORY:  Social History     Substance and Sexual Activity   Alcohol Use Yes    Comment: vodka nightly     Social History     Substance and Sexual Activity   Drug Use No     Social History     Tobacco Use   Smoking Status Current Every Day Smoker    Packs/day: 0 50    Years: 50 00    Pack years: 25 00    Types: Cigarettes    Last attempt to quit: 11/10/2020    Years since quittin 2   Smokeless Tobacco Never Used       FAMILY HISTORY:  Family History   Problem Relation Age of Onset    Diabetes Mother     Colon cancer Father         DIAGNOSED IN HIS [de-identified]    Heart failure Father     Coronary artery disease Father     Diabetes Family         MELLITUS    Heart attack Neg Hx     Stroke Neg Hx     Anuerysm Neg Hx     Clotting disorder Neg Hx     Arrhythmia Neg Hx     Hypertension Neg Hx         pt unsure     Hyperlipidemia Neg Hx     Sudden death Neg Hx         scd       MEDICATIONS:    Current Outpatient Medications:     albuterol (2 5 mg/3 mL) 0 083 % nebulizer solution, USE 1 VIAL VIA NEBULIZER 4 TIMES A DAY AS NEEDED, Disp: 1080 mL, Rfl: 3    albuterol (Proventil HFA) 90 mcg/act inhaler, Inhale 2 puffs every 4 (four) hours as needed for wheezing or shortness of breath, Disp: 3 Inhaler, Rfl: 3    aspirin 81 MG tablet, Take 81 mg by mouth daily, Disp: , Rfl:     atorvastatin (LIPITOR) 40 mg tablet, Take 1 tablet (40 mg total) by mouth daily, Disp: 90 tablet, Rfl: 3    bexarotene (Targretin) 75 mg capsule, Take 8 capsules (600 mg total) by mouth daily [place] Brand necessary, Disp: 240 capsule, Rfl: 3    Cholecalciferol ([employer] VITAMIN D3) 1000 units capsule, Take 1 capsule by mouth daily, Disp: , Rfl:     cyanocobalamin (VITAMIN B-12) 1,000 mcg tablet, Take 1,000 mcg by mouth daily, Disp: , Rfl:     ENTRESTO 49-51 MG TABS, TAKE 1 TABLET BY MOUTH TWO  TIMES DAILY, Disp: 180 tablet, Rfl: 3    ferrous sulfate 324 (65 Fe) mg, Take 1 tablet by mouth Twice daily, Disp: , Rfl:     gabapentin (NEURONTIN) 100 mg capsule, Take 1 capsule (100 mg total) by mouth 3 (three) times a day, Disp: 270 capsule, Rfl: 3    levothyroxine 112 mcg tablet, TAKE 1 TABLET BY MOUTH  DAILY, Disp: 90 tablet, Rfl: 3    metoprolol succinate (TOPROL-XL) 50 mg 24 hr tablet, TAKE 1 TABLET BY MOUTH  EVERY 12 HOURS, Disp: 180 tablet, Rfl: 3    montelukast (SINGULAIR) 10 mg tablet, TAKE 1 TABLET BY MOUTH DAILY AT BEDTIME, Disp: 90 tablet, Rfl: 1    nitrofurantoin (MACROBID) 100 mg capsule, Take 1 capsule (100 mg total) by mouth 2 (two) times a day, Disp: 20 capsule, Rfl: 1    PAZEO 0 7 % SOLN, , Disp: , Rfl:     potassium chloride (K-DUR,KLOR-CON) 20 mEq tablet, TAKE 1 TABLET BY MOUTH  DAILY, Disp: 90 tablet, Rfl: 3    torsemide (DEMADEX) 20 mg tablet, Take 1 tablet by mouth daily (Patient taking differently: Take 5 mg by mouth as needed ), Disp: 30 tablet, Rfl: 0    TRELEGY ELLIPTA 100-62 5-25 MCG/INH inhaler, USE 1 INHALATION BY MOUTH  DAILY  RINSE MOUTH AFTER  USE , Disp: 180 each, Rfl: 3    triamcinolone (KENALOG) 0 1 % ointment, Apply topically 2 (two) times 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, Disp: 454 g, Rfl: 3    Vitamins-Lipotropics (LIPOFLAVONOID PO), Take by mouth daily, Disp: , Rfl:     zolpidem (AMBIEN) 10 mg tablet, Take 1 tablet (10 mg total) by mouth daily at bedtime as needed for sleep TAKE 1 TABLET BY MOUTH AT  BEDTIME, Disp: 90 tablet, Rfl: 1    predniSONE 10 mg tablet, 40 mg for 2 days, 30 mg for 2 days, 20 mg for 2 days, 10 mg for 2 days and then stop (Patient not taking: Reported on 12/1/2020), Disp: 20 tablet, Rfl: 0    PHYSICAL EXAM:  Vitals:    02/08/21 1048   BP: 100/60   BP Location: Right arm   Patient Position: Sitting   Pulse: 68   Resp: 16   Temp: 97 5 °F (36 4 °C)   Weight: 108 kg (238 lb)   Height: 5' 11" (1 803 m)     Body mass index is 33 19 kg/m²  Physical Exam  Constitutional:       General: He is not in acute distress  Appearance: Normal appearance  He is well-developed  HENT:      Head: Normocephalic and atraumatic  Mouth/Throat:      Mouth: Mucous membranes are moist    Eyes:      General: No scleral icterus  Conjunctiva/sclera: Conjunctivae normal    Neck:      Musculoskeletal: Normal range of motion and neck supple  No neck rigidity  Vascular: No JVD  Cardiovascular:      Rate and Rhythm: Normal rate and regular rhythm  Heart sounds: Normal heart sounds     Pulmonary: Effort: Pulmonary effort is normal  No respiratory distress  Breath sounds: Normal breath sounds  No wheezing  Abdominal:      General: Bowel sounds are normal       Palpations: Abdomen is soft  Tenderness: There is no abdominal tenderness  Musculoskeletal: Normal range of motion  Skin:     General: Skin is warm  Findings: No rash  Neurological:      General: No focal deficit present  Mental Status: He is alert and oriented to person, place, and time     Psychiatric:         Mood and Affect: Mood normal          Behavior: Behavior normal          LAB RESULTS:  Results for orders placed or performed in visit on 01/28/21   CBC and differential   Result Value Ref Range    WBC 8 82 4 31 - 10 16 Thousand/uL    RBC 3 14 (L) 3 88 - 5 62 Million/uL    Hemoglobin 9 7 (L) 12 0 - 17 0 g/dL    Hematocrit 32 7 (L) 36 5 - 49 3 %     (H) 82 - 98 fL    MCH 30 9 26 8 - 34 3 pg    MCHC 29 7 (L) 31 4 - 37 4 g/dL    RDW 13 1 11 6 - 15 1 %    MPV 10 8 8 9 - 12 7 fL    Platelets 203 734 - 649 Thousands/uL    nRBC 0 /100 WBCs    Neutrophils Relative 71 43 - 75 %    Immat GRANS % 0 0 - 2 %    Lymphocytes Relative 21 14 - 44 %    Monocytes Relative 5 4 - 12 %    Eosinophils Relative 2 0 - 6 %    Basophils Relative 1 0 - 1 %    Neutrophils Absolute 6 24 1 85 - 7 62 Thousands/µL    Immature Grans Absolute 0 03 0 00 - 0 20 Thousand/uL    Lymphocytes Absolute 1 88 0 60 - 4 47 Thousands/µL    Monocytes Absolute 0 41 0 17 - 1 22 Thousand/µL    Eosinophils Absolute 0 17 0 00 - 0 61 Thousand/µL    Basophils Absolute 0 09 0 00 - 0 10 Thousands/µL   Comprehensive metabolic panel   Result Value Ref Range    Sodium 140 136 - 145 mmol/L    Potassium 5 5 (H) 3 5 - 5 3 mmol/L    Chloride 116 (H) 100 - 108 mmol/L    CO2 19 (L) 21 - 32 mmol/L    ANION GAP 5 4 - 13 mmol/L    BUN 46 (H) 5 - 25 mg/dL    Creatinine 2 34 (H) 0 60 - 1 30 mg/dL    Glucose, Fasting 120 (H) 65 - 99 mg/dL    Calcium 7 2 (L) 8 3 - 10 1 mg/dL Corrected Calcium 7 7 (L) 8 3 - 10 1 mg/dL    AST 23 5 - 45 U/L    ALT 30 12 - 78 U/L    Alkaline Phosphatase 77 46 - 116 U/L    Total Protein 7 2 6 4 - 8 2 g/dL    Albumin 3 4 (L) 3 5 - 5 0 g/dL    Total Bilirubin 0 20 0 20 - 1 00 mg/dL    eGFR 27 ml/min/1 73sq m   TSH, 3rd generation with Free T4 reflex   Result Value Ref Range    TSH 3RD GENERATON 0 307 (L) 0 358 - 3 740 uIU/mL   T4, free   Result Value Ref Range    Free T4 0 62 (L) 0 76 - 1 46 ng/dL       ASSESSMENT and PLAN:    Stage 3 chronic kidney disease  Lab Results   Component Value Date    EGFR 27 01/28/2021    EGFR 39 11/24/2020    EGFR 41 11/23/2020    CREATININE 2 34 (H) 01/28/2021    CREATININE 1 69 (H) 11/24/2020    CREATININE 1 63 (H) 11/23/2020    I think patient does have stage III CKD based on past blood test   During November hospitalization it got worse which may be secondary to cardiorenal syndrome  It improved but never reach baseline  He may have hypertensive nephrosclerosis along with cardiorenal syndrome  Pathophysiology of kidney disease discussed with the patient  Asymptomatic and progressive nature of kidney disease also discussed with him  Will advised to get kidney ultrasound and repeat blood test at this point    Chronic kidney disease-mineral and bone disorder  Lab Results   Component Value Date    EGFR 27 01/28/2021    EGFR 39 11/24/2020    EGFR 41 11/23/2020    CREATININE 2 34 (H) 01/28/2021    CREATININE 1 69 (H) 11/24/2020    CREATININE 1 63 (H) 11/23/2020    will check PTH and phosphorus along with vitamin-D as part of the CKD management    Acute kidney injury (ELLYN) with acute tubular necrosis (ATN) ([facility] [address] )   Patient does have worsening renal function  Etiology unclear  May be related to intercurrent UTI along with antibiotic which he got  He does not reveal anemia with antibiotic which got before Macrobid which is taking now    He is also having some diarrhea and not having good appetite which may be contributing as volume depletion  Advised hydration and avoiding any nephrotoxic medicine at this point  Will repeat blood test   Will also get kidney ultrasound as part of the workup    Cardiomyopathy [facility])   Patient does have cardiomyopathy with EF about 20% according the chart  This is definitely contributing to worsening renal function and fluctuating kidney function  He is on Entresto which is also contributing to fluctuating kidney function  Though I think he needs   that medication so I will not change the  Anything  He is quite asymptomatic and take diuretic on as needed basis    COPD (chronic obstructive pulmonary disease) ([address] )   Does have COPD but seems to be asymptomatic right now    HTN (hypertension)   Very well control  Likely because of cardiomyopathy      Everything discussed at length with the patient  I will repeat blood and urine test in view of acute kidney injury  He is advised to get kidney ultrasound which was ordered by primary doctor  I will monitor him closely in view of fluctuating kidney function  Thank you very much for the consultation   I will see him back in 4-6 weeks    Portions of the record may have been created with voice recognition software  Occasional wrong word or "sound a like" substitutions may have occurred due to the inherent limitations of voice recognition software  Read the chart carefully and recognize, using context, where substitutions have occurred  If you have any questions, please contact the dictating provider

## 2021-02-08 NOTE — ASSESSMENT & PLAN NOTE
Patient does have cardiomyopathy with EF about 20% according the chart  This is definitely contributing to worsening renal function and fluctuating kidney function  He is on Entresto which is also contributing to fluctuating kidney function  Though I think he needs   that medication so I will not change the  Anything      He is quite asymptomatic and take diuretic on as needed basis

## 2021-02-08 NOTE — ASSESSMENT & PLAN NOTE
Patient does have worsening renal function  Etiology unclear  May be related to intercurrent UTI along with antibiotic which he got  He does not reveal anemia with antibiotic which got before Macrobid which is taking now  He is also having some diarrhea and not having good appetite which may be contributing as volume depletion  Advised hydration and avoiding any nephrotoxic medicine at this point    Will repeat blood test   Will also get kidney ultrasound as part of the workup

## 2021-02-08 NOTE — PATIENT INSTRUCTIONS

## 2021-02-17 ENCOUNTER — REMOTE DEVICE CLINIC VISIT (OUTPATIENT)
Dept: CARDIOLOGY CLINIC | Facility: CLINIC | Age: 76
End: 2021-02-17
Payer: COMMERCIAL

## 2021-02-17 DIAGNOSIS — Z95.810 AICD (AUTOMATIC CARDIOVERTER/DEFIBRILLATOR) PRESENT: Primary | ICD-10-CM

## 2021-02-17 PROCEDURE — 93295 DEV INTERROG REMOTE 1/2/MLT: CPT | Performed by: INTERNAL MEDICINE

## 2021-02-17 PROCEDURE — 93296 REM INTERROG EVL PM/IDS: CPT | Performed by: INTERNAL MEDICINE

## 2021-02-17 NOTE — PROGRESS NOTES
Results for orders placed or performed in visit on 02/17/21   Cardiac EP device report    Narrative    MDT/BIV-ICD/ NOT MRI CONDITIONAL  CARELINK TRANSMISSION: BATTERY VOLTAGE ADEQUATE (3 8 YRS)  AP-98%, BVP-99% (TOTAL -98 1%+VSR PACE-1 3%)  ALL AVAILABLE LEAD PARAMETERS WITHIN NORMAL LIMITS  3 VHR EPISODES, MOST RECENT FOR 7 BEAT NSVT, AVG CL~315MS; SVT ON #108  PT ON ASA 81MG & METOPROLOL  [address] OPTI-VOL WITHIN NORMAL LIMITS  NORMAL DEVICE FUNCTION   GV

## 2021-02-26 DIAGNOSIS — I50.22 CHRONIC SYSTOLIC CONGESTIVE HEART FAILURE (HCC): ICD-10-CM

## 2021-02-26 DIAGNOSIS — I10 ESSENTIAL HYPERTENSION: ICD-10-CM

## 2021-02-26 RX ORDER — METOPROLOL SUCCINATE 50 MG/1
50 TABLET, EXTENDED RELEASE ORAL EVERY 12 HOURS
Qty: 180 TABLET | Refills: 3 | Status: SHIPPED | OUTPATIENT
Start: 2021-02-26 | End: 2021-04-01 | Stop reason: SDUPTHER

## 2021-02-26 RX ORDER — SACUBITRIL AND VALSARTAN 49; 51 MG/1; MG/1
1 TABLET, FILM COATED ORAL 2 TIMES DAILY
Qty: 180 TABLET | Refills: 3 | Status: SHIPPED | OUTPATIENT
Start: 2021-02-26 | End: 2021-12-17

## 2021-03-01 ENCOUNTER — HOSPITAL ENCOUNTER (OUTPATIENT)
Dept: ULTRASOUND IMAGING | Facility: CLINIC | Age: 76
Discharge: HOME | End: 2021-03-01
Payer: COMMERCIAL

## 2021-03-01 ENCOUNTER — APPOINTMENT (OUTPATIENT)
Dept: LAB | Facility: CLINIC | Age: 76
End: 2021-03-01
Payer: COMMERCIAL

## 2021-03-01 DIAGNOSIS — N18.30 STAGE 3 CHRONIC KIDNEY DISEASE, UNSPECIFIED WHETHER STAGE 3A OR 3B CKD (HCC): ICD-10-CM

## 2021-03-01 DIAGNOSIS — M89.9 CHRONIC KIDNEY DISEASE-MINERAL AND BONE DISORDER: ICD-10-CM

## 2021-03-01 DIAGNOSIS — E83.9 CHRONIC KIDNEY DISEASE-MINERAL AND BONE DISORDER: ICD-10-CM

## 2021-03-01 DIAGNOSIS — N18.9 CHRONIC KIDNEY DISEASE-MINERAL AND BONE DISORDER: ICD-10-CM

## 2021-03-01 DIAGNOSIS — N39.0 RECURRENT UTI: ICD-10-CM

## 2021-03-01 LAB
25(OH)D3 SERPL-MCNC: 14.2 NG/ML (ref 30–100)
ANION GAP SERPL CALCULATED.3IONS-SCNC: 5 MMOL/L (ref 4–13)
BACTERIA UR QL AUTO: ABNORMAL /HPF
BASOPHILS # BLD AUTO: 0.09 THOUSANDS/ΜL (ref 0–0.1)
BASOPHILS NFR BLD AUTO: 1 % (ref 0–1)
BILIRUB UR QL STRIP: NEGATIVE
BUN SERPL-MCNC: 28 MG/DL (ref 5–25)
CALCIUM SERPL-MCNC: 7.9 MG/DL (ref 8.3–10.1)
CHLORIDE SERPL-SCNC: 113 MMOL/L (ref 100–108)
CLARITY UR: ABNORMAL
CO2 SERPL-SCNC: 24 MMOL/L (ref 21–32)
COLOR UR: YELLOW
CREAT SERPL-MCNC: 1.44 MG/DL (ref 0.6–1.3)
CREAT UR-MCNC: 115 MG/DL
EOSINOPHIL # BLD AUTO: 0.47 THOUSAND/ΜL (ref 0–0.61)
EOSINOPHIL NFR BLD AUTO: 5 % (ref 0–6)
ERYTHROCYTE [DISTWIDTH] IN BLOOD BY AUTOMATED COUNT: 12.8 % (ref 11.6–15.1)
GFR SERPL CREATININE-BSD FRML MDRD: 48 ML/MIN/1.73SQ M
GLUCOSE P FAST SERPL-MCNC: 104 MG/DL (ref 65–99)
GLUCOSE UR STRIP-MCNC: NEGATIVE MG/DL
HCT VFR BLD AUTO: 34 % (ref 36.5–49.3)
HGB BLD-MCNC: 9.9 G/DL (ref 12–17)
HGB UR QL STRIP.AUTO: ABNORMAL
IMM GRANULOCYTES # BLD AUTO: 0.03 THOUSAND/UL (ref 0–0.2)
IMM GRANULOCYTES NFR BLD AUTO: 0 % (ref 0–2)
KETONES UR STRIP-MCNC: NEGATIVE MG/DL
LEUKOCYTE ESTERASE UR QL STRIP: ABNORMAL
LYMPHOCYTES # BLD AUTO: 1.72 THOUSANDS/ΜL (ref 0.6–4.47)
LYMPHOCYTES NFR BLD AUTO: 18 % (ref 14–44)
MCH RBC QN AUTO: 30.6 PG (ref 26.8–34.3)
MCHC RBC AUTO-ENTMCNC: 29.1 G/DL (ref 31.4–37.4)
MCV RBC AUTO: 105 FL (ref 82–98)
MONOCYTES # BLD AUTO: 0.61 THOUSAND/ΜL (ref 0.17–1.22)
MONOCYTES NFR BLD AUTO: 6 % (ref 4–12)
NEUTROPHILS # BLD AUTO: 6.63 THOUSANDS/ΜL (ref 1.85–7.62)
NEUTS SEG NFR BLD AUTO: 70 % (ref 43–75)
NITRITE UR QL STRIP: POSITIVE
NON-SQ EPI CELLS URNS QL MICRO: ABNORMAL /HPF
NRBC BLD AUTO-RTO: 0 /100 WBCS
PH UR STRIP.AUTO: 6 [PH]
PHOSPHATE SERPL-MCNC: 2.3 MG/DL (ref 2.3–4.1)
PLATELET # BLD AUTO: 292 THOUSANDS/UL (ref 149–390)
PMV BLD AUTO: 10.2 FL (ref 8.9–12.7)
POTASSIUM SERPL-SCNC: 4.8 MMOL/L (ref 3.5–5.3)
PROT UR STRIP-MCNC: ABNORMAL MG/DL
PROT UR-MCNC: 108 MG/DL
PROT/CREAT UR: 0.94 MG/G{CREAT} (ref 0–0.1)
PTH-INTACT SERPL-MCNC: 94.2 PG/ML (ref 18.4–80.1)
RBC # BLD AUTO: 3.24 MILLION/UL (ref 3.88–5.62)
RBC #/AREA URNS AUTO: ABNORMAL /HPF
SODIUM SERPL-SCNC: 142 MMOL/L (ref 136–145)
SP GR UR STRIP.AUTO: 1.02 (ref 1–1.03)
UROBILINOGEN UR QL STRIP.AUTO: 0.2 E.U./DL
WBC # BLD AUTO: 9.55 THOUSAND/UL (ref 4.31–10.16)
WBC #/AREA URNS AUTO: ABNORMAL /HPF

## 2021-03-01 PROCEDURE — 84100 ASSAY OF PHOSPHORUS: CPT

## 2021-03-01 PROCEDURE — 80048 BASIC METABOLIC PNL TOTAL CA: CPT

## 2021-03-01 PROCEDURE — 81001 URINALYSIS AUTO W/SCOPE: CPT

## 2021-03-01 PROCEDURE — 76770 US EXAM ABDO BACK WALL COMP: CPT

## 2021-03-01 PROCEDURE — 82306 VITAMIN D 25 HYDROXY: CPT

## 2021-03-01 PROCEDURE — 36415 COLL VENOUS BLD VENIPUNCTURE: CPT

## 2021-03-01 PROCEDURE — 82570 ASSAY OF URINE CREATININE: CPT

## 2021-03-01 PROCEDURE — 85025 COMPLETE CBC W/AUTO DIFF WBC: CPT

## 2021-03-01 PROCEDURE — 83970 ASSAY OF PARATHORMONE: CPT

## 2021-03-01 PROCEDURE — 84156 ASSAY OF PROTEIN URINE: CPT

## 2021-03-04 ENCOUNTER — TELEPHONE (OUTPATIENT)
Dept: NEPHROLOGY | Facility: CLINIC | Age: 76
End: 2021-03-04

## 2021-03-04 ENCOUNTER — LAB (OUTPATIENT)
Dept: LAB | Facility: CLINIC | Age: 76
End: 2021-03-04
Payer: COMMERCIAL

## 2021-03-04 DIAGNOSIS — R30.0 DYSURIA: Primary | ICD-10-CM

## 2021-03-04 DIAGNOSIS — R30.0 DYSURIA: ICD-10-CM

## 2021-03-04 PROCEDURE — 87077 CULTURE AEROBIC IDENTIFY: CPT

## 2021-03-04 PROCEDURE — 87086 URINE CULTURE/COLONY COUNT: CPT

## 2021-03-04 PROCEDURE — 87186 SC STD MICRODIL/AGAR DIL: CPT

## 2021-03-04 NOTE — TELEPHONE ENCOUNTER
Called pt and inform him that as per dr Dionne Eaton he needs a urine culture  Pt stated he will go today

## 2021-03-04 NOTE — TELEPHONE ENCOUNTER
Patient of Dr Molina Franco called stating that he was taking Nitrofurantoin (Antibiotic) 100mg twice a day given by Dr Rosalia Hernandez for UTI  Patient was told by Dr Molina Franco at his last office visit that since Dr Rosalia Hernandez had already given him a Rx he is to see how that Rx works for him  The patient is stating that Nitrofurantoin (Antibiotic) is not working he is still have pain when he urinates and also has burning with frequent urination  Patient has an upcoming appointment on Monday 3/22/2021 for a follow up with Dr Molina Franco  If a Rx is called in please call Rx to [address]  @ [facility],  [address] [place] @ [phone]   Azra Varela,

## 2021-03-05 ENCOUNTER — TELEPHONE (OUTPATIENT)
Dept: NEPHROLOGY | Facility: CLINIC | Age: 76
End: 2021-03-05

## 2021-03-05 DIAGNOSIS — Z23 ENCOUNTER FOR IMMUNIZATION: ICD-10-CM

## 2021-03-05 NOTE — TELEPHONE ENCOUNTER
Pt LM that went to do labs and would like to know about results  Called and LM that Dr Mekhi Lindsay does not have his full report back yet and will call him back when will receive his final result  Asked to call at [phone] if any more questions or concerns

## 2021-03-06 LAB — BACTERIA UR CULT: ABNORMAL

## 2021-03-08 ENCOUNTER — TELEPHONE (OUTPATIENT)
Dept: NEPHROLOGY | Facility: CLINIC | Age: 76
End: 2021-03-08

## 2021-03-08 DIAGNOSIS — N39.0 URINARY TRACT INFECTION WITH HEMATURIA, SITE UNSPECIFIED: Primary | ICD-10-CM

## 2021-03-08 DIAGNOSIS — R31.9 URINARY TRACT INFECTION WITH HEMATURIA, SITE UNSPECIFIED: Primary | ICD-10-CM

## 2021-03-08 RX ORDER — CIPROFLOXACIN 250 MG/1
250 TABLET, FILM COATED ORAL EVERY 12 HOURS SCHEDULED
Qty: 14 TABLET | Refills: 0 | Status: SHIPPED | OUTPATIENT
Start: 2021-03-08 | End: 2021-03-15

## 2021-03-08 NOTE — TELEPHONE ENCOUNTER
Informed him that Dr Bridget Pacheco sent Cipro 250 mg 1 tab twice a day to [facility] pharmacy  Pt understood and was ok with it

## 2021-03-16 ENCOUNTER — IMMUNIZATIONS (OUTPATIENT)
Dept: FAMILY MEDICINE CLINIC | Facility: HOSPITAL | Age: 76
End: 2021-03-16

## 2021-03-16 DIAGNOSIS — Z23 ENCOUNTER FOR IMMUNIZATION: Primary | ICD-10-CM

## 2021-03-16 PROCEDURE — 0001A SARS-COV-2 / COVID-19 MRNA VACCINE (PFIZER-BIONTECH) 30 MCG: CPT

## 2021-03-16 PROCEDURE — 91300 SARS-COV-2 / COVID-19 MRNA VACCINE (PFIZER-BIONTECH) 30 MCG: CPT

## 2021-03-19 ENCOUNTER — TELEPHONE (OUTPATIENT)
Dept: NEPHROLOGY | Facility: CLINIC | Age: 76
End: 2021-03-19

## 2021-03-22 ENCOUNTER — OFFICE VISIT (OUTPATIENT)
Dept: NEPHROLOGY | Facility: CLINIC | Age: 76
End: 2021-03-22
Payer: COMMERCIAL

## 2021-03-22 VITALS
TEMPERATURE: 97.6 F | DIASTOLIC BLOOD PRESSURE: 70 MMHG | WEIGHT: 236 LBS | HEART RATE: 68 BPM | RESPIRATION RATE: 16 BRPM | HEIGHT: 71 IN | SYSTOLIC BLOOD PRESSURE: 100 MMHG | BODY MASS INDEX: 33.04 KG/M2

## 2021-03-22 DIAGNOSIS — N17.0 ACUTE KIDNEY INJURY (AKI) WITH ACUTE TUBULAR NECROSIS (ATN) (HCC): ICD-10-CM

## 2021-03-22 DIAGNOSIS — E83.9 CHRONIC KIDNEY DISEASE-MINERAL AND BONE DISORDER: ICD-10-CM

## 2021-03-22 DIAGNOSIS — M89.9 CHRONIC KIDNEY DISEASE-MINERAL AND BONE DISORDER: ICD-10-CM

## 2021-03-22 DIAGNOSIS — N18.30 STAGE 3 CHRONIC KIDNEY DISEASE, UNSPECIFIED WHETHER STAGE 3A OR 3B CKD (HCC): Primary | ICD-10-CM

## 2021-03-22 DIAGNOSIS — Z85.72 HX OF LYMPHOMA: ICD-10-CM

## 2021-03-22 DIAGNOSIS — I42.0 DILATED CARDIOMYOPATHY (HCC): ICD-10-CM

## 2021-03-22 DIAGNOSIS — N18.9 CHRONIC KIDNEY DISEASE-MINERAL AND BONE DISORDER: ICD-10-CM

## 2021-03-22 PROCEDURE — 3078F DIAST BP <80 MM HG: CPT | Performed by: INTERNAL MEDICINE

## 2021-03-22 PROCEDURE — 99214 OFFICE O/P EST MOD 30 MIN: CPT | Performed by: INTERNAL MEDICINE

## 2021-03-22 PROCEDURE — 3074F SYST BP LT 130 MM HG: CPT | Performed by: INTERNAL MEDICINE

## 2021-03-22 NOTE — ASSESSMENT & PLAN NOTE
Lab Results   Component Value Date    EGFR 48 03/01/2021    EGFR 27 01/28/2021    EGFR 39 11/24/2020    CREATININE 1 44 (H) 03/01/2021    CREATININE 2 34 (H) 01/28/2021    CREATININE 1 69 (H) 11/24/2020    patient renal function is stable does there is some fluctuation  Likely because of cardiorenal syndrome  Pathophysiology of kidney disease discussed with him    Advised hydration and avoiding any nephrotoxic medicine

## 2021-03-22 NOTE — PROGRESS NOTES
NEPHROLOGY OFFICE FOLLOW UP  Jody Patino 70 y o  male MRN: [id number]    Encounter: [id number] 3/22/2021    REASON FOR VISIT: Jody Patino is a 70 y o  male who is here on 3/22/2021 for Chronic Kidney Disease and Follow-up    HPI:     Kayy Weiner  came in today for the follow-up of CKD and cardiorenal syndrome  He is doing quite well  Still gets short of breath easily but no other acute complaint  Weight is quite steady     No chest pain no palpitation or shortness of breath     Denies any nausea vomiting or diarrhea      REVIEW OF SYSTEMS:    Review of Systems   Constitutional: Negative for activity change and fatigue  HENT: Negative for congestion and ear discharge  Eyes: Negative for photophobia and pain  Respiratory: Positive for shortness of breath  Negative for apnea and choking  Cardiovascular: Negative for chest pain, palpitations and leg swelling  Gastrointestinal: Negative for abdominal distention and blood in stool  Endocrine: Negative for heat intolerance and polyphagia  Genitourinary: Negative for decreased urine volume, difficulty urinating, flank pain and urgency  Musculoskeletal: Positive for arthralgias  Negative for neck pain and neck stiffness  Skin: Negative for color change and wound  Allergic/Immunologic: Negative for food allergies and immunocompromised state  Neurological: Negative for seizures and facial asymmetry  Hematological: Negative for adenopathy  Does not bruise/bleed easily  Psychiatric/Behavioral: Negative for self-injury and suicidal ideas           PAST MEDICAL HISTORY:  Past Medical History:   Diagnosis Date    Agent orange exposure     Anemia     Benign colon polyp     BPH (benign prostatic hyperplasia)     Bradycardia     Cardiomyopathy ([facility] [address] )     Chest discomfort     CHF (congestive heart failure) (HCC)     Chronic bronchitis (HCC)     Chronic kidney disease     COPD [facility]chronic obstructive pulmonary disease) ([facility])     LAST ASSESSED: 17    H/O nonmelanoma skin cancer     LAST ASSESSED: 17    HHD (hypertensive heart disease)     HTN (hypertension)     Hx of lymphoma     Hyperlipidemia     Hypertension     ICD (implantable cardioverter-defibrillator) in place     Insomnia     Mycosis fungoides (HCC)     PVC (premature ventricular contraction)     PVT (paroxysmal ventricular tachycardia) (HCC)     SOB (shortness of breath)        PAST SURGICAL HISTORY:  Past Surgical History:   Procedure Laterality Date    CARDIAC PACEMAKER PLACEMENT      SKIN SURGERY      skin cancer removal        SOCIAL HISTORY:  Social History     Substance and Sexual Activity   Alcohol Use Yes    Comment: vodka nightly     Social History     Substance and Sexual Activity   Drug Use No     Social History     Tobacco Use   Smoking Status Current Every Day Smoker    Packs/day: 0 50    Years: 50 00    Pack years: 25 00    Types: Cigarettes    Last attempt to quit: 11/10/2020    Years since quittin 3   Smokeless Tobacco Never Used       FAMILY HISTORY:  Family History   Problem Relation Age of Onset    Diabetes Mother     Colon cancer Father         DIAGNOSED IN HIS 80'S    Heart failure Father     Coronary artery disease Father     Diabetes Family         MELLITUS    Heart attack Neg Hx     Stroke Neg Hx     Anuerysm Neg Hx     Clotting disorder Neg Hx     Arrhythmia Neg Hx     Hypertension Neg Hx         pt unsure     Hyperlipidemia Neg Hx     Sudden death Neg Hx         scd       MEDICATIONS:    Current Outpatient Medications:     albuterol (2 5 mg/3 mL) 0 083 % nebulizer solution, USE 1 VIAL VIA NEBULIZER 4 TIMES A DAY AS NEEDED, Disp: 1080 mL, Rfl: 3    albuterol (Proventil HFA) 90 mcg/act inhaler, Inhale 2 puffs every 4 (four) hours as needed for wheezing or shortness of breath, Disp: 3 Inhaler, Rfl: 3    aspirin 81 MG tablet, Take 81 mg by mouth daily, Disp: , Rfl:     atorvastatin (LIPITOR) 40 mg tablet, Take 1 tablet (40 mg total) by mouth daily, Disp: 90 tablet, Rfl: 3    bexarotene (Targretin) 75 mg capsule, Take 8 capsules (600 mg total) by mouth daily [place] Brand necessary, Disp: 240 capsule, Rfl: 3    Cholecalciferol ([employer] VITAMIN D3) 1000 units capsule, Take 1 capsule by mouth daily, Disp: , Rfl:     cyanocobalamin (VITAMIN B-12) 1,000 mcg tablet, Take 1,000 mcg by mouth daily, Disp: , Rfl:     ferrous sulfate 324 (65 Fe) mg, Take 1 tablet by mouth Twice daily, Disp: , Rfl:     gabapentin (NEURONTIN) 100 mg capsule, Take 1 capsule (100 mg total) by mouth 3 (three) times a day, Disp: 270 capsule, Rfl: 3    levothyroxine 112 mcg tablet, TAKE 1 TABLET BY MOUTH  DAILY, Disp: 90 tablet, Rfl: 3    metoprolol succinate (TOPROL-XL) 50 mg 24 hr tablet, Take 1 tablet (50 mg total) by mouth every 12 (twelve) hours, Disp: 180 tablet, Rfl: 3    montelukast (SINGULAIR) 10 mg tablet, TAKE 1 TABLET BY MOUTH DAILY AT BEDTIME, Disp: 90 tablet, Rfl: 1    nitrofurantoin (MACROBID) 100 mg capsule, Take 1 capsule (100 mg total) by mouth 2 (two) times a day, Disp: 20 capsule, Rfl: 1    potassium chloride (K-DUR,KLOR-CON) 20 mEq tablet, TAKE 1 TABLET BY MOUTH  DAILY, Disp: 90 tablet, Rfl: 3    sacubitril-valsartan (Entresto) 49-51 MG TABS, Take 1 tablet by mouth 2 (two) times a day, Disp: 180 tablet, Rfl: 3    torsemide (DEMADEX) 20 mg tablet, Take 1 tablet by mouth daily (Patient taking differently: Take 5 mg by mouth as needed ), Disp: 30 tablet, Rfl: 0    TRELEGY ELLIPTA 100-62 5-25 MCG/INH inhaler, USE 1 INHALATION BY MOUTH  DAILY   RINSE MOUTH AFTER  USE , Disp: 180 each, Rfl: 3    triamcinolone (KENALOG) 0 1 % ointment, Apply topically 2 (two) times 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, Disp: 454 g, Rfl: 3    Vitamins-Lipotropics (LIPOFLAVONOID PO), Take by mouth daily, Disp: , Rfl:     zolpidem (AMBIEN) 10 mg tablet, Take 1 tablet (10 mg total) by mouth daily at bedtime as needed for sleep TAKE 1 TABLET BY MOUTH AT  BEDTIME, Disp: 90 tablet, Rfl: 1    PAZEO 0 7 % SOLN, , Disp: , Rfl:     predniSONE 10 mg tablet, 40 mg for 2 days, 30 mg for 2 days, 20 mg for 2 days, 10 mg for 2 days and then stop (Patient not taking: Reported on 12/1/2020), Disp: 20 tablet, Rfl: 0    PHYSICAL EXAM:  Vitals:    03/22/21 1335   BP: 100/70   BP Location: Right arm   Patient Position: Sitting   Pulse: 68   Resp: 16   Temp: 97 6 °F (36 4 °C)   TempSrc: Temporal   Weight: 107 kg (236 lb)   Height: 5' 11" (1 803 m)     Body mass index is 32 92 kg/m²  Physical Exam  Constitutional:       General: He is not in acute distress  Appearance: He is well-developed  HENT:      Head: Normocephalic  Eyes:      General: No scleral icterus  Conjunctiva/sclera: Conjunctivae normal       Pupils: Pupils are equal, round, and reactive to light  Neck:      Musculoskeletal: Normal range of motion and neck supple  Vascular: No JVD  Cardiovascular:      Rate and Rhythm: Normal rate and regular rhythm  Heart sounds: Murmur present  Pulmonary:      Effort: Pulmonary effort is normal       Breath sounds: Normal breath sounds  No wheezing  Abdominal:      General: Bowel sounds are normal       Palpations: Abdomen is soft  Tenderness: There is no abdominal tenderness  Musculoskeletal: Normal range of motion  Skin:     General: Skin is warm  Findings: No rash  Neurological:      Mental Status: He is alert and oriented to person, place, and time     Psychiatric:         Behavior: Behavior normal          LAB RESULTS:  Results for orders placed or performed in visit on 03/04/21   Urine culture    Specimen: Urine   Result Value Ref Range    Urine Culture >100,000 cfu/ml Escherichia coli (A)        Susceptibility    Escherichia coli - PONCE     ZID Performed Yes       Ampicillin ($$) <=8 00 Susceptible ug/ml     Aztreonam ($$$)  <=4 Susceptible ug/ml     Cefazolin ($) <=2 00 Susceptible ug/ml     Ciprofloxacin ($)  <=1 00 Susceptible ug/ml     Gentamicin ($$) <=1 Susceptible ug/ml     Levofloxacin ($) <=0 25 Susceptible ug/ml     Nitrofurantoin >64 Resistant ug/ml     Tetracycline <=4 Susceptible ug/ml     Tobramycin ($) 2 Susceptible ug/ml     Trimethoprim + Sulfamethoxazole ($$$) <=2/38 Susceptible ug/ml       ASSESSMENT and PLAN:      Stage 3 chronic kidney disease  Lab Results   Component Value Date    EGFR 48 03/01/2021    EGFR 27 01/28/2021    EGFR 39 11/24/2020    CREATININE 1 44 (H) 03/01/2021    CREATININE 2 34 (H) 01/28/2021    CREATININE 1 69 (H) 11/24/2020    patient renal function is stable does there is some fluctuation  Likely because of cardiorenal syndrome  Pathophysiology of kidney disease discussed with him  Advised hydration and avoiding any nephrotoxic medicine    Chronic kidney disease-mineral and bone disorder  Lab Results   Component Value Date    EGFR 48 03/01/2021    EGFR 27 01/28/2021    EGFR 39 11/24/2020    CREATININE 1 44 (H) 03/01/2021    CREATININE 2 34 (H) 01/28/2021    CREATININE 1 69 (H) 11/24/2020    PTH and phosphorus along with vitamin-D are within acceptable range and will continue to monitor    HTN (hypertension)   Blood pressure is very well control actually on lower side because of severe cardiomyopathy    Cardiomyopathy ([address] )   On Entresto and seems to be stable         everything discussed with the patient  Asymptomatic and progressive nature also discussed with him  I will see him back in 3 months will get blood and urine test before that visit      Portions of the record may have been created with voice recognition software  Occasional wrong word or "sound a like" substitutions may have occurred due to the inherent limitations of voice recognition software  Read the chart carefully and recognize, using context, where substitutions have occurred  If you have any questions, please contact the dictating provider

## 2021-03-22 NOTE — ASSESSMENT & PLAN NOTE
Lab Results   Component Value Date    EGFR 48 03/01/2021    EGFR 27 01/28/2021    EGFR 39 11/24/2020    CREATININE 1 44 (H) 03/01/2021    CREATININE 2 34 (H) 01/28/2021    CREATININE 1 69 (H) 11/24/2020    PTH and phosphorus along with vitamin-D are within acceptable range and will continue to monitor

## 2021-03-22 NOTE — PATIENT INSTRUCTIONS

## 2021-04-01 ENCOUNTER — OFFICE VISIT (OUTPATIENT)
Dept: CARDIOLOGY CLINIC | Facility: CLINIC | Age: 76
End: 2021-04-01
Payer: COMMERCIAL

## 2021-04-01 VITALS
HEIGHT: 71 IN | OXYGEN SATURATION: 97 % | DIASTOLIC BLOOD PRESSURE: 64 MMHG | BODY MASS INDEX: 33.04 KG/M2 | WEIGHT: 236 LBS | SYSTOLIC BLOOD PRESSURE: 100 MMHG | HEART RATE: 71 BPM

## 2021-04-01 DIAGNOSIS — I10 ESSENTIAL HYPERTENSION: ICD-10-CM

## 2021-04-01 DIAGNOSIS — E78.2 MIXED HYPERLIPIDEMIA: ICD-10-CM

## 2021-04-01 DIAGNOSIS — I50.22 CHRONIC SYSTOLIC CONGESTIVE HEART FAILURE (HCC): Primary | ICD-10-CM

## 2021-04-01 DIAGNOSIS — I42.8 NON-ISCHEMIC CARDIOMYOPATHY (HCC): ICD-10-CM

## 2021-04-01 DIAGNOSIS — Z95.810 ICD (IMPLANTABLE CARDIOVERTER-DEFIBRILLATOR) IN PLACE: ICD-10-CM

## 2021-04-01 DIAGNOSIS — E66.9 OBESITY (BMI 30-39.9): ICD-10-CM

## 2021-04-01 PROCEDURE — 3008F BODY MASS INDEX DOCD: CPT | Performed by: INTERNAL MEDICINE

## 2021-04-01 PROCEDURE — 4004F PT TOBACCO SCREEN RCVD TLK: CPT | Performed by: INTERNAL MEDICINE

## 2021-04-01 PROCEDURE — 1160F RVW MEDS BY RX/DR IN RCRD: CPT | Performed by: INTERNAL MEDICINE

## 2021-04-01 PROCEDURE — 99214 OFFICE O/P EST MOD 30 MIN: CPT | Performed by: INTERNAL MEDICINE

## 2021-04-01 RX ORDER — POTASSIUM CHLORIDE 20 MEQ/1
20 TABLET, EXTENDED RELEASE ORAL DAILY PRN
Qty: 90 TABLET | Refills: 3 | Status: SHIPPED | OUTPATIENT
Start: 2021-04-01

## 2021-04-01 RX ORDER — TORSEMIDE 20 MG/1
20 TABLET ORAL DAILY PRN
Qty: 30 TABLET | Refills: 0 | Status: SHIPPED | OUTPATIENT
Start: 2021-04-01 | End: 2021-04-26

## 2021-04-01 RX ORDER — METOPROLOL SUCCINATE 50 MG/1
50 TABLET, EXTENDED RELEASE ORAL DAILY
Qty: 180 TABLET | Refills: 3 | Status: SHIPPED | OUTPATIENT
Start: 2021-04-01 | End: 2022-06-25 | Stop reason: SDUPTHER

## 2021-04-01 NOTE — PROGRESS NOTES
CARDIOLOGY OFFICE VISIT  [facility]  [address], [place], [place] [facility] Romeo Page  Tel: [phone]      NAME: Jodi Ariza  AGE: 70 y o  SEX: male  : 1945   MRN: [id number]      Chief Complaint:  Chief Complaint   Patient presents with    Follow-up         History of Present Illness:   Patient comes for follow up  States he is doing well from cardiac stand point  SOB is at baseline  He denies chest pain / pressure, palpitations, lightheadedness, syncope, swelling feet, orthopnea, PND, claudication  Chronic systolic HF - States currently his wt is at his baseline  SOB is at baseline  Occasional fatigue+  On BB, Entretso, torsemide prn, potassium prn  Used to follow with Dr Juan Rubio who has now left the area     NICMP s/p CRT-D - He had a [employer] BiV ICD implanted in 2011 and changed in 2014  He has been following up with Dr Abad Martinez for the devise  On BB, Entresto     HTN -  Has been hypertensive for many years  Taking medications regularly  Denies lightheadedness, headache, medication side effects  HLP -  Has had hyperlipidemia for many years  Taking statin regularly along with diet control  Denies myalgia  PCP closely monitoring the blood work  Obesity -  Trying to lose weight        Past Medical History:  Past Medical History:   Diagnosis Date    Agent orange exposure     Anemia     Benign colon polyp     BPH (benign prostatic hyperplasia)     Bradycardia     Cardiomyopathy (HCC)     Chest discomfort     CHF (congestive heart failure) (HCC)     Chronic bronchitis (HCC)     Chronic kidney disease     COPD (chronic obstructive pulmonary disease) (HCC)     LAST ASSESSED: 17    H/O nonmelanoma skin cancer     LAST ASSESSED: 17    HHD (hypertensive heart disease)     HTN (hypertension)     Hx of lymphoma     Hyperlipidemia     Hypertension     ICD (implantable cardioverter-defibrillator) in place     Insomnia     Mycosis fungoides ([facility] [address] )     PVC (premature ventricular contraction)     PVT (paroxysmal ventricular tachycardia) (HCC)     SOB (shortness of breath)          Past Surgical History:  Past Surgical History:   Procedure Laterality Date    CARDIAC PACEMAKER PLACEMENT      SKIN SURGERY      skin cancer removal          Family History:  Family History   Problem Relation Age of Onset    Diabetes Mother     Colon cancer Father         DIAGNOSED IN HIS [de-identified]    Heart failure Father     Coronary artery disease Father     Diabetes Family         MELLITUS    Heart attack Neg Hx     Stroke Neg Hx     Anuerysm Neg Hx     Clotting disorder Neg Hx     Arrhythmia Neg Hx     Hypertension Neg Hx         pt unsure     Hyperlipidemia Neg Hx     Sudden death Neg Hx         scd         Social History:  Social History     Socioeconomic History    Marital status:       Spouse name: None    Number of children: 0    Years of education: None    Highest education level: None   Occupational History    Occupation: retired   Social Needs    Financial resource strain: None    Food insecurity     Worry: None     Inability: None    Transportation needs     Medical: None     Non-medical: None   Tobacco Use    Smoking status: Current Every Day Smoker     Packs/day: 0 50     Years: 50 00     Pack years: 25 00     Types: Cigarettes     Last attempt to quit: 11/10/2020     Years since quittin 3    Smokeless tobacco: Never Used   Substance and Sexual Activity    Alcohol use: Yes     Comment: vodka nightly    Drug use: No    Sexual activity: Not Currently   Lifestyle    Physical activity     Days per week: 0 days     Minutes per session: 0 min    Stress: Not at all   Relationships    Social connections     Talks on phone: None     Gets together: None     Attends Anglican service: None     Active member of club or organization: None     Attends meetings of clubs or organizations: None     Relationship status: None    Intimate partner violence     Fear of current or ex partner: None     Emotionally abused: None     Physically abused: None     Forced sexual activity: None   Other Topics Concern    None   Social History Narrative    None         Active Problems:  Patient Active Problem List   Diagnosis    Insomnia    ICD (implantable cardioverter-defibrillator) in place    Hyperlipidemia    Hx of lymphoma    HTN (hypertension)    COPD (chronic obstructive pulmonary disease) ([place] [address] )    Chronic bronchitis ([place] [address] )    Cardiomyopathy ([place] [address] )    BPH (benign prostatic hyperplasia)    Seborrheic keratosis    Mycosis fungoides ([place] [address] )    History of skin cancer    Screening for skin cancer    Screening for skin condition    Acquired hypothyroidism    HHD (hypertensive heart disease)    Prostate cancer screening    Colon cancer screening    Health care maintenance    Squamous cell cancer of skin of forearm, left    PVC (premature ventricular contraction)    Acute kidney injury (ELLYN) with acute tubular necrosis (ATN) (HCC)    Urinary tract infection    COPD exacerbation ([place] [address] )    Systolic heart failure (HCC)    Hypomagnesemia    Chronic kidney disease-mineral and bone disorder    Stage 3 chronic kidney disease         The following portions of the patient's history were reviewed and updated as appropriate: past medical history, past surgical history, past family history,  past social history, current medications, allergies and problem list       Review of Systems:  Constitutional: Denies fever, chills  Eyes: Denies eye redness, eye discharge  ENT: Denies hearing loss, sneezing, nasal discharge, sore throat   Respiratory: Denies cough, expectoration   +shortness of breath  Cardiovascular: Denies chest pain, palpitations, orthopnea, PND, lower extremity swelling  Gastrointestinal: Denies abdominal pain, nausea, vomiting, hematemesis, diarrhea, bloody stools  Genito-Urinary: Denies dysuria, incontinence  Musculoskeletal: Denies back pain, muscle pain  Neurologic: Denies syncope, headache, seizures  Endocrine: Denies polydipsia, temperature intolerance  Allergy and Immunology: Denies hives, insect bite sensitivity  Hematological and Lymphatic: Denies bleeding problems, swollen glands   Psychological: Denies depression, suicidal ideation, anxiety, panic  Dermatological: Denies pruritus, rash, skin lesion changes      Vitals:  Vitals:    04/01/21 1137   BP: 100/64   Pulse: 71   SpO2: 97%       Body mass index is 32 92 kg/m²  Weight (last 2 days)     Date/Time   Weight    04/01/21 1137   107 (236)                Physical Examination:  General: Patient is not in acute distress  Awake, alert, oriented in time, place and person  Responding to commands  Head: Normocephalic  Atraumatic  Eyes: Both pupils normal sized, round and reactive to light  Nonicteric  ENT: Normal external ear canals  Neck: Supple  JVP not raised  Trachea central  No thyromegaly  Lungs: Bilateral bronchovascular breath sounds with no crackles or rhonchi  Chest wall: No tenderness  Cardiovascular: RRR  S1 and S2 normal  No murmur, rub or gallop  Gastrointestinal: Abdomen soft, nontender  No guarding or rigidity  Liver and spleen not palpable  Bowel sounds present  Neurologic: Patient is awake, alert, oriented in time, place and person  Responding to command  Moving all extremities  Integumentary:  No skin rash  Lymphatic: No cervical lymphadenopathy  Back: Symmetric   No CVA tenderness  Extremities: No clubbing, cyanosis or edema      Laboratory Results:  CBC with diff:   Lab Results   Component Value Date    WBC 9 55 03/01/2021    WBC 5 7 06/01/2015    RBC 3 24 (L) 03/01/2021    RBC 4 70 06/01/2015    HGB 9 9 (L) 03/01/2021    HGB 13 9 06/01/2015    HCT 34 0 (L) 03/01/2021    HCT 41 6 06/01/2015     (H) 03/01/2021    MCV 88 06/01/2015    MCH 30 6 03/01/2021    MCH 29 5 06/01/2015    RDW 12 8 2021    RDW 12 6 2015     2021     2015       CMP:  Lab Results   Component Value Date    CREATININE 1 44 (H) 2021    CREATININE 1 02 2015    BUN 28 (H) 2021    BUN 27 (H) 2015     2015    K 4 8 2021    K 4 1 2015     (H) 2021     2015    CO2 24 2021    CO2 28 2015    GLUCOSE 93 2015    PROT 7 3 2015    ALKPHOS 77 2021    ALKPHOS 71 2015    ALT 30 2021    ALT 27 2015    AST 23 2021    AST 19 2015    BILIDIR 0 11 2017       Lab Results   Component Value Date    HGBA1C 6 2 2017    MG 1 8 2020    PHOS 2 3 2021       Lab Results   Component Value Date    TROPONINI <0 02 2020    TROPONINI 0 07 (H) 2017    TROPONINI 0 08 (H) 2017    CKTOTAL 85 2016       Lipid Profile:   Lab Results   Component Value Date    CHOL 197 2015     Lab Results   Component Value Date    HDL 37 (L) 2020    HDL 39 (L) 2019    HDL 39 (L) 2018     Lab Results   Component Value Date    LDLCALC 133 (H) 2020    LDLCALC 99 2019    LDLCALC 134 (H) 2018     Lab Results   Component Value Date    TRIG 312 (H) 2020    TRIG 338 (H) 2019    TRIG 391 (H) 2019       Cardiac testing:   Results for orders placed during the hospital encounter of 19   Echo complete with contrast if indicated    Narrative [facility], [facility]  [phone]    Transthoracic Echocardiogram  Limited 2D, M-mode, Doppler, and Color Doppler    Study date:  25-Sep-2019    Patient: Lenora Goldmann  MR number: [id number]  Account number: [de-identified]  : [id number]  Age: 72 years  Gender: Male  Status: Outpatient  Location: [facility]  Height: 71 in  Weight: 244 lb  BP: 128/ 70 mmHg    Indications: CHF      Diagnoses: Y38 21 - Chronic systolic (congestive) heart failure    Sonographer:  JOSE Cowan  Primary Physician:  Dea Alvarado MD  Referring Physician:  Ritchie Horton MD  Group:  [facility]  Interpreting Physician:  Elvia Silverio DO    IMPRESSIONS:  Moderate to severely dilated left ventricle with diffuse hypokinesis, perhaps worse in the inferior/lateral walls and severely depressed systolic function, ejection fraction 20-25%  Normal right ventricular size and systolic function  Normal aortic root  Moderate left atrial dilation  Right atrium is not well visualized  Mild mitral valve regurgitation  Normal IVC size and inspiratory collapse  RA pressure estimated at 3 mmHg  Mild pulmonary hypertension, PASP estimated at 38 mmHg  Compared to prior echocardiogram of 10/25/2018, mitral regurgitation does not appear as severe, left ventricular function is similar  Hypokinesis is appears less severe in the anterior wall compared to the lateral and posterior walls  SUMMARY    LEFT VENTRICLE:  The ventricle was moderately to markedly dilated  Systolic function was severely reduced by visual assessment  Ejection fraction was estimated in the range of 20 % to 25 %  There was severe diffuse hypokinesis  There was mild hypokinesis of the entire anterior, basal-mid anteroseptal, and basal-mid anterolateral wall(s)  Doppler parameters were consistent with abnormal left ventricular relaxation (grade 1 diastolic dysfunction)  RIGHT VENTRICLE:  Systolic pressure was mildly increased  Estimated peak pressure was 38 mmHg  LEFT ATRIUM:  The atrium was moderately dilated  MITRAL VALVE:  There was mild regurgitation  Regurgitation grade was 1+ on a scale of 0 to 4+  TRICUSPID VALVE:  There was trace regurgitation  COMPARISONS:  Compared to prior echocardiogram of 10/25/2018, mitral regurgitation does not appear as severe, left ventricular function is similar   Hypokinesis is appears less severe in the anterior wall compared to the lateral and posterior walls  HISTORY: PRIOR HISTORY: COPD  PVC's  NYHA class III congestive heart failure  Risk factors: hypertension, hypercholesterolemia, and a family history of coronary artery disease  PRIOR PROCEDURES: ICD implantation  PROCEDURE: The study was performed in the [address] [place] [address]  This was a routine study  The transthoracic approach was used  The study included limited 2D imaging, M-mode, complete spectral Doppler, and color Doppler  Images  were obtained from the parasternal, apical, subcostal, and suprasternal notch acoustic windows  Intravenous contrast ( 1 2 ml Definity in NSS  , 6 ml) was administered to opacify the left ventricle  This was a technically difficult study  LEFT VENTRICLE: The ventricle was moderately to markedly dilated  Systolic function was severely reduced by visual assessment  Ejection fraction was estimated in the range of 20 % to 25 %  There was severe diffuse hypokinesis  There was  mild hypokinesis of the entire anterior, basal-mid anteroseptal, and basal-mid anterolateral wall(s)  Wall thickness was normal  DOPPLER: Doppler parameters were consistent with abnormal left ventricular relaxation (grade 1 diastolic  dysfunction)  RIGHT VENTRICLE: The size was normal  Systolic function was normal  Wall thickness was normal  DOPPLER: Systolic pressure was mildly increased  Estimated peak pressure was 38 mmHg  LEFT ATRIUM: The atrium was moderately dilated  RIGHT ATRIUM: Not well visualized  MITRAL VALVE: Valve structure was normal  There was normal leaflet separation  DOPPLER: The transmitral velocity was within the normal range  There was no evidence for stenosis  There was mild regurgitation  Regurgitation grade was 1+ on a  scale of 0 to 4+  AORTIC VALVE: The valve was trileaflet  Leaflets exhibited normal thickness and normal cuspal separation  DOPPLER: Transaortic velocity was within the normal range  There was no evidence for stenosis  There was no regurgitation  TRICUSPID VALVE: The valve structure was normal  There was normal leaflet separation  DOPPLER: The transtricuspid velocity was within the normal range  There was no evidence for stenosis  There was trace regurgitation  Regurgitation grade  was less than 1+ on a scale of 0 to 4+  PULMONIC VALVE: Leaflets exhibited normal thickness, no calcification, and normal cuspal separation  DOPPLER: The transpulmonic velocity was within the normal range  There was no regurgitation  PERICARDIUM: There was no pericardial effusion  The pericardium was normal in appearance  AORTA: The root exhibited normal size  SYSTEMIC VEINS: IVC: The inferior vena cava was normal in size and course  Respirophasic changes were normal     SYSTEM MEASUREMENT TABLES    2D  %FS: 7 92 %  Ao Diam: 2 94 cm  EDV(Teich): 219 45 ml  EF(Teich): 17 13 %  ESV(Teich): 181 87 ml  IVSd: 1 01 cm  LA Diam: 4 35 cm  LVIDd: 6 55 cm  LVIDs: 6 03 cm  LVPWd: 1 cm  SV(Teich): 37 58 ml    2D mode  LAAs: 23 5 cm2    CW  AV Env  Ti: 304 47 ms  AV MaxP 52 mmHg  AV VTI: 25 52 cm  AV Vmax: 1 17 m/s  AV Vmean: 0 84 m/s  AV meanPG: 3 15 mmHg  TR MaxP 05 mmHg  TR Vmax: 2 96 m/s    MM  TAPSE: 2 13 cm    PW  E': 0 03 m/s  E/E': 23 48  LVOT Env  Ti: 281 64 ms  LVOT VTI: 15 7 cm  LVOT Vmax: 0 91 m/s  LVOT Vmean: 0 56 m/s  LVOT maxPG: 3 34 mmHg  LVOT meanP 56 mmHg  MV A Daniel: 1 03 m/s  MV Dec [place]: 3 87 m/s2  MV DecT: 181 86 ms  MV E Daniel: 0 7 m/s  MV E/A Ratio: 0 68  MV PHT: 52 74 ms  MVA By PHT: 4 17 cm2    [address] Accredited Echocardiography [facility]    Prepared and electronically signed by    Sen Chi DO  Signed 25-Sep-2019 15:51:25         Medications:    Current Outpatient Medications:     albuterol (2 5 mg/3 mL) 0 083 % nebulizer solution, USE 1 VIAL VIA NEBULIZER 4 TIMES A DAY AS NEEDED, Disp: 1080 mL, Rfl: 3    albuterol (Proventil HFA) 90 mcg/act inhaler, Inhale 2 puffs every 4 (four) hours as needed for wheezing or shortness of breath, Disp: 3 Inhaler, Rfl: 3    aspirin 81 MG tablet, Take 81 mg by mouth daily, Disp: , Rfl:     atorvastatin (LIPITOR) 40 mg tablet, Take 1 tablet (40 mg total) by mouth daily, Disp: 90 tablet, Rfl: 3    bexarotene (Targretin) 75 mg capsule, Take 8 capsules (600 mg total) by mouth daily [place] Brand necessary, Disp: 240 capsule, Rfl: 3    Cholecalciferol ([employer] VITAMIN D3) 1000 units capsule, Take 1 capsule by mouth daily, Disp: , Rfl:     cyanocobalamin (VITAMIN B-12) 1,000 mcg tablet, Take 1,000 mcg by mouth daily, Disp: , Rfl:     ferrous sulfate 324 (65 Fe) mg, Take 1 tablet by mouth Twice daily, Disp: , Rfl:     gabapentin (NEURONTIN) 100 mg capsule, Take 1 capsule (100 mg total) by mouth 3 (three) times a day, Disp: 270 capsule, Rfl: 3    levothyroxine 112 mcg tablet, TAKE 1 TABLET BY MOUTH  DAILY, Disp: 90 tablet, Rfl: 3    metoprolol succinate (TOPROL-XL) 50 mg 24 hr tablet, Take 1 tablet (50 mg total) by mouth daily, Disp: 180 tablet, Rfl: 3    montelukast (SINGULAIR) 10 mg tablet, TAKE 1 TABLET BY MOUTH DAILY AT BEDTIME, Disp: 90 tablet, Rfl: 1    potassium chloride (K-DUR,KLOR-CON) 20 mEq tablet, TAKE 1 TABLET BY MOUTH  DAILY, Disp: 90 tablet, Rfl: 3    sacubitril-valsartan (Entresto) 49-51 MG TABS, Take 1 tablet by mouth 2 (two) times a day, Disp: 180 tablet, Rfl: 3    TRELEGY ELLIPTA 100-62 5-25 MCG/INH inhaler, USE 1 INHALATION BY MOUTH  DAILY   RINSE MOUTH AFTER  USE , Disp: 180 each, Rfl: 3    triamcinolone (KENALOG) 0 1 % ointment, Apply topically 2 (two) times 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, Disp: 454 g, Rfl: 3    Vitamins-Lipotropics (LIPOFLAVONOID PO), Take by mouth daily, Disp: , Rfl:     zolpidem (AMBIEN) 10 mg tablet, Take 1 tablet (10 mg total) by mouth daily at bedtime as needed for sleep TAKE 1 TABLET BY MOUTH AT  BEDTIME, Disp: 90 tablet, Rfl: 1    nitrofurantoin (MACROBID) 100 mg capsule, Take 1 capsule (100 mg total) by mouth 2 (two) times a day, Disp: 20 capsule, Rfl: 1    PAZEO 0 7 % SOLN, , Disp: , Rfl:     torsemide (DEMADEX) 20 mg tablet, Take 1 tablet by mouth daily (Patient not taking: Reported on 4/1/2021), Disp: 30 tablet, Rfl: 0      Allergies:  No Known Allergies      Assessment and Plan:  1  Chronic systolic congestive heart failure ([email or web address] 75 )   currently euvolemic  Continue Entresto, beta-blocker  Torsemide and potassium are p r n  currently  Low-salt diet  Daily weights  Fluid restriction per nephrologist     2  Non-ischemic cardiomyopathy ([facility] [[email or web address] 75 )  Continue beta-blocker, Entresto  Follow-up echocardiogram script given  Patient does not want to be considered for cardiac transplant and also states that he is not ready for an LVAD yet  He also refused sleep study    3  ICD (implantable cardioverter-defibrillator) in place   no recent ICD shocks  Patient on home monitoring of his ICD  Follow-up with Dr Tisha Zavala    4  Essential hypertension   BP soft  Decrease metoprolol succinate to once a day    5  Mixed hyperlipidemia   continue statin and diet control  His PCP closely monitors blood    6  Obesity (BMI 30-39 9)   states it is difficult to lose weight  Counseled to try to lose weight    Recommend aggressive risk factor modification and therapeutic lifestyle changes  Low-salt, low-calorie, low-fat, low-cholesterol diet with regular exercise and to optimize weight  I will defer the ordering and monitoring of necessity lab studies to you, but I am available and happy to review and manage any of the data at your request in the future  Discussed concepts of atherosclerosis, including signs and symptoms of cardiac disease  Previous studies were reviewed  Safety measures were reviewed  Questions were entertained and answered  Patient was advised to report any problems requiring medical attention  Follow-up with PCP and appropriate specialist and lab work as discussed      Return for follow up visit as scheduled or earlier, if needed  Thank you for allowing me to participate in the care and evaluation of your patient  Should you have any questions, please feel free to contact me        Chris Conrad MD  4/1/2021,12:12 PM

## 2021-04-07 ENCOUNTER — IMMUNIZATIONS (OUTPATIENT)
Dept: FAMILY MEDICINE CLINIC | Facility: HOSPITAL | Age: 76
End: 2021-04-07

## 2021-04-07 DIAGNOSIS — Z23 ENCOUNTER FOR IMMUNIZATION: Primary | ICD-10-CM

## 2021-04-07 PROCEDURE — 91300 SARS-COV-2 / COVID-19 MRNA VACCINE (PFIZER-BIONTECH) 30 MCG: CPT

## 2021-04-07 PROCEDURE — 0002A SARS-COV-2 / COVID-19 MRNA VACCINE (PFIZER-BIONTECH) 30 MCG: CPT

## 2021-04-09 ENCOUNTER — TELEPHONE (OUTPATIENT)
Dept: DERMATOLOGY | Facility: CLINIC | Age: 76
End: 2021-04-09

## 2021-04-22 ENCOUNTER — HOSPITAL ENCOUNTER (OUTPATIENT)
Dept: PULMONOLOGY | Facility: HOSPITAL | Age: 76
Discharge: HOME | End: 2021-04-22
Payer: COMMERCIAL

## 2021-04-22 DIAGNOSIS — R06.02 SHORTNESS OF BREATH: ICD-10-CM

## 2021-04-22 PROCEDURE — 94060 EVALUATION OF WHEEZING: CPT

## 2021-04-22 PROCEDURE — 94060 EVALUATION OF WHEEZING: CPT | Performed by: INTERNAL MEDICINE

## 2021-04-22 PROCEDURE — 94729 DIFFUSING CAPACITY: CPT

## 2021-04-22 PROCEDURE — 94727 GAS DIL/WSHOT DETER LNG VOL: CPT

## 2021-04-22 PROCEDURE — 94727 GAS DIL/WSHOT DETER LNG VOL: CPT | Performed by: INTERNAL MEDICINE

## 2021-04-22 PROCEDURE — 94761 N-INVAS EAR/PLS OXIMETRY MLT: CPT

## 2021-04-22 PROCEDURE — 94729 DIFFUSING CAPACITY: CPT | Performed by: INTERNAL MEDICINE

## 2021-04-22 PROCEDURE — 94618 PULMONARY STRESS TESTING: CPT | Performed by: INTERNAL MEDICINE

## 2021-04-22 RX ORDER — ALBUTEROL SULFATE 2.5 MG/3ML
2.5 SOLUTION RESPIRATORY (INHALATION) ONCE
Status: COMPLETED | OUTPATIENT
Start: 2021-04-22 | End: 2021-04-22

## 2021-04-22 RX ADMIN — ALBUTEROL SULFATE 2.5 MG: 2.5 SOLUTION RESPIRATORY (INHALATION) at 10:29

## 2021-04-25 DIAGNOSIS — I50.22 CHRONIC SYSTOLIC CONGESTIVE HEART FAILURE (HCC): ICD-10-CM

## 2021-04-26 RX ORDER — TORSEMIDE 20 MG/1
20 TABLET ORAL DAILY PRN
Qty: 90 TABLET | Refills: 1 | Status: SHIPPED | OUTPATIENT
Start: 2021-04-26 | End: 2021-10-28

## 2021-04-27 ENCOUNTER — HOSPITAL ENCOUNTER (OUTPATIENT)
Dept: NON INVASIVE DIAGNOSTICS | Facility: CLINIC | Age: 76
Discharge: HOME | End: 2021-04-27
Payer: COMMERCIAL

## 2021-04-27 DIAGNOSIS — I42.8 NON-ISCHEMIC CARDIOMYOPATHY (HCC): ICD-10-CM

## 2021-04-27 PROCEDURE — 93306 TTE W/DOPPLER COMPLETE: CPT

## 2021-04-27 PROCEDURE — 93306 TTE W/DOPPLER COMPLETE: CPT | Performed by: INTERNAL MEDICINE

## 2021-04-29 ENCOUNTER — APPOINTMENT (OUTPATIENT)
Dept: LAB | Facility: CLINIC | Age: 76
End: 2021-04-29
Payer: COMMERCIAL

## 2021-04-29 ENCOUNTER — CLINICAL SUPPORT (OUTPATIENT)
Dept: DERMATOLOGY | Facility: CLINIC | Age: 76
End: 2021-04-29
Payer: COMMERCIAL

## 2021-04-29 VITALS — TEMPERATURE: 97.6 F

## 2021-04-29 DIAGNOSIS — D48.9 NEOPLASM OF UNCERTAIN BEHAVIOR: ICD-10-CM

## 2021-04-29 DIAGNOSIS — Z12.83 SCREENING FOR SKIN CANCER: ICD-10-CM

## 2021-04-29 DIAGNOSIS — Z85.828 HISTORY OF SKIN CANCER: ICD-10-CM

## 2021-04-29 DIAGNOSIS — C84.08 MYCOSIS FUNGOIDES INVOLVING LYMPH NODES OF MULTIPLE REGIONS (HCC): ICD-10-CM

## 2021-04-29 DIAGNOSIS — L82.1 SEBORRHEIC KERATOSIS: ICD-10-CM

## 2021-04-29 DIAGNOSIS — L98.9 UNKNOWN SKIN LESION: Primary | ICD-10-CM

## 2021-04-29 LAB
TSH SERPL DL<=0.05 MIU/L-ACNC: 1.91 UIU/ML (ref 0.36–3.74)
URATE SERPL-MCNC: 7.2 MG/DL (ref 4.2–8)

## 2021-04-29 PROCEDURE — 88305 TISSUE EXAM BY PATHOLOGIST: CPT | Performed by: STUDENT IN AN ORGANIZED HEALTH CARE EDUCATION/TRAINING PROGRAM

## 2021-04-29 PROCEDURE — 84550 ASSAY OF BLOOD/URIC ACID: CPT

## 2021-04-29 PROCEDURE — 11103 TANGNTL BX SKIN EA SEP/ADDL: CPT | Performed by: DERMATOLOGY

## 2021-04-29 PROCEDURE — 36415 COLL VENOUS BLD VENIPUNCTURE: CPT

## 2021-04-29 PROCEDURE — 11102 TANGNTL BX SKIN SINGLE LES: CPT | Performed by: DERMATOLOGY

## 2021-04-29 PROCEDURE — 84443 ASSAY THYROID STIM HORMONE: CPT

## 2021-04-29 PROCEDURE — 99214 OFFICE O/P EST MOD 30 MIN: CPT | Performed by: DERMATOLOGY

## 2021-04-29 RX ORDER — BEXAROTENE 75 MG/1
600 CAPSULE, LIQUID FILLED ORAL DAILY
Qty: 240 CAPSULE | Refills: 9 | Status: SHIPPED | OUTPATIENT
Start: 2021-04-29 | End: 2021-11-08 | Stop reason: SDUPTHER

## 2021-04-29 NOTE — PATIENT INSTRUCTIONS
Skin lesions await results of biopsy may be amenable to curettage  T-cell lymphoma appears stable continue same therapy will repeat thyroid function tests at next visit  Seborrheic keratosis patient reassured these are normal growths we acquire with age no treatment needed  History of skin cancer in no recurrence nothing else atypical sunblock recommended follow-up in 3 months  Screening for dermatologic disorders nothing else of concern noted on complete exam follow-up in 3 months  Wound care instructions given to patient

## 2021-04-29 NOTE — PROGRESS NOTES
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 presents for overall checkup patient overall has been doing relatively well skin may be flaring little bit with the changing weather but other than that he has been doing well  He has not been taking the Targretin for the last month because insurance issues    Patient concerned regarding 2 spots on his skin  Past Medical History:   Diagnosis Date    Agent orange exposure     Anemia     Benign colon polyp     BPH (benign prostatic hyperplasia)     Bradycardia     Cardiomyopathy ([facility] [address] )     Chest discomfort     CHF (congestive heart failure) (HCC)     Chronic bronchitis (HCC)     Chronic kidney disease     [facility]     LAST ASSESSED: 17    H/O nonmelanoma skin cancer     LAST ASSESSED: 17    HHD (hypertensive heart disease)     HTN (hypertension)     Hx of lymphoma     Hyperlipidemia     Hypertension     ICD (implantable cardioverter-defibrillator) in place     Insomnia     Mycosis fungoides (HCC)     PVC (premature ventricular contraction)     PVT (paroxysmal ventricular tachycardia) (HCC)     SOB (shortness of breath)      Past Surgical History:   Procedure Laterality Date    CARDIAC PACEMAKER PLACEMENT      SKIN SURGERY      skin cancer removal      Social History   Social History     Substance and Sexual Activity   Alcohol Use Yes    Comment: vodka nightly     Social History     Substance and Sexual Activity   Drug Use No     Social History     Tobacco Use   Smoking Status Current Every Day Smoker    Packs/day: 0 50    Years: 50 00    Pack years: 25 00    Types: Cigarettes    Last attempt to quit: 11/10/2020    Years since quittin 4   Smokeless Tobacco Never Used     Family History   Problem Relation Age of Onset    Diabetes Mother     Colon cancer Father         DIAGNOSED IN HIS 80'S    Heart failure Father     Coronary artery disease Father     Diabetes Family         MELLITUS    Heart attack Neg Hx     Stroke Neg Hx     Anuerysm Neg Hx     Clotting disorder Neg Hx     Arrhythmia Neg Hx     Hypertension Neg Hx         pt unsure     Hyperlipidemia Neg Hx     Sudden death Neg Hx         scd     Meds/Allergies   No Known Allergies    Meds:  Prior to Admission medications    Medication Sig Start Date End Date Taking?  Authorizing Provider   albuterol (2 5 mg/3 mL) 0 083 % nebulizer solution USE 1 VIAL VIA NEBULIZER 4 TIMES A DAY AS NEEDED 20   General Maddi PA-C   albuterol (Proventil HFA) 90 mcg/act inhaler Inhale 2 puffs every 4 (four) hours as needed for wheezing or shortness of breath 10/27/20   General Maddi PA-C   aspirin 81 MG tablet Take 81 mg by mouth daily    Historical Provider, MD   atorvastatin (LIPITOR) 40 mg tablet Take 1 tablet (40 mg total) by mouth daily 20   Shayy Reid PA-C   bexarotene (Targretin) 75 mg capsule Take 8 capsules (600 mg total) by mouth daily [place] Brand necessary 10/13/20   Mildred Ramirez MD   Cholecalciferol ([employer] VITAMIN D3) 1000 units capsule Take 1 capsule by mouth daily    Historical Provider, MD   cyanocobalamin (VITAMIN B-12) 1,000 mcg tablet Take 1,000 mcg by mouth daily    Historical Provider, MD   ferrous sulfate 324 (65 Fe) mg Take 1 tablet by mouth Twice daily 8/31/15   Historical Provider, MD   gabapentin (NEURONTIN) 100 mg capsule Take 1 capsule (100 mg total) by mouth 3 (three) times a day 20   Dea Alvarado MD   levothyroxine 112 mcg tablet TAKE 1 TABLET BY MOUTH  DAILY 20   Odalys Luna PA-C   metoprolol succinate (TOPROL-XL) 50 mg 24 hr tablet Take 1 tablet (50 mg total) by mouth daily 4/1/21   Yi Marcelino MD   montelukast (SINGULAIR) 10 mg tablet TAKE 1 TABLET BY MOUTH DAILY AT BEDTIME 1/20/21   Sahara Fragoso PA-C   nitrofurantoin (MACROBID) 100 mg capsule Take 1 capsule (100 mg total) by mouth 2 (two) times a day 2/4/21   Josesito Martinez MD   PAZEO 0 7 % SOLN  8/8/18   Historical Provider, MD   potassium chloride (K-DUR,KLOR-CON) 20 mEq tablet Take 1 tablet (20 mEq total) by mouth daily as needed (weight gain, leg swelling) 4/1/21   Yi Marcelino MD   sacubitril-valsartan [facility]) 49-51 MG TABS Take 1 tablet by mouth 2 (two) times a day 2/26/21   Homer Brown PA-C   torsemide (DEMADEX) 20 mg tablet TAKE 1 TABLET (20 MG TOTAL) BY MOUTH DAILY AS NEEDED (WEIGHT GAIN, LEG SWELLING) 4/26/21   Yi Marcelino MD   TRELEGY ELLIPTA 529-30 3-08 MCG/INH inhaler USE 1 INHALATION BY MOUTH  DAILY   RINSE MOUTH AFTER  USE  7/10/20   Sahara Fragoso PA-C   triamcinolone (KENALOG) 0 1 % ointment Apply topically 2 (two) times 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 2/6/18   Kallie Morales MD   Vitamins-Lipotropics (LIPOFLAVONOID PO) Take by mouth daily    Historical Provider, MD   zolpidem (AMBIEN) 10 mg tablet Take 1 tablet (10 mg total) by mouth daily at bedtime as needed for sleep TAKE 1 TABLET BY MOUTH AT  BEDTIME 11/17/20 4/1/21  Josesito Martinez MD   nicotine (NICODERM CQ) 21 mg/24 hr TD 24 hr patch Place 1 patch on the skin every 24 hours  12/5/19  Historical Provider, MD       Subjective:     Review of Systems:    General: negative for - chills, fatigue, fever,  weight gain or weight loss  Psychological: negative for - anxiety, behavioral disorder, concentration difficulties, decreased libido, depression, irritability, memory difficulties, mood swings, sleep disturbances or suicidal ideation  ENT: negative for - hearing difficulties , nasal congestion, nasal discharge, oral lesions, sinus pain, sneezing, sore throat  Allergy and Immunology: negative for - hives, insect bite sensitivity,  Hematological and Lymphatic: negative for - bleeding problems, blood clots,bruising, swollen lymph nodes  Endocrine: negative for - hair pattern changes, hot flashes, malaise/lethargy, mood swings, palpitations, polydipsia/polyuria, skin changes, temperature intolerance or unexpected weight change  Respiratory: negative for - cough, hemoptysis, orthopnea, shortness of breath, or wheezing  Cardiovascular: negative for - chest pain, dyspnea on exertion, edema,  Gastrointestinal: negative for - abdominal pain, nausea/vomiting  Genito-Urinary: negative for - dysuria, incontinence, irregular/heavy menses or urinary frequency/urgency  Musculoskeletal: negative for - gait disturbance, joint pain, joint stiffness, joint swelling, muscle pain, muscular weakness  Dermatological:  As in HPI  Neurological: negative for confusion, dizziness, headaches, impaired coordination/balance, memory loss, numbness/tingling, seizures, speech problems, tremors or weakness       Objective:   Temp 97 6 °F (36 4 °C)     Physical Exam:    General Appearance:    Alert, cooperative, no distress   Head:    Normocephalic, without obvious abnormality, atraumatic           Skin:   A full skin exam was performed including scalp, head scalp, eyes, ears, nose, lips, neck, chest, axilla, abdomen, back, buttocks, bilateral upper extremities, bilateral lower extremities, hands, feet, fingers, toes, fingernails, and toenails widespread geographic scaling erythematous patches with poikiloderma involving 40% of body surface area keratotic 1 mm papule noted on the right naris 4 mm keratotic papule noted on left forearm previous site of skin cancers well healed without recurrence normal keratotic papules with greasy stuck on appearance nothing else remarkable noted exam          Shave Biopsy Procedure Note    Pre-operative Diagnosis:  Rule out squamous cell carcinoma    Plan:  1   Instructed to keep the wound dry and covered for 24 and clean thereafter  2  Warning signs of infection were reviewed  3  Recommended that the patient use OTC acetaminophen as needed for pain  4  Return  Pending results of biopsy(ies)    Locations:  Right nares and left forearm    Indications:  Nonhealing lesion    Anesthesia: Lidocaine 1% with epinephrine without added sodium bicarbonate    Procedure Details     Patient informed of the risks (including bleeding and infection) and benefits of the   procedure and Verbal informed consent obtained  The lesion and surrounding area were given a sterile prep using alcohol and draped in the usual sterile fashion  A Blue blade razor was used to obtain a specimen  Hemostasis achieved with aluminum chloride  Petrolatum and a sterile dressing applied  The specimen was sent for pathologic examination  The patient tolerated the procedure(s) well  Complications:  none  Assessment:     1  Unknown skin lesion     2  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)  bexarotene (Targretin) 75 mg capsule   3  Seborrheic keratosis     4  Screening for skin cancer     5   History of skin cancer           Plan:   Skin lesions await results of biopsy may be amenable to curettage  T-cell lymphoma appears stable continue same therapy will repeat thyroid function tests at next visit  Seborrheic keratosis patient reassured these are normal growths we acquire with age no treatment needed  History of skin cancer in no recurrence nothing else atypical sunblock recommended follow-up in 3 months  Screening for dermatologic disorders nothing else of concern noted on complete exam follow-up in 3 months  Lizeth Elizabeth MD  4/29/2021,2:05 PM    Portions of the record may have been created with voice recognition software   Occasional wrong word or "sound a like" substitutions may have occurred due to the inherent limitations of voice recognition software   Read the chart carefully and recognize, using context, where substitutions have occurred

## 2021-05-03 ENCOUNTER — TELEPHONE (OUTPATIENT)
Dept: DERMATOLOGY | Facility: CLINIC | Age: 76
End: 2021-05-03

## 2021-05-10 ENCOUNTER — PROCEDURE VISIT (OUTPATIENT)
Dept: DERMATOLOGY | Facility: CLINIC | Age: 76
End: 2021-05-10
Payer: COMMERCIAL

## 2021-05-10 DIAGNOSIS — D04.62 SQUAMOUS CELL CARCINOMA IN SITU (SCCIS) OF SKIN OF LEFT FOREARM: Primary | ICD-10-CM

## 2021-05-10 PROCEDURE — 12032 INTMD RPR S/A/T/EXT 2.6-7.5: CPT | Performed by: DERMATOLOGY

## 2021-05-10 PROCEDURE — 88305 TISSUE EXAM BY PATHOLOGIST: CPT | Performed by: PATHOLOGY

## 2021-05-10 PROCEDURE — 11602 EXC TR-EXT MAL+MARG 1.1-2 CM: CPT | Performed by: DERMATOLOGY

## 2021-05-10 NOTE — PROGRESS NOTES
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     Objective: There were no vitals taken for this visit  Physical Exam:    General Appearance:    Alert, cooperative, no distress   Skin:   Previous site biopsy noted    Procedure name: Excision with intermediate layered closure        Location:  Left forearm    Diagnosis: Squamous cell carcinoma    Size of lesion: 4  mm    Margins: 4 mm    Size + Margins: 12 mm    I explained the diagnosis to the patient and we recommend an excision of the lesion for diagnosis and/or treatment  Potential complications include, but are not limited to: scarring, bleeding, infection, incomplete removal, recurrence, and nerve damage  The risks, benefits, and alternatives were discussed with the patient in detail  The location of the tumor was identified, circled, and confirmed by the patient  The correct patient, site, and procedure were confirmed  Anesthesia: 1% xylocaine with 1:100,000 epinephrine 6 cc  The patient was brought into the room, prepped and draped sterilely in the usual manner and anesthesia was administered by local infiltration  A fusiform shape was drawn around the lesion, and the margins were incised to the level of the subcutaneous fat with a number 15cc Bard-Octaviano blade  The tissue was removed with sharp and blunt dissection  The lateral margins of the resulting defect were undermined with sharp and blunt dissection  Hemostasis was achieved with electrocautery  The deeper layers of the defect, including subcutaneous fat and fascia, had to be approximated to reduce tension on the suture line    Layered wound closure was performed  The wound was cleaned with saline, dried off, petroleum applied, and the wound was covered with a pressure dressing  The patient was given detailed verbal and written instructions on postoperative care  Suture for adipose/fascial/dermal layer closure: 4-0  vicryl 3 sutures interrupted    Suture used to approximate epidermis: 5-0  nylon 9 sutures interrupted    Final wound length: 4 2 cm    Follow-up in: 10 days  Patient tolerated procedure well without complications  Assessment:     1  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  Authorizing Provider   albuterol (2 5 mg/3 mL) 0 083 % nebulizer solution USE 1 VIAL VIA NEBULIZER 4 TIMES A DAY AS NEEDED 6/22/20  Yes Bronson Krishnamurthy PA-C   albuterol (Proventil HFA) 90 mcg/act inhaler Inhale 2 puffs every 4 (four) hours as needed for wheezing or shortness of breath 10/27/20  Yes Bronson Krishnamurthy PA-C   aspirin 81 MG tablet Take 81 mg by mouth daily   Yes Historical Provider, MD   atorvastatin (LIPITOR) 40 mg tablet Take 1 tablet (40 mg total) by mouth daily 12/22/20  Yes Kamran Gottlieb PA-C   bexarotene (Targretin) 75 mg capsule Take 8 capsules (600 mg total) by mouth daily [place] Brand necessary 4/29/21  Yes Adriana Stahl MD   Cholecalciferol ([employer]S VITAMIN D3) 1000 units capsule Take 1 capsule by mouth daily   Yes Historical Provider, MD   cyanocobalamin (VITAMIN B-12) 1,000 mcg tablet Take 1,000 mcg by mouth daily   Yes Historical Provider, MD   ferrous sulfate 324 (65 Fe) mg Take 1 tablet by mouth Twice daily 8/31/15  Yes Historical Provider, MD   gabapentin (NEURONTIN) 100 mg capsule Take 1 capsule (100 mg total) by mouth 3 (three) times a day 5/26/20  Yes Rm Mtz MD   levothyroxine 112 mcg tablet TAKE 1 TABLET BY MOUTH  DAILY 6/19/20  Yes Ines Petty PA-C   metoprolol succinate (TOPROL-XL) 50 mg 24 hr tablet Take 1 tablet (50 mg total) by mouth daily 4/1/21  Yes Ashlyn Brunson MD   montelukast (SINGULAIR) 10 mg tablet TAKE 1 TABLET BY MOUTH DAILY AT BEDTIME 1/20/21  Yes Marium Franklin PA-C   nitrofurantoin (MACROBID) 100 mg capsule Take 1 capsule (100 mg total) by mouth 2 (two) times a day 2/4/21  Yes Ирина Parsons MD   potassium chloride (K-DUR,KLOR-CON) 20 mEq tablet Take 1 tablet (20 mEq total) by mouth daily as needed (weight gain, leg swelling) 4/1/21  Yes Ashlyn Brunson MD   sacubitril-valsartan [place]) 49-51 MG TABS Take 1 tablet by mouth 2 (two) times a day 2/26/21  Yes Carrie Page PA-C   torsemide (DEMADEX) 20 mg tablet TAKE 1 TABLET (20 MG TOTAL) BY MOUTH DAILY AS NEEDED (WEIGHT GAIN, LEG SWELLING) 4/26/21  Yes Ashlyn Brunson MD   TRELEGY ELLIPTA 100-62 5-25 MCG/INH inhaler USE 1 INHALATION BY MOUTH  DAILY  RINSE MOUTH AFTER  USE  7/10/20  Yes Marium Franklin PA-C   triamcinolone (KENALOG) 0 1 % ointment Apply topically 2 (two) times 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 2/6/18  Yes Melonie Herndon MD   Vitamins-Lipotropics (LIPOFLAVONOID PO) Take by mouth daily   Yes Historical Provider, MD   PAZEO 0 7 % SOLN  8/8/18   Historical Provider, MD   zolpidem (AMBIEN) 10 mg tablet Take 1 tablet (10 mg total) by mouth daily at bedtime as needed for sleep TAKE 1 TABLET BY MOUTH AT  BEDTIME 11/17/20 4/1/21  Ирина Parsons MD   nicotine (NICODERM CQ) 21 mg/24 hr TD 24 hr patch Place 1 patch on the skin every 24 hours  12/5/19  Historical Provider, MD     No Known Allergies    Melonie Herndon MD  5/10/2021,2:23 PM    Portions of the record may have been created with voice recognition software   Occasional wrong word or "sound a like" substitutions may have occurred due to the inherent limitations of voice recognition software   Read the chart carefully and recognize, using context, where substitutions have occurred

## 2021-05-14 ENCOUNTER — TELEPHONE (OUTPATIENT)
Dept: DERMATOLOGY | Facility: CLINIC | Age: 76
End: 2021-05-14

## 2021-05-14 NOTE — TELEPHONE ENCOUNTER
----- Message from Payal Rhodes MD sent at 5/14/2021  9:54 AM EDT -----  Call patient if not coming in shortly for suture removal to let him know that the margins were clear

## 2021-05-19 ENCOUNTER — REMOTE DEVICE CLINIC VISIT (OUTPATIENT)
Dept: CARDIOLOGY CLINIC | Facility: CLINIC | Age: 76
End: 2021-05-19
Payer: COMMERCIAL

## 2021-05-19 DIAGNOSIS — Z95.810 PRESENCE OF IMPLANTABLE CARDIOVERTER-DEFIBRILLATOR (ICD): Primary | ICD-10-CM

## 2021-05-19 PROCEDURE — 93296 REM INTERROG EVL PM/IDS: CPT | Performed by: INTERNAL MEDICINE

## 2021-05-19 PROCEDURE — 93295 DEV INTERROG REMOTE 1/2/MLT: CPT | Performed by: INTERNAL MEDICINE

## 2021-05-19 NOTE — PROGRESS NOTES
MDT/BIV-ICD/ NOT MRI CONDITIONAL   CARELINK TRANSMISSION:  BATTERY VOLTAGE ADEQUATE (3 2 YR)   AP 97 1% BP 95 1% (VSRP 3 7%)   ALL RA/RV LEAD PARAMETERS WITHIN NORMAL LIMITS   DEVICE REPORTS LV CAPTURE MAY BE COMPROMISED AS LAST MEASURED LV PACING THRESHOLD = 4 5V/ 6 MS   20 VT-NS EPISODES SINCE 2/17/21 WITH 1 EGM SHOWING PAT (#128 15 @ 171 BPM), AND REMAINING EGMS SHOWING NSVT (18 @ 216 BPM, 10 @ 197 BPM, 9 @ 207 BPM, 7 @ 167 BPM)    TASK TO DR AS   70 V SENSE EPISODES WITH AVAILABLE MARKERS SHOWING PVCS, NSVT, AND PVC INDUCED AR/VS   OPTI-VOL FLUID THRESHOLD CROSSED, AND IS ONGOING   TASK TO HF LILLY Austin Expose DEVICE FUNCTION   RG

## 2021-05-20 ENCOUNTER — CLINICAL SUPPORT (OUTPATIENT)
Dept: DERMATOLOGY | Facility: CLINIC | Age: 76
End: 2021-05-20

## 2021-05-20 DIAGNOSIS — D04.62 SQUAMOUS CELL CARCINOMA IN SITU (SCCIS) OF SKIN OF LEFT FOREARM: Primary | ICD-10-CM

## 2021-05-20 PROCEDURE — 99024 POSTOP FOLLOW-UP VISIT: CPT | Performed by: DERMATOLOGY

## 2021-05-20 NOTE — PROGRESS NOTES
[facility] DERMATOLOGY  [address] [place] [address]  Jaswant Milesma [id number]  [phone]  [phone]     MRN: [id number] : 1945  Encounter: [id number]  Patient Information: Leonel Sender    Subjective:     81-year-old male presents for follow-up secondary to previously excised squamous cell carcinoma left forearm no problems noted  Patient also concerned regarding area of irritation on his upper back  Objective: There were no vitals taken for this visit  Physical Exam:    General Appearance:    Alert, cooperative, no distress   Skin:   Previous site of excision well-healed excoriated papule noted on the neck not well defined  Procedure:  Suture removal  Site of excision:  Left forearm  Wound was cleansed with saline  Wound is intact  Sutures removed  Steri-Strips applied  Biopsy revealed squamous cell margins clear       Assessment:     1  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  Authorizing Provider   albuterol (2 5 mg/3 mL) 0 083 % nebulizer solution USE 1 VIAL VIA NEBULIZER 4 TIMES A DAY AS NEEDED 20   Roel JOANNA Flowers   albuterol (Proventil HFA) 90 mcg/act inhaler Inhale 2 puffs every 4 (four) hours as needed for wheezing or shortness of breath 10/27/20   Roel JOANNA Flowers   aspirin 81 MG tablet Take 81 mg by mouth daily    Historical Provider, MD   atorvastatin (LIPITOR) 40 mg tablet Take 1 tablet (40 mg total) by mouth daily 20   Peña Andrews PA-C   bexarotene (Targretin) 75 mg capsule Take 8 capsules (600 mg total) by mouth daily [place] Brand necessary 21   Payal Rhodes MD   Cholecalciferol ([employer]S VITAMIN D3) 1000 units capsule Take 1 capsule by mouth daily    Historical Provider, MD   cyanocobalamin (VITAMIN B-12) 1,000 mcg tablet Take 1,000 mcg by mouth daily Historical Provider, MD   ferrous sulfate 324 (65 Fe) mg Take 1 tablet by mouth Twice daily 8/31/15   Historical Provider, MD   gabapentin (NEURONTIN) 100 mg capsule Take 1 capsule (100 mg total) by mouth 3 (three) times a day 5/26/20   Mahi Randall MD   levothyroxine 112 mcg tablet TAKE 1 TABLET BY MOUTH  DAILY 6/19/20   Devin Vu PA-C   metoprolol succinate (TOPROL-XL) 50 mg 24 hr tablet Take 1 tablet (50 mg total) by mouth daily 4/1/21   Carmelo Mina MD   montelukast (SINGULAIR) 10 mg tablet TAKE 1 TABLET BY MOUTH DAILY AT BEDTIME 1/20/21   Adamaris Rothman PA-C   nitrofurantoin (MACROBID) 100 mg capsule Take 1 capsule (100 mg total) by mouth 2 (two) times a day 2/4/21   Mahi Randall MD   PAZEO 0 7 % SOLN  8/8/18   Historical Provider, MD   potassium chloride (K-DUR,KLOR-CON) 20 mEq tablet Take 1 tablet (20 mEq total) by mouth daily as needed (weight gain, leg swelling) 4/1/21   Carmelo Mina MD   sacubitril-valsartan [facility]) 49-51 MG TABS Take 1 tablet by mouth 2 (two) times a day 2/26/21   Tania Shelley PA-C   torsemide (DEMADEX) 20 mg tablet TAKE 1 TABLET (20 MG TOTAL) BY MOUTH DAILY AS NEEDED (WEIGHT GAIN, LEG SWELLING) 4/26/21   Carmelo Mina MD   TRELEGY ELLIPTA 891-38 8-22 MCG/INH inhaler USE 1 INHALATION BY MOUTH  DAILY   RINSE MOUTH AFTER  USE  7/10/20   Adamaris Rothman PA-C   triamcinolone (KENALOG) 0 1 % ointment Apply topically 2 (two) times 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 2/6/18   Filemon Akins MD   Vitamins-Lipotropics (LIPOFLAVONOID PO) Take by mouth daily    Historical Provider, MD   zolpidem (AMBIEN) 10 mg tablet Take 1 tablet (10 mg total) by mouth daily at bedtime as needed for sleep TAKE 1 TABLET BY MOUTH AT  BEDTIME 11/17/20 4/1/21  Mahi Randall MD   nicotine (NICODERM CQ) 21 mg/24 hr TD 24 hr patch Place 1 patch on the skin every 24 hours  12/5/19  Historical Provider, MD     No Known Allergies    Filemon Akins MD  5/20/2021,2:48 PM    Portions of the record may have been created with voice recognition software   Occasional wrong word or "sound a like" substitutions may have occurred due to the inherent limitations of voice recognition software   Read the chart carefully and recognize, using context, where substitutions have occurred

## 2021-05-28 ENCOUNTER — OFFICE VISIT (OUTPATIENT)
Dept: PULMONOLOGY | Facility: CLINIC | Age: 76
End: 2021-05-28
Payer: COMMERCIAL

## 2021-05-28 VITALS
SYSTOLIC BLOOD PRESSURE: 102 MMHG | HEART RATE: 91 BPM | DIASTOLIC BLOOD PRESSURE: 64 MMHG | OXYGEN SATURATION: 93 % | WEIGHT: 239.2 LBS | BODY MASS INDEX: 33.49 KG/M2 | HEIGHT: 71 IN | TEMPERATURE: 97.6 F

## 2021-05-28 DIAGNOSIS — J30.89 ENVIRONMENTAL AND SEASONAL ALLERGIES: ICD-10-CM

## 2021-05-28 DIAGNOSIS — Z72.0 TOBACCO ABUSE: ICD-10-CM

## 2021-05-28 DIAGNOSIS — J41.0 SIMPLE CHRONIC BRONCHITIS (HCC): Primary | ICD-10-CM

## 2021-05-28 PROCEDURE — 4004F PT TOBACCO SCREEN RCVD TLK: CPT | Performed by: PHYSICIAN ASSISTANT

## 2021-05-28 PROCEDURE — 3074F SYST BP LT 130 MM HG: CPT | Performed by: PHYSICIAN ASSISTANT

## 2021-05-28 PROCEDURE — 3008F BODY MASS INDEX DOCD: CPT | Performed by: PHYSICIAN ASSISTANT

## 2021-05-28 PROCEDURE — 99214 OFFICE O/P EST MOD 30 MIN: CPT | Performed by: PHYSICIAN ASSISTANT

## 2021-05-28 PROCEDURE — 1160F RVW MEDS BY RX/DR IN RCRD: CPT | Performed by: PHYSICIAN ASSISTANT

## 2021-05-28 PROCEDURE — 3078F DIAST BP <80 MM HG: CPT | Performed by: PHYSICIAN ASSISTANT

## 2021-05-28 RX ORDER — MONTELUKAST SODIUM 10 MG/1
10 TABLET ORAL
Qty: 90 TABLET | Refills: 2 | Status: SHIPPED | OUTPATIENT
Start: 2021-05-28 | End: 2021-12-17

## 2021-05-28 NOTE — PROGRESS NOTES
Assessment/Plan:   Diagnoses and all orders for this visit:    Simple chronic bronchitis (HCC)    Environmental and seasonal allergies  -     montelukast (SINGULAIR) 10 mg tablet; Take 1 tablet (10 mg total) by mouth daily at bedtime    Tobacco abuse  -     CT lung screening program; Future        Patient is here today for follow-up  He overall remains stable with his breathing  Continues on Trelegy daily, uses albuterol at least once per day in the nebulizer, has not had much need for his rescue inhaler outside of the house  He had a recent PFT done which shows combined severe obstructive and moderately severe restrictive airflow limitation with moderately decreased DLCO  Lung function has slightly worsened from prior PFT done in 2017  He will continue with his Trelegy, albuterol 4 times per day as needed, Singulair  He continues to smoke about half a pack of cigarettes per day  Not interested in any of the smoking cessation aids, did not like the side effects from Chantix  He does have patches at home which he does use if he is out of the house and cannot have a cigarette  He has cut down to about half a pack per day  Again discussed using nicotine patches along with other nicotine replacement such as gum or lozenges  Smoking cessation discussed for 3 minutes  He will be due for screening CT scan in   September  he will follow-up with us in about 4 months after his CT scan or sooner if necessary  Return in about 4 months (around 9/28/2021)  All questions are answered to the patient's satisfaction and understanding  He verbalizes understanding  He is encouraged to call with any further questions or concerns  Portions of the record may have been created with voice recognition software  Occasional wrong word or "sound a like" substitutions may have occurred due to the inherent limitations of voice recognition software    Read the chart carefully and recognize, using context, where substitutions have occurred  Electronically Signed by Racquel Anguiano PA-C    ______________________________________________________________________    Chief Complaint: No chief complaint on file  Patient ID: Lluvia Tidwell is a 70 y o  y o  male has a past medical history of Agent orange exposure, Anemia, Benign colon polyp, BPH (benign prostatic hyperplasia), Bradycardia, Cardiomyopathy ([address] ), Chest discomfort, CHF (congestive heart failure) (HCC), Chronic bronchitis ([address] ), Chronic kidney disease, COPD (chronic obstructive pulmonary disease) ([address] ), H/O nonmelanoma skin cancer, HHD (hypertensive heart disease), HTN (hypertension), lymphoma, Hyperlipidemia, Hypertension, ICD (implantable cardioverter-defibrillator) in place, Insomnia, Mycosis fungoides ([address] ), PVC (premature ventricular contraction), PVT (paroxysmal ventricular tachycardia) ([address] ), and SOB (shortness of breath)  5/28/2021  Patient presents today for follow-up visit  Patient is a 82-year-old male current smoker with past medical history of COPD, cardiomyopathy, hypertension, nodule, lymphoma, squamous cell skin cancer  He is here today for follow-up  He is overall stable with his breathing  He has had some increased respiratory and nasal symptoms due to seasonal allergies  Continues on the Trelegy daily, does use his nebulizer on a daily basis, has not had much need for his rescue inhaler  He continues to smoke about half a pack per day  He does use the patches occasionally if he is out of the house and summer where he cannot smoke           primary symptoms  Associated symptoms include coughing  Pertinent negatives include no chest pain, fever, headaches, myalgias or sore throat  Review of Systems   Constitutional: Negative  Negative for appetite change and fever  HENT: Positive for postnasal drip, rhinorrhea and sneezing  Negative for ear pain, sore throat and trouble swallowing  Respiratory: Positive for cough  Cardiovascular: Negative  Negative for chest pain  Gastrointestinal: Negative  Genitourinary: Negative  Musculoskeletal: Negative  Negative for myalgias  Skin: Negative  Allergic/Immunologic: Negative  Neurological: Negative  Negative for headaches  Psychiatric/Behavioral: Negative  Smoking history: He reports that he has been smoking cigarettes  He has a 25 00 pack-year smoking history   He has never used smokeless tobacco     The following portions of the patient's history were reviewed and updated as appropriate: allergies, current medications, past family history, past medical history, past social history, past surgical history and problem list     Immunization History   Administered Date(s) Administered    INFLUENZA 09/24/2018, 09/08/2020    Influenza Split High Dose Preservative Free IM 10/17/2016, 09/09/2017, 09/24/2018, 09/13/2019    Influenza, seasonal, injectable 10/14/2015    Pneumococcal Conjugate 13-Valent 07/27/2020    Pneumococcal Conjugate PCV 7 10/14/2015    SARS-CoV-2 / COVID-19 mRNA IM (Pfizer-BioNTech) 03/16/2021, 04/07/2021    Tdap 1945     Current Outpatient Medications   Medication Sig Dispense Refill    albuterol (2 5 mg/3 mL) 0 083 % nebulizer solution USE 1 VIAL VIA NEBULIZER 4 TIMES A DAY AS NEEDED 1080 mL 3    albuterol (Proventil HFA) 90 mcg/act inhaler Inhale 2 puffs every 4 (four) hours as needed for wheezing or shortness of breath 3 Inhaler 3    aspirin 81 MG tablet Take 81 mg by mouth daily      atorvastatin (LIPITOR) 40 mg tablet Take 1 tablet (40 mg total) by mouth daily 90 tablet 3    bexarotene (Targretin) 75 mg capsule Take 8 capsules (600 mg total) by mouth daily [place] Brand necessary 240 capsule 9    Cholecalciferol ([employer] VITAMIN D3) 1000 units capsule Take 1 capsule by mouth daily      cyanocobalamin (VITAMIN B-12) 1,000 mcg tablet Take 1,000 mcg by mouth daily      ferrous sulfate 324 (65 Fe) mg Take 1 tablet by mouth Twice daily      gabapentin (NEURONTIN) 100 mg capsule Take 1 capsule (100 mg total) by mouth 3 (three) times a day 270 capsule 3    levothyroxine 112 mcg tablet TAKE 1 TABLET BY MOUTH  DAILY 90 tablet 3    metoprolol succinate (TOPROL-XL) 50 mg 24 hr tablet Take 1 tablet (50 mg total) by mouth daily 180 tablet 3    montelukast (SINGULAIR) 10 mg tablet Take 1 tablet (10 mg total) by mouth daily at bedtime 90 tablet 2    nitrofurantoin (MACROBID) 100 mg capsule Take 1 capsule (100 mg total) by mouth 2 (two) times a day 20 capsule 1    potassium chloride (K-DUR,KLOR-CON) 20 mEq tablet Take 1 tablet (20 mEq total) by mouth daily as needed (weight gain, leg swelling) 90 tablet 3    sacubitril-valsartan (Entresto) 49-51 MG TABS Take 1 tablet by mouth 2 (two) times a day 180 tablet 3    torsemide (DEMADEX) 20 mg tablet TAKE 1 TABLET (20 MG TOTAL) BY MOUTH DAILY AS NEEDED (WEIGHT GAIN, LEG SWELLING) 90 tablet 1    TRELEGY ELLIPTA 100-62 5-25 MCG/INH inhaler USE 1 INHALATION BY MOUTH  DAILY  RINSE MOUTH AFTER  USE  180 each 3    triamcinolone (KENALOG) 0 1 % ointment Apply topically 2 (two) times 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 454 g 3    Vitamins-Lipotropics (LIPOFLAVONOID PO) Take by mouth daily      PAZEO 0 7 % SOLN       zolpidem (AMBIEN) 10 mg tablet Take 1 tablet (10 mg total) by mouth daily at bedtime as needed for sleep TAKE 1 TABLET BY MOUTH AT  BEDTIME 90 tablet 1     No current facility-administered medications for this visit  Allergies: Patient has no known allergies  Objective:  Vitals:    05/28/21 1445   BP: 102/64   Pulse: 91   Temp: 97 6 °F (36 4 °C)   SpO2: 93%   Weight: 109 kg (239 lb 3 2 oz)   Height: 5' 11" (1 803 m)   Oxygen Therapy  SpO2: 93 %    Wt Readings from Last 3 Encounters:   05/28/21 109 kg (239 lb 3 2 oz)   04/01/21 107 kg (236 lb)   03/22/21 107 kg (236 lb)     Body mass index is 33 36 kg/m²  Physical Exam  Vitals signs reviewed     Constitutional:       General: He is not in acute distress  Appearance: Normal appearance  He is not ill-appearing  HENT:      Head: Normocephalic and atraumatic  Eyes:      Conjunctiva/sclera: Conjunctivae normal    Neck:      Musculoskeletal: Normal range of motion  Cardiovascular:      Rate and Rhythm: Normal rate and regular rhythm  Pulmonary:      Effort: Pulmonary effort is normal  No respiratory distress  Breath sounds: Normal breath sounds  No decreased breath sounds, wheezing, rhonchi or rales  Abdominal:      General: Abdomen is flat  There is no distension  Musculoskeletal: Normal range of motion  Right lower leg: No edema  Left lower leg: No edema  Skin:     General: Skin is warm and dry  Neurological:      Mental Status: He is alert and oriented to person, place, and time  Psychiatric:         Mood and Affect: Mood normal          Behavior: Behavior normal          Lab Review:   Lab Results   Component Value Date     12/11/2015    K 4 8 03/01/2021    K 4 1 12/11/2015     (H) 03/01/2021     12/11/2015    CO2 24 03/01/2021    CO2 28 12/11/2015    BUN 28 (H) 03/01/2021    BUN 27 (H) 12/11/2015    CREATININE 1 44 (H) 03/01/2021    CREATININE 1 02 12/11/2015    GLUCOSE 93 12/11/2015    CALCIUM 7 9 (L) 03/01/2021    CALCIUM 8 8 12/11/2015     Lab Results   Component Value Date    WBC 9 55 03/01/2021    WBC 5 7 06/01/2015    HGB 9 9 (L) 03/01/2021    HGB 13 9 06/01/2015    HCT 34 0 (L) 03/01/2021    HCT 41 6 06/01/2015     (H) 03/01/2021    MCV 88 06/01/2015     03/01/2021     06/01/2015       Diagnostics:  I have personally reviewed pertinent reports  Reviewed PFT  Office Spirometry Results:     ESS:    No results found    Answers for HPI/ROS submitted by the patient on 5/26/2021   Primary symptoms  Do you have a wet cough?: Yes  Chronicity: new  When did you first notice your symptoms?: 1 to 4 weeks ago  How often do your symptoms occur?: daily  Do you have shortness of breath that occurs with effort or exertion?: Yes  Do you have ear congestion?: No  Do you have heartburn?: No  Do you have fatigue?: Yes  Do you have nasal congestion?: Yes  Do you have shortness of breath when lying flat?: No  Do you have shortness of breath when you wake up?: No  Do you have sweats?: No  Have you experienced weight loss?: No  Which of the following makes your symptoms worse?: change in weather, minimal activity  Which of the following makes your symptoms better?: cold air, oral steroids, steroid inhaler  Risk factors for lung disease: smoking/tobacco exposure

## 2021-05-29 DIAGNOSIS — J41.0 SIMPLE CHRONIC BRONCHITIS (HCC): ICD-10-CM

## 2021-06-01 RX ORDER — FLUTICASONE FUROATE, UMECLIDINIUM BROMIDE AND VILANTEROL TRIFENATATE 100; 62.5; 25 UG/1; UG/1; UG/1
POWDER RESPIRATORY (INHALATION)
Qty: 180 EACH | Refills: 3 | Status: SHIPPED | OUTPATIENT
Start: 2021-06-01 | End: 2022-03-11

## 2021-06-18 DIAGNOSIS — E03.2 HYPOTHYROIDISM DUE TO MEDICATION: ICD-10-CM

## 2021-06-21 RX ORDER — LEVOTHYROXINE SODIUM 112 UG/1
TABLET ORAL
Qty: 90 TABLET | Refills: 3 | Status: SHIPPED | OUTPATIENT
Start: 2021-06-21 | End: 2022-05-24

## 2021-06-30 DIAGNOSIS — G47.00 INSOMNIA, UNSPECIFIED TYPE: ICD-10-CM

## 2021-07-02 ENCOUNTER — APPOINTMENT (OUTPATIENT)
Dept: LAB | Facility: CLINIC | Age: 76
End: 2021-07-02
Payer: COMMERCIAL

## 2021-07-02 DIAGNOSIS — N18.30 STAGE 3 CHRONIC KIDNEY DISEASE, UNSPECIFIED WHETHER STAGE 3A OR 3B CKD (HCC): ICD-10-CM

## 2021-07-02 DIAGNOSIS — N17.0 ACUTE KIDNEY INJURY (AKI) WITH ACUTE TUBULAR NECROSIS (ATN) (HCC): ICD-10-CM

## 2021-07-02 DIAGNOSIS — N18.9 CHRONIC KIDNEY DISEASE, UNSPECIFIED CKD STAGE: ICD-10-CM

## 2021-07-02 LAB
25(OH)D3 SERPL-MCNC: 25.6 NG/ML (ref 30–100)
ANION GAP SERPL CALCULATED.3IONS-SCNC: 6 MMOL/L (ref 4–13)
BASOPHILS # BLD AUTO: 0.09 THOUSANDS/ΜL (ref 0–0.1)
BASOPHILS NFR BLD AUTO: 1 % (ref 0–1)
BUN SERPL-MCNC: 18 MG/DL (ref 5–25)
CALCIUM SERPL-MCNC: 8 MG/DL (ref 8.3–10.1)
CHLORIDE SERPL-SCNC: 111 MMOL/L (ref 100–108)
CO2 SERPL-SCNC: 26 MMOL/L (ref 21–32)
CREAT SERPL-MCNC: 1.27 MG/DL (ref 0.6–1.3)
EOSINOPHIL # BLD AUTO: 0.18 THOUSAND/ΜL (ref 0–0.61)
EOSINOPHIL NFR BLD AUTO: 2 % (ref 0–6)
ERYTHROCYTE [DISTWIDTH] IN BLOOD BY AUTOMATED COUNT: 14 % (ref 11.6–15.1)
GFR SERPL CREATININE-BSD FRML MDRD: 56 ML/MIN/1.73SQ M
GLUCOSE SERPL-MCNC: 117 MG/DL (ref 65–140)
HCT VFR BLD AUTO: 37.3 % (ref 36.5–49.3)
HGB BLD-MCNC: 11.7 G/DL (ref 12–17)
IMM GRANULOCYTES # BLD AUTO: 0.04 THOUSAND/UL (ref 0–0.2)
IMM GRANULOCYTES NFR BLD AUTO: 1 % (ref 0–2)
LYMPHOCYTES # BLD AUTO: 1.69 THOUSANDS/ΜL (ref 0.6–4.47)
LYMPHOCYTES NFR BLD AUTO: 20 % (ref 14–44)
MCH RBC QN AUTO: 30 PG (ref 26.8–34.3)
MCHC RBC AUTO-ENTMCNC: 31.4 G/DL (ref 31.4–37.4)
MCV RBC AUTO: 96 FL (ref 82–98)
MONOCYTES # BLD AUTO: 0.44 THOUSAND/ΜL (ref 0.17–1.22)
MONOCYTES NFR BLD AUTO: 5 % (ref 4–12)
NEUTROPHILS # BLD AUTO: 5.92 THOUSANDS/ΜL (ref 1.85–7.62)
NEUTS SEG NFR BLD AUTO: 71 % (ref 43–75)
NRBC BLD AUTO-RTO: 0 /100 WBCS
PHOSPHATE SERPL-MCNC: 2.5 MG/DL (ref 2.3–4.1)
PLATELET # BLD AUTO: 247 THOUSANDS/UL (ref 149–390)
PMV BLD AUTO: 11.1 FL (ref 8.9–12.7)
POTASSIUM SERPL-SCNC: 3.6 MMOL/L (ref 3.5–5.3)
PTH-INTACT SERPL-MCNC: 94.1 PG/ML (ref 18.4–80.1)
RBC # BLD AUTO: 3.9 MILLION/UL (ref 3.88–5.62)
SODIUM SERPL-SCNC: 143 MMOL/L (ref 136–145)
WBC # BLD AUTO: 8.36 THOUSAND/UL (ref 4.31–10.16)

## 2021-07-02 PROCEDURE — 36415 COLL VENOUS BLD VENIPUNCTURE: CPT

## 2021-07-02 PROCEDURE — 85025 COMPLETE CBC W/AUTO DIFF WBC: CPT

## 2021-07-02 PROCEDURE — 82306 VITAMIN D 25 HYDROXY: CPT

## 2021-07-02 PROCEDURE — 84100 ASSAY OF PHOSPHORUS: CPT

## 2021-07-02 PROCEDURE — 83970 ASSAY OF PARATHORMONE: CPT

## 2021-07-02 PROCEDURE — 80048 BASIC METABOLIC PNL TOTAL CA: CPT

## 2021-07-02 RX ORDER — ZOLPIDEM TARTRATE 10 MG/1
TABLET ORAL
Qty: 90 TABLET | Refills: 1 | Status: SHIPPED | OUTPATIENT
Start: 2021-07-02 | End: 2021-12-20

## 2021-07-12 ENCOUNTER — TELEPHONE (OUTPATIENT)
Dept: NEPHROLOGY | Facility: CLINIC | Age: 76
End: 2021-07-12

## 2021-07-13 ENCOUNTER — OFFICE VISIT (OUTPATIENT)
Dept: NEPHROLOGY | Facility: CLINIC | Age: 76
End: 2021-07-13
Payer: COMMERCIAL

## 2021-07-13 VITALS
DIASTOLIC BLOOD PRESSURE: 80 MMHG | WEIGHT: 234.8 LBS | HEIGHT: 71 IN | BODY MASS INDEX: 32.87 KG/M2 | HEART RATE: 68 BPM | TEMPERATURE: 97.1 F | RESPIRATION RATE: 16 BRPM | SYSTOLIC BLOOD PRESSURE: 120 MMHG

## 2021-07-13 DIAGNOSIS — E83.9 CHRONIC KIDNEY DISEASE-MINERAL AND BONE DISORDER: ICD-10-CM

## 2021-07-13 DIAGNOSIS — M89.9 CHRONIC KIDNEY DISEASE-MINERAL AND BONE DISORDER: ICD-10-CM

## 2021-07-13 DIAGNOSIS — N18.30 STAGE 3 CHRONIC KIDNEY DISEASE, UNSPECIFIED WHETHER STAGE 3A OR 3B CKD (HCC): Primary | ICD-10-CM

## 2021-07-13 DIAGNOSIS — N18.9 CHRONIC KIDNEY DISEASE-MINERAL AND BONE DISORDER: ICD-10-CM

## 2021-07-13 DIAGNOSIS — I42.0 DILATED CARDIOMYOPATHY (HCC): ICD-10-CM

## 2021-07-13 DIAGNOSIS — J44.9 CHRONIC OBSTRUCTIVE PULMONARY DISEASE, UNSPECIFIED COPD TYPE (HCC): ICD-10-CM

## 2021-07-13 PROCEDURE — 1160F RVW MEDS BY RX/DR IN RCRD: CPT | Performed by: INTERNAL MEDICINE

## 2021-07-13 PROCEDURE — 3079F DIAST BP 80-89 MM HG: CPT | Performed by: INTERNAL MEDICINE

## 2021-07-13 PROCEDURE — 3074F SYST BP LT 130 MM HG: CPT | Performed by: INTERNAL MEDICINE

## 2021-07-13 PROCEDURE — 99214 OFFICE O/P EST MOD 30 MIN: CPT | Performed by: INTERNAL MEDICINE

## 2021-07-13 PROCEDURE — 3008F BODY MASS INDEX DOCD: CPT | Performed by: INTERNAL MEDICINE

## 2021-07-13 PROCEDURE — 4004F PT TOBACCO SCREEN RCVD TLK: CPT | Performed by: INTERNAL MEDICINE

## 2021-07-13 NOTE — ASSESSMENT & PLAN NOTE
Lab Results   Component Value Date    EGFR 56 07/02/2021    EGFR 48 03/01/2021    EGFR 27 01/28/2021    CREATININE 1 27 07/02/2021    CREATININE 1 44 (H) 03/01/2021    CREATININE 2 34 (H) 01/28/2021    PTH and phosphorus along with vitamin-D are within acceptable range

## 2021-07-13 NOTE — ASSESSMENT & PLAN NOTE
Lab Results   Component Value Date    EGFR 56 07/02/2021    EGFR 48 03/01/2021    EGFR 27 01/28/2021    CREATININE 1 27 07/02/2021    CREATININE 1 44 (H) 03/01/2021    CREATININE 2 34 (H) 01/28/2021    patient does patient does seems to have cardiorenal syndrome overall seems to be stable    Asymptomatic and progressive nature of kidney disease discussed with him

## 2021-07-13 NOTE — PROGRESS NOTES
NEPHROLOGY OFFICE FOLLOW UP  Shelba Dance 70 y o  male MRN: [id number]    Encounter: [id number] 7/13/2021    REASON FOR VISIT: Shelba Dance is a 70 y o  male who is here on 7/13/2021 for Chronic Kidney Disease and Follow-up    HPI:     Shruti Roll in today for follow-up of CKD  21-year-old gentleman with cardiomyopathy and COPD  Overall doing well  Breathing is much better     Denies any chest pain palpitation or shortness of breath    On multiple medication related to cardiac problem      REVIEW OF SYSTEMS:    Review of Systems   Constitutional: Negative for activity change and fatigue  HENT: Negative for congestion and ear discharge  Eyes: Negative for photophobia and pain  Respiratory: Positive for shortness of breath  Negative for apnea and choking  Cardiovascular: Negative for chest pain and palpitations  Gastrointestinal: Negative for abdominal distention and blood in stool  Endocrine: Negative for heat intolerance and polyphagia  Genitourinary: Negative for flank pain and urgency  Musculoskeletal: Negative for neck pain and neck stiffness  Skin: Negative for color change and wound  Allergic/Immunologic: Negative for food allergies and immunocompromised state  Neurological: Negative for seizures and facial asymmetry  Hematological: Negative for adenopathy  Does not bruise/bleed easily  Psychiatric/Behavioral: Negative for self-injury and suicidal ideas           PAST MEDICAL HISTORY:  Past Medical History:   Diagnosis Date    Agent orange exposure     Anemia     Benign colon polyp     BPH (benign prostatic hyperplasia)     Bradycardia     Cardiomyopathy ([address] )     Chest discomfort     CHF (congestive heart failure) (HCC)     Chronic bronchitis (HCC)     Chronic kidney disease     COPD (chronic obstructive pulmonary disease) (HCC)     LAST ASSESSED: 9/9/17    H/O nonmelanoma skin cancer     LAST ASSESSED: 11/7/17    HHD (hypertensive heart disease)     HTN (hypertension)     Hx of lymphoma     Hyperlipidemia     Hypertension     ICD (implantable cardioverter-defibrillator) in place     Insomnia     Mycosis fungoides (HCC)     PVC (premature ventricular contraction)     PVT (paroxysmal ventricular tachycardia) (HCC)     SOB (shortness of breath)        PAST SURGICAL HISTORY:  Past Surgical History:   Procedure Laterality Date    CARDIAC PACEMAKER PLACEMENT      SKIN SURGERY      skin cancer removal        SOCIAL HISTORY:  Social History     Substance and Sexual Activity   Alcohol Use Yes    Comment: vodka nightly     Social History     Substance and Sexual Activity   Drug Use No     Social History     Tobacco Use   Smoking Status Current Every Day Smoker    Packs/day: 0 50    Years: 50 00    Pack years: 25 00    Types: Cigarettes    Last attempt to quit: 11/10/2020    Years since quittin 6   Smokeless Tobacco Never Used       FAMILY HISTORY:  Family History   Problem Relation Age of Onset    Diabetes Mother     Colon cancer Father         DIAGNOSED IN HIS 80'S    Heart failure Father     Coronary artery disease Father     Diabetes Family         MELLITUS    Heart attack Neg Hx     Stroke Neg Hx     Anuerysm Neg Hx     Clotting disorder Neg Hx     Arrhythmia Neg Hx     Hypertension Neg Hx         pt unsure     Hyperlipidemia Neg Hx     Sudden death Neg Hx         scd       MEDICATIONS:    Current Outpatient Medications:     albuterol (2 5 mg/3 mL) 0 083 % nebulizer solution, USE 1 VIAL VIA NEBULIZER 4 TIMES A DAY AS NEEDED, Disp: 1080 mL, Rfl: 3    albuterol (Proventil HFA) 90 mcg/act inhaler, Inhale 2 puffs every 4 (four) hours as needed for wheezing or shortness of breath, Disp: 3 Inhaler, Rfl: 3    aspirin 81 MG tablet, Take 81 mg by mouth daily, Disp: , Rfl:     atorvastatin (LIPITOR) 40 mg tablet, Take 1 tablet (40 mg total) by mouth daily, Disp: 90 tablet, Rfl: 3    bexarotene (Targretin) 75 mg capsule, Take 8 capsules (600 mg total) by mouth daily [place] Brand necessary, Disp: 240 capsule, Rfl: 9    Cholecalciferol ([employer] VITAMIN D3) 1000 units capsule, Take 1 capsule by mouth daily, Disp: , Rfl:     cyanocobalamin (VITAMIN B-12) 1,000 mcg tablet, Take 1,000 mcg by mouth daily, Disp: , Rfl:     ferrous sulfate 324 (65 Fe) mg, Take 1 tablet by mouth Twice daily, Disp: , Rfl:     gabapentin (NEURONTIN) 100 mg capsule, Take 1 capsule (100 mg total) by mouth 3 (three) times a day, Disp: 270 capsule, Rfl: 3    levothyroxine 112 mcg tablet, TAKE 1 TABLET BY MOUTH  DAILY, Disp: 90 tablet, Rfl: 3    metoprolol succinate (TOPROL-XL) 50 mg 24 hr tablet, Take 1 tablet (50 mg total) by mouth daily, Disp: 180 tablet, Rfl: 3    montelukast (SINGULAIR) 10 mg tablet, Take 1 tablet (10 mg total) by mouth daily at bedtime, Disp: 90 tablet, Rfl: 2    nitrofurantoin (MACROBID) 100 mg capsule, Take 1 capsule (100 mg total) by mouth 2 (two) times a day (Patient taking differently: Take 100 mg by mouth daily ), Disp: 20 capsule, Rfl: 1    potassium chloride (K-DUR,KLOR-CON) 20 mEq tablet, Take 1 tablet (20 mEq total) by mouth daily as needed (weight gain, leg swelling), Disp: 90 tablet, Rfl: 3    sacubitril-valsartan (Entresto) 49-51 MG TABS, Take 1 tablet by mouth 2 (two) times a day, Disp: 180 tablet, Rfl: 3    torsemide (DEMADEX) 20 mg tablet, TAKE 1 TABLET (20 MG TOTAL) BY MOUTH DAILY AS NEEDED (WEIGHT GAIN, LEG SWELLING), Disp: 90 tablet, Rfl: 1    triamcinolone (KENALOG) 0 1 % ointment, Apply topically 2 (two) times 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, Disp: 454 g, Rfl: 3    Vitamins-Lipotropics (LIPOFLAVONOID PO), Take by mouth daily, Disp: , Rfl:     zolpidem (AMBIEN) 10 mg tablet, TAKE 1 TABLET BY MOUTH  DAILY AT BEDTIME AS NEEDED  FOR SLEEP, Disp: 90 tablet, Rfl: 1    PAZEO 0 7 % SOLN, , Disp: , Rfl:     Trelegy Ellipta 100-62 5-25 MCG/INH inhaler, USE 1 INHALATION BY MOUTH  DAILY   RINSE MOUTH AFTER  USE  (Patient not taking: Reported on 7/13/2021), Disp: 180 each, Rfl: 3    PHYSICAL EXAM:  Vitals:    07/13/21 1410   BP: 120/80   BP Location: Right arm   Patient Position: Sitting   Pulse: 68   Resp: 16   Temp: (!) 97 1 °F (36 2 °C)   TempSrc: Temporal   Weight: 107 kg (234 lb 12 8 oz)   Height: 5' 10 5" (1 791 m)     Body mass index is 33 21 kg/m²  Physical Exam  Constitutional:       General: He is not in acute distress  Appearance: He is well-developed  HENT:      Head: Normocephalic  Eyes:      General: No scleral icterus  Conjunctiva/sclera: Conjunctivae normal       Pupils: Pupils are equal, round, and reactive to light  Neck:      Vascular: No JVD  Cardiovascular:      Rate and Rhythm: Normal rate  Heart sounds: Normal heart sounds  Pulmonary:      Effort: Pulmonary effort is normal       Breath sounds: Normal breath sounds  No wheezing  Abdominal:      General: Bowel sounds are normal       Palpations: Abdomen is soft  Tenderness: There is no abdominal tenderness  Musculoskeletal:         General: No swelling  Normal range of motion  Cervical back: Neck supple  Skin:     General: Skin is warm  Findings: No rash  Neurological:      General: No focal deficit present  Mental Status: He is alert and oriented to person, place, and time     Psychiatric:         Behavior: Behavior normal          LAB RESULTS:  Results for orders placed or performed in visit on 32/42/60   Basic metabolic panel   Result Value Ref Range    Sodium 143 136 - 145 mmol/L    Potassium 3 6 3 5 - 5 3 mmol/L    Chloride 111 (H) 100 - 108 mmol/L    CO2 26 21 - 32 mmol/L    ANION GAP 6 4 - 13 mmol/L    BUN 18 5 - 25 mg/dL    Creatinine 1 27 0 60 - 1 30 mg/dL    Glucose 117 65 - 140 mg/dL    Calcium 8 0 (L) 8 3 - 10 1 mg/dL    eGFR 56 ml/min/1 73sq m   CBC and differential   Result Value Ref Range    WBC 8 36 4 31 - 10 16 Thousand/uL    RBC 3 90 3 88 - 5 62 Million/uL    Hemoglobin 11 7 (L) 12 0 - 17 0 g/dL    Hematocrit 37 3 36 5 - 49 3 % MCV 96 82 - 98 fL    MCH 30 0 26 8 - 34 3 pg    MCHC 31 4 31 4 - 37 4 g/dL    RDW 14 0 11 6 - 15 1 %    MPV 11 1 8 9 - 12 7 fL    Platelets 016 082 - 130 Thousands/uL    nRBC 0 /100 WBCs    Neutrophils Relative 71 43 - 75 %    Immat GRANS % 1 0 - 2 %    Lymphocytes Relative 20 14 - 44 %    Monocytes Relative 5 4 - 12 %    Eosinophils Relative 2 0 - 6 %    Basophils Relative 1 0 - 1 %    Neutrophils Absolute 5 92 1 85 - 7 62 Thousands/µL    Immature Grans Absolute 0 04 0 00 - 0 20 Thousand/uL    Lymphocytes Absolute 1 69 0 60 - 4 47 Thousands/µL    Monocytes Absolute 0 44 0 17 - 1 22 Thousand/µL    Eosinophils Absolute 0 18 0 00 - 0 61 Thousand/µL    Basophils Absolute 0 09 0 00 - 0 10 Thousands/µL   Phosphorus   Result Value Ref Range    Phosphorus 2 5 2 3 - 4 1 mg/dL   PTH, intact   Result Value Ref Range    PTH 94 1 (H) 18 4 - 80 1 pg/mL   Vitamin D 25 hydroxy   Result Value Ref Range    Vit D, 25-Hydroxy 25 6 (L) 30 0 - 100 0 ng/mL       ASSESSMENT and PLAN:      Stage 3 chronic kidney disease  Lab Results   Component Value Date    EGFR 56 07/02/2021    EGFR 48 03/01/2021    EGFR 27 01/28/2021    CREATININE 1 27 07/02/2021    CREATININE 1 44 (H) 03/01/2021    CREATININE 2 34 (H) 01/28/2021    patient does patient does seems to have cardiorenal syndrome overall seems to be stable  Asymptomatic and progressive nature of kidney disease discussed with him    Chronic kidney disease-mineral and bone disorder  Lab Results   Component Value Date    EGFR 56 07/02/2021    EGFR 48 03/01/2021    EGFR 27 01/28/2021    CREATININE 1 27 07/02/2021    CREATININE 1 44 (H) 03/01/2021    CREATININE 2 34 (H) 01/28/2021    PTH and phosphorus along with vitamin-D are within acceptable range    COPD (chronic obstructive pulmonary disease) ([facility] [address] )   On multiple inhaler and overall stable being monitored by pulmonologist    Cardiomyopathy ([facility] [address] )   Seems to be asymptomatic    Volume management discussed with him         I will see him back in 6 months  Will get blood and urine test before that  visit      Portions of the record may have been created with voice recognition software  Occasional wrong word or "sound a like" substitutions may have occurred due to the inherent limitations of voice recognition software  Read the chart carefully and recognize, using context, where substitutions have occurred  If you have any questions, please contact the dictating provider

## 2021-08-04 NOTE — ASSESSMENT & PLAN NOTE
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        Acute perforated appendicitis        Please see synoptic report and comment     APPENDIX: Resection    SPECIMEN       Procedure: Appendectomy    TUMOR       Histologic Type: Adenocarcinoma       Histologic Grade: G1: Well differentiated       Tumor Size: Cannot be determined       Tumor Deposits: Not identified       Tumor Extension: Tumor invades muscularis propria focally       Lymphovascular Invasion: Not identified    MARGINS       Proximal Margin: Cannot be assessed       Mesenteric Margin: Uninvolved by invasive carcinoma    LYMPH NODES       Regional Lymph Nodes: No lymph nodes submitted or found    PATHOLOGIC STAGE CLASSIFICATION (pTNM, AJCC 8th Edition)       Primary Tumor (pT): pT2       Regional Lymph Nodes (pN): pNX           Comment      Margins and tumor size cannot be evaluated due to fragmented nature of   specimen   Results conveyed to Dr. Liban Stokes 21/2021   *Electronically Signed Out By Charlene Zacarias MD*      ==========================================================================   * * *Gross Description* * *     Specimen #1, received in formalin labeled with the patient's name,  and   appendix, consists of a 5.5 x 4.5 x 2.0 cm aggregate of fragmented   appendiceal tissue.  The serosa demonstrates diffuse fibrinous adhesions. There are multiple areas of disruption, some of which are associated with   hemorrhage.  No discrete mass or lesion is identified; however the mucosa   is somewhat shaggy and plush.  The lumen ranging from 0.3-1.0 cm and   contains scant mucoid material.  Representative sections are submitted in   [id number] 1A-1C.    **The remainder of the appendix is submitted in [id number] 1D-1N (presumed   margin = 1D)         * * *MICROSCOPIC DESCRIPTION* * *       Microscopic sections examined; see final diagnosis. CPT: 32597           Specimen Collected: 07/29/21 00:00 Last Resulted: 08/04/21 09:38           unfortunaely with appendix mid and distal removed intact but was perforated and discontinuous with proximal portion removed in pieces sec to necrosis and inflammation and adhesions and friability. Per pathology, radial margin cancer clear but difficult to know what section cancer in. With pt multiple medical issues and comorbiditites,  I would not rec. Further surgical resectdion for ? Margins or nodes,  Perhaps we have completely reomoved the cancer, but I feel benefits of right hemicolectomy completion or even chemo rx which may not even be indicated, or not worth the risk. I offered med onc consult in hospital or pt family can pursue later as outpt,  But dtr wanted to talk with family and if furtehr oncology consult desired , they will lt hospitalist or PCP know.   Benefits, risks, alt all reviewed

## 2021-08-05 ENCOUNTER — OFFICE VISIT (OUTPATIENT)
Dept: DERMATOLOGY | Facility: CLINIC | Age: 76
End: 2021-08-05
Payer: COMMERCIAL

## 2021-08-05 ENCOUNTER — APPOINTMENT (OUTPATIENT)
Dept: LAB | Facility: CLINIC | Age: 76
End: 2021-08-05
Payer: COMMERCIAL

## 2021-08-05 DIAGNOSIS — C84.00 MYCOSIS FUNGOIDES, UNSPECIFIED BODY REGION (HCC): ICD-10-CM

## 2021-08-05 DIAGNOSIS — C84.00 MYCOSIS FUNGOIDES, UNSPECIFIED BODY REGION (HCC): Primary | ICD-10-CM

## 2021-08-05 DIAGNOSIS — L98.9 UNKNOWN SKIN LESION: ICD-10-CM

## 2021-08-05 DIAGNOSIS — Z85.828 HISTORY OF SKIN CANCER: ICD-10-CM

## 2021-08-05 DIAGNOSIS — Z13.89 SCREENING FOR SKIN CONDITION: ICD-10-CM

## 2021-08-05 DIAGNOSIS — L82.1 SEBORRHEIC KERATOSIS: ICD-10-CM

## 2021-08-05 LAB
CHOLEST SERPL-MCNC: 214 MG/DL (ref 50–200)
T4 FREE SERPL-MCNC: 0.8 NG/DL (ref 0.76–1.46)
TRIGL SERPL-MCNC: 284 MG/DL
TSH SERPL DL<=0.05 MIU/L-ACNC: 0.26 UIU/ML (ref 0.36–3.74)

## 2021-08-05 PROCEDURE — 4004F PT TOBACCO SCREEN RCVD TLK: CPT | Performed by: DERMATOLOGY

## 2021-08-05 PROCEDURE — 11103 TANGNTL BX SKIN EA SEP/ADDL: CPT | Performed by: DERMATOLOGY

## 2021-08-05 PROCEDURE — 84439 ASSAY OF FREE THYROXINE: CPT

## 2021-08-05 PROCEDURE — 99214 OFFICE O/P EST MOD 30 MIN: CPT | Performed by: DERMATOLOGY

## 2021-08-05 PROCEDURE — 84443 ASSAY THYROID STIM HORMONE: CPT

## 2021-08-05 PROCEDURE — 36415 COLL VENOUS BLD VENIPUNCTURE: CPT

## 2021-08-05 PROCEDURE — 1160F RVW MEDS BY RX/DR IN RCRD: CPT | Performed by: DERMATOLOGY

## 2021-08-05 PROCEDURE — 82465 ASSAY BLD/SERUM CHOLESTEROL: CPT

## 2021-08-05 PROCEDURE — 11102 TANGNTL BX SKIN SINGLE LES: CPT | Performed by: DERMATOLOGY

## 2021-08-05 PROCEDURE — 84478 ASSAY OF TRIGLYCERIDES: CPT

## 2021-08-05 PROCEDURE — 88305 TISSUE EXAM BY PATHOLOGIST: CPT | Performed by: STUDENT IN AN ORGANIZED HEALTH CARE EDUCATION/TRAINING PROGRAM

## 2021-08-05 NOTE — PROGRESS NOTES
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 because of concerns regarding lesions that developed on his left forearm and left hand patient notes that this has been present for couple months we had recently excised squamous cell carcinoma left forearm this lesion appears to be developing right at margins no other concerns noted  Past Medical History:   Diagnosis Date    Agent orange exposure     Anemia     Benign colon polyp     BPH (benign prostatic hyperplasia)     Bradycardia     Cardiomyopathy [facility]     Chest discomfort     CHF (congestive heart failure) ([facility]     Chronic bronchitis ([facility]     Chronic kidney disease     COPD (chronic obstructive pulmonary disease) ([facility] [address] )     LAST ASSESSED: 17    H/O nonmelanoma skin cancer     LAST ASSESSED: 17    HHD (hypertensive heart disease)     HTN (hypertension)     Hx of lymphoma     Hyperlipidemia     Hypertension     ICD (implantable cardioverter-defibrillator) in place     Insomnia     Mycosis fungoides ([facility] [facility]     PVC (premature ventricular contraction)     PVT (paroxysmal ventricular tachycardia) (HCC)     SOB (shortness of breath)      Past Surgical History:   Procedure Laterality Date    CARDIAC PACEMAKER PLACEMENT      SKIN SURGERY      skin cancer removal      Social History   Social History     Substance and Sexual Activity   Alcohol Use Yes    Comment: vodka nightly     Social History     Substance and Sexual Activity   Drug Use No     Social History     Tobacco Use   Smoking Status Current Every Day Smoker    Packs/day: 0 50    Years: 50 00    Pack years: 25 00    Types: Cigarettes    Last attempt to daily 4/1/21  Yes Jerica Sandra MD   montelukast (SINGULAIR) 10 mg tablet Take 1 tablet (10 mg total) by mouth daily at bedtime 5/28/21  Yes Bucky Mayo PA-C   nitrofurantoin (MACROBID) 100 mg capsule Take 1 capsule (100 mg total) by mouth 2 (two) times a day  Patient taking differently: Take 100 mg by mouth daily  2/4/21  Yes Ayan Kapadia MD   potassium chloride (K-DUR,KLOR-CON) 20 mEq tablet Take 1 tablet (20 mEq total) by mouth daily as needed (weight gain, leg swelling) 4/1/21  Yes Jerica Sandra MD   sacubitril-valsartan Lestine Grange) 49-51 MG TABS Take 1 tablet by mouth 2 (two) times a day 2/26/21  Yes Carrie Page PA-C   torsemide (DEMADEX) 20 mg tablet TAKE 1 TABLET (20 MG TOTAL) BY MOUTH DAILY AS NEEDED (WEIGHT GAIN, LEG SWELLING) 4/26/21  Yes Jerica Sandra MD   triamcinolone (KENALOG) 0 1 % ointment Apply topically 2 (two) times 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 2/6/18  Yes Katie Garber MD   Vitamins-Lipotropics (LIPOFLAVONOID PO) Take by mouth daily   Yes Historical Provider, MD   zolpidem (AMBIEN) 10 mg tablet TAKE 1 TABLET BY MOUTH  DAILY AT BEDTIME AS NEEDED  FOR SLEEP 7/2/21  Yes Ayan Kapadia MD   PAZEO 0 7 % SOLN  8/8/18   Historical Provider, MD Rebeca Andersen 100-62 5-25 MCG/INH inhaler USE 1 INHALATION BY MOUTH  DAILY  RINSE MOUTH AFTER  USE    Patient not taking: Reported on 7/13/2021 6/1/21   Romie Eden PA-C   nicotine (NICODERM CQ) 21 mg/24 hr TD 24 hr patch Place 1 patch on the skin every 24 hours  12/5/19  Historical Provider, MD       Subjective:     Review of Systems:    General: negative for - chills, fatigue, fever,  weight gain or weight loss  Psychological: negative for - anxiety, behavioral disorder, concentration difficulties, decreased libido, depression, irritability, memory difficulties, mood swings, sleep disturbances or suicidal ideation  ENT: negative for - hearing difficulties , nasal congestion, nasal discharge, oral lesions, sinus pain, sneezing, sore throat  Allergy and Immunology: negative for - hives, insect bite sensitivity,  Hematological and Lymphatic: negative for - bleeding problems, blood clots,bruising, swollen lymph nodes  Endocrine: negative for - hair pattern changes, hot flashes, malaise/lethargy, mood swings, palpitations, polydipsia/polyuria, skin changes, temperature intolerance or unexpected weight change  Respiratory: negative for - cough, hemoptysis, orthopnea, shortness of breath, or wheezing  Cardiovascular: negative for - chest pain, dyspnea on exertion, edema,  Gastrointestinal: negative for - abdominal pain, nausea/vomiting  Genito-Urinary: negative for - dysuria, incontinence, irregular/heavy menses or urinary frequency/urgency  Musculoskeletal: negative for - gait disturbance, joint pain, joint stiffness, joint swelling, muscle pain, muscular weakness  Dermatological:  As in HPI  Neurological: negative for confusion, dizziness, headaches, impaired coordination/balance, memory loss, numbness/tingling, seizures, speech problems, tremors or weakness       Objective: There were no vitals taken for this visit  Physical Exam:    General Appearance:    Alert, cooperative, no distress   Head:    Normocephalic, without obvious abnormality, atraumatic   Lymphatics:    No lymphadenopathy noted      Abdomen:   No hepatosplenomegaly   Skin:   A full skin exam was performed including scalp, head scalp, eyes, ears, nose, lips, neck, chest, axilla, abdomen, back, buttocks, bilateral upper extremities, bilateral lower extremities, hands, feet, fingers, toes, fingernails, and toenails poilklodermititis patches noted extensive on his body    Without plaques noted hyperkeratotic 14 mm nodule noted at the left forearm adjacent to his previous scar from previous excision of squamous cell carcinoma small 2 mm keratotic papule noted on the left dorsum of the hand          Shave Biopsy Procedure Note    Pre-operative Diagnosis:  Rule out squamous cell carcinoma    Plan:  1  Instructed to keep the wound dry and covered for 24 and clean thereafter  2  Warning signs of infection were reviewed  3  Recommended that the patient use OTC acetaminophen as needed for pain  4  Return  Pending results of biopsy(ies)    Locations:  Left forearm left dorsum of the hand    Indications:  Suspicious lesions    Anesthesia: Lidocaine 1% with epinephrine without added sodium bicarbonate    Procedure Details     Patient informed of the risks (including bleeding and infection) and benefits of the   procedure and Verbal informed consent obtained  The lesion and surrounding area were given a sterile prep using alcohol and draped in the usual sterile fashion  A Blue blade razor was used to obtain a specimen  Hemostasis achieved with aluminum chloride  Petrolatum and a sterile dressing applied  The specimen was sent for pathologic examination  The patient tolerated the procedure(s) well  Complications:  none  Assessment:     1  Unknown skin lesion     2  Mycosis fungoides, unspecified body region ([facility] [address] )     3  History of skin cancer     4  Seborrheic keratosis     5   Screening for skin condition           Plan:   Skin lesion probable squamous cell carcinoma least on the left forearm we will schedule him for excision at this time  Mycosis fungoides appears stable continue same treatment topical steroids and Targretin repeat blood work at this time for his cholesterol triglycerides and thyroid  Seborrheic keratosis patient reassured these are normal growths we acquire with age no treatment needed  History of skin cancer in no recurrence nothing else atypical sunblock recommended follow-up in 3 months  Screening for dermatologic disorders nothing else of concern noted on complete exam follow-up in 3 months    Magui Andrea MD  8/5/2021,1:43 PM    Portions of the record may have been created with voice recognition software   Occasional wrong word or "sound a like" substitutions may have occurred due to the inherent limitations of voice recognition software   Read the chart carefully and recognize, using context, where substitutions have occurred

## 2021-08-05 NOTE — PATIENT INSTRUCTIONS
Skin lesion probable squamous cell carcinoma least on the left forearm we will schedule him for excision at this time  Mycosis fungoides appears stable continue same treatment topical steroids and Targretin repeat blood work at this time for his cholesterol triglycerides and thyroid  Seborrheic keratosis patient reassured these are normal growths we acquire with age no treatment needed  History of skin cancer in no recurrence nothing else atypical sunblock recommended follow-up in 3 months  Screening for dermatologic disorders nothing else of concern noted on complete exam follow-up in 3 months  Wound care instructions given to patient

## 2021-08-19 ENCOUNTER — IN-CLINIC DEVICE VISIT (OUTPATIENT)
Dept: CARDIOLOGY CLINIC | Facility: CLINIC | Age: 76
End: 2021-08-19
Payer: COMMERCIAL

## 2021-08-19 DIAGNOSIS — Z95.810 PRESENCE OF AUTOMATIC CARDIOVERTER/DEFIBRILLATOR (AICD): Primary | ICD-10-CM

## 2021-08-19 PROCEDURE — 93284 PRGRMG EVAL IMPLANTABLE DFB: CPT | Performed by: INTERNAL MEDICINE

## 2021-08-19 NOTE — PROGRESS NOTES
Results for orders placed or performed in visit on 08/19/21   Cardiac EP device report    Narrative    MDT/BIV-ICD/ NOT MRI CONDITIONAL  DEVICE INTERROGATED IN THE [facility] OFFICE: BATTERY VOLTAGE ADEQUATE (2 6 YRS)  AP: 97 6%  : 95 4% + VSRP: 3 5%  ALL LEAD PARAMETERS WITHIN NORMAL LIMITS  15 VT MONITORED EPISODES W/ AVAIL EGRMS SHOWING NSVT 7 BEATS @ 171 BPM, 7 BEATS @ 188 BPM, 8 BEATS @ 158 BPM, 10 BEATS @ 158 BPM & PAT @ 160 BPM  DURATION 4 SECS  175 V-SENSE EPISODES (4 SEC /D) W/ AVAIL MARKERS SHOWING PAT W/ -150 BPM & FUSION  PT TAKES METOPROLOL SUCC, ASA 81MG  EF: 30-35% (ECHO 04/27/21)  OPTI-VOL FLUID THRESHOLD CROSSED  SINCE 8/14/21 AND IS ONGOING, PT IS ASYMPTOMATIC  PT TAKES DEMADEX, K-DUR  TASK TO HF TEAM  NO PROGRAMMING CHANGES MADE TO DEVICE PARAMETERS  APPROPRIATELY FUNCTIONING BI-V ICD   [address] [place] [address] Street

## 2021-08-20 ENCOUNTER — IMMUNIZATIONS (OUTPATIENT)
Dept: FAMILY MEDICINE CLINIC | Facility: HOSPITAL | Age: 76
End: 2021-08-20

## 2021-08-20 DIAGNOSIS — Z23 ENCOUNTER FOR IMMUNIZATION: Primary | ICD-10-CM

## 2021-08-20 PROCEDURE — 91300 SARS-COV-2 / COVID-19 MRNA VACCINE (PFIZER-BIONTECH) 30 MCG: CPT

## 2021-08-20 PROCEDURE — 0001A SARS-COV-2 / COVID-19 MRNA VACCINE (PFIZER-BIONTECH) 30 MCG: CPT

## 2021-08-24 ENCOUNTER — PROCEDURE VISIT (OUTPATIENT)
Dept: DERMATOLOGY | Facility: CLINIC | Age: 76
End: 2021-08-24
Payer: COMMERCIAL

## 2021-08-24 DIAGNOSIS — L57.0 ACTINIC KERATOSIS: ICD-10-CM

## 2021-08-24 DIAGNOSIS — C44.629 SQUAMOUS CELL CANCER OF SKIN OF LEFT FOREARM: Primary | ICD-10-CM

## 2021-08-24 PROCEDURE — 88305 TISSUE EXAM BY PATHOLOGIST: CPT | Performed by: PATHOLOGY

## 2021-08-24 PROCEDURE — 17000 DESTRUCT PREMALG LESION: CPT | Performed by: DERMATOLOGY

## 2021-08-24 PROCEDURE — 12032 INTMD RPR S/A/T/EXT 2.6-7.5: CPT | Performed by: DERMATOLOGY

## 2021-08-24 PROCEDURE — 11603 EXC TR-EXT MAL+MARG 2.1-3 CM: CPT | Performed by: DERMATOLOGY

## 2021-08-24 NOTE — PROGRESS NOTES
[facility] DERMATOLOGY  [address] [place] [address]  [phone]  [phone]     MRN: [id number] : 1945  Encounter: [id number]  Patient Information: Wang Rogers    Subjective:     28-year-old male presents for planned excision of previously biopsied invasive squamous cell carcinoma and actinic keratosis left forearm and left hand respectively     Objective: There were no vitals taken for this visit  Physical Exam:    General Appearance:    Alert, cooperative, no distress   Skin: Previous sites of biopsy noted    Procedure name: Excision with intermediate layered closure        Location:  Left forearm    Diagnosis: Squamous cell carcinoma    Size of lesion: 14 mm    Margins: 4 mm    Size + Margins: 22 mm    I explained the diagnosis to the patient and we recommend an excision of the lesion for diagnosis and/or treatment  Potential complications include, but are not limited to: scarring, bleeding, infection, incomplete removal, recurrence, and nerve damage  The risks, benefits, and alternatives were discussed with the patient in detail  The location of the tumor was identified, circled, and confirmed by the patient  The correct patient, site, and procedure were confirmed  Anesthesia: 1% xylocaine with 1:100,000 epinephrine 10 cc  The patient was brought into the room, prepped and draped sterilely in the usual manner and anesthesia was administered by local infiltration  A fusiform shape was drawn around the lesion, and the margins were incised to the level of the subcutaneous fat with a number 15cc Bard-Octaviano blade  The tissue was removed with sharp and blunt dissection  The lateral margins of the resulting defect were undermined with sharp and blunt dissection  Hemostasis was achieved with electrocautery    The deeper layers of the defect, including subcutaneous fat and fascia, had to be approximated to reduce tension on the suture line  Layered wound closure was performed  The wound was cleaned with saline, dried off, petroleum applied, and the wound was covered with a pressure dressing  The patient was given detailed verbal and written instructions on postoperative care  Suture for adipose/fascial/dermal layer closure: 4-0  vicryl 6sutures interrupted    Suture used to approximate epidermis: 4-0  nylon 11sutures interrupted    Final wound length: 7 cm    Follow-up in: 10 days  Patient tolerated procedure well without complications  Cryotherapy Procedure Note    Pre-operative Diagnosis: actinic keratosis    Plan:  1  Instructed to keep the area dry and clean thereafter  Apply petrolatum if area gets crusty  2  Recommended that the patient use acetaminophen  as needed for pain  Locations:  Left forearm    Indications: Destruction of previously biopsied actinic keratosis    Patient informed of risks (permanent scarring, infection, light or dark discoloration, bleeding, infection, weakness, numbness and recurrence of the lesion) and benefits of the procedure and verbal informed consent obtained  The areas are treated with liquid nitrogen therapy, frozen until ice ball extended 2 mm beyond lesion, allowed to thaw, and treated again  The patient tolerated procedure well  The patient was instructed on post-op care, warned that there may be blister formation, redness and pain  Recommend OTC analgesia as needed for pain  Condition:  Stable    Complications:  none  Assessment:     1  Squamous cell cancer of skin of left forearm     2  Actinic keratosis           Plan:   Squamous cell carcinoma lesion excised wound care instructions given patient biopsy sent for margin control  Actinic Keratosis:  Patient advised lesions are precancers  These should resolve with cryosurgery if not to let us know sun block recommended  Prior to Admission medications    Medication Sig Start Date End Date Taking? Authorizing Provider   albuterol (2 5 mg/3 mL) 0 083 % nebulizer solution USE 1 VIAL VIA NEBULIZER 4 TIMES A DAY AS NEEDED 6/22/20   Chaim Kramer PA-C   albuterol (Proventil HFA) 90 mcg/act inhaler Inhale 2 puffs every 4 (four) hours as needed for wheezing or shortness of breath 10/27/20   Chaim Kramer PA-C   aspirin 81 MG tablet Take 81 mg by mouth daily    Historical Provider, MD   atorvastatin (LIPITOR) 40 mg tablet Take 1 tablet (40 mg total) by mouth daily 12/22/20   Haylie Montenegro PA-C   bexarotene (Targretin) 75 mg capsule Take 8 capsules (600 mg total) by mouth daily [place] Brand necessary 4/29/21   Shakira Avitia MD   Cholecalciferol ([employer] VITAMIN D3) 1000 units capsule Take 1 capsule by mouth daily    Historical Provider, MD   cyanocobalamin (VITAMIN B-12) 1,000 mcg tablet Take 1,000 mcg by mouth daily    Historical Provider, MD   ferrous sulfate 324 (65 Fe) mg Take 1 tablet by mouth Twice daily 8/31/15   Historical Provider, MD   gabapentin (NEURONTIN) 100 mg capsule Take 1 capsule (100 mg total) by mouth 3 (three) times a day 5/26/20   Maria Ines Gomez MD   levothyroxine 112 mcg tablet TAKE 1 TABLET BY MOUTH  DAILY 6/21/21   Zak Bahena PA-C   metoprolol succinate (TOPROL-XL) 50 mg 24 hr tablet Take 1 tablet (50 mg total) by mouth daily 4/1/21   Fernanda Velasquez MD   montelukast (SINGULAIR) 10 mg tablet Take 1 tablet (10 mg total) by mouth daily at bedtime 5/28/21   Yoel Greenberg PA-C   nitrofurantoin (MACROBID) 100 mg capsule Take 1 capsule (100 mg total) by mouth 2 (two) times a day  Patient taking differently: Take 100 mg by mouth daily  2/4/21   Maria Ines Gomez MD   PAZEO 0 7 % SOLN  8/8/18   Historical Provider, MD   potassium chloride (K-DUR,KLOR-CON) 20 mEq tablet Take 1 tablet (20 mEq total) by mouth daily as needed (weight gain, leg swelling) 4/1/21   Fernanda Velasquez MD   sacubitril-valsartan Ralph Pandya) 49-51 MG TABS Take 1 tablet by mouth 2 (two) times a day 2/26/21   Luke Grhaam PA-C torsemide (DEMADEX) 20 mg tablet TAKE 1 TABLET (20 MG TOTAL) BY MOUTH DAILY AS NEEDED (WEIGHT GAIN, LEG SWELLING) 4/26/21   Susi Bullard MD   Trelegy Ellipta [phone] 7-21 MCG/INH inhaler USE 1 INHALATION BY MOUTH  DAILY  RINSE MOUTH AFTER  USE  Patient not taking: Reported on 7/13/2021 6/1/21   JOANNA Reyna   triamcinolone (KENALOG) 0 1 % ointment Apply topically 2 (two) times 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 2/6/18   Barb Villalta MD   Vitamins-Lipotropics (LIPOFLAVONOID PO) Take by mouth daily    Historical Provider, MD   zolpidem (AMBIEN) 10 mg tablet TAKE 1 TABLET BY MOUTH  DAILY AT BEDTIME AS NEEDED  FOR SLEEP 7/2/21   Ursula Joseph MD   nicotine (NICODERM CQ) 21 mg/24 hr TD 24 hr patch Place 1 patch on the skin every 24 hours  12/5/19  Historical Provider, MD     No Known Allergies    Barb Villalta MD  5/09/4438,4:36 PM    Portions of the record may have been created with voice recognition software   Occasional wrong word or "sound a like" substitutions may have occurred due to the inherent limitations of voice recognition software   Read the chart carefully and recognize, using context, where substitutions have occurred

## 2021-08-24 NOTE — PATIENT INSTRUCTIONS
Squamous cell carcinoma lesion excised wound care instructions given patient biopsy sent for margin control  Actinic Keratosis:  Patient advised lesions are precancers  These should resolve with cryosurgery if not to let us know sun block recommended  Treatment with Cryotherapy    The doctor has treated your skin with nitrogen, which is 320 degrees Fahrenheit below zero  He has given the treated area "frostbite "    Stinging should subside within a few hours  You can take Tylenol for pain, if needed  Over the next few days, it is normal if the area becomes reddened, a blood blister, or swollen with fluid  If the lesion treated was near the eye - you could get a swollen eye over the next few days  Do not panic! This is all temporary, and will resolve with time  There is no special treatment - just keep the area clean  Makeup and BandAids can be used, if preferred  When the area starts to dry up and peel off, using Vaseline can help healing  It usually takes up to a month for it to heal   Some lesions are recurrent and may require repeat treatments  If a lesion has not healed in one month, please don't hesitate to contact us  If you have any further questions that are not answered here, please call us  [phone]    Thank you for allowing us to care for you

## 2021-09-01 ENCOUNTER — TELEPHONE (OUTPATIENT)
Dept: DERMATOLOGY | Facility: CLINIC | Age: 76
End: 2021-09-01

## 2021-09-01 NOTE — TELEPHONE ENCOUNTER
Called patient and left him know that the margins were clear ----- Message from Liana Gitelman, MD sent at 9/1/2021  7:56 AM EDT -----  Call patient if not coming in shortly for suture removal to let him know that the margins were clear

## 2021-09-02 ENCOUNTER — CLINICAL SUPPORT (OUTPATIENT)
Dept: DERMATOLOGY | Facility: CLINIC | Age: 76
End: 2021-09-02

## 2021-09-02 DIAGNOSIS — C44.629 SQUAMOUS CELL CANCER OF SKIN OF LEFT FOREARM: Primary | ICD-10-CM

## 2021-09-02 PROCEDURE — 99024 POSTOP FOLLOW-UP VISIT: CPT | Performed by: DERMATOLOGY

## 2021-09-02 NOTE — PATIENT INSTRUCTIONS
Wound care instructions given to patient  STERI-STRIPS (BUTTERFLY) POST SURGERY        Steri-Strips have been placed over your incision site to give added support  Please continue to bathe the area as usual     Eventually the Steri-Strips will begin to peel off on the ends  Snip the raised ends off with scissors and let the rest fall off on their own  If you have any questions, please call our office   [phone]

## 2021-09-02 NOTE — PROGRESS NOTES
[facility] DERMATOLOGY  [address] [place], [place] [address]ard [facility] [address]  [phone]  [phone]     MRN: [id number] : 1945  Encounter: [id number]  Patient Information: Cinthya Corbin    Subjective:     68-year-old male presents for planned suture removal from previous excised squamous cell carcinoma left forearm     Objective: There were no vitals taken for this visit  Physical Exam:    General Appearance:    Alert, cooperative, no distress   Skin:   Previous site of excision healing well  Procedure:  Suture removal  Site of excision: L forearm  Wound was cleansed with saline  Wound is intact  Sutures removed  Steri-Strips applied  Biopsy revealed Squamous cell carcinoma margins clear         Assessment:     1  Squamous cell cancer of skin of left forearm           Plan:   Wound care instructions given to patient        Prior to Admission medications    Medication Sig Start Date End Date Taking?  Authorizing Provider   albuterol (2 5 mg/3 mL) 0 083 % nebulizer solution USE 1 VIAL VIA NEBULIZER 4 TIMES A DAY AS NEEDED 20   Wili Osborne PA-C   albuterol (Proventil HFA) 90 mcg/act inhaler Inhale 2 puffs every 4 (four) hours as needed for wheezing or shortness of breath 10/27/20   Wili Osborne PA-C   aspirin 81 MG tablet Take 81 mg by mouth daily    Historical Provider, MD   atorvastatin (LIPITOR) 40 mg tablet Take 1 tablet (40 mg total) by mouth daily 20   Shilpi Platt PA-C   bexarotene (Targretin) 75 mg capsule Take 8 capsules (600 mg total) by mouth daily [place] Brand necessary 21   Corky Messina MD   Cholecalciferol (CVS VITAMIN D3) 1000 units capsule Take 1 capsule by mouth daily    Historical Provider, MD   cyanocobalamin (VITAMIN B-12) 1,000 mcg tablet Take 1,000 mcg by mouth daily    Historical Provider, MD   ferrous sulfate 324 (65 Fe) mg Take 1 tablet by mouth Twice daily 8/31/15   Historical Provider, MD   gabapentin (NEURONTIN) 100 mg capsule Take 1 capsule (100 mg total) by mouth 3 (three) times a day 5/26/20   Evangelist Anders MD   levothyroxine 112 mcg tablet TAKE 1 TABLET BY MOUTH  DAILY 6/21/21   Gretchen Hopper PA-C   metoprolol succinate (TOPROL-XL) 50 mg 24 hr tablet Take 1 tablet (50 mg total) by mouth daily 4/1/21   Ming Young MD   montelukast (SINGULAIR) 10 mg tablet Take 1 tablet (10 mg total) by mouth daily at bedtime 5/28/21   Jose Francis PA-C   nitrofurantoin (MACROBID) 100 mg capsule Take 1 capsule (100 mg total) by mouth 2 (two) times a day  Patient taking differently: Take 100 mg by mouth daily  2/4/21   Evangelist Anders MD   PAZEO 0 7 % SOLN  8/8/18   Historical Provider, MD   potassium chloride (K-DUR,KLOR-CON) 20 mEq tablet Take 1 tablet (20 mEq total) by mouth daily as needed (weight gain, leg swelling) 4/1/21   Ming Young MD   sacubitril-valsartan Oleta Ace) 49-51 MG TABS Take 1 tablet by mouth 2 (two) times a day 2/26/21   Daniela Mckeon PA-C   torsemide (DEMADEX) 20 mg tablet TAKE 1 TABLET (20 MG TOTAL) BY MOUTH DAILY AS NEEDED (WEIGHT GAIN, LEG SWELLING) 4/26/21   Ming Young MD   Trelegy Ellipta 773-20 3-39 MCG/INH inhaler USE 1 INHALATION BY MOUTH  DAILY  RINSE MOUTH AFTER  USE    Patient not taking: Reported on 7/13/2021 6/1/21   Ruma Kaufman PA-C   triamcinolone (KENALOG) 0 1 % ointment Apply topically 2 (two) times 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 2/6/18   Parker Mireles MD   Vitamins-Lipotropics (LIPOFLAVONOID PO) Take by mouth daily    Historical Provider, MD   zolpidem (AMBIEN) 10 mg tablet TAKE 1 TABLET BY MOUTH  DAILY AT BEDTIME AS NEEDED  FOR SLEEP 7/2/21   Evangelist Anders MD   nicotine (NICODERM CQ) 21 mg/24 hr TD 24 hr patch Place 1 patch on the skin every 24 hours  12/5/19  Historical Provider, MD     No Known Allergies    Parker Mireles MD  9/2/2021,11:17 AM    Portions of the record may have been created with voice recognition software   Occasional wrong word or "sound a like" substitutions may have occurred due to the inherent limitations of voice recognition software   Read the chart carefully and recognize, using context, where substitutions have occurred

## 2021-09-21 DIAGNOSIS — J44.9 COPD, SEVERE (HCC): ICD-10-CM

## 2021-09-22 ENCOUNTER — REMOTE DEVICE CLINIC VISIT (OUTPATIENT)
Dept: CARDIOLOGY CLINIC | Facility: CLINIC | Age: 76
End: 2021-09-22
Payer: COMMERCIAL

## 2021-09-22 DIAGNOSIS — Z95.810 AICD (AUTOMATIC CARDIOVERTER/DEFIBRILLATOR) PRESENT: Primary | ICD-10-CM

## 2021-09-22 PROCEDURE — 93297 REM INTERROG DEV EVAL ICPMS: CPT | Performed by: INTERNAL MEDICINE

## 2021-09-22 PROCEDURE — G2066 INTER DEVC REMOTE 30D: HCPCS | Performed by: INTERNAL MEDICINE

## 2021-09-22 RX ORDER — ALBUTEROL SULFATE 90 UG/1
AEROSOL, METERED RESPIRATORY (INHALATION)
Qty: 40.2 G | Refills: 3 | Status: SHIPPED | OUTPATIENT
Start: 2021-09-22 | End: 2022-01-10

## 2021-09-22 NOTE — PROGRESS NOTES
Results for orders placed or performed in visit on 09/22/21   Cardiac EP device report    Narrative    MDT/BIV-ICD/ NOT MRI CONDITIONAL  CARELINK TRANSMISSION - OPTI-VOL ONLY: OPTI-VOL WITHIN NORMAL LIMITS  BATTERY VOLTAGE ADEQUATE (2 4 YRS)  AP-97%, BVP-98% (TOTAL -93 8%+VSR PACE-4 2%)  ALL AVAILABLE LEAD PARAMETERS WITHIN NORMAL LIMITS  8 NSVT EPISODES, MOST RECENT & LONGEST FOR 49 BEATS (IN & OUT OF THERAPY ZONE), AVG CL~410MS  PT ON ASA 81MG & METOPROLOL  [address] NORMAL DEVICE FUNCTION   GV

## 2021-09-30 ENCOUNTER — HOSPITAL ENCOUNTER (OUTPATIENT)
Dept: CT IMAGING | Facility: CLINIC | Age: 76
Discharge: HOME | End: 2021-09-30
Payer: COMMERCIAL

## 2021-09-30 DIAGNOSIS — Z72.0 TOBACCO ABUSE: ICD-10-CM

## 2021-09-30 PROCEDURE — 71271 CT THORAX LUNG CANCER SCR C-: CPT

## 2021-10-15 ENCOUNTER — IN-CLINIC DEVICE VISIT (OUTPATIENT)
Dept: CARDIOLOGY CLINIC | Facility: CLINIC | Age: 76
End: 2021-10-15

## 2021-10-15 DIAGNOSIS — Z95.810 PRESENCE OF IMPLANTABLE CARDIOVERTER-DEFIBRILLATOR (ICD): Primary | ICD-10-CM

## 2021-10-15 PROCEDURE — RECHECK: Performed by: INTERNAL MEDICINE

## 2021-10-25 ENCOUNTER — OFFICE VISIT (OUTPATIENT)
Dept: PULMONOLOGY | Facility: CLINIC | Age: 76
End: 2021-10-25
Payer: COMMERCIAL

## 2021-10-25 VITALS
WEIGHT: 230 LBS | SYSTOLIC BLOOD PRESSURE: 124 MMHG | DIASTOLIC BLOOD PRESSURE: 68 MMHG | HEART RATE: 92 BPM | OXYGEN SATURATION: 93 % | TEMPERATURE: 98.3 F | BODY MASS INDEX: 32.2 KG/M2 | HEIGHT: 71 IN

## 2021-10-25 DIAGNOSIS — J41.0 SIMPLE CHRONIC BRONCHITIS (HCC): ICD-10-CM

## 2021-10-25 DIAGNOSIS — R91.1 LUNG NODULE: ICD-10-CM

## 2021-10-25 DIAGNOSIS — J90 PLEURAL EFFUSION: ICD-10-CM

## 2021-10-25 DIAGNOSIS — Z23 NEEDS FLU SHOT: Primary | ICD-10-CM

## 2021-10-25 DIAGNOSIS — Z72.0 TOBACCO ABUSE: ICD-10-CM

## 2021-10-25 DIAGNOSIS — J30.89 ENVIRONMENTAL AND SEASONAL ALLERGIES: ICD-10-CM

## 2021-10-25 PROCEDURE — 99214 OFFICE O/P EST MOD 30 MIN: CPT | Performed by: PHYSICIAN ASSISTANT

## 2021-10-25 PROCEDURE — 90662 IIV NO PRSV INCREASED AG IM: CPT

## 2021-10-25 PROCEDURE — G0008 ADMIN INFLUENZA VIRUS VAC: HCPCS

## 2021-10-28 DIAGNOSIS — I50.22 CHRONIC SYSTOLIC CONGESTIVE HEART FAILURE (HCC): ICD-10-CM

## 2021-10-28 RX ORDER — TORSEMIDE 20 MG/1
TABLET ORAL
Qty: 90 TABLET | Refills: 1 | Status: SHIPPED | OUTPATIENT
Start: 2021-10-28

## 2021-10-29 DIAGNOSIS — G62.9 NEUROPATHY: ICD-10-CM

## 2021-10-29 RX ORDER — GABAPENTIN 100 MG/1
100 CAPSULE ORAL 3 TIMES DAILY
Qty: 270 CAPSULE | Refills: 0 | Status: SHIPPED | OUTPATIENT
Start: 2021-10-29 | End: 2022-01-10 | Stop reason: SDUPTHER

## 2021-11-08 ENCOUNTER — OFFICE VISIT (OUTPATIENT)
Dept: DERMATOLOGY | Facility: CLINIC | Age: 76
End: 2021-11-08
Payer: COMMERCIAL

## 2021-11-08 DIAGNOSIS — C84.00 MYCOSIS FUNGOIDES, UNSPECIFIED BODY REGION (HCC): Primary | ICD-10-CM

## 2021-11-08 DIAGNOSIS — L82.1 SEBORRHEIC KERATOSIS: ICD-10-CM

## 2021-11-08 DIAGNOSIS — Z85.828 HISTORY OF SKIN CANCER: ICD-10-CM

## 2021-11-08 DIAGNOSIS — Z13.89 SCREENING FOR SKIN CONDITION: ICD-10-CM

## 2021-11-08 DIAGNOSIS — C84.08 MYCOSIS FUNGOIDES INVOLVING LYMPH NODES OF MULTIPLE REGIONS (HCC): ICD-10-CM

## 2021-11-08 PROCEDURE — 99214 OFFICE O/P EST MOD 30 MIN: CPT | Performed by: DERMATOLOGY

## 2021-11-08 PROCEDURE — 4004F PT TOBACCO SCREEN RCVD TLK: CPT | Performed by: DERMATOLOGY

## 2021-11-08 PROCEDURE — 1160F RVW MEDS BY RX/DR IN RCRD: CPT | Performed by: DERMATOLOGY

## 2021-11-08 RX ORDER — BEXAROTENE 75 MG/1
600 CAPSULE, LIQUID FILLED ORAL DAILY
Qty: 240 CAPSULE | Refills: 9 | Status: SHIPPED | OUTPATIENT
Start: 2021-11-08 | End: 2022-04-27 | Stop reason: SDUPTHER

## 2021-11-16 ENCOUNTER — REMOTE DEVICE CLINIC VISIT (OUTPATIENT)
Dept: CARDIOLOGY CLINIC | Facility: CLINIC | Age: 76
End: 2021-11-16
Payer: COMMERCIAL

## 2021-11-16 DIAGNOSIS — Z95.810 PRESENCE OF IMPLANTABLE CARDIOVERTER-DEFIBRILLATOR (ICD): Primary | ICD-10-CM

## 2021-11-16 PROCEDURE — 93297 REM INTERROG DEV EVAL ICPMS: CPT | Performed by: INTERNAL MEDICINE

## 2021-11-16 PROCEDURE — G2066 INTER DEVC REMOTE 30D: HCPCS | Performed by: INTERNAL MEDICINE

## 2021-12-09 ENCOUNTER — APPOINTMENT (OUTPATIENT)
Dept: RADIOLOGY | Facility: CLINIC | Age: 76
End: 2021-12-09
Payer: COMMERCIAL

## 2021-12-09 DIAGNOSIS — J90 PLEURAL EFFUSION: ICD-10-CM

## 2021-12-09 PROCEDURE — 71046 X-RAY EXAM CHEST 2 VIEWS: CPT

## 2021-12-15 ENCOUNTER — TELEPHONE (OUTPATIENT)
Dept: PULMONOLOGY | Facility: CLINIC | Age: 76
End: 2021-12-15

## 2021-12-17 ENCOUNTER — REMOTE DEVICE CLINIC VISIT (OUTPATIENT)
Dept: CARDIOLOGY CLINIC | Facility: CLINIC | Age: 76
End: 2021-12-17
Payer: COMMERCIAL

## 2021-12-17 DIAGNOSIS — G47.00 INSOMNIA, UNSPECIFIED TYPE: ICD-10-CM

## 2021-12-17 DIAGNOSIS — I50.22 CHRONIC SYSTOLIC CONGESTIVE HEART FAILURE (HCC): ICD-10-CM

## 2021-12-17 DIAGNOSIS — J30.89 ENVIRONMENTAL AND SEASONAL ALLERGIES: ICD-10-CM

## 2021-12-17 DIAGNOSIS — Z95.810 PRESENCE OF AUTOMATIC CARDIOVERTER/DEFIBRILLATOR (AICD): Primary | ICD-10-CM

## 2021-12-17 PROCEDURE — 93297 REM INTERROG DEV EVAL ICPMS: CPT | Performed by: INTERNAL MEDICINE

## 2021-12-17 PROCEDURE — G2066 INTER DEVC REMOTE 30D: HCPCS | Performed by: INTERNAL MEDICINE

## 2021-12-17 RX ORDER — SACUBITRIL AND VALSARTAN 49; 51 MG/1; MG/1
TABLET, FILM COATED ORAL
Qty: 180 TABLET | Refills: 3 | Status: SHIPPED | OUTPATIENT
Start: 2021-12-17

## 2021-12-17 RX ORDER — MONTELUKAST SODIUM 10 MG/1
TABLET ORAL
Qty: 90 TABLET | Refills: 3 | Status: SHIPPED | OUTPATIENT
Start: 2021-12-17

## 2021-12-20 RX ORDER — ZOLPIDEM TARTRATE 10 MG/1
TABLET ORAL
Qty: 90 TABLET | Refills: 1 | Status: SHIPPED | OUTPATIENT
Start: 2021-12-20 | End: 2022-06-24

## 2021-12-27 DIAGNOSIS — E78.5 HYPERLIPIDEMIA, UNSPECIFIED HYPERLIPIDEMIA TYPE: ICD-10-CM

## 2021-12-27 RX ORDER — ATORVASTATIN CALCIUM 40 MG/1
40 TABLET, FILM COATED ORAL DAILY
Qty: 90 TABLET | Refills: 3 | Status: SHIPPED | OUTPATIENT
Start: 2021-12-27

## 2022-01-10 ENCOUNTER — OFFICE VISIT (OUTPATIENT)
Dept: INTERNAL MEDICINE CLINIC | Facility: CLINIC | Age: 77
End: 2022-01-10
Payer: COMMERCIAL

## 2022-01-10 ENCOUNTER — APPOINTMENT (OUTPATIENT)
Dept: LAB | Facility: CLINIC | Age: 77
End: 2022-01-10
Payer: COMMERCIAL

## 2022-01-10 VITALS
HEIGHT: 71 IN | HEART RATE: 72 BPM | WEIGHT: 228.4 LBS | OXYGEN SATURATION: 98 % | BODY MASS INDEX: 31.98 KG/M2 | TEMPERATURE: 98.7 F | DIASTOLIC BLOOD PRESSURE: 60 MMHG | SYSTOLIC BLOOD PRESSURE: 110 MMHG

## 2022-01-10 DIAGNOSIS — E03.9 ACQUIRED HYPOTHYROIDISM: ICD-10-CM

## 2022-01-10 DIAGNOSIS — I42.0 DILATED CARDIOMYOPATHY (HCC): ICD-10-CM

## 2022-01-10 DIAGNOSIS — Z12.11 COLON CANCER SCREENING: ICD-10-CM

## 2022-01-10 DIAGNOSIS — M54.50 LUMBAR PAIN: ICD-10-CM

## 2022-01-10 DIAGNOSIS — I50.22 CHRONIC SYSTOLIC HEART FAILURE (HCC): ICD-10-CM

## 2022-01-10 DIAGNOSIS — G62.9 NEUROPATHY: ICD-10-CM

## 2022-01-10 DIAGNOSIS — I11.0 HYPERTENSIVE HEART DISEASE WITH CHRONIC SYSTOLIC CONGESTIVE HEART FAILURE (HCC): Primary | ICD-10-CM

## 2022-01-10 DIAGNOSIS — C84.00 MYCOSIS FUNGOIDES, UNSPECIFIED BODY REGION (HCC): ICD-10-CM

## 2022-01-10 DIAGNOSIS — R97.20 ELEVATED PSA: ICD-10-CM

## 2022-01-10 DIAGNOSIS — N39.0 RECURRENT UTI: ICD-10-CM

## 2022-01-10 DIAGNOSIS — N18.9 CHRONIC KIDNEY DISEASE-MINERAL AND BONE DISORDER: ICD-10-CM

## 2022-01-10 DIAGNOSIS — E83.9 CHRONIC KIDNEY DISEASE-MINERAL AND BONE DISORDER: ICD-10-CM

## 2022-01-10 DIAGNOSIS — N18.30 STAGE 3 CHRONIC KIDNEY DISEASE, UNSPECIFIED WHETHER STAGE 3A OR 3B CKD (HCC): ICD-10-CM

## 2022-01-10 DIAGNOSIS — J41.0 SIMPLE CHRONIC BRONCHITIS (HCC): ICD-10-CM

## 2022-01-10 DIAGNOSIS — M89.9 CHRONIC KIDNEY DISEASE-MINERAL AND BONE DISORDER: ICD-10-CM

## 2022-01-10 DIAGNOSIS — I50.22 HYPERTENSIVE HEART DISEASE WITH CHRONIC SYSTOLIC CONGESTIVE HEART FAILURE (HCC): Primary | ICD-10-CM

## 2022-01-10 LAB
25(OH)D3 SERPL-MCNC: 23.2 NG/ML (ref 30–100)
ALBUMIN SERPL BCP-MCNC: 3.5 G/DL (ref 3.5–5)
ALP SERPL-CCNC: 72 U/L (ref 46–116)
ALT SERPL W P-5'-P-CCNC: 23 U/L (ref 12–78)
ANION GAP SERPL CALCULATED.3IONS-SCNC: 6 MMOL/L (ref 4–13)
AST SERPL W P-5'-P-CCNC: 15 U/L (ref 5–45)
BASOPHILS # BLD AUTO: 0.08 THOUSANDS/ΜL (ref 0–0.1)
BASOPHILS NFR BLD AUTO: 1 % (ref 0–1)
BILIRUB SERPL-MCNC: 0.35 MG/DL (ref 0.2–1)
BUN SERPL-MCNC: 38 MG/DL (ref 5–25)
CALCIUM SERPL-MCNC: 8.6 MG/DL (ref 8.3–10.1)
CHLORIDE SERPL-SCNC: 109 MMOL/L (ref 100–108)
CO2 SERPL-SCNC: 25 MMOL/L (ref 21–32)
CREAT SERPL-MCNC: 1.62 MG/DL (ref 0.6–1.3)
EOSINOPHIL # BLD AUTO: 0.05 THOUSAND/ΜL (ref 0–0.61)
EOSINOPHIL NFR BLD AUTO: 1 % (ref 0–6)
ERYTHROCYTE [DISTWIDTH] IN BLOOD BY AUTOMATED COUNT: 14.8 % (ref 11.6–15.1)
GFR SERPL CREATININE-BSD FRML MDRD: 41 ML/MIN/1.73SQ M
GLUCOSE SERPL-MCNC: 108 MG/DL (ref 65–140)
HCT VFR BLD AUTO: 38.9 % (ref 36.5–49.3)
HGB BLD-MCNC: 12.1 G/DL (ref 12–17)
IMM GRANULOCYTES # BLD AUTO: 0.01 THOUSAND/UL (ref 0–0.2)
IMM GRANULOCYTES NFR BLD AUTO: 0 % (ref 0–2)
LYMPHOCYTES # BLD AUTO: 1.98 THOUSANDS/ΜL (ref 0.6–4.47)
LYMPHOCYTES NFR BLD AUTO: 29 % (ref 14–44)
MCH RBC QN AUTO: 30.3 PG (ref 26.8–34.3)
MCHC RBC AUTO-ENTMCNC: 31.1 G/DL (ref 31.4–37.4)
MCV RBC AUTO: 97 FL (ref 82–98)
MONOCYTES # BLD AUTO: 0.31 THOUSAND/ΜL (ref 0.17–1.22)
MONOCYTES NFR BLD AUTO: 5 % (ref 4–12)
NEUTROPHILS # BLD AUTO: 4.39 THOUSANDS/ΜL (ref 1.85–7.62)
NEUTS SEG NFR BLD AUTO: 64 % (ref 43–75)
NRBC BLD AUTO-RTO: 0 /100 WBCS
PHOSPHATE SERPL-MCNC: 3.6 MG/DL (ref 2.3–4.1)
PLATELET # BLD AUTO: 288 THOUSANDS/UL (ref 149–390)
PMV BLD AUTO: 10.5 FL (ref 8.9–12.7)
POTASSIUM SERPL-SCNC: 4.1 MMOL/L (ref 3.5–5.3)
PROT SERPL-MCNC: 7.6 G/DL (ref 6.4–8.2)
PTH-INTACT SERPL-MCNC: 133.8 PG/ML (ref 18.4–80.1)
RBC # BLD AUTO: 4 MILLION/UL (ref 3.88–5.62)
SODIUM SERPL-SCNC: 140 MMOL/L (ref 136–145)
T3 SERPL-MCNC: 0.8 NG/ML (ref 0.6–1.8)
TSH SERPL DL<=0.05 MIU/L-ACNC: 0.1 UIU/ML (ref 0.36–3.74)
WBC # BLD AUTO: 6.82 THOUSAND/UL (ref 4.31–10.16)

## 2022-01-10 PROCEDURE — 84153 ASSAY OF PSA TOTAL: CPT

## 2022-01-10 PROCEDURE — 81003 URINALYSIS AUTO W/O SCOPE: CPT

## 2022-01-10 PROCEDURE — 84480 ASSAY TRIIODOTHYRONINE (T3): CPT

## 2022-01-10 PROCEDURE — 84156 ASSAY OF PROTEIN URINE: CPT

## 2022-01-10 PROCEDURE — 36415 COLL VENOUS BLD VENIPUNCTURE: CPT

## 2022-01-10 PROCEDURE — 99213 OFFICE O/P EST LOW 20 MIN: CPT | Performed by: INTERNAL MEDICINE

## 2022-01-10 PROCEDURE — 3288F FALL RISK ASSESSMENT DOCD: CPT | Performed by: INTERNAL MEDICINE

## 2022-01-10 PROCEDURE — 1101F PT FALLS ASSESS-DOCD LE1/YR: CPT | Performed by: INTERNAL MEDICINE

## 2022-01-10 PROCEDURE — 82570 ASSAY OF URINE CREATININE: CPT

## 2022-01-10 PROCEDURE — 1170F FXNL STATUS ASSESSED: CPT | Performed by: INTERNAL MEDICINE

## 2022-01-10 PROCEDURE — 80053 COMPREHEN METABOLIC PANEL: CPT

## 2022-01-10 PROCEDURE — 84100 ASSAY OF PHOSPHORUS: CPT

## 2022-01-10 PROCEDURE — 82306 VITAMIN D 25 HYDROXY: CPT

## 2022-01-10 PROCEDURE — 1125F AMNT PAIN NOTED PAIN PRSNT: CPT | Performed by: INTERNAL MEDICINE

## 2022-01-10 PROCEDURE — 84154 ASSAY OF PSA FREE: CPT

## 2022-01-10 PROCEDURE — 84443 ASSAY THYROID STIM HORMONE: CPT

## 2022-01-10 PROCEDURE — G0439 PPPS, SUBSEQ VISIT: HCPCS | Performed by: INTERNAL MEDICINE

## 2022-01-10 PROCEDURE — 85025 COMPLETE CBC W/AUTO DIFF WBC: CPT

## 2022-01-10 PROCEDURE — 83970 ASSAY OF PARATHORMONE: CPT

## 2022-01-10 PROCEDURE — 3725F SCREEN DEPRESSION PERFORMED: CPT | Performed by: INTERNAL MEDICINE

## 2022-01-10 RX ORDER — GABAPENTIN 100 MG/1
100 CAPSULE ORAL 3 TIMES DAILY
Qty: 270 CAPSULE | Refills: 3 | Status: SHIPPED | OUTPATIENT
Start: 2022-01-10

## 2022-01-10 RX ORDER — DICLOFENAC POTASSIUM 50 MG/1
TABLET, FILM COATED ORAL
Qty: 30 TABLET | Refills: 0 | Status: SHIPPED | OUTPATIENT
Start: 2022-01-10

## 2022-01-10 NOTE — PROGRESS NOTES
Assessment and Plan:     Problem List Items Addressed This Visit        Endocrine    Acquired hypothyroidism       Respiratory    COPD (chronic obstructive pulmonary disease) ([place] [address] )       Cardiovascular and Mediastinum    Cardiomyopathy ([place] [address] )    HHD (hypertensive heart disease) - Primary    Systolic heart failure ([place] [address] )      Other Visit Diagnoses     Neuropathy        Relevant Medications    gabapentin (NEURONTIN) 100 mg capsule    Recurrent UTI            BMI Counseling: Body mass index is 31 86 kg/m²  Follow-up plan was not completed due to elderly patient (74 years old) where weight reduction/weight gain would complicate underlying health condition such as: illness or physical disability  Depression Screening and Follow-up Plan: Patient was screened for depression during today's encounter  They screened negative with a PHQ-2 score of 0  Preventive health issues were discussed with patient, and age appropriate screening tests were ordered as noted in patient's After Visit Summary  Personalized health advice and appropriate referrals for health education or preventive services given if needed, as noted in patient's After Visit Summary       History of Present Illness:     Patient presents for Medicare Annual Wellness visit    Patient Care Team:  Crystal Toth MD as PCP - MD Chey Rosado MD Susen Kaufmann, MD Buddy Estes, MD Hulen Rota, MD Gretchen Elks, MD as Cardiologist (Cardiology)     Problem List:     Patient Active Problem List   Diagnosis    Insomnia    ICD (implantable cardioverter-defibrillator) in place    Hyperlipidemia    Hx of lymphoma    HTN (hypertension)    COPD (chronic obstructive pulmonary disease) ([place] [address] )    Chronic bronchitis ([place] [address] )    Cardiomyopathy ([place] [address] )    BPH (benign prostatic hyperplasia)    Seborrheic keratosis    Mycosis fungoides ([place] [address] )    History of skin cancer    Screening for skin cancer    Screening for skin condition    Acquired hypothyroidism    HHD (hypertensive heart disease)    Prostate cancer screening    Colon cancer screening    Health care maintenance    Squamous cell cancer of skin of forearm, left    PVC (premature ventricular contraction)    Acute kidney injury (ELLYN) with acute tubular necrosis (ATN) (HCC)    Urinary tract infection    COPD exacerbation (HCC)    Systolic heart failure (HCC)    Hypomagnesemia    Chronic kidney disease-mineral and bone disorder    Stage 3 chronic kidney disease (HCC)    Shortness of breath    Lumbar pain      Past Medical and Surgical History:     Past Medical History:   Diagnosis Date    Agent orange exposure     Anemia     Benign colon polyp     BPH (benign prostatic hyperplasia)     Bradycardia     Cardiomyopathy (HCC)     Chest discomfort     CHF (congestive heart failure) (HCC)     Chronic bronchitis (HCC)     Chronic kidney disease     COPD [facility]chronic obstructive pulmonary disease) ([facility] )     LAST ASSESSED: 9/9/17    H/O nonmelanoma skin cancer     LAST ASSESSED: 11/7/17    HHD (hypertensive heart disease)     HTN (hypertension)     Hx of lymphoma     Hyperlipidemia     Hypertension     ICD (implantable cardioverter-defibrillator) in place     Insomnia     Mycosis fungoides ([facility] )     PVC (premature ventricular contraction)     PVT (paroxysmal ventricular tachycardia) (HCC)     SOB (shortness of breath)      Past Surgical History:   Procedure Laterality Date    CARDIAC PACEMAKER PLACEMENT      SKIN SURGERY      skin cancer removal       Family History:     Family History   Problem Relation Age of Onset    Diabetes Mother     Colon cancer Father         DIAGNOSED IN HIS [de-identified]    Heart failure Father     Coronary artery disease Father     Diabetes Family         MELLITUS    Heart attack Neg Hx     Stroke Neg Hx     Anuerysm Neg Hx     Clotting disorder Neg Hx     Arrhythmia Neg Hx     Hypertension Neg Hx         pt unsure     Hyperlipidemia Neg Hx     Sudden death Neg Hx         scd      Social History:     Social History     Socioeconomic History    Marital status:       Spouse name: None    Number of children: 0    Years of education: None    Highest education level: None   Occupational History    Occupation: retired   Tobacco Use    Smoking status: Current Every Day Smoker     Packs/day: 0 50     Years: 50 00     Pack years: 25 00     Types: Cigarettes    Smokeless tobacco: Never Used    Tobacco comment: HALF A PACK PER DAY    Vaping Use    Vaping Use: Never used   Substance and Sexual Activity    Alcohol use: Yes     Comment: vodka nightly    Drug use: No    Sexual activity: Not Currently   Other Topics Concern    None   Social History Narrative    None     Social Determinants of Health     Financial Resource Strain: Not on file   Food Insecurity: Not on file   Transportation Needs: Not on file   Physical Activity: Not on file   Stress: Not on file   Social Connections: Not on file   Intimate Partner Violence: Not on file   Housing Stability: Not on file      Medications and Allergies:     Current Outpatient Medications   Medication Sig Dispense Refill    albuterol (2 5 mg/3 mL) 0 083 % nebulizer solution USE 1 VIAL VIA NEBULIZER 4 TIMES A DAY AS NEEDED 1080 mL 3    aspirin 81 MG tablet Take 81 mg by mouth daily      atorvastatin (LIPITOR) 40 mg tablet Take 1 tablet (40 mg total) by mouth daily 90 tablet 3    bexarotene (Targretin) 75 mg capsule Take 8 capsules (600 mg total) by mouth daily [place] Brand necessary 240 capsule 9    Cholecalciferol (CVS VITAMIN D3) 1000 units capsule Take 1 capsule by mouth daily      cyanocobalamin (VITAMIN B-12) 1,000 mcg tablet Take 1,000 mcg by mouth daily      Entresto 49-51 MG TABS TAKE 1 TABLET BY MOUTH  TWICE DAILY 180 tablet 3    ferrous sulfate 324 (65 Fe) mg Take 1 tablet by mouth Twice daily      gabapentin (NEURONTIN) 100 mg capsule Take 1 capsule (100 mg total) by mouth 3 (three) times a day 270 capsule 3    levothyroxine 112 mcg tablet TAKE 1 TABLET BY MOUTH  DAILY 90 tablet 3    metoprolol succinate (TOPROL-XL) 50 mg 24 hr tablet Take 1 tablet (50 mg total) by mouth daily 180 tablet 3    montelukast (SINGULAIR) 10 mg tablet TAKE 1 TABLET BY MOUTH  DAILY AT BEDTIME 90 tablet 3    potassium chloride (K-DUR,KLOR-CON) 20 mEq tablet Take 1 tablet (20 mEq total) by mouth daily as needed (weight gain, leg swelling) 90 tablet 3    torsemide (DEMADEX) 20 mg tablet TAKE 1 TABLET BY MOUTH EVERY DAY AS NEEDED FOR WEIGHT GAIN, LEG SWELLING 90 tablet 1    Trelegy Ellipta 100-62 5-25 MCG/INH inhaler USE 1 INHALATION BY MOUTH  DAILY  RINSE MOUTH AFTER  USE  180 each 3    triamcinolone (KENALOG) 0 1 % ointment Apply topically 2 (two) times 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 454 g 3    Vitamins-Lipotropics (LIPOFLAVONOID PO) Take by mouth daily      zolpidem (AMBIEN) 10 mg tablet TAKE 1 TABLET BY MOUTH  DAILY AT BEDTIME AS NEEDED  FOR SLEEP 90 tablet 1     No current facility-administered medications for this visit       No Known Allergies   Immunizations:     Immunization History   Administered Date(s) Administered    COVID-19 PFIZER VACCINE 0 3 ML IM 03/16/2021, 04/07/2021, 08/20/2021    INFLUENZA 09/24/2018, 09/08/2020    Influenza Split High Dose Preservative Free IM 10/17/2016, 09/09/2017, 09/24/2018, 09/13/2019    Influenza, high dose seasonal 0 7 mL 10/25/2021    Influenza, seasonal, injectable 10/14/2015    Pneumococcal Conjugate 13-Valent 07/27/2020    Pneumococcal Conjugate PCV 7 10/14/2015    Tdap 1945      Health Maintenance:         Topic Date Due    Colorectal Cancer Screening  02/17/2020    Lung Cancer Screening  09/30/2022    Hepatitis C Screening  Completed         Topic Date Due    DTaP,Tdap,and Td Vaccines (1 - Tdap) 11/10/1968    Pneumococcal Vaccine: 65+ Years (2 of 4 - PPSV23) 09/21/2020      Medicare Health Risk Assessment:     /60 (BP Location: Left arm, Patient Position: Sitting, Cuff Size: Standard) Comment: bp  Pulse 72   Temp 98 7 °F (37 1 °C) (Temporal) Comment: NO NSAIDS  Ht 5' 11" (1 803 m)   Wt 104 kg (228 lb 6 4 oz)   SpO2 98%   BMI 31 86 kg/m²      Sherly is here for his Subsequent Wellness visit  Last Medicare Wellness visit information reviewed, patient interviewed and updates made to the record today  Health Risk Assessment:   Patient rates overall health as fair  Patient feels that their physical health rating is same  Patient is dissatisfied with their life  Eyesight was rated as same  Hearing was rated as same  Patient feels that their emotional and mental health rating is same  Patients states they are never, rarely angry  Patient states they are never, rarely unusually tired/fatigued  Pain experienced in the last 7 days has been some  Patient's pain rating has been 3/10  Patient states that he has experienced no weight loss or gain in last 6 months  Depression Screening:   PHQ-2 Score: 0      Fall Risk Screening: In the past year, patient has experienced: no history of falling in past year      Home Safety:  Patient does not have trouble with stairs inside or outside of their home  Patient has working smoke alarms and has working carbon monoxide detector  Home safety hazards include: none  Nutrition:   Current diet is No Added Salt  Medications:   Patient is not currently taking any over-the-counter supplements  Patient is able to manage medications  Activities of Daily Living (ADLs)/Instrumental Activities of Daily Living (IADLs):   Walk and transfer into and out of bed and chair?: Yes  Dress and groom yourself?: Yes    Bathe or shower yourself?: Yes    Feed yourself?  Yes  Do your laundry/housekeeping?: Yes  Manage your money, pay your bills and track your expenses?: Yes  Make your own meals?: Yes    Do your own shopping?: Yes    Previous Hospitalizations:   Any hospitalizations or ED visits within the last 12 months?: Yes    How many hospitalizations have you had in the last year?: 1-2    Advance Care Planning:   Living will: Yes    Advanced directive: Yes      Cognitive Screening:   Provider or family/friend/caregiver concerned regarding cognition?: No    PREVENTIVE SCREENINGS      Cardiovascular Screening:    General: Screening Not Indicated and History Lipid Disorder      Diabetes Screening:     General: Screening Current      Colorectal Cancer Screening:     General: Screening Current    Due for: Cologuard      Prostate Cancer Screening:    General: Screening Current    Due for: PSA      Osteoporosis Screening:    General: Risks and Benefits Discussed      Abdominal Aortic Aneurysm (AAA) Screening:    Risk factors include: age between 73-67 yo and tobacco use        Lung Cancer Screening:     General: Screening Not Indicated      Hepatitis C Screening:    General: Screening Current    Screening, Brief Intervention, and Referral to Treatment (SBIRT)    Screening  Typical number of drinks in a day: 0      Angela Hanna MD

## 2022-01-10 NOTE — PROGRESS NOTES
Assessment/Plan:       Diagnoses and all orders for this visit:    Hypertensive heart disease with chronic systolic congestive heart failure (HCC)    Neuropathy  -     gabapentin (NEURONTIN) 100 mg capsule; Take 1 capsule (100 mg total) by mouth 3 (three) times a day    Simple chronic bronchitis (HCC)    Dilated cardiomyopathy (HCC)    Recurrent UTI    Chronic systolic heart failure ([facility] [address] )    Acquired hypothyroidism                Subjective:      Patient ID: Josefina Arnett is a 78 y o  male  Follow-up visit  A 72-year-old man with chronic systolic heart failure with very low ejection fraction whose chief symptom continues to be fatigue and shortness of breath with minimal exertion  He follows with cardiology  Last hospitalization for CHF exacerbation with 4 years ago  2019, hospitalized for replacement of pacemaker defibrillator  After the hospitalization of  years ago he was placed on Entresto and since then he has been more stable  Continues to follow with specialty providers including Cardiology, and Pulmonary for COPD, many times a year  Was hospitalized in 2021 for a renal infection but this appears to have improved and in fact his most recent blood testing shows BUN and creatinine are significantly better  Estimated GFR of 54  Globally speaking we have an individual with bare heart failure who was never the less compensated  He has chronic dyspnea which is worse with more the days but no orthopnea nor PND  He does have chronic exertional fatigue which is his baseline status  Mycosis fungoides he is treated by Dermatology; no immediate issues referable to this  Some minor arthropathy  Uses Tylenol occasionally and for really severe pain uses diclofenac 50 mg  He uses that so rarely that it is a years old prescription  We discussed this  Even with his ongoing chronic diagnoses of renal insufficiency and heart failure as long as he uses it so rarely I think it is okay      Globally, considering the severity of his diagnoses, he feels surprisingly well  The following portions of the patient's history were reviewed and updated as appropriate:   He has a past medical history of Agent orange exposure, Anemia, Benign colon polyp, BPH (benign prostatic hyperplasia), Bradycardia, Cardiomyopathy ([facility] [address] ), Chest discomfort, CHF (congestive heart failure) ([facility] Chronic bronchitis ([facility] ), Chronic kidney disease, COPD (chronic obstructive pulmonary disease) ([facility] ), H/O nonmelanoma skin cancer, HHD (hypertensive heart disease), HTN (hypertension), lymphoma, Hyperlipidemia, Hypertension, ICD (implantable cardioverter-defibrillator) in place, Insomnia, Mycosis fungoides ([facility] ), PVC (premature ventricular contraction), PVT (paroxysmal ventricular tachycardia) ([facility] ), and SOB (shortness of breath)  ,  does not have any pertinent problems on file  ,   has a past surgical history that includes Cardiac pacemaker placement and Skin surgery  ,  family history includes Colon cancer in his father; Coronary artery disease in his father; Diabetes in his family and mother; Heart failure in his father  ,   reports that he has been smoking cigarettes  He has a 25 00 pack-year smoking history  He has never used smokeless tobacco  He reports current alcohol use  He reports that he does not use drugs  ,  has No Known Allergies     Current Outpatient Medications   Medication Sig Dispense Refill    albuterol (2 5 mg/3 mL) 0 083 % nebulizer solution USE 1 VIAL VIA NEBULIZER 4 TIMES A DAY AS NEEDED 1080 mL 3    aspirin 81 MG tablet Take 81 mg by mouth daily      atorvastatin (LIPITOR) 40 mg tablet Take 1 tablet (40 mg total) by mouth daily 90 tablet 3    bexarotene (Targretin) 75 mg capsule Take 8 capsules (600 mg total) by mouth daily [place] Brand necessary 240 capsule 9    Cholecalciferol (CVS VITAMIN D3) 1000 units capsule Take 1 capsule by mouth daily      cyanocobalamin (VITAMIN B-12) 1,000 mcg tablet Take 1,000 mcg by mouth daily      Entresto 49-51 MG TABS TAKE 1 TABLET BY MOUTH  TWICE DAILY 180 tablet 3    ferrous sulfate 324 (65 Fe) mg Take 1 tablet by mouth Twice daily      gabapentin (NEURONTIN) 100 mg capsule Take 1 capsule (100 mg total) by mouth 3 (three) times a day 270 capsule 3    levothyroxine 112 mcg tablet TAKE 1 TABLET BY MOUTH  DAILY 90 tablet 3    metoprolol succinate (TOPROL-XL) 50 mg 24 hr tablet Take 1 tablet (50 mg total) by mouth daily 180 tablet 3    montelukast (SINGULAIR) 10 mg tablet TAKE 1 TABLET BY MOUTH  DAILY AT BEDTIME 90 tablet 3    potassium chloride (K-DUR,KLOR-CON) 20 mEq tablet Take 1 tablet (20 mEq total) by mouth daily as needed (weight gain, leg swelling) 90 tablet 3    torsemide (DEMADEX) 20 mg tablet TAKE 1 TABLET BY MOUTH EVERY DAY AS NEEDED FOR WEIGHT GAIN, LEG SWELLING 90 tablet 1    Trelegy Ellipta 100-62 5-25 MCG/INH inhaler USE 1 INHALATION BY MOUTH  DAILY  RINSE MOUTH AFTER  USE  180 each 3    triamcinolone (KENALOG) 0 1 % ointment Apply topically 2 (two) times 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 454 g 3    Vitamins-Lipotropics (LIPOFLAVONOID PO) Take by mouth daily      zolpidem (AMBIEN) 10 mg tablet TAKE 1 TABLET BY MOUTH  DAILY AT BEDTIME AS NEEDED  FOR SLEEP 90 tablet 1     No current facility-administered medications for this visit  Review of Systems   Constitutional: Positive for fatigue  Respiratory: Positive for shortness of breath  Musculoskeletal: Positive for arthralgias  Objective:  Vitals:    01/10/22 1549   BP: 110/60   Pulse: 72   Temp: 98 7 °F (37 1 °C)   SpO2: 98%      Physical Exam  Constitutional:       General: He is not in acute distress  Appearance: He is well-developed  Comments:   Moderately overweight male patient who looks surprisingly well considering the severity of his cardiac dysfunction   HENT:      Head: Normocephalic and atraumatic  Eyes:      Pupils: Pupils are equal, round, and reactive to light     Cardiovascular:      Rate and Rhythm: Normal rate and regular rhythm  Heart sounds: Normal heart sounds  No murmur heard  Pulmonary:      Effort: Pulmonary effort is normal  No respiratory distress  Breath sounds: Normal breath sounds  No wheezing  Musculoskeletal:         General: Normal range of motion  Cervical back: Normal range of motion  Right lower leg: Edema present  Left lower leg: Edema present  Skin:     General: Skin is warm and dry  Findings: Erythema and rash (  ) present  Comments:  Modest stasis changes   Neurological:      Mental Status: He is alert and oriented to person, place, and time  Psychiatric:         Mood and Affect: Mood normal          Behavior: Behavior normal          Thought Content: Thought content normal          Judgment: Judgment normal            There are no Patient Instructions on file for this visit

## 2022-01-11 LAB
BILIRUB UR QL STRIP: NEGATIVE
CLARITY UR: CLEAR
COLOR UR: YELLOW
CREAT UR-MCNC: 171 MG/DL
GLUCOSE UR STRIP-MCNC: NEGATIVE MG/DL
HGB UR QL STRIP.AUTO: NEGATIVE
KETONES UR STRIP-MCNC: NEGATIVE MG/DL
LEUKOCYTE ESTERASE UR QL STRIP: NEGATIVE
NITRITE UR QL STRIP: NEGATIVE
PH UR STRIP.AUTO: 5.5 [PH]
PROT UR STRIP-MCNC: NEGATIVE MG/DL
PROT UR-MCNC: 49 MG/DL
PROT/CREAT UR: 0.29 MG/G{CREAT} (ref 0–0.1)
SP GR UR STRIP.AUTO: >=1.03 (ref 1–1.03)
UROBILINOGEN UR QL STRIP.AUTO: 0.2 E.U./DL

## 2022-01-12 LAB
PSA FREE MFR SERPL: 29 %
PSA FREE SERPL-MCNC: 1.13 NG/ML
PSA SERPL-MCNC: 3.9 NG/ML (ref 0–4)

## 2022-01-17 ENCOUNTER — REMOTE DEVICE CLINIC VISIT (OUTPATIENT)
Dept: CARDIOLOGY CLINIC | Facility: CLINIC | Age: 77
End: 2022-01-17

## 2022-01-17 DIAGNOSIS — Z95.810 PRESENCE OF AUTOMATIC CARDIOVERTER/DEFIBRILLATOR (AICD): Primary | ICD-10-CM

## 2022-01-17 PROCEDURE — RECHECK: Performed by: INTERNAL MEDICINE

## 2022-01-17 NOTE — PROGRESS NOTES
Results for orders placed or performed in visit on 01/17/22   Cardiac EP device report    Narrative    MDT/BIV-ICD/ NOT MRI CONDITIONAL  NON-BILLABLE CARELINK TRANSMISSION: BATTERY VOLTAGE ADEQUATE (2 1 YRS)  AP: 97 6%  : 98 3% + VSRP: 1 1%  ALL AVAILABLE LEAD PARAMETERS WITHIN NORMAL LIMITS  5 VT-NS MONITORED EPISODES W/ EGMS SHOWING NSVT 5 BEATS @ 222 BPM, 6 BEATS @ 182 BPM, 5 BEATS @ 158 BPM & 5 BEATS @ 171 BPM  1 EPISODE SHOWING PAT 10 BEATS @ 142 BPM  6 V-SENSE EPISODES (1 SEC/D) W/ AVAIL MARKERS SHOWING PAT 10 BEATS 2 140 BPM, 12 BEATS @ 133 BPM, 16 BEATS @ 133 BPM, 12 BEATS @ 133 BPM AND 1 NSVT 13 BEATS @ 122 BPM  PT TAKES METOPROLOL SUCC, ASA 81MG  EF: 30-35% (ECHO 4/27/21)  OPTI-VOL WITHIN NORMAL LIMITS  APPROPRIATELY FUNCTIONING ICD    [address]

## 2022-01-20 ENCOUNTER — TELEPHONE (OUTPATIENT)
Dept: INTERNAL MEDICINE CLINIC | Facility: CLINIC | Age: 77
End: 2022-01-20

## 2022-01-20 NOTE — TELEPHONE ENCOUNTER
----- Message from Mariela Fernandez MD sent at 1/20/2022  7:27 AM EST -----  Please call the patient regarding his abnormal result  Kidney function a bit worse compared to prior  He needs to go back to the kidney specialist for another visit

## 2022-01-20 NOTE — TELEPHONE ENCOUNTER
----- Message from Sri Loomis MD sent at 1/20/2022  7:27 AM EST -----  Please call the patient regarding his abnormal result  Kidney function a bit worse compared to prior  He needs to go back to the kidney specialist for another visit

## 2022-01-24 ENCOUNTER — OFFICE VISIT (OUTPATIENT)
Dept: NEPHROLOGY | Facility: CLINIC | Age: 77
End: 2022-01-24
Payer: COMMERCIAL

## 2022-01-24 VITALS
SYSTOLIC BLOOD PRESSURE: 110 MMHG | RESPIRATION RATE: 16 BRPM | HEIGHT: 71 IN | HEART RATE: 86 BPM | DIASTOLIC BLOOD PRESSURE: 70 MMHG | WEIGHT: 231.6 LBS | BODY MASS INDEX: 32.42 KG/M2 | TEMPERATURE: 97.7 F | OXYGEN SATURATION: 97 %

## 2022-01-24 DIAGNOSIS — I42.0 DILATED CARDIOMYOPATHY (HCC): ICD-10-CM

## 2022-01-24 DIAGNOSIS — N18.9 CHRONIC KIDNEY DISEASE-MINERAL AND BONE DISORDER: ICD-10-CM

## 2022-01-24 DIAGNOSIS — E83.9 CHRONIC KIDNEY DISEASE-MINERAL AND BONE DISORDER: ICD-10-CM

## 2022-01-24 DIAGNOSIS — I10 PRIMARY HYPERTENSION: ICD-10-CM

## 2022-01-24 DIAGNOSIS — M89.9 CHRONIC KIDNEY DISEASE-MINERAL AND BONE DISORDER: ICD-10-CM

## 2022-01-24 DIAGNOSIS — N18.30 STAGE 3 CHRONIC KIDNEY DISEASE, UNSPECIFIED WHETHER STAGE 3A OR 3B CKD (HCC): Primary | ICD-10-CM

## 2022-01-24 PROCEDURE — 1160F RVW MEDS BY RX/DR IN RCRD: CPT | Performed by: INTERNAL MEDICINE

## 2022-01-24 PROCEDURE — 4004F PT TOBACCO SCREEN RCVD TLK: CPT | Performed by: INTERNAL MEDICINE

## 2022-01-24 PROCEDURE — 99214 OFFICE O/P EST MOD 30 MIN: CPT | Performed by: INTERNAL MEDICINE

## 2022-01-24 PROCEDURE — 3008F BODY MASS INDEX DOCD: CPT | Performed by: INTERNAL MEDICINE

## 2022-01-24 NOTE — PROGRESS NOTES
NEPHROLOGY OFFICE FOLLOW UP  Margot Hutner 78 y o  male MRN: [id number]    Encounter: [id number] 1/24/2022    REASON FOR VISIT: Margot Hunter is a 78 y o  male who is here on 1/24/2022 for Chronic Kidney Disease and Follow-up    HPI:    Samuel Huerta came in today for follow-up of CKD  [de-identified] gentleman with stage 3-4 CKD who also coronary artery disease and cardiomyopathy    Overall patient is feeling quite well denies any acute complaint     No chest pain no palpitation or shortness of breath     No nausea no vomiting no abdominal discomfort     Does not have any leg swelling      REVIEW OF SYSTEMS:    Review of Systems   Constitutional: Negative for activity change and fatigue  HENT: Negative for congestion and ear discharge  Eyes: Negative for photophobia and pain  Respiratory: Negative for apnea and choking  Cardiovascular: Negative for chest pain and palpitations  Gastrointestinal: Negative for abdominal distention and blood in stool  Endocrine: Negative for heat intolerance and polyphagia  Genitourinary: Negative for flank pain and urgency  Musculoskeletal: Negative for neck pain and neck stiffness  Skin: Negative for color change and wound  Allergic/Immunologic: Negative for food allergies and immunocompromised state  Neurological: Negative for seizures and facial asymmetry  Hematological: Negative for adenopathy  Does not bruise/bleed easily  Psychiatric/Behavioral: Negative for self-injury and suicidal ideas           PAST MEDICAL HISTORY:  Past Medical History:   Diagnosis Date    Agent orange exposure     Anemia     Benign colon polyp     BPH (benign prostatic hyperplasia)     Bradycardia     Cardiomyopathy ([address] )     Chest discomfort     CHF (congestive heart failure) (HCC)     Chronic bronchitis (HCC)     Chronic kidney disease     COPD (chronic obstructive pulmonary disease) (HCC)     LAST ASSESSED: 9/9/17    H/O nonmelanoma skin cancer     LAST ASSESSED: 11/7/17    HHD (hypertensive heart disease)     HTN (hypertension)     Hx of lymphoma     Hyperlipidemia     Hypertension     ICD (implantable cardioverter-defibrillator) in place     Insomnia     Mycosis fungoides (HCC)     PVC (premature ventricular contraction)     PVT (paroxysmal ventricular tachycardia) (HCC)     SOB (shortness of breath)        PAST SURGICAL HISTORY:  Past Surgical History:   Procedure Laterality Date    CARDIAC PACEMAKER PLACEMENT      SKIN SURGERY      skin cancer removal        SOCIAL HISTORY:  Social History     Substance and Sexual Activity   Alcohol Use Yes    Comment: vodka nightly     Social History     Substance and Sexual Activity   Drug Use No     Social History     Tobacco Use   Smoking Status Current Every Day Smoker    Packs/day: 0 50    Years: 50 00    Pack years: 25 00    Types: Cigarettes   Smokeless Tobacco Never Used   Tobacco Comment    HALF A PACK PER DAY        FAMILY HISTORY:  Family History   Problem Relation Age of Onset    Diabetes Mother     Colon cancer Father         DIAGNOSED IN HIS [de-identified]    Heart failure Father     Coronary artery disease Father     Diabetes Family         MELLITUS    Heart attack Neg Hx     Stroke Neg Hx     Anuerysm Neg Hx     Clotting disorder Neg Hx     Arrhythmia Neg Hx     Hypertension Neg Hx         pt unsure     Hyperlipidemia Neg Hx     Sudden death Neg Hx         scd       MEDICATIONS:    Current Outpatient Medications:     albuterol (2 5 mg/3 mL) 0 083 % nebulizer solution, USE 1 VIAL VIA NEBULIZER 4 TIMES A DAY AS NEEDED, Disp: 1080 mL, Rfl: 3    aspirin 81 MG tablet, Take 81 mg by mouth daily, Disp: , Rfl:     atorvastatin (LIPITOR) 40 mg tablet, Take 1 tablet (40 mg total) by mouth daily, Disp: 90 tablet, Rfl: 3    bexarotene (Targretin) 75 mg capsule, Take 8 capsules (600 mg total) by mouth daily [place] Brand necessary, Disp: 240 capsule, Rfl: 9    Cholecalciferol ([employer] VITAMIN D3) 1000 units capsule, Take 1 capsule by mouth daily, Disp: , Rfl:     cyanocobalamin (VITAMIN B-12) 1,000 mcg tablet, Take 1,000 mcg by mouth daily, Disp: , Rfl:     diclofenac potassium (CATAFLAM) 50 mg tablet, 1 tablet daily for severe pain  Not to exceed 2 tablets a week , Disp: 30 tablet, Rfl: 0    Entresto 49-51 MG TABS, TAKE 1 TABLET BY MOUTH  TWICE DAILY, Disp: 180 tablet, Rfl: 3    ferrous sulfate 324 (65 Fe) mg, Take 1 tablet by mouth Twice daily, Disp: , Rfl:     gabapentin (NEURONTIN) 100 mg capsule, Take 1 capsule (100 mg total) by mouth 3 (three) times a day, Disp: 270 capsule, Rfl: 3    levothyroxine 112 mcg tablet, TAKE 1 TABLET BY MOUTH  DAILY, Disp: 90 tablet, Rfl: 3    metoprolol succinate (TOPROL-XL) 50 mg 24 hr tablet, Take 1 tablet (50 mg total) by mouth daily, Disp: 180 tablet, Rfl: 3    montelukast (SINGULAIR) 10 mg tablet, TAKE 1 TABLET BY MOUTH  DAILY AT BEDTIME, Disp: 90 tablet, Rfl: 3    potassium chloride (K-DUR,KLOR-CON) 20 mEq tablet, Take 1 tablet (20 mEq total) by mouth daily as needed (weight gain, leg swelling), Disp: 90 tablet, Rfl: 3    torsemide (DEMADEX) 20 mg tablet, TAKE 1 TABLET BY MOUTH EVERY DAY AS NEEDED FOR WEIGHT GAIN, LEG SWELLING, Disp: 90 tablet, Rfl: 1    Trelegy Ellipta 100-62 5-25 MCG/INH inhaler, USE 1 INHALATION BY MOUTH  DAILY   RINSE MOUTH AFTER  USE , Disp: 180 each, Rfl: 3    triamcinolone (KENALOG) 0 1 % ointment, Apply topically 2 (two) times 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, Disp: 454 g, Rfl: 3    Vitamins-Lipotropics (LIPOFLAVONOID PO), Take by mouth daily, Disp: , Rfl:     zolpidem (AMBIEN) 10 mg tablet, TAKE 1 TABLET BY MOUTH  DAILY AT BEDTIME AS NEEDED  FOR SLEEP, Disp: 90 tablet, Rfl: 1    PHYSICAL EXAM:  Vitals:    01/24/22 1512   BP: 110/70   BP Location: Right arm   Patient Position: Sitting   Pulse: 86   Resp: 16   Temp: 97 7 °F (36 5 °C)   TempSrc: Temporal   SpO2: 97%   Weight: 105 kg (231 lb 9 6 oz)   Height: 5' 11" (1 803 m)     Body mass index is 32 3 kg/m²  Physical Exam  Constitutional:       General: He is not in acute distress  Appearance: Normal appearance  He is well-developed  HENT:      Head: Normocephalic  Eyes:      General: No scleral icterus  Conjunctiva/sclera: Conjunctivae normal    Neck:      Vascular: No JVD  Cardiovascular:      Rate and Rhythm: Normal rate and regular rhythm  Heart sounds: Normal heart sounds  Pulmonary:      Effort: Pulmonary effort is normal       Breath sounds: No wheezing  Abdominal:      Palpations: Abdomen is soft  Tenderness: There is no abdominal tenderness  Musculoskeletal:         General: No swelling  Normal range of motion  Cervical back: Neck supple  Skin:     General: Skin is warm  Findings: No rash  Neurological:      General: No focal deficit present  Mental Status: He is alert and oriented to person, place, and time     Psychiatric:         Behavior: Behavior normal          LAB RESULTS:  Results for orders placed or performed in visit on 01/10/22   CBC and differential   Result Value Ref Range    WBC 6 82 4 31 - 10 16 Thousand/uL    RBC 4 00 3 88 - 5 62 Million/uL    Hemoglobin 12 1 12 0 - 17 0 g/dL    Hematocrit 38 9 36 5 - 49 3 %    MCV 97 82 - 98 fL    MCH 30 3 26 8 - 34 3 pg    MCHC 31 1 (L) 31 4 - 37 4 g/dL    RDW 14 8 11 6 - 15 1 %    MPV 10 5 8 9 - 12 7 fL    Platelets 981 754 - 655 Thousands/uL    nRBC 0 /100 WBCs    Neutrophils Relative 64 43 - 75 %    Immat GRANS % 0 0 - 2 %    Lymphocytes Relative 29 14 - 44 %    Monocytes Relative 5 4 - 12 %    Eosinophils Relative 1 0 - 6 %    Basophils Relative 1 0 - 1 %    Neutrophils Absolute 4 39 1 85 - 7 62 Thousands/µL    Immature Grans Absolute 0 01 0 00 - 0 20 Thousand/uL    Lymphocytes Absolute 1 98 0 60 - 4 47 Thousands/µL    Monocytes Absolute 0 31 0 17 - 1 22 Thousand/µL    Eosinophils Absolute 0 05 0 00 - 0 61 Thousand/µL    Basophils Absolute 0 08 0 00 - 0 10 Thousands/µL   Phosphorus   Result Value Ref Range    Phosphorus 3 6 2 3 - 4 1 mg/dL   Protein / creatinine ratio, urine   Result Value Ref Range    Creatinine, Ur 171 0 mg/dL    Protein Urine Random 49 mg/dL    Prot/Creat Ratio, Ur 0 29 (H) 0 00 - 0 10   PTH, intact   Result Value Ref Range     8 (H) 18 4 - 80 1 pg/mL   UA (URINE) with reflex to Scope   Result Value Ref Range    Color, UA Yellow     Clarity, UA Clear     Specific Gravity, UA >=1 030 1 003 - 1 030    pH, UA 5 5 4 5, 5 0, 5 5, 6 0, 6 5, 7 0, 7 5, 8 0    Leukocytes, UA Negative Negative    Nitrite, UA Negative Negative    Protein, UA Negative Negative mg/dl    Glucose, UA Negative Negative mg/dl    Ketones, UA Negative Negative mg/dl    Urobilinogen, UA 0 2 0 2, 1 0 E U /dl E U /dl    Bilirubin, UA Negative Negative    Blood, UA Negative    Vitamin D 25 hydroxy   Result Value Ref Range    Vit D, 25-Hydroxy 23 2 (L) 30 0 - 100 0 ng/mL   Comprehensive metabolic panel   Result Value Ref Range    Sodium 140 136 - 145 mmol/L    Potassium 4 1 3 5 - 5 3 mmol/L    Chloride 109 (H) 100 - 108 mmol/L    CO2 25 21 - 32 mmol/L    ANION GAP 6 4 - 13 mmol/L    BUN 38 (H) 5 - 25 mg/dL    Creatinine 1 62 (H) 0 60 - 1 30 mg/dL    Glucose 108 65 - 140 mg/dL    Calcium 8 6 8 3 - 10 1 mg/dL    AST 15 5 - 45 U/L    ALT 23 12 - 78 U/L    Alkaline Phosphatase 72 46 - 116 U/L    Total Protein 7 6 6 4 - 8 2 g/dL    Albumin 3 5 3 5 - 5 0 g/dL    Total Bilirubin 0 35 0 20 - 1 00 mg/dL    eGFR 41 ml/min/1 73sq m   TSH, 3rd generation   Result Value Ref Range    TSH 3RD GENERATON 0 099 (L) 0 358 - 3 740 uIU/mL   T3   Result Value Ref Range    T3, Total 0 80 0 60 - 1 80 ng/mL   PSA, total and free   Result Value Ref Range    Prostate Specific Antigen Total 3 9 0 0 - 4 0 ng/mL    PSA, Free 1 13 N/A ng/mL    PSA, Free Pct 29 0 %       ASSESSMENT and PLAN:      Stage 3 chronic kidney disease  Lab Results   Component Value Date    EGFR 41 01/10/2022    EGFR 56 07/02/2021    EGFR 48 03/01/2021    CREATININE 1 62 (H) 01/10/2022    CREATININE 1 27 07/02/2021    CREATININE 1 44 (H) 03/01/2021   Renal function is stable with some fluctuation  It is likely because of cardiorenal syndrome  Pathophysiology of kidney disease discussed at length with the patient  Importance of avoiding nephrotoxic medicine also discussed with him    Chronic kidney disease-mineral and bone disorder  Lab Results   Component Value Date    EGFR 41 01/10/2022    EGFR 56 07/02/2021    EGFR 48 03/01/2021    CREATININE 1 62 (H) 01/10/2022    CREATININE 1 27 07/02/2021    CREATININE 1 44 (H) 03/01/2021   PTH and phosphorus along with vitamin-D are within acceptable range and will continue to monitor    HTN (hypertension)  Quite well control    Cardiomyopathy ([address] )  On diuretic  Volume management discussed with the patient        Everything discussed the patient at length  I will see him back in 4 months  Will get blood and urine test before that visit      Portions of the record may have been created with voice recognition software  Occasional wrong word or "sound a like" substitutions may have occurred due to the inherent limitations of voice recognition software  Read the chart carefully and recognize, using context, where substitutions have occurred  If you have any questions, please contact the dictating provider

## 2022-01-24 NOTE — PATIENT INSTRUCTIONS
Chronic Kidney Disease   AMBULATORY CARE:   Chronic kidney disease (CKD)  is the gradual and permanent loss of kidney function  Normally, the kidneys remove fluid, chemicals, and waste from your blood  These wastes are turned into urine by your kidneys  CKD may worsen over time and lead to kidney failure  Common signs and symptoms include the following:   · Changes in how often you need to urinate    · Swelling in your arms, legs, or feet    · Shortness of breath    · Fatigue or weakness    · Bad or bitter taste in your mouth    · Nausea, vomiting, or loss of appetite    Call your local emergency number (911 in the [facility] [facility] [facility]) if:   · You have a seizure  · You have shortness of breath  Seek care immediately if:   · You are confused and very drowsy  Call your doctor or nephrologist if:   · You suddenly gain or lose more weight than your healthcare provider has told you is okay  · You have itchy skin or a rash  · You urinate more or less than you normally do  · You have blood in your urine  · You have nausea and are vomiting  · You have fatigue or muscle weakness  · You have hiccups that will not stop  · You have questions or concerns about your condition or care  How CKD is diagnosed:  CKD has 5 stages  Your healthcare provider will use results from the following tests to find the stage of CKD you have:  · Blood and urine tests  show how well your kidneys are working  They may also show the cause of your CKD  · Ultrasound, CT scan, or MRI  pictures may be used to check your kidneys  You may be given contrast liquid to help your kidneys show up better in the pictures  Tell the healthcare provider if you have ever had an allergic reaction to contrast liquid  Do not enter the MRI room with anything metal  Metal can cause serious injury  Tell the healthcare provider if you have any metal in or on your body      · A biopsy  is a procedure to remove a small piece of tissue from your kidney  It is done to find the cause of your CKD  Treatment  can help control signs and symptoms, and prevent a worse stage of CKD  Your care team may include specialists, such as a dietitian or a heart specialist  This depends on the stage of your CKD and if you have other health conditions to manage  Healthcare providers will work with you to create a plan based on your decisions for treatment  Your treatment plan may include any of the following:  · Medicines  may be given to decrease your blood pressure and get rid of extra fluid  You may also receive medicine to manage health conditions that may occur with CKD, such as anemia, diabetes, and heart disease  · Dialysis  is a treatment to remove chemicals and waste from your blood when your kidneys can no longer do this  · Surgery  may be needed to create an arteriovenous fistula (AVF) in your arm or insert a catheter into your abdomen  This is done so you can receive dialysis  · A kidney transplant  may be done if your CKD becomes severe  What you can do to manage CKD: Management may include making some lifestyle changes  Tell your healthcare provider if you have any concerns about being able to make changes  He or she can help you find solutions, including working with specialists  Ask for help creating a plan to break large goals into smaller steps  Your plan may include any of the following:  · Manage other health conditions  Your healthcare provider will work with you to make a care plan that meets your needs  You will be checked regularly for heart disease or other conditions that can make CKD worse, such as diabetes  Your blood pressure will be closely monitored  You will also get a target blood pressure and help making a plan to reach your target  This may include taking your blood pressure at home  · Maintain a healthy weight  Your weight and body mass index (BMI) will be checked regularly   BMI helps find if your weight is healthy for your height  Your healthcare provider will use other tests to check your muscle and protein levels  Extra weight can strain your kidneys  A low weight or low muscle mass can make you feel more tired  You may have trouble doing your daily activities  Ask your provider what a healthy weight is for you  He or she can help you create a plan to lose or gain weight safely, if needed  The plan may include keeping a food diary  This is a list of foods and liquids you have each day  Your provider will use the diary to help you make changes, if needed  Changes are based on your health and any other conditions you have, such as diabetes  · Create an exercise plan  Regular exercise can help you manage CKD, high blood pressure, and diabetes  Exercise also helps control weight  Your provider can help you create exercise goals and a plan to reach those goals  For example, your goal may be to exercise for 30 minutes in a day  Your plan can include breaking exercise into 10 minute sessions, 3 times during the day  · Create a healthy eating plan  Your provider may tell you to eat food low in potassium, phosphorus, or protein  Your provider may also recommend vitamin or mineral supplements  Do not take any supplements without talking to your provider  A dietitian can help you plan meals if needed  Ask how much liquid to drink each day and which liquids are best for you  · Limit sodium (salt) as directed  You may need to limit sodium to less than 2,300 milligrams (mg) each day  Ask your dietitian or healthcare provider how much sodium you can have each day  The amount depends on your stage of kidney disease  Table salt, canned foods, soups, salted snacks, and processed meats, like deli meats and sausage, are high in sodium  Your provider or a dietitian can show you how to read food labels for sodium  · Limit alcohol as directed  Alcohol can cause fluid retention and can affect your kidneys   Ask how much alcohol is safe for you  A drink of alcohol is 12 ounces of beer, 5 ounces of wine, or 1½ ounces of liquor  · Do not smoke  Nicotine and other chemicals in cigarettes and cigars can cause kidney damage  Ask your provider for information if you currently smoke and need help to quit  E-cigarettes or smokeless tobacco still contain nicotine  Talk to your provider before you use these products  · Ask about over-the-counter medicines  Medicines such as NSAIDs and laxatives may harm your kidneys  Some cough and cold medicines can raise your blood pressure  Always ask if a medicine is safe before you take it  · Ask about vaccines you may need  CKD can increase your risk for infections such as pneumonia, influenza, and hepatitis  Vaccines lower your risk for infection  Your healthcare provider will tell you which vaccines you need and when to get them  Follow up with your doctor or nephrologist as directed: You will need to return for tests to monitor your kidney and nerve function, and your parathyroid hormone level  Your medicines may be changed, based on certain test results  Write down your questions so you remember to ask them during your visits  © Copyright [employer] 2021 Information is for End User's use only and may not be sold, redistributed or otherwise used for commercial purposes  All illustrations and images included in CareNotes® are the copyrighted property of [employer]  or Eduardo Alfred  The above information is an  only  It is not intended as medical advice for individual conditions or treatments  Talk to your doctor, nurse or pharmacist before following any medical regimen to see if it is safe and effective for you

## 2022-01-24 NOTE — ASSESSMENT & PLAN NOTE
Lab Results   Component Value Date    EGFR 41 01/10/2022    EGFR 56 07/02/2021    EGFR 48 03/01/2021    CREATININE 1 62 (H) 01/10/2022    CREATININE 1 27 07/02/2021    CREATININE 1 44 (H) 03/01/2021   PTH and phosphorus along with vitamin-D are within acceptable range and will continue to monitor

## 2022-01-24 NOTE — ASSESSMENT & PLAN NOTE
HEMATOLOGY/ONCOLOGY FOLLOWUP NOTE    CHIEF COMPLAINT:    Adenocarcinoma, grade 3 of the lung,   small cell lung cancer  Chemotherapy-induced thrombocytopenia    HISTORY OF PRESENT ILLNESS:  Denisse returns for ongoing management of lung cancer. She feels well and denies any systemic symptoms of concern except for stable dyspnea on exertion, early fatigue, stable cough productive of white phlegm.     She notes no unusual pains, fever, bowel or bladder issues.      She has occasional headaches and occasional nausea.    PAST MEDICAL AND SURGICAL HISTORY:  Unchanged.    ONCOLOGIC HISTORY:  1.  12/29/2014 chest x-ray performed to follow up on previously noted pulmonary nodule in the setting of weight loss and dyspnea with a persistent nodule noted. CT scan of the chest 01/08/2015 showed a 1 cm spiculated lesion in the right upper lobe. Subsequent PET scan confirmed the nodule to be hypermetabolic with an SUV of 6.3 and an FDG avid right supraclavicular lymph node with an SUV of 10.9. Also seen was a 2.5x2.2 cm area in the posterior left lower lobe, which was mildly FDG avid with an SUV of 1.5.   2.  01/21/2015 seen by Dr. Xander Sullivan in pulmonary medicine. CT-guided needle biopsy of the supraclavicular lymph node was performed on 01/28/2015 showing metastatic poorly differentiated non-small cell carcinoma. MRI of the brain was negative for CNS metastases.   3.  Seen by Dr. Tello on 02/10/2015 with subsequent plan for supraclavicular lymph node biopsy to obtain further tissue for diagnosis. Of note, the initial biopsy contained insufficient cells to do studies for squamous or glandular differentiation. 2/20/15: LN PATHOLOGY--Poorly differentiated metastatic adenocarcinoma, consistent with a lung primary.   4.  3/3/2015: Initiated weekly carboplatin and paclitaxel with radiation. Dose limiting thrombocytopenia requiring dose delays occurred.  Radiaton completed 4/17/15.  5. 8/3/16:  FNA progressive RUL nodule with  Quite well control ALK negative, ROS1 negative adenocarcinoma.  6. 10/21/16:  Completed Cyberknife therapy to isolated lung recurrence.  7. 06/2017:  2 progressive nodules, one in left upper lobe, one in left lower lobe. Biopsy demonstrated recurrent adenocarcinoma with a lepidic growth pattern. PET scan with no evidence of disease outside the lung. Brain MRI.  ALK negative, ROS-1 negative. EGFR and PDL 1 negative.  8. Radiation to progressive disease.  9. 11/27/17:  Lung, left, core biopsy performed for a new lung lesion: Small cell carcinoma. Rare atypical cells, cannot entirely exclude component of non-small cell carcinoma.  10. 12/19/17:  Carboplatin/Etoposide cycle 1 complicated by janee platelet count of 2000/mL requiring transfusion. Cycle 2 complicated by anemia and severe thrombocytopenia requiring transfusions. Chemotherapy delayed and radiation held due to cytopenias. Cycle 4 dose reduced by 50%.  11. 6/26/18:  Left lower lung, core biopsy and touch imprints: Interstitial fibrosis with associated pneumocyte atypia most consistent with radiation associated changes     12. 11/8/18 and 12/7/18:  Bronchoscopy with biopsies of left upper lobe negative for malignancy.      History of Prior Radiation Therapy:   1-left lower lobe stage I adenocarcinoma treated to 5000 cGy in 5 fractions of CyberKnife completed 8/23/17  2-right upper lobe non-small cell lung cancer treated to 6000 cGy in 3 fractions of CyberKnife completed 10/21/16  3-history of stage IIIB right upper lobe non-small cell lung cancer treated to 6240 cGy in 33 fractions in 2015  4-small cell lung cancer    ALLERGIES, MEDICATIONS, REVIEW OF SYSTEMS:  Documented in the MA notes and accepted.    PHYSICAL EXAMINATION:  Oncology Encounter Vitals [02/13/19 1108]   ONC OP Encounter Vitals Group      /76      Pulse 91      Resp 18      Temp 98 °F (36.7 °C)      Temp src Oral      SpO2 100 %      Weight 177 lb 8 oz (80.5 kg)      Height       Pain Score  0      Pain  Location       Pain Education?       BSA (Calculated - m2) - Mo & Mo       BMI (Calculated)    CONSTITUTIONAL:  Alert and oriented.  In no apparent distress.  Well nourished.  Well developed.   HEAD: Normocephalic; atraumatic.   EYES:  The sclerae are anicteric.  Pupils are equal.  Hematologic/Lymphatic: No petechia or purpura. No cervical, supraclavicular, infraclavicular, occipital, submental, axillary adenopathy.  Respiratory: Lungs are clear to auscultation bilaterally, with no dullness to percussion or tenderness to the bony thorax.  Cardiovascular: Regular rate, with no murmurs, rubs or clicks. The precordium is quiet.  Abdomen: Abdomen is soft, nontender, nondistended, with normal bowel sounds and no hepatosplenomegaly or masses.  Extremities: No cyanosis, clubbing or edema.    PSYCHIATRIC:  Alert and oriented times three.  Coherent speech.  Verbalizes understanding of our discussions today.    LABORATORY DATA:  Reviewed along with imaging.    ASSESSMENT:  1. Stage III nonsmall cell carcinoma of the right upper lobe with pulmonary recurrences.   2. Small cell carcinoma with a non-small cell component: Biopsy showed radiation-induced changes.     --Restaging CT scan of the chest shows progression of CHUCK nodules.  I will check with Dr. Fuentes if repeat biopsy/bronchscopy is recommended.  We discussed the likelihood of ongoing abnormal results on imaging that may lead to further imaging and biopsy. Nonetheless in the setting of multiple primary lung cancers, ongoing close interval surveillance is warranted.    cc: Kimberly Mckoy MD

## 2022-01-24 NOTE — ASSESSMENT & PLAN NOTE
Lab Results   Component Value Date    EGFR 41 01/10/2022    EGFR 56 07/02/2021    EGFR 48 03/01/2021    CREATININE 1 62 (H) 01/10/2022    CREATININE 1 27 07/02/2021    CREATININE 1 44 (H) 03/01/2021   Renal function is stable with some fluctuation  It is likely because of cardiorenal syndrome  Pathophysiology of kidney disease discussed at length with the patient    Importance of avoiding nephrotoxic medicine also discussed with him

## 2022-02-08 ENCOUNTER — CLINICAL SUPPORT (OUTPATIENT)
Dept: DERMATOLOGY | Facility: CLINIC | Age: 77
End: 2022-02-08
Payer: COMMERCIAL

## 2022-02-08 VITALS — WEIGHT: 231 LBS | TEMPERATURE: 97.6 F | BODY MASS INDEX: 32.22 KG/M2

## 2022-02-08 DIAGNOSIS — Z13.89 SCREENING FOR SKIN CONDITION: ICD-10-CM

## 2022-02-08 DIAGNOSIS — Z85.828 HISTORY OF SKIN CANCER: ICD-10-CM

## 2022-02-08 DIAGNOSIS — L82.1 SEBORRHEIC KERATOSIS: Primary | ICD-10-CM

## 2022-02-08 DIAGNOSIS — C84.00 MYCOSIS FUNGOIDES, UNSPECIFIED BODY REGION (HCC): ICD-10-CM

## 2022-02-08 PROCEDURE — 99213 OFFICE O/P EST LOW 20 MIN: CPT | Performed by: DERMATOLOGY

## 2022-02-08 PROCEDURE — 1160F RVW MEDS BY RX/DR IN RCRD: CPT | Performed by: DERMATOLOGY

## 2022-02-08 PROCEDURE — 4004F PT TOBACCO SCREEN RCVD TLK: CPT | Performed by: DERMATOLOGY

## 2022-02-08 NOTE — PROGRESS NOTES
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 continuing on Targretin as well as topical steroid no problems noted  Past Medical History:   Diagnosis Date    Agent orange exposure     Anemia     Benign colon polyp     BPH (benign prostatic hyperplasia)     Bradycardia     Cardiomyopathy ([place] [address] )     Chest discomfort     CHF [facility]congestive heart failure) [facility])     Chronic bronchitis ([place] [address] )     Chronic kidney disease     COPD (chronic obstructive pulmonary disease) ([place] [address] )     LAST ASSESSED: 17    H/O nonmelanoma skin cancer     LAST ASSESSED: 17    HHD (hypertensive heart disease)     HTN (hypertension)     Hx of lymphoma     Hyperlipidemia     Hypertension     ICD (implantable cardioverter-defibrillator) in place     Insomnia     Mycosis fungoides ([place] [address] )     PVC (premature ventricular contraction)     PVT (paroxysmal ventricular tachycardia) (HCC)     SOB (shortness of breath)      Past Surgical History:   Procedure Laterality Date    CARDIAC PACEMAKER PLACEMENT      SKIN SURGERY      skin cancer removal      Social History   Social History     Substance and Sexual Activity   Alcohol Use Yes    Comment: vodka nightly     Social History     Substance and Sexual Activity   Drug Use No     Social History     Tobacco Use   Smoking Status Current Every Day Smoker    Packs/day: 0 50    Years: 50 00    Pack years: 25 00    Types: Cigarettes   Smokeless Tobacco Never Used   Tobacco Comment    HALF A PACK PER DAY      Family History   Problem Relation Age of Onset    Diabetes Mother     Colon cancer Father DIAGNOSED IN HIS 80'S    Heart failure Father     Coronary artery disease Father     Diabetes Family         MELLITUS    Heart attack Neg Hx     Stroke Neg Hx     Anuerysm Neg Hx     Clotting disorder Neg Hx     Arrhythmia Neg Hx     Hypertension Neg Hx         pt unsure     Hyperlipidemia Neg Hx     Sudden death Neg Hx         scd     Meds/Allergies   No Known Allergies    Meds:  Prior to Admission medications    Medication Sig Start Date End Date Taking? Authorizing Provider   albuterol (2 5 mg/3 mL) 0 083 % nebulizer solution USE 1 VIAL VIA NEBULIZER 4 TIMES A DAY AS NEEDED 6/22/20  Yes Rashad Dempsey PA-C   aspirin 81 MG tablet Take 81 mg by mouth daily   Yes Historical Provider, MD   atorvastatin (LIPITOR) 40 mg tablet Take 1 tablet (40 mg total) by mouth daily 12/27/21  Yes Kira Crawford PA-C   bexarotene (Targretin) 75 mg capsule Take 8 capsules (600 mg total) by mouth daily [place] Brand necessary 11/8/21  Yes Allyssa Roman MD   Cholecalciferol ([employer] VITAMIN D3) 1000 units capsule Take 1 capsule by mouth daily   Yes Historical Provider, MD   cyanocobalamin (VITAMIN B-12) 1,000 mcg tablet Take 1,000 mcg by mouth daily   Yes Historical Provider, MD   diclofenac potassium (CATAFLAM) 50 mg tablet 1 tablet daily for severe pain  Not to exceed 2 tablets a week   1/10/22  Yes Mariela Fernandez MD   Entresto 49-51 MG TABS TAKE 1 TABLET BY MOUTH  TWICE DAILY 12/17/21  Yes Carrie Page PA-C   ferrous sulfate 324 (65 Fe) mg Take 1 tablet by mouth Twice daily 8/31/15  Yes Historical Provider, MD   gabapentin (NEURONTIN) 100 mg capsule Take 1 capsule (100 mg total) by mouth 3 (three) times a day 1/10/22  Yes Mariela Fernandez MD   levothyroxine 112 mcg tablet TAKE 1 TABLET BY MOUTH  DAILY 6/21/21  Yes Yomi Nguyễn PA-C   metoprolol succinate (TOPROL-XL) 50 mg 24 hr tablet Take 1 tablet (50 mg total) by mouth daily 4/1/21  Yes Saurav Burgess MD   montelukast (SINGULAIR) 10 mg tablet TAKE 1 TABLET BY MOUTH  DAILY AT BEDTIME 12/17/21  Yes Félix Denton PA-C   potassium chloride (K-DUR,KLOR-CON) 20 mEq tablet Take 1 tablet (20 mEq total) by mouth daily as needed (weight gain, leg swelling) 4/1/21  Yes Radha Snyder MD   torsemide (DEMADEX) 20 mg tablet TAKE 1 TABLET BY MOUTH EVERY DAY AS NEEDED FOR WEIGHT GAIN, LEG SWELLING 10/28/21  Yes Radha Snyder MD   Trelegy Ellipta 339-99 6-80 MCG/INH inhaler USE 1 INHALATION BY MOUTH  DAILY   RINSE MOUTH AFTER  USE  6/1/21  Yes Pauline Aranda PA-C   triamcinolone (KENALOG) 0 1 % ointment Apply topically 2 (two) times 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 2/6/18  Yes Deedee Peraza MD   Vitamins-Lipotropics (LIPOFLAVONOID PO) Take by mouth daily   Yes Historical Provider, MD   zolpidem (AMBIEN) 10 mg tablet TAKE 1 TABLET BY MOUTH  DAILY AT BEDTIME AS NEEDED  FOR SLEEP 12/20/21  Yes Beth Grullon MD   nicotine (NICODERM CQ) 21 mg/24 hr TD 24 hr patch Place 1 patch on the skin every 24 hours  12/5/19  Historical Provider, MD       Subjective:     Review of Systems:    General: negative for - chills, fatigue, fever,  weight gain or weight loss  Psychological: negative for - anxiety, behavioral disorder, concentration difficulties, decreased libido, depression, irritability, memory difficulties, mood swings, sleep disturbances or suicidal ideation  ENT: negative for - hearing difficulties , nasal congestion, nasal discharge, oral lesions, sinus pain, sneezing, sore throat  Allergy and Immunology: negative for - hives, insect bite sensitivity,  Hematological and Lymphatic: negative for - bleeding problems, blood clots,bruising, swollen lymph nodes  Endocrine: negative for - hair pattern changes, hot flashes, malaise/lethargy, mood swings, palpitations, polydipsia/polyuria, skin changes, temperature intolerance or unexpected weight change  Respiratory: negative for - cough, hemoptysis, orthopnea, shortness of breath, or wheezing  Cardiovascular: negative for - chest pain, dyspnea on exertion, edema,  Gastrointestinal: negative for - abdominal pain, nausea/vomiting  Genito-Urinary: negative for - dysuria, incontinence, irregular/heavy menses or urinary frequency/urgency  Musculoskeletal: negative for - gait disturbance, joint pain, joint stiffness, joint swelling, muscle pain, muscular weakness  Dermatological:  As in HPI  Neurological: negative for confusion, dizziness, headaches, impaired coordination/balance, memory loss, numbness/tingling, seizures, speech problems, tremors or weakness       Objective:   Temp 97 6 °F (36 4 °C) (Temporal)   Wt 105 kg (231 lb)   BMI 32 22 kg/m²     Physical Exam:    General Appearance:    Alert, cooperative, no distress   Head:    Normocephalic, without obvious abnormality, atraumatic   Lymphatics:    No lymphadenopathy noted      Abdomen:   No hepatosplenomegaly   Skin:   A full skin exam was performed including scalp, head scalp, eyes, ears, nose, lips, neck, chest, axilla, abdomen, back, buttocks, bilateral upper extremities, bilateral lower extremities, hands, feet, fingers, toes, fingernails, and toenails scaling erythematous patches involving 30% of body surface area areas look less inflamed less inflamed irritated and definitely thinner no other concerns noted previous sites skin cancer well healed without recurrence normal keratotic papules greasy stuck on appearance     Assessment:     1  Seborrheic keratosis     2  Mycosis fungoides, unspecified body region ([facility] [address] )     3  History of skin cancer     4  Screening for skin condition           Plan:       Gwenette Gosselin, MD  7/0/7791,5:17 PM    Portions of the record may have been created with voice recognition software   Occasional wrong word or "sound a like" substitutions may have occurred due to the inherent limitations of voice recognition software   Read the chart carefully and recognize, using context, where substitutions have occurred

## 2022-02-08 NOTE — PATIENT INSTRUCTIONS
T-cell lymphoma under good control continue same therapy lab work stable continue target written and topical steroids  Seborrheic keratosis patient reassured these are normal growths we acquire with age no treatment needed  History of skin cancer in no recurrence nothing else atypical sunblock recommended follow-up in 4 months  Screening for dermatologic disorders nothing else of concern noted on complete exam follow-up in 4 months

## 2022-02-28 ENCOUNTER — OFFICE VISIT (OUTPATIENT)
Dept: PULMONOLOGY | Facility: CLINIC | Age: 77
End: 2022-02-28
Payer: COMMERCIAL

## 2022-02-28 VITALS
WEIGHT: 232 LBS | TEMPERATURE: 97.9 F | HEART RATE: 91 BPM | OXYGEN SATURATION: 95 % | HEIGHT: 71 IN | SYSTOLIC BLOOD PRESSURE: 110 MMHG | DIASTOLIC BLOOD PRESSURE: 84 MMHG | BODY MASS INDEX: 32.48 KG/M2

## 2022-02-28 DIAGNOSIS — F17.200 CURRENT EVERY DAY SMOKER: ICD-10-CM

## 2022-02-28 DIAGNOSIS — Z87.891 PERSONAL HISTORY OF NICOTINE DEPENDENCE: ICD-10-CM

## 2022-02-28 DIAGNOSIS — J41.0 SIMPLE CHRONIC BRONCHITIS (HCC): Primary | ICD-10-CM

## 2022-02-28 DIAGNOSIS — E66.9 OBESITY (BMI 30-39.9): ICD-10-CM

## 2022-02-28 DIAGNOSIS — R06.02 SHORTNESS OF BREATH: ICD-10-CM

## 2022-02-28 DIAGNOSIS — J44.9 COPD, SEVERE (HCC): ICD-10-CM

## 2022-02-28 PROCEDURE — 99214 OFFICE O/P EST MOD 30 MIN: CPT | Performed by: INTERNAL MEDICINE

## 2022-02-28 PROCEDURE — 3008F BODY MASS INDEX DOCD: CPT | Performed by: DERMATOLOGY

## 2022-02-28 NOTE — PROGRESS NOTES
Assessment/Plan:   Diagnoses and all orders for this visit:    Simple chronic bronchitis (HCC)    Shortness of breath    COPD, severe (HCC)    Obesity (BMI 30-39  9)    Personal history of nicotine dependence  -     CT lung screening program; Future    Current every day smoker        PFTs with combined severe obstruction and moderately severe restriction with a moderately decreased DLCO  Continue with Trelegy 1 puff daily   Albuterol via nebulizer 4 times daily as needed   Continue with Singulair 10 mg daily  CT of the chest lung cancer screening from September 2021 with 1 cm calcified nodule   He is eligible for a CT lung screening annually which I have ordered for 1 for this year  Smoking cessation discussed he states he is not interested in quitting at this time he states his wife passed away and he is living alone with 3 cats and he just wants to continue smoking for the rest of his life he states  Up-to-date on his vaccines   I will see him back after the CT lung screening or p r n  earlier as needed  No follow-ups on file  All questions are answered to the patient's satisfaction and understanding  He verbalizes understanding  He is encouraged to call with any further questions or concerns  Portions of the record may have been created with voice recognition software  Occasional wrong word or "sound a like" substitutions may have occurred due to the inherent limitations of voice recognition software  Read the chart carefully and recognize, using context, where substitutions have occurred      Electronically Signed by Tommy Huff MD    ______________________________________________________________________    Chief Complaint:   Chief Complaint   Patient presents with    Follow-up       Patient ID: Ana Chanel is a 78 y o  y o  male has a past medical history of Agent orange exposure, Anemia, Benign colon polyp, BPH (benign prostatic hyperplasia), Bradycardia, Cardiomyopathy ([address] ), Chest discomfort, CHF (congestive heart failure) (HCC), Chronic bronchitis (HCC), Chronic kidney disease, COPD (chronic obstructive pulmonary disease) ([facility] [address] ), H/O nonmelanoma skin cancer, HHD (hypertensive heart disease), HTN (hypertension), lymphoma, Hyperlipidemia, Hypertension, ICD (implantable cardioverter-defibrillator) in place, Insomnia, Mycosis fungoides ([facility] [address] ), PVC (premature ventricular contraction), PVT (paroxysmal ventricular tachycardia) ([facility] ), and SOB (shortness of breath)  2/28/2022  Patient presents today for follow-up visit  Swathi Zapata is a 70-year-old male current smoker with past medical history including but not limited to COPD, cardiomyopathy, hypertension, nodule, lymphoma, squamous cell skin cancer presenting for follow-up  Patient states that his breathing remains stable  He has chronic dyspnea on exertion which is worsened on days that are very hot and humid  Today, he is generally feeling well  He is not really interested in quitting smoking, still smoking half pack per day  Currently he is on the Trelegy 1 puff once a day and he uses his nebulizer with albuterol he states once a day      Associated symptoms include coughing  Pertinent negatives include no chest pain, fever, headaches or myalgias  Review of Systems   Constitutional: Negative  Negative for fever  HENT: Negative  Eyes: Negative  Respiratory: Positive for cough and shortness of breath  Cardiovascular: Negative  Negative for chest pain  Gastrointestinal: Negative  Endocrine: Negative  Genitourinary: Negative  Musculoskeletal: Negative  Negative for myalgias  Allergic/Immunologic: Negative  Neurological: Negative  Negative for headaches  Hematological: Negative  Psychiatric/Behavioral: Negative  Smoking history: He reports that he has been smoking cigarettes  He has a 25 00 pack-year smoking history   He has never used smokeless tobacco     The following portions of the patient's history were reviewed and updated as appropriate: allergies, current medications, past family history, past medical history, past social history, past surgical history and problem list     Immunization History   Administered Date(s) Administered    COVID-19 PFIZER VACCINE 0 3 ML IM 03/16/2021, 04/07/2021, 08/20/2021    INFLUENZA 09/24/2018, 09/08/2020    Influenza Split High Dose Preservative Free IM 10/17/2016, 09/09/2017, 09/24/2018, 09/13/2019    Influenza, high dose seasonal 0 7 mL 10/25/2021    Influenza, seasonal, injectable 10/14/2015    Pneumococcal Conjugate 13-Valent 07/27/2020    Pneumococcal Conjugate PCV 7 10/14/2015    Tdap 1945     Current Outpatient Medications   Medication Sig Dispense Refill    albuterol (2 5 mg/3 mL) 0 083 % nebulizer solution USE 1 VIAL VIA NEBULIZER 4 TIMES A DAY AS NEEDED 1080 mL 3    aspirin 81 MG tablet Take 81 mg by mouth daily      atorvastatin (LIPITOR) 40 mg tablet Take 1 tablet (40 mg total) by mouth daily 90 tablet 3    bexarotene (Targretin) 75 mg capsule Take 8 capsules (600 mg total) by mouth daily [place] Brand necessary 240 capsule 9    Cholecalciferol ([employer] VITAMIN D3) 1000 units capsule Take 1 capsule by mouth daily      cyanocobalamin (VITAMIN B-12) 1,000 mcg tablet Take 1,000 mcg by mouth daily      diclofenac potassium (CATAFLAM) 50 mg tablet 1 tablet daily for severe pain  Not to exceed 2 tablets a week   30 tablet 0    Entresto 49-51 MG TABS TAKE 1 TABLET BY MOUTH  TWICE DAILY 180 tablet 3    ferrous sulfate 324 (65 Fe) mg Take 1 tablet by mouth Twice daily      gabapentin (NEURONTIN) 100 mg capsule Take 1 capsule (100 mg total) by mouth 3 (three) times a day 270 capsule 3    levothyroxine 112 mcg tablet TAKE 1 TABLET BY MOUTH  DAILY 90 tablet 3    metoprolol succinate (TOPROL-XL) 50 mg 24 hr tablet Take 1 tablet (50 mg total) by mouth daily 180 tablet 3    montelukast (SINGULAIR) 10 mg tablet TAKE 1 TABLET BY MOUTH  DAILY AT BEDTIME 90 tablet 3    potassium chloride (K-DUR,KLOR-CON) 20 mEq tablet Take 1 tablet (20 mEq total) by mouth daily as needed (weight gain, leg swelling) 90 tablet 3    torsemide (DEMADEX) 20 mg tablet TAKE 1 TABLET BY MOUTH EVERY DAY AS NEEDED FOR WEIGHT GAIN, LEG SWELLING 90 tablet 1    Trelegy Ellipta 100-62 5-25 MCG/INH inhaler USE 1 INHALATION BY MOUTH  DAILY  RINSE MOUTH AFTER  USE  180 each 3    triamcinolone (KENALOG) 0 1 % ointment Apply topically 2 (two) times 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 454 g 3    Vitamins-Lipotropics (LIPOFLAVONOID PO) Take by mouth daily      zolpidem (AMBIEN) 10 mg tablet TAKE 1 TABLET BY MOUTH  DAILY AT BEDTIME AS NEEDED  FOR SLEEP 90 tablet 1     No current facility-administered medications for this visit  Allergies: Patient has no known allergies  Objective:  Vitals:    02/28/22 1349   BP: 110/84   Pulse: 91   Temp: 97 9 °F (36 6 °C)   SpO2: 95%   Weight: 105 kg (232 lb)   Height: 5' 11" (1 803 m)   Oxygen Therapy  SpO2: 95 %    Wt Readings from Last 3 Encounters:   02/28/22 105 kg (232 lb)   02/08/22 105 kg (231 lb)   01/24/22 105 kg (231 lb 9 6 oz)     Body mass index is 32 36 kg/m²  Physical Exam  Vitals and nursing note reviewed  Constitutional:       Appearance: He is well-developed  HENT:      Head: Normocephalic and atraumatic  Eyes:      Conjunctiva/sclera: Conjunctivae normal       Pupils: Pupils are equal, round, and reactive to light  Neck:      Thyroid: No thyromegaly  Vascular: No JVD  Cardiovascular:      Rate and Rhythm: Normal rate and regular rhythm  Heart sounds: Normal heart sounds  No murmur heard  No friction rub  No gallop  Pulmonary:      Effort: Pulmonary effort is normal  No respiratory distress  Breath sounds: Normal breath sounds  No wheezing or rales  Chest:      Chest wall: No tenderness  Abdominal:      Palpations: Abdomen is soft  Musculoskeletal:         General: No tenderness or deformity  Normal range of motion        Cervical back: Normal range of motion and neck supple  Lymphadenopathy:      Cervical: No cervical adenopathy  Skin:     General: Skin is warm and dry  Neurological:      Mental Status: He is alert and oriented to person, place, and time             Diagnostics:  I have personally reviewed pertinent films in PACS

## 2022-03-11 DIAGNOSIS — J41.0 SIMPLE CHRONIC BRONCHITIS (HCC): ICD-10-CM

## 2022-03-11 RX ORDER — FLUTICASONE FUROATE, UMECLIDINIUM BROMIDE AND VILANTEROL TRIFENATATE 100; 62.5; 25 UG/1; UG/1; UG/1
POWDER RESPIRATORY (INHALATION)
Qty: 180 EACH | Refills: 3 | Status: SHIPPED | OUTPATIENT
Start: 2022-03-11

## 2022-03-31 ENCOUNTER — TELEPHONE (OUTPATIENT)
Dept: NEPHROLOGY | Facility: CLINIC | Age: 77
End: 2022-03-31

## 2022-04-15 ENCOUNTER — IMMUNIZATIONS (OUTPATIENT)
Dept: FAMILY MEDICINE CLINIC | Facility: HOSPITAL | Age: 77
End: 2022-04-15

## 2022-04-15 PROCEDURE — 91305 COVID-19 PFIZER VACC TRIS-SUCROSE GRAY CAP 0.3 ML: CPT

## 2022-04-15 PROCEDURE — 0054A COVID-19 PFIZER VACC TRIS-SUCROSE GRAY CAP 0.3 ML: CPT

## 2022-04-21 ENCOUNTER — REMOTE DEVICE CLINIC VISIT (OUTPATIENT)
Dept: CARDIOLOGY CLINIC | Facility: CLINIC | Age: 77
End: 2022-04-21
Payer: COMMERCIAL

## 2022-04-21 DIAGNOSIS — Z95.810 PRESENCE OF AUTOMATIC CARDIOVERTER/DEFIBRILLATOR (AICD): Primary | ICD-10-CM

## 2022-04-21 PROCEDURE — 93296 REM INTERROG EVL PM/IDS: CPT | Performed by: INTERNAL MEDICINE

## 2022-04-21 PROCEDURE — 93295 DEV INTERROG REMOTE 1/2/MLT: CPT | Performed by: INTERNAL MEDICINE

## 2022-04-21 NOTE — PROGRESS NOTES
Results for orders placed or performed in visit on 04/21/22   Cardiac EP device report    Narrative    MDT/BIV-ICD (DDDR MODE)/ NOT MRI CONDITIONAL  CARELINK TRANSMISSION: BATTERY ADEQUATE (1 9 YRS)  AP 98%; BVP 97% (DDDR 75)  ALL LEAD PARAMETERS WITHIN NORMAL LIMITS  28 DEVICE CLASSIFIED NSVT EPISODES (UP  BPM & 41 BEATS)  EF 30-35% (ECHO/APRIL, 2021)  PT  TAKES METOPROLOL SUCC  SENT TO DR Armando Ariza FOR REVIEW  OPTI-VOL FLUID THRESHOLD CROSSED  SENT TO HF TEAM FOR REVIEW  NORMAL DEVICE FUNCTION   PL

## 2022-04-22 ENCOUNTER — TELEPHONE (OUTPATIENT)
Dept: CARDIOLOGY CLINIC | Facility: CLINIC | Age: 77
End: 2022-04-22

## 2022-04-22 NOTE — TELEPHONE ENCOUNTER
----- Message from Mercedez Ambriz MD sent at 4/21/2022  3:42 PM EDT -----  Regarding: FW: >200 BPM & OPTIVOL  Has not been seen in the office for over a year  Needs to be seen with whoever has an open spot --- an AP is OK  ----- Message -----  From: Tami Rodriguez  Sent: 4/21/2022   3:00 PM EDT  To: Mercedez Ambriz MD, #  Subject: >200 BPM & OPTIVOL                               Dr Malia Mabry & clinical staff,     Please review from today's remote transmission    Results for orders placed or performed in visit on 04/21/22  Cardiac EP device report   Narrative   MDT/BIV-ICD (DDDR MODE)/ NOT MRI CONDITIONAL  CARELINK TRANSMISSION: BATTERY ADEQUATE (1 9 YRS)  AP 98%; BVP 97% (DDDR 75)  ALL LEAD PARAMETERS WITHIN NORMAL LIMITS  28 DEVICE CLASSIFIED NSVT EPISODES (UP  BPM & 41 BEATS)  EF 30-35% (ECHO/APRIL, 2021)  PT  TAKES METOPROLOL SUCC  SENT TO DR Chelsea Mata FOR REVIEW  OPTI-VOL FLUID THRESHOLD CROSSED  SENT TO HF TEAM FOR REVIEW  NORMAL DEVICE FUNCTION   PL    Thank you,      Tami Rodriguez

## 2022-04-28 ENCOUNTER — OFFICE VISIT (OUTPATIENT)
Dept: CARDIOLOGY CLINIC | Facility: CLINIC | Age: 77
End: 2022-04-28
Payer: COMMERCIAL

## 2022-04-28 VITALS
RESPIRATION RATE: 16 BRPM | BODY MASS INDEX: 32.9 KG/M2 | DIASTOLIC BLOOD PRESSURE: 70 MMHG | OXYGEN SATURATION: 96 % | HEART RATE: 86 BPM | WEIGHT: 235 LBS | HEIGHT: 71 IN | SYSTOLIC BLOOD PRESSURE: 110 MMHG

## 2022-04-28 DIAGNOSIS — E66.9 OBESITY (BMI 30-39.9): ICD-10-CM

## 2022-04-28 DIAGNOSIS — E78.2 MIXED HYPERLIPIDEMIA: ICD-10-CM

## 2022-04-28 DIAGNOSIS — Z95.810 ICD (IMPLANTABLE CARDIOVERTER-DEFIBRILLATOR) IN PLACE: ICD-10-CM

## 2022-04-28 DIAGNOSIS — I50.22 CHRONIC SYSTOLIC CONGESTIVE HEART FAILURE (HCC): Primary | ICD-10-CM

## 2022-04-28 DIAGNOSIS — I10 ESSENTIAL HYPERTENSION: ICD-10-CM

## 2022-04-28 DIAGNOSIS — I42.8 NON-ISCHEMIC CARDIOMYOPATHY (HCC): ICD-10-CM

## 2022-04-28 PROCEDURE — 99214 OFFICE O/P EST MOD 30 MIN: CPT | Performed by: INTERNAL MEDICINE

## 2022-04-28 PROCEDURE — 3008F BODY MASS INDEX DOCD: CPT | Performed by: INTERNAL MEDICINE

## 2022-04-28 PROCEDURE — 1160F RVW MEDS BY RX/DR IN RCRD: CPT | Performed by: INTERNAL MEDICINE

## 2022-04-28 PROCEDURE — 4004F PT TOBACCO SCREEN RCVD TLK: CPT | Performed by: INTERNAL MEDICINE

## 2022-04-28 NOTE — PROGRESS NOTES
CARDIOLOGY OFFICE VISIT  [facility]  [address], [address] [place] [address], [facility] Romeo Page  Tel: [phone]      NAME: Jerene Meigs  AGE: 78 y o  SEX: male  : 1945   MRN: [id number]      Chief Complaint:  Chief Complaint   Patient presents with    Follow-up         History of Present Illness:   Patient comes for follow up  States he is doing well from cardiac stand point and denies chest pain / pressure, SOB, palpitations, lightheadedness, syncope, swelling feet, orthopnea, PND, claudication  Chronic systolic HF - States currently his wt is at his baseline  No SOB  On BB, Entretso, torsemide prn, potassium prn  Used to follow with Dr Elli Milan who has now left the area     NICMP s/p CRT-D - He had a [employer] BiV ICD implanted in 2011 and changed in 2014  He has been following up with Dr Pankaj Alberto for the devise  On BB, Entresto    Essential hypertension -  Has been hypertensive for many years  Taking medications regularly  Denies lightheadedness, headache, medication side effects  Mixed hyperlipidemia -  Has had hyperlipidemia for many years  Taking statin regularly along with diet control  Denies myalgia  PCP closely monitoring the blood work  Obesity -  Trying to lose weight        Past Medical History:  Past Medical History:   Diagnosis Date    Agent orange exposure     Anemia     Benign colon polyp     BPH (benign prostatic hyperplasia)     Bradycardia     Cardiomyopathy (HCC)     Chest discomfort     CHF (congestive heart failure) (HCC)     Chronic bronchitis (HCC)     Chronic kidney disease     COPD (chronic obstructive pulmonary disease) (HCC)     LAST ASSESSED: 17    H/O nonmelanoma skin cancer     LAST ASSESSED: 17    HHD (hypertensive heart disease)     HTN (hypertension)     Hx of lymphoma     Hyperlipidemia     Hypertension     ICD (implantable cardioverter-defibrillator) in place  Insomnia     Mycosis fungoides (HCC)     PVC (premature ventricular contraction)     PVT (paroxysmal ventricular tachycardia) (HCC)     SOB (shortness of breath)          Past Surgical History:  Past Surgical History:   Procedure Laterality Date    CARDIAC PACEMAKER PLACEMENT      SKIN SURGERY      skin cancer removal          Family History:  Family History   Problem Relation Age of Onset    Diabetes Mother     Colon cancer Father         DIAGNOSED IN HIS [de-identified]    Heart failure Father     Coronary artery disease Father     Diabetes Family         MELLITUS    Heart attack Neg Hx     Stroke Neg Hx     Anuerysm Neg Hx     Clotting disorder Neg Hx     Arrhythmia Neg Hx     Hypertension Neg Hx         pt unsure     Hyperlipidemia Neg Hx     Sudden death Neg Hx         scd         Social History:  Social History     Socioeconomic History    Marital status:       Spouse name: None    Number of children: 0    Years of education: None    Highest education level: None   Occupational History    Occupation: retired   Tobacco Use    Smoking status: Current Every Day Smoker     Packs/day: 0 50     Years: 50 00     Pack years: 25 00     Types: Cigarettes    Smokeless tobacco: Never Used    Tobacco comment: HALF A PACK PER DAY    Vaping Use    Vaping Use: Never used   Substance and Sexual Activity    Alcohol use: Yes     Comment: vodka nightly    Drug use: No    Sexual activity: Not Currently   Other Topics Concern    None   Social History Narrative    None     Social Determinants of Health     Financial Resource Strain: Not on file   Food Insecurity: Not on file   Transportation Needs: Not on file   Physical Activity: Not on file   Stress: Not on file   Social Connections: Not on file   Intimate Partner Violence: Not on file   Housing Stability: Not on file         Active Problems:  Patient Active Problem List   Diagnosis    Insomnia    ICD (implantable cardioverter-defibrillator) in place    Hyperlipidemia    Hx of lymphoma    HTN (hypertension)    COPD (chronic obstructive pulmonary disease) (HCC)    Chronic bronchitis (HCC)    Cardiomyopathy (HCC)    BPH (benign prostatic hyperplasia)    Seborrheic keratosis    Mycosis fungoides (HCC)    History of skin cancer    Screening for skin cancer    Screening for skin condition    Acquired hypothyroidism    HHD (hypertensive heart disease)    Prostate cancer screening    Colon cancer screening    Health care maintenance    Squamous cell cancer of skin of forearm, left    PVC (premature ventricular contraction)    Acute kidney injury (ELLYN) with acute tubular necrosis (ATN) (HCC)    Urinary tract infection    COPD exacerbation (HCC)    Systolic heart failure (HCC)    Hypomagnesemia    Chronic kidney disease-mineral and bone disorder    Stage 3 chronic kidney disease (HCC)    Shortness of breath    Lumbar pain         The following portions of the patient's history were reviewed and updated as appropriate: past medical history, past surgical history, past family history,  past social history, current medications, allergies and problem list       Review of Systems:  Constitutional: Denies fever, chills  Eyes: Denies eye redness, eye discharge  ENT: Denies sneezing, nasal discharge, sore throat   Respiratory: Denies cough, expectoration  Cardiovascular: Denies chest pain, palpitations, lower extremity swelling  Gastrointestinal: Denies abdominal pain, nausea, vomiting, diarrhea  Genito-Urinary: Denies dysuria, incontinence  Musculoskeletal: Denies back pain, joint pain, muscle pain  Neurologic: Denies lightheadedness, syncope, headache, seizures  Endocrine: Denies polydipsia, temperature intolerance  Allergy and Immunology: Denies hives, insect bite sensitivity  Hematological and Lymphatic: Denies bleeding problems, swollen glands   Psychological: Denies depression, suicidal ideation, anxiety, panic  Dermatological: Denies pruritus, rash, skin lesion changes      Vitals:  Vitals:    04/28/22 1339   BP: 110/70   Pulse: 86   Resp: 16   SpO2: 96%       Body mass index is 32 78 kg/m²  Weight (last 2 days)     Date/Time Weight    04/28/22 1339 107 (235)            Physical Examination:  General: Patient is not in acute distress  Awake, alert, oriented in time, place and person  Responding to commands  Head: Normocephalic  Atraumatic  Eyes: Both pupils normal sized, round and reactive to light  Nonicteric  ENT: Normal external ear canals  Neck: Supple  JVP not raised  Trachea central  No thyromegaly  Lungs: Bilateral bronchovascular breath sounds with no crackles or rhonchi  Chest wall: No tenderness  Cardiovascular: RRR  S1 and S2 normal  No murmur, rub or gallop  Gastrointestinal: Abdomen soft, nontender  No guarding or rigidity  Liver and spleen not palpable  Bowel sounds present  Neurologic: Patient is awake, alert, oriented in time, place and person  Responding to commands  Moving all extremities  Integumentary:  No skin rash  Lymphatic: No cervical lymphadenopathy  Back: Symmetric   No CVA tenderness  Extremities: No clubbing, cyanosis or edema      Laboratory Results:  CBC with diff:   Lab Results   Component Value Date    WBC 6 82 01/10/2022    WBC 5 7 06/01/2015    RBC 4 00 01/10/2022    RBC 4 70 06/01/2015    HGB 12 1 01/10/2022    HGB 13 9 06/01/2015    HCT 38 9 01/10/2022    HCT 41 6 06/01/2015    MCV 97 01/10/2022    MCV 88 06/01/2015    MCH 30 3 01/10/2022    MCH 29 5 06/01/2015    RDW 14 8 01/10/2022    RDW 12 6 06/01/2015     01/10/2022     06/01/2015       CMP:  Lab Results   Component Value Date    CREATININE 1 62 (H) 01/10/2022    CREATININE 1 02 12/11/2015    BUN 38 (H) 01/10/2022    BUN 27 (H) 12/11/2015     12/11/2015    K 4 1 01/10/2022    K 4 1 12/11/2015     (H) 01/10/2022     12/11/2015    CO2 25 01/10/2022    CO2 28 12/11/2015    GLUCOSE 93 12/11/2015    PROT 7 3 06/01/2015 ALKPHOS 72 01/10/2022    ALKPHOS 71 2015    ALT 23 01/10/2022    ALT 27 2015    AST 15 01/10/2022    AST 19 2015    BILIDIR 0 11 2017       Lab Results   Component Value Date    HGBA1C 6 2 2017    MG 1 8 2020    PHOS 3 6 01/10/2022       Lab Results   Component Value Date    TROPONINI <0 02 2020    TROPONINI 0 07 (H) 2017    TROPONINI 0 08 (H) 2017    CKTOTAL 85 2016       Lipid Profile:   Lab Results   Component Value Date    CHOL 197 2015     Lab Results   Component Value Date    HDL 37 (L) 2020    HDL 39 (L) 2019    HDL 39 (L) 2018     Lab Results   Component Value Date    LDLCALC 133 (H) 2020    LDLCALC 99 2019    LDLCALC 134 (H) 2018     Lab Results   Component Value Date    TRIG 284 (H) 2021    TRIG 312 (H) 2020    TRIG 338 (H) 2019       Cardiac testing:   Results for orders placed during the hospital encounter of 21    Echo complete with contrast if indicated    Narrative  [place] [address] [address] [place] [address]  [phone]    Transthoracic Echocardiogram  2D, M-mode, Doppler, and Color Doppler    Study date:  2021    Patient: Elie Thornton  MR number: [id number]  Account number: [de-identified]  : [id number]  Age: 70 years  Gender: Male  Status: Outpatient  [facility]: [facility]  Height: 71 in  Weight: 236 lb  BP: 100/ 64 mmHg    Indications: NICM  Diagnoses: I42 8 - Other cardiomyopathies    Sonographer:  JOSE Pizano  Primary Physician:  Aldair Apple MD  Referring Physician:  Jing Boucher MD  Group:  [facility]  Interpreting Physician:  Ari Rueda MD    SUMMARY    LEFT VENTRICLE:  The ventricle was mildly dilated  Systolic function was moderately reduced   LVEF 30-35%(was 20-25% on previous echo in 2019)  There was moderate diffuse hypokinesis with distinct regional wall motion abnormalities - inferior/inferiorlateral wall are severely hypokinetic  Doppler parameters were consistent with abnormal left ventricular relaxation (grade 1 diastolic dysfunction)  LEFT ATRIUM:  The atrium was mildly dilated  MITRAL VALVE:  There was mild annular calcification  There was mild to moderate regurgitation  TRICUSPID VALVE:  There was mild regurgitation  Pulmonary artery systolic pressure was mildly increased  Estimated peak PA pressure was 45 mmHg  IVC, HEPATIC VEINS:  The inferior vena cava was dilated  1 9  Respirophasic changes were blunted (less than 50% variation)  HISTORY: PRIOR HISTORY: CAD, PVL, Pacemaker  Risk factors: hypertension and hypercholesterolemia  PROCEDURE: The study was performed in the [address] [place] [address]  This was a routine study  The transthoracic approach was used  The study included complete 2D imaging, M-mode, complete spectral Doppler, and color Doppler  The  heart rate was 84 bpm, at the start of the study  Images were obtained from the parasternal, apical, subcostal, and suprasternal notch acoustic windows  Echocardiographic views were limited due to poor acoustic window availability,  decreased penetration, and lung interference  This was a technically difficult study  LEFT VENTRICLE: The ventricle was mildly dilated  Systolic function was moderately reduced  LVEF 30-35%(was 20-25% on previous echo in 2019) There was moderate diffuse hypokinesis with distinct regional wall motion abnormalities -  inferior/inferiorlateral wall are severely hypokinetic Wall thickness was normal  DOPPLER: Doppler parameters were consistent with abnormal left ventricular relaxation (grade 1 diastolic dysfunction)  RIGHT VENTRICLE: The size was normal  Systolic function was normal  echogenic structure noted in RV suggestive of icd lead    LEFT ATRIUM: The atrium was mildly dilated      RIGHT ATRIUM: Size was normal     MITRAL VALVE: There was mild annular calcification  DOPPLER: There was no evidence for stenosis  There was mild to moderate regurgitation  AORTIC VALVE: The valve was probably trileaflet  The valve was not well visualized  DOPPLER: There was no evidence for stenosis  There was no regurgitation  TRICUSPID VALVE: DOPPLER: There was mild regurgitation  Pulmonary artery systolic pressure was mildly increased  Estimated peak PA pressure was 45 mmHg  PULMONIC VALVE: Not well visualized  PERICARDIUM: There was no pericardial effusion  AORTA: The root exhibited normal size  SYSTEMIC VEINS: IVC: The inferior vena cava was dilated  1 9 Respirophasic changes were blunted (less than 50% variation)  SYSTEM MEASUREMENT TABLES    2D  %FS: 11 59 %  Ao Diam: 3 15 cm  EDV(Teich): 170 61 ml  EF Biplane: 33 11 %  EF(Teich): 24 71 %  ESV(Teich): 128 45 ml  IVSd: 0 81 cm  LA Area: 16 92 cm2  LA Diam: 4 4 cm  LVEDV MOD A2C: 241 71 ml  LVEDV MOD A4C: 169 13 ml  LVEDV MOD BP: 209 81 ml  LVEF MOD A2C: 32 15 %  LVEF MOD A4C: 32 92 %  LVESV MOD A2C: 164 01 ml  LVESV MOD A4C: 113 45 ml  LVESV MOD BP: 140 35 ml  LVIDd: 5 86 cm  LVIDs: 5 18 cm  LVLd A2C: 9 51 cm  LVLd A4C: 8 81 cm  LVLs A2C: 8 37 cm  LVLs A4C: 7 87 cm  LVPWd: 0 85 cm  RA Area: 16 73 cm2  RVIDd: 3 61 cm  RWT: 0 29  SV MOD A2C: 77 7 ml  SV MOD A4C: 55 69 ml  SV(Teich): 42 16 ml    CF  MR Als  Daniel: 0 33 m/s  MR Flow: 39 89 ml/s  MR Rad: 0 44 cm    CW  AV MaxP 91 mmHg  AV Vmax: 1 22 m/s  MR VTI: 182 54 cm  MR Vmax: 5 06 m/s  TR MaxP 21 mmHg  TR Vmax: 3 36 m/s    MM  TAPSE: 2 74 cm    PW  E': 0 06 m/s  E/E': 17 09  LVOT Env  Ti: 296 86 ms  LVOT VTI: 20 35 cm  LVOT Vmax: 0 96 m/s  LVOT Vmean: 0 69 m/s  LVOT maxPG: 3 69 mmHg  LVOT meanP 1 mmHg  MR ERO: 0 08 cm2  MR RV: 14 39 ml  MV A Daniel: 1 05 m/s  MV Dec [place]: 7 02 m/s2  MV DecT: 145 08 ms  MV E Daniel: 1 02 m/s  MV E/A Ratio: 0 97  MV PHT: 42 07 ms  MVA By PHT: 5 23 cm2    [employer] Accredited Echocardiography Laboratory    Prepared and electronically signed by    Keesha Pérez MD  Signed 27-Apr-2021 15:13:57      Medications:    Current Outpatient Medications:     albuterol (2 5 mg/3 mL) 0 083 % nebulizer solution, USE 1 VIAL VIA NEBULIZER 4 TIMES A DAY AS NEEDED, Disp: 1080 mL, Rfl: 3    aspirin 81 MG tablet, Take 81 mg by mouth daily, Disp: , Rfl:     atorvastatin (LIPITOR) 40 mg tablet, Take 1 tablet (40 mg total) by mouth daily, Disp: 90 tablet, Rfl: 3    bexarotene (Targretin) 75 mg capsule, Take 8 capsules (600 mg total) by mouth daily, Disp: 240 capsule, Rfl: 9    Cholecalciferol (CVS VITAMIN D3) 1000 units capsule, Take 1 capsule by mouth daily, Disp: , Rfl:     cyanocobalamin (VITAMIN B-12) 1,000 mcg tablet, Take 1,000 mcg by mouth daily, Disp: , Rfl:     diclofenac potassium (CATAFLAM) 50 mg tablet, 1 tablet daily for severe pain    Not to exceed 2 tablets a week , Disp: 30 tablet, Rfl: 0    Entresto 49-51 MG TABS, TAKE 1 TABLET BY MOUTH  TWICE DAILY, Disp: 180 tablet, Rfl: 3    ferrous sulfate 324 (65 Fe) mg, Take 1 tablet by mouth Twice daily, Disp: , Rfl:     gabapentin (NEURONTIN) 100 mg capsule, Take 1 capsule (100 mg total) by mouth 3 (three) times a day, Disp: 270 capsule, Rfl: 3    levothyroxine 112 mcg tablet, TAKE 1 TABLET BY MOUTH  DAILY, Disp: 90 tablet, Rfl: 3    metoprolol succinate (TOPROL-XL) 50 mg 24 hr tablet, Take 1 tablet (50 mg total) by mouth daily, Disp: 180 tablet, Rfl: 3    montelukast (SINGULAIR) 10 mg tablet, TAKE 1 TABLET BY MOUTH  DAILY AT BEDTIME, Disp: 90 tablet, Rfl: 3    potassium chloride (K-DUR,KLOR-CON) 20 mEq tablet, Take 1 tablet (20 mEq total) by mouth daily as needed (weight gain, leg swelling), Disp: 90 tablet, Rfl: 3    torsemide (DEMADEX) 20 mg tablet, TAKE 1 TABLET BY MOUTH EVERY DAY AS NEEDED FOR WEIGHT GAIN, LEG SWELLING, Disp: 90 tablet, Rfl: 1    triamcinolone (KENALOG) 0 1 % ointment, Apply topically 2 (two) times 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, Disp: 454 g, Rfl: 3    Vitamins-Lipotropics (LIPOFLAVONOID PO), Take by mouth daily, Disp: , Rfl:     zolpidem (AMBIEN) 10 mg tablet, TAKE 1 TABLET BY MOUTH  DAILY AT BEDTIME AS NEEDED  FOR SLEEP, Disp: 90 tablet, Rfl: 1    Trelegy Ellipta 100-62 5-25 MCG/INH inhaler, USE 1 INHALATION BY MOUTH  DAILY RINSE MOUTH AFTER USE (Patient not taking: Reported on 4/28/2022), Disp: 180 each, Rfl: 3      Allergies:  No Known Allergies      Assessment and Plan:  1  Chronic systolic congestive heart failure (HCC)  Euvolemic  Continue Entresto, metoprolol succinate, torsemide, potassium  Low-salt diet  Daily weights  2  Non-ischemic cardiomyopathy (HCC)  Continue Entresto, metoprolol succinate    3  ICD (implantable cardioverter-defibrillator) in place  Episodes of NSVT seen on ICD interrogation but unable to increase beta-blocker dose  No recent ICD shocks    4  Essential hypertension  BP stable  Continue current medication    5  Mixed hyperlipidemia  Continue statin and diet control  His PCP closely monitors blood work    6  Obesity (BMI 30-39  9)  States he is trying to lose weight    Recommend aggressive risk factor modification and therapeutic lifestyle changes  Low-salt, low-calorie, low-fat, low-cholesterol diet with regular exercise and to optimize weight  I will defer the ordering and monitoring of necessity lab studies to you, but I am available and happy to review and manage any of the data at your request in the future  Discussed concepts of atherosclerosis, including signs and symptoms of cardiac disease  Previous studies were reviewed  Safety measures were reviewed  Questions were entertained and answered  Patient was advised to report any problems requiring medical attention  Follow-up with PCP and appropriate specialist and lab work as discussed  Return for follow up visit as scheduled or earlier, if needed    Thank you for allowing me to participate in the care and evaluation of your patient  Should you have any questions, please feel free to contact me        Sourav Lake MD  9/98/3379,9:03 PM

## 2022-05-18 DIAGNOSIS — Z12.11 SPECIAL SCREENING FOR MALIGNANT NEOPLASMS, COLON: Primary | ICD-10-CM

## 2022-05-23 DIAGNOSIS — E03.2 HYPOTHYROIDISM DUE TO MEDICATION: ICD-10-CM

## 2022-05-24 RX ORDER — LEVOTHYROXINE SODIUM 112 UG/1
TABLET ORAL
Qty: 90 TABLET | Refills: 3 | Status: SHIPPED | OUTPATIENT
Start: 2022-05-24

## 2022-06-02 ENCOUNTER — APPOINTMENT (OUTPATIENT)
Dept: LAB | Facility: CLINIC | Age: 77
End: 2022-06-02
Payer: COMMERCIAL

## 2022-06-02 DIAGNOSIS — N18.30 STAGE 3 CHRONIC KIDNEY DISEASE, UNSPECIFIED WHETHER STAGE 3A OR 3B CKD (HCC): ICD-10-CM

## 2022-06-02 LAB
25(OH)D3 SERPL-MCNC: 16 NG/ML (ref 30–100)
ANION GAP SERPL CALCULATED.3IONS-SCNC: 4 MMOL/L (ref 4–13)
BASOPHILS # BLD AUTO: 0.06 THOUSANDS/ΜL (ref 0–0.1)
BASOPHILS NFR BLD AUTO: 1 % (ref 0–1)
BUN SERPL-MCNC: 20 MG/DL (ref 5–25)
CALCIUM SERPL-MCNC: 8.1 MG/DL (ref 8.3–10.1)
CHLORIDE SERPL-SCNC: 111 MMOL/L (ref 100–108)
CO2 SERPL-SCNC: 27 MMOL/L (ref 21–32)
CREAT SERPL-MCNC: 1.3 MG/DL (ref 0.6–1.3)
EOSINOPHIL # BLD AUTO: 0.17 THOUSAND/ΜL (ref 0–0.61)
EOSINOPHIL NFR BLD AUTO: 2 % (ref 0–6)
ERYTHROCYTE [DISTWIDTH] IN BLOOD BY AUTOMATED COUNT: 13.8 % (ref 11.6–15.1)
GFR SERPL CREATININE-BSD FRML MDRD: 53 ML/MIN/1.73SQ M
GLUCOSE P FAST SERPL-MCNC: 96 MG/DL (ref 65–99)
HCT VFR BLD AUTO: 39.2 % (ref 36.5–49.3)
HGB BLD-MCNC: 12.6 G/DL (ref 12–17)
IMM GRANULOCYTES # BLD AUTO: 0.02 THOUSAND/UL (ref 0–0.2)
IMM GRANULOCYTES NFR BLD AUTO: 0 % (ref 0–2)
LYMPHOCYTES # BLD AUTO: 1.97 THOUSANDS/ΜL (ref 0.6–4.47)
LYMPHOCYTES NFR BLD AUTO: 25 % (ref 14–44)
MCH RBC QN AUTO: 30.7 PG (ref 26.8–34.3)
MCHC RBC AUTO-ENTMCNC: 32.1 G/DL (ref 31.4–37.4)
MCV RBC AUTO: 95 FL (ref 82–98)
MONOCYTES # BLD AUTO: 0.4 THOUSAND/ΜL (ref 0.17–1.22)
MONOCYTES NFR BLD AUTO: 5 % (ref 4–12)
NEUTROPHILS # BLD AUTO: 5.35 THOUSANDS/ΜL (ref 1.85–7.62)
NEUTS SEG NFR BLD AUTO: 67 % (ref 43–75)
NRBC BLD AUTO-RTO: 0 /100 WBCS
PHOSPHATE SERPL-MCNC: 2.1 MG/DL (ref 2.3–4.1)
PLATELET # BLD AUTO: 232 THOUSANDS/UL (ref 149–390)
PMV BLD AUTO: 11.2 FL (ref 8.9–12.7)
POTASSIUM SERPL-SCNC: 3.9 MMOL/L (ref 3.5–5.3)
PTH-INTACT SERPL-MCNC: 139.1 PG/ML (ref 18.4–80.1)
RBC # BLD AUTO: 4.11 MILLION/UL (ref 3.88–5.62)
SODIUM SERPL-SCNC: 142 MMOL/L (ref 136–145)
WBC # BLD AUTO: 7.97 THOUSAND/UL (ref 4.31–10.16)

## 2022-06-02 PROCEDURE — 84100 ASSAY OF PHOSPHORUS: CPT

## 2022-06-02 PROCEDURE — 83970 ASSAY OF PARATHORMONE: CPT

## 2022-06-02 PROCEDURE — 36415 COLL VENOUS BLD VENIPUNCTURE: CPT

## 2022-06-02 PROCEDURE — 80048 BASIC METABOLIC PNL TOTAL CA: CPT

## 2022-06-02 PROCEDURE — 85025 COMPLETE CBC W/AUTO DIFF WBC: CPT

## 2022-06-02 PROCEDURE — 82306 VITAMIN D 25 HYDROXY: CPT

## 2022-06-21 ENCOUNTER — OFFICE VISIT (OUTPATIENT)
Dept: NEPHROLOGY | Facility: CLINIC | Age: 77
End: 2022-06-21
Payer: COMMERCIAL

## 2022-06-21 VITALS
HEIGHT: 71 IN | BODY MASS INDEX: 32.06 KG/M2 | TEMPERATURE: 97.4 F | OXYGEN SATURATION: 95 % | DIASTOLIC BLOOD PRESSURE: 70 MMHG | RESPIRATION RATE: 16 BRPM | SYSTOLIC BLOOD PRESSURE: 110 MMHG | HEART RATE: 94 BPM | WEIGHT: 229 LBS

## 2022-06-21 DIAGNOSIS — I42.0 DILATED CARDIOMYOPATHY (HCC): ICD-10-CM

## 2022-06-21 DIAGNOSIS — N18.9 CHRONIC KIDNEY DISEASE-MINERAL AND BONE DISORDER: ICD-10-CM

## 2022-06-21 DIAGNOSIS — N17.0 ACUTE KIDNEY INJURY (AKI) WITH ACUTE TUBULAR NECROSIS (ATN) (HCC): ICD-10-CM

## 2022-06-21 DIAGNOSIS — M89.9 CHRONIC KIDNEY DISEASE-MINERAL AND BONE DISORDER: ICD-10-CM

## 2022-06-21 DIAGNOSIS — J44.9 CHRONIC OBSTRUCTIVE PULMONARY DISEASE, UNSPECIFIED COPD TYPE (HCC): ICD-10-CM

## 2022-06-21 DIAGNOSIS — N18.30 STAGE 3 CHRONIC KIDNEY DISEASE, UNSPECIFIED WHETHER STAGE 3A OR 3B CKD (HCC): Primary | ICD-10-CM

## 2022-06-21 DIAGNOSIS — E83.9 CHRONIC KIDNEY DISEASE-MINERAL AND BONE DISORDER: ICD-10-CM

## 2022-06-21 DIAGNOSIS — I10 PRIMARY HYPERTENSION: ICD-10-CM

## 2022-06-21 PROCEDURE — 99214 OFFICE O/P EST MOD 30 MIN: CPT | Performed by: INTERNAL MEDICINE

## 2022-06-21 NOTE — ASSESSMENT & PLAN NOTE
Lab Results   Component Value Date    EGFR 53 06/02/2022    EGFR 41 01/10/2022    EGFR 56 07/02/2021    CREATININE 1 30 06/02/2022    CREATININE 1 62 (H) 01/10/2022    CREATININE 1 27 07/02/2021   Patient kidney function seems stable or slightly better  He is doing quite well    Asymptomatic in progress in therapy kidneys were discussed with the patient    Importance of hydration and avoiding nephrotoxic meds also discussed with him

## 2022-06-21 NOTE — ASSESSMENT & PLAN NOTE
Lab Results   Component Value Date    EGFR 53 06/02/2022    EGFR 41 01/10/2022    EGFR 56 07/02/2021    CREATININE 1 30 06/02/2022    CREATININE 1 62 (H) 01/10/2022    CREATININE 1 27 07/02/2021   Patient's PTH and phosphorus along with vitamin-D are within acceptable range we will continue to monitor

## 2022-06-21 NOTE — PROGRESS NOTES
NEPHROLOGY OFFICE FOLLOW UP  Darvin Ugalde 78 y o  male MRN: [id number]    Encounter: [id number] 6/21/2022    REASON FOR VISIT: Darvin Ugalde is a 78 y o  male who is here on 6/21/2022 for Chronic Kidney Disease and Follow-up    HPI:    HPI    REVIEW OF SYSTEMS:    Review of Systems   Constitutional: Negative for activity change and fatigue  HENT: Negative for congestion and ear discharge  Eyes: Negative for photophobia and pain  Respiratory: Negative for apnea and choking  Cardiovascular: Negative for chest pain and palpitations  Gastrointestinal: Negative for abdominal distention and blood in stool  Endocrine: Negative for heat intolerance and polyphagia  Genitourinary: Negative for flank pain and urgency  Musculoskeletal: Negative for neck pain and neck stiffness  Skin: Negative for color change and wound  Allergic/Immunologic: Negative for food allergies and immunocompromised state  Neurological: Negative for seizures and facial asymmetry  Hematological: Negative for adenopathy  Does not bruise/bleed easily  Psychiatric/Behavioral: Negative for self-injury and suicidal ideas           PAST MEDICAL HISTORY:  Past Medical History:   Diagnosis Date    Agent orange exposure     Anemia     Benign colon polyp     BPH (benign prostatic hyperplasia)     Bradycardia     Cardiomyopathy ([address] )     Chest discomfort     CHF (congestive heart failure) (HCC)     Chronic bronchitis (HCC)     Chronic kidney disease     COPD (chronic obstructive pulmonary disease) (HCC)     LAST ASSESSED: 9/9/17    H/O nonmelanoma skin cancer     LAST ASSESSED: 11/7/17    HHD (hypertensive heart disease)     HTN (hypertension)     Hx of lymphoma     Hyperlipidemia     Hypertension     ICD (implantable cardioverter-defibrillator) in place     Insomnia     Mycosis fungoides (HCC)     PVC (premature ventricular contraction)     PVT (paroxysmal ventricular tachycardia) (HCC)     SOB (shortness of breath)        PAST SURGICAL HISTORY:  Past Surgical History:   Procedure Laterality Date    CARDIAC PACEMAKER PLACEMENT      SKIN SURGERY      skin cancer removal        SOCIAL HISTORY:  Social History     Substance and Sexual Activity   Alcohol Use Yes    Comment: vodka nightly     Social History     Substance and Sexual Activity   Drug Use No     Social History     Tobacco Use   Smoking Status Current Every Day Smoker    Packs/day: 0 50    Years: 50 00    Pack years: 25 00    Types: Cigarettes   Smokeless Tobacco Never Used   Tobacco Comment    HALF A PACK PER DAY        FAMILY HISTORY:  Family History   Problem Relation Age of Onset    Diabetes Mother     Colon cancer Father         DIAGNOSED IN HIS 80'S    Heart failure Father     Coronary artery disease Father     Diabetes Family         MELLITUS    Heart attack Neg Hx     Stroke Neg Hx     Anuerysm Neg Hx     Clotting disorder Neg Hx     Arrhythmia Neg Hx     Hypertension Neg Hx         pt unsure     Hyperlipidemia Neg Hx     Sudden death Neg Hx         scd       MEDICATIONS:    Current Outpatient Medications:     albuterol (2 5 mg/3 mL) 0 083 % nebulizer solution, USE 1 VIAL VIA NEBULIZER 4 TIMES A DAY AS NEEDED, Disp: 1080 mL, Rfl: 3    aspirin 81 MG tablet, Take 81 mg by mouth daily, Disp: , Rfl:     atorvastatin (LIPITOR) 40 mg tablet, Take 1 tablet (40 mg total) by mouth daily, Disp: 90 tablet, Rfl: 3    bexarotene (Targretin) 75 mg capsule, Take 8 capsules (600 mg total) by mouth daily, Disp: 240 capsule, Rfl: 9    Cholecalciferol 25 MCG (1000 UT) capsule, Take 1 capsule by mouth daily, Disp: , Rfl:     cyanocobalamin (VITAMIN B-12) 1,000 mcg tablet, Take 1,000 mcg by mouth daily, Disp: , Rfl:     diclofenac potassium (CATAFLAM) 50 mg tablet, 1 tablet daily for severe pain    Not to exceed 2 tablets a week , Disp: 30 tablet, Rfl: 0    Entresto 49-51 MG TABS, TAKE 1 TABLET BY MOUTH  TWICE DAILY, Disp: 180 tablet, Rfl: 3   ferrous sulfate 324 (65 Fe) mg, Take 1 tablet by mouth Twice daily, Disp: , Rfl:     gabapentin (NEURONTIN) 100 mg capsule, Take 1 capsule (100 mg total) by mouth 3 (three) times a day, Disp: 270 capsule, Rfl: 3    levothyroxine 112 mcg tablet, TAKE 1 TABLET BY MOUTH  DAILY, Disp: 90 tablet, Rfl: 3    metoprolol succinate (TOPROL-XL) 50 mg 24 hr tablet, Take 1 tablet (50 mg total) by mouth daily, Disp: 180 tablet, Rfl: 3    montelukast (SINGULAIR) 10 mg tablet, TAKE 1 TABLET BY MOUTH  DAILY AT BEDTIME, Disp: 90 tablet, Rfl: 3    potassium chloride (K-DUR,KLOR-CON) 20 mEq tablet, Take 1 tablet (20 mEq total) by mouth daily as needed (weight gain, leg swelling), Disp: 90 tablet, Rfl: 3    torsemide (DEMADEX) 20 mg tablet, TAKE 1 TABLET BY MOUTH EVERY DAY AS NEEDED FOR WEIGHT GAIN, LEG SWELLING, Disp: 90 tablet, Rfl: 1    triamcinolone (KENALOG) 0 1 % ointment, Apply topically 2 (two) times 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, Disp: 454 g, Rfl: 3    Vitamins-Lipotropics (LIPOFLAVONOID PO), Take by mouth daily, Disp: , Rfl:     zolpidem (AMBIEN) 10 mg tablet, TAKE 1 TABLET BY MOUTH  DAILY AT BEDTIME AS NEEDED  FOR SLEEP, Disp: 90 tablet, Rfl: 1    Trelegy Ellipta 100-62 5-25 MCG/INH inhaler, USE 1 INHALATION BY MOUTH  DAILY RINSE MOUTH AFTER USE (Patient not taking: No sig reported), Disp: 180 each, Rfl: 3    PHYSICAL EXAM:  Vitals:    06/21/22 1300   BP: 110/70   BP Location: Right arm   Patient Position: Sitting   Pulse: 94   Resp: 16   Temp: (!) 97 4 °F (36 3 °C)   TempSrc: Temporal   SpO2: 95%   Weight: 104 kg (229 lb)   Height: 5' 11" (1 803 m)     Body mass index is 31 94 kg/m²  Physical Exam  Constitutional:       General: He is not in acute distress  Appearance: He is well-developed  HENT:      Head: Normocephalic  Eyes:      General: No scleral icterus  Conjunctiva/sclera: Conjunctivae normal    Neck:      Vascular: No JVD  Cardiovascular:      Rate and Rhythm: Normal rate        Heart sounds: Normal heart sounds  Pulmonary:      Effort: Pulmonary effort is normal       Breath sounds: No wheezing  Abdominal:      Palpations: Abdomen is soft  Tenderness: There is no abdominal tenderness  Musculoskeletal:         General: Normal range of motion  Cervical back: Neck supple  Skin:     General: Skin is warm  Findings: No rash  Neurological:      Mental Status: He is alert and oriented to person, place, and time     Psychiatric:         Behavior: Behavior normal          LAB RESULTS:  Results for orders placed or performed in visit on 42/33/78   Basic metabolic panel   Result Value Ref Range    Sodium 142 136 - 145 mmol/L    Potassium 3 9 3 5 - 5 3 mmol/L    Chloride 111 (H) 100 - 108 mmol/L    CO2 27 21 - 32 mmol/L    ANION GAP 4 4 - 13 mmol/L    BUN 20 5 - 25 mg/dL    Creatinine 1 30 0 60 - 1 30 mg/dL    Glucose, Fasting 96 65 - 99 mg/dL    Calcium 8 1 (L) 8 3 - 10 1 mg/dL    eGFR 53 ml/min/1 73sq m   CBC and differential   Result Value Ref Range    WBC 7 97 4 31 - 10 16 Thousand/uL    RBC 4 11 3 88 - 5 62 Million/uL    Hemoglobin 12 6 12 0 - 17 0 g/dL    Hematocrit 39 2 36 5 - 49 3 %    MCV 95 82 - 98 fL    MCH 30 7 26 8 - 34 3 pg    MCHC 32 1 31 4 - 37 4 g/dL    RDW 13 8 11 6 - 15 1 %    MPV 11 2 8 9 - 12 7 fL    Platelets 750 032 - 733 Thousands/uL    nRBC 0 /100 WBCs    Neutrophils Relative 67 43 - 75 %    Immat GRANS % 0 0 - 2 %    Lymphocytes Relative 25 14 - 44 %    Monocytes Relative 5 4 - 12 %    Eosinophils Relative 2 0 - 6 %    Basophils Relative 1 0 - 1 %    Neutrophils Absolute 5 35 1 85 - 7 62 Thousands/µL    Immature Grans Absolute 0 02 0 00 - 0 20 Thousand/uL    Lymphocytes Absolute 1 97 0 60 - 4 47 Thousands/µL    Monocytes Absolute 0 40 0 17 - 1 22 Thousand/µL    Eosinophils Absolute 0 17 0 00 - 0 61 Thousand/µL    Basophils Absolute 0 06 0 00 - 0 10 Thousands/µL   Phosphorus   Result Value Ref Range    Phosphorus 2 1 (L) 2 3 - 4 1 mg/dL   PTH, intact   Result Value Ref Range     1 (H) 18 4 - 80 1 pg/mL   Vitamin D 25 hydroxy   Result Value Ref Range    Vit D, 25-Hydroxy 16 0 (L) 30 0 - 100 0 ng/mL       ASSESSMENT and PLAN:      Stage 3 chronic kidney disease  Lab Results   Component Value Date    EGFR 53 06/02/2022    EGFR 41 01/10/2022    EGFR 56 07/02/2021    CREATININE 1 30 06/02/2022    CREATININE 1 62 (H) 01/10/2022    CREATININE 1 27 07/02/2021   Patient kidney function seems stable or slightly better  He is doing quite well    Asymptomatic in progress in therapy kidneys were discussed with the patient  Importance of hydration and avoiding nephrotoxic meds also discussed with him    Chronic kidney disease-mineral and bone disorder  Lab Results   Component Value Date    EGFR 53 06/02/2022    EGFR 41 01/10/2022    EGFR 56 07/02/2021    CREATININE 1 30 06/02/2022    CREATININE 1 62 (H) 01/10/2022    CREATININE 1 27 07/02/2021   Patient's PTH and phosphorus along with vitamin-D are within acceptable range we will continue to monitor    Acute kidney injury (ELLYN) with acute tubular necrosis (ATN) (HCC)  Kidney function improved and seems to be baseline at this point    COPD (chronic obstructive pulmonary disease) ([facility] )  Asymptomatic    Cardiomyopathy ([facility] )  Seems quite asymptomatic and diuresing well with the help of diuretic      Everything discussed at length with the patient  I will see him back in 6 months and will get blood and urine test before that visit        Portions of the record may have been created with voice recognition software  Occasional wrong word or "sound a like" substitutions may have occurred due to the inherent limitations of voice recognition software  Read the chart carefully and recognize, using context, where substitutions have occurred  If you have any questions, please contact the dictating provider

## 2022-06-23 ENCOUNTER — OFFICE VISIT (OUTPATIENT)
Dept: DERMATOLOGY | Facility: CLINIC | Age: 77
End: 2022-06-23
Payer: COMMERCIAL

## 2022-06-23 VITALS — HEIGHT: 71 IN | BODY MASS INDEX: 32.06 KG/M2 | WEIGHT: 229 LBS

## 2022-06-23 DIAGNOSIS — Z85.828 HISTORY OF SKIN CANCER: ICD-10-CM

## 2022-06-23 DIAGNOSIS — Z13.89 SCREENING FOR SKIN CONDITION: ICD-10-CM

## 2022-06-23 DIAGNOSIS — L82.1 SEBORRHEIC KERATOSIS: ICD-10-CM

## 2022-06-23 DIAGNOSIS — C84.08 MYCOSIS FUNGOIDES INVOLVING LYMPH NODES OF MULTIPLE REGIONS (HCC): Primary | ICD-10-CM

## 2022-06-23 PROCEDURE — 4004F PT TOBACCO SCREEN RCVD TLK: CPT | Performed by: DERMATOLOGY

## 2022-06-23 PROCEDURE — 99214 OFFICE O/P EST MOD 30 MIN: CPT | Performed by: DERMATOLOGY

## 2022-06-23 PROCEDURE — 3008F BODY MASS INDEX DOCD: CPT | Performed by: DERMATOLOGY

## 2022-06-23 PROCEDURE — 1160F RVW MEDS BY RX/DR IN RCRD: CPT | Performed by: DERMATOLOGY

## 2022-06-23 NOTE — PATIENT INSTRUCTIONS
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

## 2022-06-23 NOTE — PROGRESS NOTES
[facility] DERMATOLOGY  [address] [place] [id number]  [phone]  [phone]     MRN: [id number] : 1945  Encounter: [id number]  Patient Information: Beth Wilks  Chief complaint:  Four-month checkup    History of present illness:  45-year-old male with known history of cutaneous T-cell lymphoma and nonmelanoma skin cancer presents for overall checkup patient doing well no complaints has not taken his Targretin for awhile because of problems getting the medication no other concerns noted  Past Medical History:   Diagnosis Date    Agent orange exposure     Anemia     Benign colon polyp     BPH (benign prostatic hyperplasia)     Bradycardia     Cardiomyopathy ([facility] [address] )     Chest discomfort     CHF (congestive heart failure) ([facility] )     Chronic bronchitis ([facility] )     Chronic kidney disease     [facility]     LAST ASSESSED: 17    H/O nonmelanoma skin cancer     LAST ASSESSED: 17    HHD (hypertensive heart disease)     HTN (hypertension)     Hx of lymphoma     Hyperlipidemia     Hypertension     ICD (implantable cardioverter-defibrillator) in place     Insomnia     Mycosis fungoides ([facility] )     PVC (premature ventricular contraction)     PVT (paroxysmal ventricular tachycardia) (HCC)     SOB (shortness of breath)      Past Surgical History:   Procedure Laterality Date    CARDIAC PACEMAKER PLACEMENT      SKIN SURGERY      skin cancer removal      Social History   Social History     Substance and Sexual Activity   Alcohol Use Yes    Comment: vodka nightly     Social History     Substance and Sexual Activity   Drug Use No     Social History     Tobacco Use   Smoking Status Current Every Day Smoker    Packs/day: 0 50    Years: 50 00    Pack years: 25 00    Types: Cigarettes   Smokeless Tobacco Never Used   Tobacco Comment    HALF A PACK PER DAY      Family History   Problem Relation Age of Onset    Diabetes Mother     Colon cancer Father         DIAGNOSED IN HIS [de-identified]    Heart failure Father     Coronary artery disease Father     Diabetes Family         MELLITUS    Heart attack Neg Hx     Stroke Neg Hx     Anuerysm Neg Hx     Clotting disorder Neg Hx     Arrhythmia Neg Hx     Hypertension Neg Hx         pt unsure     Hyperlipidemia Neg Hx     Sudden death Neg Hx         scd     Meds/Allergies   No Known Allergies    Meds:  Prior to Admission medications    Medication Sig Start Date End Date Taking? Authorizing Provider   albuterol (2 5 mg/3 mL) 0 083 % nebulizer solution USE 1 VIAL VIA NEBULIZER 4 TIMES A DAY AS NEEDED 6/22/20   Ruperto Gallegos PA-C   aspirin 81 MG tablet Take 81 mg by mouth daily    Historical Provider, MD   atorvastatin (LIPITOR) 40 mg tablet Take 1 tablet (40 mg total) by mouth daily 12/27/21   Donita Zhang PA-C   bexarotene (Targretin) 75 mg capsule Take 8 capsules (600 mg total) by mouth daily 4/27/22   Sravani Butler MD   Cholecalciferol 25 MCG (1000 UT) capsule Take 1 capsule by mouth daily    Historical Provider, MD   cyanocobalamin (VITAMIN B-12) 1,000 mcg tablet Take 1,000 mcg by mouth daily    Historical Provider, MD   diclofenac potassium (CATAFLAM) 50 mg tablet 1 tablet daily for severe pain  Not to exceed 2 tablets a week   1/10/22   Juani Hodges MD   Entresto 49-51 MG TABS TAKE 1 TABLET BY MOUTH  TWICE DAILY 12/17/21   Ima Curling, PA-C   ferrous sulfate 324 (65 Fe) mg Take 1 tablet by mouth Twice daily 8/31/15   Historical Provider, MD   gabapentin (NEURONTIN) 100 mg capsule Take 1 capsule (100 mg total) by mouth 3 (three) times a day 1/10/22   Juani Hodges MD   levothyroxine 112 mcg tablet TAKE 1 TABLET BY MOUTH  DAILY 5/24/22   Juani Hodges MD   metoprolol succinate (TOPROL-XL) 50 mg 24 hr tablet Take 1 tablet (50 mg total) by mouth daily 4/1/21   Teri Holloway MD   montelukast (SINGULAIR) 10 mg tablet TAKE 1 TABLET BY MOUTH  DAILY AT BEDTIME 12/17/21 [facility]   potassium chloride (K-DUR,KLOR-CON) 20 mEq tablet Take 1 tablet (20 mEq total) by mouth daily as needed (weight gain, leg swelling) 4/1/21   Mikey Kolb MD   torsemide (DEMADEX) 20 mg tablet TAKE 1 TABLET BY MOUTH EVERY DAY AS NEEDED FOR WEIGHT GAIN, LEG SWELLING 10/28/21   Mikey Kolb MD   Trejohn Farmerta 263-07 7-77 MCG/INH inhaler USE 1 INHALATION BY MOUTH  DAILY RINSE MOUTH AFTER USE  Patient not taking: No sig reported 3/11/22   [facility]   triamcinolone (KENALOG) 0 1 % ointment Apply topically 2 (two) times 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 2/6/18   Ladonna Mcknight MD   Vitamins-Lipotropics (LIPOFLAVONOID PO) Take by mouth daily    Historical Provider, MD   zolpidem (AMBIEN) 10 mg tablet TAKE 1 TABLET BY MOUTH  DAILY AT BEDTIME AS NEEDED  FOR SLEEP 12/20/21   Chanel Polanco MD   nicotine (NICODERM CQ) 21 mg/24 hr TD 24 hr patch Place 1 patch on the skin every 24 hours  12/5/19  Historical Provider, MD       Subjective:     Review of Systems:    General: negative for - chills, fatigue, fever,  weight gain or weight loss  Psychological: negative for - anxiety, behavioral disorder, concentration difficulties, decreased libido, depression, irritability, memory difficulties, mood swings, sleep disturbances or suicidal ideation  ENT: negative for - hearing difficulties , nasal congestion, nasal discharge, oral lesions, sinus pain, sneezing, sore throat  Allergy and Immunology: negative for - hives, insect bite sensitivity,  Hematological and Lymphatic: negative for - bleeding problems, blood clots,bruising, swollen lymph nodes  Endocrine: negative for - hair pattern changes, hot flashes, malaise/lethargy, mood swings, palpitations, polydipsia/polyuria, skin changes, temperature intolerance or unexpected weight change  Respiratory: negative for - cough, hemoptysis, orthopnea, shortness of breath, or wheezing  Cardiovascular: negative for - chest pain, dyspnea on exertion, edema,  Gastrointestinal: negative for - abdominal pain, nausea/vomiting  Genito-Urinary: negative for - dysuria, incontinence, irregular/heavy menses or urinary frequency/urgency  Musculoskeletal: negative for - gait disturbance, joint pain, joint stiffness, joint swelling, muscle pain, muscular weakness  Dermatological:  As in HPI  Neurological: negative for confusion, dizziness, headaches, impaired coordination/balance, memory loss, numbness/tingling, seizures, speech problems, tremors or weakness       Objective:   Ht 5' 11" (1 803 m)   Wt 104 kg (229 lb)   BMI 31 94 kg/m²     Physical Exam:    General Appearance:    Alert, cooperative, no distress   Head:    Normocephalic, without obvious abnormality, atraumatic   Lymphatics:    No lymphadenopathy noted      Abdomen:   No hepatosplenomegaly   Skin:   A full skin exam was performed including scalp, head scalp, eyes, ears, nose, lips, neck, chest, axilla, abdomen, back, buttocks, bilateral upper extremities, bilateral lower extremities, hands, feet, fingers, toes, fingernails, and toenails scaling patches with atrophic appearance skin involving 20% of body surface area normal keratotic papules greasy stuck on appearance previous sites skin cancer well healed without recurrence nothing else atypical noted on exam     Assessment:     1  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)     2  Seborrheic keratosis     3  Screening for skin condition     4   History of skin cancer           Plan:   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  Seborrheic keratosis patient reassured these are normal growths we acquire with age no treatment needed  History of skin cancer in no recurrence nothing else atypical sunblock recommended follow-up in 4 months  Screening for dermatologic disorders nothing else of concern noted on complete exam follow-up in 4 months    Clay Foley MD  8/57/6416,1:62 PM    Portions of the record may have been created with voice recognition software   Occasional wrong word or "sound a like" substitutions may have occurred due to the inherent limitations of voice recognition software   Read the chart carefully and recognize, using context, where substitutions have occurred

## 2022-06-25 DIAGNOSIS — I10 ESSENTIAL HYPERTENSION: ICD-10-CM

## 2022-06-27 RX ORDER — METOPROLOL SUCCINATE 50 MG/1
50 TABLET, EXTENDED RELEASE ORAL DAILY
Qty: 180 TABLET | Refills: 0 | Status: SHIPPED | OUTPATIENT
Start: 2022-06-27

## 2022-06-29 ENCOUNTER — TELEPHONE (OUTPATIENT)
Dept: INTERNAL MEDICINE CLINIC | Facility: CLINIC | Age: 77
End: 2022-06-29

## 2022-07-22 ENCOUNTER — REMOTE DEVICE CLINIC VISIT (OUTPATIENT)
Dept: CARDIOLOGY CLINIC | Facility: CLINIC | Age: 77
End: 2022-07-22
Payer: COMMERCIAL

## 2022-07-22 DIAGNOSIS — Z95.810 ICD (IMPLANTABLE CARDIOVERTER-DEFIBRILLATOR) IN PLACE: Primary | ICD-10-CM

## 2022-07-22 PROCEDURE — 93295 DEV INTERROG REMOTE 1/2/MLT: CPT | Performed by: INTERNAL MEDICINE

## 2022-07-22 PROCEDURE — 93296 REM INTERROG EVL PM/IDS: CPT | Performed by: INTERNAL MEDICINE

## 2022-07-22 NOTE — PROGRESS NOTES
Results for orders placed or performed in visit on 07/22/22   Cardiac EP device report    Narrative    MDT/BIV-ICD (DDDR MODE)/ NOT MRI CONDITIONAL  CARELINK TRANSMISSION:  BATTERY VOLTAGE ADEQUATE (1 6 YR)  AP 96 5% BVP 98 8% (VSRP 2 6%)  ALL AVAILABLE LEAD PARAMETERS WITHIN NORMAL LIMITS  36 VT-NS EPISODES SINCE LAST REMOTE WITH AVAILABLE EGM'S SHOWING BOTH PAT & NSVT  MOST RECENT EPISODE IS PAT, 20 BEATS @  BPM  1 VT EPISODE ON 7/3 22 WHICH IS SUGGESTIVE FOR  PAT  (35 #  BPM) W/ATP X1 DELIVERED, EPISODE TERMINATED  46 V SENSE EPISODES WITH AVAILABLE MARKERS SHOWING FUSION, PAT, NSVT  EF 30-35% (4/2021 ECHO)  PATIENT ON ASA 81, METOPROLOL SUCC  OPTI-VOL WITHIN NORMAL LIMITS  NORMAL DEVICE FUNCTION     ES

## 2022-08-04 LAB — COLOGUARD RESULT REPORTABLE: NEGATIVE

## 2022-10-10 ENCOUNTER — HOSPITAL ENCOUNTER (OUTPATIENT)
Dept: CT IMAGING | Facility: CLINIC | Age: 77
Discharge: HOME | End: 2022-10-10
Payer: COMMERCIAL

## 2022-10-10 DIAGNOSIS — Z87.891 PERSONAL HISTORY OF NICOTINE DEPENDENCE: ICD-10-CM

## 2022-10-10 PROCEDURE — 71271 CT THORAX LUNG CANCER SCR C-: CPT

## 2022-10-12 PROBLEM — N39.0 URINARY TRACT INFECTION: Status: RESOLVED | Noted: 2020-11-21 | Resolved: 2022-10-12

## 2022-10-18 DIAGNOSIS — J30.89 ENVIRONMENTAL AND SEASONAL ALLERGIES: ICD-10-CM

## 2022-10-18 DIAGNOSIS — G47.00 INSOMNIA, UNSPECIFIED TYPE: ICD-10-CM

## 2022-10-18 DIAGNOSIS — I50.22 CHRONIC SYSTOLIC CONGESTIVE HEART FAILURE (HCC): ICD-10-CM

## 2022-10-18 RX ORDER — SACUBITRIL AND VALSARTAN 49; 51 MG/1; MG/1
TABLET, FILM COATED ORAL
Qty: 180 TABLET | Refills: 3 | Status: SHIPPED | OUTPATIENT
Start: 2022-10-18

## 2022-10-18 RX ORDER — MONTELUKAST SODIUM 10 MG/1
TABLET ORAL
Qty: 90 TABLET | Refills: 3 | Status: SHIPPED | OUTPATIENT
Start: 2022-10-18

## 2022-10-19 DIAGNOSIS — I10 ESSENTIAL HYPERTENSION: ICD-10-CM

## 2022-10-19 RX ORDER — METOPROLOL SUCCINATE 50 MG/1
50 TABLET, EXTENDED RELEASE ORAL DAILY
Qty: 180 TABLET | Refills: 0 | Status: SHIPPED | OUTPATIENT
Start: 2022-10-19

## 2022-10-19 NOTE — TELEPHONE ENCOUNTER
Name of Brandt Nuñez called    Call back Number:    Medication(s)Metoprolol   Are we prescribing provider?:    30 or 90 day supply:    Pharmacy name/number:    Last or Next appt?:

## 2022-10-20 ENCOUNTER — OFFICE VISIT (OUTPATIENT)
Dept: PULMONOLOGY | Facility: CLINIC | Age: 77
End: 2022-10-20
Payer: COMMERCIAL

## 2022-10-20 VITALS
HEIGHT: 71 IN | BODY MASS INDEX: 32.06 KG/M2 | WEIGHT: 229 LBS | TEMPERATURE: 97.5 F | SYSTOLIC BLOOD PRESSURE: 115 MMHG | RESPIRATION RATE: 18 BRPM | OXYGEN SATURATION: 95 % | DIASTOLIC BLOOD PRESSURE: 75 MMHG | HEART RATE: 88 BPM

## 2022-10-20 DIAGNOSIS — E66.9 OBESITY (BMI 30-39.9): ICD-10-CM

## 2022-10-20 DIAGNOSIS — F17.200 NICOTINE DEPENDENCE WITH CURRENT USE: Primary | ICD-10-CM

## 2022-10-20 DIAGNOSIS — J41.0 SIMPLE CHRONIC BRONCHITIS (HCC): ICD-10-CM

## 2022-10-20 DIAGNOSIS — E78.5 HYPERLIPIDEMIA, UNSPECIFIED HYPERLIPIDEMIA TYPE: ICD-10-CM

## 2022-10-20 DIAGNOSIS — R06.02 SHORTNESS OF BREATH: ICD-10-CM

## 2022-10-20 DIAGNOSIS — Z23 NEEDS FLU SHOT: ICD-10-CM

## 2022-10-20 DIAGNOSIS — Z87.891 PERSONAL HISTORY OF NICOTINE DEPENDENCE: ICD-10-CM

## 2022-10-20 DIAGNOSIS — Z23 NEEDS FLU SHOT: Primary | ICD-10-CM

## 2022-10-20 DIAGNOSIS — R91.1 LUNG NODULE: ICD-10-CM

## 2022-10-20 DIAGNOSIS — J44.9 COPD, SEVERE (HCC): ICD-10-CM

## 2022-10-20 DIAGNOSIS — F17.200 CURRENT EVERY DAY SMOKER: ICD-10-CM

## 2022-10-20 PROCEDURE — G0008 ADMIN INFLUENZA VIRUS VAC: HCPCS

## 2022-10-20 PROCEDURE — 90662 IIV NO PRSV INCREASED AG IM: CPT

## 2022-10-20 PROCEDURE — 99214 OFFICE O/P EST MOD 30 MIN: CPT | Performed by: INTERNAL MEDICINE

## 2022-10-20 RX ORDER — ATORVASTATIN CALCIUM 40 MG/1
40 TABLET, FILM COATED ORAL DAILY
Qty: 90 TABLET | Refills: 3 | Status: SHIPPED | OUTPATIENT
Start: 2022-10-20

## 2022-10-20 NOTE — PROGRESS NOTES
Assessment/Plan:   Diagnoses and all orders for this visit:    Nicotine dependence with current use  -     CT lung screening program; Future    Shortness of breath    COPD, severe (HCC)    Personal history of nicotine dependence    Current every day smoker    Lung nodule    Needs flu shot    Simple chronic bronchitis (HCC)    Obesity (BMI 30-39  9)       Shortness of breath and dyspnea on exertion likely multifactorial secondary to his severe COPD also regarding his cardiomyopathy   PFTs with combined severe obstruction and moderately severe restriction with a moderately decreased DLCO  Continue with Trelegy 1 puff daily   Albuterol via nebulizer 4 times daily as needed   Continue with Singulair 10 mg daily  CT of the chest lung cancer screening from October 2022 , moderate emphysematous changes lung nodules are stable  Repeat lung cancer screening CT scan in a year  Smoking cessation discussed he states he is not interested in quitting at this time he states his wife passed away and he is living alone with 3 cats and he just wants to continue smoking for the rest of his life he states  Up-to-date with his pneumonia vaccines   Recommend flu shot  Risks and benefits of the flu shot discussed   Flu shot today  Recommend weight loss    Return in about 6 months (around 4/20/2023)  All questions are answered to the patient's satisfaction and understanding  He verbalizes understanding  He is encouraged to call with any further questions or concerns  Portions of the record may have been created with voice recognition software  Occasional wrong word or "sound a like" substitutions may have occurred due to the inherent limitations of voice recognition software  Read the chart carefully and recognize, using context, where substitutions have occurred      Electronically Signed by Bhavin George MD    ______________________________________________________________________    Chief Complaint:   Chief Complaint Patient presents with   • Follow-up       Patient ID: Matthew Ibrahim is a 78 y o  y o  male has a past medical history of Agent orange exposure, Anemia, Benign colon polyp, BPH (benign prostatic hyperplasia), Bradycardia, Cardiomyopathy ([facility] [address] ), Chest discomfort, CHF (congestive heart failure) ([facility] [address] ), Chronic bronchitis ([facility] [address] ), Chronic kidney disease, COPD (chronic obstructive pulmonary disease) ([facility] [address] ), Coronary artery disease (cardio myopthia), Emphysema of lung ([facility] [address] ) (2013), H/O nonmelanoma skin cancer, HHD (hypertensive heart disease), HTN (hypertension), lymphoma, Hyperlipidemia, Hypertension, ICD (implantable cardioverter-defibrillator) in place, Insomnia, Mycosis fungoides ([facility] [address] ), PVC (premature ventricular contraction), PVT (paroxysmal ventricular tachycardia), and SOB (shortness of breath)  10/20/2022  Patient presents today for follow-up visit  Matthew Ibrahim is a 53-year-old male current smoker with past medical history including but not limited to COPD, cardiomyopathy, hypertension, nodule, lymphoma, squamous cell skin cancer presenting for follow-up  Patient states that his breathing remains stable  He has chronic dyspnea on exertion which is worsened on days that are very hot and humid  Today, he is generally feeling well  He is not really interested in quitting smoking, still smoking half pack per day  Currently he is on the Trelegy 1 puff once a day and he uses his nebulizer with albuterol he states once a day, occasionally twice a day  Here for a follow-up with his CT lung screening CT scan    primary symptoms  Associated symptoms include coughing  Pertinent negatives include no chest pain, fever, headaches, myalgias or sore throat  Review of Systems   Constitutional: Negative  Negative for appetite change and fever  HENT: Positive for postnasal drip, rhinorrhea and sneezing  Negative for ear pain, sore throat and trouble swallowing  Eyes: Negative      Respiratory: Positive for cough and shortness of breath  Cardiovascular: Negative  Negative for chest pain  Gastrointestinal: Negative  Endocrine: Negative  Genitourinary: Negative  Musculoskeletal: Negative  Negative for myalgias  Allergic/Immunologic: Negative  Neurological: Negative  Negative for headaches  Hematological: Negative  Psychiatric/Behavioral: Negative  Smoking history: He reports that he has been smoking cigarettes  He has a 17 50 pack-year smoking history   He has never used smokeless tobacco     The following portions of the patient's history were reviewed and updated as appropriate: allergies, current medications, past family history, past medical history, past social history, past surgical history and problem list     Immunization History   Administered Date(s) Administered   • COVID-19 PFIZER VACCINE 0 3 ML IM 03/16/2021, 04/07/2021, 08/20/2021   • COVID-19 Pfizer vac (Maikel-sucrose, gray cap) 12 yr+ IM 04/15/2022, 04/15/2022   • INFLUENZA 09/24/2018, 09/08/2020   • Influenza Split High Dose Preservative Free IM 10/17/2016, 09/09/2017, 09/24/2018, 09/13/2019   • Influenza, high dose seasonal 0 7 mL 10/25/2021, 10/20/2022   • Influenza, seasonal, injectable 10/14/2015   • Pneumococcal Conjugate 13-Valent 07/27/2020   • Pneumococcal Conjugate PCV 7 10/14/2015   • Tdap 1945     Current Outpatient Medications   Medication Sig Dispense Refill   • albuterol (2 5 mg/3 mL) 0 083 % nebulizer solution USE 1 VIAL VIA NEBULIZER 4 TIMES A DAY AS NEEDED 1080 mL 3   • aspirin 81 MG tablet Take 81 mg by mouth daily     • atorvastatin (LIPITOR) 40 mg tablet Take 1 tablet (40 mg total) by mouth daily 90 tablet 3   • bexarotene (Targretin) 75 mg capsule Take 8 capsules (600 mg total) by mouth daily 240 capsule 9   • Cholecalciferol 25 MCG (1000 UT) capsule Take 1 capsule by mouth daily     • cyanocobalamin (VITAMIN B-12) 1,000 mcg tablet Take 1,000 mcg by mouth daily     • diclofenac potassium (CATAFLAM) 50 mg tablet 1 tablet daily for severe pain  Not to exceed 2 tablets a week  30 tablet 0   • Entresto 49-51 MG TABS TAKE 1 TABLET BY MOUTH  TWICE DAILY 180 tablet 3   • ferrous sulfate 324 (65 Fe) mg Take 1 tablet by mouth Twice daily     • gabapentin (NEURONTIN) 100 mg capsule Take 1 capsule (100 mg total) by mouth 3 (three) times a day 270 capsule 3   • levothyroxine 112 mcg tablet TAKE 1 TABLET BY MOUTH  DAILY 90 tablet 3   • metoprolol succinate (TOPROL-XL) 50 mg 24 hr tablet Take 1 tablet (50 mg total) by mouth daily 180 tablet 0   • montelukast (SINGULAIR) 10 mg tablet TAKE 1 TABLET BY MOUTH  DAILY AT BEDTIME 90 tablet 3   • potassium chloride (K-DUR,KLOR-CON) 20 mEq tablet Take 1 tablet (20 mEq total) by mouth daily as needed (weight gain, leg swelling) 90 tablet 3   • torsemide (DEMADEX) 20 mg tablet TAKE 1 TABLET BY MOUTH EVERY DAY AS NEEDED FOR WEIGHT GAIN, LEG SWELLING 90 tablet 1   • triamcinolone (KENALOG) 0 1 % ointment Apply topically 2 (two) times 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 454 g 3   • Vitamins-Lipotropics (LIPOFLAVONOID PO) Take by mouth daily     • zolpidem (AMBIEN) 10 mg tablet TAKE 1 TABLET BY MOUTH  DAILY AT BEDTIME AS NEEDED  FOR SLEEP 90 tablet 1   • Trelegy Ellipta 100-62 5-25 MCG/INH inhaler USE 1 INHALATION BY MOUTH  DAILY RINSE MOUTH AFTER USE (Patient not taking: No sig reported) 180 each 3     No current facility-administered medications for this visit  Allergies: Patient has no known allergies  Objective:  Vitals:    10/20/22 1336 10/20/22 1338   BP: 115/75    BP Location: Left arm    Patient Position: Sitting    Cuff Size: Large    Pulse: 88    Resp: 18    Temp: 97 5 °F (36 4 °C)    TempSrc: Tympanic    SpO2: 95% 95%   Weight: 104 kg (229 lb)    Height: 5' 11" (1 803 m)    Oxygen Therapy  SpO2: 95 %  Oxygen Therapy: None (Room air)      Wt Readings from Last 3 Encounters:   10/20/22 104 kg (229 lb)   06/23/22 104 kg (229 lb)   06/21/22 104 kg (229 lb)     Body mass index is 31 94 kg/m²  Physical Exam  Vitals and nursing note reviewed  Constitutional:       Appearance: He is well-developed  HENT:      Head: Normocephalic and atraumatic  Eyes:      Conjunctiva/sclera: Conjunctivae normal       Pupils: Pupils are equal, round, and reactive to light  Neck:      Thyroid: No thyromegaly  Vascular: No JVD  Cardiovascular:      Rate and Rhythm: Normal rate and regular rhythm  Heart sounds: Normal heart sounds  No murmur heard  No friction rub  No gallop  Pulmonary:      Effort: Pulmonary effort is normal  No respiratory distress  Breath sounds: Normal breath sounds  No wheezing or rales  Chest:      Chest wall: No tenderness  Musculoskeletal:         General: No tenderness or deformity  Normal range of motion  Cervical back: Normal range of motion and neck supple  Lymphadenopathy:      Cervical: No cervical adenopathy  Skin:     General: Skin is warm and dry  Neurological:      Mental Status: He is alert and oriented to person, place, and time  Diagnostics:  I have personally reviewed pertinent films in PACS  CT lung screening program    Result Date: 10/14/2022  Narrative: CT CHEST LUNG CANCER SCREENING WITHOUT IV CONTRAST INDICATION:   Z87 [id number]: Personal history of nicotine dependence  COMPARISON: CT scan 9/17/2020 TECHNIQUE:  Unenhanced CT examination of the chest was performed utilizing a low dose protocol  Reformatted images were created in axial, sagittal, and coronal planes  Radiation dose length product (DLP) for this visit:  205 mGy-cm   This examination, like all CT scans performed in the [facility], was performed utilizing techniques to minimize radiation dose exposure, including the use of iterative reconstruction and automated exposure control  FINDINGS: LUNGS: Biapical emphysematous changes  Stable right lower lobe scarring  Stable right lower lobe calcified granuloma  No new nodule    No endotracheal or endobronchial lesion  Zack Po PLEURA:  Unremarkable  HEART/GREAT VESSELS: Heart is unremarkable for patient's age  No thoracic aortic aneurysm  MEDIASTINUM AND HINA:  Stable calcified right hilar lymph nodes  CHEST WALL AND LOWER NECK:  Stable left-sided intracardiac device  VISUALIZED STRUCTURES IN THE UPPER ABDOMEN:  Unremarkable  OSSEOUS STRUCTURES:  No acute fracture or destructive osseous lesion  Impression: Stable biapical emphysema  No nodules and/or definitely benign nodules  Lung-RADS1, negative  Continued annual screening with LDCT in 12 months   Workstation performed: [id number] Answers for HPI/ROS submitted by the patient on 10/13/2022  Do you have a wet cough?: Yes  Chronicity: recurrent  When did you first notice your symptoms?: more than 1 month ago  How often do your symptoms occur?: 2 to 4 times per day  Since you first noticed this problem, how has it changed?: unchanged  Do you have shortness of breath that occurs with effort or exertion?: Yes  Do you have ear congestion?: No  Do you have heartburn?: No  Do you have fatigue?: Yes  Do you have nasal congestion?: Yes  Do you have shortness of breath when lying flat?: No  Do you have shortness of breath when you wake up?: No  Do you have sweats?: No  Have you experienced weight loss?: No  Which of the following makes your symptoms worse?: change in weather, climbing stairs, pollen  Which of the following makes your symptoms better?: steroid inhaler  Risk factors for lung disease: smoking/tobacco exposure

## 2022-10-21 RX ORDER — ZOLPIDEM TARTRATE 10 MG/1
TABLET ORAL
Qty: 90 TABLET | Refills: 1 | Status: SHIPPED | OUTPATIENT
Start: 2022-10-21

## 2022-10-25 ENCOUNTER — OFFICE VISIT (OUTPATIENT)
Dept: DERMATOLOGY | Facility: CLINIC | Age: 77
End: 2022-10-25

## 2022-10-25 VITALS — WEIGHT: 229 LBS | BODY MASS INDEX: 32.06 KG/M2 | HEIGHT: 71 IN

## 2022-10-25 DIAGNOSIS — Z13.89 SCREENING FOR SKIN CONDITION: ICD-10-CM

## 2022-10-25 DIAGNOSIS — Z85.828 HISTORY OF SKIN CANCER: ICD-10-CM

## 2022-10-25 DIAGNOSIS — C84.08 MYCOSIS FUNGOIDES INVOLVING LYMPH NODES OF MULTIPLE REGIONS (HCC): Primary | ICD-10-CM

## 2022-10-25 DIAGNOSIS — L82.1 SEBORRHEIC KERATOSIS: ICD-10-CM

## 2022-10-25 NOTE — PROGRESS NOTES
[facility] DERMATOLOGY  [address] [place] [id number]  [phone]  [phone]     MRN: [id number] : 1945  Encounter: [id number]  Patient Information: Seema Fiore  Chief complaint:  4  Month checkup    History of present illness:  79-year-old male presents for overall skin check with history of both nonmelanoma skin cancer and cutaneous T-cell lymphoma    Review of patient's chart notes that have been following for 6 years no significant changes noted  Past Medical History:   Diagnosis Date   • Agent orange exposure    • Anemia    • Benign colon polyp    • BPH (benign prostatic hyperplasia)    • Bradycardia    • Cardiomyopathy [facility])    • Chest discomfort    • CHF (congestive heart failure) ([facility])    • Chronic bronchitis ([facility])    • Chronic kidney disease    • COPD (chronic obstructive pulmonary disease) ([place] [address] )     LAST ASSESSED: 17   • Coronary artery disease cardio myopthia   • Emphysema of lung ([place] [address] )    • H/O nonmelanoma skin cancer     LAST ASSESSED: 17   • HHD (hypertensive heart disease)    • HTN (hypertension)    • Hx of lymphoma    • Hyperlipidemia    • Hypertension    • ICD (implantable cardioverter-defibrillator) in place    • Insomnia    • Mycosis fungoides (HCC)    • PVC (premature ventricular contraction)    • PVT (paroxysmal ventricular tachycardia)    • SOB (shortness of breath)      Past Surgical History:   Procedure Laterality Date   • CARDIAC PACEMAKER PLACEMENT     • SKIN SURGERY      skin cancer removal      Social History   Social History     Substance and Sexual Activity   Alcohol Use Not Currently   • Alcohol/week: 14 0 standard drinks   • Types: 14 Shots of liquor per week    Comment: two drinks every night over a 4 hour period     Social History     Substance and Sexual Activity   Drug Use Never     Social History     Tobacco Use   Smoking Status Current Every Day Smoker   • Packs/day: 0 50   • Years: 35 00   • Pack years: 17 50   • Types: Cigarettes   Smokeless Tobacco Never Used   Tobacco Comment    HALF A PACK PER DAY      Family History   Problem Relation Age of Onset   • Diabetes Mother    • Colon cancer Father         DIAGNOSED IN HIS 80'S   • Heart failure Father    • Coronary artery disease Father    • Cancer Father         Ricky Alvarado   • Diabetes Family         MELLITUS   • Heart attack Neg Hx    • Stroke Neg Hx    • Anuerysm Neg Hx    • Clotting disorder Neg Hx    • Arrhythmia Neg Hx    • Hypertension Neg Hx         pt unsure    • Hyperlipidemia Neg Hx    • Sudden death Neg Hx         scd     Meds/Allergies   No Known Allergies    Meds:  Prior to Admission medications    Medication Sig Start Date End Date Taking? Authorizing Provider   albuterol (2 5 mg/3 mL) 0 083 % nebulizer solution USE 1 VIAL VIA NEBULIZER 4 TIMES A DAY AS NEEDED 6/22/20  Yes Becca Cadet PA-C   aspirin 81 MG tablet Take 81 mg by mouth daily   Yes Historical Provider, MD   atorvastatin (LIPITOR) 40 mg tablet Take 1 tablet (40 mg total) by mouth daily 10/20/22  Yes Taryn Gomes MD   bexarotene (Targretin) 75 mg capsule Take 8 capsules (600 mg total) by mouth daily 4/27/22  Yes Cecilio Guerrero MD   Cholecalciferol 25 MCG (1000 UT) capsule Take 1 capsule by mouth daily   Yes Historical Provider, MD   cyanocobalamin (VITAMIN B-12) 1,000 mcg tablet Take 1,000 mcg by mouth daily   Yes Historical Provider, MD   diclofenac potassium (CATAFLAM) 50 mg tablet 1 tablet daily for severe pain  Not to exceed 2 tablets a week   1/10/22  Yes Leno Conley MD   Entresto 49-51 MG TABS TAKE 1 TABLET BY MOUTH  TWICE DAILY 10/18/22  Yes Carrie Page PA-C   ferrous sulfate 324 (65 Fe) mg Take 1 tablet by mouth Twice daily 8/31/15  Yes Historical Provider, MD   gabapentin (NEURONTIN) 100 mg capsule Take 1 capsule (100 mg total) by mouth 3 (three) times a day 1/10/22  Yes Leno Conley MD   levothyroxine 112 mcg tablet TAKE 1 TABLET BY MOUTH  DAILY 5/24/22  Yes Edin Fields MD   metoprolol succinate (TOPROL-XL) 50 mg 24 hr tablet Take 1 tablet (50 mg total) by mouth daily 10/19/22  Yes Justin Rodríguez MD   montelukast (SINGULAIR) 10 mg tablet TAKE 1 TABLET BY MOUTH  DAILY AT BEDTIME 10/18/22  Yes Haven Barrera PA-C   Multiple Vitamins-Minerals (OCUVITE ADULT 50+ PO) Take by mouth   Yes Historical Provider, MD   potassium chloride (K-DUR,KLOR-CON) 20 mEq tablet Take 1 tablet (20 mEq total) by mouth daily as needed (weight gain, leg swelling) 4/1/21  Yes Justin Rodríguez MD   torsemide (DEMADEX) 20 mg tablet TAKE 1 TABLET BY MOUTH EVERY DAY AS NEEDED FOR WEIGHT GAIN, LEG SWELLING 10/28/21  Yes Justin Rodríguez MD   triamcinolone (KENALOG) 0 1 % ointment Apply topically 2 (two) times 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 2/6/18  Yes Dayana Pina MD   Vitamins-Lipotropics (LIPOFLAVONOID PO) Take by mouth daily   Yes Historical Provider, MD   zolpidem (AMBIEN) 10 mg tablet TAKE 1 TABLET BY MOUTH  DAILY AT BEDTIME AS NEEDED  FOR SLEEP 10/21/22  Yes Edin Fields MD   Trelegy Ellipta 405-93 6-78 MCG/INH inhaler USE 1 INHALATION BY MOUTH  DAILY RINSE MOUTH AFTER USE  Patient not taking: No sig reported 3/11/22   Haven Barrera PA-C   nicotine (NICODERM CQ) 21 mg/24 hr TD 24 hr patch Place 1 patch on the skin every 24 hours  12/5/19  Historical Provider, MD       Subjective:     Review of Systems:    General: negative for - chills, fatigue, fever,  weight gain or weight loss  Psychological: negative for - anxiety, behavioral disorder, concentration difficulties, decreased libido, depression, irritability, memory difficulties, mood swings, sleep disturbances or suicidal ideation  ENT: negative for - hearing difficulties , nasal congestion, nasal discharge, oral lesions, sinus pain, sneezing, sore throat  Allergy and Immunology: negative for - hives, insect bite sensitivity,  Hematological and Lymphatic: negative for - bleeding problems, blood clots,bruising, swollen lymph nodes  Endocrine: negative for - hair pattern changes, hot flashes, malaise/lethargy, mood swings, palpitations, polydipsia/polyuria, skin changes, temperature intolerance or unexpected weight change  Respiratory: negative for - cough, hemoptysis, orthopnea, shortness of breath, or wheezing  Cardiovascular: negative for - chest pain, dyspnea on exertion, edema,  Gastrointestinal: negative for - abdominal pain, nausea/vomiting  Genito-Urinary: negative for - dysuria, incontinence, irregular/heavy menses or urinary frequency/urgency  Musculoskeletal: negative for - gait disturbance, joint pain, joint stiffness, joint swelling, muscle pain, muscular weakness  Dermatological:  As in HPI  Neurological: negative for confusion, dizziness, headaches, impaired coordination/balance, memory loss, numbness/tingling, seizures, speech problems, tremors or weakness       Objective:   Ht 5' 11" (1 803 m)   Wt 104 kg (229 lb)   BMI 31 94 kg/m²     Physical Exam:    General Appearance:    Alert, cooperative, no distress   Head:    Normocephalic, without obvious abnormality, atraumatic   Lymphatics:    No lymphadenopathy noted      Abdomen:   No hepatosplenomegaly   Skin:   A full skin exam was performed including scalp, head scalp, eyes, ears, nose, lips, neck, chest, axilla, abdomen, back, buttocks, bilateral upper extremities, bilateral lower extremities, hands, feet, fingers, toes, fingernails, and toenails scaling erythematous patches with poikiloderma is changes involving probably 30% of body surface area with plaques appear have disappeared that when noted previously no other concerns at this time     Assessment:     1  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)  CBC and differential    Comprehensive metabolic panel    Cholesterol, total    Triglycerides    TSH, 3rd generation with Free T4 reflex    T3   2  Seborrheic keratosis     3  Screening for skin condition     4   History of skin cancer Plan:   Mycosis fungoides continue to monitor blood work continue with bexarotene and follow-up again in 4 months repeat blood work at this time  Seborrheic keratosis patient reassured these are normal growths we acquire with age no treatment needed  History of skin cancer in no recurrence nothing else atypical sunblock recommended follow-up in4 months  Screening for dermatologic disorders nothing else of concern noted on complete exam follow-up in4 months    Katie Abrams  10/25/2022,1:34 PM    Portions of the record may have been created with voice recognition software   Occasional wrong word or "sound a like" substitutions may have occurred due to the inherent limitations of voice recognition software   Read the chart carefully and recognize, using context, where substitutions have occurred

## 2022-10-25 NOTE — PROGRESS NOTES
[place] [address] [facility] Clinic Note     Patient Name: Aries Hester  Encounter Date: October 25, 2022     Have you been cared for by a [place] [address] Dermatologist in the last 3 years and, if so, which description applies to you? Yes  I have been here within the last 3 years, and my medical history has NOT changed since that time  I am MALE/not capable of bearing children  REVIEW OF SYSTEMS:  Have you recently had or currently have any of the following? · No changes in my recent health  PAST MEDICAL HISTORY:  Have you personally ever had or currently have any of the following? If "YES," then please provide more detail  · No changes in my medical history  FAMILY HISTORY:  Any "first degree relatives" (parent, brother, sister, or child) with the following? • No changes in my family's known health  PATIENT EXPERIENCE:    • Do you want the Dermatologist to perform a COMPLETE skin exam today including a clinical examination under the "bra and underwear" areas? Yes  • If necessary, do we have your permission to call and leave a detailed message on your Preferred Phone number that includes your specific medical information? Yes      No Known Allergies   Current Outpatient Medications:   •  albuterol (2 5 mg/3 mL) 0 083 % nebulizer solution, USE 1 VIAL VIA NEBULIZER 4 TIMES A DAY AS NEEDED, Disp: 1080 mL, Rfl: 3  •  aspirin 81 MG tablet, Take 81 mg by mouth daily, Disp: , Rfl:   •  atorvastatin (LIPITOR) 40 mg tablet, Take 1 tablet (40 mg total) by mouth daily, Disp: 90 tablet, Rfl: 3  •  bexarotene (Targretin) 75 mg capsule, Take 8 capsules (600 mg total) by mouth daily, Disp: 240 capsule, Rfl: 9  •  Cholecalciferol 25 MCG (1000 UT) capsule, Take 1 capsule by mouth daily, Disp: , Rfl:   •  cyanocobalamin (VITAMIN B-12) 1,000 mcg tablet, Take 1,000 mcg by mouth daily, Disp: , Rfl:   •  diclofenac potassium (CATAFLAM) 50 mg tablet, 1 tablet daily for severe pain    Not to exceed 2 tablets a week , Disp: 30 tablet, Rfl: 0  •  Entresto 49-51 MG TABS, TAKE 1 TABLET BY MOUTH  TWICE DAILY, Disp: 180 tablet, Rfl: 3  •  ferrous sulfate 324 (65 Fe) mg, Take 1 tablet by mouth Twice daily, Disp: , Rfl:   •  gabapentin (NEURONTIN) 100 mg capsule, Take 1 capsule (100 mg total) by mouth 3 (three) times a day, Disp: 270 capsule, Rfl: 3  •  levothyroxine 112 mcg tablet, TAKE 1 TABLET BY MOUTH  DAILY, Disp: 90 tablet, Rfl: 3  •  metoprolol succinate (TOPROL-XL) 50 mg 24 hr tablet, Take 1 tablet (50 mg total) by mouth daily, Disp: 180 tablet, Rfl: 0  •  montelukast (SINGULAIR) 10 mg tablet, TAKE 1 TABLET BY MOUTH  DAILY AT BEDTIME, Disp: 90 tablet, Rfl: 3  •  Multiple Vitamins-Minerals (OCUVITE ADULT 50+ PO), Take by mouth, Disp: , Rfl:   •  potassium chloride (K-DUR,KLOR-CON) 20 mEq tablet, Take 1 tablet (20 mEq total) by mouth daily as needed (weight gain, leg swelling), Disp: 90 tablet, Rfl: 3  •  torsemide (DEMADEX) 20 mg tablet, TAKE 1 TABLET BY MOUTH EVERY DAY AS NEEDED FOR WEIGHT GAIN, LEG SWELLING, Disp: 90 tablet, Rfl: 1  •  triamcinolone (KENALOG) 0 1 % ointment, Apply topically 2 (two) times 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, Disp: 454 g, Rfl: 3  •  Vitamins-Lipotropics (LIPOFLAVONOID PO), Take by mouth daily, Disp: , Rfl:   •  zolpidem (AMBIEN) 10 mg tablet, TAKE 1 TABLET BY MOUTH  DAILY AT BEDTIME AS NEEDED  FOR SLEEP, Disp: 90 tablet, Rfl: 1  •  Trelegy Ellipta 100-62 5-25 MCG/INH inhaler, USE 1 INHALATION BY MOUTH  DAILY RINSE MOUTH AFTER USE (Patient not taking: No sig reported), Disp: 180 each, Rfl: 3          • Whom besides the patient is providing clinical information about today's encounter?   o NO ADDITIONAL HISTORIAN (patient alone provided history)    Physical Exam and Assessment/Plan by Diagnosis:

## 2022-10-25 NOTE — PATIENT INSTRUCTIONS
Mycosis fungoides continue to monitor blood work continue with bexarotene and follow-up again in 4 months repeat blood work at this time  Seborrheic keratosis patient reassured these are normal growths we acquire with age no treatment needed  History of skin cancer in no recurrence nothing else atypical sunblock recommended follow-up in4 months  Screening for dermatologic disorders nothing else of concern noted on complete exam follow-up in4 months

## 2022-10-27 ENCOUNTER — APPOINTMENT (OUTPATIENT)
Dept: LAB | Facility: CLINIC | Age: 77
End: 2022-10-27
Payer: COMMERCIAL

## 2022-10-27 ENCOUNTER — IN-CLINIC DEVICE VISIT (OUTPATIENT)
Dept: CARDIOLOGY CLINIC | Facility: CLINIC | Age: 77
End: 2022-10-27
Payer: COMMERCIAL

## 2022-10-27 DIAGNOSIS — Z95.810 PRESENCE OF IMPLANTABLE CARDIOVERTER-DEFIBRILLATOR (ICD): Primary | ICD-10-CM

## 2022-10-27 DIAGNOSIS — C84.08 MYCOSIS FUNGOIDES INVOLVING LYMPH NODES OF MULTIPLE REGIONS (HCC): ICD-10-CM

## 2022-10-27 LAB
ALBUMIN SERPL BCP-MCNC: 3.5 G/DL (ref 3.5–5)
ALP SERPL-CCNC: 76 U/L (ref 46–116)
ALT SERPL W P-5'-P-CCNC: 18 U/L (ref 12–78)
ANION GAP SERPL CALCULATED.3IONS-SCNC: 6 MMOL/L (ref 4–13)
AST SERPL W P-5'-P-CCNC: 11 U/L (ref 5–45)
BASOPHILS # BLD AUTO: 0.06 THOUSANDS/ÂΜL (ref 0–0.1)
BASOPHILS NFR BLD AUTO: 1 % (ref 0–1)
BILIRUB SERPL-MCNC: 0.37 MG/DL (ref 0.2–1)
BUN SERPL-MCNC: 20 MG/DL (ref 5–25)
CALCIUM SERPL-MCNC: 8.6 MG/DL (ref 8.3–10.1)
CHLORIDE SERPL-SCNC: 108 MMOL/L (ref 96–108)
CHOLEST SERPL-MCNC: 189 MG/DL
CO2 SERPL-SCNC: 25 MMOL/L (ref 21–32)
CREAT SERPL-MCNC: 1.22 MG/DL (ref 0.6–1.3)
EOSINOPHIL # BLD AUTO: 0.14 THOUSAND/ÂΜL (ref 0–0.61)
EOSINOPHIL NFR BLD AUTO: 2 % (ref 0–6)
ERYTHROCYTE [DISTWIDTH] IN BLOOD BY AUTOMATED COUNT: 13.5 % (ref 11.6–15.1)
GFR SERPL CREATININE-BSD FRML MDRD: 58 ML/MIN/1.73SQ M
GLUCOSE P FAST SERPL-MCNC: 104 MG/DL (ref 65–99)
HCT VFR BLD AUTO: 43.3 % (ref 36.5–49.3)
HGB BLD-MCNC: 13.3 G/DL (ref 12–17)
IMM GRANULOCYTES # BLD AUTO: 0.01 THOUSAND/UL (ref 0–0.2)
IMM GRANULOCYTES NFR BLD AUTO: 0 % (ref 0–2)
LYMPHOCYTES # BLD AUTO: 2.11 THOUSANDS/ÂΜL (ref 0.6–4.47)
LYMPHOCYTES NFR BLD AUTO: 25 % (ref 14–44)
MCH RBC QN AUTO: 29.7 PG (ref 26.8–34.3)
MCHC RBC AUTO-ENTMCNC: 30.7 G/DL (ref 31.4–37.4)
MCV RBC AUTO: 97 FL (ref 82–98)
MONOCYTES # BLD AUTO: 0.46 THOUSAND/ÂΜL (ref 0.17–1.22)
MONOCYTES NFR BLD AUTO: 5 % (ref 4–12)
NEUTROPHILS # BLD AUTO: 5.83 THOUSANDS/ÂΜL (ref 1.85–7.62)
NEUTS SEG NFR BLD AUTO: 67 % (ref 43–75)
NRBC BLD AUTO-RTO: 0 /100 WBCS
PLATELET # BLD AUTO: 292 THOUSANDS/UL (ref 149–390)
PMV BLD AUTO: 11 FL (ref 8.9–12.7)
POTASSIUM SERPL-SCNC: 4.4 MMOL/L (ref 3.5–5.3)
PROT SERPL-MCNC: 7.5 G/DL (ref 6.4–8.4)
RBC # BLD AUTO: 4.48 MILLION/UL (ref 3.88–5.62)
SODIUM SERPL-SCNC: 139 MMOL/L (ref 135–147)
T3 SERPL-MCNC: 0.9 NG/ML (ref 0.6–1.8)
TRIGL SERPL-MCNC: 169 MG/DL
TSH SERPL DL<=0.05 MIU/L-ACNC: 1.6 UIU/ML (ref 0.45–4.5)
WBC # BLD AUTO: 8.61 THOUSAND/UL (ref 4.31–10.16)

## 2022-10-27 PROCEDURE — 84478 ASSAY OF TRIGLYCERIDES: CPT

## 2022-10-27 PROCEDURE — 80053 COMPREHEN METABOLIC PANEL: CPT

## 2022-10-27 PROCEDURE — 93284 PRGRMG EVAL IMPLANTABLE DFB: CPT | Performed by: INTERNAL MEDICINE

## 2022-10-27 PROCEDURE — 84443 ASSAY THYROID STIM HORMONE: CPT

## 2022-10-27 PROCEDURE — 85025 COMPLETE CBC W/AUTO DIFF WBC: CPT

## 2022-10-27 PROCEDURE — 82465 ASSAY BLD/SERUM CHOLESTEROL: CPT

## 2022-10-27 PROCEDURE — 84480 ASSAY TRIIODOTHYRONINE (T3): CPT

## 2022-10-27 PROCEDURE — 36415 COLL VENOUS BLD VENIPUNCTURE: CPT

## 2022-10-27 NOTE — PROGRESS NOTES
MDT BIV-ICD / NOT MRI CONDITIONAL   DEVICE INTERROGATED IN THE [place] OFFICE:  BATTERY VOLTAGE ADEQUATE (18 MONTHS)   AP 97 7% BP 97 0% VSRP 2 1%   ALL LEAD PARAMETERS WITHIN NORMAL LIMITS   15 VT-NS EPISODES WITH AVAILABLE EGMS SHOWING NSVT (#292-11 @ 147 BPM, #289-7 @ 167 BPM), AND PAT (7 @ 158 BPM, 18 @ 150 BPM, 10 @ 150 BPM)    7 V SENSE EPISODES WITH MARKERS SHOWING NSVT (11 @ 146 BPM), FUSION, AND PVCS   OPTI-VOL WITHIN NORMAL LIMITS   INCREASE MADE TO LVAMPLITUDE TO PROVIDE ADEQUATE SAFETY MARGIN   NORMAL DEVICE FUNCTION   RG

## 2022-12-12 DIAGNOSIS — C84.08 MYCOSIS FUNGOIDES INVOLVING LYMPH NODES OF MULTIPLE REGIONS (HCC): Primary | ICD-10-CM

## 2022-12-12 RX ORDER — BEXAROTENE 75 MG/1
600 CAPSULE ORAL DAILY
Qty: 240 CAPSULE | Refills: 5 | Status: SHIPPED | OUTPATIENT
Start: 2022-12-12 | End: 2023-07-03

## 2022-12-13 ENCOUNTER — APPOINTMENT (OUTPATIENT)
Dept: LAB | Facility: CLINIC | Age: 77
End: 2022-12-13

## 2022-12-13 DIAGNOSIS — N18.9 CHRONIC KIDNEY DISEASE-MINERAL AND BONE DISORDER: ICD-10-CM

## 2022-12-13 DIAGNOSIS — E83.9 CHRONIC KIDNEY DISEASE-MINERAL AND BONE DISORDER: ICD-10-CM

## 2022-12-13 DIAGNOSIS — N18.30 STAGE 3 CHRONIC KIDNEY DISEASE, UNSPECIFIED WHETHER STAGE 3A OR 3B CKD (HCC): ICD-10-CM

## 2022-12-13 DIAGNOSIS — M89.9 CHRONIC KIDNEY DISEASE-MINERAL AND BONE DISORDER: ICD-10-CM

## 2022-12-13 LAB
25(OH)D3 SERPL-MCNC: 30.6 NG/ML (ref 30–100)
ANION GAP SERPL CALCULATED.3IONS-SCNC: 4 MMOL/L (ref 4–13)
BACTERIA UR QL AUTO: ABNORMAL /HPF
BASOPHILS # BLD AUTO: 0.08 THOUSANDS/ÂΜL (ref 0–0.1)
BASOPHILS NFR BLD AUTO: 1 % (ref 0–1)
BILIRUB UR QL STRIP: NEGATIVE
BUN SERPL-MCNC: 18 MG/DL (ref 5–25)
CALCIUM SERPL-MCNC: 8.1 MG/DL (ref 8.3–10.1)
CHLORIDE SERPL-SCNC: 110 MMOL/L (ref 96–108)
CLARITY UR: ABNORMAL
CO2 SERPL-SCNC: 28 MMOL/L (ref 21–32)
COLOR UR: YELLOW
CREAT SERPL-MCNC: 1.53 MG/DL (ref 0.6–1.3)
CREAT UR-MCNC: 97.1 MG/DL
EOSINOPHIL # BLD AUTO: 0.2 THOUSAND/ÂΜL (ref 0–0.61)
EOSINOPHIL NFR BLD AUTO: 2 % (ref 0–6)
ERYTHROCYTE [DISTWIDTH] IN BLOOD BY AUTOMATED COUNT: 13.5 % (ref 11.6–15.1)
GFR SERPL CREATININE-BSD FRML MDRD: 43 ML/MIN/1.73SQ M
GLUCOSE P FAST SERPL-MCNC: 79 MG/DL (ref 65–99)
GLUCOSE UR STRIP-MCNC: NEGATIVE MG/DL
HCT VFR BLD AUTO: 39.8 % (ref 36.5–49.3)
HGB BLD-MCNC: 12.3 G/DL (ref 12–17)
HGB UR QL STRIP.AUTO: ABNORMAL
HYALINE CASTS #/AREA URNS LPF: ABNORMAL /LPF
IMM GRANULOCYTES # BLD AUTO: 0.03 THOUSAND/UL (ref 0–0.2)
IMM GRANULOCYTES NFR BLD AUTO: 0 % (ref 0–2)
KETONES UR STRIP-MCNC: NEGATIVE MG/DL
LEUKOCYTE ESTERASE UR QL STRIP: ABNORMAL
LYMPHOCYTES # BLD AUTO: 1.94 THOUSANDS/ÂΜL (ref 0.6–4.47)
LYMPHOCYTES NFR BLD AUTO: 20 % (ref 14–44)
MCH RBC QN AUTO: 29.6 PG (ref 26.8–34.3)
MCHC RBC AUTO-ENTMCNC: 30.9 G/DL (ref 31.4–37.4)
MCV RBC AUTO: 96 FL (ref 82–98)
MONOCYTES # BLD AUTO: 0.51 THOUSAND/ÂΜL (ref 0.17–1.22)
MONOCYTES NFR BLD AUTO: 5 % (ref 4–12)
NEUTROPHILS # BLD AUTO: 6.73 THOUSANDS/ÂΜL (ref 1.85–7.62)
NEUTS SEG NFR BLD AUTO: 72 % (ref 43–75)
NITRITE UR QL STRIP: NEGATIVE
NON-SQ EPI CELLS URNS QL MICRO: ABNORMAL /HPF
NRBC BLD AUTO-RTO: 0 /100 WBCS
PH UR STRIP.AUTO: 5.5 [PH]
PHOSPHATE SERPL-MCNC: 2.9 MG/DL (ref 2.3–4.1)
PLATELET # BLD AUTO: 411 THOUSANDS/UL (ref 149–390)
PMV BLD AUTO: 10.1 FL (ref 8.9–12.7)
POTASSIUM SERPL-SCNC: 3.7 MMOL/L (ref 3.5–5.3)
PROT UR STRIP-MCNC: ABNORMAL MG/DL
PROT UR-MCNC: 27 MG/DL
PROT/CREAT UR: 0.28 MG/G{CREAT} (ref 0–0.1)
PTH-INTACT SERPL-MCNC: 163.9 PG/ML (ref 18.4–80.1)
RBC # BLD AUTO: 4.15 MILLION/UL (ref 3.88–5.62)
RBC #/AREA URNS AUTO: ABNORMAL /HPF
SODIUM SERPL-SCNC: 142 MMOL/L (ref 135–147)
SP GR UR STRIP.AUTO: 1.01 (ref 1–1.03)
UROBILINOGEN UR STRIP-ACNC: <2 MG/DL
WBC # BLD AUTO: 9.49 THOUSAND/UL (ref 4.31–10.16)
WBC #/AREA URNS AUTO: ABNORMAL /HPF

## 2022-12-29 ENCOUNTER — TELEPHONE (OUTPATIENT)
Dept: NEPHROLOGY | Facility: CLINIC | Age: 77
End: 2022-12-29

## 2022-12-30 ENCOUNTER — OFFICE VISIT (OUTPATIENT)
Dept: NEPHROLOGY | Facility: CLINIC | Age: 77
End: 2022-12-30

## 2022-12-30 VITALS
HEART RATE: 85 BPM | SYSTOLIC BLOOD PRESSURE: 128 MMHG | BODY MASS INDEX: 31.95 KG/M2 | WEIGHT: 228.2 LBS | TEMPERATURE: 97.8 F | DIASTOLIC BLOOD PRESSURE: 70 MMHG | OXYGEN SATURATION: 98 % | HEIGHT: 71 IN

## 2022-12-30 DIAGNOSIS — N18.30 STAGE 3 CHRONIC KIDNEY DISEASE, UNSPECIFIED WHETHER STAGE 3A OR 3B CKD (HCC): Primary | ICD-10-CM

## 2022-12-30 DIAGNOSIS — I42.0 DILATED CARDIOMYOPATHY (HCC): ICD-10-CM

## 2022-12-30 DIAGNOSIS — N40.0 BENIGN PROSTATIC HYPERPLASIA WITHOUT LOWER URINARY TRACT SYMPTOMS: ICD-10-CM

## 2022-12-30 DIAGNOSIS — I10 PRIMARY HYPERTENSION: ICD-10-CM

## 2022-12-30 DIAGNOSIS — N18.9 CHRONIC KIDNEY DISEASE-MINERAL AND BONE DISORDER: ICD-10-CM

## 2022-12-30 DIAGNOSIS — J44.9 CHRONIC OBSTRUCTIVE PULMONARY DISEASE, UNSPECIFIED COPD TYPE (HCC): ICD-10-CM

## 2022-12-30 DIAGNOSIS — M89.9 CHRONIC KIDNEY DISEASE-MINERAL AND BONE DISORDER: ICD-10-CM

## 2022-12-30 DIAGNOSIS — E83.9 CHRONIC KIDNEY DISEASE-MINERAL AND BONE DISORDER: ICD-10-CM

## 2022-12-30 NOTE — ASSESSMENT & PLAN NOTE
Lab Results   Component Value Date    EGFR 43 12/13/2022    EGFR 58 10/27/2022    EGFR 53 06/02/2022    CREATININE 1 53 (H) 12/13/2022    CREATININE 1 22 10/27/2022    CREATININE 1 30 06/02/2022   PTH and phosphorus along with vitamin D are within acceptable range and will continue to monitor

## 2022-12-30 NOTE — ASSESSMENT & PLAN NOTE
Lab Results   Component Value Date    EGFR 43 12/13/2022    EGFR 58 10/27/2022    EGFR 53 06/02/2022    CREATININE 1 53 (H) 12/13/2022    CREATININE 1 22 10/27/2022    CREATININE 1 30 06/02/2022   Renal function is stable overall with some fluctuation and as part of the cardiorenal syndrome  Asymptomatic and progressive nature of kidney disease discussed with the patient    Advised cautious hydration and avoiding nephrotoxic medication

## 2022-12-30 NOTE — PROGRESS NOTES
NEPHROLOGY OFFICE FOLLOW UP  Tomasa Opitz 78 y o  male MRN: [id number]    Encounter: [id number] 12/30/2022    REASON FOR VISIT: Tomasa Opitz is a 78 y o  male who is here on 12/30/2022 for No chief complaint on file  Nirmala Kapoor HPI:    Rocio Wilson came in today for follow-up of CKD  78 gentleman with history of CKD stage III who also has a cardiomyopathy and COPD    Denies any new complaint    No chest pain no palpitation no shortness of breath    No nausea no vomiting    No abdominal discomfort      REVIEW OF SYSTEMS:    Review of Systems   Constitutional: Negative for activity change and fatigue  HENT: Negative for congestion and ear discharge  Eyes: Negative for photophobia and pain  Respiratory: Negative for apnea and choking  Cardiovascular: Negative for chest pain and palpitations  Gastrointestinal: Negative for abdominal distention and blood in stool  Endocrine: Negative for heat intolerance and polyphagia  Genitourinary: Negative for flank pain and urgency  Musculoskeletal: Negative for neck pain and neck stiffness  Skin: Negative for color change and wound  Allergic/Immunologic: Negative for food allergies and immunocompromised state  Neurological: Negative for seizures and facial asymmetry  Hematological: Negative for adenopathy  Does not bruise/bleed easily  Psychiatric/Behavioral: Negative for self-injury and suicidal ideas           PAST MEDICAL HISTORY:  Past Medical History:   Diagnosis Date   • Agent orange exposure    • Anemia    • Benign colon polyp    • BPH (benign prostatic hyperplasia)    • Bradycardia    • Cardiomyopathy [facility])    • Chest discomfort    • CHF (congestive heart failure) (HCC)    • Chronic bronchitis (HCC)    • Chronic kidney disease    • COPD (chronic obstructive pulmonary disease) ([facility] )     LAST ASSESSED: 9/9/17   • Coronary artery disease cardio myopthia   • Emphysema of lung ([facility] ) 2013   • H/O nonmelanoma skin cancer     LAST ASSESSED: 11/7/17   • HHD (hypertensive heart disease)    • HTN (hypertension)    • Hx of lymphoma    • Hyperlipidemia    • Hypertension    • ICD (implantable cardioverter-defibrillator) in place    • Insomnia    • Mycosis fungoides (HCC)    • PVC (premature ventricular contraction)    • PVT (paroxysmal ventricular tachycardia)    • SOB (shortness of breath)        PAST SURGICAL HISTORY:  Past Surgical History:   Procedure Laterality Date   • CARDIAC PACEMAKER PLACEMENT     • SKIN SURGERY      skin cancer removal        SOCIAL HISTORY:  Social History     Substance and Sexual Activity   Alcohol Use Not Currently   • Alcohol/week: 14 0 standard drinks   • Types: 14 Shots of liquor per week    Comment: two drinks every night over a 4 hour period     Social History     Substance and Sexual Activity   Drug Use Never     Social History     Tobacco Use   Smoking Status Every Day   • Packs/day: 0 50   • Years: 35 00   • Pack years: 17 50   • Types: Cigarettes   Smokeless Tobacco Never   Tobacco Comments    HALF A PACK PER DAY        FAMILY HISTORY:  Family History   Problem Relation Age of Onset   • Diabetes Mother    • Colon cancer Father         DIAGNOSED IN HIS 80'S   • Heart failure Father    • Coronary artery disease Father    • Cancer Father         Jeovany Cote   • Diabetes Family         MELLITUS   • Heart attack Neg Hx    • Stroke Neg Hx    • Anuerysm Neg Hx    • Clotting disorder Neg Hx    • Arrhythmia Neg Hx    • Hypertension Neg Hx         pt unsure    • Hyperlipidemia Neg Hx    • Sudden death Neg Hx         scd       MEDICATIONS:    Current Outpatient Medications:   •  albuterol (2 5 mg/3 mL) 0 083 % nebulizer solution, USE 1 VIAL VIA NEBULIZER 4 TIMES A DAY AS NEEDED, Disp: 1080 mL, Rfl: 3  •  aspirin 81 MG tablet, Take 81 mg by mouth daily, Disp: , Rfl:   •  atorvastatin (LIPITOR) 40 mg tablet, Take 1 tablet (40 mg total) by mouth daily, Disp: 90 tablet, Rfl: 3  •  bexarotene (Targretin) 75 mg capsule, Take 8 capsules (600 mg total) by mouth daily, Disp: 240 capsule, Rfl: 9  •  bexarotene (Targretin) 75 mg capsule, Take 8 capsules (600 mg total) by mouth daily, Disp: 240 capsule, Rfl: 5  •  Cholecalciferol 25 MCG (1000 UT) capsule, Take 1 capsule by mouth daily, Disp: , Rfl:   •  cyanocobalamin (VITAMIN B-12) 1,000 mcg tablet, Take 1,000 mcg by mouth daily, Disp: , Rfl:   •  diclofenac potassium (CATAFLAM) 50 mg tablet, 1 tablet daily for severe pain    Not to exceed 2 tablets a week , Disp: 30 tablet, Rfl: 0  •  Entresto 49-51 MG TABS, TAKE 1 TABLET BY MOUTH  TWICE DAILY, Disp: 180 tablet, Rfl: 3  •  ferrous sulfate 324 (65 Fe) mg, Take 1 tablet by mouth Twice daily, Disp: , Rfl:   •  gabapentin (NEURONTIN) 100 mg capsule, Take 1 capsule (100 mg total) by mouth 3 (three) times a day, Disp: 270 capsule, Rfl: 3  •  levothyroxine 112 mcg tablet, TAKE 1 TABLET BY MOUTH  DAILY, Disp: 90 tablet, Rfl: 3  •  metoprolol succinate (TOPROL-XL) 50 mg 24 hr tablet, Take 1 tablet (50 mg total) by mouth daily, Disp: 180 tablet, Rfl: 0  •  montelukast (SINGULAIR) 10 mg tablet, TAKE 1 TABLET BY MOUTH  DAILY AT BEDTIME, Disp: 90 tablet, Rfl: 3  •  Multiple Vitamins-Minerals (OCUVITE ADULT 50+ PO), Take by mouth, Disp: , Rfl:   •  potassium chloride (K-DUR,KLOR-CON) 20 mEq tablet, Take 1 tablet (20 mEq total) by mouth daily as needed (weight gain, leg swelling), Disp: 90 tablet, Rfl: 3  •  torsemide (DEMADEX) 20 mg tablet, TAKE 1 TABLET BY MOUTH EVERY DAY AS NEEDED FOR WEIGHT GAIN, LEG SWELLING, Disp: 90 tablet, Rfl: 1  •  triamcinolone (KENALOG) 0 1 % ointment, Apply topically 2 (two) times 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, Disp: 454 g, Rfl: 3  •  Vitamins-Lipotropics (LIPOFLAVONOID PO), Take by mouth daily, Disp: , Rfl:   •  zolpidem (AMBIEN) 10 mg tablet, TAKE 1 TABLET BY MOUTH  DAILY AT BEDTIME AS NEEDED  FOR SLEEP, Disp: 90 tablet, Rfl: 1  •  Trelegy Ellipta 100-62 5-25 MCG/INH inhaler, USE 1 INHALATION BY MOUTH  DAILY RINSE MOUTH AFTER USE (Patient not taking: No sig reported), Disp: 180 each, Rfl: 3    PHYSICAL EXAM:  Vitals:    12/30/22 1013   BP: 128/70   BP Location: Left arm   Patient Position: Sitting   Cuff Size: Standard   Pulse: 85   Temp: 97 8 °F (36 6 °C)   SpO2: 98%   Weight: 104 kg (228 lb 3 2 oz)   Height: 5' 11" (1 803 m)     Body mass index is 31 83 kg/m²  Physical Exam  Constitutional:       General: He is not in acute distress  Appearance: He is well-developed  HENT:      Head: Normocephalic  Eyes:      General: No scleral icterus  Conjunctiva/sclera: Conjunctivae normal    Neck:      Vascular: No JVD  Cardiovascular:      Rate and Rhythm: Normal rate  Heart sounds: Normal heart sounds  Pulmonary:      Effort: Pulmonary effort is normal       Breath sounds: No wheezing  Abdominal:      Palpations: Abdomen is soft  Tenderness: There is no abdominal tenderness  Musculoskeletal:         General: Normal range of motion  Cervical back: Neck supple  Skin:     General: Skin is warm  Findings: No rash  Neurological:      Mental Status: He is alert and oriented to person, place, and time     Psychiatric:         Behavior: Behavior normal          LAB RESULTS:  Results for orders placed or performed in visit on 76/64/51   Basic metabolic panel   Result Value Ref Range    Sodium 142 135 - 147 mmol/L    Potassium 3 7 3 5 - 5 3 mmol/L    Chloride 110 (H) 96 - 108 mmol/L    CO2 28 21 - 32 mmol/L    ANION GAP 4 4 - 13 mmol/L    BUN 18 5 - 25 mg/dL    Creatinine 1 53 (H) 0 60 - 1 30 mg/dL    Glucose, Fasting 79 65 - 99 mg/dL    Calcium 8 1 (L) 8 3 - 10 1 mg/dL    eGFR 43 ml/min/1 73sq m   CBC and differential   Result Value Ref Range    WBC 9 49 4 31 - 10 16 Thousand/uL    RBC 4 15 3 88 - 5 62 Million/uL    Hemoglobin 12 3 12 0 - 17 0 g/dL    Hematocrit 39 8 36 5 - 49 3 %    MCV 96 82 - 98 fL    MCH 29 6 26 8 - 34 3 pg    MCHC 30 9 (L) 31 4 - 37 4 g/dL    RDW 13 5 11 6 - 15 1 %    MPV 10 1 8 9 - 12 7 fL    Platelets 820 (H) 520 - 390 Thousands/uL    nRBC 0 /100 WBCs    Neutrophils Relative 72 43 - 75 %    Immat GRANS % 0 0 - 2 %    Lymphocytes Relative 20 14 - 44 %    Monocytes Relative 5 4 - 12 %    Eosinophils Relative 2 0 - 6 %    Basophils Relative 1 0 - 1 %    Neutrophils Absolute 6 73 1 85 - 7 62 Thousands/µL    Immature Grans Absolute 0 03 0 00 - 0 20 Thousand/uL    Lymphocytes Absolute 1 94 0 60 - 4 47 Thousands/µL    Monocytes Absolute 0 51 0 17 - 1 22 Thousand/µL    Eosinophils Absolute 0 20 0 00 - 0 61 Thousand/µL    Basophils Absolute 0 08 0 00 - 0 10 Thousands/µL   Phosphorus   Result Value Ref Range    Phosphorus 2 9 2 3 - 4 1 mg/dL   Protein / creatinine ratio, urine   Result Value Ref Range    Creatinine, Ur 97 1 mg/dL    Protein Urine Random 27 mg/dL    Prot/Creat Ratio, Ur 0 28 (H) 0 00 - 0 10   PTH, intact   Result Value Ref Range     9 (H) 18 4 - 80 1 pg/mL   UA (URINE) with reflex to Scope   Result Value Ref Range    Color, UA Yellow     Clarity, UA Turbid     Specific [place], UA 1 013 1 003 - 1 030    pH, UA 5 5 4 5, 5 0, 5 5, 6 0, 6 5, 7 0, 7 5, 8 0    Leukocytes, UA Large (A) Negative    Nitrite, UA Negative Negative    Protein, UA Trace (A) Negative mg/dl    Glucose, UA Negative Negative mg/dl    Ketones, UA Negative Negative mg/dl    Urobilinogen, UA <2 0 <2 0 mg/dl mg/dl    Bilirubin, UA Negative Negative    Occult Blood, UA Moderate (A) Negative   Vitamin D 25 hydroxy   Result Value Ref Range    Vit D, 25-Hydroxy 30 6 30 0 - 100 0 ng/mL   Urine Microscopic   Result Value Ref Range    RBC, UA Innumerable (A) None Seen, 1-2 /hpf    WBC, UA 30-50 (A) None Seen, 1-2 /hpf    Epithelial Cells Occasional None Seen, Occasional /hpf    Bacteria, UA Occasional None Seen, Occasional /hpf    Hyaline Casts, UA 0-3 (A) None Seen /lpf       ASSESSMENT and PLAN:      Stage 3 chronic kidney disease  Lab Results   Component Value Date    EGFR 43 12/13/2022    EGFR 58 10/27/2022    EGFR 53 06/02/2022    CREATININE 1 53 (H) 12/13/2022    CREATININE 1 22 10/27/2022    CREATININE 1 30 06/02/2022   Renal function is stable overall with some fluctuation and as part of the cardiorenal syndrome  Asymptomatic and progressive nature of kidney disease discussed with the patient  Advised cautious hydration and avoiding nephrotoxic medication    Chronic kidney disease-mineral and bone disorder  Lab Results   Component Value Date    EGFR 43 12/13/2022    EGFR 58 10/27/2022    EGFR 53 06/02/2022    CREATININE 1 53 (H) 12/13/2022    CREATININE 1 22 10/27/2022    CREATININE 1 30 06/02/2022   PTH and phosphorus along with vitamin D are within acceptable range and will continue to monitor    BPH (benign prostatic hyperplasia)  Asymptomatic at this point    COPD (chronic obstructive pulmonary disease) ([address] )  Stable and being monitored by pulmonologist    Cardiomyopathy ([facility] )  On diuretic  Volume management discussed with the patient    HTN (hypertension)  Very well controlled      Everything discussed at length with the patient  I will see him back in 6 months  We will get blood and urine test before that visit        Portions of the record may have been created with voice recognition software  Occasional wrong word or "sound a like" substitutions may have occurred due to the inherent limitations of voice recognition software  Read the chart carefully and recognize, using context, where substitutions have occurred  If you have any questions, please contact the dictating provider

## 2023-01-24 ENCOUNTER — OFFICE VISIT (OUTPATIENT)
Dept: INTERNAL MEDICINE CLINIC | Facility: CLINIC | Age: 78
End: 2023-01-24

## 2023-01-24 VITALS
SYSTOLIC BLOOD PRESSURE: 110 MMHG | DIASTOLIC BLOOD PRESSURE: 60 MMHG | BODY MASS INDEX: 31.78 KG/M2 | OXYGEN SATURATION: 98 % | HEIGHT: 71 IN | HEART RATE: 97 BPM | WEIGHT: 227 LBS

## 2023-01-24 DIAGNOSIS — Z00.00 HEALTH CARE MAINTENANCE: Primary | ICD-10-CM

## 2023-01-24 NOTE — PROGRESS NOTES
Assessment and Plan:     Problem List Items Addressed This Visit    None       Preventive health issues were discussed with patient, and age appropriate screening tests were ordered as noted in patient's After Visit Summary  Personalized health advice and appropriate referrals for health education or preventive services given if needed, as noted in patient's After Visit Summary  History of Present Illness:     Patient presents for a Medicare Wellness Visit    Well visit of a 72-year-old  He has various compensated diagnoses  Reduced ejection fraction without symptomatic heart failure  Mycosis fungoides treated  Chronic lung disease without active symptoms  Some osteoarthritis    Wellness parameters are updated    The 1 thing he wishes to discuss is seasonal affective disorder  He reports that for a number of years he has been feeling somewhat "blue "  He recognizes and endorses a diagnosis of depression  Trigger is essentially loneliness  His wife  8 years ago  He he has stepchildren and does not see them a lot  In the winter, it is too cold to get out to do things    We discussed this  He has no suicidal ideation and no major depressive symptoms  He is quite functional   I think that given the time of year and the fact that days are getting longer and the holiday season is over, its not the right time to initiate a trial of antidepressant medication  He does not physically want to see a therapist    I suggest he bring this up with a primary care physician, September or October  Patient Care Team:  Farooq Kerr MD as PCP - MD Alison Villalta MD Hulda Donalds, MD Marvin Deal, MD Vida Searles, MD Arcelia Luria, MD as Cardiologist (Cardiology)     Review of Systems:     Review of Systems   Constitutional: Positive for fatigue  Musculoskeletal: Positive for arthralgias  Skin: Positive for rash     Psychiatric/Behavioral: Positive for decreased concentration and dysphoric mood          Problem List:     Patient Active Problem List   Diagnosis   • Insomnia   • ICD (implantable cardioverter-defibrillator) in place   • Hyperlipidemia   • Hx of lymphoma   • HTN (hypertension)   • COPD (chronic obstructive pulmonary disease) (HCC)   • Chronic bronchitis (HCC)   • Cardiomyopathy ([place] [address] )   • BPH (benign prostatic hyperplasia)   • Seborrheic keratosis   • Mycosis fungoides ([place] [address] )   • History of skin cancer   • Screening for skin cancer   • Screening for skin condition   • Acquired hypothyroidism   • HHD (hypertensive heart disease)   • Prostate cancer screening   • Colon cancer screening   • Health care maintenance   • Squamous cell cancer of skin of forearm, left   • PVC (premature ventricular contraction)   • Acute kidney injury (ELLYN) with acute tubular necrosis (ATN) (HCC)   • COPD exacerbation (HCC)   • Systolic heart failure (HCC)   • Hypomagnesemia   • Chronic kidney disease-mineral and bone disorder   • Stage 3 chronic kidney disease (HCC)   • Shortness of breath   • Lumbar pain      Past Medical and Surgical History:     Past Medical History:   Diagnosis Date   • Agent orange exposure    • Anemia    • Benign colon polyp    • BPH (benign prostatic hyperplasia)    • Bradycardia    • Cardiomyopathy [facility])    • Chest discomfort    • CHF (congestive heart failure) ([facility])    • Chronic bronchitis (HCC)    • Chronic kidney disease    • COPD (chronic obstructive pulmonary disease) ([place] [address] )     LAST ASSESSED: 9/9/17   • Coronary artery disease cardio myopthia   • Emphysema of lung ([place] [address] ) 2013   • H/O nonmelanoma skin cancer     LAST ASSESSED: 11/7/17   • HHD (hypertensive heart disease)    • HTN (hypertension)    • Hx of lymphoma    • Hyperlipidemia    • ICD (implantable cardioverter-defibrillator) in place    • Insomnia    • Mycosis fungoides (HCC)    • PVC (premature ventricular contraction)    • PVT (paroxysmal ventricular tachycardia)    • SOB (shortness of breath)      Past Surgical History:   Procedure Laterality Date   • CARDIAC PACEMAKER PLACEMENT     • SKIN SURGERY      skin cancer removal       Family History:     Family History   Problem Relation Age of Onset   • Diabetes Mother    • Colon cancer Father         DIAGNOSED IN HIS [de-identified]   • Heart failure Father    • Coronary artery disease Father    • Cancer Father         Heaven Mccormick   • Diabetes Family         MELLITUS   • Heart attack Neg Hx    • Stroke Neg Hx    • Anuerysm Neg Hx    • Clotting disorder Neg Hx    • Arrhythmia Neg Hx    • Hypertension Neg Hx         pt unsure    • Hyperlipidemia Neg Hx    • Sudden death Neg Hx         scd      Social History:     Social History     Socioeconomic History   • Marital status:      Spouse name: None   • Number of children: 0   • Years of education: None   • Highest education level: None   Occupational History   • Occupation: retired   Tobacco Use   • Smoking status: Every Day     Packs/day: 0 50     Years: 35 00     Pack years: 17 50     Types: Cigarettes   • Smokeless tobacco: Never   • Tobacco comments:     HALF A PACK PER DAY    Vaping Use   • Vaping Use: Never used   Substance and Sexual Activity   • Alcohol use: Not Currently     Alcohol/week: 14 0 standard drinks     Types: 14 Shots of liquor per week     Comment: two drinks every night over a 4 hour period   • Drug use: Never   • Sexual activity: Not Currently     Partners: Female     Birth control/protection: Male Sterilization   Other Topics Concern   • None   Social History Narrative   • None     Social Determinants of Health     Financial Resource Strain: Low Risk    • Difficulty of Paying Living Expenses: Not very hard   Food Insecurity: Not on file   Transportation Needs: No Transportation Needs   • Lack of Transportation (Medical): No   • Lack of Transportation (Non-Medical):  No   Physical Activity: Not on file   Stress: Not on file   Social Connections: Not on file   Intimate Partner Violence: Not on file   Housing Stability: Not on file      Medications and Allergies:     Current Outpatient Medications   Medication Sig Dispense Refill   • albuterol (2 5 mg/3 mL) 0 083 % nebulizer solution USE 1 VIAL VIA NEBULIZER 4 TIMES A DAY AS NEEDED 1080 mL 3   • aspirin 81 MG tablet Take 81 mg by mouth daily     • atorvastatin (LIPITOR) 40 mg tablet Take 1 tablet (40 mg total) by mouth daily 90 tablet 3   • bexarotene (Targretin) 75 mg capsule Take 8 capsules (600 mg total) by mouth daily 240 capsule 9   • bexarotene (Targretin) 75 mg capsule Take 8 capsules (600 mg total) by mouth daily 240 capsule 5   • Cholecalciferol 25 MCG (1000 UT) capsule Take 1 capsule by mouth daily     • cyanocobalamin (VITAMIN B-12) 1,000 mcg tablet Take 1,000 mcg by mouth daily     • diclofenac potassium (CATAFLAM) 50 mg tablet 1 tablet daily for severe pain  Not to exceed 2 tablets a week   30 tablet 0   • Entresto 49-51 MG TABS TAKE 1 TABLET BY MOUTH  TWICE DAILY 180 tablet 3   • ferrous sulfate 324 (65 Fe) mg Take 1 tablet by mouth Twice daily     • gabapentin (NEURONTIN) 100 mg capsule Take 1 capsule (100 mg total) by mouth 3 (three) times a day 270 capsule 3   • levothyroxine 112 mcg tablet TAKE 1 TABLET BY MOUTH  DAILY 90 tablet 3   • metoprolol succinate (TOPROL-XL) 50 mg 24 hr tablet Take 1 tablet (50 mg total) by mouth daily 180 tablet 0   • montelukast (SINGULAIR) 10 mg tablet TAKE 1 TABLET BY MOUTH  DAILY AT BEDTIME 90 tablet 3   • Multiple Vitamins-Minerals (OCUVITE ADULT 50+ PO) Take by mouth     • potassium chloride (K-DUR,KLOR-CON) 20 mEq tablet Take 1 tablet (20 mEq total) by mouth daily as needed (weight gain, leg swelling) 90 tablet 3   • torsemide (DEMADEX) 20 mg tablet TAKE 1 TABLET BY MOUTH EVERY DAY AS NEEDED FOR WEIGHT GAIN, LEG SWELLING 90 tablet 1   • triamcinolone (KENALOG) 0 1 % ointment Apply topically 2 (two) times 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 454 g 3   • Vitamins-Lipotropics (LIPOFLAVONOID PO) Take by mouth daily     • zolpidem (AMBIEN) 10 mg tablet TAKE 1 TABLET BY MOUTH  DAILY AT BEDTIME AS NEEDED  FOR SLEEP 90 tablet 1   • Trelegy Ellipta 100-62 5-25 MCG/INH inhaler USE 1 INHALATION BY MOUTH  DAILY RINSE MOUTH AFTER USE (Patient not taking: No sig reported) 180 each 3     No current facility-administered medications for this visit  No Known Allergies   Immunizations:     Immunization History   Administered Date(s) Administered   • COVID-19 PFIZER VACCINE 0 3 ML IM 03/16/2021, 04/07/2021, 08/20/2021   • COVID-19 Pfizer Vac BIVALENT Maikel-sucrose 12 Yr+ IM (BOOSTER ONLY) 01/16/2023   • COVID-19 Pfizer vac (Maikel-sucrose, gray cap) 12 yr+ IM 04/15/2022, 04/15/2022   • INFLUENZA 09/24/2018, 09/08/2020, 10/20/2022   • Influenza Split High Dose Preservative Free IM 10/17/2016, 09/09/2017, 09/24/2018, 09/13/2019   • Influenza, high dose seasonal 0 7 mL 10/25/2021, 10/20/2022   • Influenza, seasonal, injectable 10/14/2015   • Pneumococcal Conjugate 13-Valent 07/27/2020   • Pneumococcal Conjugate PCV 7 10/14/2015   • Tdap 1945      Health Maintenance:         Topic Date Due   • Colorectal Cancer Screening  07/27/2022   • Hepatitis C Screening  Completed   • Lung Cancer Screening  Discontinued         Topic Date Due   • Pneumococcal Vaccine: 65+ Years (2 - PPSV23 if available, else PCV20) 07/27/2021   • COVID-19 Vaccine (5 - Booster for Ayala Peter series) 06/10/2022      Medicare Screening Tests and Risk Assessments:     Roland Hernández is here for his Subsequent Wellness visit  Last Medicare Wellness visit information reviewed, patient interviewed and updates made to the record today  Health Risk Assessment:   Patient rates overall health as fair  Patient feels that their physical health rating is same  Patient is satisfied with their life  Eyesight was rated as same  Hearing was rated as same  Patient feels that their emotional and mental health rating is same  Patients states they are never, rarely angry   Patient states they are sometimes unusually tired/fatigued  Pain experienced in the last 7 days has been some  Patient's pain rating has been 3/10  Patient states that he has experienced no weight loss or gain in last 6 months  Depression Screening:   PHQ-2 Score: 2      Fall Risk Screening: In the past year, patient has experienced: no history of falling in past year      Home Safety:  Patient does not have trouble with stairs inside or outside of their home  Patient has working smoke alarms and has working carbon monoxide detector  Home safety hazards include: none  Nutrition:   Current diet is Regular, No Added Salt and Limited junk food  Medications:   Patient is not currently taking any over-the-counter supplements  Patient is able to manage medications  Activities of Daily Living (ADLs)/Instrumental Activities of Daily Living (IADLs):   Walk and transfer into and out of bed and chair?: Yes  Dress and groom yourself?: Yes    Bathe or shower yourself?: Yes    Feed yourself? Yes  Do your laundry/housekeeping?: Yes  Manage your money, pay your bills and track your expenses?: Yes  Make your own meals?: Yes    Do your own shopping?: Yes    Previous Hospitalizations:   Any hospitalizations or ED visits within the last 12 months?: No      Advance Care Planning:   Living will: Yes    Durable POA for healthcare:  Yes    Advanced directive: No      Cognitive Screening:   Provider or family/friend/caregiver concerned regarding cognition?: No    PREVENTIVE SCREENINGS      Cardiovascular Screening:    General: Screening Not Indicated and History Lipid Disorder      Diabetes Screening:     General: Screening Current      Colorectal Cancer Screening:     General: Screening Current      Prostate Cancer Screening:    General: Screening Not Indicated      Osteoporosis Screening:    General: Screening Not Indicated      Abdominal Aortic Aneurysm (AAA) Screening:    Risk factors include: age between 73-67 yo and tobacco use        Lung Cancer Screening: General: Screening Not Indicated      Hepatitis C Screening:    General: Screening Current    Screening, Brief Intervention, and Referral to Treatment (SBIRT)    Screening  Typical number of drinks in a day: 0  Typical number of drinks in a week: 0  Interpretation: Low risk drinking behavior  AUDIT-C Screenin) How often did you have a drink containing alcohol in the past year? never  2) How many drinks did you have on a typical day when you were drinking in the past year? 0  3) How often did you have 6 or more drinks on one occasion in the past year? never    AUDIT-C Score: 0  Interpretation: Score 0-3 (male): Negative screen for alcohol misuse    Single Item Drug Screening:  How often have you used an illegal drug (including marijuana) or a prescription medication for non-medical reasons in the past year? never    Single Item Drug Screen Score: 0  Interpretation: Negative screen for possible drug use disorder    No results found  Physical Exam:     /60 (BP Location: Left arm, Patient Position: Sitting)   Pulse 97   Ht 5' 11" (1 803 m)   Wt 103 kg (227 lb)   SpO2 98%   BMI 31 66 kg/m²     Physical Exam  Constitutional:       Appearance: Normal appearance  He is not ill-appearing or toxic-appearing  Comments: Moderately overweight male patient who appears to be the stated age   HENT:      Head: Normocephalic and atraumatic  Eyes:      General: No scleral icterus  Cardiovascular:      Rate and Rhythm: Normal rate  Pulmonary:      Effort: Pulmonary effort is normal    Musculoskeletal:         General: Normal range of motion  Skin:     Comments: Fuhs areas of erythema  Some scaling   Neurological:      General: No focal deficit present  Mental Status: He is alert     Psychiatric:         Mood and Affect: Mood normal           Tanya Reyes MD

## 2023-02-07 ENCOUNTER — OFFICE VISIT (OUTPATIENT)
Dept: CARDIOLOGY CLINIC | Facility: CLINIC | Age: 78
End: 2023-02-07

## 2023-02-07 ENCOUNTER — REMOTE DEVICE CLINIC VISIT (OUTPATIENT)
Dept: CARDIOLOGY CLINIC | Facility: CLINIC | Age: 78
End: 2023-02-07

## 2023-02-07 VITALS
DIASTOLIC BLOOD PRESSURE: 72 MMHG | HEIGHT: 71 IN | HEART RATE: 84 BPM | RESPIRATION RATE: 16 BRPM | OXYGEN SATURATION: 96 % | WEIGHT: 224 LBS | BODY MASS INDEX: 31.36 KG/M2 | SYSTOLIC BLOOD PRESSURE: 118 MMHG

## 2023-02-07 DIAGNOSIS — I47.29 NSVT (NONSUSTAINED VENTRICULAR TACHYCARDIA): ICD-10-CM

## 2023-02-07 DIAGNOSIS — Z95.810 CARDIAC RESYNCHRONIZATION THERAPY DEFIBRILLATOR (CRT-D) IN PLACE: ICD-10-CM

## 2023-02-07 DIAGNOSIS — I10 ESSENTIAL HYPERTENSION: ICD-10-CM

## 2023-02-07 DIAGNOSIS — I42.8 NON-ISCHEMIC CARDIOMYOPATHY (HCC): ICD-10-CM

## 2023-02-07 DIAGNOSIS — Z95.810 PRESENCE OF IMPLANTABLE CARDIOVERTER-DEFIBRILLATOR (ICD): Primary | ICD-10-CM

## 2023-02-07 DIAGNOSIS — I50.22 CHRONIC SYSTOLIC CONGESTIVE HEART FAILURE (HCC): Primary | ICD-10-CM

## 2023-02-07 DIAGNOSIS — E78.2 MIXED HYPERLIPIDEMIA: ICD-10-CM

## 2023-02-07 RX ORDER — METOPROLOL SUCCINATE 50 MG/1
75 TABLET, EXTENDED RELEASE ORAL DAILY
Qty: 135 TABLET | Refills: 3 | Status: SHIPPED | OUTPATIENT
Start: 2023-02-07

## 2023-02-07 NOTE — PROGRESS NOTES
Results for orders placed or performed in visit on 02/07/23   Cardiac EP device report    Narrative    MDT BIV-ICD / NOT MRI CONDITIONAL  CAREL[employer] TRANSMISSION: BATTERY VOLTAGE ADEQUATE (13 MOS)  AP 96 8% BVP 99 4% ( 96 2% + VSR PACE 3 2%); ALL AVAILABLE LEAD PARAMETERS WITHIN NORMAL LIMITS  15 VT-NS EPISODES WITH 3 MOST RECENT EPISODES SHOWING NSVT, 11 @ 164 BPM, 26 @ 154 BPM, 14 @ 143 BPM  OTHER 2 EGM'S FOR PAT, 15 @ 160 BPM, 15 @ 167 BPM  58 V-SENSE EVENTS WITH MARKERS FOR NSVT, FUSION, PVC'S  EF 30-35% (4/2021 ECHO); PATIENT TAKES ASA 81, METOPROLOL SUCC  OPTI-VOL FLUID THRESHOLD WAS CROSSED BUT NOW WITHIN NORMAL RANGE  NORMAL DEVICE FUNCTION    ES

## 2023-02-07 NOTE — PROGRESS NOTES
CARDIOLOGY OFFICE VISIT  [facility]  [facility], [address]  [place] [address], [place], [facility] Romeo Page  Tel: [phone]      NAME: Alyssa Gayle  AGE: 78 y o  SEX: male  : 1945  MRN: [id number]      Chief Complaint:  Chief Complaint   Patient presents with   • Follow-up         History of Present Illness:   Patient comes for follow up  States he is doing well from cardiac stand point and denies chest pain / pressure, SOB, palpitations, lightheadedness, syncope, swelling feet, orthopnea, PND, claudication  Chronic systolic HF - States currently his wt is at his baseline  No SOB  On BB, Entretso, torsemide prn, potassium prn   Used to follow with Dr Ernestine Mars who has now left the area     NICMP s/p CRT-D - He had a [employer] BiV ICD implanted in 2011 and changed in 2014  He has been following up with Dr Guy Seip for the devise interrogations    On BB, Entresto  Episodes of NSVT picked up recently on device tranmissions    Essential hypertension -  Has been hypertensive for many years  Taking medications regularly  Denies lightheadedness, headache, medication side effects  Mixed hyperlipidemia -  Has had hyperlipidemia for many years  Taking statin regularly along with diet control  Denies myalgia  PCP closely monitoring the blood work        Past Medical History:  Past Medical History:   Diagnosis Date   • Agent orange exposure    • Anemia    • Benign colon polyp    • BPH (benign prostatic hyperplasia)    • Bradycardia    • Cardiomyopathy [facility])    • Chest discomfort    • CHF (congestive heart failure) (HCC)    • Chronic bronchitis (HCC)    • Chronic kidney disease    • COPD (chronic obstructive pulmonary disease) ([facility] )     LAST ASSESSED: 17   • Coronary artery disease cardio myopthia   • Emphysema of lung ([facility] )    • H/O nonmelanoma skin cancer     LAST ASSESSED: 17   • HHD (hypertensive heart disease)    • HTN (hypertension)    • Hx of lymphoma    • Hyperlipidemia    • ICD (implantable cardioverter-defibrillator) in place    • Insomnia    • Mycosis fungoides (HCC)    • PVC (premature ventricular contraction)    • PVT (paroxysmal ventricular tachycardia)    • SOB (shortness of breath)          Past Surgical History:  Past Surgical History:   Procedure Laterality Date   • CARDIAC PACEMAKER PLACEMENT     • SKIN SURGERY      skin cancer removal          Family History:  Family History   Problem Relation Age of Onset   • Diabetes Mother    • Colon cancer Father         DIAGNOSED IN HIS [de-identified]   • Heart failure Father    • Coronary artery disease Father    • Cancer Father         [place]   • Diabetes Family         MELLITUS   • Heart attack Neg Hx    • Stroke Neg Hx    • Anuerysm Neg Hx    • Clotting disorder Neg Hx    • Arrhythmia Neg Hx    • Hypertension Neg Hx         pt unsure    • Hyperlipidemia Neg Hx    • Sudden death Neg Hx         scd         Social History:  Social History     Socioeconomic History   • Marital status:       Spouse name: None   • Number of children: 0   • Years of education: None   • Highest education level: None   Occupational History   • Occupation: retired   Tobacco Use   • Smoking status: Every Day     Packs/day: 0 50     Years: 35 00     Pack years: 17 50     Types: Cigarettes   • Smokeless tobacco: Never   • Tobacco comments:     HALF A PACK PER DAY    Vaping Use   • Vaping Use: Never used   Substance and Sexual Activity   • Alcohol use: Not Currently     Alcohol/week: 14 0 standard drinks     Types: 14 Shots of liquor per week     Comment: two drinks every night over a 4 hour period   • Drug use: Never   • Sexual activity: Not Currently     Partners: Female     Birth control/protection: Male Sterilization   Other Topics Concern   • None   Social History Narrative   • None     Social Determinants of Health     Financial Resource Strain: Low Risk    • Difficulty of Paying Living Expenses: Not very hard   Food Insecurity: Not on file   Transportation Needs: No Transportation Needs   • Lack of Transportation (Medical): No   • Lack of Transportation (Non-Medical):  No   Physical Activity: Not on file   Stress: Not on file   Social Connections: Not on file   Intimate Partner Violence: Not on file   Housing Stability: Not on file         Active Problems:  Patient Active Problem List   Diagnosis   • Insomnia   • Cardiac resynchronization therapy defibrillator (CRT-D) in place   • Hyperlipidemia   • Hx of lymphoma   • HTN (hypertension)   • COPD (chronic obstructive pulmonary disease) (HCC)   • Chronic bronchitis (HCC)   • Non-ischemic cardiomyopathy (HCC)   • BPH (benign prostatic hyperplasia)   • Seborrheic keratosis   • Mycosis fungoides (HCC)   • History of skin cancer   • Screening for skin cancer   • Screening for skin condition   • Acquired hypothyroidism   • HHD (hypertensive heart disease)   • Prostate cancer screening   • Colon cancer screening   • Health care maintenance   • Squamous cell cancer of skin of forearm, left   • PVC (premature ventricular contraction)   • Acute kidney injury (ELLYN) with acute tubular necrosis (ATN) (HCC)   • COPD exacerbation (HCC)   • Chronic systolic congestive heart failure (HCC)   • Hypomagnesemia   • Chronic kidney disease-mineral and bone disorder   • Stage 3 chronic kidney disease (HCC)   • Shortness of breath   • Lumbar pain         The following portions of the patient's history were reviewed and updated as appropriate: past medical history, past surgical history, past family history,  past social history, current medications, allergies and problem list       Review of Systems:  Constitutional: Denies fever, chills  Eyes: Denies eye redness, eye discharge  ENT: Denies hearing loss, sneezing, nasal discharge, sore throat   Respiratory: Denies cough, expectoration, shortness of breath  Cardiovascular: Denies chest pain, palpitations, lower extremity swelling  Gastrointestinal: Denies abdominal pain, nausea, vomiting, diarrhea  Genito-Urinary: Denies dysuria, incontinence  Musculoskeletal: Denies back pain, joint pain, muscle pain  Neurologic: Denies lightheadedness, syncope, headache, seizures  Endocrine: Denies polydipsia, temperature intolerance  Allergy and Immunology: Denies hives, insect bite sensitivity  Hematological and Lymphatic: Denies bleeding problems, swollen glands   Psychological: Denies depression, suicidal ideation, anxiety, panic  Dermatological: Denies pruritus, rash, skin lesion changes      Vitals:  Vitals:    02/07/23 1051   BP: 118/72   Pulse: 84   Resp: 16   SpO2: 96%       Body mass index is 31 24 kg/m²  Weight (last 2 days)     Date/Time Weight    02/07/23 1051 102 (224)            Physical Examination:  General: Patient is not in acute distress  Awake, alert, oriented in time, place and person  Responding to commands  Head: Normocephalic  Atraumatic  Eyes: Both pupils normal sized, round and reactive to light  Nonicteric  ENT: Normal external ear canals  Neck: Supple  JVP not raised  Trachea central  No thyromegaly  Lungs: Bilateral bronchovascular breath sounds with no crackles or rhonchi  Chest wall: No tenderness  Cardiovascular: RRR  S1 and S2 normal  No murmur, rub or gallop  Gastrointestinal: Abdomen soft, nontender  No guarding or rigidity  Liver and spleen not palpable  Bowel sounds present  Neurologic: Patient is awake, alert, oriented in time, place and person  Responding to commands  Moving all extremities  Integumentary:  No skin rash  Lymphatic: No cervical lymphadenopathy  Back: Symmetric   No CVA tenderness  Extremities: No clubbing, cyanosis or edema      Laboratory Results:  CBC with diff:   Lab Results   Component Value Date    WBC 9 49 12/13/2022    WBC 5 7 06/01/2015    RBC 4 15 12/13/2022    RBC 4 70 06/01/2015    HGB 12 3 12/13/2022    HGB 13 9 06/01/2015    HCT 39 8 12/13/2022    HCT 41 6 06/01/2015    MCV 96 12/13/2022    MCV 88 06/01/2015    MCH 29 6 12/13/2022    MCH 29 5 06/01/2015    RDW 13 5 12/13/2022    RDW 12 6 06/01/2015     (H) 12/13/2022     06/01/2015       CMP:  Lab Results   Component Value Date    CREATININE 1 53 (H) 12/13/2022    CREATININE 1 02 12/11/2015    BUN 18 12/13/2022    BUN 27 (H) 12/11/2015     12/11/2015    K 3 7 12/13/2022    K 4 1 12/11/2015     (H) 12/13/2022     12/11/2015    CO2 28 12/13/2022    CO2 28 12/11/2015    GLUCOSE 93 12/11/2015    PROT 7 3 06/01/2015    ALKPHOS 76 10/27/2022    ALKPHOS 71 06/01/2015    ALT 18 10/27/2022    ALT 27 06/01/2015    AST 11 10/27/2022    AST 19 06/01/2015    BILIDIR 0 11 09/14/2017       Lab Results   Component Value Date    HGBA1C 6 2 09/01/2017    MG 1 8 11/24/2020    PHOS 2 9 12/13/2022       Lab Results   Component Value Date    TROPONINI <0 02 11/21/2020    TROPONINI 0 07 (H) 09/01/2017    TROPONINI 0 08 (H) 09/01/2017    CKTOTAL 85 01/28/2016       Medications:    Current Outpatient Medications:   •  albuterol (2 5 mg/3 mL) 0 083 % nebulizer solution, USE 1 VIAL VIA NEBULIZER 4 TIMES A DAY AS NEEDED, Disp: 1080 mL, Rfl: 3  •  aspirin 81 MG tablet, Take 81 mg by mouth daily, Disp: , Rfl:   •  atorvastatin (LIPITOR) 40 mg tablet, Take 1 tablet (40 mg total) by mouth daily, Disp: 90 tablet, Rfl: 3  •  bexarotene (Targretin) 75 mg capsule, Take 8 capsules (600 mg total) by mouth daily, Disp: 240 capsule, Rfl: 9  •  bexarotene (Targretin) 75 mg capsule, Take 8 capsules (600 mg total) by mouth daily, Disp: 240 capsule, Rfl: 5  •  Cholecalciferol 25 MCG (1000 UT) capsule, Take 1 capsule by mouth daily, Disp: , Rfl:   •  cyanocobalamin (VITAMIN B-12) 1,000 mcg tablet, Take 1,000 mcg by mouth daily, Disp: , Rfl:   •  diclofenac potassium (CATAFLAM) 50 mg tablet, 1 tablet daily for severe pain    Not to exceed 2 tablets a week , Disp: 30 tablet, Rfl: 0  •  Entresto 49-51 MG TABS, TAKE 1 TABLET BY MOUTH  TWICE DAILY, Disp: 180 tablet, Rfl: 3  •  ferrous sulfate 324 (65 Fe) mg, Take 1 tablet by mouth Twice daily, Disp: , Rfl:   •  gabapentin (NEURONTIN) 100 mg capsule, Take 1 capsule (100 mg total) by mouth 3 (three) times a day, Disp: 270 capsule, Rfl: 3  •  levothyroxine 112 mcg tablet, TAKE 1 TABLET BY MOUTH  DAILY, Disp: 90 tablet, Rfl: 3  •  metoprolol succinate (TOPROL-XL) 50 mg 24 hr tablet, Take 1 5 tablets (75 mg total) by mouth daily, Disp: 135 tablet, Rfl: 3  •  montelukast (SINGULAIR) 10 mg tablet, TAKE 1 TABLET BY MOUTH  DAILY AT BEDTIME, Disp: 90 tablet, Rfl: 3  •  Multiple Vitamins-Minerals (OCUVITE ADULT 50+ PO), Take by mouth, Disp: , Rfl:   •  potassium chloride (K-DUR,KLOR-CON) 20 mEq tablet, Take 1 tablet (20 mEq total) by mouth daily as needed (weight gain, leg swelling), Disp: 90 tablet, Rfl: 3  •  torsemide (DEMADEX) 20 mg tablet, TAKE 1 TABLET BY MOUTH EVERY DAY AS NEEDED FOR WEIGHT GAIN, LEG SWELLING, Disp: 90 tablet, Rfl: 1  •  Trelegy Ellipta 100-62 5-25 MCG/INH inhaler, USE 1 INHALATION BY MOUTH  DAILY RINSE MOUTH AFTER USE, Disp: 180 each, Rfl: 3  •  triamcinolone (KENALOG) 0 1 % ointment, Apply topically 2 (two) times 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, Disp: 454 g, Rfl: 3  •  Vitamins-Lipotropics (LIPOFLAVONOID PO), Take by mouth daily, Disp: , Rfl:   •  zolpidem (AMBIEN) 10 mg tablet, TAKE 1 TABLET BY MOUTH  DAILY AT BEDTIME AS NEEDED  FOR SLEEP, Disp: 90 tablet, Rfl: 1      Allergies:  No Known Allergies      Assessment and Plan:  1  Chronic systolic congestive heart failure (HCC)  Euvolemic  Continue Entresto, metoprolol succinate, torsemide as needed, potassium as needed  Low-salt diet  Daily weights    2  Non-ischemic cardiomyopathy (HCC)  Continue Entresto, metoprolol succinate    3  Cardiac resynchronization therapy defibrillator (CRT-D) in place  In view of episode of NSVT picked up, increase metoprolol succinate from 50 mg to 75 mg daily  No recent ICD shocks      4  NSVT (nonsustained ventricular tachycardia)  Increase beta-blocker dose as discussed    5  Essential hypertension  BP stable  Continue to monitor and call if abnormal    6  Mixed hyperlipidemia  Continue statin and diet control  His PCP closely monitors his blood work      Recommend aggressive risk factor modification and therapeutic lifestyle changes  Low-salt, low-calorie, low-fat, low-cholesterol diet with regular exercise and to optimize weight  I will defer the ordering and monitoring of necessity lab studies to you, but I am available and happy to review and manage any of the data at your request in the future  Discussed concepts of atherosclerosis, including signs and symptoms of cardiac disease  Previous studies were reviewed  Safety measures were reviewed  Questions were entertained and answered  Patient was advised to report any problems requiring medical attention  Follow-up with PCP and appropriate specialist and lab work as discussed  Return for follow up visit as scheduled or earlier, if needed  Thank you for allowing me to participate in the care and evaluation of your patient  Should you have any questions, please feel free to contact me        Lawrence Osei MD  2/7/2023,11:28 AM

## 2023-02-20 DIAGNOSIS — G62.9 NEUROPATHY: ICD-10-CM

## 2023-02-20 RX ORDER — GABAPENTIN 100 MG/1
100 CAPSULE ORAL 3 TIMES DAILY
Qty: 270 CAPSULE | Refills: 1 | Status: SHIPPED | OUTPATIENT
Start: 2023-02-20

## 2023-02-21 DIAGNOSIS — J41.0 SIMPLE CHRONIC BRONCHITIS (HCC): ICD-10-CM

## 2023-02-22 ENCOUNTER — OFFICE VISIT (OUTPATIENT)
Dept: DERMATOLOGY | Facility: CLINIC | Age: 78
End: 2023-02-22

## 2023-02-22 VITALS — WEIGHT: 222 LBS | HEIGHT: 71 IN | BODY MASS INDEX: 31.08 KG/M2

## 2023-02-22 DIAGNOSIS — L82.1 SEBORRHEIC KERATOSIS: ICD-10-CM

## 2023-02-22 DIAGNOSIS — Z85.828 HISTORY OF SKIN CANCER: ICD-10-CM

## 2023-02-22 DIAGNOSIS — C84.08 MYCOSIS FUNGOIDES INVOLVING LYMPH NODES OF MULTIPLE REGIONS (HCC): Primary | ICD-10-CM

## 2023-02-22 DIAGNOSIS — Z13.89 SCREENING FOR SKIN CONDITION: ICD-10-CM

## 2023-02-22 RX ORDER — FLUTICASONE FUROATE, UMECLIDINIUM BROMIDE AND VILANTEROL TRIFENATATE 100; 62.5; 25 UG/1; UG/1; UG/1
POWDER RESPIRATORY (INHALATION)
Qty: 180 EACH | Refills: 3 | Status: SHIPPED | OUTPATIENT
Start: 2023-02-22

## 2023-02-22 NOTE — PROGRESS NOTES
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 presents for overall checkup  Patient was unable to get his bexarotenefor a month because of pharmacy issues  He felt much better clinically  When he went back to the full dose again he was not feeling well and elected to go ahead and switch to every other day therapy and seems to be tolerating this okay    Has not noticed any real change in his skin  Past Medical History:   Diagnosis Date   • Agent orange exposure    • Anemia    • Benign colon polyp    • BPH (benign prostatic hyperplasia)    • Bradycardia    • Cardiomyopathy [facility])    • Chest discomfort    • CHF (congestive heart failure) ([facility])    • Chronic bronchitis (HCC)    • Chronic kidney disease    • COPD (chronic obstructive pulmonary disease) ([facility] )     LAST ASSESSED: 17   • Coronary artery disease cardio myopthia   • Emphysema of lung ([facility] )    • H/O nonmelanoma skin cancer     LAST ASSESSED: 17   • HHD (hypertensive heart disease)    • HTN (hypertension)    • Hx of lymphoma    • Hyperlipidemia    • ICD (implantable cardioverter-defibrillator) in place    • Insomnia    • Mycosis fungoides (HCC)    • PVC (premature ventricular contraction)    • PVT (paroxysmal ventricular tachycardia)    • SOB (shortness of breath)      Past Surgical History:   Procedure Laterality Date   • CARDIAC PACEMAKER PLACEMENT     • SKIN SURGERY      skin cancer removal      Social History   Social History     Substance and Sexual Activity   Alcohol Use Not Currently   • Alcohol/week: 14 0 standard drinks   • Types: 14 Shots of liquor per week    Comment: two drinks every night over a 4 hour period     Social History     Substance and Sexual Activity   Drug Use Never     Social History     Tobacco Use   Smoking Status Every Day   • Packs/day: 0 50   • Years: 35 00   • Pack years: 17 50   • Types: Cigarettes   Smokeless Tobacco Never   Tobacco Comments    HALF A PACK PER DAY      Family History   Problem Relation Age of Onset   • Diabetes Mother    • Colon cancer Father         DIAGNOSED IN HIS 80'S   • Heart failure Father    • Coronary artery disease Father    • Cancer Father         Marcos Gonzáles   • Diabetes Family         MELLITUS   • Heart attack Neg Hx    • Stroke Neg Hx    • Anuerysm Neg Hx    • Clotting disorder Neg Hx    • Arrhythmia Neg Hx    • Hypertension Neg Hx         pt unsure    • Hyperlipidemia Neg Hx    • Sudden death Neg Hx         scd     Meds/Allergies   No Known Allergies    Meds:  Prior to Admission medications    Medication Sig Start Date End Date Taking? Authorizing Provider   albuterol (2 5 mg/3 mL) 0 083 % nebulizer solution USE 1 VIAL VIA NEBULIZER 4 TIMES A DAY AS NEEDED 6/22/20  Yes Hilda Ramirez PA-C   aspirin 81 MG tablet Take 81 mg by mouth daily   Yes Historical Provider, MD   atorvastatin (LIPITOR) 40 mg tablet Take 1 tablet (40 mg total) by mouth daily 10/20/22  Yes Renetta Miller MD   bexarotene (Targretin) 75 mg capsule Take 8 capsules (600 mg total) by mouth daily 4/27/22  Yes Gwenette Gosselin, MD   bexarotene (Targretin) 75 mg capsule Take 8 capsules (600 mg total) by mouth daily 12/12/22  Yes Gwenette Gosselin, MD   Cholecalciferol 25 MCG (1000 UT) capsule Take 1 capsule by mouth daily   Yes Historical Provider, MD   cyanocobalamin (VITAMIN B-12) 1,000 mcg tablet Take 1,000 mcg by mouth daily   Yes Historical Provider, MD   diclofenac potassium (CATAFLAM) 50 mg tablet 1 tablet daily for severe pain  Not to exceed 2 tablets a week   1/10/22  Yes Lindsey Savage MD   Entresto 49-51 MG TABS TAKE 1 TABLET BY MOUTH  TWICE DAILY 10/18/22 Yes Eren Scherer PA-C   ferrous sulfate 324 (65 Fe) mg Take 1 tablet by mouth Twice daily 8/31/15  Yes Historical Provider, MD   gabapentin (NEURONTIN) 100 mg capsule Take 1 capsule (100 mg total) by mouth 3 (three) times a day 2/20/23  Yes Colletta Lolling, MD   levothyroxine 112 mcg tablet TAKE 1 TABLET BY MOUTH  DAILY 5/24/22  Yes Colletta Lolling, MD   metoprolol succinate (TOPROL-XL) 50 mg 24 hr tablet Take 1 5 tablets (75 mg total) by mouth daily 2/7/23  Yes Scot Ott MD   montelukast (SINGULAIR) 10 mg tablet TAKE 1 TABLET BY MOUTH  DAILY AT BEDTIME 10/18/22  Yes Elmer Stern PA-C   Multiple Vitamins-Minerals (OCUVITE ADULT 50+ PO) Take by mouth   Yes Historical Provider, MD   potassium chloride (K-DUR,KLOR-CON) 20 mEq tablet Take 1 tablet (20 mEq total) by mouth daily as needed (weight gain, leg swelling) 4/1/21  Yes Scot Ott MD   torsemide (DEMADEX) 20 mg tablet TAKE 1 TABLET BY MOUTH EVERY DAY AS NEEDED FOR WEIGHT GAIN, LEG SWELLING 10/28/21  Yes Scot Ott MD   Trelegy Ellipta 187-56 7-25 MCG/INH inhaler USE 1 INHALATION BY MOUTH  DAILY RINSE MOUTH AFTER USE 3/11/22  Yes Elmer Stern PA-C   triamcinolone (KENALOG) 0 1 % ointment Apply topically 2 (two) times 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 2/6/18  Yes Yoel Klein MD   Vitamins-Lipotropics (LIPOFLAVONOID PO) Take by mouth daily   Yes Historical Provider, MD   zolpidem (AMBIEN) 10 mg tablet TAKE 1 TABLET BY MOUTH  DAILY AT BEDTIME AS NEEDED  FOR SLEEP 10/21/22  Yes Colletta Lolling, MD   nicotine (NICODERM CQ) 21 mg/24 hr TD 24 hr patch Place 1 patch on the skin every 24 hours  12/5/19  Historical Provider, MD       Subjective:     Review of Systems:    General: negative for - chills, fatigue, fever,  weight gain or weight loss  Psychological: negative for - anxiety, behavioral disorder, concentration difficulties, decreased libido, depression, irritability, memory difficulties, mood swings, sleep disturbances or suicidal ideation  ENT: negative for - hearing difficulties , nasal congestion, nasal discharge, oral lesions, sinus pain, sneezing, sore throat  Allergy and Immunology: negative for - hives, insect bite sensitivity,  Hematological and Lymphatic: negative for - bleeding problems, blood clots,bruising, swollen lymph nodes  Endocrine: negative for - hair pattern changes, hot flashes, malaise/lethargy, mood swings, palpitations, polydipsia/polyuria, skin changes, temperature intolerance or unexpected weight change  Respiratory: negative for - cough, hemoptysis, orthopnea, shortness of breath, or wheezing  Cardiovascular: negative for - chest pain, dyspnea on exertion, edema,  Gastrointestinal: negative for - abdominal pain, nausea/vomiting  Genito-Urinary: negative for - dysuria, incontinence, irregular/heavy menses or urinary frequency/urgency  Musculoskeletal: negative for - gait disturbance, joint pain, joint stiffness, joint swelling, muscle pain, muscular weakness  Dermatological:  As in HPI  Neurological: negative for confusion, dizziness, headaches, impaired coordination/balance, memory loss, numbness/tingling, seizures, speech problems, tremors or weakness       Objective:   Ht 5' 11" (1 803 m)   Wt 101 kg (222 lb)   BMI 30 96 kg/m²     Physical Exam:    General Appearance:    Alert, cooperative, no distress   Head:    Normocephalic, without obvious abnormality, atraumatic    No lymphadenopathy or hepatosplenomegaly       Skin:   A full skin exam was performed including scalp, head scalp, eyes, ears, nose, lips, neck, chest, axilla, abdomen, back, buttocks, bilateral upper extremities, bilateral lower extremities, hands, feet, fingers, toes, fingernails, and toenails  Erythema poikiloderma scaling noted on the skin involving about 70% of body surface area normal keratotic papules greasy stuck on appearance previous sites of skin cancer well-healed without recurrence nothing else atypical noted on exam     Assessment:     1   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)        2  Seborrheic keratosis        3  History of skin cancer        4  Screening for skin condition              Plan:   Cutaneous T-cell lymphoma clinically appears to be slightly more active than previous suggestion to patient was to just continue with the bexarotene at half dose daily if this becomes more active need to have patient seen by Dr Alves [facility]  for her opinion  Seborrheic keratosis patient reassured these are normal growths we acquire with age no treatment needed  History of skin cancer in no recurrence nothing else atypical sunblock recommended follow-up in 4 months  Screening for dermatologic disorders nothing else of concern noted on complete exam follow-up in 4 months    Cheyenne Blunt MD  2/22/2023,2:29 PM    Portions of the record may have been created with voice recognition software   Occasional wrong word or "sound a like" substitutions may have occurred due to the inherent limitations of voice recognition software   Read the chart carefully and recognize, using context, where substitutions have occurred

## 2023-02-22 NOTE — PROGRESS NOTES
[facility] Note     Patient Name: Maurizio Flowers  Encounter Date: February 22, 2023     Have you been cared for by a [facility] [address] [facility] in the last 3 years and, if so, which description applies to you? Yes  I have been here within the last 3 years, and my medical history has NOT changed since that time  I am MALE/not capable of bearing children  REVIEW OF SYSTEMS:  Have you recently had or currently have any of the following? · No changes in my recent health  PAST MEDICAL HISTORY:  Have you personally ever had or currently have any of the following? If "YES," then please provide more detail  · No changes in my medical history  FAMILY HISTORY:  Any "first degree relatives" (parent, brother, sister, or child) with the following? • No changes in my family's known health  PATIENT EXPERIENCE:    • Do you want the Dermatologist to perform a COMPLETE skin exam today including a clinical examination under the "bra and underwear" areas? Yes  • If necessary, do we have your permission to call and leave a detailed message on your Preferred Phone number that includes your specific medical information?   Yes      No Known Allergies   Current Outpatient Medications:   •  albuterol (2 5 mg/3 mL) 0 083 % nebulizer solution, USE 1 VIAL VIA NEBULIZER 4 TIMES A DAY AS NEEDED, Disp: 1080 mL, Rfl: 3  •  aspirin 81 MG tablet, Take 81 mg by mouth daily, Disp: , Rfl:   •  atorvastatin (LIPITOR) 40 mg tablet, Take 1 tablet (40 mg total) by mouth daily, Disp: 90 tablet, Rfl: 3  •  bexarotene (Targretin) 75 mg capsule, Take 8 capsules (600 mg total) by mouth daily, Disp: 240 capsule, Rfl: 9  •  bexarotene (Targretin) 75 mg capsule, Take 8 capsules (600 mg total) by mouth daily, Disp: 240 capsule, Rfl: 5  •  Cholecalciferol 25 MCG (1000 UT) capsule, Take 1 capsule by mouth daily, Disp: , Rfl:   •  cyanocobalamin (VITAMIN B-12) 1,000 mcg tablet, Take 1,000 mcg by mouth daily, Disp: , Rfl:   • diclofenac potassium (CATAFLAM) 50 mg tablet, 1 tablet daily for severe pain    Not to exceed 2 tablets a week , Disp: 30 tablet, Rfl: 0  •  Entresto 49-51 MG TABS, TAKE 1 TABLET BY MOUTH  TWICE DAILY, Disp: 180 tablet, Rfl: 3  •  ferrous sulfate 324 (65 Fe) mg, Take 1 tablet by mouth Twice daily, Disp: , Rfl:   •  gabapentin (NEURONTIN) 100 mg capsule, Take 1 capsule (100 mg total) by mouth 3 (three) times a day, Disp: 270 capsule, Rfl: 1  •  levothyroxine 112 mcg tablet, TAKE 1 TABLET BY MOUTH  DAILY, Disp: 90 tablet, Rfl: 3  •  metoprolol succinate (TOPROL-XL) 50 mg 24 hr tablet, Take 1 5 tablets (75 mg total) by mouth daily, Disp: 135 tablet, Rfl: 3  •  montelukast (SINGULAIR) 10 mg tablet, TAKE 1 TABLET BY MOUTH  DAILY AT BEDTIME, Disp: 90 tablet, Rfl: 3  •  Multiple Vitamins-Minerals (OCUVITE ADULT 50+ PO), Take by mouth, Disp: , Rfl:   •  potassium chloride (K-DUR,KLOR-CON) 20 mEq tablet, Take 1 tablet (20 mEq total) by mouth daily as needed (weight gain, leg swelling), Disp: 90 tablet, Rfl: 3  •  torsemide (DEMADEX) 20 mg tablet, TAKE 1 TABLET BY MOUTH EVERY DAY AS NEEDED FOR WEIGHT GAIN, LEG SWELLING, Disp: 90 tablet, Rfl: 1  •  Trelegy Ellipta 100-62 5-25 MCG/INH inhaler, USE 1 INHALATION BY MOUTH  DAILY RINSE MOUTH AFTER USE, Disp: 180 each, Rfl: 3  •  triamcinolone (KENALOG) 0 1 % ointment, Apply topically 2 (two) times 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, Disp: 454 g, Rfl: 3  •  Vitamins-Lipotropics (LIPOFLAVONOID PO), Take by mouth daily, Disp: , Rfl:   •  zolpidem (AMBIEN) 10 mg tablet, TAKE 1 TABLET BY MOUTH  DAILY AT BEDTIME AS NEEDED  FOR SLEEP, Disp: 90 tablet, Rfl: 1          • Whom besides the patient is providing clinical information about today's encounter?   o NO ADDITIONAL HISTORIAN (patient alone provided history)    Physical Exam and Assessment/Plan by Diagnosis:

## 2023-02-22 NOTE — PATIENT INSTRUCTIONS
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

## 2023-03-30 DIAGNOSIS — E03.2 HYPOTHYROIDISM DUE TO MEDICATION: ICD-10-CM

## 2023-03-30 RX ORDER — LEVOTHYROXINE SODIUM 112 UG/1
TABLET ORAL
Qty: 90 TABLET | Refills: 3 | Status: SHIPPED | OUTPATIENT
Start: 2023-03-30

## 2023-04-03 ENCOUNTER — OFFICE VISIT (OUTPATIENT)
Dept: DERMATOLOGY | Facility: CLINIC | Age: 78
End: 2023-04-03

## 2023-04-03 VITALS — WEIGHT: 220 LBS | BODY MASS INDEX: 30.8 KG/M2 | HEIGHT: 71 IN

## 2023-04-03 DIAGNOSIS — L98.9 UNKNOWN SKIN LESION: Primary | ICD-10-CM

## 2023-04-03 NOTE — PATIENT INSTRUCTIONS
Skin lesion probable squamous cell carcinoma would require complete excision pending results  Dr Jorge Bush Biopsy After Care Instructions      Remove bandage the next day  Keep bandage dry  Shower or Bathe as usual the next day  Cleanse the area once daily with saline or hydrogen peroxide  Apply Vaseline  WE ADVISE YOU NOT TO USE NEOSPORIN OR ANY TOPICAL ANTIBIOTIC UNLESS INSTRUCTED BY THE DOCTOR  Cover area with a dressing or Band-Aid if possible  Continue treatment until completely healed  (Skin appears in pink)  Try to avoid scab formation  Slight bleeding may occur after the Band-Aid/Dressing is removed or the first few days after the procedure was done  Don't panic!! Apply continuous, direct pressure on the dressing over the wound for 15-20 minutes  DO NOT remove dressing  HINT:  Set a timer for 15-20 minutes to make sure you press on the wound long enough  SOAKING: the dressing before you remove it should decrease chances of bleeding  If the wound is on the legs or arms, swelling may occur  Elevating the arm or leg above the level of the heart as much as possible will also decrease swelling, promote healing and decrease chances of bleeding  ANY QUESTION PLEASE CALL OUR OFFICE  [phone]  IF AFTER HOURS, THE ANSWERING SERVICE WILL GET A HOLD OF THE DOCTOR  PLEASE BE ADVISED THAT BIOPSY RESULTS CAN TAKE UP TO 1 TO 2 WEEKS  YOU WILL RECEIVE THE RESULTS IN MYCHART FIRST, BUT PLEASE WAIT FOR THE DOCTOR OR STAFF TO NOTIFY YOU        Haley Sequeira!!

## 2023-04-03 NOTE — PROGRESS NOTES
[facility]  [address] [place] [id number]  [phone]  [phone]     MRN: [id number] : 1945  Encounter: [id number]  Patient Information: Keri Xiao  Chief complaint: Lesion left arm    History of present illness: 79-year-old male who presents for concerns regarding painful growth that developed shortly after his recent visit  Past Medical History:   Diagnosis Date   • Agent orange exposure    • Anemia    • Benign colon polyp    • BPH (benign prostatic hyperplasia)    • Bradycardia    • Cardiomyopathy [facility])    • Chest discomfort    • CHF (congestive heart failure) ([place] [address] )    • Chronic bronchitis ([place] [address] )    • Chronic kidney disease    • COPD (chronic obstructive pulmonary disease) ([place] [address] )     LAST ASSESSED: 17   • Coronary artery disease cardio myopthia   • Emphysema of lung ([place] [address] )    • H/O nonmelanoma skin cancer     LAST ASSESSED: 17   • HHD (hypertensive heart disease)    • HTN (hypertension)    • Hx of lymphoma    • Hyperlipidemia    • ICD (implantable cardioverter-defibrillator) in place    • Insomnia    • Mycosis fungoides (HCC)    • PVC (premature ventricular contraction)    • PVT (paroxysmal ventricular tachycardia) (HCC)    • SOB (shortness of breath)      Past Surgical History:   Procedure Laterality Date   • CARDIAC PACEMAKER PLACEMENT     • SKIN SURGERY      skin cancer removal      Social History   Social History     Substance and Sexual Activity   Alcohol Use Not Currently   • Alcohol/week: 14 0 standard drinks   • Types: 14 Shots of liquor per week    Comment: two drinks every night over a 4 hour period     Social History     Substance and Sexual Activity   Drug Use Never     Social History     Tobacco Use   Smoking Status Every Day   • Packs/day: 0 50   • Years: 35 00   • Pack years: 17 50   • Types: Cigarettes   Smokeless Tobacco Never   Tobacco Comments    HALF A PACK PER DAY      Family History   Problem Relation Age of Onset   • Diabetes Mother    • Colon cancer Father         DIAGNOSED IN HIS [de-identified]   • Heart failure Father    • Coronary artery disease Father    • Cancer Father         Shreya Anderson   • Diabetes Family         MELLITUS   • Heart attack Neg Hx    • Stroke Neg Hx    • Anuerysm Neg Hx    • Clotting disorder Neg Hx    • Arrhythmia Neg Hx    • Hypertension Neg Hx         pt unsure    • Hyperlipidemia Neg Hx    • Sudden death Neg Hx         scd     Meds/Allergies   No Known Allergies    Meds:  Prior to Admission medications    Medication Sig Start Date End Date Taking? Authorizing Provider   albuterol (2 5 mg/3 mL) 0 083 % nebulizer solution USE 1 VIAL VIA NEBULIZER 4 TIMES A DAY AS NEEDED 6/22/20   Ivone Hansen PA-C   aspirin 81 MG tablet Take 81 mg by mouth daily    Historical Provider, MD   atorvastatin (LIPITOR) 40 mg tablet Take 1 tablet (40 mg total) by mouth daily 10/20/22   Reza Beebe MD   bexarotene (Targretin) 75 mg capsule Take 8 capsules (600 mg total) by mouth daily 4/27/22   Kandra Curling, MD   bexarotene (Targretin) 75 mg capsule Take 8 capsules (600 mg total) by mouth daily 12/12/22   Kandra Curling, MD   Cholecalciferol 25 MCG (1000 UT) capsule Take 1 capsule by mouth daily    Historical Provider, MD   cyanocobalamin (VITAMIN B-12) 1,000 mcg tablet Take 1,000 mcg by mouth daily    Historical Provider, MD   diclofenac potassium (CATAFLAM) 50 mg tablet 1 tablet daily for severe pain  Not to exceed 2 tablets a week   1/10/22   Jen Polanco MD   Entresto 49-51 MG TABS TAKE 1 TABLET BY MOUTH  TWICE DAILY 10/18/22   Kylie Washburn PA-C   ferrous sulfate 324 (65 Fe) mg Take 1 tablet by mouth Twice daily 8/31/15   Historical Provider, MD   gabapentin (NEURONTIN) 100 mg capsule Take 1 capsule (100 mg total) by mouth 3 (three) times a day 2/20/23   Jen Polanco MD   levothyroxine 112 mcg tablet TAKE 1 TABLET BY MOUTH  DAILY 3/30/23   Jen Polanco MD   metoprolol succinate (TOPROL-XL) 50 mg 24 hr tablet Take 1 5 tablets (75 mg total) by mouth daily 2/7/23   Izzy Dobbs MD   montelukast (SINGULAIR) 10 mg tablet TAKE 1 TABLET BY MOUTH  DAILY AT BEDTIME 10/18/22   Bradley Montanez PA-C   Multiple Vitamins-Minerals (OCUVITE ADULT 50+ PO) Take by mouth    Historical Provider, MD   potassium chloride (K-DUR,KLOR-CON) 20 mEq tablet Take 1 tablet (20 mEq total) by mouth daily as needed (weight gain, leg swelling) 4/1/21   Izzy Dobbs MD   torsemide (DEMADEX) 20 mg tablet TAKE 1 TABLET BY MOUTH EVERY DAY AS NEEDED FOR WEIGHT GAIN, LEG SWELLING 10/28/21   Izzy Dobbs MD   Trelemanisha Ellipta 539-62 4-70 MCG/ACT inhaler USE 1 INHALATION BY MOUTH  DAILY RINSE MOUTH AFTER USE 2/22/23   Bradley Montanez PA-C   triamcinolone (KENALOG) 0 1 % ointment Apply topically 2 (two) times 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 2/6/18   Chris Ledezma MD   Vitamins-Lipotropics (LIPOFLAVONOID PO) Take by mouth daily    Historical Provider, MD   zolpidem (AMBIEN) 10 mg tablet TAKE 1 TABLET BY MOUTH  DAILY AT BEDTIME AS NEEDED  FOR SLEEP 10/21/22   Say Diaz MD   nicotine (NICODERM CQ) 21 mg/24 hr TD 24 hr patch Place 1 patch on the skin every 24 hours  12/5/19  Historical Provider, MD       Subjective:     Review of Systems:    General: negative for - chills, fatigue, fever,  weight gain or weight loss  Psychological: negative for - anxiety, behavioral disorder, concentration difficulties, decreased libido, depression, irritability, memory difficulties, mood swings, sleep disturbances or suicidal ideation  ENT: negative for - hearing difficulties , nasal congestion, nasal discharge, oral lesions, sinus pain, sneezing, sore throat  Allergy and Immunology: negative for - hives, insect bite sensitivity,  Hematological and Lymphatic: negative for - bleeding problems, blood clots,bruising, swollen lymph nodes  Endocrine: negative for - hair pattern changes, hot flashes, malaise/lethargy, mood swings, palpitations, polydipsia/polyuria, skin "changes, temperature intolerance or unexpected weight change  Respiratory: negative for - cough, hemoptysis, orthopnea, shortness of breath, or wheezing  Cardiovascular: negative for - chest pain, dyspnea on exertion, edema,  Gastrointestinal: negative for - abdominal pain, nausea/vomiting  Genito-Urinary: negative for - dysuria, incontinence, irregular/heavy menses or urinary frequency/urgency  Musculoskeletal: negative for - gait disturbance, joint pain, joint stiffness, joint swelling, muscle pain, muscular weakness  Dermatological:  As in HPI  Neurological: negative for confusion, dizziness, headaches, impaired coordination/balance, memory loss, numbness/tingling, seizures, speech problems, tremors or weakness       Objective:   Ht 5' 11\" (1 803 m)   Wt 99 8 kg (220 lb)   BMI 30 68 kg/m²     Physical Exam:    General Appearance:    Alert, cooperative, no distress   Head:    Normocephalic, without obvious abnormality, atraumatic           Skin:   A full skin exam was performed including scalp, head scalp, eyes, ears, nose, lips, neck, chest, axilla, abdomen, back, buttocks, bilateral upper extremities, bilateral lower extremities, hands, feet, fingers, toes, fingernails, and toenails 13 mm keratoticinflammed nodule noted on the left wrist nothing else ofconcern noted on cursory exam      Shave Biopsy Procedure Note    Pre-operative Diagnosis: Rule out squamous cell carcinoma    Plan:  1  Instructed to keep the wound dry and covered for 24 and clean thereafter  2  Warning signs of infection were reviewed  3  Recommended that the patient use OTC acetaminophen as needed for pain     4  Return  Pending results of biopsy(ies)    Locations: Left wrist    Indications: Suspicious lesion    Anesthesia: Lidocaine 1% with epinephrine without added sodium bicarbonate    Procedure Details     Patient informed of the risks (including bleeding and infection) and benefits of the   procedure and Verbal informed consent " "obtained  The lesion and surrounding area were given a sterile prep using alcohol and draped in the usual sterile fashion  A Blue blade razor was used to obtain a specimen  Hemostasis achieved with aluminum chloride  Petrolatum and a sterile dressing applied  The specimen was sent for pathologic examination  The patient tolerated the procedure(s) well  Complications:  none  Assessment:     1  Unknown skin lesion              Plan:   Skin lesion probable squamous cell carcinoma would require complete excision pending results    Telly Jimenez MD  4/3/2023,4:09 PM    Portions of the record may have been created with voice recognition software   Occasional wrong word or \"sound a like\" substitutions may have occurred due to the inherent limitations of voice recognition software   Read the chart carefully and recognize, using context, where substitutions have occurred    "

## 2023-04-26 ENCOUNTER — TELEPHONE (OUTPATIENT)
Age: 78
End: 2023-04-26

## 2023-04-26 NOTE — TELEPHONE ENCOUNTER
----- Message from Kalyn Eaton MD sent at 4/26/2023  7:57 AM EDT -----  Call patient if not coming in shortly for suture removal to let him know that the margins were clear

## 2023-05-01 ENCOUNTER — OFFICE VISIT (OUTPATIENT)
Age: 78
End: 2023-05-01

## 2023-05-01 VITALS — BODY MASS INDEX: 30.8 KG/M2 | WEIGHT: 220 LBS | HEIGHT: 71 IN

## 2023-05-01 DIAGNOSIS — C44.629 SQUAMOUS CELL CANCER OF SKIN OF LEFT FOREARM: Primary | ICD-10-CM

## 2023-05-01 NOTE — PATIENT INSTRUCTIONS
Wound care instructions given to patient    STERI-STRIPS (BUTTERFLY) POST SURGERY        Steri-Strips have been placed over your incision site to give added support  Please continue to bathe the area as usual     Eventually the Steri-Strips will begin to peel off on the ends  Snip the raised ends off with scissors and let the rest fall off on their own  If you have any questions, please call our office   [phone]

## 2023-05-01 NOTE — PROGRESS NOTES
"[address] [facility]  [address] [facility] [address]  [phone]  [phone]     MRN: [id number] : 1945  Encounter: [id number]  Patient Information: Christina Wynne    Subjective:     74-year-old male presents for planned suture removal from previous excised squamous cell carcinoma left wrist     Objective:   Ht 5' 11\" (1 803 m)   Wt 99 8 kg (220 lb)   BMI 30 68 kg/m²     Physical Exam:    General Appearance:    Alert, cooperative, no distress   Skin:  Previous site of excision healing well  Procedure:  Suture removal  Site of excision: Left wrist  Wound was cleansed with saline  Wound is intact  Sutures removed  Steri-Strips applied  Biopsy revealed [place] clear     Assessment:     1  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 Authorizing Provider   albuterol (2 5 mg/3 mL) 0 083 % nebulizer solution USE 1 VIAL VIA NEBULIZER 4 TIMES A DAY AS NEEDED 20   Fitz Hansen PA-C   aspirin 81 MG tablet Take 81 mg by mouth daily    Historical Provider, MD   atorvastatin (LIPITOR) 40 mg tablet Take 1 tablet (40 mg total) by mouth daily 10/20/22   Chuck Parikh MD   bexarotene (Targretin) 75 mg capsule Take 8 capsules (600 mg total) by mouth daily 22   Jayme Nicole MD   bexarotene (Targretin) 75 mg capsule Take 8 capsules (600 mg total) by mouth daily 22   Jayme Nicole MD   Cholecalciferol 25 MCG (1000 UT) capsule Take 1 capsule by mouth daily    Historical Provider, MD   cyanocobalamin (VITAMIN B-12) 1,000 mcg tablet Take 1,000 mcg by mouth daily    Historical Provider, MD   diclofenac potassium (CATAFLAM) 50 mg tablet 1 tablet daily for severe pain  Not to exceed 2 tablets a week   1/10/22   Claudia Calvert MD   Entresto 49-51 MG TABS TAKE 1 TABLET BY MOUTH  TWICE DAILY 10/18/22   Bebe Pang PA-C   ferrous " "sulfate 324 (65 Fe) mg Take 1 tablet by mouth Twice daily 8/31/15   Historical Provider, MD   gabapentin (NEURONTIN) 100 mg capsule Take 1 capsule (100 mg total) by mouth 3 (three) times a day 2/20/23   Charles Duran MD   levothyroxine 112 mcg tablet TAKE 1 TABLET BY MOUTH  DAILY 3/30/23   Charles Duran MD   metoprolol succinate (TOPROL-XL) 50 mg 24 hr tablet Take 1 5 tablets (75 mg total) by mouth daily 2/7/23   Erika Flynn MD   montelukast (SINGULAIR) 10 mg tablet TAKE 1 TABLET BY MOUTH  DAILY AT BEDTIME 10/18/22   Deniz Lazaro PA-C   Multiple Vitamins-Minerals (OCUVITE ADULT 50+ PO) Take by mouth    Historical Provider, MD   potassium chloride (K-DUR,KLOR-CON) 20 mEq tablet Take 1 tablet (20 mEq total) by mouth daily as needed (weight gain, leg swelling) 4/1/21   Erika Flynn MD   torsemide (DEMADEX) 20 mg tablet TAKE 1 TABLET BY MOUTH EVERY DAY AS NEEDED FOR WEIGHT GAIN, LEG SWELLING 10/28/21   Erika Flynn MD   Trelemanisha Ellipta 262-87 6-60 MCG/ACT inhaler USE 1 INHALATION BY MOUTH  DAILY RINSE MOUTH AFTER USE 2/22/23   Deniz Lazaro PA-C   triamcinolone (KENALOG) 0 1 % ointment Apply topically 2 (two) times 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 2/6/18   Denisa Monson MD   Vitamins-Lipotropics (LIPOFLAVONOID PO) Take by mouth daily    Historical Provider, MD   zolpidem (AMBIEN) 10 mg tablet TAKE 1 TABLET BY MOUTH  DAILY AT BEDTIME AS NEEDED  FOR SLEEP 5/1/23   Charles Duran MD   nicotine (NICODERM CQ) 21 mg/24 hr TD 24 hr patch Place 1 patch on the skin every 24 hours  12/5/19  Historical Provider, MD   zolpidem (AMBIEN) 10 mg tablet TAKE 1 TABLET BY MOUTH  DAILY AT BEDTIME AS NEEDED  FOR SLEEP 10/21/22 5/1/23  Charles Duran MD     No Known Allergies    Denisa Monson MD  4/6/6982,8:61 PM    Portions of the record may have been created with voice recognition software   Occasional wrong word or \"sound a like\" substitutions may have occurred due to the inherent limitations of voice recognition software   " Read the chart carefully and recognize, using context, where substitutions have occurred

## 2023-05-09 ENCOUNTER — REMOTE DEVICE CLINIC VISIT (OUTPATIENT)
Dept: CARDIOLOGY CLINIC | Facility: CLINIC | Age: 78
End: 2023-05-09

## 2023-05-09 DIAGNOSIS — Z95.810 AICD (AUTOMATIC CARDIOVERTER/DEFIBRILLATOR) PRESENT: Primary | ICD-10-CM

## 2023-05-09 NOTE — PROGRESS NOTES
Results for orders placed or performed in visit on 05/09/23   Cardiac EP device report    Narrative    MDT BIV-ICD / NOT MRI CONDITIONAL  CARELINK TRANSMISSION: BATTERY VOLTAGE ADEQUATE (14 MOS)  AP-98%, BVP-98% (TOTAL -95 4%+VSR PACE-3 2%)  CHRONICALLY ELEVATED LV THRESHOLDS  ALL OTHER AVAILABLE LEAD PARAMETERS WITHIN NORMAL LIMITS  17 DEVICE CLASSIFIED NSVT EPISODES, NSVT & SVT ON EGM'S, MOST RECENT NSVT FOR 6 BEATS, AVG CL~395MS FOLLOWED BY 8 BEATS, AVG CL~440MS (#352)  PT ON ASA 81MG & METOPROLOL  Ilichova 50 OPTI-VOL WITHIN NORMAL LIMITS  NORMAL DEVICE FUNCTION   GV

## 2023-05-30 ENCOUNTER — OFFICE VISIT (OUTPATIENT)
Age: 78
End: 2023-05-30

## 2023-05-30 VITALS
DIASTOLIC BLOOD PRESSURE: 66 MMHG | OXYGEN SATURATION: 96 % | HEART RATE: 82 BPM | TEMPERATURE: 98.7 F | SYSTOLIC BLOOD PRESSURE: 114 MMHG | RESPIRATION RATE: 20 BRPM | WEIGHT: 224.4 LBS | BODY MASS INDEX: 31.3 KG/M2

## 2023-05-30 DIAGNOSIS — R31.9 HEMATURIA, UNSPECIFIED TYPE: Primary | ICD-10-CM

## 2023-05-30 DIAGNOSIS — N30.01 ACUTE CYSTITIS WITH HEMATURIA: ICD-10-CM

## 2023-05-30 LAB
SL AMB  POCT GLUCOSE, UA: ABNORMAL
SL AMB LEUKOCYTE ESTERASE,UA: ABNORMAL
SL AMB POCT BILIRUBIN,UA: ABNORMAL
SL AMB POCT BLOOD,UA: ABNORMAL
SL AMB POCT CLARITY,UA: ABNORMAL
SL AMB POCT COLOR,UA: ABNORMAL
SL AMB POCT KETONES,UA: ABNORMAL
SL AMB POCT NITRITE,UA: ABNORMAL
SL AMB POCT PH,UA: 5
SL AMB POCT SPECIFIC GRAVITY,UA: 1.03
SL AMB POCT URINE PROTEIN: 2000
SL AMB POCT UROBILINOGEN: 0.2

## 2023-05-30 RX ORDER — CEPHALEXIN 500 MG/1
500 CAPSULE ORAL 4 TIMES DAILY
Qty: 28 CAPSULE | Refills: 0 | Status: SHIPPED | OUTPATIENT
Start: 2023-05-30 | End: 2023-06-06

## 2023-05-30 RX ORDER — PHENAZOPYRIDINE HYDROCHLORIDE 200 MG/1
TABLET, FILM COATED ORAL
Qty: 9 TABLET | Refills: 0 | Status: SHIPPED | OUTPATIENT
Start: 2023-05-30

## 2023-05-30 NOTE — PATIENT INSTRUCTIONS
1   Take meds as instructed  2  Drink lots of fluids especially water and cranberry juice  3  Check MyChart in 24 to 72 hours for urine culture results  4  The Pyridium will turn your urine orange  5  Follow-up with PCP in 2 to 3 days  6  Patient have a repeat urinalysis in 10 to 14 days  7  If symptoms get worse proceed to the emergency department  8    Tylenol for fever

## 2023-05-30 NOTE — PROGRESS NOTES
"  [address] [facility]        NAME: Gage Conley is a 78 y o  male  : 1945    MRN: [id number]  DATE: May 30, 2023  TIME: 3:20 PM    Assessment and Plan   Hematuria, unspecified type [R31 9]  1  Hematuria, unspecified type  POCT urine dip    Urine culture      2  Acute cystitis with hematuria  cephalexin (KEFLEX) 500 mg capsule    phenazopyridine (PYRIDIUM) 200 mg tablet        Patient's UA was positive for blood, protein, large amount of leukocytes  We will send it for urine culture  Patient Instructions   Patient Instructions   1  Take meds as instructed  2  Drink lots of fluids especially water and cranberry juice  3  Check MyChart in 24 to 72 hours for urine culture results  4  The Pyridium will turn your urine orange  5  Follow-up with PCP in 2 to 3 days  6  Patient have a repeat urinalysis in 10 to 14 days  7  If symptoms get worse proceed to the emergency department  8  Tylenol for fever        Follow up with PCP in 3-5 days  Proceed to  ER if symptoms worsen  Chief Complaint     Chief Complaint   Patient presents with   • Possible UTI     Began with urinary frequency, urgency, voiding small amts,, pain / burning on urination on   Later noted \"red streaks\" in urine  Today urine brown, with multiple tiny black particles         History of Present Illness       57-year-old male with a chief complaint of burning and pain on urination over the past couple days  Patient denies any prior UTIs  Patient denies nausea or vomiting or back pain  Patient denies any prior history of kidney stones  Review of Systems   Review of Systems   Constitutional: Negative for chills and fever  HENT: Negative for congestion and rhinorrhea  Eyes: Negative for discharge and visual disturbance  Respiratory: Negative for shortness of breath and wheezing  Cardiovascular: Negative for chest pain and palpitations  Gastrointestinal: Positive for abdominal pain  Negative for vomiting   " Endocrine: Negative for polydipsia and polyuria  Genitourinary: Positive for decreased urine volume, difficulty urinating, dysuria, frequency, hematuria and urgency  Negative for flank pain, penile discharge and testicular pain  Musculoskeletal: Negative for arthralgias, gait problem and neck stiffness  Skin: Negative for rash and wound  Neurological: Negative for dizziness and headaches  Psychiatric/Behavioral: Negative for confusion and suicidal ideas  Current Medications       Current Outpatient Medications:   •  albuterol (2 5 mg/3 mL) 0 083 % nebulizer solution, USE 1 VIAL VIA NEBULIZER 4 TIMES A DAY AS NEEDED, Disp: 1080 mL, Rfl: 3  •  aspirin 81 MG tablet, Take 81 mg by mouth daily, Disp: , Rfl:   •  atorvastatin (LIPITOR) 40 mg tablet, Take 1 tablet (40 mg total) by mouth daily, Disp: 90 tablet, Rfl: 3  •  bexarotene (Targretin) 75 mg capsule, Take 8 capsules (600 mg total) by mouth daily, Disp: 240 capsule, Rfl: 9  •  bexarotene (Targretin) 75 mg capsule, Take 8 capsules (600 mg total) by mouth daily, Disp: 240 capsule, Rfl: 5  •  cephalexin (KEFLEX) 500 mg capsule, Take 1 capsule (500 mg total) by mouth 4 (four) times a day for 7 days, Disp: 28 capsule, Rfl: 0  •  Cholecalciferol 25 MCG (1000 UT) capsule, Take 1 capsule by mouth daily, Disp: , Rfl:   •  cyanocobalamin (VITAMIN B-12) 1,000 mcg tablet, Take 1,000 mcg by mouth daily, Disp: , Rfl:   •  diclofenac potassium (CATAFLAM) 50 mg tablet, 1 tablet daily for severe pain    Not to exceed 2 tablets a week , Disp: 30 tablet, Rfl: 0  •  Entresto 49-51 MG TABS, TAKE 1 TABLET BY MOUTH  TWICE DAILY, Disp: 180 tablet, Rfl: 3  •  ferrous sulfate 324 (65 Fe) mg, Take 1 tablet by mouth Twice daily, Disp: , Rfl:   •  gabapentin (NEURONTIN) 100 mg capsule, Take 1 capsule (100 mg total) by mouth 3 (three) times a day, Disp: 270 capsule, Rfl: 1  •  levothyroxine 112 mcg tablet, TAKE 1 TABLET BY MOUTH  DAILY, Disp: 90 tablet, Rfl: 3  •  metoprolol succinate (TOPROL-XL) 50 mg 24 hr tablet, Take 1 5 tablets (75 mg total) by mouth daily, Disp: 135 tablet, Rfl: 3  •  montelukast (SINGULAIR) 10 mg tablet, TAKE 1 TABLET BY MOUTH  DAILY AT BEDTIME, Disp: 90 tablet, Rfl: 3  •  Multiple Vitamins-Minerals (OCUVITE ADULT 50+ PO), Take by mouth, Disp: , Rfl:   •  phenazopyridine (PYRIDIUM) 200 mg tablet, Take one pill 3 x/ day x 2-3 days, Disp: 9 tablet, Rfl: 0  •  potassium chloride (K-DUR,KLOR-CON) 20 mEq tablet, Take 1 tablet (20 mEq total) by mouth daily as needed (weight gain, leg swelling), Disp: 90 tablet, Rfl: 3  •  torsemide (DEMADEX) 20 mg tablet, TAKE 1 TABLET BY MOUTH EVERY DAY AS NEEDED FOR WEIGHT GAIN, LEG SWELLING, Disp: 90 tablet, Rfl: 1  •  Trelegy Ellipta 100-62 5-25 MCG/ACT inhaler, USE 1 INHALATION BY MOUTH  DAILY RINSE MOUTH AFTER USE, Disp: 180 each, Rfl: 3  •  triamcinolone (KENALOG) 0 1 % ointment, Apply topically 2 (two) times 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, Disp: 454 g, Rfl: 3  •  Vitamins-Lipotropics (LIPOFLAVONOID PO), Take by mouth daily, Disp: , Rfl:   •  zolpidem (AMBIEN) 10 mg tablet, TAKE 1 TABLET BY MOUTH  DAILY AT BEDTIME AS NEEDED  FOR SLEEP, Disp: 30 tablet, Rfl: 5    Current Allergies     Allergies as of 05/30/2023   • (No Known Allergies)            The following portions of the patient's history were reviewed and updated as appropriate: allergies, current medications, past family history, past medical history, past social history, past surgical history and problem list      Past Medical History:   Diagnosis Date   • Agent orange exposure    • Anemia    • Benign colon polyp    • BPH (benign prostatic hyperplasia)    • Bradycardia    • Cardiomyopathy [facility])    • Chest discomfort    • CHF (congestive heart failure) (HCC)    • Chronic bronchitis (HCC)    • Chronic kidney disease    • COPD (chronic obstructive pulmonary disease) ([facility] )     LAST ASSESSED: 9/9/17   • Coronary artery disease cardio myopthia   • Emphysema of lung ([facility] ) 2013   • H/O nonmelanoma skin cancer     LAST ASSESSED: 11/7/17   • HHD (hypertensive heart disease)    • HTN (hypertension)    • Hx of lymphoma    • Hyperlipidemia    • ICD (implantable cardioverter-defibrillator) in place    • Insomnia    • Mycosis fungoides (HCC)    • PVC (premature ventricular contraction)    • PVT (paroxysmal ventricular tachycardia) (HCC)    • SOB (shortness of breath)        Past Surgical History:   Procedure Laterality Date   • CARDIAC PACEMAKER PLACEMENT     • SKIN SURGERY      skin cancer removal        Family History   Problem Relation Age of Onset   • Diabetes Mother    • Colon cancer Father         DIAGNOSED IN HIS [de-identified]   • Heart failure Father    • Coronary artery disease Father    • Cancer Father         [facility]   • Diabetes Family         MELLITUS   • Heart attack Neg Hx    • Stroke Neg Hx    • Anuerysm Neg Hx    • Clotting disorder Neg Hx    • Arrhythmia Neg Hx    • Hypertension Neg Hx         pt unsure    • Hyperlipidemia Neg Hx    • Sudden death Neg Hx         scd         Medications have been verified  Objective   /66 (BP Location: Right arm, Patient Position: Sitting, Cuff Size: Adult)   Pulse 82   Temp 98 7 °F (37 1 °C) (Tympanic)   Resp 20   Wt 102 kg (224 lb 6 4 oz)   SpO2 96%   BMI 31 30 kg/m²        Physical Exam     Physical Exam  Vitals and nursing note reviewed  Constitutional:       General: He is not in acute distress  Appearance: Normal appearance  He is not ill-appearing, toxic-appearing or diaphoretic  Comments: 79-year-old white male complaining of burning and urgency on urination  Patient states he is going frequently and there is blood in his urine since last  evening  HENT:      Head: Normocephalic and atraumatic  Right Ear: Tympanic membrane normal       Left Ear: Tympanic membrane normal       Nose: Nose normal       Mouth/Throat:      Mouth: Mucous membranes are moist       Pharynx: Oropharynx is clear   No oropharyngeal exudate or posterior oropharyngeal erythema  Eyes:      Extraocular Movements: Extraocular movements intact  Pupils: Pupils are equal, round, and reactive to light  Neck:      Vascular: No carotid bruit  Cardiovascular:      Rate and Rhythm: Normal rate  Pulses: Normal pulses  Pulmonary:      Effort: Pulmonary effort is normal    Abdominal:      General: Abdomen is flat  Bowel sounds are normal  There is no distension  Palpations: Abdomen is soft  There is no mass  Tenderness: There is abdominal tenderness  There is no right CVA tenderness, left CVA tenderness, guarding or rebound  Hernia: No hernia is present  Comments: There is some suprapubic tenderness on palpation  There is no CVA  tenderness bilaterally   Musculoskeletal:         General: No swelling, tenderness, deformity or signs of injury  Normal range of motion  Cervical back: Normal range of motion and neck supple  No rigidity  No muscular tenderness  Right lower leg: No edema  Left lower leg: No edema  Lymphadenopathy:      Cervical: No cervical adenopathy  Skin:     General: Skin is warm and dry  Coloration: Skin is not jaundiced or pale  Findings: No bruising, erythema, lesion or rash  Neurological:      General: No focal deficit present  Mental Status: He is alert and oriented to person, place, and time  Cranial Nerves: No cranial nerve deficit  Motor: No weakness     Psychiatric:         Mood and Affect: Mood normal

## 2023-06-01 LAB — BACTERIA UR CULT: ABNORMAL

## 2023-06-03 DIAGNOSIS — N30.01 ACUTE CYSTITIS WITH HEMATURIA: Primary | ICD-10-CM

## 2023-06-03 RX ORDER — NITROFURANTOIN 25; 75 MG/1; MG/1
100 CAPSULE ORAL 2 TIMES DAILY
Qty: 14 CAPSULE | Refills: 0 | Status: SHIPPED | OUTPATIENT
Start: 2023-06-03

## 2023-06-03 NOTE — PROGRESS NOTES
Obtained Urine culture results which showed Enterococcus fecalis which is resistant to Keflex  I ordered Omayra Do which it is sensitive to , and sent over a Rx to his pharmacy and left pt  A voicemail

## 2023-06-16 ENCOUNTER — APPOINTMENT (OUTPATIENT)
Age: 78
End: 2023-06-16
Payer: COMMERCIAL

## 2023-06-16 DIAGNOSIS — N18.9 CHRONIC KIDNEY DISEASE-MINERAL AND BONE DISORDER: ICD-10-CM

## 2023-06-16 DIAGNOSIS — E83.9 CHRONIC KIDNEY DISEASE-MINERAL AND BONE DISORDER: ICD-10-CM

## 2023-06-16 DIAGNOSIS — N18.30 STAGE 3 CHRONIC KIDNEY DISEASE, UNSPECIFIED WHETHER STAGE 3A OR 3B CKD (HCC): ICD-10-CM

## 2023-06-16 DIAGNOSIS — M89.9 CHRONIC KIDNEY DISEASE-MINERAL AND BONE DISORDER: ICD-10-CM

## 2023-06-16 LAB
25(OH)D3 SERPL-MCNC: 37.8 NG/ML (ref 30–100)
ANION GAP SERPL CALCULATED.3IONS-SCNC: 3 MMOL/L (ref 4–13)
BACTERIA UR QL AUTO: ABNORMAL /HPF
BASOPHILS # BLD AUTO: 0.1 THOUSANDS/ÂΜL (ref 0–0.1)
BASOPHILS NFR BLD AUTO: 1 % (ref 0–1)
BILIRUB UR QL STRIP: NEGATIVE
BUN SERPL-MCNC: 24 MG/DL (ref 5–25)
CALCIUM SERPL-MCNC: 8.9 MG/DL (ref 8.3–10.1)
CHLORIDE SERPL-SCNC: 113 MMOL/L (ref 96–108)
CLARITY UR: CLEAR
CO2 SERPL-SCNC: 25 MMOL/L (ref 21–32)
COLOR UR: YELLOW
CREAT SERPL-MCNC: 1.27 MG/DL (ref 0.6–1.3)
CREAT UR-MCNC: 171 MG/DL
EOSINOPHIL # BLD AUTO: 0.39 THOUSAND/ÂΜL (ref 0–0.61)
EOSINOPHIL NFR BLD AUTO: 6 % (ref 0–6)
ERYTHROCYTE [DISTWIDTH] IN BLOOD BY AUTOMATED COUNT: 13.1 % (ref 11.6–15.1)
GFR SERPL CREATININE-BSD FRML MDRD: 54 ML/MIN/1.73SQ M
GLUCOSE P FAST SERPL-MCNC: 97 MG/DL (ref 65–99)
GLUCOSE UR STRIP-MCNC: NEGATIVE MG/DL
HCT VFR BLD AUTO: 40.5 % (ref 36.5–49.3)
HGB BLD-MCNC: 12.9 G/DL (ref 12–17)
HGB UR QL STRIP.AUTO: NEGATIVE
HYALINE CASTS #/AREA URNS LPF: ABNORMAL /LPF
IMM GRANULOCYTES # BLD AUTO: 0.03 THOUSAND/UL (ref 0–0.2)
IMM GRANULOCYTES NFR BLD AUTO: 0 % (ref 0–2)
KETONES UR STRIP-MCNC: NEGATIVE MG/DL
LEUKOCYTE ESTERASE UR QL STRIP: NEGATIVE
LYMPHOCYTES # BLD AUTO: 2.22 THOUSANDS/ÂΜL (ref 0.6–4.47)
LYMPHOCYTES NFR BLD AUTO: 31 % (ref 14–44)
MCH RBC QN AUTO: 31.1 PG (ref 26.8–34.3)
MCHC RBC AUTO-ENTMCNC: 31.9 G/DL (ref 31.4–37.4)
MCV RBC AUTO: 98 FL (ref 82–98)
MONOCYTES # BLD AUTO: 0.46 THOUSAND/ÂΜL (ref 0.17–1.22)
MONOCYTES NFR BLD AUTO: 7 % (ref 4–12)
MUCOUS THREADS UR QL AUTO: ABNORMAL
NEUTROPHILS # BLD AUTO: 3.9 THOUSANDS/ÂΜL (ref 1.85–7.62)
NEUTS SEG NFR BLD AUTO: 55 % (ref 43–75)
NITRITE UR QL STRIP: NEGATIVE
NON-SQ EPI CELLS URNS QL MICRO: ABNORMAL /HPF
NRBC BLD AUTO-RTO: 0 /100 WBCS
PH UR STRIP.AUTO: 5.5 [PH]
PHOSPHATE SERPL-MCNC: 2.7 MG/DL (ref 2.3–4.1)
PLATELET # BLD AUTO: 349 THOUSANDS/UL (ref 149–390)
PMV BLD AUTO: 10.6 FL (ref 8.9–12.7)
POTASSIUM SERPL-SCNC: 5 MMOL/L (ref 3.5–5.3)
PROT UR STRIP-MCNC: ABNORMAL MG/DL
PROT UR-MCNC: 39 MG/DL
PROT/CREAT UR: 0.23 MG/G{CREAT} (ref 0–0.1)
PTH-INTACT SERPL-MCNC: 117.6 PG/ML (ref 12–88)
RBC # BLD AUTO: 4.15 MILLION/UL (ref 3.88–5.62)
RBC #/AREA URNS AUTO: ABNORMAL /HPF
SODIUM SERPL-SCNC: 141 MMOL/L (ref 135–147)
SP GR UR STRIP.AUTO: 1.02 (ref 1–1.03)
UROBILINOGEN UR STRIP-ACNC: <2 MG/DL
WBC # BLD AUTO: 7.1 THOUSAND/UL (ref 4.31–10.16)
WBC #/AREA URNS AUTO: ABNORMAL /HPF

## 2023-06-16 PROCEDURE — 84100 ASSAY OF PHOSPHORUS: CPT

## 2023-06-16 PROCEDURE — 80048 BASIC METABOLIC PNL TOTAL CA: CPT

## 2023-06-16 PROCEDURE — 85025 COMPLETE CBC W/AUTO DIFF WBC: CPT

## 2023-06-16 PROCEDURE — 84156 ASSAY OF PROTEIN URINE: CPT

## 2023-06-16 PROCEDURE — 82306 VITAMIN D 25 HYDROXY: CPT

## 2023-06-16 PROCEDURE — 82570 ASSAY OF URINE CREATININE: CPT

## 2023-06-16 PROCEDURE — 83970 ASSAY OF PARATHORMONE: CPT

## 2023-06-16 PROCEDURE — 81001 URINALYSIS AUTO W/SCOPE: CPT

## 2023-06-30 ENCOUNTER — TELEPHONE (OUTPATIENT)
Dept: UROLOGY | Facility: CLINIC | Age: 78
End: 2023-06-30

## 2023-07-03 ENCOUNTER — OFFICE VISIT (OUTPATIENT)
Dept: NEPHROLOGY | Facility: CLINIC | Age: 78
End: 2023-07-03
Payer: COMMERCIAL

## 2023-07-03 VITALS
HEART RATE: 93 BPM | OXYGEN SATURATION: 93 % | RESPIRATION RATE: 18 BRPM | SYSTOLIC BLOOD PRESSURE: 100 MMHG | TEMPERATURE: 97.7 F | HEIGHT: 71 IN | WEIGHT: 223.6 LBS | BODY MASS INDEX: 31.3 KG/M2 | DIASTOLIC BLOOD PRESSURE: 70 MMHG

## 2023-07-03 DIAGNOSIS — N18.9 CHRONIC KIDNEY DISEASE-MINERAL AND BONE DISORDER: ICD-10-CM

## 2023-07-03 DIAGNOSIS — N18.30 STAGE 3 CHRONIC KIDNEY DISEASE, UNSPECIFIED WHETHER STAGE 3A OR 3B CKD (HCC): Primary | ICD-10-CM

## 2023-07-03 DIAGNOSIS — I13.2 CARDIORENAL SYNDROME WITH RENAL FAILURE, STAGE 5 CHRONIC KIDNEY DISEASE OR END STAGE RENAL DISEASE, WITH HEART FAILURE (HCC): ICD-10-CM

## 2023-07-03 DIAGNOSIS — E83.9 CHRONIC KIDNEY DISEASE-MINERAL AND BONE DISORDER: ICD-10-CM

## 2023-07-03 DIAGNOSIS — I42.8 NON-ISCHEMIC CARDIOMYOPATHY (HCC): ICD-10-CM

## 2023-07-03 DIAGNOSIS — N40.0 BENIGN PROSTATIC HYPERPLASIA WITHOUT LOWER URINARY TRACT SYMPTOMS: ICD-10-CM

## 2023-07-03 DIAGNOSIS — M89.9 CHRONIC KIDNEY DISEASE-MINERAL AND BONE DISORDER: ICD-10-CM

## 2023-07-03 DIAGNOSIS — I10 PRIMARY HYPERTENSION: ICD-10-CM

## 2023-07-03 PROCEDURE — 99214 OFFICE O/P EST MOD 30 MIN: CPT | Performed by: INTERNAL MEDICINE

## 2023-07-03 PROCEDURE — 1160F RVW MEDS BY RX/DR IN RCRD: CPT | Performed by: INTERNAL MEDICINE

## 2023-07-03 PROCEDURE — 1159F MED LIST DOCD IN RCRD: CPT | Performed by: INTERNAL MEDICINE

## 2023-07-03 NOTE — PROGRESS NOTES
NEPHROLOGY OFFICE FOLLOW UP  Chance Davis 78 y.o. male MRN: [id number]    Encounter: [id number] 7/3/2023    REASON FOR VISIT: Chance Davis is a 78 y.o. male who is here on 7/3/2023 for Chronic Kidney Disease and Follow-up  . HPI:    Amarilis Hyde came in today for follow-up of stage III CKD. 51-year-old gentleman with stage III CKD along with COPD and cardiomyopathy    His breathing does fluctuate because of COPD    No leg swelling    Denies any chest pain or palpitation    No nausea no vomiting      REVIEW OF SYSTEMS:    Review of Systems   Constitutional: Negative for activity change and fatigue. HENT: Negative for congestion and ear discharge. Eyes: Negative for photophobia and pain. Respiratory: Negative for apnea and choking. Cardiovascular: Negative for chest pain and palpitations. Gastrointestinal: Negative for abdominal distention and blood in stool. Endocrine: Negative for heat intolerance and polyphagia. Genitourinary: Negative for flank pain and urgency. Musculoskeletal: Negative for neck pain and neck stiffness. Skin: Negative for color change and wound. Allergic/Immunologic: Negative for food allergies and immunocompromised state. Neurological: Negative for seizures and facial asymmetry. Hematological: Negative for adenopathy. Does not bruise/bleed easily. Psychiatric/Behavioral: Negative for self-injury and suicidal ideas.          PAST MEDICAL HISTORY:  Past Medical History:   Diagnosis Date   • Agent orange exposure    • Anemia    • Benign colon polyp    • BPH (benign prostatic hyperplasia)    • Bradycardia    • Cardiomyopathy [facility])    • Chest discomfort    • CHF (congestive heart failure) (HCC)    • Chronic bronchitis (HCC)    • Chronic kidney disease    • COPD (chronic obstructive pulmonary disease) ([address])     LAST ASSESSED: 9/9/17   • Coronary artery disease cardio myopthia   • Emphysema of lung ([address]) 2013   • H/O nonmelanoma skin cancer     LAST ASSESSED: 11/7/17   • HHD (hypertensive heart disease)    • HTN (hypertension)    • Hx of lymphoma    • Hyperlipidemia    • ICD (implantable cardioverter-defibrillator) in place    • Insomnia    • Mycosis fungoides (HCC)    • PVC (premature ventricular contraction)    • PVT (paroxysmal ventricular tachycardia) (HCC)    • SOB (shortness of breath)        PAST SURGICAL HISTORY:  Past Surgical History:   Procedure Laterality Date   • CARDIAC PACEMAKER PLACEMENT     • SKIN SURGERY      skin cancer removal        SOCIAL HISTORY:  Social History     Substance and Sexual Activity   Alcohol Use Not Currently   • Alcohol/week: 14.0 standard drinks of alcohol   • Types: 14 Shots of liquor per week    Comment: two drinks every night over a 4 hour period     Social History     Substance and Sexual Activity   Drug Use Never     Social History     Tobacco Use   Smoking Status Every Day   • Packs/day: 0.50   • Years: 35.00   • Total pack years: 17.50   • Types: Cigarettes   Smokeless Tobacco Never   Tobacco Comments    HALF A PACK PER DAY        FAMILY HISTORY:  Family History   Problem Relation Age of Onset   • Diabetes Mother    • Colon cancer Father         DIAGNOSED IN HIS 80'S   • Heart failure Father    • Coronary artery disease Father    • Cancer Father         [facility]   • Diabetes Family         MELLITUS   • Heart attack Neg Hx    • Stroke Neg Hx    • Anuerysm Neg Hx    • Clotting disorder Neg Hx    • Arrhythmia Neg Hx    • Hypertension Neg Hx         pt unsure    • Hyperlipidemia Neg Hx    • Sudden death Neg Hx         scd       MEDICATIONS:    Current Outpatient Medications:   •  albuterol (2.5 mg/3 mL) 0.083 % nebulizer solution, USE 1 VIAL VIA NEBULIZER 4 TIMES A DAY AS NEEDED, Disp: 1080 mL, Rfl: 3  •  aspirin 81 MG tablet, Take 81 mg by mouth daily, Disp: , Rfl:   •  atorvastatin (LIPITOR) 40 mg tablet, Take 1 tablet (40 mg total) by mouth daily, Disp: 90 tablet, Rfl: 3  •  bexarotene (Targretin) 75 mg capsule, Take 8 capsules (600 mg total) by mouth daily, Disp: 240 capsule, Rfl: 9  •  Cholecalciferol 25 MCG (1000 UT) capsule, Take 1 capsule by mouth daily, Disp: , Rfl:   •  cyanocobalamin (VITAMIN B-12) 1,000 mcg tablet, Take 1,000 mcg by mouth daily, Disp: , Rfl:   •  diclofenac potassium (CATAFLAM) 50 mg tablet, 1 tablet daily for severe pain.   Not to exceed 2 tablets a week., Disp: 30 tablet, Rfl: 0  •  Entresto 49-51 MG TABS, TAKE 1 TABLET BY MOUTH  TWICE DAILY, Disp: 180 tablet, Rfl: 3  •  ferrous sulfate 324 (65 Fe) mg, Take 1 tablet by mouth Twice daily, Disp: , Rfl:   •  gabapentin (NEURONTIN) 100 mg capsule, Take 1 capsule (100 mg total) by mouth 3 (three) times a day, Disp: 270 capsule, Rfl: 1  •  levothyroxine 112 mcg tablet, TAKE 1 TABLET BY MOUTH  DAILY, Disp: 90 tablet, Rfl: 3  •  metoprolol succinate (TOPROL-XL) 50 mg 24 hr tablet, Take 1.5 tablets (75 mg total) by mouth daily, Disp: 135 tablet, Rfl: 3  •  montelukast (SINGULAIR) 10 mg tablet, TAKE 1 TABLET BY MOUTH  DAILY AT BEDTIME, Disp: 90 tablet, Rfl: 3  •  Multiple Vitamins-Minerals (OCUVITE ADULT 50+ PO), Take by mouth, Disp: , Rfl:   •  potassium chloride (K-DUR,KLOR-CON) 20 mEq tablet, Take 1 tablet (20 mEq total) by mouth daily as needed (weight gain, leg swelling), Disp: 90 tablet, Rfl: 3  •  torsemide (DEMADEX) 20 mg tablet, TAKE 1 TABLET BY MOUTH EVERY DAY AS NEEDED FOR WEIGHT GAIN, LEG SWELLING, Disp: 90 tablet, Rfl: 1  •  Trelegy Ellipta 100-62.5-25 MCG/ACT inhaler, USE 1 INHALATION BY MOUTH  DAILY RINSE MOUTH AFTER USE, Disp: 180 each, Rfl: 3  •  triamcinolone (KENALOG) 0.1 % ointment, Apply topically 2 (two) times 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, Disp: 454 g, Rfl: 3  •  Vitamins-Lipotropics (LIPOFLAVONOID PO), Take by mouth daily, Disp: , Rfl:   •  zolpidem (AMBIEN) 10 mg tablet, TAKE 1 TABLET BY MOUTH  DAILY AT BEDTIME AS NEEDED  FOR SLEEP, Disp: 30 tablet, Rfl: 5  •  nitrofurantoin (MACROBID) 100 mg capsule, Take 1 capsule (100 mg total) by mouth 2 (two) times a day (Patient not taking: Reported on 7/3/2023), Disp: 14 capsule, Rfl: 0  •  phenazopyridine (PYRIDIUM) 200 mg tablet, Take one pill 3 x/ day x 2-3 days (Patient not taking: Reported on 7/3/2023), Disp: 9 tablet, Rfl: 0    PHYSICAL EXAM:  Vitals:    07/03/23 1456   BP: 100/70   BP Location: Right arm   Patient Position: Sitting   Pulse: 93   Resp: 18   Temp: 97.7 °F (36.5 °C)   TempSrc: Temporal   SpO2: 93%   Weight: 101 kg (223 lb 9.6 oz)   Height: 5' 11" (1.803 m)     Body mass index is 31.19 kg/m². Physical Exam  Constitutional:       General: He is not in acute distress. Appearance: He is well-developed. HENT:      Head: Normocephalic. Eyes:      General: No scleral icterus. Conjunctiva/sclera: Conjunctivae normal.   Neck:      Vascular: No JVD. Cardiovascular:      Rate and Rhythm: Normal rate. Heart sounds: Normal heart sounds. Pulmonary:      Effort: Pulmonary effort is normal.      Breath sounds: No wheezing. Abdominal:      Palpations: Abdomen is soft. Tenderness: There is no abdominal tenderness. Musculoskeletal:         General: Normal range of motion. Cervical back: Neck supple. Skin:     General: Skin is warm. Findings: No rash. Neurological:      Mental Status: He is alert and oriented to person, place, and time.    Psychiatric:         Behavior: Behavior normal.         LAB RESULTS:  Results for orders placed or performed in visit on 45/84/70   Basic metabolic panel   Result Value Ref Range    Sodium 141 135 - 147 mmol/L    Potassium 5.0 3.5 - 5.3 mmol/L    Chloride 113 (H) 96 - 108 mmol/L    CO2 25 21 - 32 mmol/L    ANION GAP 3 (L) 4 - 13 mmol/L    BUN 24 5 - 25 mg/dL    Creatinine 1.27 0.60 - 1.30 mg/dL    Glucose, Fasting 97 65 - 99 mg/dL    Calcium 8.9 8.3 - 10.1 mg/dL    eGFR 54 ml/min/1.73sq m   CBC and differential   Result Value Ref Range    WBC 7.10 4.31 - 10.16 Thousand/uL    RBC 4.15 3.88 - 5.62 Million/uL    Hemoglobin 12.9 12.0 - 17.0 g/dL Hematocrit 40.5 36.5 - 49.3 %    MCV 98 82 - 98 fL    MCH 31.1 26.8 - 34.3 pg    MCHC 31.9 31.4 - 37.4 g/dL    RDW 13.1 11.6 - 15.1 %    MPV 10.6 8.9 - 12.7 fL    Platelets 053 603 - 033 Thousands/uL    nRBC 0 /100 WBCs    Neutrophils Relative 55 43 - 75 %    Immat GRANS % 0 0 - 2 %    Lymphocytes Relative 31 14 - 44 %    Monocytes Relative 7 4 - 12 %    Eosinophils Relative 6 0 - 6 %    Basophils Relative 1 0 - 1 %    Neutrophils Absolute 3.90 1.85 - 7.62 Thousands/µL    Immature Grans Absolute 0.03 0.00 - 0.20 Thousand/uL    Lymphocytes Absolute 2.22 0.60 - 4.47 Thousands/µL    Monocytes Absolute 0.46 0.17 - 1.22 Thousand/µL    Eosinophils Absolute 0.39 0.00 - 0.61 Thousand/µL    Basophils Absolute 0.10 0.00 - 0.10 Thousands/µL   Phosphorus   Result Value Ref Range    Phosphorus 2.7 2.3 - 4.1 mg/dL   Protein / creatinine ratio, urine   Result Value Ref Range    Creatinine, Ur 171.0 mg/dL    Protein Urine Random 39 mg/dL    Prot/Creat Ratio, Ur 0.23 (H) 0.00 - 0.10   PTH, intact   Result Value Ref Range    .6 (H) 12.0 - 88.0 pg/mL   UA (URINE) with reflex to Scope   Result Value Ref Range    Color, UA Yellow     Clarity, UA Clear     Specific Gravity, UA 1.021 1.003 - 1.030    pH, UA 5.5 4.5, 5.0, 5.5, 6.0, 6.5, 7.0, 7.5, 8.0    Leukocytes, UA Negative Negative    Nitrite, UA Negative Negative    Protein, UA 30 (1+) (A) Negative mg/dl    Glucose, UA Negative Negative mg/dl    Ketones, UA Negative Negative mg/dl    Urobilinogen, UA <2.0 <2.0 mg/dl mg/dl    Bilirubin, UA Negative Negative    Occult Blood, UA Negative Negative   Vitamin D 25 hydroxy   Result Value Ref Range    Vit D, 25-Hydroxy 37.8 30.0 - 100.0 ng/mL   Urine Microscopic   Result Value Ref Range    RBC, UA 1-2 None Seen, 1-2 /hpf    WBC, UA 1-2 None Seen, 1-2 /hpf    Epithelial Cells Occasional None Seen, Occasional /hpf    Bacteria, UA None Seen None Seen, Occasional /hpf    MUCUS THREADS Occasional (A) None Seen    Hyaline Casts, UA 3-5 (A) None Seen /lpf       ASSESSMENT and PLAN:      Stage 3 chronic kidney disease  Lab Results   Component Value Date    EGFR 54 06/16/2023    EGFR 43 12/13/2022    EGFR 58 10/27/2022    CREATININE 1.27 06/16/2023    CREATININE 1.53 (H) 12/13/2022    CREATININE 1.22 10/27/2022   Renal function is quite stable. Advise hydration and avoiding nephrotoxic medication    Chronic kidney disease-mineral and bone disorder  Lab Results   Component Value Date    EGFR 54 06/16/2023    EGFR 43 12/13/2022    EGFR 58 10/27/2022    CREATININE 1.27 06/16/2023    CREATININE 1.53 (H) 12/13/2022    CREATININE 1.22 10/27/2022   PTH and phosphorus along with vitamin D are within acceptable range. We will continue to monitor    BPH (benign prostatic hyperplasia)  Asymptomatic    Non-ischemic cardiomyopathy ([address])  At present seems to be asymptomatic with help of diuretic which we will continue patient is also on Entresto which is helping him    HTN (hypertension)  Quite stable      Everything discussed with the patient. I will see him back in 6 months. We will get blood and urine test before that visit        Portions of the record may have been created with voice recognition software. Occasional wrong word or "sound a like" substitutions may have occurred due to the inherent limitations of voice recognition software. Read the chart carefully and recognize, using context, where substitutions have occurred. If you have any questions, please contact the dictating provider.

## 2023-07-03 NOTE — ASSESSMENT & PLAN NOTE
Lab Results   Component Value Date    EGFR 54 06/16/2023    EGFR 43 12/13/2022    EGFR 58 10/27/2022    CREATININE 1.27 06/16/2023    CREATININE 1.53 (H) 12/13/2022    CREATININE 1.22 10/27/2022   PTH and phosphorus along with vitamin D are within acceptable range.   We will continue to monitor

## 2023-07-03 NOTE — ASSESSMENT & PLAN NOTE
Lab Results   Component Value Date    EGFR 54 06/16/2023    EGFR 43 12/13/2022    EGFR 58 10/27/2022    CREATININE 1.27 06/16/2023    CREATININE 1.53 (H) 12/13/2022    CREATININE 1.22 10/27/2022   Renal function is quite stable.   Advise hydration and avoiding nephrotoxic medication

## 2023-07-03 NOTE — ASSESSMENT & PLAN NOTE
At present seems to be asymptomatic with help of diuretic which we will continue patient is also on Entresto which is helping him

## 2023-07-05 ENCOUNTER — OFFICE VISIT (OUTPATIENT)
Age: 78
End: 2023-07-05
Payer: COMMERCIAL

## 2023-07-05 VITALS — WEIGHT: 223 LBS | BODY MASS INDEX: 31.22 KG/M2 | HEIGHT: 71 IN

## 2023-07-05 DIAGNOSIS — L82.1 SEBORRHEIC KERATOSIS: ICD-10-CM

## 2023-07-05 DIAGNOSIS — D22.9 NEVUS: ICD-10-CM

## 2023-07-05 DIAGNOSIS — Z85.828 HISTORY OF SKIN CANCER: ICD-10-CM

## 2023-07-05 DIAGNOSIS — Z13.89 SCREENING FOR SKIN CONDITION: Primary | ICD-10-CM

## 2023-07-05 DIAGNOSIS — C84.08 MYCOSIS FUNGOIDES INVOLVING LYMPH NODES OF MULTIPLE REGIONS (HCC): ICD-10-CM

## 2023-07-05 PROCEDURE — 99214 OFFICE O/P EST MOD 30 MIN: CPT | Performed by: DERMATOLOGY

## 2023-07-05 NOTE — PATIENT INSTRUCTIONS
HISTORY OF BASAL CELL CARCINOMA      Assessment and Plan:  Based on a thorough discussion of this condition and the management approach to it (including a comprehensive discussion of the known risks, side effects and potential benefits of treatment), the patient (family) agrees to implement the following specific plan:  When outside we recommend using a wide brim hat, sunglasses, long sleeve and pants, sunscreen with SPF 65+ with reapplication every 2 hours, or SPF specific clothing   Follow up every 4 months    HISTORY OF SQUAMOUS CELL CARCINOMA       Assessment and Plan:  Based on a thorough discussion of this condition and the management approach to it (including a comprehensive discussion of the known risks, side effects and potential benefits of treatment), the patient (family) agrees to implement the following specific plan:  When outside we recommend using a wide brim hat, sunglasses, long sleeve and pants, sunscreen with SPF 66+ with reapplication every 2 hours, or SPF specific clothing   Follow up every 4 months      MELANOCYTIC NEVI ("Moles")      Assessment and Plan:  Based on a thorough discussion of this condition and the management approach to it (including a comprehensive discussion of the known risks, side effects and potential benefits of treatment), the patient (family) agrees to implement the following specific plan:  Provided handout with information regarding the ABCDE's of moles   Recommend routine skin exams every 4 months   Sun avoidance, protective clothing (known as UPF clothing), and the use of at least SPF 30 sunscreens is advised. Sunscreen should be reapplied every two hours when outside.        SEBORRHEIC KERATOSIS; NON-INFLAMED      Assessment and Plan:  Based on a thorough discussion of this condition and the management approach to it (including a comprehensive discussion of the known risks, side effects and potential benefits of treatment), the patient (family) agrees to implement the following specific plan:  Reassured benign      MYCOSIS FUNGOIDES      Assessment and Plan:  Based on a thorough discussion of this condition and the management approach to it (including a comprehensive discussion of the known risks, side effects and potential benefits of treatment), the patient (family) agrees to implement the following specific plan:  Continue Bexarotene and triamcinolone    Mycosis fungoides  Mycosis fungoides is a condition in which the skin is infiltrated by patches or lumps composed of white cells called lymphocytes. It is more common in men than women and is very rare in children. Its cause is unknown but in some patients, it is associated with a pre-existing contact allergic dermatitis or infection with a retrovirus. Mycosis fungoides has an indolent (low-grade) clinical course, which means that it may persist in one stage or over years or sometimes decades, slowly progress to another stage (from patches to thicker plaques and eventually to tumors). 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 Patch stage  In patch stage mycosis fungoides, the skin lesions are flat. Most often there are oval or ring-shaped (annular) pink dry patches on covered skin. They may spontaneously disappear, remain the same size, or slowly enlarge. The skin may be atrophic (thinned), and may or may not itch. The patch stage of mycosis fungoides can be difficult to distinguish from psoriasis, discoid eczema or parapsoriasis. Plaque stage  In plaque stage mycosis fungoides, the patches become thickened and may resemble psoriasis. They are usually itchy. Tumor stage  In tumor stage mycosis fungoides, large irregular lumps develop from plaques, or de elliot. They may ulcerate. At this stage, spread to other organs is more likely than in earlier stages. The tumor type may transform into a large cell lymphoma.     MF variants and subtypes  There are a number of variants and subtypes of mycosis fungoides. Most follow the same clinical and pathological features as MF but some have distinctive features as described below. Folliculotropic MF (follicular cell lymphoma)  The localized form of cutaneous T-cell lymphoma in which there is a slowly enlarging solitary patch, plaque or a tumor in which biopsy shows characteristic lymphomatous change around hair follicles. Most commonly found in the head and neck area. Skin lesions are often associated with alopecia, and sometimes with mucinorrhea (see alopecia mucinosa).    Pagetoid Reticulosis  Localized patches or plaques with an intraepidermal growth of neoplastic T cells  Presents as a solitary psoriasis-like or hyperkeratotic patch or plaque, usually on the extremities  Granulomatous slack skin:  Extremely rare subtype characterized by the slow development of folds of lax skin in the major skin folds  Skin folds show a granulomatous infiltrate with clonal T cells  Occurs most commonly in the groin and underarm regions  Mycosis fungoides palmaris et plantaris  Confined to palms and soles

## 2023-07-05 NOTE — PROGRESS NOTES
[facility] Note     Patient Name: Jill Rock  Encounter Date: July 5, 2023     Have you been cared for by a [facility] in the last 3 years and, if so, which description applies to you? Yes. I have been here within the last 3 years, and my medical history has NOT changed since that time. I am MALE/not capable of bearing children. REVIEW OF SYSTEMS:  Have you recently had or currently have any of the following? · No changes in my recent health. PAST MEDICAL HISTORY:  Have you personally ever had or currently have any of the following? If "YES," then please provide more detail. · No changes in my medical history. FAMILY HISTORY:  Any "first degree relatives" (parent, brother, sister, or child) with the following? • No changes in my family's known health. PATIENT EXPERIENCE:    • Do you want the Dermatologist to perform a COMPLETE skin exam today including a clinical examination under the "bra and underwear" areas? Yes  • If necessary, do we have your permission to call and leave a detailed message on your Preferred Phone number that includes your specific medical information? Yes      No Known Allergies   Current Outpatient Medications:   •  albuterol (2.5 mg/3 mL) 0.083 % nebulizer solution, USE 1 VIAL VIA NEBULIZER 4 TIMES A DAY AS NEEDED, Disp: 1080 mL, Rfl: 3  •  aspirin 81 MG tablet, Take 81 mg by mouth daily, Disp: , Rfl:   •  atorvastatin (LIPITOR) 40 mg tablet, Take 1 tablet (40 mg total) by mouth daily, Disp: 90 tablet, Rfl: 3  •  bexarotene (Targretin) 75 mg capsule, Take 8 capsules (600 mg total) by mouth daily, Disp: 240 capsule, Rfl: 9  •  Cholecalciferol 25 MCG (1000 UT) capsule, Take 1 capsule by mouth daily, Disp: , Rfl:   •  cyanocobalamin (VITAMIN B-12) 1,000 mcg tablet, Take 1,000 mcg by mouth daily, Disp: , Rfl:   •  diclofenac potassium (CATAFLAM) 50 mg tablet, 1 tablet daily for severe pain.   Not to exceed 2 tablets a week., Disp: 30 tablet, Rfl: 0  •  Entresto 49-51 MG TABS, TAKE 1 TABLET BY MOUTH  TWICE DAILY, Disp: 180 tablet, Rfl: 3  •  ferrous sulfate 324 (65 Fe) mg, Take 1 tablet by mouth Twice daily, Disp: , Rfl:   •  gabapentin (NEURONTIN) 100 mg capsule, Take 1 capsule (100 mg total) by mouth 3 (three) times a day, Disp: 270 capsule, Rfl: 1  •  levothyroxine 112 mcg tablet, TAKE 1 TABLET BY MOUTH  DAILY, Disp: 90 tablet, Rfl: 3  •  metoprolol succinate (TOPROL-XL) 50 mg 24 hr tablet, Take 1.5 tablets (75 mg total) by mouth daily, Disp: 135 tablet, Rfl: 3  •  montelukast (SINGULAIR) 10 mg tablet, TAKE 1 TABLET BY MOUTH  DAILY AT BEDTIME, Disp: 90 tablet, Rfl: 3  •  Multiple Vitamins-Minerals (OCUVITE ADULT 50+ PO), Take by mouth, Disp: , Rfl:   •  potassium chloride (K-DUR,KLOR-CON) 20 mEq tablet, Take 1 tablet (20 mEq total) by mouth daily as needed (weight gain, leg swelling), Disp: 90 tablet, Rfl: 3  •  torsemide (DEMADEX) 20 mg tablet, TAKE 1 TABLET BY MOUTH EVERY DAY AS NEEDED FOR WEIGHT GAIN, LEG SWELLING, Disp: 90 tablet, Rfl: 1  •  Trelegy Ellipta 100-62.5-25 MCG/ACT inhaler, USE 1 INHALATION BY MOUTH  DAILY RINSE MOUTH AFTER USE, Disp: 180 each, Rfl: 3  •  triamcinolone (KENALOG) 0.1 % ointment, Apply topically 2 (two) times 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, Disp: 454 g, Rfl: 3  •  Vitamins-Lipotropics (LIPOFLAVONOID PO), Take by mouth daily, Disp: , Rfl:   •  zolpidem (AMBIEN) 10 mg tablet, TAKE 1 TABLET BY MOUTH  DAILY AT BEDTIME AS NEEDED  FOR SLEEP, Disp: 30 tablet, Rfl: 5  •  nitrofurantoin (MACROBID) 100 mg capsule, Take 1 capsule (100 mg total) by mouth 2 (two) times a day (Patient not taking: Reported on 7/3/2023), Disp: 14 capsule, Rfl: 0  •  phenazopyridine (PYRIDIUM) 200 mg tablet, Take one pill 3 x/ day x 2-3 days (Patient not taking: Reported on 7/3/2023), Disp: 9 tablet, Rfl: 0          • Whom besides the patient is providing clinical information about today's encounter?   o NO ADDITIONAL HISTORIAN (patient alone provided history)    Physical Exam and Assessment/Plan by Diagnosis:    78year old male presents today for a 4 month check up with history of mycosis fungoides and non melanoma skin cancer. No concerns noted. HISTORY OF BASAL CELL CARCINOMA    Physical Exam:  • Anatomic Location Affected:  2015 - Right arm  • Morphological Description of scar:  Healed  • Suspected Recurrence: No  • Pertinent Positives:  • Pertinent Negatives:       Additional History of Present Condition:  History of basal cell carcinoma with no sign of recurrence    Assessment and Plan:  Based on a thorough discussion of this condition and the management approach to it (including a comprehensive discussion of the known risks, side effects and potential benefits of treatment), the patient (family) agrees to implement the following specific plan:  • When outside we recommend using a wide brim hat, sunglasses, long sleeve and pants, sunscreen with SPF 16+ with reapplication every 2 hours, or SPF specific clothing   • Follow up every 4 months    HISTORY OF SQUAMOUS CELL CARCINOMA     Physical Exam:  • Anatomic Location Affected:  2015 - Left Upper Arm x 2, right hand; 2016 - Right Ear;  2016 - Left Forearm; 2018 - Left Arm; 2020 - Left Hand; 2021 - Left Forearm; 2021 - Left Forearm; 2023 - Left Wrist  • Morphological Description of Scar:  Healed  • Suspected Recurrence: no  • Regional adenopathy: no    Additional History of Present Condition:      Assessment and Plan:  Based on a thorough discussion of this condition and the management approach to it (including a comprehensive discussion of the known risks, side effects and potential benefits of treatment), the patient (family) agrees to implement the following specific plan:  • When outside we recommend using a wide brim hat, sunglasses, long sleeve and pants, sunscreen with SPF 02+ with reapplication every 2 hours, or SPF specific clothing   • Follow up every 4 months      MELANOCYTIC NEVI ("Moles")    Physical Exam:  • Anatomic Location Affected: Mostly on sun-exposed areas of the body. • Morphological Description:  Scattered, 1-4mm round to ovoid, symmetrical-appearing, even bordered, skin colored to dark brown macules/papules, mostly in sun-exposed areas  • Pertinent Positives:  • Pertinent Negatives: Additional History of Present Condition:      Assessment and Plan:  Based on a thorough discussion of this condition and the management approach to it (including a comprehensive discussion of the known risks, side effects and potential benefits of treatment), the patient (family) agrees to implement the following specific plan:  • Provided handout with information regarding the ABCDE's of moles   • Recommend routine skin exams every 4 months   • Sun avoidance, protective clothing (known as UPF clothing), and the use of at least SPF 30 sunscreens is advised. Sunscreen should be reapplied every two hours when outside. SEBORRHEIC KERATOSIS; NON-INFLAMED    Physical Exam:  • Anatomic Location Affected:  scattered across trunk, extremities, face  • Morphological Description:  Flat and raised, waxy, smooth to warty textured, yellow to brownish-grey to dark brown to blackish, discrete, "stuck-on" appearing papules. • Pertinent Positives:  • Pertinent Negatives: Additional History of Present Condition:  Patient reports new bumps on the skin. Denies itch, burn, pain, bleeding or ulceration. Present constantly; nothing seems to make it worse or better. No prior treatment.       Assessment and Plan:  Based on a thorough discussion of this condition and the management approach to it (including a comprehensive discussion of the known risks, side effects and potential benefits of treatment), the patient (family) agrees to implement the following specific plan:  • Reassured benign      MYCOSIS FUNGOIDES    Physical Exam:  • Anatomic Location Affected:  Groin area; torso, upper and lower extremities  • Morphological Description:  Involving 40% of the body. Poiklodermatous; Scaling patches  • Pertinent Positives:  • Pertinent Negatives: No lymphadenopathy or hepatosplenamegaly      Assessment and Plan:  Based on a thorough discussion of this condition and the management approach to it (including a comprehensive discussion of the known risks, side effects and potential benefits of treatment), the patient (family) agrees to implement the following specific plan:  • Continue Bexarotene and triamcinolone    Mycosis fungoides  Mycosis fungoides is a condition in which the skin is infiltrated by patches or lumps composed of white cells called lymphocytes. It is more common in men than women and is very rare in children. Its cause is unknown but in some patients, it is associated with a pre-existing contact allergic dermatitis or infection with a retrovirus. Mycosis fungoides has an indolent (low-grade) clinical course, which means that it may persist in one stage or over years or sometimes decades, slowly progress to another stage (from patches to thicker plaques and eventually to tumors). 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 • Patch stage  o In patch stage mycosis fungoides, the skin lesions are flat. Most often there are oval or ring-shaped (annular) pink dry patches on covered skin. They may spontaneously disappear, remain the same size, or slowly enlarge. The skin may be atrophic (thinned), and may or may not itch. The patch stage of mycosis fungoides can be difficult to distinguish from psoriasis, discoid eczema or parapsoriasis. • Plaque stage  o In plaque stage mycosis fungoides, the patches become thickened and may resemble psoriasis. They are usually itchy. • Tumor stage  o In tumor stage mycosis fungoides, large irregular lumps develop from plaques, or de elliot. They may ulcerate.  At this stage, spread to other organs is more likely than in earlier stages. The tumor type may transform into a large cell lymphoma. MF variants and subtypes  There are a number of variants and subtypes of mycosis fungoides. Most follow the same clinical and pathological features as MF but some have distinctive features as described below. • Folliculotropic MF (follicular cell lymphoma)  o The localized form of cutaneous T-cell lymphoma in which there is a slowly enlarging solitary patch, plaque or a tumor in which biopsy shows characteristic lymphomatous change around hair follicles. o Most commonly found in the head and neck area.  o Skin lesions are often associated with alopecia, and sometimes with mucinorrhea (see alopecia mucinosa).    • Pagetoid Reticulosis  o Localized patches or plaques with an intraepidermal growth of neoplastic T cells  o Presents as a solitary psoriasis-like or hyperkeratotic patch or plaque, usually on the extremities  • Granulomatous slack skin:  o Extremely rare subtype characterized by the slow development of folds of lax skin in the major skin folds  o Skin folds show a granulomatous infiltrate with clonal T cells  o Occurs most commonly in the groin and underarm regions  • Mycosis fungoides palmaris et plantaris  o Confined to palms and soles    Scribe Attestation    I,:  Carissa Bueno MA am acting as a scribe while in the presence of the attending physician.:       I,:  Tom Gardner MD personally performed the services described in this documentation    as scribed in my presence.:

## 2023-07-06 DIAGNOSIS — E78.5 HYPERLIPIDEMIA, UNSPECIFIED HYPERLIPIDEMIA TYPE: ICD-10-CM

## 2023-07-06 RX ORDER — ATORVASTATIN CALCIUM 40 MG/1
TABLET, FILM COATED ORAL
Qty: 90 TABLET | Refills: 3 | Status: SHIPPED | OUTPATIENT
Start: 2023-07-06

## 2023-08-08 ENCOUNTER — REMOTE DEVICE CLINIC VISIT (OUTPATIENT)
Dept: CARDIOLOGY CLINIC | Facility: CLINIC | Age: 78
End: 2023-08-08

## 2023-08-08 DIAGNOSIS — Z95.810 AICD (AUTOMATIC CARDIOVERTER/DEFIBRILLATOR) PRESENT: Primary | ICD-10-CM

## 2023-08-08 PROCEDURE — RECHECK: Performed by: INTERNAL MEDICINE

## 2023-08-09 NOTE — PROGRESS NOTES
Results for orders placed or performed in visit on 08/08/23   Cardiac EP device report    Narrative    MDT BIV-ICD / NOT MRI CONDITIONAL  CARELINK TRANSMISSION: BATTERY VOLTAGE NEARING LIZZY-11 MONS. WILL SCHEDULE MONTHLY BATTERY CHECKS. AP 98% CRT PACING (BIV) 97% (LV) 0%. ALL AVAILABLE LEAD PARAMETERS & TRENDS WITHIN NORMAL LIMITS. 18 NSVT NOTED; NO THERAPIES GIVEN. EPISODES #[id number] CONSECUTIVE WITH BREAKS SHOWING RATES >220 BPM. PT ON METO SUCC & EF 30% (ECHO 2021). DR. POLLARD MADE AWARE. VENT SENSING EPISODES NOTED; MARKERS SHOWING FUSION & NSVT. OPTI-VOL WITHIN NORMAL LIMITS. NORMAL DEVICE FUNCTION.  NC

## 2023-08-20 DIAGNOSIS — G62.9 NEUROPATHY: ICD-10-CM

## 2023-08-21 RX ORDER — GABAPENTIN 100 MG/1
100 CAPSULE ORAL 3 TIMES DAILY
Qty: 270 CAPSULE | Refills: 0 | Status: SHIPPED | OUTPATIENT
Start: 2023-08-21

## 2023-08-28 DIAGNOSIS — C84.08 MYCOSIS FUNGOIDES INVOLVING LYMPH NODES OF MULTIPLE REGIONS (HCC): ICD-10-CM

## 2023-08-28 RX ORDER — BEXAROTENE 75 MG/1
600 CAPSULE ORAL DAILY
Qty: 240 CAPSULE | Refills: 5 | Status: SHIPPED | OUTPATIENT
Start: 2023-08-28

## 2023-09-05 DIAGNOSIS — J30.89 ENVIRONMENTAL AND SEASONAL ALLERGIES: ICD-10-CM

## 2023-09-05 DIAGNOSIS — I50.22 CHRONIC SYSTOLIC CONGESTIVE HEART FAILURE (HCC): ICD-10-CM

## 2023-09-05 RX ORDER — SACUBITRIL AND VALSARTAN 49; 51 MG/1; MG/1
TABLET, FILM COATED ORAL
Qty: 180 TABLET | Refills: 3 | Status: SHIPPED | OUTPATIENT
Start: 2023-09-05

## 2023-09-05 RX ORDER — MONTELUKAST SODIUM 10 MG/1
TABLET ORAL
Qty: 90 TABLET | Refills: 3 | Status: SHIPPED | OUTPATIENT
Start: 2023-09-05

## 2023-09-05 NOTE — TELEPHONE ENCOUNTER
Name from pharmacy: [facility] [address]          Will file in chart as: Entresto 49-51 MG TABS    Sig: TAKE 1 TABLET BY MOUTH  TWICE DAILY    Disp:  180 tablet    Refills:  3    Start: 9/5/2023    Class: Normal    Non-formulary For: Chronic systolic congestive heart failure ([address] )    To pharmacy: Please send a replace/new response with 90-Day Supply if appropriate to maximize member benefit. Requesting 1 year supply. Last ordered: 10 months ago (10/18/2022) by Afsaneh Dexter PA-C    Last refill: 8/19/2023    Rx #: [id number]    Off-Protocol Failed 09/05/2023 11:17 AM   Protocol Details  Medication not assigned to a protocol, review manually. Valid encounter within last 12 months      To be filled at: [address] ([facility]) - [place], [address] [facility]   Please review and refill if possible. Thanks!

## 2023-10-10 ENCOUNTER — TELEPHONE (OUTPATIENT)
Dept: NEPHROLOGY | Facility: CLINIC | Age: 78
End: 2023-10-10

## 2023-10-10 NOTE — TELEPHONE ENCOUNTER
I called and spoke to the patient and schedule him for his 6 month nephrology follow up with Dr. Vick Thornton. The patient understood and was okay with the day and time of his next appointment.

## 2023-10-20 ENCOUNTER — HOSPITAL ENCOUNTER (OUTPATIENT)
Dept: CT IMAGING | Facility: CLINIC | Age: 78
Discharge: HOME | End: 2023-10-20
Payer: COMMERCIAL

## 2023-10-20 DIAGNOSIS — F17.200 NICOTINE DEPENDENCE WITH CURRENT USE: ICD-10-CM

## 2023-10-20 PROCEDURE — 71271 CT THORAX LUNG CANCER SCR C-: CPT

## 2023-11-02 DIAGNOSIS — J44.9 CHRONIC OBSTRUCTIVE PULMONARY DISEASE, UNSPECIFIED COPD TYPE (HCC): ICD-10-CM

## 2023-11-02 DIAGNOSIS — G62.9 NEUROPATHY: ICD-10-CM

## 2023-11-02 RX ORDER — GABAPENTIN 100 MG/1
100 CAPSULE ORAL 3 TIMES DAILY
Qty: 270 CAPSULE | Refills: 0 | Status: SHIPPED | OUTPATIENT
Start: 2023-11-02

## 2023-11-03 RX ORDER — ALBUTEROL SULFATE 2.5 MG/3ML
SOLUTION RESPIRATORY (INHALATION)
Qty: 1080 ML | Refills: 0 | Status: SHIPPED | OUTPATIENT
Start: 2023-11-03

## 2023-11-08 ENCOUNTER — OFFICE VISIT (OUTPATIENT)
Age: 78
End: 2023-11-08
Payer: COMMERCIAL

## 2023-11-08 VITALS
HEIGHT: 71 IN | HEART RATE: 87 BPM | OXYGEN SATURATION: 99 % | RESPIRATION RATE: 18 BRPM | TEMPERATURE: 97.8 F | WEIGHT: 225.9 LBS | DIASTOLIC BLOOD PRESSURE: 72 MMHG | BODY MASS INDEX: 31.63 KG/M2 | SYSTOLIC BLOOD PRESSURE: 101 MMHG

## 2023-11-08 DIAGNOSIS — E03.9 ACQUIRED HYPOTHYROIDISM: ICD-10-CM

## 2023-11-08 DIAGNOSIS — I42.8 NON-ISCHEMIC CARDIOMYOPATHY (HCC): ICD-10-CM

## 2023-11-08 DIAGNOSIS — E66.9 OBESITY (BMI 30-39.9): ICD-10-CM

## 2023-11-08 DIAGNOSIS — H93.13 TINNITUS OF BOTH EARS: ICD-10-CM

## 2023-11-08 DIAGNOSIS — F17.210 CIGARETTE NICOTINE DEPENDENCE WITHOUT COMPLICATION: ICD-10-CM

## 2023-11-08 DIAGNOSIS — J44.9 CHRONIC OBSTRUCTIVE PULMONARY DISEASE, UNSPECIFIED COPD TYPE (HCC): ICD-10-CM

## 2023-11-08 DIAGNOSIS — N18.31 STAGE 3A CHRONIC KIDNEY DISEASE (HCC): ICD-10-CM

## 2023-11-08 DIAGNOSIS — I10 PRIMARY HYPERTENSION: Primary | ICD-10-CM

## 2023-11-08 DIAGNOSIS — I50.22 CHRONIC SYSTOLIC CONGESTIVE HEART FAILURE (HCC): ICD-10-CM

## 2023-11-08 DIAGNOSIS — E78.2 MIXED HYPERLIPIDEMIA: ICD-10-CM

## 2023-11-08 PROBLEM — N17.0 ACUTE KIDNEY INJURY (AKI) WITH ACUTE TUBULAR NECROSIS (ATN) (HCC): Status: RESOLVED | Noted: 2020-11-21 | Resolved: 2023-11-08

## 2023-11-08 PROBLEM — J44.1 COPD EXACERBATION (HCC): Status: RESOLVED | Noted: 2020-11-21 | Resolved: 2023-11-08

## 2023-11-08 PROBLEM — Z13.89 SCREENING FOR SKIN CONDITION: Status: RESOLVED | Noted: 2018-05-07 | Resolved: 2023-11-08

## 2023-11-08 PROBLEM — Z12.83 SCREENING FOR SKIN CANCER: Status: RESOLVED | Noted: 2018-02-06 | Resolved: 2023-11-08

## 2023-11-08 PROBLEM — Z12.11 COLON CANCER SCREENING: Status: RESOLVED | Noted: 2018-08-28 | Resolved: 2023-11-08

## 2023-11-08 PROBLEM — Z12.5 PROSTATE CANCER SCREENING: Status: RESOLVED | Noted: 2018-08-28 | Resolved: 2023-11-08

## 2023-11-08 PROCEDURE — 99214 OFFICE O/P EST MOD 30 MIN: CPT | Performed by: INTERNAL MEDICINE

## 2023-11-08 NOTE — PROGRESS NOTES
INTERNAL MEDICINE OFFICE VISIT  [facility] AT [place] [address] [address], [place], [address] [facility]  Tel: [phone]      NAME: Soila Downey  AGE: 78 y.o. SEX: male  : 1945   MRN: [id number]    DATE: 2023  TIME: 2:24 PM      Assessment and Plan:  1. Primary hypertension  Blood pressure is stable, continue the same medications    2. Mixed hyperlipidemia  Continue atorvastatin    3. Acquired hypothyroidism  Continue levothyroxine 112 mcg daily  - Lipid panel; Future  - TSH, 3rd generation; Future    4. Stage 3a chronic kidney disease ([address])  Was told to keep himself well-hydrated and avoid NSAIDs    5. Non-ischemic cardiomyopathy [facility])  Follow-up with cardiology    6. Chronic systolic congestive heart failure (HCC)  Continue torsemide    7. Chronic obstructive pulmonary disease, unspecified COPD type ([address])  Continue inhalers    8. Tinnitus of both ears  Has been taking Lipo flavonoid tablets but now it is not helping him. He does not want to see ENT    9. Cigarette nicotine dependence without complication  BMI Counseling: Body mass index is 31.51 kg/m². The BMI is above normal. Nutrition recommendations include decreasing portion sizes, encouraging healthy choices of fruits and vegetables and moderation in carbohydrate intake. Rationale for BMI follow-up plan is due to patient being overweight or obese. Tobacco Cessation Counseling: Tobacco cessation counseling was provided. The patient is sincerely urged to quit consumption of tobacco. He is not ready to quit tobacco.           - Counseling Documentation: patient was counseled regarding: diagnostic results, instructions for management, risk factor reductions, prognosis, patient and family education, risks and benefits of treatment options, and importance of compliance with treatment  - Medication Side Effects: Adverse side effects of medications were reviewed with the patient/guardian today.       Return for follow up visit in 6 months or earlier, if needed. Chief Complaint:  Chief Complaint   Patient presents with    Establish Care         History of Present Illness: This is a patient of Dr. Neva Millan who needs to switch. Blood pressure, cholesterol, TSH and creatinine are stable on the present medications  He has cardiomyopathy and congestive heart failure for which he follows up with cardiology but at present is asymptomatic  He has COPD and continues to smoke.   He does not want to quit  He needs to lose weight      Active Problem List:  Patient Active Problem List   Diagnosis    Insomnia    Cardiac resynchronization therapy defibrillator (CRT-D) in place    Hyperlipidemia    Hx of lymphoma    HTN (hypertension)    COPD (chronic obstructive pulmonary disease) (HCC)    Chronic bronchitis (HCC)    Non-ischemic cardiomyopathy (HCC)    BPH (benign prostatic hyperplasia)    Seborrheic keratosis    Mycosis fungoides ([address])    History of skin cancer    Acquired hypothyroidism    HHD (hypertensive heart disease)    Health care maintenance    Squamous cell cancer of skin of forearm, left    PVC (premature ventricular contraction)    Chronic systolic congestive heart failure (HCC)    Hypomagnesemia    Chronic kidney disease-mineral and bone disorder    Stage 3 chronic kidney disease (HCC)    Shortness of breath    Lumbar pain    Tinnitus of both ears    Cigarette nicotine dependence without complication         Past Medical History:  Past Medical History:   Diagnosis Date    Agent orange exposure     Anemia     Benign colon polyp     BPH (benign prostatic hyperplasia)     Bradycardia     Cardiomyopathy (HCC)     Chest discomfort     CHF (congestive heart failure) (HCC)     Chronic bronchitis (HCC)     Chronic kidney disease     COPD (chronic obstructive pulmonary disease) ([address])     LAST ASSESSED: 9/9/17    Coronary artery disease cardio myopthia    Emphysema of lung ([address]) 2013    H/O nonmelanoma skin cancer     LAST ASSESSED: 11/7/17    HHD (hypertensive heart disease)     HTN (hypertension)     Hx of lymphoma     Hyperlipidemia     ICD (implantable cardioverter-defibrillator) in place     Insomnia     Mycosis fungoides (HCC)     PVC (premature ventricular contraction)     PVT (paroxysmal ventricular tachycardia) (HCC)     SOB (shortness of breath)          Past Surgical History:  Past Surgical History:   Procedure Laterality Date    CARDIAC PACEMAKER PLACEMENT      SKIN SURGERY      skin cancer removal          Family History:  Family History   Problem Relation Age of Onset    Diabetes Mother     Colon cancer Father         DIAGNOSED IN HIS 80'S    Heart failure Father     Coronary artery disease Father     Cancer Father         [place] S[place]y    Diabetes Family         MELLITUS    Heart attack Neg Hx     Stroke Neg Hx     Anuerysm Neg Hx     Clotting disorder Neg Hx     Arrhythmia Neg Hx     Hypertension Neg Hx         pt unsure     Hyperlipidemia Neg Hx     Sudden death Neg Hx         scd         Social History:  Social History     Socioeconomic History    Marital status:       Spouse name: None    Number of children: 0    Years of education: None    Highest education level: None   Occupational History    Occupation: retired   Tobacco Use    Smoking status: Every Day     Packs/day: 0.50     Years: 35.00     Total pack years: 17.50     Types: Cigarettes    Smokeless tobacco: Never    Tobacco comments:     HALF A PACK PER DAY    Vaping Use    Vaping Use: Never used   Substance and Sexual Activity    Alcohol use: Not Currently     Alcohol/week: 14.0 standard drinks of alcohol     Types: 14 Shots of liquor per week     Comment: two drinks every night over a 4 hour period    Drug use: Never    Sexual activity: Not Currently     Partners: Female     Birth control/protection: Male Sterilization   Other Topics Concern    None   Social History Narrative    None     Social Determinants of Health     Financial Resource Strain: Low Risk (1/23/2023)    Overall Financial Resource Strain (CARDIA)     Difficulty of Paying Living Expenses: Not very hard   Food Insecurity: Not on file   Transportation Needs: No Transportation Needs (1/23/2023)    PRAPARE - Transportation     Lack of Transportation (Medical): No     Lack of Transportation (Non-Medical): No   Physical Activity: Inactive (9/4/2020)    Exercise Vital Sign     Days of Exercise per Week: 0 days     Minutes of Exercise per Session: 0 min   Stress: No Stress Concern Present (9/4/2020)    [facility]     Feeling of Stress : Not at all   Social Connections: Not on file   Intimate Partner Violence: Not on file   Housing Stability: Not on file         Allergies:  No Known Allergies      Medications:    Current Outpatient Medications:     albuterol (2.5 mg/3 mL) 0.083 % nebulizer solution, USE 1 VIAL VIA NEBULIZER 4 TIMES A DAY AS NEEDED, Disp: 1080 mL, Rfl: 0    aspirin 81 MG tablet, Take 81 mg by mouth daily, Disp: , Rfl:     atorvastatin (LIPITOR) 40 mg tablet, TAKE 1 TABLET BY MOUTH  DAILY, Disp: 90 tablet, Rfl: 3    bexarotene (Targretin) 75 mg capsule, Take 8 capsules (600 mg total) by mouth daily, Disp: 240 capsule, Rfl: 5    Cholecalciferol 25 MCG (1000 UT) capsule, Take 1 capsule by mouth daily, Disp: , Rfl:     cyanocobalamin (VITAMIN B-12) 1,000 mcg tablet, Take 1,000 mcg by mouth daily, Disp: , Rfl:     diclofenac potassium (CATAFLAM) 50 mg tablet, 1 tablet daily for severe pain.   Not to exceed 2 tablets a week., Disp: 30 tablet, Rfl: 0    Entresto 49-51 MG TABS, TAKE 1 TABLET BY MOUTH  TWICE DAILY, Disp: 180 tablet, Rfl: 3    ferrous sulfate 324 (65 Fe) mg, Take 1 tablet by mouth Twice daily, Disp: , Rfl:     gabapentin (NEURONTIN) 100 mg capsule, TAKE 1 CAPSULE BY MOUTH THREE TIMES A DAY, Disp: 270 capsule, Rfl: 0    levothyroxine 112 mcg tablet, TAKE 1 TABLET BY MOUTH  DAILY, Disp: 90 tablet, Rfl: 3    metoprolol succinate (TOPROL-XL) 50 mg 24 hr tablet, Take 1.5 tablets (75 mg total) by mouth daily (Patient taking differently: Take 50 mg by mouth daily Patient states prescriber mentioned if caused side effects to stop taking 1.5 tablets and go back to 1 tablet.), Disp: 135 tablet, Rfl: 3    montelukast (SINGULAIR) 10 mg tablet, TAKE 1 TABLET BY MOUTH  DAILY AT BEDTIME, Disp: 90 tablet, Rfl: 3    Multiple Vitamins-Minerals (OCUVITE ADULT 50+ PO), Take by mouth, Disp: , Rfl:     potassium chloride (K-DUR,KLOR-CON) 20 mEq tablet, Take 1 tablet (20 mEq total) by mouth daily as needed (weight gain, leg swelling), Disp: 90 tablet, Rfl: 3    torsemide (DEMADEX) 20 mg tablet, TAKE 1 TABLET BY MOUTH EVERY DAY AS NEEDED FOR WEIGHT GAIN, LEG SWELLING, Disp: 90 tablet, Rfl: 1    Trelegy Ellipta 100-62.5-25 MCG/ACT inhaler, USE 1 INHALATION BY MOUTH  DAILY RINSE MOUTH AFTER USE, Disp: 180 each, Rfl: 3    triamcinolone (KENALOG) 0.1 % ointment, Apply topically 2 (two) times 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, Disp: 454 g, Rfl: 3    Vitamins-Lipotropics (LIPOFLAVONOID PO), Take by mouth daily, Disp: , Rfl:     zolpidem (AMBIEN) 10 mg tablet, TAKE 1 TABLET BY MOUTH  DAILY AT BEDTIME AS NEEDED  FOR SLEEP, Disp: 30 tablet, Rfl: 5      The following portions of the patient's history were reviewed and updated as appropriate: past medical history, past surgical history, family history, social history, allergies, current medications and active problem list.      Review of Systems:  Constitutional: Denies fever, chills, weight gain, weight loss, fatigue  Eyes: Denies eye redness, eye discharge, double vision, change in visual acuity  ENT: Denies hearing loss, tinnitus, sneezing, nasal congestion, nasal discharge, sore throat   Respiratory: Denies cough, expectoration, hemoptysis, shortness of breath, wheezing  Cardiovascular: Denies chest pain, palpitations, lower extremity swelling, orthopnea, PND  Gastrointestinal: Denies abdominal pain, heartburn, nausea, vomiting, hematemesis, diarrhea, bloody stools  Genito-Urinary: Denies dysuria, frequency, difficulty in micturition, nocturia, incontinence  Musculoskeletal: Denies back pain, joint pain, muscle pain  Neurologic: Denies confusion, lightheadedness, syncope, headache, focal weakness, sensory changes, seizures  Endocrine: Denies polyuria, polydipsia, temperature intolerance  Allergy and Immunology: Denies hives, insect bite sensitivity  Hematological and Lymphatic: Denies bleeding problems, swollen glands   Psychological: Denies depression, suicidal ideation, anxiety, panic, mood swings  Dermatological: Denies pruritus, rash, skin lesion changes      Vitals:  Vitals:    11/08/23 1356   BP: 101/72   Pulse: 87   Resp: 18   Temp: 97.8 °F (36.6 °C)   SpO2: 99%       Body mass index is 31.51 kg/m². Weight (last 2 days)       Date/Time Weight    11/08/23 1356 102 (225.9)              Physical Examination:  General: Patient is not in acute distress. Awake, alert, responding to commands. No weight gain or loss  Head: Normocephalic. Atraumatic  Eyes: Conjunctiva and lids with no swelling, erythema or discharge. Both pupils normal sized, round and reactive to light. Sclera nonicteric  ENT: External examination of nose and ear normal. Otoscopic examination shows translucent tympanic membranes with patent canals without erythema. Oropharynx moist with no erythema, edema, exudate or lesions  Neck: Supple. JVP not raised. Trachea midline. No masses. No thyromegaly  Lungs: No signs of increased work of breathing or respiratory distress. Bilateral bronchovascular breath sounds with no crackles or rhonchi  Chest wall: No tenderness  Cardiovascular: Normal PMI. No thrills. Regular rate and rhythm. S1 and S2 normal. No murmur, rub or gallop  Gastrointestinal: Abdomen soft, nontender. No guarding or rigidity. Liver and spleen not palpable. Bowel sounds present  Neurologic: Cranial nerves II-XII intact.  Cortical functions normal. Motor system - Reflexes 2+ and symmetrical. Sensations normal  Musculoskeletal: Gait normal. No joint tenderness  Integumentary: Skin normal with no rash or lesions  Lymphatic: No palpable lymph nodes in neck, axilla or groin  Extremities: No clubbing, cyanosis, edema or varicosities  Psychological: Judgement and insight normal. Mood and affect normal      Laboratory Results:  CBC with diff:   Lab Results   Component Value Date    WBC 7.10 06/16/2023    WBC 5.7 06/01/2015    RBC 4.15 06/16/2023    RBC 4.70 06/01/2015    HGB 12.9 06/16/2023    HGB 13.9 06/01/2015    HCT 40.5 06/16/2023    HCT 41.6 06/01/2015    MCV 98 06/16/2023    MCV 88 06/01/2015    MCH 31.1 06/16/2023    MCH 29.5 06/01/2015    RDW 13.1 06/16/2023    RDW 12.6 06/01/2015     06/16/2023     06/01/2015       CMP:  Lab Results   Component Value Date    CREATININE 1.27 06/16/2023    CREATININE 1.02 12/11/2015    BUN 24 06/16/2023    BUN 27 (H) 12/11/2015     12/11/2015    K 5.0 06/16/2023    K 4.1 12/11/2015     (H) 06/16/2023     12/11/2015    CO2 25 06/16/2023    CO2 28 12/11/2015    GLUCOSE 93 12/11/2015    PROT 7.3 06/01/2015    ALKPHOS 76 10/27/2022    ALKPHOS 71 06/01/2015    ALT 18 10/27/2022    ALT 27 06/01/2015    AST 11 10/27/2022    AST 19 06/01/2015    BILIDIR 0.11 09/14/2017       Lab Results   Component Value Date    HGBA1C 6.2 09/01/2017    MG 1.8 11/24/2020    PHOS 2.7 06/16/2023       Lab Results   Component Value Date    TROPONINI <0.02 11/21/2020    TROPONINI 0.07 (H) 09/01/2017    TROPONINI 0.08 (H) 09/01/2017    CKTOTAL 85 01/28/2016       Lipid Profile:   Lab Results   Component Value Date    CHOL 197 06/01/2015     Lab Results   Component Value Date    HDL 37 (L) 01/24/2020    HDL 39 (L) 08/30/2019     Lab Results   Component Value Date    LDLCALC 133 (H) 01/24/2020    LDLCALC 99 08/30/2019     Lab Results   Component Value Date    TRIG 169 (H) 10/27/2022    TRIG 284 (H) 08/05/2021       Imaging Results:  CT lung screening program  Narrative: CT CHEST LUNG CANCER SCREENING WITHOUT IV CONTRAST    INDICATION:   F17.200: Nicotine dependence, unspecified, uncomplicated. COMPARISON: CT dated 10/10/2022    TECHNIQUE:  Unenhanced CT examination of the chest was performed utilizing a low dose protocol. Multiplanar 2D reformatted images were created from the source data. Radiation dose length product (DLP) for this visit:  210 mGy-cm . This examination, like all CT scans performed in the [facility], was performed utilizing techniques to minimize radiation dose exposure, including the use of iterative   reconstruction and automated exposure control. FINDINGS:    LUNGS: Central airways are patent. Trace amount of secretion in the right mainstem bronchus and subsegmental endobronchial filling defect in the left lower lobe, likely mucous plugging. Mild bronchial wall thickening, compatible with mild smoking-related chronic bronchitis. Moderate emphysema. Scattered benign calcified granulomata. No suspicious nodules. PLEURA:  Unremarkable. HEART/GREAT VESSELS: Mild to moderate coronary artery calcification. No thoracic aortic aneurysm. AICD with battery pack in the left upper anterior chest wall. MEDIASTINUM AND HINA: Calcified right hilar lymph nodes. CHEST WALL AND LOWER NECK:  Unremarkable. VISUALIZED STRUCTURES IN THE UPPER ABDOMEN:  Unremarkable. OSSEOUS STRUCTURES:  Spinal degenerative changes are noted. No acute fracture or destructive osseous lesion. Impression: No suspicious nodules or masses. Mild smoking-related chronic bronchitis. Moderate emphysema. Lung-RADS1, negative. Continued annual screening with LDCT in 12 months.     Workstation performed: [id number]       Health Maintenance:  Health Maintenance   Topic Date Due    BMI: Followup Plan  Never done    Pneumococcal Vaccine: 65+ Years (2 - PPSV23 if available, else PCV20) 09/21/2020    Fall Risk 01/24/2024    Depression Screening  01/24/2024    Medicare Annual Wellness Visit (AWV)  01/24/2024    Colorectal Cancer Screening  11/08/2024 (Originally 11/10/1990)    BMI: Adult  11/08/2024    Hepatitis C Screening  Completed    Influenza Vaccine  Completed    COVID-19 Vaccine  Completed    HIB Vaccine  Aged Out    IPV Vaccine  Aged Out    Hepatitis A Vaccine  Aged Out    Meningococcal ACWY Vaccine  Aged Out    HPV Vaccine  Aged Out    Lung Cancer Screening  Discontinued     Immunization History   Administered Date(s) Administered    COVID-19 PFIZER VACCINE 0.3 ML IM 03/16/2021, 04/07/2021, 08/20/2021    COVID-19 Pfizer Vac BIVALENT Maikel-sucrose 12 Yr+ IM 01/16/2023    COVID-19 Pfizer vac (Maikel-sucrose, gray cap) 12 yr+ IM 04/15/2022, 04/15/2022    INFLUENZA 09/24/2018, 09/08/2020, 10/20/2022, 11/04/2023    Influenza Split High Dose Preservative Free IM 10/17/2016, 09/09/2017, 09/24/2018, 09/13/2019    Influenza, high dose seasonal 0.7 mL 10/25/2021, 10/20/2022    Influenza, seasonal, injectable 10/14/2015    Pneumococcal Conjugate 13-Valent 07/27/2020    Pneumococcal Conjugate PCV 7 10/14/2015    Respiratory Syncytial Virus Vaccine (Recombinant, Adjuvanted) 11/04/2023    Tdap 1945    Zoster Vaccine Recombinant 03/31/2023         Dar Marti MD  11/8/2023,2:24 PM

## 2023-11-09 ENCOUNTER — IN-CLINIC DEVICE VISIT (OUTPATIENT)
Dept: CARDIOLOGY CLINIC | Facility: CLINIC | Age: 78
End: 2023-11-09
Payer: COMMERCIAL

## 2023-11-09 DIAGNOSIS — Z95.810 PRESENCE OF IMPLANTABLE CARDIOVERTER-DEFIBRILLATOR (ICD): Primary | ICD-10-CM

## 2023-11-09 PROCEDURE — 93284 PRGRMG EVAL IMPLANTABLE DFB: CPT | Performed by: INTERNAL MEDICINE

## 2023-11-09 NOTE — PROGRESS NOTES
MDT BIV-ICD / NOT MRI CONDITIONAL   DEVICE INTERROGATED IN THE [facility] OFFICE:  PDF REPORTS ARE OUT OF SEQUENCE DUE TO EXTERNAL DRIVE ERROR. ALL DATA RECOVERED. BATTERY VOLTAGE NEARING LIZZY (8 MONTHS). WILL SCHEDULE MONTHLY BATTERY CHECKS. AP 97.9% BP 96.5% VSRP 2.7%. ALL LEAD PARAMETERS WITHIN NORMAL LIMITS. 15 EPISODES OF NSVT WITH -174 BPM (12 @ 165 BPM, 30 @ 154 BPM, 41 @ 150 BPM, 8 @ 174 BPM, 9 @ 145 BPM, 12 @ 153 BPM). 174 V SENSE EPISODES WITH AVAILABLE MARKERS SHOWING NSVT. NO PROGRAMMING CHANGES MADE TO DEVICE PARAMETERS. OPTI-VOL WITHIN NORMAL LIMITS. NORMAL DEVICE FUNCTION.   RG

## 2023-11-14 ENCOUNTER — OFFICE VISIT (OUTPATIENT)
Age: 78
End: 2023-11-14
Payer: COMMERCIAL

## 2023-11-14 VITALS — HEIGHT: 71 IN | WEIGHT: 225 LBS | BODY MASS INDEX: 31.5 KG/M2

## 2023-11-14 DIAGNOSIS — Z13.89 SCREENING FOR SKIN CONDITION: Primary | ICD-10-CM

## 2023-11-14 DIAGNOSIS — D18.01 CHERRY ANGIOMA: ICD-10-CM

## 2023-11-14 DIAGNOSIS — Z85.828 HISTORY OF SKIN CANCER: ICD-10-CM

## 2023-11-14 DIAGNOSIS — D22.9 MULTIPLE NEVI: ICD-10-CM

## 2023-11-14 DIAGNOSIS — L82.1 SEBORRHEIC KERATOSIS: ICD-10-CM

## 2023-11-14 DIAGNOSIS — C84.00 MYCOSIS FUNGOIDES, UNSPECIFIED BODY REGION (HCC): ICD-10-CM

## 2023-11-14 PROCEDURE — 99213 OFFICE O/P EST LOW 20 MIN: CPT | Performed by: DERMATOLOGY

## 2023-11-14 RX ORDER — AMMONIUM LACTATE 12 G/100G
LOTION TOPICAL DAILY
Qty: 225 G | Refills: 2 | Status: SHIPPED | OUTPATIENT
Start: 2023-11-14

## 2023-11-14 NOTE — PATIENT INSTRUCTIONS
When outside we recommend using a wide brim hat, sunglasses, long sleeve and pants, sunscreen with SPF 16+ with reapplication every 2 hours, or SPF specific clothing     ACTINIC KERATOSIS    Actinic keratoses are very common on sites repeatedly exposed to the sun, especially the backs of the hands and the face, most often affecting the ears, nose, cheeks, upper lip, vermilion of the lower lip, temples, forehead and balding scalp. In severely chronically sun-damaged individuals, they may also be found on the upper trunk, upper and lower limbs, and dorsum of feet. We discussed the theoretical premalignant (“pre-cancerous”) nature and etiology of these growths. We discussed the prevailing notion that actinic keratoses are a reflection of abnormal skin cell development due to DNA damage by short wavelength UVB. They are more likely to appear if the immune function is poor, due to aging, recent sun exposure, predisposing disease or certain drugs. We discussed that the main concern is that actinic keratoses may predispose to squamous cell carcinoma. It is rare for a solitary actinic keratosis to evolve to squamous cell carcinoma (SCC), but the risk of SCC occurring at some stage in a patient with more than 10 actinic keratoses is thought to be about 10 to 15%. A tender, thickened, ulcerated or enlarging actinic keratosis is suspicious of SCC. Actinic keratoses may be prevented by strict sun protection. If already present, keratoses may improve with a very high sun protection factor (50+) broad-spectrum sunscreen applied at least daily to affected areas, year-round. We recommend that UPF-rated clothing and hats and sunglasses be worn whenever possible and that a sunscreen-moisturizer combination product such as Neutrogena Daily Defense be applied at least three times a day.     We performed a thorough discussion of treatment options and specific risk/benefits/alternatives including but not limited to medical “field” treatment with medications such as the following:    Topical “field area” medications such as 5-fluorouracil or Aldara (specifically, the trouble with long-term compliance, blistering and local skin reaction versus the convenience of at-home therapy and that field therapy “gets what is not yet seen”). Cryotherapy (specifically, local pain, scarring, dyspigmentation, blistering, possible superinfection, and treats “only what we see” versus directed treatment today). Photodynamic therapy (specifically, local pain, scarring, dyspigmentation, blistering, possible superinfection, need to schedule for a later date, and time spent in the office versus field therapy that “gets what is not yet seen”). BASAL CELL CARCINOMA    What is basal cell carcinoma? Basal cell carcinoma (BCC) is a common, locally invasive, keratinocytic, or non-melanoma, skin cancer. It is also known as rodent ulcer and basalioma. Patients with BCC often develop multiple primary tumours over time. Who gets basal cell carcinoma? Risk factors for BCC include:  Age and sex: BCCs are particularly prevalent in elderly males. However, they also affect females and younger adults   Previous BCC or other form of skin cancer (squamous cell carcinoma, melanoma)   Sun damage (photoaging, actinic keratoses)   Repeated prior episodes of sunburn   Fair skin, blue eyes and blond or red hair--note; BCC can also affect darker skin types   Previous cutaneous injury, thermal burn, disease (eg cutaneous lupus, sebaceous naevus)   Inherited syndromes: BCC is a particular problem for families with basal cell naevus syndrome (Gorlin syndrome), Ejwwp-Fhsxé-Zfxlgazp syndrome, Rombo syndrome, Oley syndrome and xeroderma pigmentosum   Other risk factors include ionising radiation, exposure to arsenic, and immune suppression due to disease or medicines    What causes basal cell carcinoma?   The cause of BCC is multifactorial.  Most often, there are DNA mutations in the patched [facility]) tumour suppressor gene, part of hedgehog signaling pathway   These may be triggered by exposure to ultraviolet radiation   Various spontaneous and inherited gene defects predispose to [facility]    What are the clinical features of basal cell carcinoma? BCC is a locally invasive skin tumour. The main characteristics are:  Slowly growing plaque or nodule   Skin coloured, pink or pigmented   Varies in size from a few millimetres to several centimetres in diameter   Spontaneous bleeding or ulceration  BCC is very rarely a threat to life. A tiny proportion of BCCs grow rapidly, invade deeply, and/or metastasise to local lymph nodes. Types of basal cell carcinoma  There are several distinct clinical types of BCC, and over 20 histological growth patterns of BCC.   Nodular BCC  Most common type of facial BCC   Shiny or pearly nodule with a smooth surface   May have central depression or ulceration, so its edges appear rolled   Blood vessels cross its surface   Cystic variant is soft, with jelly-like contents   Micronodular, microcystic and infiltrative types are potentially aggressive subtypes   Also known as nodulocystic carcinoma  Superficial BCC  Most common type in younger adults   Most common type on upper trunk and shoulders   Slightly scaly, irregular plaque   Thin, translucent rolled border   Multiple microerosions  Morphoeaform BCC  Usually found in mid-facial sites   Waxy, scar-like plaque with indistinct borders   Wide and deep subclinical extension   May infiltrate cutaneous nerves (perineural spread)   Also known as morpheic, morphoeiform or sclerosing BCC  Basosquamous carcinoma  Mixed basal cell carcinoma (BCC) and squamous cell carcinoma (SCC)   Infiltrative growth pattern   Potentially more aggressive than other forms of BCC   Also known as basisquamous carcinoma and mixed basal-squamous cell carcinoma       Complications of basal cell carcinoma    Recurrent BCC  Recurrence of BCC after initial treatment is not uncommon. Characteristics of recurrent BCC often include: Incomplete excision or narrow margins at primary excision   Morphoeic, micronodular, and infiltrative subtypes   Location on head and neck    Advanced BCC  Advanced BCCs are large, often neglected tumours. They may be several centimetres in diameter   They may be deeply infiltrating into tissues below the skin   They are difficult or impossible to treat surgically    Metastatic BCC  Very rare   Primary tumour is often large, neglected or recurrent, located on head and neck, with aggressive subtype   May have had multiple prior treatments   May arise in site exposed to ionising radiation   Can be fatal    How is basal cell carcinoma diagnosed? BCC is diagnosed clinically by the presence of a slowly enlarging skin lesion with typical appearance. The diagnosis and  by a diagnostic biopsy or following excision. Some typical superficial BCCs on trunk and limbs are clinically diagnosed and have non-surgical treatment without histology. What is the treatment for primary basal cell carcinoma? The treatment for a [address] [place] [address] depends on its type, size and location, the number to be treated, patient factors, and the preference or expertise of the doctor. Most BCCs are treated surgically. Long-term follow-up is recommended to check for new lesions and recurrence; the latter may be unnecessary if histology has reported wide clear margins. Excision biopsy  Excision means the lesion is cut out and the skin stitched up.   Most appropriate treatment for nodular, infiltrative and morphoeic BCCs   Should include 3 to 5 mm margin of normal skin around the tumour   Very large lesions may require flap or skin graft to repair the defect   Pathologist will report deep and lateral margins   Further surgery is recommended for lesions that are incompletely excised    Mohs micrographically controlled excision  Mohs micrographically controlled surgery involves examining carefully marked excised tissue under the microscope, layer by layer, to ensure complete excision. Very high cure rates achieved by trained Mohs surgeons   Used in high-risk areas of the face around eyes, lips and nose   Suitable for ill-defined, morphoeic, infiltrative and recurrent subtypes   Large defects are repaired by flap or skin graft    Superficial skin surgery  Superficial skin surgery comprises shave, curettage, and electrocautery. It is a rapid technique using local anaesthesia and does not require sutures. Suitable for small, well-defined nodular or superficial BCCs   Lesions are usually located on trunk or limbs   Wound is left open to heal by secondary intention   Moist wound dressings lead to healing within a few weeks   Eventual scar quality variable    Cryotherapy  Cryotherapy is the treatment of a superficial skin lesion by freezing it, usually with liquid nitrogen. Suitable for small superficial BCCs on covered areas of trunk and limbs   Best avoided for BCCs on head and neck, and distal to knees   Double freeze-thaw technique   Results in a blister that crusts over and heals within several weeks. Leaves permanent white abner    Photodynamic therapy  Photodynamic therapy (PDT) refers to a technique in which [facility] is treated with a photosensitising chemical, and exposed to light several hours later. Topical photosensitisers include aminolevulinic acid lotion and methyl aminolevulinate cream   Suitable for low-risk small, superficial BCCs   Best avoided if tumour in site at high risk of recurrence   Results in inflammatory reaction, maximal 3-4 days after procedure   Treatment repeated 7 days after initial treatment   Excellent cosmetic results    Imiquimod cream  Imiquimod is an immune response modifier.   Best used for superficial BCCs less than 2 cm diameter   Applied three to five times each week, for 6-16 weeks   Results in a variable inflammatory reaction, maximal at three weeks   Minimal scarring is usual    Fluorouracil cream  5-Fluorouracil cream is a topical cytotoxic agent. Used to treat small superficial basal cell carcinomas   Requires prolonged course, eg twice daily for 6-12 weeks   Causes inflammatory reaction   Has high recurrence rates    Radiotherapy  Radiotherapy or X-ray treatment can be used to treat primary BCCs or as adjunctive treatment if margins are incomplete. Mainly used if surgery is not suitable   Best avoided in young patients and in genetic conditions predisposing to skin cancer   Best cosmetic results achieved using multiple fractions   Typically, patient attends once-weekly for several weeks   Causes inflammatory reaction followed by scar   Risk of radiodermatitis, late recurrence, and new tumours    What is the treatment for advanced or metastatic basal cell carcinoma? Locally advanced primary, recurrent or metastatic BCC requires multidisciplinary consultation. Often a combination of treatments is used. Surgery   Radiotherapy   Targeted therapy  Targeted therapy refers to the hedgehog signalling pathway inhibitors, vismodegib and sonidegib. These drugs have some important risks and side effects. How can basal cell carcinoma be prevented? The most important way to prevent BCC is to avoid sunburn. This is especially important in childhood and early life. Fair skinned individuals and those with a personal or family history of BCC should protect their skin from sun exposure daily, year-round and lifelong. Stay indoors or under the shade in the middle of the day   Wear covering clothing   Apply high protection factor SPF50+ broad-spectrum sunscreens generously to exposed skin if outdoors   Avoid indoor tanning (sun beds, solaria)  Oral nicotinamide (vitamin B3) in a dose of 500 mg twice daily may reduce the number and severity of BCCs. What is the outlook for basal cell carcinoma? Most BCCs are cured by treatment.  Cure is most likely if treatment is undertaken when the lesion is small. About 50% of people with BCC develop a second one within 3 years of the first. They are also at increased risk of other skin cancers, especially melanoma. Regular self-skin examinations and long-term annual skin checks by an experienced health professional are recommended. SQUAMOUS CELL CARCINOMA    What is cutaneous squamous cell carcinoma? Cutaneous squamous cell carcinoma (SCC) is a common type of keratinocyte or non-melanoma skin cancer. It is derived from cells within the epidermis that make keratin -- the horny protein that makes up skin, hair and nails. Cutaneous SCC is an invasive disease, referring to cancer cells that have grown beyond the epidermis. SCC can sometimes metastasise and may prove fatal.  Intraepidermal carcinoma (cutaneous SCC in situ) and mucosal SCC are considered elsewhere. Who gets cutaneous squamous cell carcinoma? Risk factors for cutaneous SCC include:  Age and sex: SCCs are particularly prevalent in elderly males. However, they also affect females and younger adults. Previous SCC or another form of skin cancer (basal cell carcinoma, melanoma) are a strong predictor for further skin cancers. Actinic keratoses   Outdoor occupation or recreation   Smoking   Fair skin, blue eyes and blond or red hair   Previous cutaneous injury, thermal burn, disease (eg cutaneous lupus, epidermolysis bullosa, leg ulcer)   Inherited syndromes: SCC is a particular problem for families with xeroderma pigmentosum and albinism   Other risk factors include ionising radiation, exposure to arsenic, and immune suppression due to disease (eg chronic lymphocytic leukaemia) or medicines. Organ transplant recipients have a massively increased risk of developing SCC. What causes cutaneous squamous cell carcinoma? More than 90% of cases of SCC are associated with numerous DNA mutations in multiple somatic genes.  Mutations in the p53 tumour suppressor gene are caused by exposure to ultraviolet radiation (UV), especially UVB (known as signature 7). Other signature mutations relate to cigarette smoking, ageing and immune suppression (eg, to drugs such as azathioprine). Mutations in signalling pathways affect the epidermal growth factor receptor, KENTON, Fyn, and [id number] signalling. Beta-genus human papillomaviruses (wart virus) are thought to play a role in SCC arising in immune-suppressed populations. ?-HPV and HPV subtypes 5, 8, 17, 20, 24, and 38 have also been associated with an increased risk of cutaneous SCC in immunocompetent individuals. What are the clinical features of cutaneous squamous cell carcinoma? Cutaneous SCCs present as enlarging scaly or crusted lumps. They usually arise within pre-existing actinic keratosis or intraepidermal carcinoma. They grow over weeks to months   They may ulcerate   They are often tender or painful   Located on sun-exposed sites, particularly the face, lips, ears, hands, forearms and lower legs   Size varies from a few millimetres to several centimetres in diameter. Types of cutaneous squamous cell carcinoma  Distinct clinical types of invasive cutaneous SCC include:  Cutaneous horn -- the horn is due to excessive production of keratin   Keratoacanthoma (KA) -- a rapidly growing keratinising nodule that may resolve without treatment   Carcinoma cuniculatum (‘verrucous carcinoma’), a slow-growing, warty tumour on the sole of the foot. Multiple eruptive SCC/KA-like lesions arising in syndromes, such as multiple self-healing squamous epitheliomas of Plaza-Smith and Grzybowski syndrome  The pathologist may classify a tumour as well differentiated, moderately well differentiated, poorly differentiated or anaplastic cutaneous SCC. There are other variants.     Classification of squamous cell carcinoma by risk  Cutaneous SCC is classified as low-risk or high-risk, depending on the chance of tumour recurrence and metastasis. Characteristics of high-risk SCC include:  High-risk cutaneous squamous cell carcinoma has the following characteristics:  Diameter greater than or equal to 2 cm   Location on the ear, vermilion of the lip, central face, hands, feet, genitalia   Arising in elderly or immune suppressed patient   Histological thickness greater than 2 mm, poorly differentiated histology, or with the invasion of the subcutaneous tissue, nerves and blood vessels  Metastatic SCC is found in regional lymph nodes (80%), lungs, liver, brain, bones and skin. Staging cutaneous squamous cell carcinoma  In 2011, the [address] on Cancer (AJCC) published a new staging systemic for cutaneous SCC for the 7th Edition of the AJCC manual. This evaluates the dimensions of the original primary tumour (T) and its metastases to lymph nodes (N). Tumour staging for cutaneous SCC  TX: Th Primary tumour cannot be assessed  T0: No evidence of a primary tumour  Tis: Carcinoma in situ  T1: Tumour ? 2cm without high-risk features  T2: Tumour ? 2cm; or; Tumour ? 2 cm with high-risk features  T3: Tumour with the invasion of maxilla, mandible, orbit or temporal bone  T4: Tumour with the invasion of axial or appendicular skeleton or perineural invasion of skull base    Zachariah staging for cutaneous SCC  NX: Regional lymph nodes cannot be assessed  N0: No regional lymph node metastasis  N1: Metastasis in one local lymph node ? 3cm  N2: Metastasis in one local lymph node ? 3cm; or; Metastasis in >1 local lymph node ? 6cm  N3: Metastasis in lymph node ? 6cm    How is squamous cell carcinoma diagnosed? Diagnosis of cutaneous SCC is based on clinical features. The diagnosis and histological subtype are confirmed pathologically by diagnostic biopsy or following excision. See squamous cell carcinoma - pathology.   Patients with high-risk SCC may also undergo staging investigations to determine whether it has spread to lymph nodes or elsewhere. These may include:  Imaging using ultrasound scan, X-rays, CT scans, MRI scans   Lymph node or other tissue biopsies    What is the treatment for cutaneous squamous cell carcinoma? Cutaneous SCC is nearly always treated surgically. Most cases are excised with a 3-10 mm margin of normal tissue around a visible tumour. A flap or skin graft may be needed to repair the defect. Other methods of removal include:  Shave, curettage, and electrocautery for low-risk tumours on trunk and limbs   Aggressive cryotherapy for very small, thin, low-risk tumours   Mohs micrographic surgery for large facial lesions with indistinct margins or recurrent tumours   Radiotherapy for an inoperable tumour, patients unsuitable for surgery, or as adjuvant    What is the treatment for advanced or metastatic squamous cell carcinoma? Locally advanced primary, recurrent or metastatic SCC requires multidisciplinary consultation. Often a combination of treatments is used. Surgery   Radiotherapy   Cemiplimab   Experimental targeted therapy using epidermal growth factor receptor inhibitors    How can cutaneous squamous cell carcinoma be prevented? There is a great deal of evidence to show that very careful sun protection at any time of life reduces the number of SCCs. This is particularly important in ageing, sun-damaged, fair skin; in patients that are immune suppressed; and in those who already have actinic keratoses or previous SCC. Stay indoors or under the shade in the middle of the day   Wear covering clothing   Apply high protection factor SPF50+ broad-spectrum sunscreens generously to exposed skin if outdoors   Avoid indoor tanning (sun beds, solaria)    Oral nicotinamide (vitamin B3) in a dose of 500 mg twice daily may reduce the number and severity of SCCs in people at high risk. Patients with multiple squamous cell carcinomas may be prescribed an oral retinoid (acitretin or isotretinoin).  These reduce the number of tumours but have some nuisance side effects. What is the outlook for cutaneous squamous cell carcinoma? Most SCCs are cured by treatment. A cure is most likely if treatment is undertaken when the lesion is small. The risk of recurrence or disease-associated death is greater for tumours that are > 20 mm in diameter and/or > 2 mm in thickness at the time of surgical excision. About 50% of people at high risk of SCC develop a second one within 5 years of the first. They are also at increased risk of other skin cancers, especially melanoma. Regular self-skin examinations and long-term annual skin checks by an experienced health professional are recommended. Mycosis fungoides  Mycosis fungoides is a condition in which the skin is infiltrated by patches or lumps composed of white cells called lymphocytes. It is more common in men than women and is very rare in children. Its cause is unknown but in some patients, it is associated with a pre-existing contact allergic dermatitis or infection with a retrovirus. Mycosis fungoides has an indolent (low-grade) clinical course, which means that it may persist in one stage or over years or sometimes decades, slowly progress to another stage (from patches to thicker plaques and eventually to tumors). 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 Patch stage  In patch stage mycosis fungoides, the skin lesions are flat. Most often there are oval or ring-shaped (annular) pink dry patches on covered skin. They may spontaneously disappear, remain the same size, or slowly enlarge. The skin may be atrophic (thinned), and may or may not itch. The patch stage of mycosis fungoides can be difficult to distinguish from psoriasis, discoid eczema or parapsoriasis. Plaque stage  In plaque stage mycosis fungoides, the patches become thickened and may resemble psoriasis. They are usually itchy.   Tumor stage  In tumor stage mycosis fungoides, large irregular lumps develop from plaques, or de elliot. They may ulcerate. At this stage, spread to other organs is more likely than in earlier stages. The tumor type may transform into a large cell lymphoma. MF variants and subtypes  There are a number of variants and subtypes of mycosis fungoides. Most follow the same clinical and pathological features as MF but some have distinctive features as described below. Folliculotropic MF (follicular cell lymphoma)  The localized form of cutaneous T-cell lymphoma in which there is a slowly enlarging solitary patch, plaque or a tumor in which biopsy shows characteristic lymphomatous change around hair follicles. Most commonly found in the head and neck area. Skin lesions are often associated with alopecia, and sometimes with mucinorrhea (see alopecia mucinosa).    Pagetoid Reticulosis  Localized patches or plaques with an intraepidermal growth of neoplastic T cells  Presents as a solitary psoriasis-like or hyperkeratotic patch or plaque, usually on the extremities  Granulomatous slack skin:  Extremely rare subtype characterized by the slow development of folds of lax skin in the major skin folds  Skin folds show a granulomatous infiltrate with clonal T cells  Occurs most commonly in the groin and underarm regions  Mycosis fungoides palmaris et plantaris  Confined to palms and soles

## 2023-11-14 NOTE — PROGRESS NOTES
[facility] Note     Patient Name: Joni Boyd  Encounter Date: November 14, 2023     Have you been cared for by a [facility]atologist in the last 3 years and, if so, which description applies to you? Yes. I have been here within the last 3 years, and my medical history has NOT changed since that time. I am MALE/not capable of bearing children. REVIEW OF SYSTEMS:  Have you recently had or currently have any of the following? No changes in my recent health. PAST MEDICAL HISTORY:  Have you personally ever had or currently have any of the following? If "YES," then please provide more detail. No changes in my medical history. HISTORY OF IMMUNOSUPPRESSION: Do you have a history of any of the following:  Systemic Immunosuppression such as Diabetes, Biologic or Immunotherapy, Chemotherapy, Organ Transplantation, Bone Marrow Transplantation? YES, MYCOSIS FUNGOIDES     Answering "YES" requires the addition of the dotphrase "IMMUNOSUPPRESSED" as the first diagnosis of the patient's visit. FAMILY HISTORY:  Any "first degree relatives" (parent, brother, sister, or child) with the following? No changes in my family's known health. PATIENT EXPERIENCE:    Do you want the Dermatologist to perform a COMPLETE skin exam today including a clinical examination under the "bra and underwear" areas? Yes  If necessary, do we have your permission to call and leave a detailed message on your Preferred Phone number that includes your specific medical information?   Yes      No Known Allergies   Current Outpatient Medications:     albuterol (2.5 mg/3 mL) 0.083 % nebulizer solution, USE 1 VIAL VIA NEBULIZER 4 TIMES A DAY AS NEEDED, Disp: 1080 mL, Rfl: 0    aspirin 81 MG tablet, Take 81 mg by mouth daily, Disp: , Rfl:     atorvastatin (LIPITOR) 40 mg tablet, TAKE 1 TABLET BY MOUTH  DAILY, Disp: 90 tablet, Rfl: 3    bexarotene (Targretin) 75 mg capsule, Take 8 capsules (600 mg total) by mouth daily, Disp: 240 capsule, Rfl: 5    Cholecalciferol 25 MCG (1000 UT) capsule, Take 1 capsule by mouth daily, Disp: , Rfl:     cyanocobalamin (VITAMIN B-12) 1,000 mcg tablet, Take 1,000 mcg by mouth daily, Disp: , Rfl:     diclofenac potassium (CATAFLAM) 50 mg tablet, 1 tablet daily for severe pain.   Not to exceed 2 tablets a week., Disp: 30 tablet, Rfl: 0    Entresto 49-51 MG TABS, TAKE 1 TABLET BY MOUTH  TWICE DAILY, Disp: 180 tablet, Rfl: 3    ferrous sulfate 324 (65 Fe) mg, Take 1 tablet by mouth Twice daily, Disp: , Rfl:     gabapentin (NEURONTIN) 100 mg capsule, TAKE 1 CAPSULE BY MOUTH THREE TIMES A DAY, Disp: 270 capsule, Rfl: 0    levothyroxine 112 mcg tablet, TAKE 1 TABLET BY MOUTH  DAILY, Disp: 90 tablet, Rfl: 3    metoprolol succinate (TOPROL-XL) 50 mg 24 hr tablet, Take 1.5 tablets (75 mg total) by mouth daily (Patient taking differently: Take 50 mg by mouth daily Patient states prescriber mentioned if caused side effects to stop taking 1.5 tablets and go back to 1 tablet.), Disp: 135 tablet, Rfl: 3    montelukast (SINGULAIR) 10 mg tablet, TAKE 1 TABLET BY MOUTH  DAILY AT BEDTIME, Disp: 90 tablet, Rfl: 3    Multiple Vitamins-Minerals (OCUVITE ADULT 50+ PO), Take by mouth, Disp: , Rfl:     potassium chloride (K-DUR,KLOR-CON) 20 mEq tablet, Take 1 tablet (20 mEq total) by mouth daily as needed (weight gain, leg swelling), Disp: 90 tablet, Rfl: 3    torsemide (DEMADEX) 20 mg tablet, TAKE 1 TABLET BY MOUTH EVERY DAY AS NEEDED FOR WEIGHT GAIN, LEG SWELLING, Disp: 90 tablet, Rfl: 1    Trelegy Ellipta 100-62.5-25 MCG/ACT inhaler, USE 1 INHALATION BY MOUTH  DAILY RINSE MOUTH AFTER USE, Disp: 180 each, Rfl: 3    triamcinolone (KENALOG) 0.1 % ointment, Apply topically 2 (two) times 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, Disp: 454 g, Rfl: 3    Vitamins-Lipotropics (LIPOFLAVONOID PO), Take by mouth daily, Disp: , Rfl:     zolpidem (AMBIEN) 10 mg tablet, TAKE 1 TABLET BY MOUTH  DAILY AT BEDTIME AS NEEDED  FOR SLEEP, Disp: 30 tablet, Rfl: 5 Whom besides the patient is providing clinical information about today's encounter? NO ADDITIONAL HISTORIAN (patient alone provided history)    Physical Exam and Assessment/Plan by Diagnosis:    Chief complaint: Patient is a 69 y/o male present for a routine skin exam with history of non-melanoma skin cancer and history of Mycosis Fungoides, currently using Triamcinolone Ointment as needed and using Am-Lactin cream daily    HISTORY OF SQUAMOUS CELL CARCINOMA     Physical Exam:  Anatomic Location Affected:  Left Wrist - 04/2023  Left Forearm - 2021, 2018  Left Dorsum of Hand - 2020  Left Arm - 2018  Right Ear - 2016  Right Hand (Keratoacanthoma) - 2015  Left Upper Arm - 2015  Morphological Description of Scar:  well healed  Suspected Recurrence: no  Regional adenopathy: no    Additional History of Present Condition:  Treated - no history of reoccurrence     Assessment and Plan:  Based on a thorough discussion of this condition and the management approach to it (including a comprehensive discussion of the known risks, side effects and potential benefits of treatment), the patient (family) agrees to implement the following specific plan:  Monitor for change  Physical Exam:  Anatomic Location Affected: Mostly on sun-exposed areas of the trunk and extremities  Morphological Description:  Scattered, 1-4mm round to ovoid, symmetrical-appearing, even bordered, skin colored to dark brown macules/papules, mostly in sun-exposed areas  Pertinent Positives:  Pertinent Negatives:     Additional History of Present Condition:  present on exam    Assessment and Plan:  Based on a thorough discussion of this condition and the management approach to it (including a comprehensive discussion of the known risks, side effects and potential benefits of treatment), the patient (family) agrees to implement the following specific plan:  Provided handout with information regarding the ABCDE's of moles   Recommend routine skin exams every 4 months   Sun avoidance, protective clothing (known as UPF clothing), and the use of at least SPF 30 sunscreens is advised. Sunscreen should be reapplied every two hours when outside. SEBORRHEIC KERATOSIS; NON-INFLAMED    Physical Exam:  Anatomic Location Affected:  scattered across trunk, extremities, face  Morphological Description:  Flat and raised, waxy, smooth to warty textured, yellow to brownish-grey to dark brown to blackish, discrete, "stuck-on" appearing papules. Pertinent Positives:  Pertinent Negatives: Additional History of Present Condition:  Patient reports new bumps on the skin. Denies itch, burn, pain, bleeding or ulceration. Present constantly; nothing seems to make it worse or better. No prior treatment. Assessment and Plan:  Based on a thorough discussion of this condition and the management approach to it (including a comprehensive discussion of the known risks, side effects and potential benefits of treatment), the patient (family) agrees to implement the following specific plan:  Reassured benign    ANGIOMA ("CHERRY ANGIOMA")    Physical Exam:  Anatomic Location: scattered across sun exposed areas of the trunk and extremities   Morphologic Description: Firm red to reddish-blue discrete papules  Pertinent Positives:  Pertinent Negatives: Additional History of Present Condition:  Present on exam.     Assessment and Plan:  Reassured benign    MYCOSIS FUNGOIDES    Physical Exam:  Anatomic Location Affected:  Generalized  Morphological Description:  Erythematous, scaly patches  Pertinent Positives:  Pertinent Negatives:     Additional History of Present Condition:  Currently using Am-Lactin Lotion daily, Targretin and Triamcinolone Ointment as needed, diagnosed 2017    Assessment and Plan:  Based on a thorough discussion of this condition and the management approach to it (including a comprehensive discussion of the known risks, side effects and potential benefits of treatment), the patient (family) agrees to implement the following specific plan:  Continue with same regimen      Scribe Attestation      I,:  Kate Berman am acting as a scribe while in the presence of the attending physician.:       I,:  Pankaj Reyes MD personally performed the services described in this documentation    as scribed in my presence.:

## 2023-12-08 ENCOUNTER — OFFICE VISIT (OUTPATIENT)
Age: 78
End: 2023-12-08
Payer: COMMERCIAL

## 2023-12-08 VITALS
HEART RATE: 89 BPM | WEIGHT: 227 LBS | BODY MASS INDEX: 31.78 KG/M2 | HEIGHT: 71 IN | TEMPERATURE: 97.9 F | OXYGEN SATURATION: 94 % | SYSTOLIC BLOOD PRESSURE: 114 MMHG | DIASTOLIC BLOOD PRESSURE: 76 MMHG

## 2023-12-08 DIAGNOSIS — J41.0 SIMPLE CHRONIC BRONCHITIS (HCC): Primary | ICD-10-CM

## 2023-12-08 DIAGNOSIS — J30.89 ENVIRONMENTAL AND SEASONAL ALLERGIES: ICD-10-CM

## 2023-12-08 DIAGNOSIS — R06.02 SHORTNESS OF BREATH: ICD-10-CM

## 2023-12-08 DIAGNOSIS — F17.200 CURRENT EVERY DAY SMOKER: ICD-10-CM

## 2023-12-08 DIAGNOSIS — E66.9 OBESITY (BMI 30-39.9): ICD-10-CM

## 2023-12-08 DIAGNOSIS — Z87.891 PERSONAL HISTORY OF NICOTINE DEPENDENCE: ICD-10-CM

## 2023-12-08 PROCEDURE — 99214 OFFICE O/P EST MOD 30 MIN: CPT | Performed by: INTERNAL MEDICINE

## 2023-12-08 NOTE — PROGRESS NOTES
Assessment/Plan:   Diagnoses and all orders for this visit:    Simple chronic bronchitis (HCC)    Environmental and seasonal allergies    Shortness of breath    Current every day smoker  -     CT lung screening program; Future    Personal history of nicotine dependence  -     CT lung screening program; Future    Obesity (BMI 30-39. 9)        Shortness of breath and dyspnea on exertion likely multifactorial secondary to his severe COPD also regarding his cardiomyopathy   PFTs with combined severe obstruction and moderately severe restriction with a moderately decreased DLCO. Continue with Trelegy 1 puff daily   Albuterol via nebulizer 4 times daily as needed   Continue with Singulair 10 mg daily. CT of the chest lung cancer screening from October 2023 , moderate emphysematous changes lung nodules are stable  Repeat lung cancer screening CT scan in a year  Smoking cessation discussed he states he is not interested in quitting at this time he states his wife passed away and he is living alone with 3 cats and he just wants to continue smoking for the rest of his life he states. He states he enjoys smoking and does not want to quit  Vaccinations up to date received his flu shot as well as the RSV vaccine  Follow-up in 6 months or earlier as needed  No follow-ups on file. All questions are answered to the patient's satisfaction and understanding. He verbalizes understanding. He is encouraged to call with any further questions or concerns. Portions of the record may have been created with voice recognition software. Occasional wrong word or "sound a like" substitutions may have occurred due to the inherent limitations of voice recognition software. Read the chart carefully and recognize, using context, where substitutions have occurred.     Electronically Signed by Vick Montenegro MD    ______________________________________________________________________    Chief Complaint:   Chief Complaint   Patient presents with    Follow-up       Patient ID: Marielena Dacosta is a 70 y.o. y.o. 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 12/8/2023  Patient presents today for follow-up visit. Marielena Dacosta is a 53-year-old male current smoker with past medical history including but not limited to COPD, cardiomyopathy, hypertension, nodule, lymphoma, squamous cell skin cancer presenting for follow-up. Patient states that his breathing remains stable. He has chronic dyspnea on exertion which is worsened on days that are very hot and humid. Today, he is generally feeling well. He is not really interested in quitting smoking, still smoking half pack per day. Currently he is on the Trelegy 1 puff once a day and he uses his nebulizer with albuterol he states once a day, occasionally twice a day. Here for a follow-up with his CT lung screening CT scan    primary symptoms  Associated symptoms include coughing. Pertinent negatives include no chest pain, fever, headaches, myalgias or sore throat. Review of Systems   Constitutional:  Positive for appetite change. Negative for fever. HENT:  Positive for rhinorrhea. Negative for ear pain, postnasal drip, sneezing, sore throat and trouble swallowing. Eyes: Negative. Respiratory:  Positive for cough and shortness of breath. Cardiovascular: Negative. Negative for chest pain. Gastrointestinal: Negative. Endocrine: Negative. Genitourinary: Negative. Musculoskeletal: Negative. Negative for myalgias. Allergic/Immunologic: Negative. Neurological: Negative. Negative for headaches. Hematological: Negative. Psychiatric/Behavioral: Negative. Smoking history: He reports that he has been smoking cigarettes. He started smoking about 62 years ago. He has a 62.00 pack-year smoking history.  He has never used smokeless tobacco.    The following portions of the patient's history were reviewed and updated as appropriate: allergies, current medications, past family history, past medical history, past social history, past surgical history, and problem list.    Immunization History   Administered Date(s) Administered    COVID-19 PFIZER VACCINE 0.3 ML IM 03/16/2021, 04/07/2021, 08/20/2021    COVID-19 Pfizer Vac BIVALENT Maikel-sucrose 12 Yr+ IM 01/16/2023    COVID-19 Pfizer vac (Maikel-sucrose, gray cap) 12 yr+ IM 04/15/2022, 04/15/2022    INFLUENZA 09/24/2018, 09/08/2020, 10/20/2022, 11/04/2023    Influenza Split High Dose Preservative Free IM 10/17/2016, 09/09/2017, 09/24/2018, 09/13/2019    Influenza, high dose seasonal 0.7 mL 10/25/2021, 10/20/2022    Influenza, seasonal, injectable 10/14/2015    Pneumococcal Conjugate 13-Valent 07/27/2020    Pneumococcal Conjugate PCV 7 10/14/2015    Respiratory Syncytial Virus Vaccine (Recombinant, Adjuvanted) 11/04/2023    Tdap 1945    Zoster Vaccine Recombinant 03/31/2023     Current Outpatient Medications   Medication Sig Dispense Refill    albuterol (2.5 mg/3 mL) 0.083 % nebulizer solution USE 1 VIAL VIA NEBULIZER 4 TIMES A DAY AS NEEDED 1080 mL 0    ammonium lactate (LAC-HYDRIN) 12 % lotion Apply topically daily 225 g 2    aspirin 81 MG tablet Take 81 mg by mouth daily      atorvastatin (LIPITOR) 40 mg tablet TAKE 1 TABLET BY MOUTH  DAILY 90 tablet 3    bexarotene (Targretin) 75 mg capsule Take 8 capsules (600 mg total) by mouth daily 240 capsule 5    Cholecalciferol 25 MCG (1000 UT) capsule Take 1 capsule by mouth daily      cyanocobalamin (VITAMIN B-12) 1,000 mcg tablet Take 1,000 mcg by mouth daily      diclofenac potassium (CATAFLAM) 50 mg tablet 1 tablet daily for severe pain. Not to exceed 2 tablets a week. 30 tablet 0    Entresto 49-51 MG TABS TAKE 1 TABLET BY MOUTH  TWICE DAILY 180 tablet 3    ferrous sulfate 324 (65 Fe) mg Take 1 tablet by mouth Twice daily      gabapentin (NEURONTIN) 100 mg capsule TAKE 1 CAPSULE BY MOUTH THREE TIMES A  capsule 0    levothyroxine 112 mcg tablet TAKE 1 TABLET BY MOUTH  DAILY 90 tablet 3    metoprolol succinate (TOPROL-XL) 50 mg 24 hr tablet Take 1.5 tablets (75 mg total) by mouth daily (Patient taking differently: Take 50 mg by mouth daily Patient states prescriber mentioned if caused side effects to stop taking 1.5 tablets and go back to 1 tablet.) 135 tablet 3    montelukast (SINGULAIR) 10 mg tablet TAKE 1 TABLET BY MOUTH  DAILY AT BEDTIME 90 tablet 3    Multiple Vitamins-Minerals (OCUVITE ADULT 50+ PO) Take by mouth      potassium chloride (K-DUR,KLOR-CON) 20 mEq tablet Take 1 tablet (20 mEq total) by mouth daily as needed (weight gain, leg swelling) 90 tablet 3    torsemide (DEMADEX) 20 mg tablet TAKE 1 TABLET BY MOUTH EVERY DAY AS NEEDED FOR WEIGHT GAIN, LEG SWELLING 90 tablet 1    Trelegy Ellipta 100-62.5-25 MCG/ACT inhaler USE 1 INHALATION BY MOUTH  DAILY RINSE MOUTH AFTER  each 3    triamcinolone (KENALOG) 0.1 % ointment Apply topically 2 (two) times 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 454 g 3    Vitamins-Lipotropics (LIPOFLAVONOID PO) Take by mouth daily      zolpidem (AMBIEN) 10 mg tablet TAKE 1 TABLET BY MOUTH  DAILY AT BEDTIME AS NEEDED  FOR SLEEP 30 tablet 5     No current facility-administered medications for this visit. Allergies: Patient has no known allergies.     Objective:  Vitals:    12/08/23 1032   BP: 114/76   Pulse: 89 Temp: 97.9 °F (36.6 °C)   SpO2: 94%   Weight: 103 kg (227 lb)   Height: 5' 11" (1.803 m)   Oxygen Therapy  SpO2: 94 %  . Wt Readings from Last 3 Encounters:   12/08/23 103 kg (227 lb)   11/14/23 102 kg (225 lb)   11/08/23 102 kg (225 lb 14.4 oz)     Body mass index is 31.66 kg/m². Physical Exam  Vitals and nursing note reviewed. Constitutional:       Appearance: He is well-developed. HENT:      Head: Normocephalic and atraumatic. Eyes:      Conjunctiva/sclera: Conjunctivae normal.      Pupils: Pupils are equal, round, and reactive to light. Neck:      Thyroid: No thyromegaly. Vascular: No JVD. Cardiovascular:      Rate and Rhythm: Normal rate and regular rhythm. Heart sounds: Normal heart sounds. No murmur heard. No friction rub. No gallop. Pulmonary:      Effort: Pulmonary effort is normal. No respiratory distress. Breath sounds: Normal breath sounds. No wheezing or rales. Chest:      Chest wall: No tenderness. Musculoskeletal:         General: No tenderness or deformity. Normal range of motion. Cervical back: Normal range of motion and neck supple. Lymphadenopathy:      Cervical: No cervical adenopathy. Skin:     General: Skin is warm and dry. Neurological:      Mental Status: He is alert and oriented to person, place, and time.          Lab Review:   Appointment on 06/16/2023   Component Date Value    Sodium 06/16/2023 141     Potassium 06/16/2023 5.0     Chloride 06/16/2023 113 (H)     CO2 06/16/2023 25     ANION GAP 06/16/2023 3 (L)     BUN 06/16/2023 24     Creatinine 06/16/2023 1.27     Glucose, Fasting 06/16/2023 97     Calcium 06/16/2023 8.9     eGFR 06/16/2023 54     WBC 06/16/2023 7.10     RBC 06/16/2023 4.15     Hemoglobin 06/16/2023 12.9     Hematocrit 06/16/2023 40.5     MCV 06/16/2023 98     MCH 06/16/2023 31.1     MCHC 06/16/2023 31.9     RDW 06/16/2023 13.1     MPV 06/16/2023 10.6     Platelets 69/27/9648 349     nRBC 06/16/2023 0     Neutrophils Relative 06/16/2023 55     Immat GRANS % 06/16/2023 0     Lymphocytes Relative 06/16/2023 31     Monocytes Relative 06/16/2023 7     Eosinophils Relative 06/16/2023 6     Basophils Relative 06/16/2023 1     Neutrophils Absolute 06/16/2023 3.90     Immature Grans Absolute 06/16/2023 0.03     Lymphocytes Absolute 06/16/2023 2.22     Monocytes Absolute 06/16/2023 0.46     Eosinophils Absolute 06/16/2023 0.39     Basophils Absolute 06/16/2023 0.10     Phosphorus 06/16/2023 2.7     Creatinine, Ur 06/16/2023 171.0     Protein Urine Random 06/16/2023 39     Prot/Creat Ratio, Ur 06/16/2023 0.23 (H)     PTH 06/16/2023 117.6 (H)     Color, UA 06/16/2023 Yellow     Clarity, UA 06/16/2023 Clear     Specific Gravity, UA 06/16/2023 1.021     pH, UA 06/16/2023 5.5     Leukocytes, UA 06/16/2023 Negative     Nitrite, UA 06/16/2023 Negative     Protein, UA 06/16/2023 30 (1+) (A)     Glucose, UA 06/16/2023 Negative     Ketones, UA 06/16/2023 Negative     Urobilinogen, UA 06/16/2023 <2.0     Bilirubin, UA 06/16/2023 Negative     Occult Blood, UA 06/16/2023 Negative     Vit D, 25-Hydroxy 06/16/2023 37.8     RBC, UA 06/16/2023 1-2     WBC, UA 06/16/2023 1-2     Epithelial Cells 06/16/2023 Occasional     Bacteria, UA 06/16/2023 None Seen     MUCUS THREADS 06/16/2023 Occasional (A)     Hyaline Casts, UA 06/16/2023 3-5 (A)        Diagnostics:  I have personally reviewed pertinent films in PACS  CT of chest performed on 10/20/23 ct lung screening revealed no suspicious lung nodules  Answers submitted by the patient for this visit:  Pulmonology Questionnaire (Submitted on 12/5/2023)  Chief Complaint: Primary symptoms  Chronicity: recurrent  When did you first notice your symptoms?: more than 1 year ago  How often do your symptoms occur?: intermittently  Since you first noticed this problem, how has it changed?: waxing and waning  Do you have shortness of breath that occurs with effort or exertion?: Yes  Do you have ear congestion?: No  Do you have heartburn?: No  Do you have fatigue?: Yes  Do you have nasal congestion?: Yes  Do you have shortness of breath when lying flat?: No  Do you have shortness of breath when you wake up?: Yes  Do you have sweats?: No  Have you experienced weight loss?: Yes  Which of the following makes your symptoms worse?: change in weather, exercise, pollen, strenuous activity  Which of the following makes your symptoms better?: steroid inhaler  Risk factors for lung disease: animal exposure, smoking/tobacco exposure

## 2023-12-10 DIAGNOSIS — I10 ESSENTIAL HYPERTENSION: ICD-10-CM

## 2023-12-11 ENCOUNTER — REMOTE DEVICE CLINIC VISIT (OUTPATIENT)
Dept: CARDIOLOGY CLINIC | Facility: CLINIC | Age: 78
End: 2023-12-11
Payer: COMMERCIAL

## 2023-12-11 DIAGNOSIS — Z95.810 PRESENCE OF AUTOMATIC CARDIOVERTER/DEFIBRILLATOR (AICD): Primary | ICD-10-CM

## 2023-12-11 PROCEDURE — 93295 DEV INTERROG REMOTE 1/2/MLT: CPT | Performed by: INTERNAL MEDICINE

## 2023-12-11 PROCEDURE — 93296 REM INTERROG EVL PM/IDS: CPT | Performed by: INTERNAL MEDICINE

## 2023-12-11 RX ORDER — METOPROLOL SUCCINATE 50 MG/1
75 TABLET, EXTENDED RELEASE ORAL DAILY
Qty: 135 TABLET | Refills: 3 | Status: SHIPPED | OUTPATIENT
Start: 2023-12-11 | End: 2023-12-21 | Stop reason: SDUPTHER

## 2023-12-11 NOTE — TELEPHONE ENCOUNTER
Name from pharmacy: Metoprolol Succinate ER 50 MG Oral Tablet Extended Release 24 Hour         Will file in chart as: metoprolol succinate (TOPROL-XL) 50 mg 24 hr tablet    Sig: TAKE 1 AND 1/2 TABLETS BY MOUTH  DAILY    Disp: 135 tablet    Refills: 3    Start: 12/10/2023    Class: Normal    For: Essential hypertension    To pharmacy: Please send a replace/new response with 90-Day Supply if appropriate to maximize member benefit. Requesting 1 year supply. Last ordered: 10 months ago (2/7/2023) by Maryjane Marti MD    Last refill: 10/16/2023    Rx #: [id number]    Cardiovascular:  Beta Blockers Bjobsb07/10/2023 11:25 PM   Protocol Details Valid encounter within last 6 months    BP completed in the last 6 months    Last Heart Rate in normal range and within 180 days      To be filled at: [id number] [facility], [address] [facility]      Please review and refill if appropriate  Thanks!

## 2023-12-11 NOTE — PROGRESS NOTES
Results for orders placed or performed in visit on 12/11/23   Cardiac EP device report    Narrative    MDT BIV-ICD / NOT MRI CONDITIONAL  NON-BILLABLE CARELINK TRANSMISSION: BATTERY STATUS: BATTERY VOLTAGE NEARING LIZZY (8 MOS). WILL SCHEDULE MONTHLY BATTERY CHECKS. AP: 98.1%. : 97.8% + VSRP: 1.7%. ALL AVAILABLE LEAD PARAMETERS WITHIN NORMAL LIMITS. 4 VT-NS EPISODES W/ EGMS SHOWING NSVT 15 BEATS @ 162 BPM, 6 BEATS @ 188 BPM, 7 BEATS @ 145 BPM, 8 BEATS @ 168 BPM. 5 V-SENSE EPISODES (1 SEC/D) W/ AVAIL MARKERS SHOWING NSVT 15 BEATS  @162 BPM, 6 BEATS @ 188 BPM. PAT 7 BEATS @ 145 BPM, 8 BEATS @ 168 BPM & FUSION. PT TAKES METOPROLOL SUCC, ENTRESTO, ASA 81MG. EF: 30-35% (ECHO 4/27/21). OPTI-VOL WITHIN NORMAL LIMITS. NORMAL DEVICE FUNCTION.  [id number] [address]

## 2023-12-21 ENCOUNTER — OFFICE VISIT (OUTPATIENT)
Dept: CARDIOLOGY CLINIC | Facility: CLINIC | Age: 78
End: 2023-12-21
Payer: COMMERCIAL

## 2023-12-21 VITALS
HEART RATE: 75 BPM | SYSTOLIC BLOOD PRESSURE: 110 MMHG | HEIGHT: 71 IN | DIASTOLIC BLOOD PRESSURE: 80 MMHG | OXYGEN SATURATION: 95 % | RESPIRATION RATE: 16 BRPM | BODY MASS INDEX: 32.62 KG/M2 | WEIGHT: 233 LBS

## 2023-12-21 DIAGNOSIS — E66.9 OBESITY (BMI 30-39.9): ICD-10-CM

## 2023-12-21 DIAGNOSIS — Z95.810 CARDIAC RESYNCHRONIZATION THERAPY DEFIBRILLATOR (CRT-D) IN PLACE: ICD-10-CM

## 2023-12-21 DIAGNOSIS — E78.2 MIXED HYPERLIPIDEMIA: ICD-10-CM

## 2023-12-21 DIAGNOSIS — I42.8 NON-ISCHEMIC CARDIOMYOPATHY (HCC): Primary | ICD-10-CM

## 2023-12-21 DIAGNOSIS — I47.29 NSVT (NONSUSTAINED VENTRICULAR TACHYCARDIA) (HCC): ICD-10-CM

## 2023-12-21 DIAGNOSIS — I10 ESSENTIAL HYPERTENSION: ICD-10-CM

## 2023-12-21 PROCEDURE — 99214 OFFICE O/P EST MOD 30 MIN: CPT | Performed by: INTERNAL MEDICINE

## 2023-12-21 RX ORDER — METOPROLOL SUCCINATE 25 MG/1
25 TABLET, EXTENDED RELEASE ORAL DAILY
Qty: 90 TABLET | Refills: 3
Start: 2023-12-21

## 2023-12-21 RX ORDER — SPIRONOLACTONE 25 MG/1
12.5 TABLET ORAL DAILY
Qty: 45 TABLET | Refills: 3 | Status: SHIPPED | OUTPATIENT
Start: 2023-12-21

## 2023-12-21 NOTE — PROGRESS NOTES
CARDIOLOGY OFFICE VISIT  [facility]  [address], [place] [id number]  Tel: [phone]      NAME: Sherly Peña  AGE: 78 y.o.  SEX: male  : 1945  MRN: [id number]      Chief Complaint:  Chief Complaint   Patient presents with    Follow-up         History of Present Illness:   Sherly comes for follow-up.  States she is doing okay from a cardiac standpoint.  He denies chest pain, shortness of breath, lightheadedness, palpitations, swelling feet, orthopnea, PND, claudication, syncope    Chronic systolic HF - States currently his wt is at his baseline. No SOB. On BB, Entretso     NICMP s/p CRT-D - He had a Medtronic BiV ICD implanted in 2011 and changed in 2014. He has been following up with Dr Sampson for the devise interrogations. On BB, Entresto.     Episodes of NSVT picked up recently on device tranmissions    Essential hypertension -  Has been hypertensive for many years.  Taking medications regularly.  Denies lightheadedness, headache, medication side effects.      Mixed hyperlipidemia -  Has had hyperlipidemia for many years.  Taking statin regularly along with diet control.  Denies myalgia.  PCP closely monitoring the blood work.    Obesity    Past Medical History:  Past Medical History:   Diagnosis Date    Agent orange exposure     Anemia     Benign colon polyp     BPH (benign prostatic hyperplasia)     Bradycardia     Cardiomyopathy (HCC)     Chest discomfort     CHF (congestive heart failure) (HCC)     Chronic bronchitis (HCC)     Chronic kidney disease     COPD (chronic obstructive pulmonary disease) (HCC)     LAST ASSESSED: 17    Coronary artery disease cardio myopthia    Emphysema of lung (HCC)     H/O nonmelanoma skin cancer     LAST ASSESSED: 17    HHD (hypertensive heart disease)     HTN (hypertension)     Hx of lymphoma     Hyperlipidemia     ICD (implantable cardioverter-defibrillator) in  place     Insomnia     Mycosis fungoides (HCC)     PVC (premature ventricular contraction)     PVT (paroxysmal ventricular tachycardia) (HCC)     SOB (shortness of breath)          Past Surgical History:  Past Surgical History:   Procedure Laterality Date    CARDIAC PACEMAKER PLACEMENT      SKIN SURGERY      skin cancer removal          Family History:  Family History   Problem Relation Age of Onset    Diabetes Mother     Colon cancer Father         DIAGNOSED IN HIS 80'S    Heart failure Father     Coronary artery disease Father     Cancer Father         Lorenzo    Diabetes Family         MELLITUS    Heart attack Neg Hx     Stroke Neg Hx     Anuerysm Neg Hx     Clotting disorder Neg Hx     Arrhythmia Neg Hx     Hypertension Neg Hx         pt unsure     Hyperlipidemia Neg Hx     Sudden death Neg Hx         scd         Social History:  Social History     Socioeconomic History    Marital status:      Spouse name: None    Number of children: 0    Years of education: None    Highest education level: None   Occupational History    Occupation: retired   Tobacco Use    Smoking status: Every Day     Current packs/day: 1.00     Average packs/day: 1 pack/day for 63.0 years (63.0 ttl pk-yrs)     Types: Cigarettes     Start date: 1961    Smokeless tobacco: Never    Tobacco comments:     HALF A PACK PER DAY    Vaping Use    Vaping status: Never Used   Substance and Sexual Activity    Alcohol use: Not Currently     Alcohol/week: 14.0 standard drinks of alcohol     Types: 14 Shots of liquor per week     Comment: two drinks every night over a 4 hour period    Drug use: Never    Sexual activity: Not Currently     Partners: Female     Birth control/protection: Male Sterilization   Other Topics Concern    None   Social History Narrative    None     Social Determinants of Health     Financial Resource Strain: Low Risk  (1/23/2023)    Overall Financial Resource Strain (CARDIA)     Difficulty of Paying Living Expenses: Not very hard    Food Insecurity: Not on file   Transportation Needs: No Transportation Needs (1/23/2023)    PRAPARE - Transportation     Lack of Transportation (Medical): No     Lack of Transportation (Non-Medical): No   Physical Activity: Inactive (9/4/2020)    Exercise Vital Sign     Days of Exercise per Week: 0 days     Minutes of Exercise per Session: 0 min   Stress: No Stress Concern Present (9/4/2020)    [employer] - Occupational Stress Questionnaire     Feeling of Stress : Not at all   Social Connections: Not on file   Intimate Partner Violence: Not on file   Housing Stability: Not on file         Active Problems:  Patient Active Problem List   Diagnosis    Insomnia    Cardiac resynchronization therapy defibrillator (CRT-D) in place    Hyperlipidemia    Hx of lymphoma    HTN (hypertension)    COPD (chronic obstructive pulmonary disease) (HCC)    Chronic bronchitis (HCC)    Non-ischemic cardiomyopathy (HCC)    BPH (benign prostatic hyperplasia)    Seborrheic keratosis    Mycosis fungoides (HCC)    History of skin cancer    Acquired hypothyroidism    HHD (hypertensive heart disease)    Health care maintenance    Squamous cell cancer of skin of forearm, left    PVC (premature ventricular contraction)    Chronic systolic congestive heart failure (HCC)    Hypomagnesemia    Chronic kidney disease-mineral and bone disorder    Stage 3 chronic kidney disease (HCC)    Shortness of breath    Lumbar pain    Tinnitus of both ears    Cigarette nicotine dependence without complication         The following portions of the patient's history were reviewed and updated as appropriate: past medical history, past surgical history, past family history,  past social history, current medications, allergies and problem list.      Review of Systems:  Constitutional: Denies fever, chills  Eyes: Denies eye redness, eye discharge  ENT: Denies hearing loss, tinnitus, sneezing, nasal discharge, sore throat   Respiratory:  Denies cough, expectoration, hemoptysis, shortness of breath  Cardiovascular: Denies chest pain, palpitations, orthopnea, PND, lower extremity swelling  Gastrointestinal: Denies abdominal pain, nausea, vomiting, hematemesis, diarrhea, bloody stools  Genito-Urinary: Denies dysuria, incontinence  Musculoskeletal: Denies back pain, joint pain, muscle pain  Neurologic: Denies lightheadedness, syncope, headache, seizures  Endocrine: Denies polydipsia, temperature intolerance  Allergy and Immunology: Denies hives, insect bite sensitivity  Hematological and Lymphatic: Denies bleeding problems, swollen glands   Psychological: Denies depression, suicidal ideation, anxiety, panic  Dermatological: Denies pruritus, rash, skin lesion changes        Vitals:  Vitals:    12/21/23 1457   BP: 110/80   Pulse: 75   Resp: 16   SpO2: 95%       Body mass index is 32.5 kg/m².    Weight (last 2 days)       Date/Time Weight    12/21/23 1457 106 (233)            Physical Examination  General: Awake, alert. Responding to commands  Head: Normocephalic. Atraumatic  Eyes: Nonicteric  Neck: Supple. JVP not raised. Trachea central. No thyromegaly  Lungs: Bilateral bronchovascular breath sounds with no crackles or rhonchi  Chest wall: No tenderness  Cardiovascular: RRR. S1 and S2 normal. No murmur, rub or gallop  Gastrointestinal: Abdomen soft, nontender. No guarding or rigidity. Liver and spleen not palpable. Bowel sounds present  Neurologic: Patient is awake, alert. Responding to command. Moving all extremities  Integumentary:  No skin rash  Lymphatic: No cervical lymphadenopathy  Back: Symmetric. No CVA tenderness  Extremities: No clubbing, cyanosis or edema        Laboratory Results:  CBC with diff:   Lab Results   Component Value Date    WBC 7.10 06/16/2023    WBC 5.7 06/01/2015    RBC 4.15 06/16/2023    RBC 4.70 06/01/2015    HGB 12.9 06/16/2023    HGB 13.9 06/01/2015    HCT 40.5 06/16/2023    HCT 41.6 06/01/2015    MCV 98 06/16/2023    MCV 88  06/01/2015    MCH 31.1 06/16/2023    MCH 29.5 06/01/2015    RDW 13.1 06/16/2023    RDW 12.6 06/01/2015     06/16/2023     06/01/2015       CMP:  Lab Results   Component Value Date    CREATININE 1.27 06/16/2023    CREATININE 1.02 12/11/2015    BUN 24 06/16/2023    BUN 27 (H) 12/11/2015     12/11/2015    K 5.0 06/16/2023    K 4.1 12/11/2015     (H) 06/16/2023     12/11/2015    CO2 25 06/16/2023    CO2 28 12/11/2015    GLUCOSE 93 12/11/2015    PROT 7.3 06/01/2015    ALKPHOS 76 10/27/2022    ALKPHOS 71 06/01/2015    ALT 18 10/27/2022    ALT 27 06/01/2015    AST 11 10/27/2022    AST 19 06/01/2015    BILIDIR 0.11 09/14/2017       Lab Results   Component Value Date    HGBA1C 6.2 09/01/2017    MG 1.8 11/24/2020    PHOS 2.7 06/16/2023       Lab Results   Component Value Date    TROPONINI <0.02 11/21/2020    TROPONINI 0.07 (H) 09/01/2017    TROPONINI 0.08 (H) 09/01/2017    CKTOTAL 85 01/28/2016       Medications:    Current Outpatient Medications:     albuterol (2.5 mg/3 mL) 0.083 % nebulizer solution, USE 1 VIAL VIA NEBULIZER 4 TIMES A DAY AS NEEDED, Disp: 1080 mL, Rfl: 0    ammonium lactate (LAC-HYDRIN) 12 % lotion, Apply topically daily, Disp: 225 g, Rfl: 2    aspirin 81 MG tablet, Take 81 mg by mouth daily, Disp: , Rfl:     atorvastatin (LIPITOR) 40 mg tablet, TAKE 1 TABLET BY MOUTH  DAILY, Disp: 90 tablet, Rfl: 3    bexarotene (Targretin) 75 mg capsule, Take 8 capsules (600 mg total) by mouth daily, Disp: 240 capsule, Rfl: 5    Cholecalciferol 25 MCG (1000 UT) capsule, Take 1 capsule by mouth daily, Disp: , Rfl:     cyanocobalamin (VITAMIN B-12) 1,000 mcg tablet, Take 1,000 mcg by mouth daily, Disp: , Rfl:     diclofenac potassium (CATAFLAM) 50 mg tablet, 1 tablet daily for severe pain.  Not to exceed 2 tablets a week., Disp: 30 tablet, Rfl: 0    Entresto 49-51 MG TABS, TAKE 1 TABLET BY MOUTH  TWICE DAILY, Disp: 180 tablet, Rfl: 3    ferrous sulfate 324 (65 Fe) mg, Take 1 tablet by mouth  Twice daily, Disp: , Rfl:     gabapentin (NEURONTIN) 100 mg capsule, TAKE 1 CAPSULE BY MOUTH THREE TIMES A DAY, Disp: 270 capsule, Rfl: 0    levothyroxine 112 mcg tablet, TAKE 1 TABLET BY MOUTH  DAILY, Disp: 90 tablet, Rfl: 3    metoprolol succinate (TOPROL-XL) 50 mg 24 hr tablet, TAKE 1 AND 1/2 TABLETS BY MOUTH  DAILY (Patient taking differently: Take 50 mg by mouth daily), Disp: 135 tablet, Rfl: 3    montelukast (SINGULAIR) 10 mg tablet, TAKE 1 TABLET BY MOUTH  DAILY AT BEDTIME, Disp: 90 tablet, Rfl: 3    Multiple Vitamins-Minerals (OCUVITE ADULT 50+ PO), Take by mouth, Disp: , Rfl:     Trelegy Ellipta 100-62.5-25 MCG/ACT inhaler, USE 1 INHALATION BY MOUTH  DAILY RINSE MOUTH AFTER USE, Disp: 180 each, Rfl: 3    triamcinolone (KENALOG) 0.1 % ointment, Apply topically 2 (two) times 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, Disp: 454 g, Rfl: 3    Vitamins-Lipotropics (LIPOFLAVONOID PO), Take by mouth daily, Disp: , Rfl:     zolpidem (AMBIEN) 10 mg tablet, TAKE 1 TABLET BY MOUTH  DAILY AT BEDTIME AS NEEDED  FOR SLEEP, Disp: 30 tablet, Rfl: 5      Allergies:  No Known Allergies      Assessment and Plan:  1. Chronic systolic congestive heart failure (HCC)  Euvolemic.  Continue Entresto, metoprolol succinate.  Low-salt diet.  Daily weights    2. Non-ischemic cardiomyopathy (HCC)  Continue Entresto, metoprolol succinate (50 -> 25 mg daily).  Add Aldactone 12.5 mg daily    3. Cardiac resynchronization therapy defibrillator (CRT-D) in place  No recent ICD shocks.  Continue regular ICD interrogation    4. NSVT (nonsustained ventricular tachycardia)  Continue beta-blocker    5. Essential hypertension  BP stable.  Continue to monitor and call if abnormal    6. Mixed hyperlipidemia  Continue statin and diet control.  His PCP closely monitors his blood work    Recommend aggressive risk factor modification and therapeutic lifestyle changes.  Low-salt, low-calorie, low-fat, low-cholesterol diet with regular exercise and to optimize weight.  I will  defer the ordering and monitoring of necessity lab studies to you, but I am available and happy to review and manage any of the data at your request in the future.    Discussed concepts of atherosclerosis, including signs and symptoms of cardiac disease.    Previous studies were reviewed.    Safety measures were reviewed.  Questions were entertained and answered.  Patient was advised to report any problems requiring medical attention.    Follow-up with PCP and appropriate specialist and lab work as discussed.    Return for follow up visit as scheduled or earlier, if needed.  Thank you for allowing me to participate in the care and evaluation of your patient.  Should you have any questions, please feel free to contact me.      Asher Lozada MD  12/21/2023,3:28 PM

## 2023-12-26 DIAGNOSIS — G47.00 INSOMNIA, UNSPECIFIED TYPE: ICD-10-CM

## 2023-12-26 RX ORDER — ZOLPIDEM TARTRATE 10 MG/1
10 TABLET ORAL
Qty: 30 TABLET | Refills: 5 | Status: CANCELLED | OUTPATIENT
Start: 2023-12-26

## 2023-12-27 ENCOUNTER — HOSPITAL ENCOUNTER (OUTPATIENT)
Dept: NON INVASIVE DIAGNOSTICS | Facility: CLINIC | Age: 78
Discharge: HOME | End: 2023-12-27
Payer: COMMERCIAL

## 2023-12-27 VITALS
HEART RATE: 92 BPM | SYSTOLIC BLOOD PRESSURE: 110 MMHG | DIASTOLIC BLOOD PRESSURE: 80 MMHG | HEIGHT: 71 IN | WEIGHT: 233 LBS | BODY MASS INDEX: 32.62 KG/M2

## 2023-12-27 DIAGNOSIS — G47.00 INSOMNIA, UNSPECIFIED TYPE: ICD-10-CM

## 2023-12-27 DIAGNOSIS — I42.8 NON-ISCHEMIC CARDIOMYOPATHY (HCC): ICD-10-CM

## 2023-12-27 LAB
AORTIC ROOT: 3.4 CM
APICAL FOUR CHAMBER EJECTION FRACTION: 30 %
ASCENDING AORTA: 3.1 CM
E WAVE DECELERATION TIME: 238 MS
E/A RATIO: 1.3
FRACTIONAL SHORTENING: 13 (ref 28–44)
INTERVENTRICULAR SEPTUM IN DIASTOLE (PARASTERNAL SHORT AXIS VIEW): 1 CM
INTERVENTRICULAR SEPTUM: 1 CM (ref 0.6–1.1)
LAAS-AP2: 21.5 CM2
LAAS-AP4: 22.5 CM2
LEFT ATRIUM SIZE: 3.9 CM
LEFT ATRIUM VOLUME (MOD BIPLANE): 74 ML
LEFT ATRIUM VOLUME INDEX (MOD BIPLANE): 32.9 ML/M2
LEFT INTERNAL DIMENSION IN SYSTOLE: 5.3 CM (ref 2.1–4)
LEFT VENTRICLE DIASTOLIC VOLUME (MOD BIPLANE): 166 ML
LEFT VENTRICLE SYSTOLIC VOLUME (MOD BIPLANE): 99 ML
LEFT VENTRICULAR INTERNAL DIMENSION IN DIASTOLE: 6.1 CM (ref 3.5–6)
LEFT VENTRICULAR POSTERIOR WALL IN END DIASTOLE: 1 CM
LEFT VENTRICULAR STROKE VOLUME: 55 ML
LV EF: 40 %
LVSV (TEICH): 55 ML
MV E'TISSUE VEL-SEP: 6 CM/S
MV PEAK A VEL: 0.7 M/S
MV PEAK E VEL: 91 CM/S
MV STENOSIS PRESSURE HALF TIME: 69 MS
MV VALVE AREA P 1/2 METHOD: 3.19
RIGHT ATRIUM AREA SYSTOLE A4C: 13.8 CM2
RIGHT VENTRICLE ID DIMENSION: 3.3 CM
SL CV LEFT ATRIUM LENGTH A2C: 5.2 CM
SL CV LV EF: 30
SL CV PED ECHO LEFT VENTRICLE DIASTOLIC VOLUME (MOD BIPLANE) 2D: 190 ML
SL CV PED ECHO LEFT VENTRICLE SYSTOLIC VOLUME (MOD BIPLANE) 2D: 135 ML
TR MAX PG: 22 MMHG
TR PEAK VELOCITY: 2.4 M/S
TRICUSPID ANNULAR PLANE SYSTOLIC EXCURSION: 2.2 CM
TRICUSPID VALVE PEAK REGURGITATION VELOCITY: 2.36 M/S

## 2023-12-27 PROCEDURE — 93306 TTE W/DOPPLER COMPLETE: CPT | Performed by: INTERNAL MEDICINE

## 2023-12-27 PROCEDURE — 93306 TTE W/DOPPLER COMPLETE: CPT

## 2023-12-28 ENCOUNTER — APPOINTMENT (OUTPATIENT)
Age: 78
End: 2023-12-28
Payer: COMMERCIAL

## 2023-12-28 ENCOUNTER — TELEPHONE (OUTPATIENT)
Dept: CARDIOLOGY CLINIC | Facility: CLINIC | Age: 78
End: 2023-12-28

## 2023-12-28 DIAGNOSIS — E03.9 ACQUIRED HYPOTHYROIDISM: ICD-10-CM

## 2023-12-28 DIAGNOSIS — E83.9 CHRONIC KIDNEY DISEASE-MINERAL AND BONE DISORDER: ICD-10-CM

## 2023-12-28 DIAGNOSIS — N18.9 CHRONIC KIDNEY DISEASE-MINERAL AND BONE DISORDER: ICD-10-CM

## 2023-12-28 DIAGNOSIS — M89.9 CHRONIC KIDNEY DISEASE-MINERAL AND BONE DISORDER: ICD-10-CM

## 2023-12-28 DIAGNOSIS — G47.00 INSOMNIA, UNSPECIFIED TYPE: ICD-10-CM

## 2023-12-28 DIAGNOSIS — N18.30 STAGE 3 CHRONIC KIDNEY DISEASE, UNSPECIFIED WHETHER STAGE 3A OR 3B CKD (HCC): ICD-10-CM

## 2023-12-28 LAB
25(OH)D3 SERPL-MCNC: 28.8 NG/ML (ref 30–100)
AMORPH URATE CRY URNS QL MICRO: ABNORMAL
ANION GAP SERPL CALCULATED.3IONS-SCNC: 10 MMOL/L
BACTERIA UR QL AUTO: ABNORMAL /HPF
BASOPHILS # BLD AUTO: 0.08 THOUSANDS/ÂΜL (ref 0–0.1)
BASOPHILS NFR BLD AUTO: 1 % (ref 0–1)
BILIRUB UR QL STRIP: NEGATIVE
BUN SERPL-MCNC: 21 MG/DL (ref 5–25)
CALCIUM SERPL-MCNC: 8.3 MG/DL (ref 8.4–10.2)
CHLORIDE SERPL-SCNC: 105 MMOL/L (ref 96–108)
CHOLEST SERPL-MCNC: 213 MG/DL
CLARITY UR: CLEAR
CO2 SERPL-SCNC: 25 MMOL/L (ref 21–32)
COLOR UR: YELLOW
CREAT SERPL-MCNC: 1.22 MG/DL (ref 0.6–1.3)
CREAT UR-MCNC: 185.7 MG/DL
EOSINOPHIL # BLD AUTO: 0.16 THOUSAND/ÂΜL (ref 0–0.61)
EOSINOPHIL NFR BLD AUTO: 3 % (ref 0–6)
ERYTHROCYTE [DISTWIDTH] IN BLOOD BY AUTOMATED COUNT: 13.2 % (ref 11.6–15.1)
GFR SERPL CREATININE-BSD FRML MDRD: 57 ML/MIN/1.73SQ M
GLUCOSE P FAST SERPL-MCNC: 96 MG/DL (ref 65–99)
GLUCOSE UR STRIP-MCNC: NEGATIVE MG/DL
HCT VFR BLD AUTO: 40.6 % (ref 36.5–49.3)
HDLC SERPL-MCNC: 46 MG/DL
HGB BLD-MCNC: 12.7 G/DL (ref 12–17)
HGB UR QL STRIP.AUTO: NEGATIVE
IMM GRANULOCYTES # BLD AUTO: 0.01 THOUSAND/UL (ref 0–0.2)
IMM GRANULOCYTES NFR BLD AUTO: 0 % (ref 0–2)
KETONES UR STRIP-MCNC: NEGATIVE MG/DL
LDLC SERPL CALC-MCNC: 110 MG/DL (ref 0–100)
LEUKOCYTE ESTERASE UR QL STRIP: NEGATIVE
LYMPHOCYTES # BLD AUTO: 2.13 THOUSANDS/ÂΜL (ref 0.6–4.47)
LYMPHOCYTES NFR BLD AUTO: 38 % (ref 14–44)
MCH RBC QN AUTO: 30.4 PG (ref 26.8–34.3)
MCHC RBC AUTO-ENTMCNC: 31.3 G/DL (ref 31.4–37.4)
MCV RBC AUTO: 97 FL (ref 82–98)
MONOCYTES # BLD AUTO: 0.44 THOUSAND/ÂΜL (ref 0.17–1.22)
MONOCYTES NFR BLD AUTO: 8 % (ref 4–12)
MUCOUS THREADS UR QL AUTO: ABNORMAL
NEUTROPHILS # BLD AUTO: 2.79 THOUSANDS/ÂΜL (ref 1.85–7.62)
NEUTS SEG NFR BLD AUTO: 50 % (ref 43–75)
NITRITE UR QL STRIP: NEGATIVE
NON-SQ EPI CELLS URNS QL MICRO: ABNORMAL /HPF
NONHDLC SERPL-MCNC: 167 MG/DL
NRBC BLD AUTO-RTO: 0 /100 WBCS
PH UR STRIP.AUTO: 6 [PH]
PHOSPHATE SERPL-MCNC: 3 MG/DL (ref 2.3–4.1)
PLATELET # BLD AUTO: 266 THOUSANDS/UL (ref 149–390)
PMV BLD AUTO: 11.1 FL (ref 8.9–12.7)
POTASSIUM SERPL-SCNC: 3.9 MMOL/L (ref 3.5–5.3)
PROT UR STRIP-MCNC: ABNORMAL MG/DL
PROT UR-MCNC: 39 MG/DL
PROT/CREAT UR: 0.21 MG/G{CREAT} (ref 0–0.1)
PTH-INTACT SERPL-MCNC: 124.5 PG/ML (ref 12–88)
RBC # BLD AUTO: 4.18 MILLION/UL (ref 3.88–5.62)
RBC #/AREA URNS AUTO: ABNORMAL /HPF
SODIUM SERPL-SCNC: 140 MMOL/L (ref 135–147)
SP GR UR STRIP.AUTO: 1.02 (ref 1–1.03)
TRIGL SERPL-MCNC: 285 MG/DL
TSH SERPL DL<=0.05 MIU/L-ACNC: 0.17 UIU/ML (ref 0.45–4.5)
UROBILINOGEN UR STRIP-ACNC: <2 MG/DL
WBC # BLD AUTO: 5.61 THOUSAND/UL (ref 4.31–10.16)
WBC #/AREA URNS AUTO: ABNORMAL /HPF

## 2023-12-28 PROCEDURE — 84100 ASSAY OF PHOSPHORUS: CPT

## 2023-12-28 PROCEDURE — 81001 URINALYSIS AUTO W/SCOPE: CPT

## 2023-12-28 PROCEDURE — 36415 COLL VENOUS BLD VENIPUNCTURE: CPT

## 2023-12-28 PROCEDURE — 84443 ASSAY THYROID STIM HORMONE: CPT

## 2023-12-28 PROCEDURE — 82570 ASSAY OF URINE CREATININE: CPT

## 2023-12-28 PROCEDURE — 82306 VITAMIN D 25 HYDROXY: CPT

## 2023-12-28 PROCEDURE — 85025 COMPLETE CBC W/AUTO DIFF WBC: CPT

## 2023-12-28 PROCEDURE — 80048 BASIC METABOLIC PNL TOTAL CA: CPT

## 2023-12-28 PROCEDURE — 80061 LIPID PANEL: CPT

## 2023-12-28 PROCEDURE — 84156 ASSAY OF PROTEIN URINE: CPT

## 2023-12-28 PROCEDURE — 83970 ASSAY OF PARATHORMONE: CPT

## 2023-12-28 RX ORDER — ZOLPIDEM TARTRATE 10 MG/1
10 TABLET ORAL
Qty: 30 TABLET | Refills: 5 | OUTPATIENT
Start: 2023-12-28

## 2023-12-28 NOTE — TELEPHONE ENCOUNTER
----- Message from Asher Lozada MD sent at 12/27/2023  5:20 PM EST -----  Please call and inform the patient that the echo showed EF is still 30%

## 2023-12-29 DIAGNOSIS — G47.00 INSOMNIA, UNSPECIFIED TYPE: ICD-10-CM

## 2023-12-29 DIAGNOSIS — E03.2 HYPOTHYROIDISM DUE TO MEDICATION: ICD-10-CM

## 2023-12-29 RX ORDER — ZOLPIDEM TARTRATE 10 MG/1
10 TABLET ORAL
Qty: 30 TABLET | Refills: 5 | OUTPATIENT
Start: 2023-12-29

## 2023-12-29 RX ORDER — ZOLPIDEM TARTRATE 10 MG/1
10 TABLET ORAL
Qty: 30 TABLET | Refills: 3 | Status: SHIPPED | OUTPATIENT
Start: 2023-12-29

## 2023-12-29 RX ORDER — LEVOTHYROXINE SODIUM 0.1 MG/1
100 TABLET ORAL DAILY
Qty: 90 TABLET | Refills: 3 | Status: SHIPPED | OUTPATIENT
Start: 2023-12-29

## 2023-12-29 NOTE — TELEPHONE ENCOUNTER
----- Message from Marino Lozada MD sent at 12/29/2023  9:58 AM EST -----  Please  new dose of thyroid medicine, TSH is abnormal

## 2024-01-11 ENCOUNTER — REMOTE DEVICE CLINIC VISIT (OUTPATIENT)
Dept: CARDIOLOGY CLINIC | Facility: CLINIC | Age: 79
End: 2024-01-11

## 2024-01-11 DIAGNOSIS — G47.00 INSOMNIA, UNSPECIFIED TYPE: ICD-10-CM

## 2024-01-11 DIAGNOSIS — Z95.810 PRESENCE OF AUTOMATIC CARDIOVERTER/DEFIBRILLATOR (AICD): Primary | ICD-10-CM

## 2024-01-11 PROCEDURE — RECHECK: Performed by: INTERNAL MEDICINE

## 2024-01-11 NOTE — PROGRESS NOTES
Results for orders placed or performed in visit on 01/11/24   Cardiac EP device report    Narrative    MDT BIV-ICD / NOT MRI CONDITIONAL  CARELINK TRANSMISSION: N/B BATTERY CHECKBATTERY VOLTAGE NEARING LIZZY.  WILL SCHEDULE MONTHLY BATTERY CHECKS. ( 8 MONTHS) AP 97%  94%. ALL AVAILABLE LEAD PARAMETERS WITHIN NORMAL LIMITS. 6 NSVT EPISODES DETECTED MOST RECENT 6 BEATS @ 340ms. OPTI-VOL WITHIN NORMAL LIMITS. NORMAL DEVICE FUNCTION. ---DINERO

## 2024-01-12 RX ORDER — ZOLPIDEM TARTRATE 10 MG/1
10 TABLET ORAL
Qty: 30 TABLET | Refills: 3 | OUTPATIENT
Start: 2024-01-12

## 2024-01-13 DIAGNOSIS — J41.0 SIMPLE CHRONIC BRONCHITIS (HCC): ICD-10-CM

## 2024-01-15 DIAGNOSIS — G47.00 INSOMNIA, UNSPECIFIED TYPE: ICD-10-CM

## 2024-01-15 RX ORDER — FLUTICASONE FUROATE, UMECLIDINIUM BROMIDE AND VILANTEROL TRIFENATATE 100; 62.5; 25 UG/1; UG/1; UG/1
POWDER RESPIRATORY (INHALATION)
Qty: 180 EACH | Refills: 0 | Status: SHIPPED | OUTPATIENT
Start: 2024-01-15

## 2024-01-19 RX ORDER — ZOLPIDEM TARTRATE 10 MG/1
10 TABLET ORAL
Qty: 30 TABLET | Refills: 3 | Status: SHIPPED | OUTPATIENT
Start: 2024-01-19

## 2024-01-22 ENCOUNTER — TELEPHONE (OUTPATIENT)
Dept: NEPHROLOGY | Facility: CLINIC | Age: 79
End: 2024-01-22

## 2024-01-26 ENCOUNTER — TELEPHONE (OUTPATIENT)
Dept: NEPHROLOGY | Facility: CLINIC | Age: 79
End: 2024-01-26

## 2024-01-29 ENCOUNTER — OFFICE VISIT (OUTPATIENT)
Dept: NEPHROLOGY | Facility: CLINIC | Age: 79
End: 2024-01-29
Payer: COMMERCIAL

## 2024-01-29 VITALS
HEIGHT: 71 IN | WEIGHT: 224.6 LBS | SYSTOLIC BLOOD PRESSURE: 110 MMHG | TEMPERATURE: 97.9 F | HEART RATE: 91 BPM | OXYGEN SATURATION: 98 % | DIASTOLIC BLOOD PRESSURE: 80 MMHG | BODY MASS INDEX: 31.44 KG/M2 | RESPIRATION RATE: 18 BRPM

## 2024-01-29 DIAGNOSIS — I42.8 NON-ISCHEMIC CARDIOMYOPATHY (HCC): ICD-10-CM

## 2024-01-29 DIAGNOSIS — N18.31 STAGE 3A CHRONIC KIDNEY DISEASE (HCC): Primary | ICD-10-CM

## 2024-01-29 DIAGNOSIS — M89.9 CHRONIC KIDNEY DISEASE-MINERAL AND BONE DISORDER: ICD-10-CM

## 2024-01-29 DIAGNOSIS — N18.9 CHRONIC KIDNEY DISEASE-MINERAL AND BONE DISORDER: ICD-10-CM

## 2024-01-29 DIAGNOSIS — I10 PRIMARY HYPERTENSION: ICD-10-CM

## 2024-01-29 DIAGNOSIS — I50.22 CHRONIC SYSTOLIC CONGESTIVE HEART FAILURE (HCC): ICD-10-CM

## 2024-01-29 DIAGNOSIS — E83.9 CHRONIC KIDNEY DISEASE-MINERAL AND BONE DISORDER: ICD-10-CM

## 2024-01-29 PROCEDURE — 99214 OFFICE O/P EST MOD 30 MIN: CPT | Performed by: INTERNAL MEDICINE

## 2024-01-29 PROCEDURE — 1160F RVW MEDS BY RX/DR IN RCRD: CPT | Performed by: INTERNAL MEDICINE

## 2024-01-29 PROCEDURE — 1159F MED LIST DOCD IN RCRD: CPT | Performed by: INTERNAL MEDICINE

## 2024-01-29 PROCEDURE — 3079F DIAST BP 80-89 MM HG: CPT | Performed by: INTERNAL MEDICINE

## 2024-01-29 PROCEDURE — 3074F SYST BP LT 130 MM HG: CPT | Performed by: INTERNAL MEDICINE

## 2024-01-29 NOTE — ASSESSMENT & PLAN NOTE
Lab Results   Component Value Date    EGFR 57 12/28/2023    EGFR 54 06/16/2023    EGFR 43 12/13/2022    CREATININE 1.22 12/28/2023    CREATININE 1.27 06/16/2023    CREATININE 1.53 (H) 12/13/2022   PTH and phosphorus along with vitamin D are within acceptable range and will continue to monitor

## 2024-01-29 NOTE — ASSESSMENT & PLAN NOTE
Lab Results   Component Value Date    EGFR 57 12/28/2023    EGFR 54 06/16/2023    EGFR 43 12/13/2022    CREATININE 1.22 12/28/2023    CREATININE 1.27 06/16/2023    CREATININE 1.53 (H) 12/13/2022   Renal function is quite stable.  Advise hydration and avoiding nephrotoxic medication

## 2024-01-29 NOTE — PROGRESS NOTES
NEPHROLOGY OFFICE FOLLOW UP  Sherly Peña 78 y.o. male MRN: [id number]    Encounter: [id number] 1/29/2024    REASON FOR VISIT: Sherly Peña is a 78 y.o. male who is here on 1/29/2024 for Chronic Kidney Disease and Follow-up  .    HPI:    Sherly came in today for follow-up of CKD.  78-year-old gentleman with history of cardiomyopathy and COPD    Overall doing quite well    Breathing is quite stable    No chest pain no palpitation    No nausea no vomiting        REVIEW OF SYSTEMS:    Review of Systems   Constitutional:  Negative for fatigue.   HENT:  Negative for congestion.    Eyes:  Negative for photophobia and pain.   Respiratory:  Negative for chest tightness and shortness of breath.    Cardiovascular:  Negative for chest pain and palpitations.   Gastrointestinal:  Negative for abdominal distention, abdominal pain and blood in stool.   Endocrine: Negative for polydipsia.   Genitourinary:  Negative for difficulty urinating, dysuria, flank pain, hematuria and urgency.   Musculoskeletal:  Negative for arthralgias and back pain.   Skin:  Negative for rash.   Neurological:  Negative for dizziness, light-headedness and headaches.   Hematological:  Does not bruise/bleed easily.   Psychiatric/Behavioral:  Negative for behavioral problems. The patient is not nervous/anxious.          PAST MEDICAL HISTORY:  Past Medical History:   Diagnosis Date    Agent orange exposure     Anemia     Benign colon polyp     BPH (benign prostatic hyperplasia)     Bradycardia     Cardiomyopathy (HCC)     Chest discomfort     CHF (congestive heart failure) (HCC)     Chronic bronchitis (HCC)     Chronic kidney disease     COPD (chronic obstructive pulmonary disease) (HCC)     LAST ASSESSED: 9/9/17    Coronary artery disease cardio myopthia    Emphysema of lung (HCC) 2013    H/O nonmelanoma skin cancer     LAST ASSESSED: 11/7/17    HHD (hypertensive heart disease)     HTN (hypertension)     Hx of lymphoma     Hyperlipidemia     ICD  (implantable cardioverter-defibrillator) in place     Insomnia     Mycosis fungoides (HCC)     PVC (premature ventricular contraction)     PVT (paroxysmal ventricular tachycardia) (HCC)     SOB (shortness of breath)        PAST SURGICAL HISTORY:  Past Surgical History:   Procedure Laterality Date    CARDIAC PACEMAKER PLACEMENT      SKIN SURGERY      skin cancer removal        SOCIAL HISTORY:  Social History     Substance and Sexual Activity   Alcohol Use Yes    Alcohol/week: 14.0 standard drinks of alcohol    Types: 14 Shots of liquor per week    Comment: two drinks every night over a 4 hour period     Social History     Substance and Sexual Activity   Drug Use Never     Social History     Tobacco Use   Smoking Status Every Day    Current packs/day: 1.00    Average packs/day: 1 pack/day for 63.1 years (63.1 ttl pk-yrs)    Types: Cigarettes    Start date: 1961    Passive exposure: Current   Smokeless Tobacco Never   Tobacco Comments    HALF A PACK PER DAY        FAMILY HISTORY:  Family History   Problem Relation Age of Onset    Diabetes Mother     Colon cancer Father         DIAGNOSED IN HIS 80'S    Heart failure Father     Coronary artery disease Father     Cancer Father         Lorenzo    Diabetes Family         MELLITUS    Heart attack Neg Hx     Stroke Neg Hx     Anuerysm Neg Hx     Clotting disorder Neg Hx     Arrhythmia Neg Hx     Hypertension Neg Hx         pt unsure     Hyperlipidemia Neg Hx     Sudden death Neg Hx         scd       MEDICATIONS:    Current Outpatient Medications:     albuterol (2.5 mg/3 mL) 0.083 % nebulizer solution, USE 1 VIAL VIA NEBULIZER 4 TIMES A DAY AS NEEDED, Disp: 1080 mL, Rfl: 0    ammonium lactate (LAC-HYDRIN) 12 % lotion, Apply topically daily, Disp: 225 g, Rfl: 2    aspirin 81 MG tablet, Take 81 mg by mouth daily, Disp: , Rfl:     atorvastatin (LIPITOR) 40 mg tablet, TAKE 1 TABLET BY MOUTH  DAILY, Disp: 90 tablet, Rfl: 3    bexarotene (Targretin) 75 mg capsule, Take 8 capsules (600  "mg total) by mouth daily, Disp: 240 capsule, Rfl: 5    Cholecalciferol 25 MCG (1000 UT) capsule, Take 1 capsule by mouth daily, Disp: , Rfl:     cyanocobalamin (VITAMIN B-12) 1,000 mcg tablet, Take 1,000 mcg by mouth daily, Disp: , Rfl:     diclofenac potassium (CATAFLAM) 50 mg tablet, 1 tablet daily for severe pain.  Not to exceed 2 tablets a week., Disp: 30 tablet, Rfl: 0    Entresto 49-51 MG TABS, TAKE 1 TABLET BY MOUTH  TWICE DAILY, Disp: 180 tablet, Rfl: 3    ferrous sulfate 324 (65 Fe) mg, Take 1 tablet by mouth Twice daily, Disp: , Rfl:     gabapentin (NEURONTIN) 100 mg capsule, TAKE 1 CAPSULE BY MOUTH THREE TIMES A DAY, Disp: 270 capsule, Rfl: 0    levothyroxine 100 mcg tablet, Take 1 tablet (100 mcg total) by mouth daily, Disp: 90 tablet, Rfl: 3    metoprolol succinate (TOPROL-XL) 25 mg 24 hr tablet, Take 1 tablet (25 mg total) by mouth daily, Disp: 90 tablet, Rfl: 3    montelukast (SINGULAIR) 10 mg tablet, TAKE 1 TABLET BY MOUTH  DAILY AT BEDTIME, Disp: 90 tablet, Rfl: 3    Multiple Vitamins-Minerals (OCUVITE ADULT 50+ PO), Take by mouth, Disp: , Rfl:     spironolactone (ALDACTONE) 25 mg tablet, Take 0.5 tablets (12.5 mg total) by mouth daily, Disp: 45 tablet, Rfl: 3    Trelegy Ellipta 100-62.5-25 MCG/ACT inhaler, USE 1 INHALATION BY MOUTH ONCE  DAILY AT THE SAME TIME EACH DAY  RINSE MOUTH AFTER USE, Disp: 180 each, Rfl: 0    triamcinolone (KENALOG) 0.1 % ointment, Apply topically 2 (two) times 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, Disp: 454 g, Rfl: 3    Vitamins-Lipotropics (LIPOFLAVONOID PO), Take by mouth daily, Disp: , Rfl:     zolpidem (AMBIEN) 10 mg tablet, Take 1 tablet (10 mg total) by mouth daily at bedtime as needed for sleep, Disp: 30 tablet, Rfl: 3    PHYSICAL EXAM:  Vitals:    01/29/24 1627   BP: 110/80   BP Location: Right arm   Patient Position: Sitting   Pulse: 91   Resp: 18   Temp: 97.9 °F (36.6 °C)   TempSrc: Temporal   SpO2: 98%   Weight: 102 kg (224 lb 9.6 oz)   Height: 5' 11\" (1.803 m)     Body mass " index is 31.33 kg/m².    Physical Exam  Constitutional:       General: He is not in acute distress.     Appearance: He is well-developed.   HENT:      Head: Normocephalic.      Mouth/Throat:      Mouth: Mucous membranes are moist.   Eyes:      General: No scleral icterus.     Conjunctiva/sclera: Conjunctivae normal.   Neck:      Vascular: No JVD.   Cardiovascular:      Rate and Rhythm: Normal rate.      Heart sounds: Normal heart sounds.   Pulmonary:      Effort: Pulmonary effort is normal.      Breath sounds: No wheezing.   Abdominal:      Palpations: Abdomen is soft.      Tenderness: There is no abdominal tenderness.   Musculoskeletal:         General: Normal range of motion.      Cervical back: Neck supple.   Skin:     General: Skin is warm.      Findings: No rash.   Neurological:      Mental Status: He is alert and oriented to person, place, and time.   Psychiatric:         Behavior: Behavior normal.         LAB RESULTS:  Results for orders placed or performed in visit on 12/28/23   Basic metabolic panel   Result Value Ref Range    Sodium 140 135 - 147 mmol/L    Potassium 3.9 3.5 - 5.3 mmol/L    Chloride 105 96 - 108 mmol/L    CO2 25 21 - 32 mmol/L    ANION GAP 10 mmol/L    BUN 21 5 - 25 mg/dL    Creatinine 1.22 0.60 - 1.30 mg/dL    Glucose, Fasting 96 65 - 99 mg/dL    Calcium 8.3 (L) 8.4 - 10.2 mg/dL    eGFR 57 ml/min/1.73sq m   CBC and differential   Result Value Ref Range    WBC 5.61 4.31 - 10.16 Thousand/uL    RBC 4.18 3.88 - 5.62 Million/uL    Hemoglobin 12.7 12.0 - 17.0 g/dL    Hematocrit 40.6 36.5 - 49.3 %    MCV 97 82 - 98 fL    MCH 30.4 26.8 - 34.3 pg    MCHC 31.3 (L) 31.4 - 37.4 g/dL    RDW 13.2 11.6 - 15.1 %    MPV 11.1 8.9 - 12.7 fL    Platelets 266 149 - 390 Thousands/uL    nRBC 0 /100 WBCs    Neutrophils Relative 50 43 - 75 %    Immat GRANS % 0 0 - 2 %    Lymphocytes Relative 38 14 - 44 %    Monocytes Relative 8 4 - 12 %    Eosinophils Relative 3 0 - 6 %    Basophils Relative 1 0 - 1 %     Neutrophils Absolute 2.79 1.85 - 7.62 Thousands/µL    Immature Grans Absolute 0.01 0.00 - 0.20 Thousand/uL    Lymphocytes Absolute 2.13 0.60 - 4.47 Thousands/µL    Monocytes Absolute 0.44 0.17 - 1.22 Thousand/µL    Eosinophils Absolute 0.16 0.00 - 0.61 Thousand/µL    Basophils Absolute 0.08 0.00 - 0.10 Thousands/µL   Protein / creatinine ratio, urine   Result Value Ref Range    Creatinine, Ur 185.7 Reference range not established. mg/dL    Protein Urine Random 39 Reference range not established. mg/dL    Prot/Creat Ratio, Ur 0.21 (H) 0.00 - 0.10   Phosphorus   Result Value Ref Range    Phosphorus 3.0 2.3 - 4.1 mg/dL   PTH, intact   Result Value Ref Range    .5 (H) 12.0 - 88.0 pg/mL   UA (URINE) with reflex to Scope   Result Value Ref Range    Color, UA Yellow     Clarity, UA Clear     Specific Gravity, UA 1.023 1.003 - 1.030    pH, UA 6.0 4.5, 5.0, 5.5, 6.0, 6.5, 7.0, 7.5, 8.0    Leukocytes, UA Negative Negative    Nitrite, UA Negative Negative    Protein, UA 30 (1+) (A) Negative mg/dl    Glucose, UA Negative Negative mg/dl    Ketones, UA Negative Negative mg/dl    Urobilinogen, UA <2.0 <2.0 mg/dl mg/dl    Bilirubin, UA Negative Negative    Occult Blood, UA Negative Negative   Vitamin D 25 hydroxy   Result Value Ref Range    Vit D, 25-Hydroxy 28.8 (L) 30.0 - 100.0 ng/mL   Lipid panel   Result Value Ref Range    Cholesterol 213 (H) See Comment mg/dL    Triglycerides 285 (H) See Comment mg/dL    HDL, Direct 46 >=40 mg/dL    LDL Calculated 110 (H) 0 - 100 mg/dL    Non-HDL-Chol (CHOL-HDL) 167 mg/dl   TSH, 3rd generation   Result Value Ref Range    TSH 3RD GENERATON 0.172 (L) 0.450 - 4.500 uIU/mL   Urine Microscopic   Result Value Ref Range    RBC, UA 2-4 (A) None Seen, 1-2 /hpf    WBC, UA 2-4 (A) None Seen, 1-2 /hpf    Epithelial Cells Occasional None Seen, Occasional /hpf    Bacteria, UA Occasional None Seen, Occasional /hpf    MUCUS THREADS Moderate (A) None Seen    Amorphous Crystals, UA Occasional   "      ASSESSMENT and PLAN:      Stage 3 chronic kidney disease  Lab Results   Component Value Date    EGFR 57 12/28/2023    EGFR 54 06/16/2023    EGFR 43 12/13/2022    CREATININE 1.22 12/28/2023    CREATININE 1.27 06/16/2023    CREATININE 1.53 (H) 12/13/2022   Renal function is quite stable.  Advise hydration and avoiding nephrotoxic medication    Chronic kidney disease-mineral and bone disorder  Lab Results   Component Value Date    EGFR 57 12/28/2023    EGFR 54 06/16/2023    EGFR 43 12/13/2022    CREATININE 1.22 12/28/2023    CREATININE 1.27 06/16/2023    CREATININE 1.53 (H) 12/13/2022   PTH and phosphorus along with vitamin D are within acceptable range and will continue to monitor    Non-ischemic cardiomyopathy (HCC)  On multiple medication including Entresto and diuretic.  Seems to be stable for now    HTN (hypertension)  Very well-controlled with present medication        Everything discussed at length with the patient.  I will see him back in 1 year now.  Will get blood and urine test before that visit      Portions of the record may have been created with voice recognition software. Occasional wrong word or \"sound a like\" substitutions may have occurred due to the inherent limitations of voice recognition software. Read the chart carefully and recognize, using context, where substitutions have occurred.If you have any questions, please contact the dictating provider.   "

## 2024-02-12 ENCOUNTER — REMOTE DEVICE CLINIC VISIT (OUTPATIENT)
Dept: CARDIOLOGY CLINIC | Facility: CLINIC | Age: 79
End: 2024-02-12
Payer: COMMERCIAL

## 2024-02-12 DIAGNOSIS — Z95.810 PRESENCE OF AUTOMATIC CARDIOVERTER/DEFIBRILLATOR (AICD): Primary | ICD-10-CM

## 2024-02-12 PROCEDURE — 93295 DEV INTERROG REMOTE 1/2/MLT: CPT | Performed by: INTERNAL MEDICINE

## 2024-02-12 PROCEDURE — 93296 REM INTERROG EVL PM/IDS: CPT | Performed by: INTERNAL MEDICINE

## 2024-02-13 NOTE — PROGRESS NOTES
Results for orders placed or performed in visit on 02/12/24   Cardiac EP device report    Narrative    MDT BIV-ICD / NOT MRI CONDITIONAL  CARELINK TRANSMISSION: BATTERY VOLTAGE NEARING LIZZY.  WILL SCHEDULE MONTHLY BATTERY CHECKS.(6 MONS) AP 98%  95%. ALL AVAILABLE LEAD PARAMETERS WITHIN NORMAL LIMITS. NO SIGNIFICANT HIGH RATE EPISODES. OPTI-VOL FLUID THRESHOLD WITHIN NORMAL LIMITS. NORMAL DEVICE FUNCTION.---DINERO

## 2024-02-21 ENCOUNTER — PATIENT MESSAGE (OUTPATIENT)
Age: 79
End: 2024-02-21

## 2024-02-21 DIAGNOSIS — G62.9 NEUROPATHY: ICD-10-CM

## 2024-02-21 PROBLEM — Z00.00 HEALTH CARE MAINTENANCE: Status: RESOLVED | Noted: 2018-08-28 | Resolved: 2024-02-21

## 2024-02-21 RX ORDER — GABAPENTIN 100 MG/1
100 CAPSULE ORAL 3 TIMES DAILY
Qty: 270 CAPSULE | Refills: 3 | Status: SHIPPED | OUTPATIENT
Start: 2024-02-21

## 2024-03-05 DIAGNOSIS — C84.08 MYCOSIS FUNGOIDES INVOLVING LYMPH NODES OF MULTIPLE REGIONS (HCC): ICD-10-CM

## 2024-03-05 RX ORDER — BEXAROTENE 75 MG/1
CAPSULE ORAL
Qty: 240 CAPSULE | Refills: 5 | Status: SHIPPED | OUTPATIENT
Start: 2024-03-05

## 2024-03-06 DIAGNOSIS — E03.2 HYPOTHYROIDISM DUE TO MEDICATION: ICD-10-CM

## 2024-03-06 RX ORDER — LEVOTHYROXINE SODIUM 0.1 MG/1
100 TABLET ORAL DAILY
Qty: 90 TABLET | Refills: 1 | Status: SHIPPED | OUTPATIENT
Start: 2024-03-06

## 2024-03-13 ENCOUNTER — REMOTE DEVICE CLINIC VISIT (OUTPATIENT)
Dept: CARDIOLOGY CLINIC | Facility: CLINIC | Age: 79
End: 2024-03-13

## 2024-03-13 DIAGNOSIS — Z95.810 PRESENCE OF AUTOMATIC CARDIOVERTER/DEFIBRILLATOR (AICD): Primary | ICD-10-CM

## 2024-03-13 PROCEDURE — RECHECK: Performed by: INTERNAL MEDICINE

## 2024-03-13 NOTE — PROGRESS NOTES
Results for orders placed or performed in visit on 03/13/24   Cardiac EP device report    Narrative    MDT BIV-ICD / NOT MRI CONDITIONAL  NON-BILLABLE CARELINK TRANSMISSION: BATTERY STATUS: BATTERY VOLTAGE NEARING LIZZY (6 MOS).  WILL SCHEDULE MONTHLY BATTERY CHECKS. AP: 97.6%. : 96.5% + VSRP: 2.7%. ALL AVAILABLE LEAD PARAMETERS WITHIN NORMAL LIMITS. 8 VT-NS EPISODES W/ AVAIL EGMS SHOWING NSVT 5 BEATS @ 158 BPM, 14 BEATS @ 156 BPM, 9 BEATS @ 141 BPM, 14 BEATS @ 151 BPM, 6 BEATS @ 143 BPM. 47 V-SENSE EPISODES (24 SEC/D) W / AVAIL MARKERS SHOWING NSVT 10 BEATS @ 136 BPM, 16 BEATS @ 150 BPM, 14 BEATS @ 140 BPM & PAT @ 130 BPM, DURATION 1 MIN 52 SECS. PT TAKES METOPROLOL SUCC, ENTRESTO, ASA 81MG. EF: 30% (ECHO 12/27/23). OPTI-VOL FLUID THRESHOLD CROSSED 3/7 AND IS ONGOING. PT TAKES ALDACTONE. TASK TO HF TEAM. NORMAL DEVICE FUNCTION. CH

## 2024-03-14 ENCOUNTER — TELEPHONE (OUTPATIENT)
Dept: CARDIOLOGY CLINIC | Facility: CLINIC | Age: 79
End: 2024-03-14

## 2024-03-14 NOTE — TELEPHONE ENCOUNTER
F/U call to patient who stated he is feeling good. Denies LLE or chest pain or discomfort.    Stated experiencing occasional SOB but has COPD also.    Taking all prescribed cardiac medications.    Patient stated he though he read in January he had 6 months remaining to his device battery.    Advised patient of scheduled battery testing, remote & in person. Asking to confirm please.    Please advise.

## 2024-03-14 NOTE — TELEPHONE ENCOUNTER
From: Whit Casey   Sent: 3/13/2024  10:17 AM EDT   To: Amy Solitario, LILLY   Subject: opti-vol crossed                                 Pts opti-vol crossed 3/7 and is ongoing. Pt takes aldactone, metoprolol succ, entresto, asa 81mg. EF: 30% (echo 12/27/23).   Thanks,   ~Whit   NON-BILLABLE CARELINK TRANSMISSION: BATTERY STATUS: BATTERY VOLTAGE NEARING LIZZY (6 MOS).  WILL SCHEDULE MONTHLY BATTERY CHECKS. AP: 97.6%. : 96.5% + VSRP: 2.7%. ALL AVAILABLE LEAD PARAMETERS WITHIN NORMAL LIMITS. 8 VT-NS EPISODES W/ AVAIL EGMS SHOWING NSVT 5 BEATS @ 158 BPM, 14 BEATS @ 156 BPM, 9 BEATS @ 141 BPM, 14 BEATS @ 151 BPM, 6 BEATS @ 143 BPM. 47 V-SENSE EPISODES (24 SEC/D) W / AVAIL MARKERS SHOWING NSVT 10 BEATS @ 136 BPM, 16 BEATS @ 150 BPM, 14 BEATS @ 140 BPM & PAT @ 130 BPM, DURATION 1 MIN 52 SECS. PT TAKES METOPROLOL SUCC, ENTRESTO, ASA 81MG. EF: 30% (ECHO 12/27/23). OPTI-VOL FLUID THRESHOLD CROSSED 3/7 AND IS ONGOING. PT TAKES ALDACTONE. TASK TO HF TEAM. NORMAL DEVICE FUNCTION. CH

## 2024-03-15 ENCOUNTER — TELEPHONE (OUTPATIENT)
Dept: CARDIOLOGY CLINIC | Facility: CLINIC | Age: 79
End: 2024-03-15

## 2024-03-15 NOTE — TELEPHONE ENCOUNTER
03/15/24 @ 8:45am l/m on pts voicemail re: device follow up due to battery status: q monthly checks until rocio is reached. ~ch

## 2024-03-18 ENCOUNTER — OFFICE VISIT (OUTPATIENT)
Age: 79
End: 2024-03-18
Payer: COMMERCIAL

## 2024-03-18 VITALS — BODY MASS INDEX: 31.36 KG/M2 | TEMPERATURE: 98 F | WEIGHT: 224 LBS | HEIGHT: 71 IN

## 2024-03-18 DIAGNOSIS — Z13.89 SCREENING FOR SKIN CONDITION: Primary | ICD-10-CM

## 2024-03-18 DIAGNOSIS — D22.9 MULTIPLE NEVI: ICD-10-CM

## 2024-03-18 DIAGNOSIS — D18.01 CHERRY ANGIOMA: ICD-10-CM

## 2024-03-18 DIAGNOSIS — L82.1 SEBORRHEIC KERATOSIS: ICD-10-CM

## 2024-03-18 DIAGNOSIS — Z85.828 HISTORY OF SKIN CANCER: ICD-10-CM

## 2024-03-18 DIAGNOSIS — C84.08 MYCOSIS FUNGOIDES INVOLVING LYMPH NODES OF MULTIPLE REGIONS (HCC): ICD-10-CM

## 2024-03-18 DIAGNOSIS — Z79.899 ENCOUNTER FOR LONG-TERM (CURRENT) USE OF HIGH-RISK MEDICATION: ICD-10-CM

## 2024-03-18 PROCEDURE — 99214 OFFICE O/P EST MOD 30 MIN: CPT | Performed by: DERMATOLOGY

## 2024-03-18 RX ORDER — METOPROLOL SUCCINATE 50 MG/1
TABLET, EXTENDED RELEASE ORAL
COMMUNITY
Start: 2024-02-27

## 2024-03-18 NOTE — PROGRESS NOTES
"[facility] Note     Patient Name: Sherly Peña  Encounter Date: March 18, 2024     Have you been cared for by a [facility] Dermatologist in the last 3 years and, if so, which description applies to you?    Yes.  I have been here within the last 3 years, and my medical history has NOT changed since that time.  I am MALE/not capable of bearing children.    REVIEW OF SYSTEMS:  Have you recently had or currently have any of the following? No changes in my recent health.   PAST MEDICAL HISTORY:  Have you personally ever had or currently have any of the following?  If \"YES,\" then please provide more detail. No changes in my medical history.   HISTORY OF IMMUNOSUPPRESSION: Do you have a history of any of the following:  Systemic Immunosuppression such as Diabetes, Biologic or Immunotherapy, Chemotherapy, Organ Transplantation, Bone Marrow Transplantation?  No     Answering \"YES\" requires the addition of the dotphrase \"IMMUNOSUPPRESSED\" as the first diagnosis of the patient's visit.   FAMILY HISTORY:  Any \"first degree relatives\" (parent, brother, sister, or child) with the following?    No changes in my family's known health.   PATIENT EXPERIENCE:    Do you want the Dermatologist to perform a COMPLETE skin exam today including a clinical examination under the \"bra and underwear\" areas?  Yes  If necessary, do we have your permission to call and leave a detailed message on your Preferred Phone number that includes your specific medical information?  Yes      No Known Allergies   Current Outpatient Medications:     albuterol (2.5 mg/3 mL) 0.083 % nebulizer solution, USE 1 VIAL VIA NEBULIZER 4 TIMES A DAY AS NEEDED, Disp: 1080 mL, Rfl: 0    ammonium lactate (LAC-HYDRIN) 12 % lotion, Apply topically daily, Disp: 225 g, Rfl: 2    aspirin 81 MG tablet, Take 81 mg by mouth daily, Disp: , Rfl:     atorvastatin (LIPITOR) 40 mg tablet, TAKE 1 TABLET BY MOUTH  DAILY, Disp: 90 tablet, Rfl: 3    bexarotene " (TARGRETIN) 75 mg capsule, TAKE 8 CAPSULES BY MOUTH DAILY, Disp: 240 capsule, Rfl: 5    Cholecalciferol 25 MCG (1000 UT) capsule, Take 1 capsule by mouth daily, Disp: , Rfl:     cyanocobalamin (VITAMIN B-12) 1,000 mcg tablet, Take 1,000 mcg by mouth daily, Disp: , Rfl:     diclofenac potassium (CATAFLAM) 50 mg tablet, 1 tablet daily for severe pain.  Not to exceed 2 tablets a week., Disp: 30 tablet, Rfl: 0    Entresto 49-51 MG TABS, TAKE 1 TABLET BY MOUTH  TWICE DAILY, Disp: 180 tablet, Rfl: 3    ferrous sulfate 324 (65 Fe) mg, Take 1 tablet by mouth Twice daily, Disp: , Rfl:     gabapentin (NEURONTIN) 100 mg capsule, Take 1 capsule (100 mg total) by mouth 3 (three) times a day, Disp: 270 capsule, Rfl: 3    levothyroxine 100 mcg tablet, Take 1 tablet (100 mcg total) by mouth daily, Disp: 90 tablet, Rfl: 1    metoprolol succinate (TOPROL-XL) 50 mg 24 hr tablet, , Disp: , Rfl:     montelukast (SINGULAIR) 10 mg tablet, TAKE 1 TABLET BY MOUTH  DAILY AT BEDTIME, Disp: 90 tablet, Rfl: 3    Multiple Vitamins-Minerals (OCUVITE ADULT 50+ PO), Take by mouth, Disp: , Rfl:     spironolactone (ALDACTONE) 25 mg tablet, Take 0.5 tablets (12.5 mg total) by mouth daily, Disp: 45 tablet, Rfl: 3    Trelegy Ellipta 100-62.5-25 MCG/ACT inhaler, USE 1 INHALATION BY MOUTH ONCE  DAILY AT THE SAME TIME EACH DAY  RINSE MOUTH AFTER USE, Disp: 180 each, Rfl: 0    triamcinolone (KENALOG) 0.1 % ointment, Apply topically 2 (two) times 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, Disp: 454 g, Rfl: 3    Vitamins-Lipotropics (LIPOFLAVONOID PO), Take by mouth daily, Disp: , Rfl:     zolpidem (AMBIEN) 10 mg tablet, Take 1 tablet (10 mg total) by mouth daily at bedtime as needed for sleep, Disp: 30 tablet, Rfl: 3          Whom besides the patient is providing clinical information about today's encounter?   NO ADDITIONAL HISTORIAN (patient alone provided history)    Physical Exam and Assessment/Plan by Diagnosis:    Chief complaint: Patient is a 77 y/o male present for a  "routine skin exam with history of non-melanoma skin cancer and Mycosis Fungoides and he has no spots of concern for today.    HISTORY OF BASAL CELL CARCINOMA    Physical Exam:  Anatomic Location Affected:  Right Arm - 2015  Morphological Description of scar:  well healed  Suspected Recurrence: No  Pertinent Positives:  Pertinent Negatives:    Additional History of Present Condition:  History of basal cell carcinoma with no sign of recurrence    Assessment and Plan:  Based on a thorough discussion of this condition and the management approach to it (including a comprehensive discussion of the known risks, side effects and potential benefits of treatment), the patient (family) agrees to implement the following specific plan:  Monitor for change    HISTORY OF SQUAMOUS CELL CARCINOMA     Physical Exam:  Anatomic Location Affected:  Left Wrist - 04/2023  Left Forearm - 08/2021  Left Hand - 2020  Left Arm - 2018  Right Ear - 2016  Right Hand - 2015  Left Upper Arm - 2015  Morphological Description of Scar:  well healed  Suspected Recurrence: no  Regional adenopathy: no    Additional History of Present Condition:  History of multiple Squamous Cell Carcinoma, treated    Assessment and Plan:  Based on a thorough discussion of this condition and the management approach to it (including a comprehensive discussion of the known risks, side effects and potential benefits of treatment), the patient (family) agrees to implement the following specific plan:  Monitor for change      MELANOCYTIC NEVI (\"Moles\")    Physical Exam:  Anatomic Location Affected: Mostly on sun-exposed areas of the trunk and extremities  Morphological Description:  Scattered, 1-4mm round to ovoid, symmetrical-appearing, even bordered, skin colored to dark brown macules/papules, mostly in sun-exposed areas  Pertinent Positives:  Pertinent Negatives:    Additional History of Present Condition:  present on exam    Assessment and Plan:  Based on a thorough " "discussion of this condition and the management approach to it (including a comprehensive discussion of the known risks, side effects and potential benefits of treatment), the patient (family) agrees to implement the following specific plan:  Provided handout with information regarding the ABCDE's of moles   Recommend routine skin exams every 6 months   Sun avoidance, protective clothing (known as UPF clothing), and the use of at least SPF 30 sunscreens is advised. Sunscreen should be reapplied every two hours when outside.     SEBORRHEIC KERATOSIS; NON-INFLAMED    Physical Exam:  Anatomic Location Affected:  scattered across trunk, extremities, face  Morphological Description:  Flat and raised, waxy, smooth to warty textured, yellow to brownish-grey to dark brown to blackish, discrete, \"stuck-on\" appearing papules.  Pertinent Positives:  Pertinent Negatives:    Additional History of Present Condition:  Patient reports new bumps on the skin.  Denies itch, burn, pain, bleeding or ulceration.  Present constantly; nothing seems to make it worse or better.  No prior treatment.      Assessment and Plan:  Based on a thorough discussion of this condition and the management approach to it (including a comprehensive discussion of the known risks, side effects and potential benefits of treatment), the patient (family) agrees to implement the following specific plan:  Reassured benign    ANGIOMA (\"CHERRY ANGIOMA\")    Physical Exam:  Anatomic Location: scattered across sun exposed areas of the trunk and extremities   Morphologic Description: Firm red to reddish-blue discrete papules  Pertinent Positives:  Pertinent Negatives:    Additional History of Present Condition:  Present on exam.     Assessment and Plan:  Reassured benign    MYCOSIS FUNGOIDES    Physical Exam:  Anatomic Location Affected:  trunk and extremities  Morphological Description:  scaly erythematous patches  involving 30 % of his skin  Pertinent " Positives:  Pertinent Negatives:    Additional History of Present Condition:  Currently using Bexarotene 75mg daily    Assessment and Plan:  Based on a thorough discussion of this condition and the management approach to it (including a comprehensive discussion of the known risks, side effects and potential benefits of treatment), the patient (family) agrees to implement the following specific plan:  Continue same regimen  Patient advised that his TSH level  will be depressed secondary to Bexarotene and useless to monitor. Need to monitor T4 and T3    Mycosis fungoides  Mycosis fungoides is a condition in which the skin is infiltrated by patches or lumps composed of white cells called lymphocytes. It is more common in men than women and is very rare in children. Its cause is unknown but in some patients, it is associated with a pre-existing contact allergic dermatitis or infection with a retrovirus.    Mycosis fungoides has an indolent (low-grade) clinical course, which means that it may persist in one stage or over years or sometimes decades, slowly progress to another stage (from patches to thicker plaques and eventually to tumors).    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  Patch stage  In patch stage mycosis fungoides, the skin lesions are flat. Most often there are oval or ring-shaped (annular) pink dry patches on covered skin. They may spontaneously disappear, remain the same size, or slowly enlarge. The skin may be atrophic (thinned), and may or may not itch. The patch stage of mycosis fungoides can be difficult to distinguish from psoriasis, discoid eczema or parapsoriasis.  Plaque stage  In plaque stage mycosis fungoides, the patches become thickened and may resemble psoriasis. They are usually itchy.  Tumor stage  In tumor stage mycosis fungoides, large irregular lumps develop from plaques, or de elliot. They may ulcerate. At  this stage, spread to other organs is more likely than in earlier stages. The tumor type may transform into a large cell lymphoma.    MF variants and subtypes  There are a number of variants and subtypes of mycosis fungoides. Most follow the same clinical and pathological features as MF but some have distinctive features as described below.    Folliculotropic MF (follicular cell lymphoma)  The localized form of cutaneous T-cell lymphoma in which there is a slowly enlarging solitary patch, plaque or a tumor in which biopsy shows characteristic lymphomatous change around hair follicles.  Most commonly found in the head and neck area.  Skin lesions are often associated with alopecia, and sometimes with mucinorrhea (see alopecia mucinosa).   Pagetoid Reticulosis  Localized patches or plaques with an intraepidermal growth of neoplastic T cells  Presents as a solitary psoriasis-like or hyperkeratotic patch or plaque, usually on the extremities  Granulomatous slack skin:  Extremely rare subtype characterized by the slow development of folds of lax skin in the major skin folds  Skin folds show a granulomatous infiltrate with clonal T cells  Occurs most commonly in the groin and underarm regions  Mycosis fungoides palmaris et plantaris  Confined to palms and soles      Scribe Attestation      I,:  Savana Murray am acting as a scribe while in the presence of the attending physician.:       I,:  Harrison Rivers MD personally performed the services described in this documentation    as scribed in my presence.:

## 2024-03-18 NOTE — PATIENT INSTRUCTIONS
ACTINIC KERATOSIS    Actinic keratoses are very common on sites repeatedly exposed to the sun, especially the backs of the hands and the face, most often affecting the ears, nose, cheeks, upper lip, vermilion of the lower lip, temples, forehead and balding scalp. In severely chronically sun-damaged individuals, they may also be found on the upper trunk, upper and lower limbs, and dorsum of feet.    We discussed the theoretical premalignant (“pre-cancerous”) nature and etiology of these growths.  We discussed the prevailing notion that actinic keratoses are a reflection of abnormal skin cell development due to DNA damage by short wavelength UVB.  They are more likely to appear if the immune function is poor, due to aging, recent sun exposure, predisposing disease or certain drugs.    We discussed that the main concern is that actinic keratoses may predispose to squamous cell carcinoma. It is rare for a solitary actinic keratosis to evolve to squamous cell carcinoma (SCC), but the risk of SCC occurring at some stage in a patient with more than 10 actinic keratoses is thought to be about 10 to 15%. A tender, thickened, ulcerated or enlarging actinic keratosis is suspicious of SCC.    Actinic keratoses may be prevented by strict sun protection. If already present, keratoses may improve with a very high sun protection factor (50+) broad-spectrum sunscreen applied at least daily to affected areas, year-round.  We recommend that UPF-rated clothing and hats and sunglasses be worn whenever possible and that a sunscreen-moisturizer combination product such as Neutrogena Daily Defense be applied at least three times a day.    We performed a thorough discussion of treatment options and specific risk/benefits/alternatives including but not limited to medical “field” treatment with medications such as the following:    Topical “field area” medications such as 5-fluorouracil or Aldara (specifically, the trouble with long-term  compliance, blistering and local skin reaction versus the convenience of at-home therapy and that field therapy “gets what is not yet seen”).    Cryotherapy (specifically, local pain, scarring, dyspigmentation, blistering, possible superinfection, and treats “only what we see” versus directed treatment today).    Photodynamic therapy (specifically, local pain, scarring, dyspigmentation, blistering, possible superinfection, need to schedule for a later date, and time spent in the office versus field therapy that “gets what is not yet seen”).      BASAL CELL CARCINOMA    What is basal cell carcinoma?  Basal cell carcinoma (BCC) is a common, locally invasive, keratinocytic, or non-melanoma, skin cancer. It is also known as rodent ulcer and basalioma. Patients with BCC often develop multiple primary tumours over time.    Who gets basal cell carcinoma?  Risk factors for BCC include:  Age and sex: BCCs are particularly prevalent in elderly males. However, they also affect females and younger adults   Previous BCC or other form of skin cancer (squamous cell carcinoma, melanoma)   Sun damage (photoaging, actinic keratoses)   Repeated prior episodes of sunburn   Fair skin, blue eyes and blond or red hair--note; BCC can also affect darker skin types   Previous cutaneous injury, thermal burn, disease (eg cutaneous lupus, sebaceous naevus)   Inherited syndromes: BCC is a particular problem for families with basal cell naevus syndrome (Gorlin syndrome), Pbghb-Eapbé-Gtvkipea syndrome, Rombo syndrome, Oley syndrome and xeroderma pigmentosum   Other risk factors include ionising radiation, exposure to arsenic, and immune suppression due to disease or medicines    What causes basal cell carcinoma?  The cause of BCC is multifactorial.  Most often, there are DNA mutations in the patched (PTCH) tumour suppressor gene, part of hedgehog signaling pathway   These may be triggered by exposure to ultraviolet radiation   Various spontaneous  and inherited gene defects predispose to BCC    What are the clinical features of basal cell carcinoma?  BCC is a locally invasive skin tumour. The main characteristics are:  Slowly growing plaque or nodule   Skin coloured, pink or pigmented   Varies in size from a few millimetres to several centimetres in diameter   Spontaneous bleeding or ulceration  BCC is very rarely a threat to life. A tiny proportion of BCCs grow rapidly, invade deeply, and/or metastasise to local lymph nodes.    Types of basal cell carcinoma  There are several distinct clinical types of BCC, and over 20 histological growth patterns of BCC.  Nodular BCC  Most common type of facial BCC   Shiny or pearly nodule with a smooth surface   May have central depression or ulceration, so its edges appear rolled   Blood vessels cross its surface   Cystic variant is soft, with jelly-like contents   Micronodular, microcystic and infiltrative types are potentially aggressive subtypes   Also known as nodulocystic carcinoma  Superficial BCC  Most common type in younger adults   Most common type on upper trunk and shoulders   Slightly scaly, irregular plaque   Thin, translucent rolled border   Multiple microerosions  Morphoeaform BCC  Usually found in mid-facial sites   Waxy, scar-like plaque with indistinct borders   Wide and deep subclinical extension   May infiltrate cutaneous nerves (perineural spread)   Also known as morpheic, morphoeiform or sclerosing BCC  Basosquamous carcinoma  Mixed basal cell carcinoma (BCC) and squamous cell carcinoma (SCC)   Infiltrative growth pattern   Potentially more aggressive than other forms of BCC   Also known as basisquamous carcinoma and mixed basal-squamous cell carcinoma       Complications of basal cell carcinoma    Recurrent BCC  Recurrence of BCC after initial treatment is not uncommon. Characteristics of recurrent BCC often include:  Incomplete excision or narrow margins at primary excision   Morphoeic,  micronodular, and infiltrative subtypes   Location on head and neck    Advanced BCC  Advanced BCCs are large, often neglected tumours.  They may be several centimetres in diameter   They may be deeply infiltrating into tissues below the skin   They are difficult or impossible to treat surgically    Metastatic BCC  Very rare   Primary tumour is often large, neglected or recurrent, located on head and neck, with aggressive subtype   May have had multiple prior treatments   May arise in site exposed to ionising radiation   Can be fatal    How is basal cell carcinoma diagnosed?  BCC is diagnosed clinically by the presence of a slowly enlarging skin lesion with typical appearance. The diagnosis and  by a diagnostic biopsy or following excision.  Some typical superficial BCCs on trunk and limbs are clinically diagnosed and have non-surgical treatment without histology.    What is the treatment for primary basal cell carcinoma?  The treatment for a BCC depends on its type, size and location, the number to be treated, patient factors, and the preference or expertise of the doctor. Most BCCs are treated surgically. Long-term follow-up is recommended to check for new lesions and recurrence; the latter may be unnecessary if histology has reported wide clear margins.    Excision biopsy  Excision means the lesion is cut out and the skin stitched up.  Most appropriate treatment for nodular, infiltrative and morphoeic BCCs   Should include 3 to 5 mm margin of normal skin around the tumour   Very large lesions may require flap or skin graft to repair the defect   Pathologist will report deep and lateral margins   Further surgery is recommended for lesions that are incompletely excised    Mohs micrographically controlled excision  Mohs micrographically controlled surgery involves examining carefully marked excised tissue under the microscope, layer by layer, to ensure complete excision.  Very high cure rates achieved by trained Mohs  surgeons   Used in high-risk areas of the face around eyes, lips and nose   Suitable for ill-defined, morphoeic, infiltrative and recurrent subtypes   Large defects are repaired by flap or skin graft    Superficial skin surgery  Superficial skin surgery comprises shave, curettage, and electrocautery. It is a rapid technique using local anaesthesia and does not require sutures.  Suitable for small, well-defined nodular or superficial BCCs   Lesions are usually located on trunk or limbs   Wound is left open to heal by secondary intention   Moist wound dressings lead to healing within a few weeks   Eventual scar quality variable    Cryotherapy  Cryotherapy is the treatment of a superficial skin lesion by freezing it, usually with liquid nitrogen.  Suitable for small superficial BCCs on covered areas of trunk and limbs   Best avoided for BCCs on head and neck, and distal to knees   Double freeze-thaw technique   Results in a blister that crusts over and heals within several weeks.   Leaves permanent white abner    Photodynamic therapy  Photodynamic therapy (PDT) refers to a technique in which BCC is treated with a photosensitising chemical, and exposed to light several hours later.  Topical photosensitisers include aminolevulinic acid lotion and methyl aminolevulinate cream   Suitable for low-risk small, superficial BCCs   Best avoided if tumour in site at high risk of recurrence   Results in inflammatory reaction, maximal 3-4 days after procedure   Treatment repeated 7 days after initial treatment   Excellent cosmetic results    Imiquimod cream  Imiquimod is an immune response modifier.  Best used for superficial BCCs less than 2 cm diameter   Applied three to five times each week, for 6-16 weeks   Results in a variable inflammatory reaction, maximal at three weeks   Minimal scarring is usual    Fluorouracil cream  5-Fluorouracil cream is a topical cytotoxic agent.  Used to treat small superficial basal cell carcinomas    Requires prolonged course, eg twice daily for 6-12 weeks   Causes inflammatory reaction   Has high recurrence rates    Radiotherapy  Radiotherapy or X-ray treatment can be used to treat primary BCCs or as adjunctive treatment if margins are incomplete.  Mainly used if surgery is not suitable   Best avoided in young patients and in genetic conditions predisposing to skin cancer   Best cosmetic results achieved using multiple fractions   Typically, patient attends once-weekly for several weeks   Causes inflammatory reaction followed by scar   Risk of radiodermatitis, late recurrence, and new tumours    What is the treatment for advanced or metastatic basal cell carcinoma?  Locally advanced primary, recurrent or metastatic BCC requires multidisciplinary consultation. Often a combination of treatments is used.  Surgery   Radiotherapy   Targeted therapy  Targeted therapy refers to the hedgehog signalling pathway inhibitors, vismodegib and sonidegib. These drugs have some important risks and side effects.    How can basal cell carcinoma be prevented?  The most important way to prevent BCC is to avoid sunburn. This is especially important in childhood and early life. Fair skinned individuals and those with a personal or family history of BCC should protect their skin from sun exposure daily, year-round and lifelong.  Stay indoors or under the shade in the middle of the day   Wear covering clothing   Apply high protection factor SPF50+ broad-spectrum sunscreens generously to exposed skin if outdoors   Avoid indoor tanning (sun beds, solaria)  Oral nicotinamide (vitamin B3) in a dose of 500 mg twice daily may reduce the number and severity of BCCs.    What is the outlook for basal cell carcinoma?  Most BCCs are cured by treatment. Cure is most likely if treatment is undertaken when the lesion is small.  About 50% of people with BCC develop a second one within 3 years of the first. They are also at increased risk of other  skin cancers, especially melanoma. Regular self-skin examinations and long-term annual skin checks by an experienced health professional are recommended.        SQUAMOUS CELL CARCINOMA    What is cutaneous squamous cell carcinoma?  Cutaneous squamous cell carcinoma (SCC) is a common type of keratinocyte or non-melanoma skin cancer. It is derived from cells within the epidermis that make keratin -- the horny protein that makes up skin, hair and nails.  Cutaneous SCC is an invasive disease, referring to cancer cells that have grown beyond the epidermis. SCC can sometimes metastasise and may prove fatal.  Intraepidermal carcinoma (cutaneous SCC in situ) and mucosal SCC are considered elsewhere.    Who gets cutaneous squamous cell carcinoma?  Risk factors for cutaneous SCC include:  Age and sex: SCCs are particularly prevalent in elderly males. However, they also affect females and younger adults.   Previous SCC or another form of skin cancer (basal cell carcinoma, melanoma) are a strong predictor for further skin cancers.   Actinic keratoses   Outdoor occupation or recreation   Smoking   Fair skin, blue eyes and blond or red hair   Previous cutaneous injury, thermal burn, disease (eg cutaneous lupus, epidermolysis bullosa, leg ulcer)   Inherited syndromes: SCC is a particular problem for families with xeroderma pigmentosum and albinism   Other risk factors include ionising radiation, exposure to arsenic, and immune suppression due to disease (eg chronic lymphocytic leukaemia) or medicines. Organ transplant recipients have a massively increased risk of developing SCC.    What causes cutaneous squamous cell carcinoma?  More than 90% of cases of SCC are associated with numerous DNA mutations in multiple somatic genes. Mutations in the p53 tumour suppressor gene are caused by exposure to ultraviolet radiation (UV), especially UVB (known as signature 7). Other signature mutations relate to cigarette smoking, ageing and  immune suppression (eg, to drugs such as azathioprine). Mutations in signalling pathways affect the epidermal growth factor receptor, KENTON, Fyn, and [id number] signalling.   Beta-genus human papillomaviruses (wart virus) are thought to play a role in SCC arising in immune-suppressed populations. ?-HPV and HPV subtypes 5, 8, 17, 20, 24, and 38 have also been associated with an increased risk of cutaneous SCC in immunocompetent individuals.     What are the clinical features of cutaneous squamous cell carcinoma?  Cutaneous SCCs present as enlarging scaly or crusted lumps. They usually arise within pre-existing actinic keratosis or intraepidermal carcinoma.  They grow over weeks to months   They may ulcerate   They are often tender or painful   Located on sun-exposed sites, particularly the face, lips, ears, hands, forearms and lower legs   Size varies from a few millimetres to several centimetres in diameter.    Types of cutaneous squamous cell carcinoma  Distinct clinical types of invasive cutaneous SCC include:  Cutaneous horn -- the horn is due to excessive production of keratin   Keratoacanthoma (KA) -- a rapidly growing keratinising nodule that may resolve without treatment   Carcinoma cuniculatum (‘verrucous carcinoma’), a slow-growing, warty tumour on the sole of the foot.   Multiple eruptive SCC/KA-like lesions arising in syndromes, such as multiple self-healing squamous epitheliomas of Plaza-Smith and Grzybowski syndrome  The pathologist may classify a tumour as well differentiated, moderately well differentiated, poorly differentiated or anaplastic cutaneous SCC. There are other variants.    Classification of squamous cell carcinoma by risk  Cutaneous SCC is classified as low-risk or high-risk, depending on the chance of tumour recurrence and metastasis. Characteristics of high-risk SCC include:  High-risk cutaneous squamous cell carcinoma has the following characteristics:  Diameter greater than or equal to  2 cm   Location on the ear, vermilion of the lip, central face, hands, feet, genitalia   Arising in elderly or immune suppressed patient   Histological thickness greater than 2 mm, poorly differentiated histology, or with the invasion of the subcutaneous tissue, nerves and blood vessels  Metastatic SCC is found in regional lymph nodes (80%), lungs, liver, brain, bones and skin.    Staging cutaneous squamous cell carcinoma  In 2011, the American Joint Committee on Cancer (AJCC) published a new staging systemic for cutaneous SCC for the 7th Edition of the AJCC manual. This evaluates the dimensions of the original primary tumour (T) and its metastases to lymph nodes (N).    Tumour staging for cutaneous SCC  TX: Th Primary tumour cannot be assessed  T0: No evidence of a primary tumour  Tis: Carcinoma in situ  T1: Tumour ? 2cm without high-risk features  T2: Tumour ? 2cm; or; Tumour ? 2 cm with high-risk features  T3: Tumour with the invasion of maxilla, mandible, orbit or temporal bone  T4: Tumour with the invasion of axial or appendicular skeleton or perineural invasion of skull base    Zachariah staging for cutaneous SCC  NX: Regional lymph nodes cannot be assessed  N0: No regional lymph node metastasis  N1: Metastasis in one local lymph node ? 3cm  N2: Metastasis in one local lymph node ? 3cm; or; Metastasis in >1 local lymph node ? 6cm  N3: Metastasis in lymph node ? 6cm    How is squamous cell carcinoma diagnosed?  Diagnosis of cutaneous SCC is based on clinical features. The diagnosis and histological subtype are confirmed pathologically by diagnostic biopsy or following excision. See squamous cell carcinoma - pathology.  Patients with high-risk SCC may also undergo staging investigations to determine whether it has spread to lymph nodes or elsewhere. These may include:  Imaging using ultrasound scan, X-rays, CT scans, MRI scans   Lymph node or other tissue biopsies    What is the treatment for cutaneous squamous cell  carcinoma?  Cutaneous SCC is nearly always treated surgically. Most cases are excised with a 3-10 mm margin of normal tissue around a visible tumour. A flap or skin graft may be needed to repair the defect.  Other methods of removal include:  Shave, curettage, and electrocautery for low-risk tumours on trunk and limbs   Aggressive cryotherapy for very small, thin, low-risk tumours   Mohs micrographic surgery for large facial lesions with indistinct margins or recurrent tumours   Radiotherapy for an inoperable tumour, patients unsuitable for surgery, or as adjuvant    What is the treatment for advanced or metastatic squamous cell carcinoma?  Locally advanced primary, recurrent or metastatic SCC requires multidisciplinary consultation. Often a combination of treatments is used.  Surgery   Radiotherapy   Cemiplimab   Experimental targeted therapy using epidermal growth factor receptor inhibitors    How can cutaneous squamous cell carcinoma be prevented?  There is a great deal of evidence to show that very careful sun protection at any time of life reduces the number of SCCs. This is particularly important in ageing, sun-damaged, fair skin; in patients that are immune suppressed; and in those who already have actinic keratoses or previous SCC.  Stay indoors or under the shade in the middle of the day   Wear covering clothing   Apply high protection factor SPF50+ broad-spectrum sunscreens generously to exposed skin if outdoors   Avoid indoor tanning (sun beds, solaria)    Oral nicotinamide (vitamin B3) in a dose of 500 mg twice daily may reduce the number and severity of SCCs in people at high risk.  Patients with multiple squamous cell carcinomas may be prescribed an oral retinoid (acitretin or isotretinoin). These reduce the number of tumours but have some nuisance side effects.    What is the outlook for cutaneous squamous cell carcinoma?  Most SCCs are cured by treatment. A cure is most likely if treatment is  undertaken when the lesion is small. The risk of recurrence or disease-associated death is greater for tumours that are > 20 mm in diameter and/or > 2 mm in thickness at the time of surgical excision.  About 50% of people at high risk of SCC develop a second one within 5 years of the first. They are also at increased risk of other skin cancers, especially melanoma. Regular self-skin examinations and long-term annual skin checks by an experienced health professional are recommended.

## 2024-03-29 NOTE — PATIENT INSTRUCTIONS
A patient with severe heart failure nevertheless doing quite well in the sense of minimal symptoms  For a person with a low EF of 20% he is getting about quite well  He has some exertional fatigue and exertional dyspnea with humidity but no chest pain  Addition of diuresis has alleviated severe edema  He needs to do some routine lab work  I think colonoscopy should not be done because of the risk of decompensation  Follow-up here yearly or as needed 
abdomen

## 2024-04-15 ENCOUNTER — REMOTE DEVICE CLINIC VISIT (OUTPATIENT)
Dept: CARDIOLOGY CLINIC | Facility: CLINIC | Age: 79
End: 2024-04-15
Payer: COMMERCIAL

## 2024-04-15 DIAGNOSIS — Z95.810 PRESENCE OF IMPLANTABLE CARDIOVERTER-DEFIBRILLATOR (ICD): Primary | ICD-10-CM

## 2024-04-15 PROCEDURE — 93297 REM INTERROG DEV EVAL ICPMS: CPT | Performed by: INTERNAL MEDICINE

## 2024-04-15 NOTE — PROGRESS NOTES
MDT BIV-ICD/NOT MRI CONDITIONAL   [employer] TRANSMISSION - OPTI-VOL ONLY:  OPTI-VOL WITHIN NORMAL LIMITS.  BATTERY VOLTAGE NEARING LIZZY (5 MONTHS).  WILL SCHEDULE MONTHLY BATTERY CHECKS.  AP 98.7% BP 97.9% VSRP 1.7%.  ALL LEAD PARAMETERS WITHIN NORMAL LIMITS.  9 VT-NS EPISODES SINCE 3/13/2024 WITH ALL AVAILABLE EGMS SHOWING NSVT (26 @ 205 BPM, 5 @ 154 BPM, 7 @ 140 BPM, 9 @ 170 BPM, 7 @ 143 BPM).  TASK TO DR AS.  NORMAL DEVICE FUNCTION. RG

## 2024-04-16 ENCOUNTER — TELEPHONE (OUTPATIENT)
Dept: CARDIOLOGY CLINIC | Facility: CLINIC | Age: 79
End: 2024-04-16

## 2024-04-16 NOTE — TELEPHONE ENCOUNTER
Spoke with patient relayed  advised, patient verbally understood.    Patient stated that he has not been taking his BP.    Advised patient moving forward to keep a log of his BP's and to give the office a call regarding his log list.     Patient verbally understood.

## 2024-04-16 NOTE — TELEPHONE ENCOUNTER
----- Message from Asher Lozada MD sent at 4/16/2024  8:33 AM EDT -----  Regarding: FW: BiV ICD patient with NSVT >200 bpm  Kindly ask patient's blood pressure.  If SBP is more than 120 mmHg, he should increase his metoprolol succinate from 50 mg to 75 mg daily.  This is due to extra beats we are picking up on his ICD      ----- Message -----  From: Jaden Franz  Sent: 4/15/2024   1:38 PM EDT  To: Asher Lozada MD  Subject: BiV ICD patient with NSVT >200 bpm               For your review.  Thank you, Jaden FREEDMANT BIV-ICD/NOT MRI CONDITIONAL   [employer] TRANSMISSION - OPTI-VOL ONLY:  OPTI-VOL WITHIN NORMAL LIMITS.  BATTERY VOLTAGE NEARING LIZZY (5 MONTHS).  WILL SCHEDULE MONTHLY BATTERY CHECKS.  AP 98.7% BP 97.9% VSRP 1.7%.  ALL LEAD PARAMETERS WITHIN NORMAL LIMITS.  9 VT-NS EPISODES SINCE 3/13/2024 WITH ALL AVAILABLE EGMS SHOWING NSVT (26 @ 205 BPM, 5 @ 154 BPM, 7 @ 140 BPM, 9 @ 170 BPM, 7 @ 143 BPM).  TASK TO DR AS.  NORMAL DEVICE FUNCTION. RG

## 2024-05-02 DIAGNOSIS — J44.9 CHRONIC OBSTRUCTIVE PULMONARY DISEASE, UNSPECIFIED COPD TYPE (HCC): ICD-10-CM

## 2024-05-03 RX ORDER — ALBUTEROL SULFATE 2.5 MG/3ML
SOLUTION RESPIRATORY (INHALATION)
Qty: 360 ML | Refills: 2 | Status: SHIPPED | OUTPATIENT
Start: 2024-05-03

## 2024-05-07 ENCOUNTER — RA CDI HCC (OUTPATIENT)
Dept: OTHER | Facility: HOSPITAL | Age: 79
End: 2024-05-07

## 2024-05-14 ENCOUNTER — OFFICE VISIT (OUTPATIENT)
Age: 79
End: 2024-05-14
Payer: COMMERCIAL

## 2024-05-14 ENCOUNTER — APPOINTMENT (OUTPATIENT)
Age: 79
End: 2024-05-14
Payer: COMMERCIAL

## 2024-05-14 VITALS
DIASTOLIC BLOOD PRESSURE: 72 MMHG | HEIGHT: 71 IN | SYSTOLIC BLOOD PRESSURE: 130 MMHG | WEIGHT: 222.6 LBS | RESPIRATION RATE: 16 BRPM | HEART RATE: 94 BPM | BODY MASS INDEX: 31.16 KG/M2 | TEMPERATURE: 96.6 F | OXYGEN SATURATION: 95 %

## 2024-05-14 DIAGNOSIS — I47.29 NSVT (NONSUSTAINED VENTRICULAR TACHYCARDIA) (HCC): ICD-10-CM

## 2024-05-14 DIAGNOSIS — E78.2 MIXED HYPERLIPIDEMIA: ICD-10-CM

## 2024-05-14 DIAGNOSIS — I50.22 CHRONIC SYSTOLIC CONGESTIVE HEART FAILURE (HCC): ICD-10-CM

## 2024-05-14 DIAGNOSIS — J44.9 CHRONIC OBSTRUCTIVE PULMONARY DISEASE, UNSPECIFIED COPD TYPE (HCC): ICD-10-CM

## 2024-05-14 DIAGNOSIS — Z00.00 MEDICARE ANNUAL WELLNESS VISIT, SUBSEQUENT: ICD-10-CM

## 2024-05-14 DIAGNOSIS — F17.210 CIGARETTE NICOTINE DEPENDENCE WITHOUT COMPLICATION: ICD-10-CM

## 2024-05-14 DIAGNOSIS — I10 PRIMARY HYPERTENSION: Primary | ICD-10-CM

## 2024-05-14 DIAGNOSIS — E03.9 ACQUIRED HYPOTHYROIDISM: ICD-10-CM

## 2024-05-14 DIAGNOSIS — N18.31 STAGE 3A CHRONIC KIDNEY DISEASE (HCC): ICD-10-CM

## 2024-05-14 LAB
CHOLEST SERPL-MCNC: 256 MG/DL
HDLC SERPL-MCNC: 39 MG/DL
NONHDLC SERPL-MCNC: 217 MG/DL
T3 SERPL-MCNC: 1 NG/ML
T4 FREE SERPL-MCNC: 0.49 NG/DL (ref 0.61–1.12)
TRIGL SERPL-MCNC: 644 MG/DL
TSH SERPL DL<=0.05 MIU/L-ACNC: 0.6 UIU/ML (ref 0.45–4.5)

## 2024-05-14 PROCEDURE — 84443 ASSAY THYROID STIM HORMONE: CPT

## 2024-05-14 PROCEDURE — 80061 LIPID PANEL: CPT

## 2024-05-14 PROCEDURE — 99214 OFFICE O/P EST MOD 30 MIN: CPT | Performed by: INTERNAL MEDICINE

## 2024-05-14 PROCEDURE — G0439 PPPS, SUBSEQ VISIT: HCPCS | Performed by: INTERNAL MEDICINE

## 2024-05-14 NOTE — PATIENT INSTRUCTIONS
Medicare Preventive Visit Patient Instructions  Thank you for completing your Welcome to Medicare Visit or Medicare Annual Wellness Visit today. Your next wellness visit will be due in one year (5/15/2025).  The screening/preventive services that you may require over the next 5-10 years are detailed below. Some tests may not apply to you based off risk factors and/or age. Screening tests ordered at today's visit but not completed yet may show as past due. Also, please note that scanned in results may not display below.  Preventive Screenings:  Service Recommendations Previous Testing/Comments   Colorectal Cancer Screening  Colonoscopy    Fecal Occult Blood Test (FOBT)/Fecal Immunochemical Test (FIT)  Fecal DNA/Cologuard Test  Flexible Sigmoidoscopy Age: 45-75 years old   Colonoscopy: every 10 years (May be performed more frequently if at higher risk)  OR  FOBT/FIT: every 1 year  OR  Cologuard: every 3 years  OR  Sigmoidoscopy: every 5 years  Screening may be recommended earlier than age 45 if at higher risk for colorectal cancer. Also, an individualized decision between you and your healthcare provider will decide whether screening between the ages of 76-85 would be appropriate. Colonoscopy: 02/17/2015  FOBT/FIT: Not on file  Cologuard: 07/26/2022  Sigmoidoscopy: Not on file          Prostate Cancer Screening Individualized decision between patient and health care provider in men between ages of 55-69   Medicare will cover every 12 months beginning on the day after your 50th birthday PSA: 3.9 ng/mL     Screening Not Indicated     Hepatitis C Screening Once for adults born between 1945 and 1965  More frequently in patients at high risk for Hepatitis C Hep C Antibody: 02/15/2018    Screening Current   Diabetes Screening 1-2 times per year if you're at risk for diabetes or have pre-diabetes Fasting glucose: 96 mg/dL (12/28/2023)  A1C: No results in last 5 years (No results in last 5 years)  Screening Current    Cholesterol Screening Once every 5 years if you don't have a lipid disorder. May order more often based on risk factors. Lipid panel: 12/28/2023  Screening Not Indicated  History Lipid Disorder      Other Preventive Screenings Covered by Medicare:  Abdominal Aortic Aneurysm (AAA) Screening: covered once if your at risk. You're considered to be at risk if you have a family history of AAA or a male between the age of 65-75 who smoking at least 100 cigarettes in your lifetime.  Lung Cancer Screening: covers low dose CT scan once per year if you meet all of the following conditions: (1) Age 55-77; (2) No signs or symptoms of lung cancer; (3) Current smoker or have quit smoking within the last 15 years; (4) You have a tobacco smoking history of at least 20 pack years (packs per day x number of years you smoked); (5) You get a written order from a healthcare provider.  Glaucoma Screening: covered annually if you're considered high risk: (1) You have diabetes OR (2) Family history of glaucoma OR (3)  aged 50 and older OR (4)  American aged 65 and older  Osteoporosis Screening: covered every 2 years if you meet one of the following conditions: (1) Have a vertebral abnormality; (2) On glucocorticoid therapy for more than 3 months; (3) Have primary hyperparathyroidism; (4) On osteoporosis medications and need to assess response to drug therapy.  HIV Screening: covered annually if you're between the age of 15-65. Also covered annually if you are younger than 15 and older than 65 with risk factors for HIV infection. For pregnant patients, it is covered up to 3 times per pregnancy.    Immunizations:  Immunization Recommendations   Influenza Vaccine Annual influenza vaccination during flu season is recommended for all persons aged >= 6 months who do not have contraindications   Pneumococcal Vaccine   * Pneumococcal conjugate vaccine = PCV13 (Prevnar 13), PCV15 (Vaxneuvance), PCV20 (Prevnar 20)  *  Pneumococcal polysaccharide vaccine = PPSV23 (Pneumovax) Adults 19-65 yo with certain risk factors or if 65+ yo  If never received any pneumonia vaccine: recommend Prevnar 20 (PCV20)  Give PCV20 if previously received 1 dose of PCV13 or PPSV23   Hepatitis B Vaccine 3 dose series if at intermediate or high risk (ex: diabetes, end stage renal disease, liver disease)   Respiratory syncytial virus (RSV) Vaccine - COVERED BY MEDICARE PART D  * RSVPreF3 (Arexvy) CDC recommends that adults 60 years of age and older may receive a single dose of RSV vaccine using shared clinical decision-making (SCDM)   Tetanus (Td) Vaccine - COST NOT COVERED BY MEDICARE PART B Following completion of primary series, a booster dose should be given every 10 years to maintain immunity against tetanus. Td may also be given as tetanus wound prophylaxis.   Tdap Vaccine - COST NOT COVERED BY MEDICARE PART B Recommended at least once for all adults. For pregnant patients, recommended with each pregnancy.   Shingles Vaccine (Shingrix) - COST NOT COVERED BY MEDICARE PART B  2 shot series recommended in those 19 years and older who have or will have weakened immune systems or those 50 years and older     Health Maintenance Due:      Topic Date Due   • Colorectal Cancer Screening  11/08/2024 (Originally 11/10/1990)   • Lung Cancer Screening  10/20/2024   • Hepatitis C Screening  Completed     Immunizations Due:      Topic Date Due   • Pneumococcal Vaccine: 65+ Years (2 of 2 - PPSV23 or PCV20) 09/21/2020   • COVID-19 Vaccine (7 - 2023-24 season) 09/01/2023     Advance Directives   What are advance directives?  Advance directives are legal documents that state your wishes and plans for medical care. These plans are made ahead of time in case you lose your ability to make decisions for yourself. Advance directives can apply to any medical decision, such as the treatments you want, and if you want to donate organs.   What are the types of advance  directives?  There are many types of advance directives, and each state has rules about how to use them. You may choose a combination of any of the following:  Living will:  This is a written record of the treatment you want. You can also choose which treatments you do not want, which to limit, and which to stop at a certain time. This includes surgery, medicine, IV fluid, and tube feedings.   Durable power of  for healthcare (DPAHC):  This is a written record that states who you want to make healthcare choices for you when you are unable to make them for yourself. This person, called a proxy, is usually a family member or a friend. You may choose more than 1 proxy.  Do not resuscitate (DNR) order:  A DNR order is used in case your heart stops beating or you stop breathing. It is a request not to have certain forms of treatment, such as CPR. A DNR order may be included in other types of advance directives.  Medical directive:  This covers the care that you want if you are in a coma, near death, or unable to make decisions for yourself. You can list the treatments you want for each condition. Treatment may include pain medicine, surgery, blood transfusions, dialysis, IV or tube feedings, and a ventilator (breathing machine).  Values history:  This document has questions about your views, beliefs, and how you feel and think about life. This information can help others choose the care that you would choose.  Why are advance directives important?  An advance directive helps you control your care. Although spoken wishes may be used, it is better to have your wishes written down. Spoken wishes can be misunderstood, or not followed. Treatments may be given even if you do not want them. An advance directive may make it easier for your family to make difficult choices about your care.   Cigarette Smoking and Your Health   Risks to your health if you smoke:  Nicotine and other chemicals found in tobacco damage every  cell in your body. Even if you are a light smoker, you have an increased risk for cancer, heart disease, and lung disease. If you are pregnant or have diabetes, smoking increases your risk for complications.   Benefits to your health if you stop smoking:   You decrease respiratory symptoms such as coughing, wheezing, and shortness of breath.   You reduce your risk for cancers of the lung, mouth, throat, kidney, bladder, pancreas, stomach, and cervix. If you already have cancer, you increase the benefits of chemotherapy. You also reduce your risk for cancer returning or a second cancer from developing.   You reduce your risk for heart disease, blood clots, heart attack, and stroke.   You reduce your risk for lung infections, and diseases such as pneumonia, asthma, chronic bronchitis, and emphysema.  Your circulation improves. More oxygen can be delivered to your body. If you have diabetes, you lower your risk for complications, such as kidney, artery, and eye diseases. You also lower your risk for nerve damage. Nerve damage can lead to amputations, poor vision, and blindness.  You improve your body's ability to heal and to fight infections.  For more information and support to stop smoking:   [employer]  Phone: 8- 285 - [phone]  Web Address: www.[employer]  Weight Management   Why it is important to manage your weight:  Being overweight increases your risk of health conditions such as heart disease, high blood pressure, type 2 diabetes, and certain types of cancer. It can also increase your risk for osteoarthritis, sleep apnea, and other respiratory problems. Aim for a slow, steady weight loss. Even a small amount of weight loss can lower your risk of health problems.  How to lose weight safely:  A safe and healthy way to lose weight is to eat fewer calories and get regular exercise. You can lose up about 1 pound a week by decreasing the number of calories you eat by 500 calories each day.   Healthy meal plan  for weight management:  A healthy meal plan includes a variety of foods, contains fewer calories, and helps you stay healthy. A healthy meal plan includes the following:  Eat whole-grain foods more often.  A healthy meal plan should contain fiber. Fiber is the part of grains, fruits, and vegetables that is not broken down by your body. Whole-grain foods are healthy and provide extra fiber in your diet. Some examples of whole-grain foods are whole-wheat breads and pastas, oatmeal, brown rice, and bulgur.  Eat a variety of vegetables every day.  Include dark, leafy greens such as spinach, kale, day greens, and mustard greens. Eat yellow and orange vegetables such as carrots, sweet potatoes, and winter squash.   Eat a variety of fruits every day.  Choose fresh or canned fruit (canned in its own juice or light syrup) instead of juice. Fruit juice has very little or no fiber.  Eat low-fat dairy foods.  Drink fat-free (skim) milk or 1% milk. Eat fat-free yogurt and low-fat cottage cheese. Try low-fat cheeses such as mozzarella and other reduced-fat cheeses.  Choose meat and other protein foods that are low in fat.  Choose beans or other legumes such as split peas or lentils. Choose fish, skinless poultry (chicken or turkey), or lean cuts of red meat (beef or pork). Before you cook meat or poultry, cut off any visible fat.   Use less fat and oil.  Try baking foods instead of frying them. Add less fat, such as margarine, sour cream, regular salad dressing and mayonnaise to foods. Eat fewer high-fat foods. Some examples of high-fat foods include french fries, doughnuts, ice cream, and cakes.  Eat fewer sweets.  Limit foods and drinks that are high in sugar. This includes candy, cookies, regular soda, and sweetened drinks.  Exercise:  Exercise at least 30 minutes per day on most days of the week. Some examples of exercise include walking, biking, dancing, and swimming. You can also fit in more physical activity by taking  the stairs instead of the elevator or parking farther away from stores. Ask your healthcare provider about the best exercise plan for you.      © Copyright [employer] 2018 Information is for End User's use only and may not be sold, redistributed or otherwise used for commercial purposes. All illustrations and images included in CareNotes® are the copyrighted property of [employer] or [employer]

## 2024-05-14 NOTE — PROGRESS NOTES
Assessment and Plan:   Blood pressure is stable, continue the same medications  Continue atorvastatin  TSH was on the lower side in the last labs, check a TSH level to see if any adjustment in the dosage of levothyroxine is required  Continue to follow-up with cardiology for the coronary artery disease and congestive heart failure  COPD is fairly controlled except in winter and humid weather.  Continue the nebulizer treatments.  Was told to keep himself well-hydrated  Was advised to quit smoking      Problem List Items Addressed This Visit    None      Depression Screening and Follow-up Plan: Patient was screened for depression during today's encounter. They screened negative with a PHQ-2 score of 2.      Preventive health issues were discussed with patient, and age appropriate screening tests were ordered as noted in patient's After Visit Summary.  Personalized health advice and appropriate referrals for health education or preventive services given if needed, as noted in patient's After Visit Summary.     History of Present Illness:     Patient presents for a Medicare Wellness Visit    HPI  Patient is here for follow-up of hypertension, hyperlipidemia, hypothyroidism, COPD, chronic kidney disease.  He has been taking all his medications regularly and tolerating them well.  Does not have any major complaints.      Patient Care Team:  Marino Lozada MD as PCP - General (Internal Medicine)  MD Harrison Monique MD Ramani Gosala, MD Abhishek Singh, MD as Cardiologist (Cardiology)  Michael Stanford MD (Nephrology)     Review of Systems:     Review of Systems   Constitutional:  Negative for chills, diaphoresis, fatigue and fever.   HENT:  Negative for congestion, ear discharge, ear pain, hearing loss, postnasal drip, rhinorrhea, sinus pressure, sinus pain, sneezing, sore throat and voice change.    Eyes:  Negative for pain, discharge, redness and visual disturbance.   Respiratory:  Negative for cough, chest  tightness, shortness of breath and wheezing.    Cardiovascular:  Negative for chest pain, palpitations and leg swelling.   Gastrointestinal:  Negative for abdominal distention, abdominal pain, blood in stool, constipation, diarrhea, nausea and vomiting.   Endocrine: Negative for cold intolerance, heat intolerance, polydipsia, polyphagia and polyuria.   Genitourinary:  Negative for dysuria, flank pain, frequency, hematuria and urgency.   Musculoskeletal:  Negative for arthralgias, back pain, gait problem, joint swelling, myalgias, neck pain and neck stiffness.   Skin:  Negative for rash.   Neurological:  Negative for dizziness, tremors, syncope, facial asymmetry, speech difficulty, weakness, light-headedness, numbness and headaches.   Hematological:  Does not bruise/bleed easily.   Psychiatric/Behavioral:  Negative for behavioral problems, confusion and sleep disturbance. The patient is not nervous/anxious.         Problem List:     Patient Active Problem List   Diagnosis    Insomnia    Cardiac resynchronization therapy defibrillator (CRT-D) in place    Hyperlipidemia    Hx of lymphoma    HTN (hypertension)    COPD (chronic obstructive pulmonary disease) (HCC)    Chronic bronchitis (HCC)    Non-ischemic cardiomyopathy (HCC)    BPH (benign prostatic hyperplasia)    Seborrheic keratosis    Mycosis fungoides (HCC)    History of skin cancer    Acquired hypothyroidism    HHD (hypertensive heart disease)    Squamous cell cancer of skin of forearm, left    PVC (premature ventricular contraction)    Chronic systolic congestive heart failure (HCC)    Hypomagnesemia    Chronic kidney disease-mineral and bone disorder    Stage 3 chronic kidney disease (HCC)    Shortness of breath    Lumbar pain    Tinnitus of both ears    Cigarette nicotine dependence without complication      Past Medical and Surgical History:     Past Medical History:   Diagnosis Date    Agent orange exposure     Anemia     Benign colon polyp     BPH (benign  prostatic hyperplasia)     Bradycardia     Cardiomyopathy (HCC)     Chest discomfort     CHF (congestive heart failure) (HCC)     Chronic bronchitis (HCC)     Chronic kidney disease     COPD (chronic obstructive pulmonary disease) (HCC)     LAST ASSESSED: 9/9/17    Coronary artery disease cardio myopthia    Emphysema of lung (HCC) 2013    H/O nonmelanoma skin cancer     LAST ASSESSED: 11/7/17    HHD (hypertensive heart disease)     HTN (hypertension)     Hx of lymphoma     Hyperlipidemia     ICD (implantable cardioverter-defibrillator) in place     Insomnia     Mycosis fungoides (HCC)     PVC (premature ventricular contraction)     PVT (paroxysmal ventricular tachycardia) (HCC)     SOB (shortness of breath)      Past Surgical History:   Procedure Laterality Date    CARDIAC PACEMAKER PLACEMENT      SKIN SURGERY      skin cancer removal       Family History:     Family History   Problem Relation Age of Onset    Diabetes Mother     Colon cancer Father         DIAGNOSED IN HIS 80'S    Heart failure Father     Coronary artery disease Father     Cancer Father         Lorenzo    Diabetes Family         MELLITUS    Heart attack Neg Hx     Stroke Neg Hx     Anuerysm Neg Hx     Clotting disorder Neg Hx     Arrhythmia Neg Hx     Hypertension Neg Hx         pt unsure     Hyperlipidemia Neg Hx     Sudden death Neg Hx         scd      Social History:     Social History     Socioeconomic History    Marital status:      Spouse name: None    Number of children: 0    Years of education: None    Highest education level: None   Occupational History    Occupation: retired   Tobacco Use    Smoking status: Every Day     Current packs/day: 1.00     Average packs/day: 1 pack/day for 63.4 years (63.4 ttl pk-yrs)     Types: Cigarettes     Start date: 1961     Passive exposure: Current    Smokeless tobacco: Never    Tobacco comments:     HALF A PACK PER DAY    Vaping Use    Vaping status: Never Used   Substance and Sexual Activity     Alcohol use: Yes     Alcohol/week: 14.0 standard drinks of alcohol     Types: 14 Shots of liquor per week     Comment: two drinks every night over a 4 hour period    Drug use: Never    Sexual activity: Not Currently     Partners: Female     Birth control/protection: Male Sterilization   Other Topics Concern    None   Social History Narrative    None     Social Determinants of Health     Financial Resource Strain: Low Risk  (1/23/2023)    Overall Financial Resource Strain (CARDIA)     Difficulty of Paying Living Expenses: Not very hard   Food Insecurity: Not on file   Transportation Needs: No Transportation Needs (1/23/2023)    PRAPARE - Transportation     Lack of Transportation (Medical): No     Lack of Transportation (Non-Medical): No   Physical Activity: Inactive (5/14/2024)    Exercise Vital Sign     Days of Exercise per Week: 0 days     Minutes of Exercise per Session: 0 min   Stress: No Stress Concern Present (9/4/2020)    Citizen of [place] [employer] - Occupational Stress Questionnaire     Feeling of Stress : Not at all   Social Connections: Not on file   Intimate Partner Violence: Not on file   Housing Stability: Not on file      Medications and Allergies:     Current Outpatient Medications   Medication Sig Dispense Refill    albuterol (2.5 mg/3 mL) 0.083 % nebulizer solution USE 1 VIAL VIA NEBULIZER 4 TIMES A DAY AS NEEDED 360 mL 2    ammonium lactate (LAC-HYDRIN) 12 % lotion Apply topically daily 225 g 2    aspirin 81 MG tablet Take 81 mg by mouth daily      atorvastatin (LIPITOR) 40 mg tablet TAKE 1 TABLET BY MOUTH  DAILY 90 tablet 3    bexarotene (TARGRETIN) 75 mg capsule TAKE 8 CAPSULES BY MOUTH DAILY 240 capsule 5    Cholecalciferol 25 MCG (1000 UT) capsule Take 1 capsule by mouth daily      cyanocobalamin (VITAMIN B-12) 1,000 mcg tablet Take 1,000 mcg by mouth daily      diclofenac potassium (CATAFLAM) 50 mg tablet 1 tablet daily for severe pain.  Not to exceed 2 tablets a week. 30 tablet 0     Entresto 49-51 MG TABS TAKE 1 TABLET BY MOUTH  TWICE DAILY 180 tablet 3    ferrous sulfate 324 (65 Fe) mg Take 1 tablet by mouth Twice daily      gabapentin (NEURONTIN) 100 mg capsule Take 1 capsule (100 mg total) by mouth 3 (three) times a day 270 capsule 3    levothyroxine 100 mcg tablet Take 1 tablet (100 mcg total) by mouth daily 90 tablet 1    metoprolol succinate (TOPROL-XL) 50 mg 24 hr tablet       montelukast (SINGULAIR) 10 mg tablet TAKE 1 TABLET BY MOUTH  DAILY AT BEDTIME 90 tablet 3    Multiple Vitamins-Minerals (OCUVITE ADULT 50+ PO) Take by mouth      spironolactone (ALDACTONE) 25 mg tablet Take 0.5 tablets (12.5 mg total) by mouth daily 45 tablet 3    Trelegy Ellipta 100-62.5-25 MCG/ACT inhaler USE 1 INHALATION BY MOUTH ONCE  DAILY AT THE SAME TIME EACH DAY  RINSE MOUTH AFTER  each 3    triamcinolone (KENALOG) 0.1 % ointment Apply topically 2 (two) times 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 454 g 3    Vitamins-Lipotropics (LIPOFLAVONOID PO) Take by mouth daily      zolpidem (AMBIEN) 10 mg tablet Take 1 tablet (10 mg total) by mouth daily at bedtime as needed for sleep 30 tablet 3     No current facility-administered medications for this visit.     No Known Allergies   Immunizations:     Immunization History   Administered Date(s) Administered    COVID-19 PFIZER VACCINE 0.3 ML IM 03/16/2021, 04/07/2021, 08/20/2021    COVID-19 Pfizer Vac BIVALENT Maikel-sucrose 12 Yr+ IM 01/16/2023    COVID-19 Pfizer vac (Maikel-sucrose, gray cap) 12 yr+ IM 04/15/2022, 04/15/2022    INFLUENZA 10/14/2015, 10/17/2016, 09/24/2018, 09/08/2020, 10/25/2021, 10/20/2022, 11/04/2023    Influenza Split High Dose Preservative Free IM 10/17/2016, 09/09/2017, 09/24/2018, 09/13/2019    Influenza, high dose seasonal 0.7 mL 10/25/2021, 10/20/2022    Influenza, seasonal, injectable 10/14/2015    Pneumococcal Conjugate 13-Valent 07/27/2020    Pneumococcal Conjugate PCV 7 10/14/2015    Respiratory Syncytial Virus Vaccine (Recombinant, Adjuvanted)  11/04/2023    Tdap 1945    Zoster Vaccine Recombinant 03/31/2023      Health Maintenance:         Topic Date Due    Colorectal Cancer Screening  11/08/2024 (Originally 11/10/1990)    Lung Cancer Screening  10/20/2024    Hepatitis C Screening  Completed         Topic Date Due    Pneumococcal Vaccine: 65+ Years (2 of 2 - PPSV23 or PCV20) 09/21/2020    COVID-19 Vaccine (7 - 2023-24 season) 09/01/2023      Medicare Screening Tests and Risk Assessments:     Sherly is here for his Subsequent Wellness visit.     Health Risk Assessment:   Patient rates overall health as fair. Patient feels that their physical health rating is same. Patient is dissatisfied with their life. Eyesight was rated as same. Hearing was rated as same. Patient feels that their emotional and mental health rating is same. Patients states they are never, rarely angry. Patient states they are sometimes unusually tired/fatigued. Pain experienced in the last 7 days has been none. Patient states that he has experienced no weight loss or gain in last 6 months.     Depression Screening:   PHQ-2 Score: 2      Fall Risk Screening:   In the past year, patient has experienced: no history of falling in past year      Home Safety:  Patient does not have trouble with stairs inside or outside of their home. Patient has working smoke alarms and has working carbon monoxide detector. Home safety hazards include: none.     Nutrition:   Current diet is Regular.     Medications:   Patient is currently taking over-the-counter supplements. OTC medications include: see medication list. Patient is able to manage medications.     Activities of Daily Living (ADLs)/Instrumental Activities of Daily Living (IADLs):   Walk and transfer into and out of bed and chair?: Yes  Dress and groom yourself?: Yes    Bathe or shower yourself?: Yes    Feed yourself? Yes  Do your laundry/housekeeping?: Yes  Manage your money, pay your bills and track your expenses?: Yes  Make your own meals?:  "Yes    Do your own shopping?: Yes    Previous Hospitalizations:   Any hospitalizations or ED visits within the last 12 months?: No      Advance Care Planning:   Living will: Yes    Advanced directive: Yes      PREVENTIVE SCREENINGS      Cardiovascular Screening:    General: Screening Not Indicated and History Lipid Disorder      Diabetes Screening:     General: Screening Current      Prostate Cancer Screening:    General: Screening Not Indicated      Abdominal Aortic Aneurysm (AAA) Screening:    Risk factors include: tobacco use        Lung Cancer Screening:     General: Screening Current      Hepatitis C Screening:    General: Screening Current    Screening, Brief Intervention, and Referral to Treatment (SBIRT)    Screening      Single Item Drug Screening:  How often have you used an illegal drug (including marijuana) or a prescription medication for non-medical reasons in the past year? never    Single Item Drug Screen Score: 0  Interpretation: Negative screen for possible drug use disorder    No results found.     Physical Exam:     /72 (BP Location: Left arm, Patient Position: Sitting, Cuff Size: Standard)   Pulse 94   Temp (!) 96.6 °F (35.9 °C) (Tympanic)   Resp 16   Ht 5' 11\" (1.803 m)   Wt 101 kg (222 lb 9.6 oz)   SpO2 95%   BMI 31.05 kg/m²     Physical Exam  Constitutional:       General: He is not in acute distress.     Appearance: He is well-developed. He is not diaphoretic.   HENT:      Head: Normocephalic and atraumatic.      Right Ear: External ear normal.      Left Ear: External ear normal.      Nose: Nose normal.   Eyes:      General: No scleral icterus.        Right eye: No discharge.         Left eye: No discharge.      Conjunctiva/sclera: Conjunctivae normal.   Cardiovascular:      Rate and Rhythm: Normal rate and regular rhythm.      Heart sounds: Normal heart sounds. No murmur heard.     No friction rub. No gallop.   Pulmonary:      Effort: Pulmonary effort is normal. No respiratory " distress.      Breath sounds: Normal breath sounds. No wheezing or rales.   Abdominal:      General: Bowel sounds are normal. There is no distension.      Palpations: Abdomen is soft.      Tenderness: There is no abdominal tenderness. There is no guarding or rebound.   Musculoskeletal:         General: No tenderness.   Skin:     General: Skin is warm and dry.      Findings: No erythema or rash.   Neurological:      Mental Status: He is alert and oriented to person, place, and time.      Cranial Nerves: No cranial nerve deficit.      Sensory: No sensory deficit.      Motor: No abnormal muscle tone.   Psychiatric:         Behavior: Behavior normal.          Marino Lozada MD

## 2024-05-16 ENCOUNTER — REMOTE DEVICE CLINIC VISIT (OUTPATIENT)
Dept: CARDIOLOGY CLINIC | Facility: CLINIC | Age: 79
End: 2024-05-16
Payer: COMMERCIAL

## 2024-05-16 DIAGNOSIS — Z95.810 PRESENCE OF AUTOMATIC CARDIOVERTER/DEFIBRILLATOR (AICD): Primary | ICD-10-CM

## 2024-05-16 PROCEDURE — 93295 DEV INTERROG REMOTE 1/2/MLT: CPT | Performed by: INTERNAL MEDICINE

## 2024-05-16 PROCEDURE — 93296 REM INTERROG EVL PM/IDS: CPT | Performed by: INTERNAL MEDICINE

## 2024-06-03 ENCOUNTER — OFFICE VISIT (OUTPATIENT)
Dept: CARDIOLOGY CLINIC | Facility: CLINIC | Age: 79
End: 2024-06-03
Payer: COMMERCIAL

## 2024-06-03 VITALS
DIASTOLIC BLOOD PRESSURE: 68 MMHG | BODY MASS INDEX: 31.5 KG/M2 | HEART RATE: 95 BPM | SYSTOLIC BLOOD PRESSURE: 90 MMHG | WEIGHT: 225 LBS | HEIGHT: 71 IN | OXYGEN SATURATION: 97 % | RESPIRATION RATE: 16 BRPM

## 2024-06-03 DIAGNOSIS — E78.2 MIXED HYPERLIPIDEMIA: ICD-10-CM

## 2024-06-03 DIAGNOSIS — I50.22 CHRONIC SYSTOLIC CONGESTIVE HEART FAILURE (HCC): Primary | ICD-10-CM

## 2024-06-03 DIAGNOSIS — I47.29 NSVT (NONSUSTAINED VENTRICULAR TACHYCARDIA) (HCC): ICD-10-CM

## 2024-06-03 DIAGNOSIS — Z95.810 CARDIAC RESYNCHRONIZATION THERAPY DEFIBRILLATOR (CRT-D) IN PLACE: ICD-10-CM

## 2024-06-03 DIAGNOSIS — I10 ESSENTIAL HYPERTENSION: ICD-10-CM

## 2024-06-03 DIAGNOSIS — I42.8 NON-ISCHEMIC CARDIOMYOPATHY (HCC): ICD-10-CM

## 2024-06-03 PROCEDURE — 99214 OFFICE O/P EST MOD 30 MIN: CPT | Performed by: INTERNAL MEDICINE

## 2024-06-03 NOTE — PROGRESS NOTES
CARDIOLOGY OFFICE VISIT  [facility]  [address] [place] [address], [place] [id number]  Tel: [phone]      NAME: Sherly Peña  AGE: 78 y.o.  SEX: male  : 1945  MRN: [id number]      Chief Complaint:  Chief Complaint   Patient presents with    Follow-up         History of Present Illness:   Patient comes for follow-up.  States he easily gets tired but denies shortness of breath.  Also denies chest pain, palpitations, lightheadedness, syncope, swelling feet, orthopnea, PND, claudication.    Chronic systolic HF - States currently his wt is at his baseline. No SOB. On BB, Entretso, spironolactone (which he is taking erroneously at 37.5 mg daily instead of 12.5 mg daily)     NICMP s/p CRT-D - He had a [employer] BiV ICD implanted in 2011 and changed in 2014. He has been following up with Dr Sampson for the devise interrogations. On BB, Entresto, spironolactone    Episodes of NSVT picked up recently on device tranmissions. On BB    Essential hypertension -  Has been hypertensive for many years.  Taking medications regularly.  Denies lightheadedness, headache, medication side effects.  Stated BP at home is in 110s-120s    Mixed hyperlipidemia -  Has had hyperlipidemia for many years.  Taking statin regularly along with diet control.  Denies myalgia.  PCP closely monitoring the blood work.    Past Medical History:  Past Medical History:   Diagnosis Date    Agent orange exposure     Anemia     Benign colon polyp     BPH (benign prostatic hyperplasia)     Bradycardia     Cardiomyopathy (HCC)     Chest discomfort     CHF (congestive heart failure) (HCC)     Chronic bronchitis (HCC)     Chronic kidney disease     COPD (chronic obstructive pulmonary disease) (HCC)     LAST ASSESSED: 17    Coronary artery disease cardio myopthia    Emphysema of lung (HCC)     H/O nonmelanoma skin cancer     LAST ASSESSED: 17    HHD (hypertensive  heart disease)     HTN (hypertension)     Hx of lymphoma     Hyperlipidemia     ICD (implantable cardioverter-defibrillator) in place     Insomnia     Mycosis fungoides (HCC)     PVC (premature ventricular contraction)     PVT (paroxysmal ventricular tachycardia) (HCC)     SOB (shortness of breath)          Past Surgical History:  Past Surgical History:   Procedure Laterality Date    CARDIAC PACEMAKER PLACEMENT      SKIN SURGERY      skin cancer removal          Family History:  Family History   Problem Relation Age of Onset    Diabetes Mother     Colon cancer Father         DIAGNOSED IN HIS 80'S    Heart failure Father     Coronary artery disease Father     Cancer Father         Lorenzo    Diabetes Family         MELLITUS    Heart attack Neg Hx     Stroke Neg Hx     Anuerysm Neg Hx     Clotting disorder Neg Hx     Arrhythmia Neg Hx     Hypertension Neg Hx         pt unsure     Hyperlipidemia Neg Hx     Sudden death Neg Hx         scd         Social History:  Social History     Socioeconomic History    Marital status:      Spouse name: None    Number of children: 0    Years of education: None    Highest education level: None   Occupational History    Occupation: retired   Tobacco Use    Smoking status: Every Day     Current packs/day: 1.00     Average packs/day: 1 pack/day for 63.4 years (63.4 ttl pk-yrs)     Types: Cigarettes     Start date: 1961     Passive exposure: Current    Smokeless tobacco: Never    Tobacco comments:     HALF A PACK PER DAY    Vaping Use    Vaping status: Never Used   Substance and Sexual Activity    Alcohol use: Yes     Alcohol/week: 14.0 standard drinks of alcohol     Types: 14 Shots of liquor per week     Comment: two drinks every night over a 4 hour period    Drug use: Never    Sexual activity: Not Currently     Partners: Female     Birth control/protection: Male Sterilization   Other Topics Concern    None   Social History Narrative    None     Social Determinants of Health      Financial Resource Strain: Low Risk  (1/23/2023)    Overall Financial Resource Strain (CARDIA)     Difficulty of Paying Living Expenses: Not very hard   Food Insecurity: No Food Insecurity (5/14/2024)    Hunger Vital Sign     Worried About Running Out of Food in the Last Year: Never true     Ran Out of Food in the Last Year: Never true   Transportation Needs: No Transportation Needs (5/14/2024)    PRAPARE - Transportation     Lack of Transportation (Medical): No     Lack of Transportation (Non-Medical): No   Physical Activity: Inactive (5/14/2024)    Exercise Vital Sign     Days of Exercise per Week: 0 days     Minutes of Exercise per Session: 0 min   Stress: No Stress Concern Present (9/4/2020)    [employer] - Occupational Stress Questionnaire     Feeling of Stress : Not at all   Social Connections: Not on file   Intimate Partner Violence: Not on file   Housing Stability: Unknown (5/14/2024)    Housing Stability Vital Sign     Unable to Pay for Housing in the Last Year: No     Number of Times Moved in the Last Year: Not on file     Homeless in the Last Year: Not on file         Active Problems:  Patient Active Problem List   Diagnosis    Insomnia    Cardiac resynchronization therapy defibrillator (CRT-D) in place    Hyperlipidemia    Hx of lymphoma    HTN (hypertension)    COPD (chronic obstructive pulmonary disease) (HCC)    Chronic bronchitis (HCC)    Non-ischemic cardiomyopathy (HCC)    BPH (benign prostatic hyperplasia)    Seborrheic keratosis    Mycosis fungoides (HCC)    History of skin cancer    Acquired hypothyroidism    HHD (hypertensive heart disease)    Squamous cell cancer of skin of forearm, left    PVC (premature ventricular contraction)    Chronic systolic congestive heart failure (HCC)    Hypomagnesemia    Chronic kidney disease-mineral and bone disorder    Stage 3 chronic kidney disease (HCC)    Shortness of breath    Lumbar pain    Tinnitus of both ears     Cigarette nicotine dependence without complication    NSVT (nonsustained ventricular tachycardia) (HCC)         The following portions of the patient's history were reviewed and updated as appropriate: past medical history, past surgical history, past family history,  past social history, current medications, allergies and problem list.      Review of Systems:  Constitutional: Denies fever, chills. +tiredness  Eyes: Denies eye redness, eye discharge  ENT: Denies hearing loss, tinnitus, sneezing, nasal discharge, sore throat   Respiratory: Denies cough, expectoration, hemoptysis, shortness of breath  Cardiovascular: Denies chest pain, palpitations, orthopnea, PND, lower extremity swelling  Gastrointestinal: Denies abdominal pain, nausea, vomiting, hematemesis, diarrhea, bloody stools  Genito-Urinary: Denies dysuria, incontinence  Musculoskeletal: Denies muscle pain  Neurologic: Denies lightheadedness, syncope, headache, seizures  Endocrine: Denies polydipsia, temperature intolerance  Allergy and Immunology: Denies hives, insect bite sensitivity  Hematological and Lymphatic: Denies bleeding problems, swollen glands   Psychological: Denies suicidal ideation, panic  Dermatological: Denies pruritus, rash, skin lesion changes          Vitals:  Vitals:    06/03/24 1538   BP: 90/68   Pulse: 95   Resp: 16   SpO2: 97%       Body mass index is 31.38 kg/m².    Weight (last 2 days)       Date/Time Weight    06/03/24 1538 102 (225)            Physical Examination  General: Awake, alert. Responding to commands  Head: Normocephalic. Atraumatic  Eyes: Nonicteric  Neck: Supple. JVP not raised. Trachea central. No thyromegaly  Lungs: Bilateral bronchovascular breath sounds with no crackles or rhonchi  Chest wall: No tenderness  Cardiovascular: RRR. S1 and S2 normal. No murmur, rub or gallop  Gastrointestinal: Abdomen soft, nontender. No guarding or rigidity. Liver and spleen not palpable. Bowel sounds present  Neurologic: Patient is  awake, alert. Responding to command. Moving all extremities  Integumentary:  No skin rash  Lymphatic: No cervical lymphadenopathy  Back: Symmetric. No CVA tenderness  Extremities: No clubbing, cyanosis or edema        Laboratory Results:  CBC with diff:   Lab Results   Component Value Date    WBC 5.61 12/28/2023    WBC 5.7 06/01/2015    RBC 4.18 12/28/2023    RBC 4.70 06/01/2015    HGB 12.7 12/28/2023    HGB 13.9 06/01/2015    HCT 40.6 12/28/2023    HCT 41.6 06/01/2015    MCV 97 12/28/2023    MCV 88 06/01/2015    MCH 30.4 12/28/2023    MCH 29.5 06/01/2015    RDW 13.2 12/28/2023    RDW 12.6 06/01/2015     12/28/2023     06/01/2015       CMP:  Lab Results   Component Value Date    CREATININE 1.22 12/28/2023    CREATININE 1.02 12/11/2015    BUN 21 12/28/2023    BUN 27 (H) 12/11/2015     12/11/2015    K 3.9 12/28/2023    K 4.1 12/11/2015     12/28/2023     12/11/2015    CO2 25 12/28/2023    CO2 28 12/11/2015    GLUCOSE 93 12/11/2015    PROT 7.3 06/01/2015    ALKPHOS 76 10/27/2022    ALKPHOS 71 06/01/2015    ALT 18 10/27/2022    ALT 27 06/01/2015    AST 11 10/27/2022    AST 19 06/01/2015    BILIDIR 0.11 09/14/2017       Lab Results   Component Value Date    HGBA1C 6.2 09/01/2017    MG 1.8 11/24/2020    PHOS 3.0 12/28/2023       Lab Results   Component Value Date    TROPONINI <0.02 11/21/2020    TROPONINI 0.07 (H) 09/01/2017    TROPONINI 0.08 (H) 09/01/2017    CKTOTAL 85 01/28/2016       Medications:    Current Outpatient Medications:     albuterol (2.5 mg/3 mL) 0.083 % nebulizer solution, USE 1 VIAL VIA NEBULIZER 4 TIMES A DAY AS NEEDED, Disp: 360 mL, Rfl: 2    ammonium lactate (LAC-HYDRIN) 12 % lotion, Apply topically daily, Disp: 225 g, Rfl: 2    aspirin 81 MG tablet, Take 81 mg by mouth daily, Disp: , Rfl:     atorvastatin (LIPITOR) 40 mg tablet, TAKE 1 TABLET BY MOUTH  DAILY, Disp: 90 tablet, Rfl: 3    bexarotene (TARGRETIN) 75 mg capsule, TAKE 8 CAPSULES BY MOUTH DAILY, Disp: 240  capsule, Rfl: 5    Cholecalciferol 25 MCG (1000 UT) capsule, Take 1 capsule by mouth daily, Disp: , Rfl:     cyanocobalamin (VITAMIN B-12) 1,000 mcg tablet, Take 1,000 mcg by mouth daily, Disp: , Rfl:     diclofenac potassium (CATAFLAM) 50 mg tablet, 1 tablet daily for severe pain.  Not to exceed 2 tablets a week., Disp: 30 tablet, Rfl: 0    Entresto 49-51 MG TABS, TAKE 1 TABLET BY MOUTH  TWICE DAILY, Disp: 180 tablet, Rfl: 3    ferrous sulfate 324 (65 Fe) mg, Take 1 tablet by mouth Twice daily, Disp: , Rfl:     gabapentin (NEURONTIN) 100 mg capsule, Take 1 capsule (100 mg total) by mouth 3 (three) times a day, Disp: 270 capsule, Rfl: 3    levothyroxine 100 mcg tablet, Take 1 tablet (100 mcg total) by mouth daily, Disp: 90 tablet, Rfl: 1    metoprolol succinate (TOPROL-XL) 50 mg 24 hr tablet, , Disp: , Rfl:     montelukast (SINGULAIR) 10 mg tablet, TAKE 1 TABLET BY MOUTH  DAILY AT BEDTIME, Disp: 90 tablet, Rfl: 3    Multiple Vitamins-Minerals (OCUVITE ADULT 50+ PO), Take by mouth, Disp: , Rfl:     spironolactone (ALDACTONE) 25 mg tablet, Take 0.5 tablets (12.5 mg total) by mouth daily, Disp: 45 tablet, Rfl: 3    Trelegy Ellipta 100-62.5-25 MCG/ACT inhaler, USE 1 INHALATION BY MOUTH ONCE  DAILY AT THE SAME TIME EACH DAY  RINSE MOUTH AFTER USE, Disp: 180 each, Rfl: 3    triamcinolone (KENALOG) 0.1 % ointment, Apply topically 2 (two) times 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, Disp: 454 g, Rfl: 3    Vitamins-Lipotropics (LIPOFLAVONOID PO), Take by mouth daily, Disp: , Rfl:     zolpidem (AMBIEN) 10 mg tablet, Take 1 tablet (10 mg total) by mouth daily at bedtime as needed for sleep, Disp: 30 tablet, Rfl: 3      Allergies:  No Known Allergies      Assessment and Plan:  1. Chronic systolic congestive heart failure (HCC)  Euvolemic.  Continue Entresto 49-51 mg BD, metoprolol succinate 50 mg daily.  Decrease spironolactone to 12.5 mg daily low-salt diet.  Daily weights    2. Non-ischemic cardiomyopathy (HCC)  Continue Entresto,  metoprolol succinate. Decrease Aldactone to 12.5 mg daily    3. Cardiac resynchronization therapy defibrillator (CRT-D) in place  No recent ICD shocks.  Continue regular ICD interrogation    4. NSVT (nonsustained ventricular tachycardia)  Continue beta-blocker    5. Essential hypertension  BP stable at home.  Continue to monitor and call if abnormal    6. Mixed hyperlipidemia  Continue statin and diet control.  His PCP closely monitors his blood work    Recommend aggressive risk factor modification and therapeutic lifestyle changes.  Low-salt, low-calorie, low-fat, low-cholesterol diet with regular exercise and to optimize weight.  I will defer the ordering and monitoring of necessity lab studies to you, but I am available and happy to review and manage any of the data at your request in the future.    Discussed concepts of atherosclerosis, including signs and symptoms of cardiac disease.    Previous studies were reviewed.    Safety measures were reviewed.  Questions were entertained and answered.  Patient was advised to report any problems requiring medical attention.    Follow-up with PCP and appropriate specialist and lab work as discussed.    Return for follow up visit as scheduled or earlier, if needed.  Thank you for allowing me to participate in the care and evaluation of your patient.  Should you have any questions, please feel free to contact me.      Asher Lozada MD  6/3/2024,4:06 PM

## 2024-06-05 ENCOUNTER — OFFICE VISIT (OUTPATIENT)
Age: 79
End: 2024-06-05
Payer: COMMERCIAL

## 2024-06-05 VITALS
SYSTOLIC BLOOD PRESSURE: 110 MMHG | DIASTOLIC BLOOD PRESSURE: 79 MMHG | RESPIRATION RATE: 18 BRPM | OXYGEN SATURATION: 95 % | HEART RATE: 92 BPM | HEIGHT: 71 IN | WEIGHT: 224 LBS | BODY MASS INDEX: 31.36 KG/M2

## 2024-06-05 DIAGNOSIS — R06.02 SHORTNESS OF BREATH: Primary | ICD-10-CM

## 2024-06-05 DIAGNOSIS — J44.9 CHRONIC OBSTRUCTIVE PULMONARY DISEASE, UNSPECIFIED COPD TYPE (HCC): ICD-10-CM

## 2024-06-05 DIAGNOSIS — F17.210 CIGARETTE NICOTINE DEPENDENCE WITHOUT COMPLICATION: ICD-10-CM

## 2024-06-05 DIAGNOSIS — E66.9 OBESITY (BMI 30-39.9): ICD-10-CM

## 2024-06-05 DIAGNOSIS — J41.0 SIMPLE CHRONIC BRONCHITIS (HCC): ICD-10-CM

## 2024-06-05 PROCEDURE — G2211 COMPLEX E/M VISIT ADD ON: HCPCS | Performed by: INTERNAL MEDICINE

## 2024-06-05 PROCEDURE — 99214 OFFICE O/P EST MOD 30 MIN: CPT | Performed by: INTERNAL MEDICINE

## 2024-06-05 NOTE — PROGRESS NOTES
"Assessment/Plan:   Diagnoses and all orders for this visit:    Shortness of breath    Simple chronic bronchitis (HCC)    Chronic obstructive pulmonary disease, unspecified COPD type (HCC)    Cigarette nicotine dependence without complication    Obesity (BMI 30-39.9)      Shortness of breath and dyspnea on exertion likely multifactorial secondary to his severe COPD also regarding his cardiomyopathy   PFTs with combined severe obstruction and moderately severe restriction with a moderately decreased DLCO.    Continue with Trelegy 1 puff daily   Albuterol via nebulizer 4 times daily as needed   Continue with Singulair 10 mg daily.    CT of the chest lung cancer screening from October 2023 , moderate emphysematous changes lung nodules are stable  Repeat lung cancer screening CT scan in October 2024  Smoking cessation discussed he states he is not interested in quitting at this time he states his wife passed away and he is living alone with 3 cats and he just wants to continue smoking for the rest of his life he states.  He states he enjoys smoking and does not want to quit  Vaccinations up-to-date  Follow-up in 6 months or earlier as needed  Return in about 6 months (around 12/5/2024).  All questions are answered to the patient's satisfaction and understanding.  He verbalizes understanding.  He is encouraged to call with any further questions or concerns.    Portions of the record may have been created with voice recognition software.  Occasional wrong word or \"sound a like\" substitutions may have occurred due to the inherent limitations of voice recognition software.  Read the chart carefully and recognize, using context, where substitutions have occurred.    Electronically Signed by Karishma Valle MD    ______________________________________________________________________    Chief Complaint:   Chief Complaint   Patient presents with    Follow-up       Patient ID: Sherly is a 78 y.o. y.o. male has a past medical history " of Agent orange exposure, Anemia, Benign colon polyp, BPH (benign prostatic hyperplasia), Bradycardia, Cardiomyopathy (HCC), Chest discomfort, CHF (congestive heart failure) (HCC), Chronic bronchitis (HCC), Chronic kidney disease, COPD (chronic obstructive pulmonary disease) (HCC), Coronary artery disease (cardio myopthia), Emphysema of lung (HCC) (2013), H/O nonmelanoma skin cancer, HHD (hypertensive heart disease), HTN (hypertension), lymphoma, Hyperlipidemia, ICD (implantable cardioverter-defibrillator) in place, Insomnia, Mycosis fungoides (HCC), PVC (premature ventricular contraction), PVT (paroxysmal ventricular tachycardia) (HCC), and SOB (shortness of breath).    6/5/2024  Patient presents today for follow-up visit.  Sherly is a 78-year-old male current smoker with past medical history including but not limited to COPD, cardiomyopathy, hypertension, nodule, lymphoma, squamous cell skin cancer presenting for follow-up.  Patient states that his breathing remains stable.  He has chronic dyspnea on exertion which is worsened on days that are very hot and humid.  Today, he is generally feeling well.  He is not really interested in quitting smoking, still smoking half pack per day.  Currently he is on the Trelegy 1 puff once a day and he uses his nebulizer with albuterol he states once a day, occasionally twice a day.  Here for a follow-up     primary symptoms  Associated symptoms include coughing. Pertinent negatives include no chest pain, fever, headaches, myalgias or sore throat.       Review of Systems   Constitutional:  Positive for appetite change. Negative for fever.   HENT:  Positive for postnasal drip and rhinorrhea. Negative for ear pain, sneezing, sore throat and trouble swallowing.    Eyes: Negative.    Respiratory:  Positive for cough and shortness of breath.    Cardiovascular: Negative.  Negative for chest pain.   Gastrointestinal: Negative.    Endocrine: Negative.    Genitourinary: Negative.     Musculoskeletal: Negative.  Negative for myalgias.   Allergic/Immunologic: Negative.    Neurological: Negative.  Negative for headaches.   Psychiatric/Behavioral: Negative.         Smoking history: He reports that he has been smoking cigarettes. He started smoking about 63 years ago. He has a 63.4 pack-year smoking history. He has been exposed to tobacco smoke. He has never used smokeless tobacco.    The following portions of the patient's history were reviewed and updated as appropriate: allergies, current medications, past family history, past medical history, past social history, past surgical history, and problem list.    Immunization History   Administered Date(s) Administered    COVID-19 PFIZER VACCINE 0.3 ML IM 03/16/2021, 04/07/2021, 08/20/2021    COVID-19 Pfizer Vac BIVALENT Maikel-sucrose 12 Yr+ IM 01/16/2023    COVID-19 Pfizer vac (Maikel-sucrose, gray cap) 12 yr+ IM 04/15/2022, 04/15/2022    INFLUENZA 10/14/2015, 10/17/2016, 09/24/2018, 09/08/2020, 10/25/2021, 10/20/2022, 11/04/2023    Influenza Split High Dose Preservative Free IM 10/17/2016, 09/09/2017, 09/24/2018, 09/13/2019    Influenza, high dose seasonal 0.7 mL 10/25/2021, 10/20/2022    Influenza, seasonal, injectable 10/14/2015    Pneumococcal Conjugate 13-Valent 07/27/2020    Pneumococcal Conjugate PCV 7 10/14/2015    Respiratory Syncytial Virus Vaccine (Recombinant, Adjuvanted) 11/04/2023    Tdap 1945    Zoster Vaccine Recombinant 03/31/2023     Current Outpatient Medications   Medication Sig Dispense Refill    albuterol (2.5 mg/3 mL) 0.083 % nebulizer solution USE 1 VIAL VIA NEBULIZER 4 TIMES A DAY AS NEEDED 360 mL 2    ammonium lactate (LAC-HYDRIN) 12 % lotion Apply topically daily 225 g 2    aspirin 81 MG tablet Take 81 mg by mouth daily      atorvastatin (LIPITOR) 40 mg tablet TAKE 1 TABLET BY MOUTH  DAILY 90 tablet 3    bexarotene (TARGRETIN) 75 mg capsule TAKE 8 CAPSULES BY MOUTH DAILY 240 capsule 5    Cholecalciferol 25 MCG (1000 UT)  "capsule Take 1 capsule by mouth daily      cyanocobalamin (VITAMIN B-12) 1,000 mcg tablet Take 1,000 mcg by mouth daily      diclofenac potassium (CATAFLAM) 50 mg tablet 1 tablet daily for severe pain.  Not to exceed 2 tablets a week. 30 tablet 0    Entresto 49-51 MG TABS TAKE 1 TABLET BY MOUTH  TWICE DAILY 180 tablet 3    ferrous sulfate 324 (65 Fe) mg Take 1 tablet by mouth Twice daily      gabapentin (NEURONTIN) 100 mg capsule Take 1 capsule (100 mg total) by mouth 3 (three) times a day 270 capsule 3    levothyroxine 100 mcg tablet Take 1 tablet (100 mcg total) by mouth daily 90 tablet 1    metoprolol succinate (TOPROL-XL) 50 mg 24 hr tablet       montelukast (SINGULAIR) 10 mg tablet TAKE 1 TABLET BY MOUTH  DAILY AT BEDTIME 90 tablet 3    Multiple Vitamins-Minerals (OCUVITE ADULT 50+ PO) Take by mouth      spironolactone (ALDACTONE) 25 mg tablet Take 0.5 tablets (12.5 mg total) by mouth daily 45 tablet 3    Trelegy Ellipta 100-62.5-25 MCG/ACT inhaler USE 1 INHALATION BY MOUTH ONCE  DAILY AT THE SAME TIME EACH DAY  RINSE MOUTH AFTER  each 3    triamcinolone (KENALOG) 0.1 % ointment Apply topically 2 (two) times 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 454 g 3    Vitamins-Lipotropics (LIPOFLAVONOID PO) Take by mouth daily      zolpidem (AMBIEN) 10 mg tablet Take 1 tablet (10 mg total) by mouth daily at bedtime as needed for sleep 30 tablet 3     No current facility-administered medications for this visit.     Allergies: Patient has no known allergies.    Objective:  Vitals:    06/05/24 1342 06/05/24 1343   BP: 110/79    BP Location: Left arm    Patient Position: Sitting    Cuff Size: Large    Pulse: 92    Resp: 18    SpO2: 95% 95%   Weight: 102 kg (224 lb)    Height: 5' 11\" (1.803 m)    Oxygen Therapy  SpO2: 95 %  Oxygen Therapy: None (Room air)  .  Wt Readings from Last 3 Encounters:   06/05/24 102 kg (224 lb)   06/03/24 102 kg (225 lb)   05/14/24 101 kg (222 lb 9.6 oz)     Body mass index is 31.24 kg/m².    Physical " Exam  Vitals and nursing note reviewed.   Constitutional:       Appearance: He is well-developed.   HENT:      Head: Normocephalic and atraumatic.   Eyes:      Conjunctiva/sclera: Conjunctivae normal.      Pupils: Pupils are equal, round, and reactive to light.   Neck:      Thyroid: No thyromegaly.      Vascular: No JVD.   Cardiovascular:      Rate and Rhythm: Normal rate and regular rhythm.      Heart sounds: Normal heart sounds. No murmur heard.     No friction rub. No gallop.   Pulmonary:      Effort: Pulmonary effort is normal. No respiratory distress.      Breath sounds: Normal breath sounds. No wheezing or rales.   Chest:      Chest wall: No tenderness.   Musculoskeletal:         General: No tenderness or deformity. Normal range of motion.      Cervical back: Normal range of motion and neck supple.   Lymphadenopathy:      Cervical: No cervical adenopathy.   Skin:     General: Skin is warm and dry.   Neurological:      Mental Status: He is alert and oriented to person, place, and time.         Lab Review:   Appointment on 05/14/2024   Component Date Value    Cholesterol 05/14/2024 256 (H)     Triglycerides 05/14/2024 644 (H)     HDL, Direct 05/14/2024 39 (L)     LDL Calculated 05/14/2024      Non-HDL-Chol (CHOL-HDL) 05/14/2024 217     TSH 3RD GENERATON 05/14/2024 0.596    Office Visit on 03/18/2024   Component Date Value    T3, Total 05/14/2024 1.0     Free T4 05/14/2024 0.49 (L)    Appointment on 12/28/2023   Component Date Value    Sodium 12/28/2023 140     Potassium 12/28/2023 3.9     Chloride 12/28/2023 105     CO2 12/28/2023 25     ANION GAP 12/28/2023 10     BUN 12/28/2023 21     Creatinine 12/28/2023 1.22     Glucose, Fasting 12/28/2023 96     Calcium 12/28/2023 8.3 (L)     eGFR 12/28/2023 57     WBC 12/28/2023 5.61     RBC 12/28/2023 4.18     Hemoglobin 12/28/2023 12.7     Hematocrit 12/28/2023 40.6     MCV 12/28/2023 97     MCH 12/28/2023 30.4     MCHC 12/28/2023 31.3 (L)     RDW 12/28/2023 13.2      MPV 12/28/2023 11.1     Platelets 12/28/2023 266     nRBC 12/28/2023 0     Segmented % 12/28/2023 50     Immature Grans % 12/28/2023 0     Lymphocytes % 12/28/2023 38     Monocytes % 12/28/2023 8     Eosinophils Relative 12/28/2023 3     Basophils Relative 12/28/2023 1     Absolute Neutrophils 12/28/2023 2.79     Absolute Immature Grans 12/28/2023 0.01     Absolute Lymphocytes 12/28/2023 2.13     Absolute Monocytes 12/28/2023 0.44     Eosinophils Absolute 12/28/2023 0.16     Basophils Absolute 12/28/2023 0.08     Creatinine, Ur 12/28/2023 185.7     Protein Urine Random 12/28/2023 39     Prot/Creat Ratio, Ur 12/28/2023 0.21 (H)     Phosphorus 12/28/2023 3.0     PTH 12/28/2023 124.5 (H)     Color, UA 12/28/2023 Yellow     Clarity, UA 12/28/2023 Clear     Specific Gravity, UA 12/28/2023 1.023     pH, UA 12/28/2023 6.0     Leukocytes, UA 12/28/2023 Negative     Nitrite, UA 12/28/2023 Negative     Protein, UA 12/28/2023 30 (1+) (A)     Glucose, UA 12/28/2023 Negative     Ketones, UA 12/28/2023 Negative     Urobilinogen, UA 12/28/2023 <2.0     Bilirubin, UA 12/28/2023 Negative     Occult Blood, UA 12/28/2023 Negative     Vit D, 25-Hydroxy 12/28/2023 28.8 (L)     Cholesterol 12/28/2023 213 (H)     Triglycerides 12/28/2023 285 (H)     HDL, Direct 12/28/2023 46     LDL Calculated 12/28/2023 110 (H)     Non-HDL-Chol (CHOL-HDL) 12/28/2023 167     TSH 3RD GENERATON 12/28/2023 0.172 (L)     RBC, UA 12/28/2023 2-4 (A)     WBC, UA 12/28/2023 2-4 (A)     Epithelial Cells 12/28/2023 Occasional     Bacteria, UA 12/28/2023 Occasional     MUCUS THREADS 12/28/2023 Moderate (A)     Amorphous Crystals, UA 12/28/2023 Occasional    Hospital Outpatient Visit on 12/27/2023   Component Date Value    A4C EF 12/27/2023 30     LV Diastolic Volume (BP) 12/27/2023 166     LV Systolic Volume (BP) 12/27/2023 99     EF 12/27/2023 40     LVIDd 12/27/2023 6.10     LVIDS 12/27/2023 5.30     IVSd 12/27/2023 1.00     LVPWd 12/27/2023 1.00     FS 12/27/2023  13     MV E' Tissue Velocity Se* 12/27/2023 6     LA Volume Index (BP) 12/27/2023 32.9     E/A ratio 12/27/2023 1.30     E wave deceleration time 12/27/2023 238     MV Peak E Daniel 12/27/2023 91     MV Peak A Daniel 12/27/2023 0.7     RVID d 12/27/2023 3.3     Tricuspid annular plane * 12/27/2023 2.20     LA size 12/27/2023 3.9     LA length (A2C) 12/27/2023 5.20     LA volume (BP) 12/27/2023 74     RAA A4C 12/27/2023 13.8     MV stenosis pressure 1/2* 12/27/2023 69     MV valve area p 1/2 meth* 12/27/2023 3.19     TR Peak Daniel 12/27/2023 2.4     Triscuspid Valve Regurgi* 12/27/2023 22.0     Ao root 12/27/2023 3.40     Asc Ao 12/27/2023 3.1     Tricuspid valve peak reg* 12/27/2023 2.36     Left ventricular stroke * 12/27/2023 55.00     IVS 12/27/2023 1     LEFT VENTRICLE SYSTOLIC * 12/27/2023 135     LV DIASTOLIC VOLUME (MOD* 12/27/2023 190     Left Atrium Area-systoli* 12/27/2023 22.5     Left Atrium Area-systoli* 12/27/2023 21.5     LVSV, 2D 12/27/2023 55     LV EF 12/27/2023 30        Diagnostics:  I have personally reviewed pertinent films in PACS  CT of chest performed on 10/20/2023 CT lung screening revealed no suspicious lung nodules  Cardiac EP device report    Result Date: 5/16/2024  Narrative: MDT BIV-ICD/NOT MRI CONDITIONAL CARELINK TRANSMISSION: BATTERY VOLTAGE NEARING LIZZY.  WILL SCHEDULE MONTHLY BATTERY CHECKS.(4 MONS) AP 98%  96%. ALL AVAILABLE LEAD PARAMETERS WITHIN NORMAL LIMITS. 2 NSVT EPISODES DETECTED 9 BEATS @ 320ms; 15 BEATS @ 360ms. PATIENT IS ON METOPROLOL. OPTI-VOL FLUID THRESHOLD WITHIN NORMAL LIMITS. NORMAL DEVICE FUNCTION.---DINERO   Answers submitted by the patient for this visit:  Pulmonology Questionnaire (Submitted on 5/31/2024)  Chief Complaint: Primary symptoms  Chronicity: recurrent  How often do your symptoms occur?: every several days  Since you first noticed this problem, how has it changed?: unchanged  Do you have shortness of breath that occurs with effort or exertion?: Yes  Do you  have ear congestion?: No  Do you have heartburn?: No  Do you have fatigue?: Yes  Do you have nasal congestion?: Yes  Do you have shortness of breath when lying flat?: No  Do you have shortness of breath when you wake up?: Yes  Do you have sweats?: No  Have you experienced weight loss?: Yes  Which of the following makes your symptoms worse?: climbing stairs, exercise, pollen  Which of the following makes your symptoms better?: oral steroids  Risk factors for lung disease: smoking/tobacco exposure

## 2024-06-10 DIAGNOSIS — G47.00 INSOMNIA, UNSPECIFIED TYPE: ICD-10-CM

## 2024-06-10 DIAGNOSIS — C84.00 MYCOSIS FUNGOIDES, UNSPECIFIED BODY REGION (HCC): ICD-10-CM

## 2024-06-10 RX ORDER — AMMONIUM LACTATE 12 G/100G
LOTION TOPICAL
Qty: 225 ML | Refills: 2 | Status: SHIPPED | OUTPATIENT
Start: 2024-06-10

## 2024-06-10 RX ORDER — ZOLPIDEM TARTRATE 10 MG/1
10 TABLET ORAL
Qty: 30 TABLET | Refills: 1 | Status: SHIPPED | OUTPATIENT
Start: 2024-06-10

## 2024-06-11 DIAGNOSIS — Z00.6 ENCOUNTER FOR EXAMINATION FOR NORMAL COMPARISON OR CONTROL IN CLINICAL RESEARCH PROGRAM: ICD-10-CM

## 2024-06-16 ENCOUNTER — OFFICE VISIT (OUTPATIENT)
Age: 79
End: 2024-06-16
Payer: COMMERCIAL

## 2024-06-16 VITALS
HEART RATE: 80 BPM | BODY MASS INDEX: 31.1 KG/M2 | TEMPERATURE: 97.7 F | SYSTOLIC BLOOD PRESSURE: 100 MMHG | OXYGEN SATURATION: 99 % | RESPIRATION RATE: 18 BRPM | DIASTOLIC BLOOD PRESSURE: 60 MMHG | WEIGHT: 223 LBS

## 2024-06-16 DIAGNOSIS — N30.91 CYSTITIS WITH HEMATURIA: Primary | ICD-10-CM

## 2024-06-16 LAB
SL AMB  POCT GLUCOSE, UA: NEGATIVE
SL AMB LEUKOCYTE ESTERASE,UA: ABNORMAL
SL AMB POCT BILIRUBIN,UA: NEGATIVE
SL AMB POCT BLOOD,UA: ABNORMAL
SL AMB POCT CLARITY,UA: ABNORMAL
SL AMB POCT COLOR,UA: ABNORMAL
SL AMB POCT KETONES,UA: NEGATIVE
SL AMB POCT NITRITE,UA: NEGATIVE
SL AMB POCT PH,UA: 5
SL AMB POCT SPECIFIC GRAVITY,UA: 1.02
SL AMB POCT URINE PROTEIN: 300
SL AMB POCT UROBILINOGEN: 0.2

## 2024-06-16 PROCEDURE — 87086 URINE CULTURE/COLONY COUNT: CPT | Performed by: PHYSICIAN ASSISTANT

## 2024-06-16 PROCEDURE — 99213 OFFICE O/P EST LOW 20 MIN: CPT | Performed by: PHYSICIAN ASSISTANT

## 2024-06-16 PROCEDURE — S9083 URGENT CARE CENTER GLOBAL: HCPCS | Performed by: PHYSICIAN ASSISTANT

## 2024-06-16 PROCEDURE — 87147 CULTURE TYPE IMMUNOLOGIC: CPT | Performed by: PHYSICIAN ASSISTANT

## 2024-06-16 PROCEDURE — 87077 CULTURE AEROBIC IDENTIFY: CPT | Performed by: PHYSICIAN ASSISTANT

## 2024-06-16 PROCEDURE — 87186 SC STD MICRODIL/AGAR DIL: CPT | Performed by: PHYSICIAN ASSISTANT

## 2024-06-16 PROCEDURE — 81002 URINALYSIS NONAUTO W/O SCOPE: CPT | Performed by: PHYSICIAN ASSISTANT

## 2024-06-16 RX ORDER — SULFAMETHOXAZOLE AND TRIMETHOPRIM 800; 160 MG/1; MG/1
1 TABLET ORAL EVERY 12 HOURS SCHEDULED
Qty: 6 TABLET | Refills: 0 | Status: SHIPPED | OUTPATIENT
Start: 2024-06-16 | End: 2024-06-16

## 2024-06-16 RX ORDER — SULFAMETHOXAZOLE AND TRIMETHOPRIM 800; 160 MG/1; MG/1
1 TABLET ORAL EVERY 12 HOURS SCHEDULED
Qty: 14 TABLET | Refills: 0 | Status: SHIPPED | OUTPATIENT
Start: 2024-06-16 | End: 2024-06-23

## 2024-06-16 NOTE — PROGRESS NOTES
[facility]        NAME: Sherly Peña is a 78 y.o. male  : 1945    MRN: [id number]  DATE: 2024  TIME: 12:42 PM    Assessment and Plan   Cystitis with hematuria [N30.91]  1. Cystitis with hematuria  POCT urine dip    Urine culture    Urine culture    sulfamethoxazole-trimethoprim (BACTRIM DS) 800-160 mg per tablet    DISCONTINUED: sulfamethoxazole-trimethoprim (BACTRIM DS) 800-160 mg per tablet          Urinalysis and history consistent with acute cystitis with hematuria.  Will treat him at that time with Bactrim due to his GFR being under 60, will await culture and sensitivities.  Recommend he follow-up with PCP or urologist regarding prostate he does have history of BPH for follow-up and monitoring of this as this is a risk factor for UTI    The patient verbalized understanding of exam findings and treatment plan.   We engaged in the shared decision-making process and treatment options were   discussed at length with the patient.  All questions, concerns and  complaints were answered and addressed to the patient's satisfaction.    Patient Instructions   There are no Patient Instructions on file for this visit.    Follow up with PCP in 3-5 days.  Proceed to  ER if symptoms worsen.    If tests are performed, our office will contact you with results only if   changes need to made to the care plan discussed with you at the visit.   [facility]     Chief Complaint     Chief Complaint   Patient presents with   • Possible UTI     Pt states he has burning with urination, hematuria, urinary frequency for 2 days          History of Present Illness       HPI  Patient presents complaining of dysuria hematuria increased frequency urgency.  Reports he had a similar episode of this 1 year ago was treated successfully with antibiotics.  Denies any fevers or chills.  No flank pain no nausea or vomiting.    Review of Systems   Review of Systems  All other  related systems reviewed and are negative except as noted in HPI    Current Medications       Current Outpatient Medications:   •  albuterol (2.5 mg/3 mL) 0.083 % nebulizer solution, USE 1 VIAL VIA NEBULIZER 4 TIMES A DAY AS NEEDED, Disp: 360 mL, Rfl: 2  •  ammonium lactate (LAC-HYDRIN) 12 % lotion, APPLY TO AFFECTED AREA TOPICALLY EVERY DAY, Disp: 225 mL, Rfl: 2  •  aspirin 81 MG tablet, Take 81 mg by mouth daily, Disp: , Rfl:   •  atorvastatin (LIPITOR) 40 mg tablet, TAKE 1 TABLET BY MOUTH  DAILY, Disp: 90 tablet, Rfl: 3  •  bexarotene (TARGRETIN) 75 mg capsule, TAKE 8 CAPSULES BY MOUTH DAILY, Disp: 240 capsule, Rfl: 5  •  Cholecalciferol 25 MCG (1000 UT) capsule, Take 1 capsule by mouth daily, Disp: , Rfl:   •  cyanocobalamin (VITAMIN B-12) 1,000 mcg tablet, Take 1,000 mcg by mouth daily, Disp: , Rfl:   •  diclofenac potassium (CATAFLAM) 50 mg tablet, 1 tablet daily for severe pain.  Not to exceed 2 tablets a week., Disp: 30 tablet, Rfl: 0  •  Entresto 49-51 MG TABS, TAKE 1 TABLET BY MOUTH  TWICE DAILY, Disp: 180 tablet, Rfl: 3  •  ferrous sulfate 324 (65 Fe) mg, Take 1 tablet by mouth Twice daily, Disp: , Rfl:   •  gabapentin (NEURONTIN) 100 mg capsule, Take 1 capsule (100 mg total) by mouth 3 (three) times a day, Disp: 270 capsule, Rfl: 3  •  levothyroxine 100 mcg tablet, Take 1 tablet (100 mcg total) by mouth daily, Disp: 90 tablet, Rfl: 1  •  metoprolol succinate (TOPROL-XL) 50 mg 24 hr tablet, , Disp: , Rfl:   •  montelukast (SINGULAIR) 10 mg tablet, TAKE 1 TABLET BY MOUTH  DAILY AT BEDTIME, Disp: 90 tablet, Rfl: 3  •  Multiple Vitamins-Minerals (OCUVITE ADULT 50+ PO), Take by mouth, Disp: , Rfl:   •  spironolactone (ALDACTONE) 25 mg tablet, Take 0.5 tablets (12.5 mg total) by mouth daily, Disp: 45 tablet, Rfl: 3  •  sulfamethoxazole-trimethoprim (BACTRIM DS) 800-160 mg per tablet, Take 1 tablet by mouth every 12 (twelve) hours for 7 days, Disp: 14 tablet, Rfl: 0  •  Trelegy Ellipta 100-62.5-25 MCG/ACT  inhaler, USE 1 INHALATION BY MOUTH ONCE  DAILY AT THE SAME TIME EACH DAY  RINSE MOUTH AFTER USE, Disp: 180 each, Rfl: 3  •  triamcinolone (KENALOG) 0.1 % ointment, Apply topically 2 (two) times 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, Disp: 454 g, Rfl: 3  •  Vitamins-Lipotropics (LIPOFLAVONOID PO), Take by mouth daily, Disp: , Rfl:   •  zolpidem (AMBIEN) 10 mg tablet, TAKE 1 TABLET BY MOUTH DAILY AT BEDTIME AS NEEDED FOR SLEEP, Disp: 30 tablet, Rfl: 1    Current Allergies     Allergies as of 06/16/2024   • (No Known Allergies)            The following portions of the patient's history were reviewed and updated as appropriate: allergies, current medications, past family history, past medical history, past social history, past surgical history and problem list.     Past Medical History:   Diagnosis Date   • Agent orange exposure    • Anemia    • Benign colon polyp    • BPH (benign prostatic hyperplasia)    • Bradycardia    • Cardiomyopathy (HCC)    • Chest discomfort    • CHF (congestive heart failure) ([facility])    • Chronic bronchitis ([facility])    • Chronic kidney disease    • COPD (chronic obstructive pulmonary disease) ([facility])     LAST ASSESSED: 9/9/17   • Coronary artery disease cardio myopthia   • Emphysema of lung ([facility]) 2013   • H/O nonmelanoma skin cancer     LAST ASSESSED: 11/7/17   • HHD (hypertensive heart disease)    • HTN (hypertension)    • Hx of lymphoma    • Hyperlipidemia    • ICD (implantable cardioverter-defibrillator) in place    • Insomnia    • Mycosis fungoides ([facility])    • PVC (premature ventricular contraction)    • PVT (paroxysmal ventricular tachycardia) ([facility])    • SOB (shortness of breath)        Past Surgical History:   Procedure Laterality Date   • CARDIAC PACEMAKER PLACEMENT     • SKIN SURGERY      skin cancer removal    • TONSILLECTOMY         Family History   Problem Relation Age of Onset   • Diabetes Mother    • Colon cancer Father         DIAGNOSED IN HIS 80'S   • Heart failure Father    • Coronary artery disease  "Father    • Cancer Father         Lorenzo   • Diabetes Family         MELLITUS   • Heart attack Neg Hx    • Stroke Neg Hx    • Anuerysm Neg Hx    • Clotting disorder Neg Hx    • Arrhythmia Neg Hx    • Hypertension Neg Hx         pt unsure    • Hyperlipidemia Neg Hx    • Sudden death Neg Hx         scd         Medications have been verified.        Objective   /60   Pulse 80   Temp 97.7 °F (36.5 °C)   Resp 18   Wt 101 kg (223 lb)   SpO2 99%   BMI 31.10 kg/m²   No LMP for male patient.       Physical Exam     Physical Exam  Constitutional:       General: He is not in acute distress.     Appearance: He is well-developed.   HENT:      Head: Normocephalic and atraumatic.   Eyes:      General: No scleral icterus.     Conjunctiva/sclera: Conjunctivae normal.   Pulmonary:      Effort: Pulmonary effort is normal. No respiratory distress.      Breath sounds: No stridor.   Abdominal:      Tenderness: There is no right CVA tenderness or left CVA tenderness.   Musculoskeletal:      Cervical back: Normal range of motion and neck supple.   Skin:     General: Skin is warm and dry.   Neurological:      Mental Status: He is alert and oriented to person, place, and time.   Psychiatric:         Behavior: Behavior normal.         Ortho Exam        Procedures  No Procedures performed today        Note: Portions of this record may have been created with voice recognition software. Occasional wrong word or \"sound a like\" substitutions may have occurred due to the inherent limitations of voice recognition software. Please read the chart carefully and recognize, using context, where substitutions have occurred.*      "

## 2024-06-17 ENCOUNTER — REMOTE DEVICE CLINIC VISIT (OUTPATIENT)
Dept: CARDIOLOGY CLINIC | Facility: CLINIC | Age: 79
End: 2024-06-17
Payer: COMMERCIAL

## 2024-06-17 DIAGNOSIS — I50.22 CHRONIC SYSTOLIC CONGESTIVE HEART FAILURE (HCC): Primary | ICD-10-CM

## 2024-06-17 PROCEDURE — 93297 REM INTERROG DEV EVAL ICPMS: CPT | Performed by: INTERNAL MEDICINE

## 2024-06-17 NOTE — PROGRESS NOTES
Results for orders placed or performed in visit on 06/17/24   Cardiac EP device report    Narrative    MDT BIV-ICD/NOT MRI CONDITIONAL  CARELINK TRANSMISSION - OPTI-VOL ONLY: OPTI-VOL FLUID THRESHOLD CROSSED ON 6/2/24 & ONGOING. PT ON SPIRONOLACTONE. WILL RECHECK IN 1 MONTH. TASKED HF-RN. BATTERY VOLTAGE NEARING LIZZY (4 MOS). WILL SCHEDULE MONTHLY BATTERY CHECKS. AP-98%, BVP-98% (TOTAL -96%+VSR PACE-2%). ALL AVAILABLE LEAD PARAMETERS WITHIN NORMAL LIMITS. 5 NSVT EPISODES, MOST RECENT FOR 16 BEATS, AVG CL~380MS & LONGEST  26 BEATS, AVG CL~400MS. PT ON ASA 81MG & METOPROLOL. 7 VENT SENSING EPISODES. NORMAL DEVICE FUNCTION. GV

## 2024-06-18 ENCOUNTER — TELEPHONE (OUTPATIENT)
Age: 79
End: 2024-06-18

## 2024-06-18 LAB — BACTERIA UR CULT: ABNORMAL

## 2024-06-18 NOTE — TELEPHONE ENCOUNTER
Called and left voicemail for the patient he was placed on Bactrim initially unclear if the pathogen in his urine is susceptible to this based on the susceptibilities provided.  Will discuss possibly switching antibiotic when he calls back

## 2024-06-22 DIAGNOSIS — E78.5 HYPERLIPIDEMIA, UNSPECIFIED HYPERLIPIDEMIA TYPE: ICD-10-CM

## 2024-06-22 DIAGNOSIS — E03.2 HYPOTHYROIDISM DUE TO MEDICATION: ICD-10-CM

## 2024-06-22 RX ORDER — LEVOTHYROXINE SODIUM 0.1 MG/1
100 TABLET ORAL DAILY
Qty: 90 TABLET | Refills: 1 | Status: SHIPPED | OUTPATIENT
Start: 2024-06-22

## 2024-06-23 RX ORDER — ATORVASTATIN CALCIUM 40 MG/1
TABLET, FILM COATED ORAL
Qty: 90 TABLET | Refills: 1 | Status: SHIPPED | OUTPATIENT
Start: 2024-06-23

## 2024-06-24 ENCOUNTER — TELEPHONE (OUTPATIENT)
Dept: CARDIOLOGY CLINIC | Facility: CLINIC | Age: 79
End: 2024-06-24

## 2024-06-24 NOTE — TELEPHONE ENCOUNTER
----- Message from Amy BECK sent at 6/17/2024  4:41 PM EDT -----  Regarding: FW: OPTIVOL    ----- Message -----  From: Paola Cason RN  Sent: 6/17/2024   3:19 PM EDT  To: Amy Solitario RN  Subject: OPTIVOL                                          FYI:    OPTI-VOL FLUID THRESHOLD CROSSED ON 6/2/24 & ONGOING. PT ON SPIRONOLACTONE. WILL RECHECK IN 1 MONTH.     Thanks,   Katharine

## 2024-06-24 NOTE — TELEPHONE ENCOUNTER
F/U call to patient who stated everything is fine with him. Denied any chest pain or discomfort. Denied SOB  Denied any edema to lower extremities or abdominal bloating.  Stated taking all his cardiac medications as ordered.  Following 2 g sodium diet.

## 2024-06-30 ENCOUNTER — OFFICE VISIT (OUTPATIENT)
Age: 79
End: 2024-06-30
Payer: COMMERCIAL

## 2024-06-30 VITALS
DIASTOLIC BLOOD PRESSURE: 54 MMHG | TEMPERATURE: 97.7 F | RESPIRATION RATE: 18 BRPM | SYSTOLIC BLOOD PRESSURE: 96 MMHG | OXYGEN SATURATION: 97 % | WEIGHT: 220 LBS | BODY MASS INDEX: 30.8 KG/M2 | HEIGHT: 71 IN | HEART RATE: 77 BPM

## 2024-06-30 DIAGNOSIS — N30.01 ACUTE CYSTITIS WITH HEMATURIA: Primary | ICD-10-CM

## 2024-06-30 LAB
SL AMB  POCT GLUCOSE, UA: NEGATIVE
SL AMB LEUKOCYTE ESTERASE,UA: ABNORMAL
SL AMB POCT BILIRUBIN,UA: ABNORMAL
SL AMB POCT BLOOD,UA: ABNORMAL
SL AMB POCT CLARITY,UA: ABNORMAL
SL AMB POCT COLOR,UA: YELLOW
SL AMB POCT KETONES,UA: NEGATIVE
SL AMB POCT NITRITE,UA: NEGATIVE
SL AMB POCT PH,UA: 5
SL AMB POCT SPECIFIC GRAVITY,UA: 1.03
SL AMB POCT URINE PROTEIN: 30
SL AMB POCT UROBILINOGEN: 0.2

## 2024-06-30 PROCEDURE — S9083 URGENT CARE CENTER GLOBAL: HCPCS | Performed by: PHYSICIAN ASSISTANT

## 2024-06-30 PROCEDURE — 87086 URINE CULTURE/COLONY COUNT: CPT | Performed by: PHYSICIAN ASSISTANT

## 2024-06-30 PROCEDURE — 87077 CULTURE AEROBIC IDENTIFY: CPT | Performed by: PHYSICIAN ASSISTANT

## 2024-06-30 PROCEDURE — 99213 OFFICE O/P EST LOW 20 MIN: CPT | Performed by: PHYSICIAN ASSISTANT

## 2024-06-30 PROCEDURE — 87186 SC STD MICRODIL/AGAR DIL: CPT | Performed by: PHYSICIAN ASSISTANT

## 2024-06-30 PROCEDURE — 81002 URINALYSIS NONAUTO W/O SCOPE: CPT | Performed by: PHYSICIAN ASSISTANT

## 2024-06-30 RX ORDER — LEVOFLOXACIN 750 MG/1
750 TABLET, FILM COATED ORAL EVERY 24 HOURS
Qty: 7 TABLET | Refills: 0 | Status: SHIPPED | OUTPATIENT
Start: 2024-06-30 | End: 2024-07-07

## 2024-06-30 NOTE — PROGRESS NOTES
[facility]        NAME: Sherly Peña is a 78 y.o. male  : 1945    MRN: [id number]  DATE: 2024  TIME: 12:02 PM      Assessment and Plan     Acute cystitis with hematuria [N30.01]  1. Acute cystitis with hematuria  POCT urine dip    Urine culture    Urine culture    levofloxacin (LEVAQUIN) 750 mg tablet          POC Testing Results    Urine dip -- positive for leuks, protein, blood, and bilirubin     Note:   Will treat patient with Levaquin since previous infection was E. Faecalis and bactrim doesn't have great coverage for that, so infection likely wasn't totally cleared   Will send urine culture and change treatment based on results if necessary   Told patient to follow up with urology ASAP or go to the ER if the symptoms do not resolve, as urinary infections can turn into sepsis quickly. Patient verbalized understanding.     Patient Instructions   There are no Patient Instructions on file for this visit.     Follow up with primary care provider.   Go to ER if symptoms worsen.    Chief Complaint     Chief Complaint   Patient presents with    Possible UTI     Started 2 days ago, patient complains of burning, urgency, frequency and painful urination.         History of Present Illness     Patient presents with urinary burning and frequency with dark urine x 2 days. He states he was here about 1 month ago for the same thing and they gave bactrim, which helped, but the symptoms returned.         Review of Systems     Review of Systems   Constitutional:  Negative for chills, fatigue and fever.   HENT:  Negative for congestion, ear pain, postnasal drip, rhinorrhea, sinus pressure, sinus pain, sneezing and sore throat.    Eyes:  Negative for pain and visual disturbance.   Respiratory:  Negative for cough and shortness of breath.    Cardiovascular:  Negative for chest pain and palpitations.   Gastrointestinal:  Negative for abdominal pain, diarrhea, nausea and vomiting.   Genitourinary:   Positive for dysuria, frequency and hematuria. Negative for flank pain and urgency.   Musculoskeletal:  Negative for arthralgias, back pain and myalgias.   Skin:  Negative for rash.   Neurological:  Negative for dizziness, seizures, syncope, numbness and headaches.   All other systems reviewed and are negative.        Current Medications       Current Outpatient Medications:     albuterol (2.5 mg/3 mL) 0.083 % nebulizer solution, USE 1 VIAL VIA NEBULIZER 4 TIMES A DAY AS NEEDED, Disp: 360 mL, Rfl: 2    ammonium lactate (LAC-HYDRIN) 12 % lotion, APPLY TO AFFECTED AREA TOPICALLY EVERY DAY, Disp: 225 mL, Rfl: 2    aspirin 81 MG tablet, Take 81 mg by mouth daily, Disp: , Rfl:     atorvastatin (LIPITOR) 40 mg tablet, TAKE 1 TABLET BY MOUTH DAILY, Disp: 90 tablet, Rfl: 1    bexarotene (TARGRETIN) 75 mg capsule, TAKE 8 CAPSULES BY MOUTH DAILY, Disp: 240 capsule, Rfl: 5    Cholecalciferol 25 MCG (1000 UT) capsule, Take 1 capsule by mouth daily, Disp: , Rfl:     cyanocobalamin (VITAMIN B-12) 1,000 mcg tablet, Take 1,000 mcg by mouth daily, Disp: , Rfl:     diclofenac potassium (CATAFLAM) 50 mg tablet, 1 tablet daily for severe pain.  Not to exceed 2 tablets a week., Disp: 30 tablet, Rfl: 0    Entresto 49-51 MG TABS, TAKE 1 TABLET BY MOUTH  TWICE DAILY, Disp: 180 tablet, Rfl: 3    ferrous sulfate 324 (65 Fe) mg, Take 1 tablet by mouth Twice daily, Disp: , Rfl:     gabapentin (NEURONTIN) 100 mg capsule, Take 1 capsule (100 mg total) by mouth 3 (three) times a day, Disp: 270 capsule, Rfl: 3    levofloxacin (LEVAQUIN) 750 mg tablet, Take 1 tablet (750 mg total) by mouth every 24 hours for 7 days, Disp: 7 tablet, Rfl: 0    levothyroxine 100 mcg tablet, TAKE 1 TABLET BY MOUTH DAILY, Disp: 90 tablet, Rfl: 1    metoprolol succinate (TOPROL-XL) 50 mg 24 hr tablet, , Disp: , Rfl:     montelukast (SINGULAIR) 10 mg tablet, TAKE 1 TABLET BY MOUTH  DAILY AT BEDTIME, Disp: 90 tablet, Rfl: 3    Multiple Vitamins-Minerals (OCUVITE ADULT 50+  PO), Take by mouth, Disp: , Rfl:     spironolactone (ALDACTONE) 25 mg tablet, Take 0.5 tablets (12.5 mg total) by mouth daily, Disp: 45 tablet, Rfl: 3    Trelegy Ellipta 100-62.5-25 MCG/ACT inhaler, USE 1 INHALATION BY MOUTH ONCE  DAILY AT THE SAME TIME EACH DAY  RINSE MOUTH AFTER USE, Disp: 180 each, Rfl: 3    triamcinolone (KENALOG) 0.1 % ointment, Apply topically 2 (two) times 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, Disp: 454 g, Rfl: 3    Vitamins-Lipotropics (LIPOFLAVONOID PO), Take by mouth daily, Disp: , Rfl:     zolpidem (AMBIEN) 10 mg tablet, TAKE 1 TABLET BY MOUTH DAILY AT BEDTIME AS NEEDED FOR SLEEP, Disp: 30 tablet, Rfl: 1    Current Allergies     Allergies as of 06/30/2024    (No Known Allergies)              The following portions of the patient's history were reviewed and updated as appropriate: allergies, current medications, past family history, past medical history, past social history, past surgical history, and problem list.     Past Medical History:   Diagnosis Date    Agent orange exposure     Anemia     Benign colon polyp     BPH (benign prostatic hyperplasia)     Bradycardia     Cardiomyopathy (HCC)     Chest discomfort     CHF (congestive heart failure) (HCC)     Chronic bronchitis (HCC)     Chronic kidney disease     COPD (chronic obstructive pulmonary disease) (HCC)     LAST ASSESSED: 9/9/17    Coronary artery disease cardio myopthia    Emphysema of lung (HCC) 2013    H/O nonmelanoma skin cancer     LAST ASSESSED: 11/7/17    HHD (hypertensive heart disease)     HTN (hypertension)     Hx of lymphoma     Hyperlipidemia     ICD (implantable cardioverter-defibrillator) in place     Insomnia     Mycosis fungoides (HCC)     PVC (premature ventricular contraction)     PVT (paroxysmal ventricular tachycardia) (HCC)     SOB (shortness of breath)        Past Surgical History:   Procedure Laterality Date    CARDIAC PACEMAKER PLACEMENT      SKIN SURGERY      skin cancer removal     TONSILLECTOMY         Family History  "  Problem Relation Age of Onset    Diabetes Mother     Colon cancer Father         DIAGNOSED IN HIS 80'S    Heart failure Father     Coronary artery disease Father     Cancer Father         Lorenzo    Diabetes Family         MELLITUS    Heart attack Neg Hx     Stroke Neg Hx     Anuerysm Neg Hx     Clotting disorder Neg Hx     Arrhythmia Neg Hx     Hypertension Neg Hx         pt unsure     Hyperlipidemia Neg Hx     Sudden death Neg Hx         scd         Medications have been verified.        Objective     BP 96/54   Pulse 77   Temp 97.7 °F (36.5 °C)   Resp 18   Ht 5' 11\" (1.803 m)   Wt 99.8 kg (220 lb)   SpO2 97%   BMI 30.68 kg/m²   No LMP for male patient.         Physical Exam     Physical Exam  Vitals and nursing note reviewed.   Constitutional:       Appearance: Normal appearance. He is normal weight.   HENT:      Head: Normocephalic and atraumatic.   Cardiovascular:      Rate and Rhythm: Normal rate.   Pulmonary:      Effort: Pulmonary effort is normal.   Abdominal:      General: Abdomen is flat. Bowel sounds are normal.      Palpations: Abdomen is soft.      Tenderness: There is no abdominal tenderness. There is no right CVA tenderness or left CVA tenderness.   Skin:     General: Skin is warm and dry.   Neurological:      General: No focal deficit present.      Mental Status: He is alert and oriented to person, place, and time.   Psychiatric:         Mood and Affect: Mood normal.         Behavior: Behavior normal.       "

## 2024-07-03 LAB — BACTERIA UR CULT: ABNORMAL

## 2024-07-16 ENCOUNTER — PATIENT MESSAGE (OUTPATIENT)
Dept: NEPHROLOGY | Facility: CLINIC | Age: 79
End: 2024-07-16

## 2024-07-17 ENCOUNTER — TELEPHONE (OUTPATIENT)
Age: 79
End: 2024-07-17

## 2024-07-17 DIAGNOSIS — N39.0 RECURRENT UTI: Primary | ICD-10-CM

## 2024-07-17 NOTE — TELEPHONE ENCOUNTER
Spoke to patient and he feels the previous antibiotic didn't work. He is still experiencing painful urination. He was given care advice to contact PCP to address current concerns until care is established with our office. Placed on the appointment cancellation list. Reviewed ER precautions.

## 2024-07-17 NOTE — TELEPHONE ENCOUNTER
New Patient    What is the reason for the patient's appointment?: UTI, pain with urination    What office location does the patient prefer?: [place]    Does patient have Imaging/Lab Results:  Urine 6/16 & 6/30    Have patient records been requested?:  If No, are the records showing in Epic: Yes      INSURANCE:   Do we accept the patient's insurance or is the patient Self-Pay?: Yes     Insurance Provider: Catherine   Plan Type/Number:   Member ID#: [id number]       HISTORY:   Has the patient had any previous Urologist(s)?: No    Was the patient seen in the ED?: No    Has the patient had any outside testing done?: Urine 6/16 & 6/30    Does the patient have a personal history of cancer?: N/A    Pt scheduled for the next available of 8/27 and added to wait list.     Pt stated he will not be able to deal with the painful urination until the next available, please advise.

## 2024-07-18 ENCOUNTER — REMOTE DEVICE CLINIC VISIT (OUTPATIENT)
Dept: CARDIOLOGY CLINIC | Facility: CLINIC | Age: 79
End: 2024-07-18

## 2024-07-18 DIAGNOSIS — Z95.810 AICD (AUTOMATIC CARDIOVERTER/DEFIBRILLATOR) PRESENT: Primary | ICD-10-CM

## 2024-07-18 PROCEDURE — RECHECK: Performed by: INTERNAL MEDICINE

## 2024-07-18 NOTE — PROGRESS NOTES
Results for orders placed or performed in visit on 07/18/24   Cardiac EP device report    Narrative    MDT BIV-ICD/NOT MRI CONDITIONAL  [employer]EL[employer] TRANSMISSION - OPTIVOL ONLY: BATTERY VOLTAGE NEARING LIZZY(4 MTHS). WILL SCHEDULE MONTHLY BATTERY CHECKS. AP 98.5% BVP 97.5% (VSRP 1.6%) ALL AVAILABLE LEAD PARAMETERS WITHIN NORMAL LIMITS. 5 VT-NS EPISODES W/ 4 EGMS SHOWING NSVT (8 BEATS  BPM, 7 BEATS  BPM, 8 BEATS  BPM, 20 BEATS  BPM W/ NO THERAPY). 7 V SENSING EPISODES W/ MARKERS SHOWING NSVT AND SVT. TAKES METOPROLOL SUCC. EF 30% (ECHO 12/27/23). OPTI-VOL WITHIN NORMAL LIMITS. NORMAL DEVICE FUNCTION. AM

## 2024-07-22 ENCOUNTER — APPOINTMENT (EMERGENCY)
Dept: CT IMAGING | Facility: HOSPITAL | Age: 79
DRG: 690 | End: 2024-07-22
Payer: COMMERCIAL

## 2024-07-22 ENCOUNTER — HOSPITAL ENCOUNTER (INPATIENT)
Facility: HOSPITAL | Age: 79
LOS: 3 days | Discharge: HOME | DRG: 690 | End: 2024-07-25
Attending: EMERGENCY MEDICINE | Admitting: FAMILY MEDICINE
Payer: COMMERCIAL

## 2024-07-22 DIAGNOSIS — N40.0 BPH (BENIGN PROSTATIC HYPERPLASIA): ICD-10-CM

## 2024-07-22 DIAGNOSIS — M54.50 LUMBAR PAIN: ICD-10-CM

## 2024-07-22 DIAGNOSIS — I42.8 NON-ISCHEMIC CARDIOMYOPATHY (HCC): ICD-10-CM

## 2024-07-22 DIAGNOSIS — N21.0 BLADDER STONES: ICD-10-CM

## 2024-07-22 DIAGNOSIS — N39.0 ACUTE UTI: ICD-10-CM

## 2024-07-22 DIAGNOSIS — N39.0 UTI (URINARY TRACT INFECTION): Primary | ICD-10-CM

## 2024-07-22 DIAGNOSIS — I49.3 PVC (PREMATURE VENTRICULAR CONTRACTION): ICD-10-CM

## 2024-07-22 LAB
ALBUMIN SERPL BCG-MCNC: 3.9 G/DL (ref 3.5–5)
ALP SERPL-CCNC: 55 U/L (ref 34–104)
ALT SERPL W P-5'-P-CCNC: 11 U/L (ref 7–52)
ANION GAP SERPL CALCULATED.3IONS-SCNC: 9 MMOL/L (ref 4–13)
APTT PPP: 34 SECONDS (ref 23–37)
AST SERPL W P-5'-P-CCNC: 15 U/L (ref 13–39)
BACTERIA UR QL AUTO: ABNORMAL /HPF
BASOPHILS # BLD AUTO: 0.06 THOUSANDS/ÂΜL (ref 0–0.1)
BASOPHILS NFR BLD AUTO: 1 % (ref 0–1)
BILIRUB SERPL-MCNC: 0.4 MG/DL (ref 0.2–1)
BILIRUB UR QL STRIP: NEGATIVE
BUN SERPL-MCNC: 30 MG/DL (ref 5–25)
CALCIUM SERPL-MCNC: 8.1 MG/DL (ref 8.4–10.2)
CHLORIDE SERPL-SCNC: 110 MMOL/L (ref 96–108)
CLARITY UR: ABNORMAL
CO2 SERPL-SCNC: 24 MMOL/L (ref 21–32)
COLOR UR: YELLOW
CREAT SERPL-MCNC: 1.58 MG/DL (ref 0.6–1.3)
EOSINOPHIL # BLD AUTO: 0.11 THOUSAND/ÂΜL (ref 0–0.61)
EOSINOPHIL NFR BLD AUTO: 1 % (ref 0–6)
ERYTHROCYTE [DISTWIDTH] IN BLOOD BY AUTOMATED COUNT: 13.3 % (ref 11.6–15.1)
GFR SERPL CREATININE-BSD FRML MDRD: 41 ML/MIN/1.73SQ M
GLUCOSE SERPL-MCNC: 100 MG/DL (ref 65–140)
GLUCOSE UR STRIP-MCNC: NEGATIVE MG/DL
HCT VFR BLD AUTO: 38.9 % (ref 36.5–49.3)
HGB BLD-MCNC: 12.2 G/DL (ref 12–17)
HGB UR QL STRIP.AUTO: ABNORMAL
IMM GRANULOCYTES # BLD AUTO: 0.04 THOUSAND/UL (ref 0–0.2)
IMM GRANULOCYTES NFR BLD AUTO: 0 % (ref 0–2)
INR PPP: 1.06 (ref 0.84–1.19)
KETONES UR STRIP-MCNC: ABNORMAL MG/DL
LACTATE SERPL-SCNC: 1.5 MMOL/L (ref 0.5–2)
LEUKOCYTE ESTERASE UR QL STRIP: ABNORMAL
LYMPHOCYTES # BLD AUTO: 1.92 THOUSANDS/ÂΜL (ref 0.6–4.47)
LYMPHOCYTES NFR BLD AUTO: 16 % (ref 14–44)
MCH RBC QN AUTO: 30.5 PG (ref 26.8–34.3)
MCHC RBC AUTO-ENTMCNC: 31.4 G/DL (ref 31.4–37.4)
MCV RBC AUTO: 97 FL (ref 82–98)
MONOCYTES # BLD AUTO: 0.5 THOUSAND/ÂΜL (ref 0.17–1.22)
MONOCYTES NFR BLD AUTO: 4 % (ref 4–12)
MUCOUS THREADS UR QL AUTO: ABNORMAL
NEUTROPHILS # BLD AUTO: 9.77 THOUSANDS/ÂΜL (ref 1.85–7.62)
NEUTS SEG NFR BLD AUTO: 78 % (ref 43–75)
NITRITE UR QL STRIP: NEGATIVE
NON-SQ EPI CELLS URNS QL MICRO: ABNORMAL /HPF
NRBC BLD AUTO-RTO: 0 /100 WBCS
PH UR STRIP.AUTO: 6 [PH]
PLATELET # BLD AUTO: 279 THOUSANDS/UL (ref 149–390)
PMV BLD AUTO: 9.9 FL (ref 8.9–12.7)
POTASSIUM SERPL-SCNC: 4.1 MMOL/L (ref 3.5–5.3)
PROCALCITONIN SERPL-MCNC: <0.05 NG/ML
PROT SERPL-MCNC: 7 G/DL (ref 6.4–8.4)
PROT UR STRIP-MCNC: ABNORMAL MG/DL
PROTHROMBIN TIME: 14.5 SECONDS (ref 11.6–14.5)
RBC # BLD AUTO: 4 MILLION/UL (ref 3.88–5.62)
RBC #/AREA URNS AUTO: ABNORMAL /HPF
SODIUM SERPL-SCNC: 143 MMOL/L (ref 135–147)
SP GR UR STRIP.AUTO: 1.02 (ref 1–1.03)
UROBILINOGEN UR STRIP-ACNC: <2 MG/DL
WBC # BLD AUTO: 12.4 THOUSAND/UL (ref 4.31–10.16)
WBC #/AREA URNS AUTO: ABNORMAL /HPF

## 2024-07-22 PROCEDURE — 96365 THER/PROPH/DIAG IV INF INIT: CPT

## 2024-07-22 PROCEDURE — 36415 COLL VENOUS BLD VENIPUNCTURE: CPT | Performed by: EMERGENCY MEDICINE

## 2024-07-22 PROCEDURE — 81001 URINALYSIS AUTO W/SCOPE: CPT | Performed by: EMERGENCY MEDICINE

## 2024-07-22 PROCEDURE — 74177 CT ABD & PELVIS W/CONTRAST: CPT

## 2024-07-22 PROCEDURE — 85730 THROMBOPLASTIN TIME PARTIAL: CPT | Performed by: EMERGENCY MEDICINE

## 2024-07-22 PROCEDURE — 85610 PROTHROMBIN TIME: CPT | Performed by: EMERGENCY MEDICINE

## 2024-07-22 PROCEDURE — 83605 ASSAY OF LACTIC ACID: CPT | Performed by: EMERGENCY MEDICINE

## 2024-07-22 PROCEDURE — 85025 COMPLETE CBC W/AUTO DIFF WBC: CPT | Performed by: EMERGENCY MEDICINE

## 2024-07-22 PROCEDURE — 87040 BLOOD CULTURE FOR BACTERIA: CPT | Performed by: EMERGENCY MEDICINE

## 2024-07-22 PROCEDURE — 87086 URINE CULTURE/COLONY COUNT: CPT | Performed by: EMERGENCY MEDICINE

## 2024-07-22 PROCEDURE — 99284 EMERGENCY DEPT VISIT MOD MDM: CPT

## 2024-07-22 PROCEDURE — 80053 COMPREHEN METABOLIC PANEL: CPT | Performed by: EMERGENCY MEDICINE

## 2024-07-22 PROCEDURE — 84145 PROCALCITONIN (PCT): CPT | Performed by: EMERGENCY MEDICINE

## 2024-07-22 PROCEDURE — 99223 1ST HOSP IP/OBS HIGH 75: CPT | Performed by: FAMILY MEDICINE

## 2024-07-22 RX ORDER — ATORVASTATIN CALCIUM 40 MG/1
40 TABLET, FILM COATED ORAL DAILY
Status: DISCONTINUED | OUTPATIENT
Start: 2024-07-23 | End: 2024-07-25 | Stop reason: HOSPADM

## 2024-07-22 RX ORDER — HEPARIN SODIUM 5000 [USP'U]/ML
5000 INJECTION, SOLUTION INTRAVENOUS; SUBCUTANEOUS EVERY 8 HOURS SCHEDULED
Status: DISCONTINUED | OUTPATIENT
Start: 2024-07-22 | End: 2024-07-25 | Stop reason: HOSPADM

## 2024-07-22 RX ORDER — ASPIRIN 81 MG/1
81 TABLET, CHEWABLE ORAL DAILY
Status: DISCONTINUED | OUTPATIENT
Start: 2024-07-23 | End: 2024-07-25 | Stop reason: HOSPADM

## 2024-07-22 RX ORDER — GABAPENTIN 100 MG/1
100 CAPSULE ORAL 3 TIMES DAILY
Status: DISCONTINUED | OUTPATIENT
Start: 2024-07-22 | End: 2024-07-25 | Stop reason: HOSPADM

## 2024-07-22 RX ORDER — FERROUS SULFATE 325(65) MG
325 TABLET ORAL
Status: DISCONTINUED | OUTPATIENT
Start: 2024-07-23 | End: 2024-07-25 | Stop reason: HOSPADM

## 2024-07-22 RX ORDER — NICOTINE 21 MG/24HR
1 PATCH, TRANSDERMAL 24 HOURS TRANSDERMAL DAILY
Status: DISCONTINUED | OUTPATIENT
Start: 2024-07-23 | End: 2024-07-25 | Stop reason: HOSPADM

## 2024-07-22 RX ORDER — ALBUTEROL SULFATE 2.5 MG/3ML
2.5 SOLUTION RESPIRATORY (INHALATION) EVERY 6 HOURS PRN
Status: DISCONTINUED | OUTPATIENT
Start: 2024-07-22 | End: 2024-07-25 | Stop reason: HOSPADM

## 2024-07-22 RX ORDER — LEVOTHYROXINE SODIUM 0.1 MG/1
100 TABLET ORAL DAILY
Status: DISCONTINUED | OUTPATIENT
Start: 2024-07-23 | End: 2024-07-25 | Stop reason: HOSPADM

## 2024-07-22 RX ORDER — METOPROLOL SUCCINATE 50 MG/1
50 TABLET, EXTENDED RELEASE ORAL DAILY
Status: DISCONTINUED | OUTPATIENT
Start: 2024-07-23 | End: 2024-07-25 | Stop reason: HOSPADM

## 2024-07-22 RX ORDER — FLUTICASONE FUROATE AND VILANTEROL 100; 25 UG/1; UG/1
1 POWDER RESPIRATORY (INHALATION) DAILY
Status: DISCONTINUED | OUTPATIENT
Start: 2024-07-23 | End: 2024-07-25 | Stop reason: HOSPADM

## 2024-07-22 RX ADMIN — IOHEXOL 100 ML: 350 INJECTION, SOLUTION INTRAVENOUS at 15:48

## 2024-07-22 RX ADMIN — AMPICILLIN SODIUM 2000 MG: 2 INJECTION, POWDER, FOR SOLUTION INTRAMUSCULAR; INTRAVENOUS at 23:35

## 2024-07-22 RX ADMIN — GABAPENTIN 100 MG: 100 CAPSULE ORAL at 22:06

## 2024-07-22 RX ADMIN — HEPARIN SODIUM 5000 UNITS: 5000 INJECTION INTRAVENOUS; SUBCUTANEOUS at 17:55

## 2024-07-22 RX ADMIN — AMPICILLIN SODIUM 2000 MG: 2 INJECTION, POWDER, FOR SOLUTION INTRAMUSCULAR; INTRAVENOUS at 16:25

## 2024-07-22 RX ADMIN — HEPARIN SODIUM 5000 UNITS: 5000 INJECTION INTRAVENOUS; SUBCUTANEOUS at 23:51

## 2024-07-22 RX ADMIN — GABAPENTIN 100 MG: 100 CAPSULE ORAL at 17:55

## 2024-07-22 RX ADMIN — SACUBITRIL AND VALSARTAN 1 TABLET: 49; 51 TABLET, FILM COATED ORAL at 17:55

## 2024-07-22 NOTE — ED PROVIDER NOTES
History  Chief Complaint   Patient presents with    Difficulty Urinating     Patient reports difficulty urinating, and when they are able to go they report pain. Recently diagnosed with UTI at urgent care earlier this month and was placed on bactrim and levofloxacin. Patient reports no relief.      78-year-old male with a past medical history of hypertension, hyperlipidemia, COPD, CHF, CKD, and BPH presents for evaluation with difficulty urinating, dysuria, and hematuria.  Patient states that symptoms have been ongoing for approximately 4 days now.  This is the third time this has happened in the past 2 months.  Patient was on 2 different antibiotics within the past 2 months for treatment of urinary tract infections.  His last antibiotic course was at the end of June.  He denies any associated fevers or chills.  No abdominal pain or back/flank pain.  He otherwise denies any symptoms including chest pain or shortness of breath.         Prior to Admission Medications   Prescriptions Last Dose Informant Patient Reported? Taking?   Cholecalciferol 25 MCG (1000 UT) capsule  Self Yes No   Sig: Take 1 capsule by mouth daily   Entresto 49-51 MG TABS  Self No No   Sig: TAKE 1 TABLET BY MOUTH  TWICE DAILY   Multiple Vitamins-Minerals (OCUVITE ADULT 50+ PO)  Self Yes No   Sig: Take by mouth   Trelegy Ellipta 100-62.5-25 MCG/ACT inhaler  Self No No   Sig: USE 1 INHALATION BY MOUTH ONCE  DAILY AT THE SAME TIME EACH DAY  RINSE MOUTH AFTER USE   Vitamins-Lipotropics (LIPOFLAVONOID PO)  Self Yes No   Sig: Take by mouth daily   albuterol (2.5 mg/3 mL) 0.083 % nebulizer solution  Self No No   Sig: USE 1 VIAL VIA NEBULIZER 4 TIMES A DAY AS NEEDED   ammonium lactate (LAC-HYDRIN) 12 % lotion   No No   Sig: APPLY TO AFFECTED AREA TOPICALLY EVERY DAY   aspirin 81 MG tablet  Self Yes No   Sig: Take 81 mg by mouth daily   atorvastatin (LIPITOR) 40 mg tablet   No No   Sig: TAKE 1 TABLET BY MOUTH DAILY   bexarotene (TARGRETIN) 75 mg capsule   Self No No   Sig: TAKE 8 CAPSULES BY MOUTH DAILY   cyanocobalamin (VITAMIN B-12) 1,000 mcg tablet  Self Yes No   Sig: Take 1,000 mcg by mouth daily   diclofenac potassium (CATAFLAM) 50 mg tablet  Self No No   Si tablet daily for severe pain.  Not to exceed 2 tablets a week.   ferrous sulfate 324 (65 Fe) mg  Self Yes No   Sig: Take 1 tablet by mouth Twice daily   gabapentin (NEURONTIN) 100 mg capsule  Self No No   Sig: Take 1 capsule (100 mg total) by mouth 3 (three) times a day   levothyroxine 100 mcg tablet   No No   Sig: TAKE 1 TABLET BY MOUTH DAILY   metoprolol succinate (TOPROL-XL) 50 mg 24 hr tablet  Self Yes No   montelukast (SINGULAIR) 10 mg tablet  Self No No   Sig: TAKE 1 TABLET BY MOUTH  DAILY AT BEDTIME   spironolactone (ALDACTONE) 25 mg tablet  Self No No   Sig: Take 0.5 tablets (12.5 mg total) by mouth daily   triamcinolone (KENALOG) 0.1 % ointment  Self No No   Sig: Apply topically 2 (two) times 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   zolpidem (AMBIEN) 10 mg tablet   No No   Sig: TAKE 1 TABLET BY MOUTH DAILY AT BEDTIME AS NEEDED FOR SLEEP      Facility-Administered Medications: None       Past Medical History:   Diagnosis Date    Agent orange exposure     Anemia     Benign colon polyp     BPH (benign prostatic hyperplasia)     Bradycardia     Cardiomyopathy (HCC)     Chest discomfort     CHF (congestive heart failure) (HCC)     Chronic bronchitis (HCC)     Chronic kidney disease     COPD (chronic obstructive pulmonary disease) (HCC)     LAST ASSESSED: 17    Coronary artery disease cardio myopthia    Emphysema of lung (HCC)     H/O nonmelanoma skin cancer     LAST ASSESSED: 17    HHD (hypertensive heart disease)     HTN (hypertension)     Hx of lymphoma     Hyperlipidemia     ICD (implantable cardioverter-defibrillator) in place     Insomnia     Mycosis fungoides (HCC)     PVC (premature ventricular contraction)     PVT (paroxysmal ventricular tachycardia) (HCC)     SOB (shortness of breath)         Past Surgical History:   Procedure Laterality Date    CARDIAC PACEMAKER PLACEMENT      SKIN SURGERY      skin cancer removal     TONSILLECTOMY         Family History   Problem Relation Age of Onset    Diabetes Mother     Colon cancer Father         DIAGNOSED IN HIS 80'S    Heart failure Father     Coronary artery disease Father     Cancer Father         Lorenzo    Diabetes Family         MELLITUS    Heart attack Neg Hx     Stroke Neg Hx     Anuerysm Neg Hx     Clotting disorder Neg Hx     Arrhythmia Neg Hx     Hypertension Neg Hx         pt unsure     Hyperlipidemia Neg Hx     Sudden death Neg Hx         scd     I have reviewed and agree with the history as documented.    E-Cigarette/Vaping    E-Cigarette Use Never User      E-Cigarette/Vaping Substances    Nicotine No     THC No     CBD No     Flavoring No     Other No     Unknown No      Social History     Tobacco Use    Smoking status: Every Day     Current packs/day: 1.00     Average packs/day: 1 pack/day for 63.6 years (63.6 ttl pk-yrs)     Types: Cigarettes     Start date: 1961     Passive exposure: Current    Smokeless tobacco: Never    Tobacco comments:     HALF A PACK PER DAY    Vaping Use    Vaping status: Never Used   Substance Use Topics    Alcohol use: Not Currently     Alcohol/week: 14.0 standard drinks of alcohol     Comment: two drinks every night over a 4 hour period    Drug use: Never       Review of Systems   Constitutional:  Negative for chills and fever.   Respiratory:  Negative for shortness of breath.    Cardiovascular:  Negative for chest pain.   Gastrointestinal:  Negative for abdominal pain, nausea and vomiting.   Genitourinary:  Positive for difficulty urinating, dysuria, frequency and hematuria.   Musculoskeletal:  Negative for back pain.   All other systems reviewed and are negative.      Physical Exam  Physical Exam  Vitals and nursing note reviewed.   Constitutional:       General: He is awake. He is not in acute distress.      Appearance: He is not toxic-appearing.   HENT:      Head: Normocephalic and atraumatic.   Eyes:      General: Vision grossly intact. Gaze aligned appropriately.   Cardiovascular:      Rate and Rhythm: Normal rate and regular rhythm.      Heart sounds: Normal heart sounds.   Pulmonary:      Effort: Pulmonary effort is normal. No respiratory distress.      Breath sounds: Normal breath sounds.   Abdominal:      General: There is no distension.      Palpations: Abdomen is soft.      Tenderness: There is no abdominal tenderness.   Musculoskeletal:      Cervical back: Full passive range of motion without pain and neck supple.   Skin:     General: Skin is warm and dry.   Neurological:      General: No focal deficit present.      Mental Status: He is alert and oriented to person, place, and time.         Vital Signs  ED Triage Vitals [07/22/24 1332]   Temperature Pulse Respirations Blood Pressure SpO2   (!) 97.1 °F (36.2 °C) 58 (!) 25 124/83 97 %      Temp Source Heart Rate Source Patient Position - Orthostatic VS BP Location FiO2 (%)   Temporal Monitor Sitting Left arm --      Pain Score       --           Vitals:    07/22/24 1332   BP: 124/83   Pulse: 58   Patient Position - Orthostatic VS: Sitting         Visual Acuity      ED Medications  Medications   iohexol (OMNIPAQUE) 350 MG/ML injection (MULTI-DOSE) 100 mL (100 mL Intravenous Given 7/22/24 1548)   ampicillin (OMNIPEN) 2,000 mg in sodium chloride 0.9 % 100 mL IVPB (2,000 mg Intravenous New Bag 7/22/24 1625)       Diagnostic Studies  Results Reviewed       Procedure Component Value Units Date/Time    Blood culture #2 [[id number]] Collected: 07/22/24 1607    Lab Status: In process Specimen: Blood from Arm, Right Updated: 07/22/24 1613    Procalcitonin [[id number]]  (Normal) Collected: 07/22/24 1501    Lab Status: Final result Specimen: Blood from Arm, Left Updated: 07/22/24 1539     Procalcitonin <0.05 ng/ml     Comprehensive metabolic panel [[id number]]  (Abnormal)  Collected: 07/22/24 1501    Lab Status: Final result Specimen: Blood from Arm, Left Updated: 07/22/24 1525     Sodium 143 mmol/L      Potassium 4.1 mmol/L      Chloride 110 mmol/L      CO2 24 mmol/L      ANION GAP 9 mmol/L      BUN 30 mg/dL      Creatinine 1.58 mg/dL      Glucose 100 mg/dL      Calcium 8.1 mg/dL      AST 15 U/L      ALT 11 U/L      Alkaline Phosphatase 55 U/L      Total Protein 7.0 g/dL      Albumin 3.9 g/dL      Total Bilirubin 0.40 mg/dL      eGFR 41 ml/min/1.73sq m     Narrative:      National Kid[employer] Disease Foundation guidelines for Chronic Kidney Disease (CKD):     Stage 1 with normal or high GFR (GFR > 90 mL/min/1.73 square meters)    Stage 2 Mild CKD (GFR = 60-89 mL/min/1.73 square meters)    Stage 3A Moderate CKD (GFR = 45-59 mL/min/1.73 square meters)    Stage 3B Moderate CKD (GFR = 30-44 mL/min/1.73 square meters)    Stage 4 Severe CKD (GFR = 15-29 mL/min/1.73 square meters)    Stage 5 End Stage CKD (GFR <15 mL/min/1.73 square meters)  Note: GFR calculation is accurate only with a steady state creatinine    Lactic acid [[id number]]  (Normal) Collected: 07/22/24 1501    Lab Status: Final result Specimen: Blood from Arm, Left Updated: 07/22/24 1524     LACTIC ACID 1.5 mmol/L     Narrative:      Result may be elevated if tourniquet was used during collection.    Protime-INR [[id number]]  (Normal) Collected: 07/22/24 1501    Lab Status: Final result Specimen: Blood from Arm, Left Updated: 07/22/24 1522     Protime 14.5 seconds      INR 1.06    APTT [[id number]]  (Normal) Collected: 07/22/24 1501    Lab Status: Final result Specimen: Blood from Arm, Left Updated: 07/22/24 1522     PTT 34 seconds     CBC and differential [[id number]]  (Abnormal) Collected: 07/22/24 1501    Lab Status: Final result Specimen: Blood from Arm, Left Updated: 07/22/24 1509     WBC 12.40 Thousand/uL      RBC 4.00 Million/uL      Hemoglobin 12.2 g/dL      Hematocrit 38.9 %      MCV 97 fL      MCH 30.5 pg      MCHC 31.4 g/dL       RDW 13.3 %      MPV 9.9 fL      Platelets 279 Thousands/uL      nRBC 0 /100 WBCs      Segmented % 78 %      Immature Grans % 0 %      Lymphocytes % 16 %      Monocytes % 4 %      Eosinophils Relative 1 %      Basophils Relative 1 %      Absolute Neutrophils 9.77 Thousands/µL      Absolute Immature Grans 0.04 Thousand/uL      Absolute Lymphocytes 1.92 Thousands/µL      Absolute Monocytes 0.50 Thousand/µL      Eosinophils Absolute 0.11 Thousand/µL      Basophils Absolute 0.06 Thousands/µL     Urine Microscopic [[id number]]  (Abnormal) Collected: 07/22/24 1441    Lab Status: Final result Specimen: Urine, Clean Catch Updated: 07/22/24 1506     RBC, UA Innumerable /hpf      WBC, UA Innumerable /hpf      Epithelial Cells None Seen /hpf      Bacteria, UA Occasional /hpf      MUCUS THREADS Occasional    Urine culture [[id number]] Collected: 07/22/24 1441    Lab Status: In process Specimen: Urine, Clean Catch Updated: 07/22/24 1505    Blood culture #1 [[id number]] Collected: 07/22/24 1501    Lab Status: In process Specimen: Blood from Arm, Left Updated: 07/22/24 1505    UA w Reflex to Microscopic w Reflex to Culture [[id number]]  (Abnormal) Collected: 07/22/24 1441    Lab Status: Final result Specimen: Urine, Clean Catch Updated: 07/22/24 1503     Color, UA Yellow     Clarity, UA Extra Turbid     Specific Gravity, UA 1.023     pH, UA 6.0     Leukocytes, UA Large     Nitrite, UA Negative     Protein,  (3+) mg/dl      Glucose, UA Negative mg/dl      Ketones, UA 10 (1+) mg/dl      Urobilinogen, UA <2.0 mg/dl      Bilirubin, UA Negative     Occult Blood, UA Moderate                   CT abdomen pelvis with contrast   Final Result by Chuy Myles MD (07/22 1610)      1. Abnormal appearance of urinary bladder. Although not optimally distended, the bladder wall appears significantly thickened, with increased mucosal enhancement, and innumerable bladder calculi and perivesical fat stranding, most in keeping with    cystitis  in the appropriate clinical and laboratory setting   (Given these above findings, a small underlying bladder wall lesion cannot be excluded. If clinically indicated, nonemergent urology consultation should be considered)   2. Although there are multiple midline prostate calcifications, prostatic urethral calculi felt somewhat unlikely as the bladder is not distended. The prostate gland is noted to be significantly enlarged.   3. Bilateral nephrolithiasis without hydronephrosis         Workstation performed: [id number]                    Procedures  Procedures         ED Course  ED Course as of 07/22/24 1705   Mon Jul 22, 2024   1510 Leukocytes, UA(!): Large   1510 Blood, UA(!): Moderate   1510 RBC Urine(!): Innumerable   1510 WBC, UA(!): Innumerable   1510 Bacteria, UA: Occasional   1510 MUCUS THREADS(!): Occasional   1510 WBC(!): 12.40   1530 Creatinine(!): 1.58  Up from 1.2                                               Medical Decision Making  Amount and/or Complexity of Data Reviewed  Labs: ordered. Decision-making details documented in ED Course.  Radiology: ordered.    Risk  Prescription drug management.  Decision regarding hospitalization.                 Disposition  Final diagnoses:   UTI (urinary tract infection)   Bladder stones   BPH (benign prostatic hyperplasia)     Time reflects when diagnosis was documented in both MDM as applicable and the Disposition within this note       Time User Action Codes Description Comment    7/22/2024  5:03 PM Yris Gomez [N39.0] UTI (urinary tract infection)     7/22/2024  5:03 PM Yris Gomez [N21.0] Bladder stones     7/22/2024  5:04 PM Yris Gomez [N40.0] BPH (benign prostatic hyperplasia)           ED Disposition       ED Disposition   Admit    Condition   Stable    Date/Time   Mon Jul 22, 2024  5:04 PM    Comment   Case was discussed with STEPHANIE and the patient's admission status was agreed to be Admission Status: inpatient status to the service of   [facility].               Follow-up Information    None         Patient's Medications   Discharge Prescriptions    No medications on file       No discharge procedures on file.    PDMP Review         Value Time User    PDMP Reviewed  Yes 5/1/2023  7:40 AM Tam Umana MD            ED Provider  Electronically Signed by

## 2024-07-22 NOTE — ASSESSMENT & PLAN NOTE
Lab Results   Component Value Date    EGFR 41 07/22/2024    EGFR 57 12/28/2023    EGFR 54 06/16/2023    CREATININE 1.58 (H) 07/22/2024    CREATININE 1.22 12/28/2023    CREATININE 1.27 06/16/2023   Has known history of CKD with baseline creatinine 1.2, today presents with creatinine 1.58  Patient also received CT with contrast in ER today.  Given underlying chronic systolic CHF, will hold off on fluids for now  Will hold Aldactone overnight and monitor BMP closely.

## 2024-07-22 NOTE — ASSESSMENT & PLAN NOTE
Wt Readings from Last 3 Encounters:   06/30/24 99.8 kg (220 lb)   06/16/24 101 kg (223 lb)   06/05/24 102 kg (224 lb)     Follows outpatient with cardiology  Last echo December 2023 with EF 30% grade 1 diastolic dysfunction  Has ICD in place.  Maintained on beta-blocker Entresto and Aldactone  Holding Aldactone for now given patient has mild elevation in creatinine from baseline  Monitor intake/output/daily weights closely  Monitor O2 levels closely.

## 2024-07-22 NOTE — ASSESSMENT & PLAN NOTE
C/c : + With symptoms of dysuria/frequency/hematuria.  Recent UTI x 2 in June 2024.  Currently afebrile.    CT Notes : Abnormal appearance of urinary bladder. Although not optimally distended, the bladder wall appears significantly thickened, with increased mucosal enhancement, and innumerable bladder calculi and perivesical fat stranding, most in keeping with cystitis in the appropriate clinical and laboratory setting. (Given these above findings, a small underlying bladder wall lesion cannot be excluded. If clinically indicated, nonemergent urology consultation should be considered)  UA positive, urine culture/blood culture ordered  Based on prior urine cultures from 6/30/2024 and 6/16/2024, patient growing Enterococcus faecalis sensitive to ampicillin  Continue IV ampicillin for now.  Urinary retention protocol ordered  Urology consulted.

## 2024-07-22 NOTE — TELEPHONE ENCOUNTER
pt seen in the ER. presentation appears to be bph with chronic incomplete emptying and numerous gravel/bladder stones. likely cause of his chronic dysuria and bacteriuria.  has upcoming OV in [place] but he will also need a cysto, can plan thanks

## 2024-07-22 NOTE — PLAN OF CARE
Problem: PAIN - ADULT  Goal: Verbalizes/displays adequate comfort level or baseline comfort level  Description: Interventions:  - Encourage patient to monitor pain and request assistance  - Assess pain using appropriate pain scale  - Administer analgesics based on type and severity of pain and evaluate response  - Implement non-pharmacological measures as appropriate and evaluate response  - Consider cultural and social influences on pain and pain management  - Notify physician/advanced practitioner if interventions unsuccessful or patient reports new pain  Outcome: Progressing     Problem: INFECTION - ADULT  Goal: Absence or prevention of progression during hospitalization  Description: INTERVENTIONS:  - Assess and monitor for signs and symptoms of infection  - Monitor lab/diagnostic results  - Monitor all insertion sites, i.e. indwelling lines, tubes, and drains  - Monitor endotracheal if appropriate and nasal secretions for changes in amount and color  - [place] appropriate cooling/warming therapies per order  - Administer medications as ordered  - Instruct and encourage patient and family to use good hand hygiene technique  - Identify and instruct in appropriate isolation precautions for identified infection/condition  Outcome: Progressing  Goal: Absence of fever/infection during neutropenic period  Description: INTERVENTIONS:  - Monitor WBC    Outcome: Progressing     Problem: SAFETY ADULT  Goal: Patient will remain free of falls  Description: INTERVENTIONS:  - Educate patient/family on patient safety including physical limitations  - Instruct patient to call for assistance with activity   - Consult OT/PT to assist with strengthening/mobility   - Keep Call bell within reach  - Keep bed low and locked with side rails adjusted as appropriate  - Keep care items and personal belongings within reach  - Initiate and maintain comfort rounds  - Make Fall Risk Sign visible to staff  - Offer Toileting every    Hours,  in advance of need  - Initiate/Maintain   alarm  - Obtain necessary fall risk management equipment:     - Apply yellow socks and bracelet for high fall risk patients  - Consider moving patient to room near nurses station  Outcome: Progressing  Goal: Maintain or return to baseline ADL function  Description: INTERVENTIONS:  -  Assess patient's ability to carry out ADLs; assess patient's baseline for ADL function and identify physical deficits which impact ability to perform ADLs (bathing, care of mouth/teeth, toileting, grooming, dressing, etc.)  - Assess/evaluate cause of self-care deficits   - Assess range of motion  - Assess patient's mobility; develop plan if impaired  - Assess patient's need for assistive devices and provide as appropriate  - Encourage maximum independence but intervene and supervise when necessary  - Involve family in performance of ADLs  - Assess for home care needs following discharge   - Consider OT consult to assist with ADL evaluation and planning for discharge  - Provide patient education as appropriate  Outcome: Progressing  Goal: Maintains/Returns to pre admission functional level  Description: INTERVENTIONS:  - Perform AM-PAC 6 Click Basic Mobility/ Daily Activity assessment daily.  - Set and communicate daily mobility goal to care team and patient/family/caregiver.   - Collaborate with rehabilitation services on mobility goals if consulted  - Perform Range of Motion    times a day.  - Reposition patient every    hours.  - Dangle patient    times a day  - Stand patient    times a day  - Ambulate patient      times a day  - Out of bed to chair    times a day   - Out of bed for meals  times a day  - Out of bed for toileting  - Record patient progress and toleration of activity level   Outcome: Progressing     Problem: DISCHARGE PLANNING  Goal: Discharge to home or other facility with appropriate resources  Description: INTERVENTIONS:  - Identify barriers to discharge w/patient and  caregiver  - Arrange for needed discharge resources and transportation as appropriate  - Identify discharge learning needs (meds, wound care, etc.)  - Arrange for interpretive services to assist at discharge as needed  - Refer to Case Management Department for coordinating discharge planning if the patient needs post-hospital services based on physician/advanced practitioner order or complex needs related to functional status, cognitive ability, or social support system  Outcome: Progressing     Problem: Knowledge Deficit  Goal: Patient/family/caregiver demonstrates understanding of disease process, treatment plan, medications, and discharge instructions  Description: Complete learning assessment and assess knowledge base.  Interventions:  - Provide teaching at level of understanding  - Provide teaching via preferred learning methods  Outcome: Progressing

## 2024-07-22 NOTE — H&P
[facility]  H&P  Name: Sherly Peña 78 y.o. male I MRN: [id number]  Unit/Bed#: ED 12 I Date of Admission: 7/22/2024   Date of Service: 7/22/2024 I Hospital Day: 0      Assessment & Plan   * Acute UTI  Assessment & Plan  C/c : + With symptoms of dysuria/frequency/hematuria.  Recent UTI x 2 in June 2024.  Currently afebrile.    CT Notes : Abnormal appearance of urinary bladder. Although not optimally distended, the bladder wall appears significantly thickened, with increased mucosal enhancement, and innumerable bladder calculi and perivesical fat stranding, most in keeping with cystitis in the appropriate clinical and laboratory setting. (Given these above findings, a small underlying bladder wall lesion cannot be excluded. If clinically indicated, nonemergent urology consultation should be considered)  UA positive, urine culture/blood culture ordered  Based on prior urine cultures from 6/30/2024 and 6/16/2024, patient growing Enterococcus faecalis sensitive to ampicillin  Continue IV ampicillin for now.  Urinary retention protocol ordered  Urology consulted.    Stage 3 chronic kidney disease (HCC)  Assessment & Plan  Lab Results   Component Value Date    EGFR 41 07/22/2024    EGFR 57 12/28/2023    EGFR 54 06/16/2023    CREATININE 1.58 (H) 07/22/2024    CREATININE 1.22 12/28/2023    CREATININE 1.27 06/16/2023   Has known history of CKD with baseline creatinine 1.2, today presents with creatinine 1.58  Patient also received CT with contrast in ER today.  Given underlying chronic systolic CHF, will hold off on fluids for now  Will hold Aldactone overnight and monitor BMP closely.    NSVT (nonsustained ventricular tachycardia) ([facility])  Assessment & Plan  ICD in place  Continue beta-blocker  Follows outpatient with cardiology.    Chronic systolic congestive heart failure (HCC)  Assessment & Plan  Wt Readings from Last 3 Encounters:   06/30/24 99.8 kg (220 lb)   06/16/24 101 kg (223 lb)   06/05/24  102 kg (224 lb)     Follows outpatient with cardiology  Last echo December 2023 with EF 30% grade 1 diastolic dysfunction  Has ICD in place.  Maintained on beta-blocker Entresto and Aldactone  Holding Aldactone for now given patient has mild elevation in creatinine from baseline  Monitor intake/output/daily weights closely  Monitor O2 levels closely.          Acquired hypothyroidism  Assessment & Plan  Continue Synthroid.    COPD (chronic obstructive pulmonary disease) (HCC)  Assessment & Plan  Not in exacerbation  Continue albuterol nebulizer every 6 hours as needed/continue home inhalers.    HTN (hypertension)  Assessment & Plan  Currently well-controlled  Continue home Toprol-XL.    Hyperlipidemia  Assessment & Plan  Continue home statin.           VTE Prophylaxis: Heparin  / sequential compression device   Code Status: level 1  POLST: POLST form is not discussed and not completed at this time.    Anticipated Length of Stay:  Patient will be admitted on an Inpatient basis with an anticipated length of stay of  over 2 midnights.   Justification for Hospital Stay: iv abx    Total Time for Visit, including Counseling / Coordination of Care: 30 minutes.  Greater than 50% of this total time spent on direct patient counseling and coordination of care.    Chief Complaint:   UTI    History of Present Illness:    Sherly Peña is a 78 y.o. male with underlying history of CHF with ICD in place, COPD, CKD, hypothyroidism, hypertension, hyperlipidemia with frequent UTIs presents to the ED reporting dysuria hematuria frequency.  No fever/chills.  Patient with 2 recent urine cultures back in June growing Enterococcus faecalis sensitive to ampicillin.  Besides that he denies any acute concerns.    Review of Systems:    Review of Systems   Constitutional:  Negative for activity change, appetite change, chills, diaphoresis, fatigue and fever.   HENT:  Negative for congestion, postnasal drip and rhinorrhea.    Respiratory:   Negative for cough, shortness of breath and wheezing.    Cardiovascular:  Negative for chest pain, palpitations and leg swelling.   Gastrointestinal:  Negative for abdominal pain, blood in stool, constipation, diarrhea, nausea and vomiting.   Genitourinary:  Positive for dysuria, frequency and hematuria. Negative for flank pain.   Musculoskeletal:  Negative for gait problem and myalgias.   Skin:  Negative for rash.   Neurological:  Negative for dizziness, seizures, speech difficulty, light-headedness and headaches.       Past Medical and Surgical History:     Past Medical History:   Diagnosis Date    Agent orange exposure     Anemia     Benign colon polyp     BPH (benign prostatic hyperplasia)     Bradycardia     Cardiomyopathy (HCC)     Chest discomfort     CHF (congestive heart failure) (HCC)     Chronic bronchitis (HCC)     Chronic kidney disease     COPD (chronic obstructive pulmonary disease) (HCC)     LAST ASSESSED: 9/9/17    Coronary artery disease cardio myopthia    Emphysema of lung (HCC) 2013    H/O nonmelanoma skin cancer     LAST ASSESSED: 11/7/17    HHD (hypertensive heart disease)     HTN (hypertension)     Hx of lymphoma     Hyperlipidemia     ICD (implantable cardioverter-defibrillator) in place     Insomnia     Mycosis fungoides (HCC)     PVC (premature ventricular contraction)     PVT (paroxysmal ventricular tachycardia) (HCC)     SOB (shortness of breath)        Past Surgical History:   Procedure Laterality Date    CARDIAC PACEMAKER PLACEMENT      SKIN SURGERY      skin cancer removal     TONSILLECTOMY         Meds/Allergies:    Prior to Admission medications    Medication Sig Start Date End Date Taking? Authorizing Provider   albuterol (2.5 mg/3 mL) 0.083 % nebulizer solution USE 1 VIAL VIA NEBULIZER 4 TIMES A DAY AS NEEDED 5/3/24   Tony Zapata PA-C   ammonium lactate (LAC-HYDRIN) 12 % lotion APPLY TO AFFECTED AREA TOPICALLY EVERY DAY 6/10/24   Harrison Rivers MD   aspirin 81 MG tablet Take  81 mg by mouth daily    Historical Provider, MD   atorvastatin (LIPITOR) 40 mg tablet TAKE 1 TABLET BY MOUTH DAILY 6/23/24   Asher Lozada MD   bexarotene (TARGRETIN) 75 mg capsule TAKE 8 CAPSULES BY MOUTH DAILY 3/5/24   Harrison Rivers MD   Cholecalciferol 25 MCG (1000 UT) capsule Take 1 capsule by mouth daily    Historical Provider, MD   cyanocobalamin (VITAMIN B-12) 1,000 mcg tablet Take 1,000 mcg by mouth daily    Historical Provider, MD   diclofenac potassium (CATAFLAM) 50 mg tablet 1 tablet daily for severe pain.  Not to exceed 2 tablets a week. 1/10/22   Tam Umana MD   Entresto 49-51 MG TABS TAKE 1 TABLET BY MOUTH  TWICE DAILY 9/5/23   Asher Lozada MD   ferrous sulfate 324 (65 Fe) mg Take 1 tablet by mouth Twice daily 8/31/15   Historical Provider, MD   gabapentin (NEURONTIN) 100 mg capsule Take 1 capsule (100 mg total) by mouth 3 (three) times a day 2/21/24   Justus Reese DO   levothyroxine 100 mcg tablet TAKE 1 TABLET BY MOUTH DAILY 6/22/24   Marino Lozada MD   metoprolol succinate (TOPROL-XL) 50 mg 24 hr tablet  2/27/24   Historical Provider, MD   montelukast (SINGULAIR) 10 mg tablet TAKE 1 TABLET BY MOUTH  DAILY AT BEDTIME 9/5/23   Tony Zapata PA-C   Multiple Vitamins-Minerals (OCUVITE ADULT 50+ PO) Take by mouth    Historical Provider, MD   spironolactone (ALDACTONE) 25 mg tablet Take 0.5 tablets (12.5 mg total) by mouth daily 12/21/23   Asher Lozada MD   Trelegy Ellipta 100-62.5-25 MCG/ACT inhaler USE 1 INHALATION BY MOUTH ONCE  DAILY AT THE SAME TIME EACH DAY  RINSE MOUTH AFTER USE 4/2/24   Karishma Valle MD   triamcinolone (KENALOG) 0.1 % ointment Apply topically 2 (two) times 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 2/6/18   Harrison Rivers MD   Vitamins-Lipotropics (LIPOFLAVONOID PO) Take by mouth daily    Historical Provider, MD   zolpidem (AMBIEN) 10 mg tablet TAKE 1 TABLET BY MOUTH DAILY AT BEDTIME AS NEEDED FOR SLEEP 6/10/24   Marino Lozada MD   nicotine (NICODERM CQ) 21 mg/24 hr TD 24 hr  patch Place 1 patch on the skin every 24 hours  12/5/19  Historical Provider, MD LIU have reviewed home medications with patient personally.    Allergies: No Known Allergies    Social History:     Marital Status:      Substance Use History:   Social History     Substance and Sexual Activity   Alcohol Use Not Currently    Alcohol/week: 14.0 standard drinks of alcohol    Comment: two drinks every night over a 4 hour period     Social History     Tobacco Use   Smoking Status Every Day    Current packs/day: 1.00    Average packs/day: 1 pack/day for 63.6 years (63.6 ttl pk-yrs)    Types: Cigarettes    Start date: 1961    Passive exposure: Current   Smokeless Tobacco Never   Tobacco Comments    HALF A PACK PER DAY      Social History     Substance and Sexual Activity   Drug Use Never       Family History:    non-contributory    Physical Exam:     Vitals:   Blood Pressure: 124/83 (07/22/24 1332)  Pulse: 58 (07/22/24 1332)  Temperature: (!) 97.1 °F (36.2 °C) (07/22/24 1332)  Temp Source: Temporal (07/22/24 1332)  Respirations: (!) 25 (07/22/24 1332)  SpO2: 97 % (07/22/24 1332)    Physical Exam  Vitals reviewed.   Constitutional:       General: He is not in acute distress.     Appearance: He is not ill-appearing.   HENT:      Head: Normocephalic and atraumatic.      Nose: Nose normal. No congestion.      Mouth/Throat:      Mouth: Mucous membranes are moist.      Pharynx: Oropharynx is clear.   Eyes:      Conjunctiva/sclera: Conjunctivae normal.      Pupils: Pupils are equal, round, and reactive to light.   Cardiovascular:      Rate and Rhythm: Normal rate and regular rhythm.      Pulses: Normal pulses.   Pulmonary:      Effort: Pulmonary effort is normal. No respiratory distress.      Breath sounds: Normal breath sounds. No wheezing or rales.   Abdominal:      General: Abdomen is flat. Bowel sounds are normal. There is no distension.      Palpations: Abdomen is soft.      Tenderness: There is no abdominal  tenderness. There is no guarding.   Musculoskeletal:         General: No swelling. Normal range of motion.      Cervical back: Normal range of motion and neck supple.   Skin:     General: Skin is warm and dry.      Findings: No rash.   Neurological:      General: No focal deficit present.      Mental Status: He is alert and oriented to person, place, and time.   Psychiatric:         Mood and Affect: Mood normal.         Behavior: Behavior normal.         Additional Data:     Lab Results: I have personally reviewed pertinent reports.      Results from last 7 days   Lab Units 07/22/24  1501   WBC Thousand/uL 12.40*   HEMOGLOBIN g/dL 12.2   HEMATOCRIT % 38.9   PLATELETS Thousands/uL 279   SEGS PCT % 78*   LYMPHO PCT % 16   MONO PCT % 4   EOS PCT % 1     Results from last 7 days   Lab Units 07/22/24  1501   SODIUM mmol/L 143   POTASSIUM mmol/L 4.1   CHLORIDE mmol/L 110*   CO2 mmol/L 24   BUN mg/dL 30*   CREATININE mg/dL 1.58*   ANION GAP mmol/L 9   CALCIUM mg/dL 8.1*   ALBUMIN g/dL 3.9   TOTAL BILIRUBIN mg/dL 0.40   ALK PHOS U/L 55   ALT U/L 11   AST U/L 15   GLUCOSE RANDOM mg/dL 100     Results from last 7 days   Lab Units 07/22/24  1501   INR  1.06             Results from last 7 days   Lab Units 07/22/24  1501   LACTIC ACID mmol/L 1.5   PROCALCITONIN ng/ml <0.05       Imaging: I have personally reviewed pertinent reports.      CT abdomen pelvis with contrast   Final Result by Chuy Myles MD (07/22 1610)      1. Abnormal appearance of urinary bladder. Although not optimally distended, the bladder wall appears significantly thickened, with increased mucosal enhancement, and innumerable bladder calculi and perivesical fat stranding, most in keeping with    cystitis in the appropriate clinical and laboratory setting   (Given these above findings, a small underlying bladder wall lesion cannot be excluded. If clinically indicated, nonemergent urology consultation should be considered)   2. Although there are multiple  midline prostate calcifications, prostatic urethral calculi felt somewhat unlikely as the bladder is not distended. The prostate gland is noted to be significantly enlarged.   3. Bilateral nephrolithiasis without hydronephrosis         Workstation performed: [id number]             Allscripts / [facility] Records Reviewed: Yes     ** Please Note: This note has been constructed using a voice recognition system. **

## 2024-07-23 LAB
ANION GAP SERPL CALCULATED.3IONS-SCNC: 10 MMOL/L (ref 4–13)
BACTERIA UR CULT: NORMAL
BUN SERPL-MCNC: 32 MG/DL (ref 5–25)
CALCIUM SERPL-MCNC: 7.7 MG/DL (ref 8.4–10.2)
CHLORIDE SERPL-SCNC: 110 MMOL/L (ref 96–108)
CO2 SERPL-SCNC: 21 MMOL/L (ref 21–32)
CREAT SERPL-MCNC: 1.47 MG/DL (ref 0.6–1.3)
ERYTHROCYTE [DISTWIDTH] IN BLOOD BY AUTOMATED COUNT: 13.3 % (ref 11.6–15.1)
GFR SERPL CREATININE-BSD FRML MDRD: 45 ML/MIN/1.73SQ M
GLUCOSE SERPL-MCNC: 81 MG/DL (ref 65–140)
HCT VFR BLD AUTO: 36.9 % (ref 36.5–49.3)
HGB BLD-MCNC: 11.7 G/DL (ref 12–17)
MCH RBC QN AUTO: 30.3 PG (ref 26.8–34.3)
MCHC RBC AUTO-ENTMCNC: 31.7 G/DL (ref 31.4–37.4)
MCV RBC AUTO: 96 FL (ref 82–98)
PLATELET # BLD AUTO: 264 THOUSANDS/UL (ref 149–390)
PMV BLD AUTO: 10.5 FL (ref 8.9–12.7)
POTASSIUM SERPL-SCNC: 3.9 MMOL/L (ref 3.5–5.3)
RBC # BLD AUTO: 3.86 MILLION/UL (ref 3.88–5.62)
SODIUM SERPL-SCNC: 141 MMOL/L (ref 135–147)
WBC # BLD AUTO: 9.84 THOUSAND/UL (ref 4.31–10.16)

## 2024-07-23 PROCEDURE — 99232 SBSQ HOSP IP/OBS MODERATE 35: CPT | Performed by: FAMILY MEDICINE

## 2024-07-23 PROCEDURE — 80048 BASIC METABOLIC PNL TOTAL CA: CPT | Performed by: FAMILY MEDICINE

## 2024-07-23 PROCEDURE — 99222 1ST HOSP IP/OBS MODERATE 55: CPT

## 2024-07-23 PROCEDURE — 85027 COMPLETE CBC AUTOMATED: CPT | Performed by: FAMILY MEDICINE

## 2024-07-23 RX ORDER — TAMSULOSIN HYDROCHLORIDE 0.4 MG/1
0.4 CAPSULE ORAL
Status: DISCONTINUED | OUTPATIENT
Start: 2024-07-23 | End: 2024-07-25 | Stop reason: HOSPADM

## 2024-07-23 RX ORDER — ZOLPIDEM TARTRATE 5 MG/1
10 TABLET ORAL
Status: DISCONTINUED | OUTPATIENT
Start: 2024-07-23 | End: 2024-07-25 | Stop reason: HOSPADM

## 2024-07-23 RX ORDER — OXYBUTYNIN CHLORIDE 5 MG/1
5 TABLET ORAL 3 TIMES DAILY PRN
Status: DISCONTINUED | OUTPATIENT
Start: 2024-07-23 | End: 2024-07-25 | Stop reason: HOSPADM

## 2024-07-23 RX ADMIN — AMPICILLIN SODIUM 2000 MG: 2 INJECTION, POWDER, FOR SOLUTION INTRAMUSCULAR; INTRAVENOUS at 18:39

## 2024-07-23 RX ADMIN — AMPICILLIN SODIUM 2000 MG: 2 INJECTION, POWDER, FOR SOLUTION INTRAMUSCULAR; INTRAVENOUS at 12:08

## 2024-07-23 RX ADMIN — TAMSULOSIN HYDROCHLORIDE 0.4 MG: 0.4 CAPSULE ORAL at 18:40

## 2024-07-23 RX ADMIN — GABAPENTIN 100 MG: 100 CAPSULE ORAL at 18:39

## 2024-07-23 RX ADMIN — SACUBITRIL AND VALSARTAN 1 TABLET: 49; 51 TABLET, FILM COATED ORAL at 18:41

## 2024-07-23 RX ADMIN — METOPROLOL SUCCINATE 50 MG: 50 TABLET, EXTENDED RELEASE ORAL at 09:48

## 2024-07-23 RX ADMIN — FLUTICASONE FUROATE AND VILANTEROL TRIFENATATE 1 PUFF: 100; 25 POWDER RESPIRATORY (INHALATION) at 08:50

## 2024-07-23 RX ADMIN — OXYBUTYNIN CHLORIDE 5 MG: 5 TABLET ORAL at 18:45

## 2024-07-23 RX ADMIN — HEPARIN SODIUM 5000 UNITS: 5000 INJECTION INTRAVENOUS; SUBCUTANEOUS at 18:40

## 2024-07-23 RX ADMIN — LEVOTHYROXINE SODIUM 100 MCG: 0.1 TABLET ORAL at 05:42

## 2024-07-23 RX ADMIN — GABAPENTIN 100 MG: 100 CAPSULE ORAL at 21:41

## 2024-07-23 RX ADMIN — UMECLIDINIUM 1 PUFF: 62.5 AEROSOL, POWDER ORAL at 08:55

## 2024-07-23 RX ADMIN — ZOLPIDEM TARTRATE 10 MG: 5 TABLET, COATED ORAL at 00:42

## 2024-07-23 RX ADMIN — AMPICILLIN SODIUM 2000 MG: 2 INJECTION, POWDER, FOR SOLUTION INTRAMUSCULAR; INTRAVENOUS at 23:09

## 2024-07-23 RX ADMIN — GABAPENTIN 100 MG: 100 CAPSULE ORAL at 09:48

## 2024-07-23 RX ADMIN — ZOLPIDEM TARTRATE 10 MG: 5 TABLET, COATED ORAL at 21:41

## 2024-07-23 RX ADMIN — FERROUS SULFATE TAB 325 MG (65 MG ELEMENTAL FE) 325 MG: 325 (65 FE) TAB at 09:48

## 2024-07-23 RX ADMIN — SACUBITRIL AND VALSARTAN 1 TABLET: 49; 51 TABLET, FILM COATED ORAL at 09:53

## 2024-07-23 RX ADMIN — ATORVASTATIN CALCIUM 40 MG: 40 TABLET, FILM COATED ORAL at 09:48

## 2024-07-23 RX ADMIN — AMPICILLIN SODIUM 2000 MG: 2 INJECTION, POWDER, FOR SOLUTION INTRAMUSCULAR; INTRAVENOUS at 05:42

## 2024-07-23 RX ADMIN — ASPIRIN 81 MG: 81 TABLET, CHEWABLE ORAL at 09:48

## 2024-07-23 RX ADMIN — HEPARIN SODIUM 5000 UNITS: 5000 INJECTION INTRAVENOUS; SUBCUTANEOUS at 09:48

## 2024-07-23 NOTE — CONSULTS
Formerly [facility]  Consult  Name: Sherly Peña 78 y.o. male I MRN: [id number]  Unit/Bed#: -01 I Date of Admission: 7/22/2024   Date of Service: 7/23/2024 I Hospital Day: 1    Inpatient consult to Urology  Consult performed by: Luzmaria Cole PA-C  Consult ordered by: Roque Linn MD          Assessment & Plan   * Acute UTI  Assessment & Plan  Third UTI in the past month, history of Enterococcus  Dysuria, frequency, urgency.  Denies fever  CT showing Abnormal appearance of urinary bladder. Although not optimally distended, the bladder wall appears significantly thickened, with increased mucosal enhancement, and innumerable bladder calculi and perivesical fat stranding, most in keeping with cystitis  PSA last 2022 3.9, stable  Mild leukocytosis resolved  Reports improvement in symptoms after IV antibiotics, however symptoms recurred this AM  Start flomax  PVR 53ml. Can utilize oxybutinin for frequency, urgency. Caution for side effects  Will require cysto to evaluate bladder stones, bladder outlet obstruction for surgical interventions  Outpatient follow up  Urology will sign off. Available as needed for patient.            Subjective:   HPI: Leidy is a 78-year-old PMH COPD, HTN, hypothyroidism, CKD stage III presenting to the ED with frequency, urgency, dysuria, hematuria.  Denies any fever or chills.  Patient reports 2 prior urinary tract infections in the past month.  Past urine cultures growing Enterococcus.  He does report improvement in symptoms with antibiotics.  He denies any suprapubic pain or flank pain.  He routinely gets his PSA checked, within normal limits, last checked in 2022 3.9.  He underwent a CT scan which showed enlarged prostate, multiple layering bladder stones, asymmetric enhancement of bladder.  Patient was admitted for IV antibiotics.  Urology was consulted due to BPH, bladder stones, recurrent UTI.    Review of Systems   Constitutional:  Negative for chills and  "fever.   Respiratory:  Negative for cough and shortness of breath.    Cardiovascular:  Negative for chest pain and palpitations.   Gastrointestinal:  Negative for abdominal pain and vomiting.   Genitourinary:  Positive for difficulty urinating and dysuria. Negative for hematuria.   Musculoskeletal:  Negative for arthralgias and back pain.   Skin:  Negative for color change and rash.   Neurological:  Negative for seizures and syncope.   All other systems reviewed and are negative.      Objective:    Vitals: Blood pressure 127/79, pulse 77, temperature 97.9 °F (36.6 °C), resp. rate 18, height 5' 11\" (1.803 m), weight 98.3 kg (216 lb 11.4 oz), SpO2 97%.,Body mass index is 30.23 kg/m².    Physical Exam  Constitutional:       General: He is not in acute distress.     Appearance: Normal appearance. He is normal weight. He is not ill-appearing or toxic-appearing.   HENT:      Head: Normocephalic and atraumatic.      Right Ear: External ear normal.      Left Ear: External ear normal.      Nose: Nose normal.      Mouth/Throat:      Mouth: Mucous membranes are moist.   Eyes:      General: No scleral icterus.     Conjunctiva/sclera: Conjunctivae normal.   Cardiovascular:      Rate and Rhythm: Normal rate.      Pulses: Normal pulses.   Pulmonary:      Effort: Pulmonary effort is normal.   Abdominal:      General: Abdomen is flat. There is no distension.      Palpations: Abdomen is soft.      Tenderness: There is no abdominal tenderness. There is no guarding.   Musculoskeletal:         General: Normal range of motion.      Cervical back: Normal range of motion.   Skin:     General: Skin is warm.      Findings: No rash.   Neurological:      General: No focal deficit present.      Mental Status: He is alert and oriented to person, place, and time. Mental status is at baseline.   Psychiatric:         Mood and Affect: Mood normal.         Behavior: Behavior normal.         Thought Content: Thought content normal.         Judgment: " Judgment normal.         Imaging:  CT ABDOMEN AND PELVIS WITH IV CONTRAST     INDICATION: recurrent bladder infections, long smoking history, concern for possible bladder cancer.     COMPARISON: None.     TECHNIQUE: CT examination of the abdomen and pelvis was performed. Multiplanar 2D reformatted images were created from the source data.     This examination, like all CT scans performed in the [facility], was performed utilizing techniques to minimize radiation dose exposure, including the use of iterative reconstruction and automated exposure control. Radiation dose length   product (DLP) for this visit: 720 mGy-cm     IV Contrast: 100 mL of iohexol (OMNIPAQUE)  Enteric Contrast: Not administered.     FINDINGS:     ABDOMEN     LOWER CHEST: No clinically significant abnormality in the visualized lower chest.     LIVER/BILIARY TREE: Unremarkable.     GALLBLADDER: No calcified gallstones. No pericholecystic inflammatory change.     SPLEEN: Unremarkable.     PANCREAS: Unremarkable.     ADRENAL GLANDS: Unremarkable.     KIDNEYS/URETERS: 0.2 cm left kidney upper pole nonobstructing calculus. Multiple larger calculi present on the right, largest present within the lower pole, measuring 0.7 cm. No hydronephrosis.     STOMACH AND BOWEL: Colonic diverticulosis. No bowel obstruction     APPENDIX: No findings to suggest appendicitis.     ABDOMINOPELVIC CAVITY: No ascites. No pneumoperitoneum. No lymphadenopathy.     VESSELS: Unremarkable for patient's age.     PELVIS     REPRODUCTIVE ORGANS: Markedly enlarged prostate gland, measuring approximately 5.5 x 4.3 x 5.7 cm. Produces mass effect upon the bladder base. Although there are numerous midline prostate calcifications, prostatic urethral calculi felt somewhat unlikely   given the bladder is not distended        URINARY BLADDER: Somewhat under distended. Innumerable bladder calculi. Diffuse bladder wall thickening and mucosal enhancement. Perivesical soft  tissue stranding.        ABDOMINAL WALL/INGUINAL REGIONS: Unremarkable.     BONES: No acute fracture or suspicious osseous lesion.     IMPRESSION:     1. Abnormal appearance of urinary bladder. Although not optimally distended, the bladder wall appears significantly thickened, with increased mucosal enhancement, and innumerable bladder calculi and perivesical fat stranding, most in keeping with   cystitis in the appropriate clinical and laboratory setting  (Given these above findings, a small underlying bladder wall lesion cannot be excluded. If clinically indicated, nonemergent urology consultation should be considered)  2. Although there are multiple midline prostate calcifications, prostatic urethral calculi felt somewhat unlikely as the bladder is not distended. The prostate gland is noted to be significantly enlarged.  3. Bilateral nephrolithiasis without hydronephrosis        Workstation performed: [id number]       Labs:  Recent Labs     07/22/24  1501 07/23/24  0456   WBC 12.40* 9.84       Recent Labs     07/22/24  1501 07/23/24  0456   HGB 12.2 11.7*     Recent Labs     07/22/24  1501 07/23/24  0456   HCT 38.9 36.9     Recent Labs     07/22/24  1501 07/23/24  0456   CREATININE 1.58* 1.47*         History:    Past Medical History:   Diagnosis Date    Agent orange exposure     Anemia     Benign colon polyp     BPH (benign prostatic hyperplasia)     Bradycardia     Cardiomyopathy (HCC)     Chest discomfort     CHF (congestive heart failure) (HCC)     Chronic bronchitis (HCC)     Chronic kidney disease     COPD (chronic obstructive pulmonary disease) (HCC)     LAST ASSESSED: 9/9/17    Coronary artery disease cardio myopthia    Emphysema of lung (HCC) 2013    H/O nonmelanoma skin cancer     LAST ASSESSED: 11/7/17    HHD (hypertensive heart disease)     HTN (hypertension)     Hx of lymphoma     Hyperlipidemia     ICD (implantable cardioverter-defibrillator) in place     Insomnia     Mycosis fungoides (HCC)      PVC (premature ventricular contraction)     PVT (paroxysmal ventricular tachycardia) (HCC)     SOB (shortness of breath)      Social History     Socioeconomic History    Marital status:      Spouse name: None    Number of children: 0    Years of education: None    Highest education level: None   Occupational History    Occupation: retired   Tobacco Use    Smoking status: Every Day     Current packs/day: 1.00     Average packs/day: 1 pack/day for 63.6 years (63.6 ttl pk-yrs)     Types: Cigarettes     Start date: 1961     Passive exposure: Current    Smokeless tobacco: Never    Tobacco comments:     HALF A PACK PER DAY    Vaping Use    Vaping status: Never Used   Substance and Sexual Activity    Alcohol use: Not Currently     Alcohol/week: 14.0 standard drinks of alcohol     Comment: two drinks every night over a 4 hour period    Drug use: Never    Sexual activity: Not Currently     Partners: Female     Birth control/protection: Male Sterilization   Other Topics Concern    None   Social History Narrative    None     Social Determinants of Health     Financial Resource Strain: Low Risk  (1/23/2023)    Overall Financial Resource Strain (CARDIA)     Difficulty of Paying Living Expenses: Not very hard   Food Insecurity: No Food Insecurity (5/14/2024)    Hunger Vital Sign     Worried About Running Out of Food in the Last Year: Never true     Ran Out of Food in the Last Year: Never true   Transportation Needs: No Transportation Needs (5/14/2024)    PRAPARE - Transportation     Lack of Transportation (Medical): No     Lack of Transportation (Non-Medical): No   Physical Activity: Inactive (5/14/2024)    Exercise Vital Sign     Days of Exercise per Week: 0 days     Minutes of Exercise per Session: 0 min   Stress: No Stress Concern Present (9/4/2020)    [employer] - Occupational Stress Questionnaire     Feeling of Stress : Not at all   Social Connections: Not on file   Intimate Partner  Violence: Not on file   Housing Stability: Unknown (5/14/2024)    Housing Stability Vital Sign     Unable to Pay for Housing in the Last Year: No     Number of Times Moved in the Last Year: Not on file     Homeless in the Last Year: No     Past Surgical History:   Procedure Laterality Date    CARDIAC PACEMAKER PLACEMENT      SKIN SURGERY      skin cancer removal     TONSILLECTOMY       Family History   Problem Relation Age of Onset    Diabetes Mother     Colon cancer Father         DIAGNOSED IN HIS 80'S    Heart failure Father     Coronary artery disease Father     Cancer Father         Lorenzo    Diabetes Family         MELLITUS    Heart attack Neg Hx     Stroke Neg Hx     Anuerysm Neg Hx     Clotting disorder Neg Hx     Arrhythmia Neg Hx     Hypertension Neg Hx         pt unsure     Hyperlipidemia Neg Hx     Sudden death Neg Hx         scd       Luzmaria Cole PA-C  Date: 7/23/2024 Time: 10:11 AM

## 2024-07-23 NOTE — PLAN OF CARE
Problem: INFECTION - ADULT  Goal: Absence or prevention of progression during hospitalization  Description: INTERVENTIONS:  - Assess and monitor for signs and symptoms of infection  - Monitor lab/diagnostic results  - Monitor all insertion sites, i.e. indwelling lines, tubes, and drains  - Monitor endotracheal if appropriate and nasal secretions for changes in amount and color  - [place] appropriate cooling/warming therapies per order  - Administer medications as ordered  - Instruct and encourage patient and family to use good hand hygiene technique  - Identify and instruct in appropriate isolation precautions for identified infection/condition  Outcome: Progressing  Goal: Absence of fever/infection during neutropenic period  Description: INTERVENTIONS:  - Monitor WBC    Outcome: Progressing

## 2024-07-23 NOTE — QUICK NOTE
Contacted by bedside RN regarding Mr. Peña insomnia. The patient takes Ambien 10 mg HS at home. Home medication was reconciled, PDMP checked. Nurse was instructed to  monitor patient closely.

## 2024-07-23 NOTE — ASSESSMENT & PLAN NOTE
Lab Results   Component Value Date    EGFR 45 07/23/2024    EGFR 41 07/22/2024    EGFR 57 12/28/2023    CREATININE 1.47 (H) 07/23/2024    CREATININE 1.58 (H) 07/22/2024    CREATININE 1.22 12/28/2023   Has known history of CKD with baseline creatinine 1.2, today presents with creatinine 1.58  Patient also received CT with contrast in ER today.  Given underlying chronic systolic CHF, will hold off on fluids for now  Will hold Aldactone overnight and monitor BMP closely. Creatinine improving, 1.47 this am

## 2024-07-23 NOTE — ASSESSMENT & PLAN NOTE
Wt Readings from Last 3 Encounters:   07/22/24 98.3 kg (216 lb 11.4 oz)   06/30/24 99.8 kg (220 lb)   06/16/24 101 kg (223 lb)     Follows outpatient with cardiology  Last echo December 2023 with EF 30% grade 1 diastolic dysfunction  Has ICD in place.  Maintained on beta-blocker Entresto and Aldactone  Holding Aldactone for now given patient has mild elevation in creatinine from baseline  Monitor intake/output/daily weights closely  Monitor O2 levels closely.

## 2024-07-23 NOTE — UTILIZATION REVIEW
Initial Clinical Review    Admission: Date/Time/Statement:   Admission Orders (From admission, onward)       Ordered        07/22/24 1705  INPATIENT ADMISSION  Once                          Orders Placed This Encounter   Procedures    INPATIENT ADMISSION     Standing Status:   Standing     Number of Occurrences:   1     Order Specific Question:   Level of Care     Answer:   Med Surg [16]     Order Specific Question:   Estimated length of stay     Answer:   More than 2 Midnights     Order Specific Question:   Certification     Answer:   I certify that inpatient services are medically necessary for this patient for a duration of greater than two midnights. See H&P and MD Progress Notes for additional information about the patient's course of treatment.     ED Arrival Information       Expected   -    Arrival   7/22/2024 13:18    Acuity   Urgent              Means of arrival   Walk-In    Escorted by   Self    Service   Hospitalist    Admission type   Emergency              Arrival complaint   Possible UTI             Chief Complaint   Patient presents with    Difficulty Urinating     Patient reports difficulty urinating, and when they are able to go they report pain. Recently diagnosed with UTI at urgent care earlier this month and was placed on bactrim and levofloxacin. Patient reports no relief.        Initial Presentation: 78 y.o. male to the ED from home with complaints of difficulty urinating. Admitted to inpatient for acute UTI, CKD. H/O CKD, AICD, NSVT, htn, HLD, COPD.  Hypothyroidism. CT a/p shows: abnormal appearance of urinary bladder. Although not optimally distended, the bladder wall appears significantly thickened, with increased mucosal enhancement, and innumerable bladder calculi and perivesical fat stranding, most in keeping with cystitis in the appropriate clinical and laboratory setting. (Given these above findings, a small underlying bladder wall lesion cannot be excluded. Urology consult. Urine  culture pending.  H/O CKD with baseline creat 1.2. Creat on arrival is 1.58.    Anticipated Length of Stay/Certification Statement: Patient will be admitted on an Inpatient basis with an anticipated length of stay of  over 2 midnights.   Justification for Hospital Stay: iv abx       Date: 7/23   Day 2:  Continue with IV abx. Continue BB, entresto, aldactone.     Urology:  Reports improved symptoms since starting IV abx.PVR 53 mls. Mild leukocytosis resolved. Continue with IV abx.      Date: 7/24  Day 3: Has surpassed a 2nd midnight with active treatments and services.  Initially admitted to inpatient for UTI, has been receiving IV abx. LUngs clear.     ED Triage Vitals   Temperature Pulse Respirations Blood Pressure SpO2 Pain Score   07/22/24 1332 07/22/24 1332 07/22/24 1332 07/22/24 1332 07/22/24 1332 07/22/24 1844   (!) 97.1 °F (36.2 °C) 58 (!) 25 124/83 97 % 8     Weight (last 2 days)       Date/Time Weight    07/22/24 1936 98.3 (216.71)            Vital Signs (last 3 days)       Date/Time Temp Pulse Resp BP MAP (mmHg) SpO2 O2 Device Patient Position - Orthostatic VS Pain    07/24/24 15:10:35 97.8 °F (36.6 °C) 76 16 125/73 90 97 % -- -- --    07/24/24 1100 -- -- -- -- -- -- None (Room air) -- --    07/24/24 1055 -- -- -- -- -- -- -- -- 9    07/24/24 0958 -- 79 -- -- -- -- -- -- 9    07/24/24 07:24:02 97.6 °F (36.4 °C) 85 19 125/76 92 93 % -- -- --    07/23/24 21:43:49 98.8 °F (37.1 °C) 82 18 116/77 90 94 % -- Sitting --    07/23/24 2001 -- -- -- -- -- -- -- -- No Pain    07/23/24 14:47:53 97.8 °F (36.6 °C) 79 -- 112/78 89 97 % -- -- --    07/23/24 0900 -- -- -- -- -- -- None (Room air) -- No Pain    07/23/24 07:28:30 97.9 °F (36.6 °C) 77 18 127/79 95 97 % -- -- --    07/23/24 07:27:50 97.9 °F (36.6 °C) 73 18 127/79 95 99 % -- -- --    07/23/24 01:38:42 97.4 °F (36.3 °C) 85 18 130/79 96 93 % -- -- --    07/22/24 1936 -- -- -- -- -- -- -- -- No Pain    07/22/24 1844 -- -- -- -- -- -- -- -- 8    07/22/24 18:30:55  97.3 °F (36.3 °C) 56 18 108/69 82 96 % -- -- --    07/22/24 1332 97.1 °F (36.2 °C) 58 25 124/83 -- 97 % None (Room air) Sitting --              Pertinent Labs/Diagnostic Test Results:   Radiology:  CT abdomen pelvis with contrast   Final Interpretation by Chuy Myles MD (07/22 1610)      1. Abnormal appearance of urinary bladder. Although not optimally distended, the bladder wall appears significantly thickened, with increased mucosal enhancement, and innumerable bladder calculi and perivesical fat stranding, most in keeping with    cystitis in the appropriate clinical and laboratory setting   (Given these above findings, a small underlying bladder wall lesion cannot be excluded. If clinically indicated, nonemergent urology consultation should be considered)   2. Although there are multiple midline prostate calcifications, prostatic urethral calculi felt somewhat unlikely as the bladder is not distended. The prostate gland is noted to be significantly enlarged.   3. Bilateral nephrolithiasis without hydronephrosis         Workstation performed: [id number]                   Results from last 7 days   Lab Units 07/24/24  0538 07/23/24  0456 07/22/24  1501   WBC Thousand/uL 9.72 9.84 12.40*   HEMOGLOBIN g/dL 11.5* 11.7* 12.2   HEMATOCRIT % 35.1* 36.9 38.9   PLATELETS Thousands/uL 236 264 279   TOTAL NEUT ABS Thousands/µL 6.84  --  9.77*         Results from last 7 days   Lab Units 07/24/24  0538 07/23/24  0456 07/22/24  1501   SODIUM mmol/L 141 141 143   POTASSIUM mmol/L 3.9 3.9 4.1   CHLORIDE mmol/L 110* 110* 110*   CO2 mmol/L 22 21 24   ANION GAP mmol/L 9 10 9   BUN mg/dL 30* 32* 30*   CREATININE mg/dL 1.44* 1.47* 1.58*   EGFR ml/min/1.73sq m 46 45 41   CALCIUM mg/dL 7.5* 7.7* 8.1*     Results from last 7 days   Lab Units 07/22/24  1501   AST U/L 15   ALT U/L 11   ALK PHOS U/L 55   TOTAL PROTEIN g/dL 7.0   ALBUMIN g/dL 3.9   TOTAL BILIRUBIN mg/dL 0.40         Results from last 7 days   Lab Units 07/24/24  0522  07/23/24  0456 07/22/24  1501   GLUCOSE RANDOM mg/dL 95 81 100               Results from last 7 days   Lab Units 07/22/24  1501   PROTIME seconds 14.5   INR  1.06   PTT seconds 34         Results from last 7 days   Lab Units 07/22/24  1501   PROCALCITONIN ng/ml <0.05     Results from last 7 days   Lab Units 07/22/24  1501   LACTIC ACID mmol/L 1.5           Results from last 7 days   Lab Units 07/22/24  1441   CLARITY UA  Extra Turbid   COLOR UA  Yellow   SPEC GRAV UA  1.023   PH UA  6.0   GLUCOSE UA mg/dl Negative   KETONES UA mg/dl 10 (1+)*   BLOOD UA  Moderate*   PROTEIN UA mg/dl 300 (3+)*   NITRITE UA  Negative   BILIRUBIN UA  Negative   UROBILINOGEN UA (BE) mg/dl <2.0   LEUKOCYTES UA  Large*   WBC UA /hpf Innumerable*   RBC UA /hpf Innumerable*   BACTERIA UA /hpf Occasional   EPITHELIAL CELLS WET PREP /hpf None Seen   MUCUS THREADS  Occasional*           Results from last 7 days   Lab Units 07/22/24  1607 07/22/24  1501 07/22/24  1441   BLOOD CULTURE  No Growth at 24 hrs. No Growth at 24 hrs.  --    URINE CULTURE   --   --  <10,000 cfu/ml       ED Treatment-Medication Administration from 07/22/2024 1318 to 07/22/2024 1823         Date/Time Order Dose Route Action     07/22/2024 1548 iohexol (OMNIPAQUE) 350 MG/ML injection (MULTI-DOSE) 100 mL 100 mL Intravenous Given     07/22/2024 1625 ampicillin (OMNIPEN) 2,000 mg in sodium chloride 0.9 % 100 mL IVPB 2,000 mg Intravenous New Bag     07/22/2024 1755 gabapentin (NEURONTIN) capsule 100 mg 100 mg Oral Given     07/22/2024 1755 heparin (porcine) subcutaneous injection 5,000 Units 5,000 Units Subcutaneous Given     07/22/2024 1755 sacubitril-valsartan (ENTRESTO) 49-51 MG per tablet 1 tablet 1 tablet Oral Given            Past Medical History:   Diagnosis Date    Agent orange exposure     Anemia     Benign colon polyp     BPH (benign prostatic hyperplasia)     Bradycardia     Cardiomyopathy (HCC)     Chest discomfort     CHF (congestive heart failure) (HCC)      Chronic bronchitis (HCC)     Chronic kidney disease     COPD (chronic obstructive pulmonary disease) (HCC)     LAST ASSESSED: 9/9/17    Coronary artery disease cardio myopthia    Emphysema of lung (HCC) 2013    H/O nonmelanoma skin cancer     LAST ASSESSED: 11/7/17    HHD (hypertensive heart disease)     HTN (hypertension)     Hx of lymphoma     Hyperlipidemia     ICD (implantable cardioverter-defibrillator) in place     Insomnia     Mycosis fungoides (HCC)     PVC (premature ventricular contraction)     PVT (paroxysmal ventricular tachycardia) (HCC)     SOB (shortness of breath)          Admitting Diagnosis: Bladder stones [N21.0]  UTI (urinary tract infection) [N39.0]  Acute UTI [N39.0]  BPH (benign prostatic hyperplasia) [N40.0]  Age/Sex: 78 y.o. male  Admission Orders:  Scheduled Medications:  amoxicillin, 500 mg, Oral, Q8H NILE  aspirin, 81 mg, Oral, Daily  atorvastatin, 40 mg, Oral, Daily  ferrous sulfate, 325 mg, Oral, Daily With Breakfast  Fluticasone Furoate-Vilanterol, 1 puff, Inhalation, Daily   And  umeclidinium, 1 puff, Inhalation, Daily  gabapentin, 100 mg, Oral, TID  heparin (porcine), 5,000 Units, Subcutaneous, Q8H NILE  levothyroxine, 100 mcg, Oral, Daily  metoprolol succinate, 50 mg, Oral, Daily  nicotine, 1 patch, Transdermal, Daily  sacubitril-valsartan, 1 tablet, Oral, BID  tamsulosin, 0.4 mg, Oral, Daily With Dinner      Continuous IV Infusions:     PRN Meds:  acetaminophen, 650 mg, Oral, Q6H PRN  albuterol, 2.5 mg, Nebulization, Q6H PRN  oxybutynin, 5 mg, Oral, TID PRN  traMADol, 50 mg, Oral, Q6H PRN  zolpidem, 10 mg, Oral, HS PRN        IP CONSULT TO UROLOGY    Network Utilization Review Department  ATTENTION: Please call with any questions or concerns to [phone] and carefully listen to the prompts so that you are directed to the right person. All voicemails are confidential.   For Discharge needs, contact Care Management DC Support Team at [phone] opt. 2  Send all requests for  admission clinical reviews, approved or denied determinations and any other requests to dedicated fax number below belonging to the campus where the patient is receiving treatment. List of dedicated fax numbers for the Facilities:  FACILITY NAME UR FAX NUMBER   ADMISSION DENIALS (Administrative/Medical Necessity) [phone]   DISCHARGE SUPPORT TEAM (NETWORK) [phone]   PARENT CHILD HEALTH (Maternity/NICU/Pediatrics) [phone]   [facility] [phone]   [facility] [phone]   [facility] [phone]   [facility] [phone]   [facility] [phone]   [facility] [phone]   [facility] [phone]   [facility] [phone]   [facility] [phone]   [facility] [phone]   [facility] [phone]   [facility] [phone]

## 2024-07-23 NOTE — ASSESSMENT & PLAN NOTE
Third UTI in the past month, history of Enterococcus  Dysuria, frequency, urgency.  Denies fever  CT showing Abnormal appearance of urinary bladder. Although not optimally distended, the bladder wall appears significantly thickened, with increased mucosal enhancement, and innumerable bladder calculi and perivesical fat stranding, most in keeping with cystitis  PSA last 2022 3.9, stable  Mild leukocytosis resolved  Reports improvement in symptoms after IV antibiotics, however symptoms recurred this AM  Start flomax  PVR 53ml. Can utilize oxybutinin for frequency, urgency. Caution for side effects  Will require cysto to evaluate bladder stones, bladder outlet obstruction for surgical interventions  Outpatient follow up  Urology will sign off. Available as needed for patient.

## 2024-07-23 NOTE — PLAN OF CARE
Problem: PAIN - ADULT  Goal: Verbalizes/displays adequate comfort level or baseline comfort level  Description: Interventions:  - Encourage patient to monitor pain and request assistance  - Assess pain using appropriate pain scale  - Administer analgesics based on type and severity of pain and evaluate response  - Implement non-pharmacological measures as appropriate and evaluate response  - Consider cultural and social influences on pain and pain management  - Notify physician/advanced practitioner if interventions unsuccessful or patient reports new pain  Outcome: Progressing     Problem: INFECTION - ADULT  Goal: Absence or prevention of progression during hospitalization  Description: INTERVENTIONS:  - Assess and monitor for signs and symptoms of infection  - Monitor lab/diagnostic results  - Monitor all insertion sites, i.e. indwelling lines, tubes, and drains  - Monitor endotracheal if appropriate and nasal secretions for changes in amount and color  - [place] appropriate cooling/warming therapies per order  - Administer medications as ordered  - Instruct and encourage patient and family to use good hand hygiene technique  - Identify and instruct in appropriate isolation precautions for identified infection/condition  Outcome: Progressing  Goal: Absence of fever/infection during neutropenic period  Description: INTERVENTIONS:  - Monitor WBC    Outcome: Progressing     Problem: SAFETY ADULT  Goal: Patient will remain free of falls  Description: INTERVENTIONS:  - Educate patient/family on patient safety including physical limitations  - Instruct patient to call for assistance with activity   - Consult OT/PT to assist with strengthening/mobility   - Keep Call bell within reach  - Keep bed low and locked with side rails adjusted as appropriate  - Keep care items and personal belongings within reach  - Initiate and maintain comfort rounds  - Make Fall Risk Sign visible to staff  - Offer Toileting every, in  advance of need  - Initiate/Maintain   - Obtain necessary fall risk management equipment:   - Apply yellow socks and bracelet for high fall risk patients  - Consider moving patient to room near nurses station  Outcome: Progressing  Goal: Maintain or return to baseline ADL function  Description: INTERVENTIONS:  -  Assess patient's ability to carry out ADLs; assess patient's baseline for ADL function and identify physical deficits which impact ability to perform ADLs (bathing, care of mouth/teeth, toileting, grooming, dressing, etc.)  - Assess/evaluate cause of self-care deficits   - Assess range of motion  - Assess patient's mobility; develop plan if impaired  - Assess patient's need for assistive devices and provide as appropriate  - Encourage maximum independence but intervene and supervise when necessary  - Involve family in performance of ADLs  - Assess for home care needs following discharge   - Consider OT consult to assist with ADL evaluation and planning for discharge  - Provide patient education as appropriate  Outcome: Progressing  Goal: Maintains/Returns to pre admission functional level  Description: INTERVENTIONS:  - Perform AM-PAC 6 Click Basic Mobility/ Daily Activity assessment daily.  - Set and communicate daily mobility goal to care team and patient/family/caregiver.   - Collaborate with rehabilitation services on mobility goals if consulted    - Out of bed for toileting  - Record patient progress and toleration of activity level   Outcome: Progressing     Problem: DISCHARGE PLANNING  Goal: Discharge to home or other facility with appropriate resources  Description: INTERVENTIONS:  - Identify barriers to discharge w/patient and caregiver  - Arrange for needed discharge resources and transportation as appropriate  - Identify discharge learning needs (meds, wound care, etc.)  - Arrange for interpretive services to assist at discharge as needed  - Refer to Case Management Department for coordinating  discharge planning if the patient needs post-hospital services based on physician/advanced practitioner order or complex needs related to functional status, cognitive ability, or social support system  Outcome: Progressing     Problem: Knowledge Deficit  Goal: Patient/family/caregiver demonstrates understanding of disease process, treatment plan, medications, and discharge instructions  Description: Complete learning assessment and assess knowledge base.  Interventions:  - Provide teaching at level of understanding  - Provide teaching via preferred learning methods  Outcome: Progressing

## 2024-07-23 NOTE — PROGRESS NOTES
[facility]  Progress Note  Name: Sherly Peña I  MRN: [id number]  Unit/Bed#: -01 I Date of Admission: 7/22/2024   Date of Service: 7/23/2024 I Hospital Day: 1    Assessment & Plan   * Acute UTI  Assessment & Plan  C/c : + With symptoms of dysuria/frequency/hematuria.  Recent UTI x 2 in June 2024.  Currently afebrile.    CT Notes : Abnormal appearance of urinary bladder. Although not optimally distended, the bladder wall appears significantly thickened, with increased mucosal enhancement, and innumerable bladder calculi and perivesical fat stranding, most in keeping with cystitis in the appropriate clinical and laboratory setting. (Given these above findings, a small underlying bladder wall lesion cannot be excluded. If clinically indicated, nonemergent urology consultation should be considered)  UA positive, urine culture/blood culture pending  Based on prior urine cultures from 6/30/2024 and 6/16/2024, patient growing Enterococcus faecalis sensitive to ampicillin  Continue IV ampicillin for now.  Urology consulted. Will require OP cysto to evaluate bladder stones, bladder outlet obstruction for surgical interventions  Urinary retention protocol      Stage 3 chronic kidney disease (HCC)  Assessment & Plan  Lab Results   Component Value Date    EGFR 45 07/23/2024    EGFR 41 07/22/2024    EGFR 57 12/28/2023    CREATININE 1.47 (H) 07/23/2024    CREATININE 1.58 (H) 07/22/2024    CREATININE 1.22 12/28/2023   Has known history of CKD with baseline creatinine 1.2, today presents with creatinine 1.58  Patient also received CT with contrast in ER today.  Given underlying chronic systolic CHF, will hold off on fluids for now  Will hold Aldactone overnight and monitor BMP closely. Creatinine improving, 1.47 this am    NSVT (nonsustained ventricular tachycardia) ([facility])  Assessment & Plan  ICD in place  Continue beta-blocker  Follows outpatient with cardiology.    Chronic systolic congestive  heart failure (HCC)  Assessment & Plan  Wt Readings from Last 3 Encounters:   07/22/24 98.3 kg (216 lb 11.4 oz)   06/30/24 99.8 kg (220 lb)   06/16/24 101 kg (223 lb)     Follows outpatient with cardiology  Last echo December 2023 with EF 30% grade 1 diastolic dysfunction  Has ICD in place.  Maintained on beta-blocker Entresto and Aldactone  Holding Aldactone for now given patient has mild elevation in creatinine from baseline  Monitor intake/output/daily weights closely  Monitor O2 levels closely.          Acquired hypothyroidism  Assessment & Plan  Continue Synthroid.    COPD (chronic obstructive pulmonary disease) (HCC)  Assessment & Plan  Not in exacerbation  Continue albuterol nebulizer every 6 hours as needed/continue home inhalers.    HTN (hypertension)  Assessment & Plan  Currently well-controlled  Continue home Toprol-XL.    Hyperlipidemia  Assessment & Plan  Continue home statin.             VTE Pharmacologic Prophylaxis: VTE Score: 4 heparin    Mobility:   Basic Mobility Inpatient Raw Score: 23  JH-HLM Goal: 7: Walk 25 feet or more  JH-HLM Achieved: 3: Sit at edge of bed  JH-HLM Goal achieved. Continue to encourage appropriate mobility.    Patient Centered Rounds: I performed bedside rounds with nursing staff today.   Discussions with Specialists or Other Care Team Provider: urology    Education and Discussions with Family / Patient: patient    Total Time Spent on Date of Encounter in care of patient: 35 mins. This time was spent on one or more of the following: performing physical exam; counseling and coordination of care; obtaining or reviewing history; documenting in the medical record; reviewing/ordering tests, medications or procedures; communicating with other healthcare professionals and discussing with patient's family/caregivers.    Current Length of Stay: 1 day(s)  Current Patient Status: Inpatient   Certification Statement: The patient will continue to require additional inpatient hospital stay  due to needs iv abx  Discharge Plan: 24 hrs, await urine c/s    Code Status: Level 1 - Full Code    Subjective:   Still with dysuria/ frequency. Improved  No further hematuria  No fevers    Objective:     Vitals:   Temp (24hrs), Av.5 °F (36.4 °C), Min:97.1 °F (36.2 °C), Max:97.9 °F (36.6 °C)    Temp:  [97.1 °F (36.2 °C)-97.9 °F (36.6 °C)] 97.9 °F (36.6 °C)  HR:  [56-85] 77  Resp:  [18-25] 18  BP: (108-130)/(69-83) 127/79  SpO2:  [93 %-99 %] 97 %  Body mass index is 30.23 kg/m².     Input and Output Summary (last 24 hours):     Intake/Output Summary (Last 24 hours) at 2024 1221  Last data filed at 2024 0542  Gross per 24 hour   Intake 440 ml   Output 465 ml   Net -25 ml       Physical Exam:   Physical Exam  Constitutional:       General: He is not in acute distress.     Appearance: He is normal weight. He is not toxic-appearing.   HENT:      Mouth/Throat:      Mouth: Mucous membranes are moist.      Pharynx: Oropharynx is clear.   Cardiovascular:      Rate and Rhythm: Normal rate and regular rhythm.      Pulses: Normal pulses.   Pulmonary:      Effort: Pulmonary effort is normal. No respiratory distress.      Breath sounds: Normal breath sounds.   Abdominal:      General: Abdomen is flat. Bowel sounds are normal.      Palpations: Abdomen is soft.      Tenderness: There is no abdominal tenderness.   Neurological:      General: No focal deficit present.      Mental Status: He is alert and oriented to person, place, and time.          Additional Data:     Labs:  Results from last 7 days   Lab Units 24  0456 24  1501   WBC Thousand/uL 9.84 12.40*   HEMOGLOBIN g/dL 11.7* 12.2   HEMATOCRIT % 36.9 38.9   PLATELETS Thousands/uL 264 279   SEGS PCT %  --  78*   LYMPHO PCT %  --  16   MONO PCT %  --  4   EOS PCT %  --  1     Results from last 7 days   Lab Units 24  0456 24  1501   SODIUM mmol/L 141 143   POTASSIUM mmol/L 3.9 4.1   CHLORIDE mmol/L 110* 110*   CO2 mmol/L 21 24   BUN mg/dL 32*  30*   CREATININE mg/dL 1.47* 1.58*   ANION GAP mmol/L 10 9   CALCIUM mg/dL 7.7* 8.1*   ALBUMIN g/dL  --  3.9   TOTAL BILIRUBIN mg/dL  --  0.40   ALK PHOS U/L  --  55   ALT U/L  --  11   AST U/L  --  15   GLUCOSE RANDOM mg/dL 81 100     Results from last 7 days   Lab Units 07/22/24  1501   INR  1.06             Results from last 7 days   Lab Units 07/22/24  1501   LACTIC ACID mmol/L 1.5   PROCALCITONIN ng/ml <0.05       Lines/Drains:  Invasive Devices       Peripheral Intravenous Line  Duration             Peripheral IV 07/22/24 Left Antecubital <1 day                    Recent Cultures (last 7 days):   Results from last 7 days   Lab Units 07/22/24  1607 07/22/24  1501   BLOOD CULTURE  Received in Microbiology Lab. Culture in Progress. Received in Microbiology Lab. Culture in Progress.       Last 24 Hours Medication List:   Current Facility-Administered Medications   Medication Dose Route Frequency Provider Last Rate    albuterol  2.5 mg Nebulization Q6H PRN Roque Linn MD      ampicillin  2,000 mg Intravenous Q6H Roque Linn MD 2,000 mg (07/23/24 0542)    aspirin  81 mg Oral Daily Roque Linn MD      atorvastatin  40 mg Oral Daily Roque Linn MD      ferrous sulfate  325 mg Oral Daily With Breakfast Roque Linn MD      Fluticasone Furoate-Vilanterol  1 puff Inhalation Daily Roque Linn MD      And    umeclidinium  1 puff Inhalation Daily Roque Linn MD      gabapentin  100 mg Oral TID Roque Linn MD      heparin (porcine)  5,000 Units Subcutaneous Q8H [facility] Roque Linn MD      levothyroxine  100 mcg Oral Daily Roque Linn MD      metoprolol succinate  50 mg Oral Daily Roque Linn MD      nicotine  1 patch Transdermal Daily Roque Linn MD      oxybutynin  5 mg Oral TID PRN Luzmaria Cole PA-C      sacubitril-valsartan  1 tablet Oral BID Roque Linn MD      tamsulosin  0.4 mg Oral Daily With Dinner Luzmaria Cole PA-C      zolpidem  10 mg Oral HS PRN SHRUTHI Kennedy           Today, Patient Was Seen By: Roque Linn MD    **Please Note: This note may have been constructed using a voice recognition system.**

## 2024-07-23 NOTE — ASSESSMENT & PLAN NOTE
C/c : + With symptoms of dysuria/frequency/hematuria.  Recent UTI x 2 in June 2024.  Currently afebrile.    CT Notes : Abnormal appearance of urinary bladder. Although not optimally distended, the bladder wall appears significantly thickened, with increased mucosal enhancement, and innumerable bladder calculi and perivesical fat stranding, most in keeping with cystitis in the appropriate clinical and laboratory setting. (Given these above findings, a small underlying bladder wall lesion cannot be excluded. If clinically indicated, nonemergent urology consultation should be considered)  UA positive, urine culture/blood culture pending  Based on prior urine cultures from 6/30/2024 and 6/16/2024, patient growing Enterococcus faecalis sensitive to ampicillin  Continue IV ampicillin for now.  Urology consulted. Will require OP cysto to evaluate bladder stones, bladder outlet obstruction for surgical interventions  Urinary retention protocol

## 2024-07-24 LAB
ANION GAP SERPL CALCULATED.3IONS-SCNC: 9 MMOL/L (ref 4–13)
BASOPHILS # BLD AUTO: 0.07 THOUSANDS/ÂΜL (ref 0–0.1)
BASOPHILS NFR BLD AUTO: 1 % (ref 0–1)
BUN SERPL-MCNC: 30 MG/DL (ref 5–25)
CALCIUM SERPL-MCNC: 7.5 MG/DL (ref 8.4–10.2)
CHLORIDE SERPL-SCNC: 110 MMOL/L (ref 96–108)
CO2 SERPL-SCNC: 22 MMOL/L (ref 21–32)
CREAT SERPL-MCNC: 1.44 MG/DL (ref 0.6–1.3)
EOSINOPHIL # BLD AUTO: 0.19 THOUSAND/ÂΜL (ref 0–0.61)
EOSINOPHIL NFR BLD AUTO: 2 % (ref 0–6)
ERYTHROCYTE [DISTWIDTH] IN BLOOD BY AUTOMATED COUNT: 13.2 % (ref 11.6–15.1)
GFR SERPL CREATININE-BSD FRML MDRD: 46 ML/MIN/1.73SQ M
GLUCOSE SERPL-MCNC: 95 MG/DL (ref 65–140)
HCT VFR BLD AUTO: 35.1 % (ref 36.5–49.3)
HGB BLD-MCNC: 11.5 G/DL (ref 12–17)
IMM GRANULOCYTES # BLD AUTO: 0.02 THOUSAND/UL (ref 0–0.2)
IMM GRANULOCYTES NFR BLD AUTO: 0 % (ref 0–2)
LYMPHOCYTES # BLD AUTO: 1.99 THOUSANDS/ÂΜL (ref 0.6–4.47)
LYMPHOCYTES NFR BLD AUTO: 21 % (ref 14–44)
MCH RBC QN AUTO: 30.7 PG (ref 26.8–34.3)
MCHC RBC AUTO-ENTMCNC: 32.8 G/DL (ref 31.4–37.4)
MCV RBC AUTO: 94 FL (ref 82–98)
MONOCYTES # BLD AUTO: 0.61 THOUSAND/ÂΜL (ref 0.17–1.22)
MONOCYTES NFR BLD AUTO: 6 % (ref 4–12)
NEUTROPHILS # BLD AUTO: 6.84 THOUSANDS/ÂΜL (ref 1.85–7.62)
NEUTS SEG NFR BLD AUTO: 70 % (ref 43–75)
NRBC BLD AUTO-RTO: 0 /100 WBCS
PLATELET # BLD AUTO: 236 THOUSANDS/UL (ref 149–390)
PMV BLD AUTO: 9.8 FL (ref 8.9–12.7)
POTASSIUM SERPL-SCNC: 3.9 MMOL/L (ref 3.5–5.3)
RBC # BLD AUTO: 3.74 MILLION/UL (ref 3.88–5.62)
SODIUM SERPL-SCNC: 141 MMOL/L (ref 135–147)
WBC # BLD AUTO: 9.72 THOUSAND/UL (ref 4.31–10.16)

## 2024-07-24 PROCEDURE — 99233 SBSQ HOSP IP/OBS HIGH 50: CPT | Performed by: STUDENT IN AN ORGANIZED HEALTH CARE EDUCATION/TRAINING PROGRAM

## 2024-07-24 PROCEDURE — 80048 BASIC METABOLIC PNL TOTAL CA: CPT | Performed by: FAMILY MEDICINE

## 2024-07-24 PROCEDURE — 85025 COMPLETE CBC W/AUTO DIFF WBC: CPT | Performed by: FAMILY MEDICINE

## 2024-07-24 RX ORDER — AMOXICILLIN 250 MG/1
500 CAPSULE ORAL EVERY 8 HOURS SCHEDULED
Status: DISCONTINUED | OUTPATIENT
Start: 2024-07-24 | End: 2024-07-25 | Stop reason: HOSPADM

## 2024-07-24 RX ORDER — TRAMADOL HYDROCHLORIDE 50 MG/1
50 TABLET ORAL EVERY 6 HOURS PRN
Status: DISCONTINUED | OUTPATIENT
Start: 2024-07-24 | End: 2024-07-25 | Stop reason: HOSPADM

## 2024-07-24 RX ORDER — ACETAMINOPHEN 325 MG/1
650 TABLET ORAL EVERY 6 HOURS PRN
Status: DISCONTINUED | OUTPATIENT
Start: 2024-07-24 | End: 2024-07-25 | Stop reason: HOSPADM

## 2024-07-24 RX ADMIN — UMECLIDINIUM 1 PUFF: 62.5 AEROSOL, POWDER ORAL at 10:05

## 2024-07-24 RX ADMIN — AMOXICILLIN 500 MG: 250 CAPSULE ORAL at 10:03

## 2024-07-24 RX ADMIN — METOPROLOL SUCCINATE 50 MG: 50 TABLET, EXTENDED RELEASE ORAL at 09:58

## 2024-07-24 RX ADMIN — AMOXICILLIN 500 MG: 250 CAPSULE ORAL at 21:04

## 2024-07-24 RX ADMIN — SACUBITRIL AND VALSARTAN 1 TABLET: 49; 51 TABLET, FILM COATED ORAL at 17:57

## 2024-07-24 RX ADMIN — FLUTICASONE FUROATE AND VILANTEROL TRIFENATATE 1 PUFF: 100; 25 POWDER RESPIRATORY (INHALATION) at 10:05

## 2024-07-24 RX ADMIN — TRAMADOL HYDROCHLORIDE 50 MG: 50 TABLET, COATED ORAL at 10:55

## 2024-07-24 RX ADMIN — GABAPENTIN 100 MG: 100 CAPSULE ORAL at 15:17

## 2024-07-24 RX ADMIN — TAMSULOSIN HYDROCHLORIDE 0.4 MG: 0.4 CAPSULE ORAL at 15:40

## 2024-07-24 RX ADMIN — GABAPENTIN 100 MG: 100 CAPSULE ORAL at 09:57

## 2024-07-24 RX ADMIN — ZOLPIDEM TARTRATE 10 MG: 5 TABLET, COATED ORAL at 21:04

## 2024-07-24 RX ADMIN — HEPARIN SODIUM 5000 UNITS: 5000 INJECTION INTRAVENOUS; SUBCUTANEOUS at 23:05

## 2024-07-24 RX ADMIN — ACETAMINOPHEN 650 MG: 325 TABLET, FILM COATED ORAL at 15:40

## 2024-07-24 RX ADMIN — AMPICILLIN SODIUM 2000 MG: 2 INJECTION, POWDER, FOR SOLUTION INTRAMUSCULAR; INTRAVENOUS at 05:11

## 2024-07-24 RX ADMIN — HEPARIN SODIUM 5000 UNITS: 5000 INJECTION INTRAVENOUS; SUBCUTANEOUS at 00:45

## 2024-07-24 RX ADMIN — SACUBITRIL AND VALSARTAN 1 TABLET: 49; 51 TABLET, FILM COATED ORAL at 10:00

## 2024-07-24 RX ADMIN — TRAMADOL HYDROCHLORIDE 50 MG: 50 TABLET, COATED ORAL at 17:57

## 2024-07-24 RX ADMIN — ATORVASTATIN CALCIUM 40 MG: 40 TABLET, FILM COATED ORAL at 09:57

## 2024-07-24 RX ADMIN — HEPARIN SODIUM 5000 UNITS: 5000 INJECTION INTRAVENOUS; SUBCUTANEOUS at 09:58

## 2024-07-24 RX ADMIN — HEPARIN SODIUM 5000 UNITS: 5000 INJECTION INTRAVENOUS; SUBCUTANEOUS at 15:17

## 2024-07-24 RX ADMIN — FERROUS SULFATE TAB 325 MG (65 MG ELEMENTAL FE) 325 MG: 325 (65 FE) TAB at 09:58

## 2024-07-24 RX ADMIN — GABAPENTIN 100 MG: 100 CAPSULE ORAL at 21:05

## 2024-07-24 RX ADMIN — LEVOTHYROXINE SODIUM 100 MCG: 0.1 TABLET ORAL at 05:11

## 2024-07-24 RX ADMIN — ASPIRIN 81 MG: 81 TABLET, CHEWABLE ORAL at 09:57

## 2024-07-24 NOTE — PLAN OF CARE
Problem: PAIN - ADULT  Goal: Verbalizes/displays adequate comfort level or baseline comfort level  Description: Interventions:  - Encourage patient to monitor pain and request assistance  - Assess pain using appropriate pain scale  - Administer analgesics based on type and severity of pain and evaluate response  - Implement non-pharmacological measures as appropriate and evaluate response  - Consider cultural and social influences on pain and pain management  - Notify physician/advanced practitioner if interventions unsuccessful or patient reports new pain  Outcome: Progressing     Problem: INFECTION - ADULT  Goal: Absence or prevention of progression during hospitalization  Description: INTERVENTIONS:  - Assess and monitor for signs and symptoms of infection  - Monitor lab/diagnostic results  - Monitor all insertion sites, i.e. indwelling lines, tubes, and drains  - Monitor endotracheal if appropriate and nasal secretions for changes in amount and color  - [place] appropriate cooling/warming therapies per order  - Administer medications as ordered  - Instruct and encourage patient and family to use good hand hygiene technique  - Identify and instruct in appropriate isolation precautions for identified infection/condition  Outcome: Progressing  Goal: Absence of fever/infection during neutropenic period  Description: INTERVENTIONS:  - Monitor WBC    Outcome: Progressing     Problem: SAFETY ADULT  Goal: Patient will remain free of falls  Description: INTERVENTIONS:  - Educate patient/family on patient safety including physical limitations  - Instruct patient to call for assistance with activity   - Consult OT/PT to assist with strengthening/mobility   - Keep Call bell within reach  - Keep bed low and locked with side rails adjusted as appropriate  - Keep care items and personal belongings within reach  - Initiate and maintain comfort rounds  - Make Fall Risk Sign visible to staff  - Offer Toileting in advance of  need  - Initiate/Maintain alarm  - Obtain necessary fall risk management equipment:    - Apply yellow socks and bracelet for high fall risk patients  - Consider moving patient to room near nurses station  Outcome: Progressing  Goal: Maintain or return to baseline ADL function  Description: INTERVENTIONS:  -  Assess patient's ability to carry out ADLs; assess patient's baseline for ADL function and identify physical deficits which impact ability to perform ADLs (bathing, care of mouth/teeth, toileting, grooming, dressing, etc.)  - Assess/evaluate cause of self-care deficits   - Assess range of motion  - Assess patient's mobility; develop plan if impaired  - Assess patient's need for assistive devices and provide as appropriate  - Encourage maximum independence but intervene and supervise when necessary  - Involve family in performance of ADLs  - Assess for home care needs following discharge   - Consider OT consult to assist with ADL evaluation and planning for discharge  - Provide patient education as appropriate  Outcome: Progressing  Goal: Maintains/Returns to pre admission functional level  Description: INTERVENTIONS:  - Perform AM-PAC 6 Click Basic Mobility/ Daily Activity assessment daily.  - Set and communicate daily mobility goal to care team and patient/family/caregiver.   - Collaborate with rehabilitation services on mobility goals if consulted    - Out of bed for toileting  - Record patient progress and toleration of activity level   Outcome: Progressing     Problem: DISCHARGE PLANNING  Goal: Discharge to home or other facility with appropriate resources  Description: INTERVENTIONS:  - Identify barriers to discharge w/patient and caregiver  - Arrange for needed discharge resources and transportation as appropriate  - Identify discharge learning needs (meds, wound care, etc.)  - Arrange for interpretive services to assist at discharge as needed  - Refer to Case Management Department for coordinating  discharge planning if the patient needs post-hospital services based on physician/advanced practitioner order or complex needs related to functional status, cognitive ability, or social support system  Outcome: Progressing     Problem: Knowledge Deficit  Goal: Patient/family/caregiver demonstrates understanding of disease process, treatment plan, medications, and discharge instructions  Description: Complete learning assessment and assess knowledge base.  Interventions:  - Provide teaching at level of understanding  - Provide teaching via preferred learning methods  Outcome: Progressing

## 2024-07-24 NOTE — CASE MANAGEMENT
Case Management Assessment & Discharge Planning Note    Patient name Sherly Peña  Location /-01 MRN [id number]  : 1945 Date 2024       Current Admission Date: 2024  Current Admission Diagnosis:Acute UTI   Patient Active Problem List    Diagnosis Date Noted Date Diagnosed    Acute UTI 2024     NSVT (nonsustained ventricular tachycardia) (HCC) 2024     Tinnitus of both ears 2023     Cigarette nicotine dependence without complication 2023     Lumbar pain 01/10/2022     Shortness of breath      Chronic kidney disease-mineral and bone disorder 2021     Stage 3 chronic kidney disease (HCC) 2021     Hypomagnesemia 2020     Chronic systolic congestive heart failure (HCC) 2020     PVC (premature ventricular contraction) 2018     Squamous cell cancer of skin of forearm, left 2018     Acquired hypothyroidism 2018     HHD (hypertensive heart disease) 2018     Mycosis fungoides (HCC) 2018     History of skin cancer 2018     Insomnia      Cardiac resynchronization therapy defibrillator (CRT-D) in place      Hyperlipidemia      Hx of lymphoma      HTN (hypertension)      COPD (chronic obstructive pulmonary disease) (HCC)      Chronic bronchitis (HCC)      Non-ischemic cardiomyopathy (HCC)      BPH (benign prostatic hyperplasia)      Seborrheic keratosis 2016       LOS (days): 2  Geometric Mean LOS (GMLOS) (days): 2.9  Days to GMLOS:0.9     OBJECTIVE:    Risk of Unplanned Readmission Score: 14.83         Current admission status: Inpatient       Preferred Pharmacy:   [facility]/pharmacy #[id number] - [facility] [place] - [address]  [address] [facility] [address] [address]  Phone: [phone] Fax: [phone]    [facility] ([facility]) - [address] [address]  Phone: [phone] Fax: [phone]    [facility]  - [place], [place] - [address]  [address]  [address]  [facility] [address]  Phone: [phone] Fax: [phone]    Primary Care Provider: Marino Lozada MD    Primary Insurance: [employer]  REP  Secondary Insurance:     ASSESSMENT:  Active Health Care Proxies       Ariana Mccauley Health Care Representative - Sister   Primary Phone: [phone] (Mobile)  Work Phone: [phone]                 Advance Directives  Does patient have a Health Care POA?: Yes  Does patient have Advance Directives?: Yes  Advance Directives: Living will  Primary Contact: Ariana (Sister)  [phone]         Readmission Root Cause  30 Day Readmission: No    Patient Information  Admitted from:: Home  Mental Status: Alert  During Assessment patient was accompanied by: Not accompanied during assessment  Assessment information provided by:: Patient  Primary Caregiver: Self  Support Systems: Family members  County of Residence: [place]  What city do you live in?: [place]  Home entry access options. Select all that apply.: Stairs  Number of steps to enter home.: 1  Do the steps have railings?: No  Type of Current Residence: [facility]  Living Arrangements: Lives Alone  Is patient a ?: No    Activities of Daily Living Prior to Admission  Functional Status: Independent  Completes ADLs independently?: Yes  Ambulates independently?: Yes  Does patient use assisted devices?: Yes  Assisted Devices (DME) used: Home Oxygen concentrator, Portable Oxygen concentrator, Portable Oxygen tanks, Walker, Straight Cane  DME Company Name (respiratory supplies): [employer]  O2 Rate(s): 3L nocturnally  Does patient currently own DME?: Yes  What DME does the patient currently own?: Walker, Straight Cane, Portable Oxygen concentrator, Portable Oxygen tanks, Home Oxygen concentrator  Does patient have a history of Outpatient Therapy (PT/OT)?: Yes  Does the patient have a history of Short-Term Rehab?: No  Does patient have a history of  HHC?: No  Does patient currently have HHC?: No         Patient Information Continued  Income Source: Pension/nursing home  Does patient have prescription coverage?: Yes  Does patient receive dialysis treatments?: No  Does patient have a history of substance abuse?: No  Does patient have a history of Mental Health Diagnosis?: No         Means of Transportation  Means of Transport to Appts:: Drives Self      Social Determinants of Health (SDOH)      Flowsheet Row Most Recent Value   Housing Stability    In the last 12 months, was there a time when you were not able to pay the mortgage or rent on time? N   In the past 12 months, how many times have you moved where you were living? 0   At any time in the past 12 months, were you homeless or living in a shelter (including now)? N   Transportation Needs    In the past 12 months, has lack of transportation kept you from medical appointments or from getting medications? no   In the past 12 months, has lack of transportation kept you from meetings, work, or from getting things needed for daily living? No   Food Insecurity    Within the past 12 months, you worried that your food would run out before you got the money to buy more. Never true   Within the past 12 months, the food you bought just didn't last and you didn't have money to get more. Never true   Utilities    In the past 12 months has the electric, gas, oil, or water company threatened to shut off services in your home? No            DISCHARGE DETAILS:    Discharge planning discussed with:: patient at bedside  Freedom of Choice: Yes  Comments - Freedom of Choice: CM maintained freedom of choice as it pertains to discharge planning. Patient reports plan to return home at discharge. Patient reports he would be interested in HHC and agreeable to blanket referral, gave no preferences. Patient denied DME needs. Patient reports he plans to drive himself home from the hospital, normally on O2 just nocturnally, if going home  on daytime O2, will need tank of O2 to get home.  CM contacted family/caregiver?: No- see comments (pt alert, oriented, independent adult)  Were Treatment Team discharge recommendations reviewed with patient/caregiver?: Yes  Did patient/caregiver verbalize understanding of patient care needs?: Yes  Were patient/caregiver advised of the risks associated with not following Treatment Team discharge recommendations?: Yes         Requested Home Health Care         Is the patient interested in HHC at discharge?: Yes  Home Health Discipline requested:: Nursing, Occupational Therapy, Physical Therapy  Home Health Agency Name:: Other (TBD; referrals sent)  HHA External Referral Reason (only applicable if external HHA name selected): Services not provided in network or near patient location  Home Health Follow-Up Provider:: PCP  Home Health Services Needed:: COPD Management, Heart Failure Management, Evaluate Functional Status and Safety, Gait/ADL Training, Strengthening/Theraputic Exercises to Improve Function, Oxygen Via Nasal Cannula  Oxygen LPM Ordered (if applicable based on home health services needed):: 3 LPM  Homebound Criteria Met:: Uses an Assist Device (i.e. cane, walker, etc)  Supporting Clincal Findings:: Requires Oxygen, Limited Endurance    DME Referral Provided  Referral made for DME?: No    Other Referral/Resources/Interventions Provided:  Interventions: HHC  Referral Comments: [place] referral sent in [facility] for HHC for PT, OT, and nurse visists for COPD, CHF, DM, and O2 use.    Would you like to participate in our [facility] service program?  : No - Declined    Treatment Team Recommendation: Home with Home Health Care  Discharge Destination Plan:: Home with Home Health Care  Transport at Discharge : Self

## 2024-07-24 NOTE — ASSESSMENT & PLAN NOTE
Not in exacerbation  Continue albuterol nebulizer every 6 hours as needed/continue home inhalers  Patient does have home oxygen available mostly uses at night 3 L.  .

## 2024-07-24 NOTE — ASSESSMENT & PLAN NOTE
Lab Results   Component Value Date    EGFR 46 07/24/2024    EGFR 45 07/23/2024    EGFR 41 07/22/2024    CREATININE 1.44 (H) 07/24/2024    CREATININE 1.47 (H) 07/23/2024    CREATININE 1.58 (H) 07/22/2024   Has known history of CKD with baseline creatinine 1.2, today presents with creatinine 1.58  Patient also received CT with contrast in ER today.  Given underlying chronic systolic CHF, will hold off on fluids for now  Will hold Aldactone overnight and monitor BMP closely. Creatinine improving, 1.44 this am

## 2024-07-24 NOTE — PLAN OF CARE
Problem: PAIN - ADULT  Goal: Verbalizes/displays adequate comfort level or baseline comfort level  Description: Interventions:  - Encourage patient to monitor pain and request assistance  - Assess pain using appropriate pain scale  - Administer analgesics based on type and severity of pain and evaluate response  - Implement non-pharmacological measures as appropriate and evaluate response  - Consider cultural and social influences on pain and pain management  - Notify physician/advanced practitioner if interventions unsuccessful or patient reports new pain  Outcome: Progressing     Problem: INFECTION - ADULT  Goal: Absence or prevention of progression during hospitalization  Description: INTERVENTIONS:  - Assess and monitor for signs and symptoms of infection  - Monitor lab/diagnostic results  - Monitor all insertion sites, i.e. indwelling lines, tubes, and drains  - Monitor endotracheal if appropriate and nasal secretions for changes in amount and color  - [place] appropriate cooling/warming therapies per order  - Administer medications as ordered  - Instruct and encourage patient and family to use good hand hygiene technique  - Identify and instruct in appropriate isolation precautions for identified infection/condition  Outcome: Progressing  Goal: Absence of fever/infection during neutropenic period  Description: INTERVENTIONS:  - Monitor WBC    Outcome: Progressing     Problem: SAFETY ADULT  Goal: Patient will remain free of falls  Description: INTERVENTIONS:  - Educate patient/family on patient safety including physical limitations  - Instruct patient to call for assistance with activity   - Consult OT/PT to assist with strengthening/mobility   - Keep Call bell within reach  - Keep bed low and locked with side rails adjusted as appropriate  - Keep care items and personal belongings within reach  - Initiate and maintain comfort rounds  - Make Fall Risk Sign visible to staff    - Apply yellow socks and  bracelet for high fall risk patients  - Consider moving patient to room near nurses station  Outcome: Progressing  Goal: Maintain or return to baseline ADL function  Description: INTERVENTIONS:  -  Assess patient's ability to carry out ADLs; assess patient's baseline for ADL function and identify physical deficits which impact ability to perform ADLs (bathing, care of mouth/teeth, toileting, grooming, dressing, etc.)  - Assess/evaluate cause of self-care deficits   - Assess range of motion  - Assess patient's mobility; develop plan if impaired  - Assess patient's need for assistive devices and provide as appropriate  - Encourage maximum independence but intervene and supervise when necessary  - Involve family in performance of ADLs  - Assess for home care needs following discharge   - Consider OT consult to assist with ADL evaluation and planning for discharge  - Provide patient education as appropriate  Outcome: Progressing  Goal: Maintains/Returns to pre admission functional level  Description: INTERVENTIONS:  - Perform AM-PAC 6 Click Basic Mobility/ Daily Activity assessment daily.  - Set and communicate daily mobility goal to care team and patient/family/caregiver.   - Collaborate with rehabilitation services on mobility goals if consulted    - Record patient progress and toleration of activity level   Outcome: Progressing     Problem: DISCHARGE PLANNING  Goal: Discharge to home or other facility with appropriate resources  Description: INTERVENTIONS:  - Identify barriers to discharge w/patient and caregiver  - Arrange for needed discharge resources and transportation as appropriate  - Identify discharge learning needs (meds, wound care, etc.)  - Arrange for interpretive services to assist at discharge as needed  - Refer to Case Management Department for coordinating discharge planning if the patient needs post-hospital services based on physician/advanced practitioner order or complex needs related to  functional status, cognitive ability, or social support system  Outcome: Progressing     Problem: Knowledge Deficit  Goal: Patient/family/caregiver demonstrates understanding of disease process, treatment plan, medications, and discharge instructions  Description: Complete learning assessment and assess knowledge base.  Interventions:  - Provide teaching at level of understanding  - Provide teaching via preferred learning methods  Outcome: Progressing

## 2024-07-24 NOTE — ASSESSMENT & PLAN NOTE
C/c : + With symptoms of dysuria/frequency/hematuria.  Recent UTI x 2 in June 2024.  Currently afebrile.    CT Notes : Abnormal appearance of urinary bladder. Although not optimally distended, the bladder wall appears significantly thickened, with increased mucosal enhancement, and innumerable bladder calculi and perivesical fat stranding, most in keeping with cystitis in the appropriate clinical and laboratory setting. (Given these above findings, a small underlying bladder wall lesion cannot be excluded. If clinically indicated, nonemergent urology consultation should be considered)  UA positive, urine culture/blood culture pending  Based on prior urine cultures from 6/30/2024 and 6/16/2024, patient growing Enterococcus faecalis sensitive to ampicillin  Urology consulted. Will require OP cysto to evaluate bladder stones, bladder outlet obstruction for surgical interventions  Urine culture growing less than 10,000 mixed contaminants.  Transition to p.o. amoxicillin 500 mg every 8 hours

## 2024-07-24 NOTE — PROGRESS NOTES
[facility]  Progress Note  Name: Sherly Peña I  MRN: [id number]  Unit/Bed#: -01 I Date of Admission: 7/22/2024   Date of Service: 7/24/2024 I Hospital Day: 2    Assessment & Plan   * Acute UTI  Assessment & Plan  C/c : + With symptoms of dysuria/frequency/hematuria.  Recent UTI x 2 in June 2024.  Currently afebrile.    CT Notes : Abnormal appearance of urinary bladder. Although not optimally distended, the bladder wall appears significantly thickened, with increased mucosal enhancement, and innumerable bladder calculi and perivesical fat stranding, most in keeping with cystitis in the appropriate clinical and laboratory setting. (Given these above findings, a small underlying bladder wall lesion cannot be excluded. If clinically indicated, nonemergent urology consultation should be considered)  UA positive, urine culture/blood culture pending  Based on prior urine cultures from 6/30/2024 and 6/16/2024, patient growing Enterococcus faecalis sensitive to ampicillin  Urology consulted. Will require OP cysto to evaluate bladder stones, bladder outlet obstruction for surgical interventions  Urine culture growing less than 10,000 mixed contaminants.  Transition to p.o. amoxicillin 500 mg every 8 hours        NSVT (nonsustained ventricular tachycardia) ([facility])  Assessment & Plan  ICD in place  Continue beta-blocker  Follows outpatient with cardiology.    Stage 3 chronic kidney disease (HCC)  Assessment & Plan  Lab Results   Component Value Date    EGFR 46 07/24/2024    EGFR 45 07/23/2024    EGFR 41 07/22/2024    CREATININE 1.44 (H) 07/24/2024    CREATININE 1.47 (H) 07/23/2024    CREATININE 1.58 (H) 07/22/2024   Has known history of CKD with baseline creatinine 1.2, today presents with creatinine 1.58  Patient also received CT with contrast in ER today.  Given underlying chronic systolic CHF, will hold off on fluids for now  Will hold Aldactone overnight and monitor BMP closely. Creatinine  improving, 1.44 this am    Chronic systolic congestive heart failure (HCC)  Assessment & Plan  Wt Readings from Last 3 Encounters:   07/22/24 98.3 kg (216 lb 11.4 oz)   06/30/24 99.8 kg (220 lb)   06/16/24 101 kg (223 lb)     Follows outpatient with cardiology  Last echo December 2023 with EF 30% grade 1 diastolic dysfunction  Has ICD in place.  Maintained on beta-blocker Entresto and Aldactone  Holding Aldactone for now given patient has mild elevation in creatinine from baseline  Monitor intake/output/daily weights closely  Monitor O2 levels closely.          Acquired hypothyroidism  Assessment & Plan  Continue Synthroid.    COPD (chronic obstructive pulmonary disease) ([facility])  Assessment & Plan  Not in exacerbation  Continue albuterol nebulizer every 6 hours as needed/continue home inhalers  Patient does have home oxygen available mostly uses at night 3 L.  .      HTN (hypertension)  Assessment & Plan  Currently well-controlled  Continue home Toprol-XL.    Hyperlipidemia  Assessment & Plan  Continue home statin.               VTE Pharmacologic Prophylaxis: VTE Score: 4 Moderate Risk (Score 3-4) - Pharmacological DVT Prophylaxis Ordered: heparin.    Mobility:   Basic Mobility Inpatient Raw Score: 24  -HLM Goal: 8: Walk 250 feet or more  JH-HLM Achieved: 6: Walk 10 steps or more      Patient Centered Rounds: I performed bedside rounds with nursing staff today.       Total Time Spent on Date of Encounter in care of patient: 35 mins. This time was spent on one or more of the following: performing physical exam; counseling and coordination of care; obtaining or reviewing history; documenting in the medical record; reviewing/ordering tests, medications or procedures; communicating with other healthcare professionals and discussing with patient's family/caregivers.    Current Length of Stay: 2 day(s)  Current Patient Status: Inpatient   Certification Statement: The patient will continue to require additional inpatient  hospital stay due to pain control   Discharge Plan: Anticipate discharge tomorrow to home.    Code Status: Level 1 - Full Code    Subjective:   Seen during a.m. rounds.  Patient appears comfortable on discharge however complaining of passing bladder stones and complaining of lower abdominal/suprapubic pain.  Denies chest pain, fever, chills, hematuria, any other new complaints.  No other events reported.  Objective:     Vitals:   Temp (24hrs), Av.1 °F (36.7 °C), Min:97.6 °F (36.4 °C), Max:98.8 °F (37.1 °C)    Temp:  [97.6 °F (36.4 °C)-98.8 °F (37.1 °C)] 97.8 °F (36.6 °C)  HR:  [76-85] 76  Resp:  [16-19] 16  BP: (116-125)/(73-77) 125/73  SpO2:  [93 %-97 %] 97 %  Body mass index is 30.23 kg/m².     Input and Output Summary (last 24 hours):     Intake/Output Summary (Last 24 hours) at 2024 1622  Last data filed at 2024 0726  Gross per 24 hour   Intake --   Output 200 ml   Net -200 ml       Physical Exam:   Physical Exam  Constitutional:       General: He is not in acute distress.     Appearance: Normal appearance. He is not ill-appearing.   HENT:      Head: Normocephalic and atraumatic.   Eyes:      Pupils: Pupils are equal, round, and reactive to light.   Cardiovascular:      Rate and Rhythm: Normal rate.      Pulses: Normal pulses.   Pulmonary:      Effort: Pulmonary effort is normal. No respiratory distress.   Abdominal:      General: Bowel sounds are normal. There is no distension.      Palpations: Abdomen is soft.      Tenderness: There is no right CVA tenderness or left CVA tenderness.   Neurological:      Mental Status: He is alert and oriented to person, place, and time.   Psychiatric:         Mood and Affect: Mood normal.         Behavior: Behavior normal.          Additional Data:     Labs:  Results from last 7 days   Lab Units 24  0538   WBC Thousand/uL 9.72   HEMOGLOBIN g/dL 11.5*   HEMATOCRIT % 35.1*   PLATELETS Thousands/uL 236   SEGS PCT % 70   LYMPHO PCT % 21   MONO PCT % 6   EOS PCT  % 2     Results from last 7 days   Lab Units 07/24/24  0538 07/23/24  0456 07/22/24  1501   SODIUM mmol/L 141   < > 143   POTASSIUM mmol/L 3.9   < > 4.1   CHLORIDE mmol/L 110*   < > 110*   CO2 mmol/L 22   < > 24   BUN mg/dL 30*   < > 30*   CREATININE mg/dL 1.44*   < > 1.58*   ANION GAP mmol/L 9   < > 9   CALCIUM mg/dL 7.5*   < > 8.1*   ALBUMIN g/dL  --   --  3.9   TOTAL BILIRUBIN mg/dL  --   --  0.40   ALK PHOS U/L  --   --  55   ALT U/L  --   --  11   AST U/L  --   --  15   GLUCOSE RANDOM mg/dL 95   < > 100    < > = values in this interval not displayed.     Results from last 7 days   Lab Units 07/22/24  1501   INR  1.06             Results from last 7 days   Lab Units 07/22/24  1501   LACTIC ACID mmol/L 1.5   PROCALCITONIN ng/ml <0.05       Lines/Drains:  Invasive Devices       Peripheral Intravenous Line  Duration             Peripheral IV 07/22/24 Left Antecubital 2 days                          Imaging: Reviewed radiology reports from this admission including: abdominal/pelvic CT    Recent Cultures (last 7 days):   Results from last 7 days   Lab Units 07/22/24  1607 07/22/24  1501 07/22/24  1441   BLOOD CULTURE  No Growth at 24 hrs. No Growth at 24 hrs.  --    URINE CULTURE   --   --  <10,000 cfu/ml       Last 24 Hours Medication List:   Current Facility-Administered Medications   Medication Dose Route Frequency Provider Last Rate    acetaminophen  650 mg Oral Q6H PRN Charles CANDELARIA MD      albuterol  2.5 mg Nebulization Q6H PRN Roque Linn MD      amoxicillin  500 mg Oral Q8H [facility] Charles CANDELARIA MD      aspirin  81 mg Oral Daily Roque Linn MD      atorvastatin  40 mg Oral Daily Roque Linn MD      ferrous sulfate  325 mg Oral Daily With Breakfast Roque Linn MD      Fluticasone Furoate-Vilanterol  1 puff Inhalation Daily Roque Linn MD      And    umeclidinium  1 puff Inhalation Daily Roque Linn MD      gabapentin  100 mg Oral TID Roque Linn MD      heparin (porcine)  5,000 Units  Subcutaneous Q8H NILE Roque Linn MD      levothyroxine  100 mcg Oral Daily Roque Linn MD      metoprolol succinate  50 mg Oral Daily Roque Linn MD      nicotine  1 patch Transdermal Daily Roque Linn MD      oxybutynin  5 mg Oral TID PRN Luzmaria Cole PA-C      sacubitril-valsartan  1 tablet Oral BID Roque Linn MD      tamsulosin  0.4 mg Oral Daily With Dinner Luzmaria Cole PA-C      traMADol  50 mg Oral Q6H PRN Charles CANDELARIA MD      zolpidem  10 mg Oral HS PRN SHRUTHI Kennedy          Today, Patient Was Seen By: Charles Wade MD    **Please Note: This note may have been constructed using a voice recognition system.**

## 2024-07-25 ENCOUNTER — TRANSITIONAL CARE MANAGEMENT (OUTPATIENT)
Dept: FAMILY MEDICINE CLINIC | Facility: MEDICAL CENTER | Age: 79
End: 2024-07-25

## 2024-07-25 VITALS
TEMPERATURE: 97.8 F | OXYGEN SATURATION: 97 % | WEIGHT: 216.71 LBS | DIASTOLIC BLOOD PRESSURE: 66 MMHG | RESPIRATION RATE: 18 BRPM | HEIGHT: 71 IN | BODY MASS INDEX: 30.34 KG/M2 | HEART RATE: 83 BPM | SYSTOLIC BLOOD PRESSURE: 123 MMHG

## 2024-07-25 LAB
ANION GAP SERPL CALCULATED.3IONS-SCNC: 8 MMOL/L (ref 4–13)
BUN SERPL-MCNC: 25 MG/DL (ref 5–25)
CALCIUM SERPL-MCNC: 7.5 MG/DL (ref 8.4–10.2)
CHLORIDE SERPL-SCNC: 109 MMOL/L (ref 96–108)
CO2 SERPL-SCNC: 23 MMOL/L (ref 21–32)
CREAT SERPL-MCNC: 1.24 MG/DL (ref 0.6–1.3)
GFR SERPL CREATININE-BSD FRML MDRD: 56 ML/MIN/1.73SQ M
GLUCOSE SERPL-MCNC: 106 MG/DL (ref 65–140)
POTASSIUM SERPL-SCNC: 4 MMOL/L (ref 3.5–5.3)
SODIUM SERPL-SCNC: 140 MMOL/L (ref 135–147)

## 2024-07-25 PROCEDURE — 80048 BASIC METABOLIC PNL TOTAL CA: CPT | Performed by: STUDENT IN AN ORGANIZED HEALTH CARE EDUCATION/TRAINING PROGRAM

## 2024-07-25 PROCEDURE — 99239 HOSP IP/OBS DSCHRG MGMT >30: CPT | Performed by: STUDENT IN AN ORGANIZED HEALTH CARE EDUCATION/TRAINING PROGRAM

## 2024-07-25 RX ORDER — AMOXICILLIN 500 MG/1
500 CAPSULE ORAL EVERY 8 HOURS SCHEDULED
Qty: 12 CAPSULE | Refills: 0 | Status: SHIPPED | OUTPATIENT
Start: 2024-07-25 | End: 2024-07-29

## 2024-07-25 RX ORDER — TRAMADOL HYDROCHLORIDE 50 MG/1
50 TABLET ORAL EVERY 6 HOURS PRN
Qty: 10 TABLET | Refills: 0 | Status: SHIPPED | OUTPATIENT
Start: 2024-07-25

## 2024-07-25 RX ORDER — TAMSULOSIN HYDROCHLORIDE 0.4 MG/1
0.4 CAPSULE ORAL
Qty: 30 CAPSULE | Refills: 0 | Status: SHIPPED | OUTPATIENT
Start: 2024-07-25 | End: 2024-07-29 | Stop reason: SDUPTHER

## 2024-07-25 RX ADMIN — HEPARIN SODIUM 5000 UNITS: 5000 INJECTION INTRAVENOUS; SUBCUTANEOUS at 08:03

## 2024-07-25 RX ADMIN — ATORVASTATIN CALCIUM 40 MG: 40 TABLET, FILM COATED ORAL at 10:12

## 2024-07-25 RX ADMIN — AMOXICILLIN 500 MG: 250 CAPSULE ORAL at 05:05

## 2024-07-25 RX ADMIN — LEVOTHYROXINE SODIUM 100 MCG: 0.1 TABLET ORAL at 05:05

## 2024-07-25 RX ADMIN — FERROUS SULFATE TAB 325 MG (65 MG ELEMENTAL FE) 325 MG: 325 (65 FE) TAB at 08:03

## 2024-07-25 RX ADMIN — SACUBITRIL AND VALSARTAN 1 TABLET: 49; 51 TABLET, FILM COATED ORAL at 10:14

## 2024-07-25 RX ADMIN — ASPIRIN 81 MG: 81 TABLET, CHEWABLE ORAL at 10:13

## 2024-07-25 RX ADMIN — GABAPENTIN 100 MG: 100 CAPSULE ORAL at 10:12

## 2024-07-25 RX ADMIN — METOPROLOL SUCCINATE 50 MG: 50 TABLET, EXTENDED RELEASE ORAL at 10:12

## 2024-07-25 NOTE — CASE MANAGEMENT
Case Management Discharge Planning Note    Patient name Sherly Peña  Location /-01 MRN [id number]  : 1945 Date 2024       Current Admission Date: 2024  Current Admission Diagnosis:Acute UTI   Patient Active Problem List    Diagnosis Date Noted Date Diagnosed    Acute UTI 2024     NSVT (nonsustained ventricular tachycardia) (HCC) 2024     Tinnitus of both ears 2023     Cigarette nicotine dependence without complication 2023     Lumbar pain 01/10/2022     Shortness of breath      Chronic kidney disease-mineral and bone disorder 2021     Stage 3 chronic kidney disease (HCC) 2021     Hypomagnesemia 2020     Chronic systolic congestive heart failure (HCC) 2020     PVC (premature ventricular contraction) 2018     Squamous cell cancer of skin of forearm, left 2018     Acquired hypothyroidism 2018     HHD (hypertensive heart disease) 2018     Mycosis fungoides (HCC) 2018     History of skin cancer 2018     Insomnia      Cardiac resynchronization therapy defibrillator (CRT-D) in place      Hyperlipidemia      Hx of lymphoma      HTN (hypertension)      COPD (chronic obstructive pulmonary disease) (HCC)      Chronic bronchitis (HCC)      Non-ischemic cardiomyopathy (HCC)      BPH (benign prostatic hyperplasia)      Seborrheic keratosis 2016       LOS (days): 3  Geometric Mean LOS (GMLOS) (days): 2.9  Days to GMLOS:0.2     OBJECTIVE:  Risk of Unplanned Readmission Score: 12.37         Current admission status: Inpatient   Preferred Pharmacy:   [facility]/pharmacy #[id number] - [facility] [place] - [address]  [address] [facility] [address]  Phone: [phone] Fax: [phone]    [facility] ([facility]) - [address] [address]  Phone: [phone] Fax: [phone]    [facility] - [place], [place]  - [address]  [address]  [address] [address]  Phone: [phone] Fax: [phone]    Primary Care Provider: Marino Lozada MD    Primary Insurance: [employer]  Secondary Insurance:     DISCHARGE DETAILS:    Discharge planning discussed with:: patient at bedside  Freedom of Choice: Yes  Comments - Freedom of Choice: CM maintained freedom of choice as it pertains to discharge planning. Patient choice list provided to patient at bedside for HHC. Patient reviewed options and then reported he does not wish to have HHC. Patient denied any other discharge needs. CM advised patient that if he changed his mind and decided he did want to set up HHC he can do this through his PCP office.  CM contacted family/caregiver?: No- see comments (pt alert, oriented, independent adult)  Were Treatment Team discharge recommendations reviewed with patient/caregiver?: Yes  Did patient/caregiver verbalize understanding of patient care needs?: Yes  Were patient/caregiver advised of the risks associated with not following Treatment Team discharge recommendations?: Yes    Requested Home Health Care         Is the patient interested in HHC at discharge?: No    DME Referral Provided  Referral made for DME?: No    Other Referral/Resources/Interventions Provided:  Interventions: HHC  Referral Comments: Patient choice list provided to patient at bedside.    Would you like to participate in our [facility] service program?  : No - Declined    Treatment Team Recommendation: Home with Home Health Care  Discharge Destination Plan:: Home with Home Health Care  Transport at Discharge : Self

## 2024-07-25 NOTE — DISCHARGE SUMMARY
[facility]  Discharge- Sherly Peña 1945, 78 y.o. male MRN: [id number]  Unit/Bed#: -01 Encounter: [id number]  Primary Care Provider: Marino Lozada MD   Date and time admitted to hospital: 7/22/2024  2:39 PM    * Acute UTI  Assessment & Plan  C/c : + With symptoms of dysuria/frequency/hematuria.  Recent UTI x 2 in June 2024.  Currently afebrile.    CT Notes : Abnormal appearance of urinary bladder. Although not optimally distended, the bladder wall appears significantly thickened, with increased mucosal enhancement, and innumerable bladder calculi and perivesical fat stranding, most in keeping with cystitis in the appropriate clinical and laboratory setting. (Given these above findings, a small underlying bladder wall lesion cannot be excluded. If clinically indicated, nonemergent urology consultation should be considered)  UA positive, urine culture/blood culture pending  Based on prior urine cultures from 6/30/2024 and 6/16/2024, patient growing Enterococcus faecalis sensitive to ampicillin  Urology consulted. Will require OP cysto to evaluate bladder stones, bladder outlet obstruction for surgical interventions  Urine culture growing less than 10,000 mixed contaminants.  Transition to p.o. amoxicillin 500 mg every 8 hours to complete 7-day course.  Hemodynamically stable for discharge with outpatient follow-up with primary care provider and urology.  Patient is in agreement with above plan.        NSVT (nonsustained ventricular tachycardia) ([facility])  Assessment & Plan  ICD in place  Continue beta-blocker  Follows outpatient with cardiology.    Stage 3 chronic kidney disease (HCC)  Assessment & Plan  Lab Results   Component Value Date    EGFR 56 07/25/2024    EGFR 46 07/24/2024    EGFR 45 07/23/2024    CREATININE 1.24 07/25/2024    CREATININE 1.44 (H) 07/24/2024    CREATININE 1.47 (H) 07/23/2024   Has known history of CKD with baseline creatinine 1.2,  presented with creatinine  1.58  Patient also received CT with contrast in ER  Patient was treated with holding off on Aldactone.  Currently creatinine 1.24 stable at baseline.  Resume home medication upon discharge with outpatient follow-up with PCP.      Chronic systolic congestive heart failure (HCC)  Assessment & Plan  Wt Readings from Last 3 Encounters:   07/22/24 98.3 kg (216 lb 11.4 oz)   06/30/24 99.8 kg (220 lb)   06/16/24 101 kg (223 lb)     Follows outpatient with cardiology  Last echo December 2023 with EF 30% grade 1 diastolic dysfunction  Has ICD in place.  Maintained on beta-blocker Entresto and Aldactone  Resume Aldactone on discharge.  Recommend outpatient follow-up with PCP.          Acquired hypothyroidism  Assessment & Plan  Continue Synthroid.    COPD (chronic obstructive pulmonary disease) (HCC)  Assessment & Plan  Not in exacerbation  Continue albuterol nebulizer every 6 hours as needed/continue home inhalers  Patient does have home oxygen available mostly uses at night 3 L.  .      HTN (hypertension)  Assessment & Plan  Currently well-controlled  Continue home Toprol-XL.    Hyperlipidemia  Assessment & Plan  Continue home statin.        Medical Problems       Resolved Problems  Date Reviewed: 7/25/2024   None       Discharging Physician / Practitioner: Charles Wade MD  PCP: Marino Lozada MD  Admission Date:   Admission Orders (From admission, onward)       Ordered        07/22/24 1705  INPATIENT ADMISSION  Once                          Discharge Date: 07/25/24    Consultations During Hospital Stay:  Urology      Reason for Admission: Urinary tract infection    Hospital Course:     Sherly Peña is a 78 y.o. male patient patient past medical history of CHF, history of ICD in place, COPD, CKD, hypertension, hypothyroidism, hyperlipidemia, history of recurrent UTI, who originally presented to the hospital on 7/22/2024 due to complaining of dysuria, frequency without fever or chills.  With recent 2 episodes of urine  "culture growing Enterococcus faecalis sensitive to ampicillin.  CT Abdo pelvis report noted with abnormal appearance of urinary bladder, bladder wall appears significantly thickened noted with innumerable bladder calculi and finding concerning of cystitis.  UA was positive for urinary tract infection and patient was given IV ampicillin based on previous cultures.  Urine culture grew mixed contaminant on this hospitalization.  Patient was seen by urology and recommend outpatient follow-up with urology for cystoscopy.  Patient did well with IV ampicillin.  Blood cultures remain without growth at 72 hours.  Currently patient is hemodynamically stable for discharge.  Currently reports bladder/lower abdominal pain is controlled with tramadol.  Being discharged on amoxicillin for 4 more days to complete total 7-day course.  Recommend close follow-up with primary care provider and primary urology.  Giving few days supplies for tramadol as needed.  No other events reported.  Current hemodynamically stable for discharge.  Refer to earlier notes for further clarification.      Please see above list of diagnoses and related plan for additional information.     Condition at Discharge: good    Discharge Day Visit / Exam:   Subjective: Seen during a.m. rounds.  Patient appears comfortable not in distress.  Reports that her pain is significantly better with tramadol.  Currently denies any new complaints.  Eager to go home.    Vitals: Blood Pressure: 123/66 (07/25/24 0700)  Pulse: 83 (07/24/24 2202)  Temperature: 97.8 °F (36.6 °C) (07/24/24 2202)  Temp Source: Oral (07/23/24 2143)  Respirations: 18 (07/25/24 0700)  Height: 5' 11\" (180.3 cm) (07/22/24 1936)  Weight - Scale: 98.3 kg (216 lb 11.4 oz) (07/22/24 1936)  SpO2: 97 % (07/24/24 2202)  Exam:   Physical Exam  Constitutional:       General: He is not in acute distress.     Appearance: Normal appearance. He is not ill-appearing.   HENT:      Head: Normocephalic and atraumatic. "   Eyes:      Pupils: Pupils are equal, round, and reactive to light.   Cardiovascular:      Rate and Rhythm: Normal rate.      Pulses: Normal pulses.   Pulmonary:      Effort: Pulmonary effort is normal. No respiratory distress.   Abdominal:      General: Bowel sounds are normal. There is no distension.      Palpations: Abdomen is soft.      Tenderness: There is no right CVA tenderness or left CVA tenderness.   Neurological:      Mental Status: He is alert and oriented to person, place, and time.   Psychiatric:         Mood and Affect: Mood normal.         Behavior: Behavior normal.       Discharge instructions/Information to patient and family:   See after visit summary for information provided to patient and family.      Provisions for Follow-Up Care:  See after visit summary for information related to follow-up care and any pertinent home health orders.      Mobility at time of Discharge:   Basic Mobility Inpatient Raw Score: 24  JH-HLM Goal: 8: Walk 250 feet or more  JH-HLM Achieved: 7: Walk 25 feet or more       Disposition:   Home    Planned Readmission:      Discharge Statement:  I spent 35 minutes discharging the patient. This time was spent on the day of discharge. I had direct contact with the patient on the day of discharge. Greater than 50% of the total time was spent examining patient, answering all patient questions, arranging and discussing plan of care with patient as well as directly providing post-discharge instructions.  Additional time then spent on discharge activities.    Discharge Medications:  See after visit summary for reconciled discharge medications provided to patient and/or family.      **Please Note: This note may have been constructed using a voice recognition system**

## 2024-07-25 NOTE — PLAN OF CARE
Problem: INFECTION - ADULT  Goal: Absence or prevention of progression during hospitalization  Description: INTERVENTIONS:  - Assess and monitor for signs and symptoms of infection  - Monitor lab/diagnostic results  - Monitor all insertion sites, i.e. indwelling lines, tubes, and drains  - Monitor endotracheal if appropriate and nasal secretions for changes in amount and color  - Menan appropriate cooling/warming therapies per order  - Administer medications as ordered  - Instruct and encourage patient and family to use good hand hygiene technique  - Identify and instruct in appropriate isolation precautions for identified infection/condition  Outcome: Progressing     Problem: SAFETY ADULT  Goal: Patient will remain free of falls  Description: INTERVENTIONS:  - Educate patient/family on patient safety including physical limitations  - Instruct patient to call for assistance with activity   - Consult OT/PT to assist with strengthening/mobility   - Keep Call bell within reach  - Keep bed low and locked with side rails adjusted as appropriate  - Keep care items and personal belongings within reach  - Initiate and maintain comfort rounds  - Make Fall Risk Sign visible to staff    - Apply yellow socks and bracelet for high fall risk patients  - Consider moving patient to room near nurses station  Outcome: Progressing     Problem: DISCHARGE PLANNING  Goal: Discharge to home or other facility with appropriate resources  Description: INTERVENTIONS:  - Identify barriers to discharge w/patient and caregiver  - Arrange for needed discharge resources and transportation as appropriate  - Identify discharge learning needs (meds, wound care, etc.)  - Arrange for interpretive services to assist at discharge as needed  - Refer to Case Management Department for coordinating discharge planning if the patient needs post-hospital services based on physician/advanced practitioner order or complex needs related to functional status,  cognitive ability, or social support system  Outcome: Progressing

## 2024-07-25 NOTE — DISCHARGE INSTR - AVS FIRST PAGE
Recommend close follow-up with primary care provider within 1 week of discharge.  Recommended to repeat blood work for CBC and BMP within 1 week with primary care reported.  Recommend outpatient follow-up with urology.

## 2024-07-26 ENCOUNTER — TRANSITIONAL CARE MANAGEMENT (OUTPATIENT)
Dept: FAMILY MEDICINE CLINIC | Facility: MEDICAL CENTER | Age: 79
End: 2024-07-26

## 2024-07-26 NOTE — ASSESSMENT & PLAN NOTE
Lab Results   Component Value Date    EGFR 56 07/25/2024    EGFR 46 07/24/2024    EGFR 45 07/23/2024    CREATININE 1.24 07/25/2024    CREATININE 1.44 (H) 07/24/2024    CREATININE 1.47 (H) 07/23/2024   Has known history of CKD with baseline creatinine 1.2, today presents with creatinine 1.58  Patient also received CT with contrast in ER today.  Given underlying chronic systolic CHF, will hold off on fluids for now  Will hold Aldactone overnight and monitor BMP closely. Creatinine improving, 1.44 this am

## 2024-07-27 LAB
BACTERIA BLD CULT: NORMAL
BACTERIA BLD CULT: NORMAL

## 2024-07-28 ENCOUNTER — RA CDI HCC (OUTPATIENT)
Dept: OTHER | Facility: HOSPITAL | Age: 79
End: 2024-07-28

## 2024-07-29 ENCOUNTER — OFFICE VISIT (OUTPATIENT)
Dept: UROLOGY | Facility: CLINIC | Age: 79
End: 2024-07-29
Payer: COMMERCIAL

## 2024-07-29 VITALS
SYSTOLIC BLOOD PRESSURE: 122 MMHG | DIASTOLIC BLOOD PRESSURE: 78 MMHG | WEIGHT: 222 LBS | BODY MASS INDEX: 31.08 KG/M2 | OXYGEN SATURATION: 96 % | TEMPERATURE: 97.5 F | HEART RATE: 62 BPM | HEIGHT: 71 IN

## 2024-07-29 DIAGNOSIS — N32.89 BLADDER WALL THICKENING: ICD-10-CM

## 2024-07-29 DIAGNOSIS — R31.0 GROSS HEMATURIA: ICD-10-CM

## 2024-07-29 DIAGNOSIS — R97.20 ELEVATED PSA: ICD-10-CM

## 2024-07-29 DIAGNOSIS — N40.1 BENIGN PROSTATIC HYPERPLASIA WITH LOWER URINARY TRACT SYMPTOMS, SYMPTOM DETAILS UNSPECIFIED: Primary | ICD-10-CM

## 2024-07-29 DIAGNOSIS — N40.0 BPH (BENIGN PROSTATIC HYPERPLASIA): ICD-10-CM

## 2024-07-29 LAB — POST-VOID RESIDUAL VOLUME, ML POC: 12 ML

## 2024-07-29 PROCEDURE — 3074F SYST BP LT 130 MM HG: CPT | Performed by: PHYSICIAN ASSISTANT

## 2024-07-29 PROCEDURE — 3078F DIAST BP <80 MM HG: CPT | Performed by: PHYSICIAN ASSISTANT

## 2024-07-29 PROCEDURE — 99204 OFFICE O/P NEW MOD 45 MIN: CPT | Performed by: PHYSICIAN ASSISTANT

## 2024-07-29 PROCEDURE — 51798 US URINE CAPACITY MEASURE: CPT | Performed by: PHYSICIAN ASSISTANT

## 2024-07-29 PROCEDURE — 1160F RVW MEDS BY RX/DR IN RCRD: CPT | Performed by: PHYSICIAN ASSISTANT

## 2024-07-29 PROCEDURE — 1159F MED LIST DOCD IN RCRD: CPT | Performed by: PHYSICIAN ASSISTANT

## 2024-07-29 RX ORDER — TAMSULOSIN HYDROCHLORIDE 0.4 MG/1
0.4 CAPSULE ORAL
Qty: 90 CAPSULE | Refills: 3 | Status: SHIPPED | OUTPATIENT
Start: 2024-07-29

## 2024-07-29 NOTE — PROGRESS NOTES
7/29/2024      Chief Complaint   Patient presents with    Follow-up     UNABLE TO USE RESTROOM     Assessment and Plan    78 y.o. male new patient    BPH with LUTS  2.   Bladder wall thickening  3.   Bladder calculi   4.   Gross hematuria and dysuria, resolved  - CT A/P from 7/22/24 - Abnormal appearance of urinary bladder. Although not optimally distended, the bladder wall appears significantly thickened, with increased mucosal enhancement, and innumerable bladder calculi and perivesical fat stranding.Although there are multiple midline prostate calcifications, prostatic urethral calculi felt somewhat unlikely as the bladder is not distended. The prostate gland is noted to be significantly enlarged. Bilateral nephrolithiasis without hydronephrosis  - Urinated prior to visit. Bladder scan 12 mL   - Continue Flomax. Discussed addition of finasteride which he defers at this time.   - Recommend cystoscopy for further work-up which he is agreeable to. Urine cytology ordered.     4. History of elevated PSA  - Prior negative prostate biopsy 15 years ago  - PSA from 2022 was 3.9  - GERMÁN negative.   - Obtain updated PSA    History of Present Illness  Sherly Peña is a 78 y.o. male here for new patient evaluation of lower urinary tract symptoms and dysuria. He was seen in ED last week this issue. He reports he did have episode of hematuria and passing sediment/stone fragments. Imaging showing above. Urine culture was negative at that time. Previous recent urine cultures were positive for Enterococcus faecalis. He was treated with IV ampicillin. He was placed on Flomax. He reports symptoms have completely resolved. He reports prior prostate biopsy in the past for elevated PSA which was reportedly negative. No other   surgeries. No family history of  malignancy. He is current smoker.     Review of Systems   Constitutional:  Negative for chills and fever.   Respiratory:  Negative for shortness of breath.     Cardiovascular:  Negative for chest pain.   Gastrointestinal:  Negative for abdominal pain.   Genitourinary:  Negative for difficulty urinating, dysuria, flank pain, frequency, hematuria and urgency.   Neurological:  Negative for dizziness.           AUA SYMPTOM SCORE      Flowsheet Row Most Recent Value   AUA SYMPTOM SCORE    How often have you had a sensation of not emptying your bladder completely after you finished urinating? 2 (P)     How often have you had to urinate again less than two hours after you finished urinating? 4 (P)     How often have you found you stopped and started again several times when you urinate? 4 (P)     How often have you found it difficult to postpone urination? 3 (P)     How often have you had a weak urinary stream? 3 (P)     How often have you had to push or strain to begin urination? 2 (P)     How many times did you most typically get up to urinate from the time you went to bed at night until the time you got up in the morning? 1 (P)     Quality of Life: If you were to spend the rest of your life with your urinary condition just the way it is now, how would you feel about that? 3 (P)     AUA SYMPTOM SCORE 19 (P)                 Past Medical History  Past Medical History:   Diagnosis Date    Agent orange exposure     Anemia     Benign colon polyp     BPH (benign prostatic hyperplasia)     Bradycardia     Cardiomyopathy (HCC)     Chest discomfort     CHF (congestive heart failure) (HCC)     Chronic bronchitis (HCC)     Chronic kidney disease     COPD (chronic obstructive pulmonary disease) (HCC)     LAST ASSESSED: 9/9/17    Coronary artery disease cardio myopthia    Emphysema of lung (HCC) 2013    H/O nonmelanoma skin cancer     LAST ASSESSED: 11/7/17    HHD (hypertensive heart disease)     HTN (hypertension)     Hx of lymphoma     Hyperlipidemia     ICD (implantable cardioverter-defibrillator) in place     Insomnia     Mycosis fungoides (HCC)     PVC (premature ventricular  contraction)     PVT (paroxysmal ventricular tachycardia) (HCC)     SOB (shortness of breath)        Past Social History  Past Surgical History:   Procedure Laterality Date    CARDIAC PACEMAKER PLACEMENT      SKIN SURGERY      skin cancer removal     TONSILLECTOMY       Social History     Tobacco Use   Smoking Status Every Day    Current packs/day: 1.00    Average packs/day: 1 pack/day for 63.6 years (63.6 ttl pk-yrs)    Types: Cigarettes    Start date: 1961    Passive exposure: Current   Smokeless Tobacco Never   Tobacco Comments    HALF A PACK PER DAY        Past Family History  Family History   Problem Relation Age of Onset    Diabetes Mother     Colon cancer Father         DIAGNOSED IN HIS 80'S    Heart failure Father     Coronary artery disease Father     Cancer Father         Lorenzo    Diabetes Family         MELLITUS    Heart attack Neg Hx     Stroke Neg Hx     Anuerysm Neg Hx     Clotting disorder Neg Hx     Arrhythmia Neg Hx     Hypertension Neg Hx         pt unsure     Hyperlipidemia Neg Hx     Sudden death Neg Hx         scd       Past Social history  Social History     Socioeconomic History    Marital status:      Spouse name: Not on file    Number of children: 0    Years of education: Not on file    Highest education level: Not on file   Occupational History    Occupation: retired   Tobacco Use    Smoking status: Every Day     Current packs/day: 1.00     Average packs/day: 1 pack/day for 63.6 years (63.6 ttl pk-yrs)     Types: Cigarettes     Start date: 1961     Passive exposure: Current    Smokeless tobacco: Never    Tobacco comments:     HALF A PACK PER DAY    Vaping Use    Vaping status: Never Used   Substance and Sexual Activity    Alcohol use: Not Currently     Alcohol/week: 14.0 standard drinks of alcohol     Comment: two drinks every night over a 4 hour period    Drug use: Never    Sexual activity: Not Currently     Partners: Female     Birth control/protection: Male Sterilization   Other  Topics Concern    Not on file   Social History Narrative    Not on file     Social Determinants of Health     Financial Resource Strain: Low Risk  (1/23/2023)    Overall Financial Resource Strain (CARDIA)     Difficulty of Paying Living Expenses: Not very hard   Food Insecurity: No Food Insecurity (7/24/2024)    Hunger Vital Sign     Worried About Running Out of Food in the Last Year: Never true     Ran Out of Food in the Last Year: Never true   Transportation Needs: No Transportation Needs (7/24/2024)    PRAPARE - Transportation     Lack of Transportation (Medical): No     Lack of Transportation (Non-Medical): No   Physical Activity: Inactive (5/14/2024)    Exercise Vital Sign     Days of Exercise per Week: 0 days     Minutes of Exercise per Session: 0 min   Stress: No Stress Concern Present (9/4/2020)    [employer] - Occupational Stress Questionnaire     Feeling of Stress : Not at all   Social Connections: Not on file   Intimate Partner Violence: Not on file   Housing Stability: Low Risk  (7/24/2024)    Housing Stability Vital Sign     Unable to Pay for Housing in the Last Year: No     Number of Times Moved in the Last Year: 0     Homeless in the Last Year: No       Current Medications  Current Outpatient Medications   Medication Sig Dispense Refill    albuterol (2.5 mg/3 mL) 0.083 % nebulizer solution USE 1 VIAL VIA NEBULIZER 4 TIMES A DAY AS NEEDED 360 mL 2    ammonium lactate (LAC-HYDRIN) 12 % lotion APPLY TO AFFECTED AREA TOPICALLY EVERY  mL 2    amoxicillin (AMOXIL) 500 mg capsule Take 1 capsule (500 mg total) by mouth every 8 (eight) hours for 4 days 12 capsule 0    aspirin 81 MG tablet Take 81 mg by mouth daily      atorvastatin (LIPITOR) 40 mg tablet TAKE 1 TABLET BY MOUTH DAILY 90 tablet 1    bexarotene (TARGRETIN) 75 mg capsule TAKE 8 CAPSULES BY MOUTH DAILY 240 capsule 5    Cholecalciferol 25 MCG (1000 UT) capsule Take 1 capsule by mouth daily      cyanocobalamin  (VITAMIN B-12) 1,000 mcg tablet Take 1,000 mcg by mouth daily      diclofenac potassium (CATAFLAM) 50 mg tablet 1 tablet daily for severe pain.  Not to exceed 2 tablets a week. 30 tablet 0    Entresto 49-51 MG TABS TAKE 1 TABLET BY MOUTH  TWICE DAILY 180 tablet 3    ferrous sulfate 324 (65 Fe) mg Take 1 tablet by mouth Twice daily      gabapentin (NEURONTIN) 100 mg capsule Take 1 capsule (100 mg total) by mouth 3 (three) times a day 270 capsule 3    levothyroxine 100 mcg tablet TAKE 1 TABLET BY MOUTH DAILY 90 tablet 1    metoprolol succinate (TOPROL-XL) 50 mg 24 hr tablet       montelukast (SINGULAIR) 10 mg tablet TAKE 1 TABLET BY MOUTH  DAILY AT BEDTIME 90 tablet 3    Multiple Vitamins-Minerals (OCUVITE ADULT 50+ PO) Take by mouth      spironolactone (ALDACTONE) 25 mg tablet Take 0.5 tablets (12.5 mg total) by mouth daily 45 tablet 3    tamsulosin (FLOMAX) 0.4 mg Take 1 capsule (0.4 mg total) by mouth daily with dinner 30 capsule 0    traMADol (ULTRAM) 50 mg tablet Take 1 tablet (50 mg total) by mouth every 6 (six) hours as needed for moderate pain or severe pain 10 tablet 0    Trelegy Ellipta 100-62.5-25 MCG/ACT inhaler USE 1 INHALATION BY MOUTH ONCE  DAILY AT THE SAME TIME EACH DAY  RINSE MOUTH AFTER  each 3    triamcinolone (KENALOG) 0.1 % ointment Apply topically 2 (two) times 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 454 g 3    Vitamins-Lipotropics (LIPOFLAVONOID PO) Take by mouth daily      zolpidem (AMBIEN) 10 mg tablet TAKE 1 TABLET BY MOUTH DAILY AT BEDTIME AS NEEDED FOR SLEEP 30 tablet 1     No current facility-administered medications for this visit.       Allergies  No Known Allergies      The following portions of the patient's history were reviewed and updated as appropriate: allergies, current medications, past medical history, past social history, past surgical history and problem list.      Vitals  Vitals:    07/29/24 1129   Pulse: 62   Temp: 97.5 °F (36.4 °C)   TempSrc: Temporal   SpO2: 96%   Weight: 101 kg  "(222 lb)   Height: 5' 11\" (1.803 m)           Physical Exam  Physical Exam  Constitutional:       Appearance: Normal appearance.   HENT:      Head: Normocephalic and atraumatic.      Right Ear: External ear normal.      Left Ear: External ear normal.      Nose: Nose normal.   Eyes:      General: No scleral icterus.     Conjunctiva/sclera: Conjunctivae normal.   Cardiovascular:      Pulses: Normal pulses.   Pulmonary:      Effort: Pulmonary effort is normal.   Genitourinary:     Prostate: Enlarged. Not tender and no nodules present.   Musculoskeletal:         General: Normal range of motion.      Cervical back: Normal range of motion.   Skin:     General: Skin is warm and dry.   Neurological:      General: No focal deficit present.      Mental Status: He is alert and oriented to person, place, and time.   Psychiatric:         Mood and Affect: Mood normal.         Behavior: Behavior normal.         Thought Content: Thought content normal.         Judgment: Judgment normal.           Results  Recent Results (from the past 1 hour(s))   POCT Measure PVR    Collection Time: 07/29/24 11:31 AM   Result Value Ref Range    POST-VOID RESIDUAL VOLUME, ML POC 12 mL   ]  Lab Results   Component Value Date    PSA 3.9 01/10/2022    PSA 4.2 (H) 01/14/2021    PSA 4.8 (H) 09/04/2020     Lab Results   Component Value Date    GLUCOSE 93 12/11/2015    CALCIUM 7.5 (L) 07/25/2024     12/11/2015    K 4.0 07/25/2024    CO2 23 07/25/2024     (H) 07/25/2024    BUN 25 07/25/2024    CREATININE 1.24 07/25/2024     Lab Results   Component Value Date    WBC 9.72 07/24/2024    HGB 11.5 (L) 07/24/2024    HCT 35.1 (L) 07/24/2024    MCV 94 07/24/2024     07/24/2024           Orders  Orders Placed This Encounter   Procedures    POCT Measure PVR       Shyann Hernandez"

## 2024-08-01 ENCOUNTER — APPOINTMENT (OUTPATIENT)
Age: 79
End: 2024-08-01
Payer: COMMERCIAL

## 2024-08-01 ENCOUNTER — OFFICE VISIT (OUTPATIENT)
Age: 79
End: 2024-08-01

## 2024-08-01 VITALS
HEIGHT: 71 IN | WEIGHT: 222 LBS | SYSTOLIC BLOOD PRESSURE: 100 MMHG | OXYGEN SATURATION: 98 % | DIASTOLIC BLOOD PRESSURE: 60 MMHG | RESPIRATION RATE: 20 BRPM | HEART RATE: 72 BPM | BODY MASS INDEX: 31.08 KG/M2 | TEMPERATURE: 98.9 F

## 2024-08-01 DIAGNOSIS — D22.9 NEVUS: ICD-10-CM

## 2024-08-01 DIAGNOSIS — N39.0 RECURRENT UTI: ICD-10-CM

## 2024-08-01 DIAGNOSIS — C84.00 MYCOSIS FUNGOIDES, UNSPECIFIED BODY REGION (HCC): Primary | ICD-10-CM

## 2024-08-01 DIAGNOSIS — R97.20 ELEVATED PSA: ICD-10-CM

## 2024-08-01 DIAGNOSIS — Z00.6 ENCOUNTER FOR EXAMINATION FOR NORMAL COMPARISON OR CONTROL IN CLINICAL RESEARCH PROGRAM: ICD-10-CM

## 2024-08-01 DIAGNOSIS — L82.1 SEBORRHEIC KERATOSIS: ICD-10-CM

## 2024-08-01 DIAGNOSIS — Z79.899 ENCOUNTER FOR LONG-TERM CURRENT USE OF HIGH RISK MEDICATION: ICD-10-CM

## 2024-08-01 DIAGNOSIS — N32.89 BLADDER WALL THICKENING: ICD-10-CM

## 2024-08-01 DIAGNOSIS — Z13.89 SCREENING FOR SKIN CONDITION: ICD-10-CM

## 2024-08-01 DIAGNOSIS — D18.01 CHERRY ANGIOMA: ICD-10-CM

## 2024-08-01 LAB
BACTERIA UR QL AUTO: ABNORMAL /HPF
BILIRUB UR QL STRIP: NEGATIVE
CLARITY UR: CLEAR
COLOR UR: YELLOW
GLUCOSE UR STRIP-MCNC: NEGATIVE MG/DL
HGB UR QL STRIP.AUTO: NEGATIVE
HYALINE CASTS #/AREA URNS LPF: ABNORMAL /LPF
KETONES UR STRIP-MCNC: NEGATIVE MG/DL
LEUKOCYTE ESTERASE UR QL STRIP: ABNORMAL
MUCOUS THREADS UR QL AUTO: ABNORMAL
NITRITE UR QL STRIP: NEGATIVE
NON-SQ EPI CELLS URNS QL MICRO: ABNORMAL /HPF
PH UR STRIP.AUTO: 6 [PH]
PROT UR STRIP-MCNC: ABNORMAL MG/DL
PSA SERPL-MCNC: 5.72 NG/ML (ref 0–4)
RBC #/AREA URNS AUTO: ABNORMAL /HPF
SP GR UR STRIP.AUTO: 1.02 (ref 1–1.03)
UROBILINOGEN UR STRIP-ACNC: <2 MG/DL
WBC #/AREA URNS AUTO: ABNORMAL /HPF

## 2024-08-01 PROCEDURE — 84153 ASSAY OF PSA TOTAL: CPT

## 2024-08-01 PROCEDURE — 36415 COLL VENOUS BLD VENIPUNCTURE: CPT

## 2024-08-01 PROCEDURE — 88112 CYTOPATH CELL ENHANCE TECH: CPT | Performed by: PATHOLOGY

## 2024-08-01 PROCEDURE — 87086 URINE CULTURE/COLONY COUNT: CPT

## 2024-08-01 PROCEDURE — 81001 URINALYSIS AUTO W/SCOPE: CPT

## 2024-08-01 NOTE — PROGRESS NOTES
"[facility] Note     Patient Name: Sherly Peña  Encounter Date: 08/01/2024     Have you been cared for by a [facility]ologist in the last 3 years and, if so, which description applies to you?    Yes.  I have been here within the last 3 years, and my medical history has NOT changed since that time.  I am MALE/not capable of bearing children.    REVIEW OF SYSTEMS:  Have you recently had or currently have any of the following? No changes in my recent health.   PAST MEDICAL HISTORY:  Have you personally ever had or currently have any of the following?  If \"YES,\" then please provide more detail. No changes in my medical history.   HISTORY OF IMMUNOSUPPRESSION: Do you have a history of any of the following:  Systemic Immunosuppression such as Diabetes, Biologic or Immunotherapy, Chemotherapy, Organ Transplantation, Bone Marrow Transplantation?  No     Answering \"YES\" requires the addition of the dotphrase \"IMMUNOSUPPRESSED\" as the first diagnosis of the patient's visit.   FAMILY HISTORY:  Any \"first degree relatives\" (parent, brother, sister, or child) with the following?    No changes in my family's known health.   PATIENT EXPERIENCE:    Do you want the Dermatologist to perform a COMPLETE skin exam today including a clinical examination under the \"bra and underwear\" areas?  Yes  If necessary, do we have your permission to call and leave a detailed message on your Preferred Phone number that includes your specific medical information?  Yes      No Known Allergies   Current Outpatient Medications:     albuterol (2.5 mg/3 mL) 0.083 % nebulizer solution, USE 1 VIAL VIA NEBULIZER 4 TIMES A DAY AS NEEDED, Disp: 360 mL, Rfl: 2    ammonium lactate (LAC-HYDRIN) 12 % lotion, APPLY TO AFFECTED AREA TOPICALLY EVERY DAY, Disp: 225 mL, Rfl: 2    aspirin 81 MG tablet, Take 81 mg by mouth daily, Disp: , Rfl:     atorvastatin (LIPITOR) 40 mg tablet, TAKE 1 TABLET BY MOUTH DAILY, Disp: 90 tablet, Rfl: 1    " bexarotene (TARGRETIN) 75 mg capsule, TAKE 8 CAPSULES BY MOUTH DAILY, Disp: 240 capsule, Rfl: 5    Cholecalciferol 25 MCG (1000 UT) capsule, Take 1 capsule by mouth daily, Disp: , Rfl:     cyanocobalamin (VITAMIN B-12) 1,000 mcg tablet, Take 1,000 mcg by mouth daily, Disp: , Rfl:     diclofenac potassium (CATAFLAM) 50 mg tablet, 1 tablet daily for severe pain.  Not to exceed 2 tablets a week., Disp: 30 tablet, Rfl: 0    Entresto 49-51 MG TABS, TAKE 1 TABLET BY MOUTH  TWICE DAILY, Disp: 180 tablet, Rfl: 3    ferrous sulfate 324 (65 Fe) mg, Take 1 tablet by mouth Twice daily, Disp: , Rfl:     gabapentin (NEURONTIN) 100 mg capsule, Take 1 capsule (100 mg total) by mouth 3 (three) times a day, Disp: 270 capsule, Rfl: 3    levothyroxine 100 mcg tablet, TAKE 1 TABLET BY MOUTH DAILY, Disp: 90 tablet, Rfl: 1    metoprolol succinate (TOPROL-XL) 50 mg 24 hr tablet, , Disp: , Rfl:     montelukast (SINGULAIR) 10 mg tablet, TAKE 1 TABLET BY MOUTH  DAILY AT BEDTIME, Disp: 90 tablet, Rfl: 3    Multiple Vitamins-Minerals (OCUVITE ADULT 50+ PO), Take by mouth, Disp: , Rfl:     spironolactone (ALDACTONE) 25 mg tablet, Take 0.5 tablets (12.5 mg total) by mouth daily, Disp: 45 tablet, Rfl: 3    tamsulosin (FLOMAX) 0.4 mg, Take 1 capsule (0.4 mg total) by mouth daily with dinner, Disp: 90 capsule, Rfl: 3    traMADol (ULTRAM) 50 mg tablet, Take 1 tablet (50 mg total) by mouth every 6 (six) hours as needed for moderate pain or severe pain, Disp: 10 tablet, Rfl: 0    Trelegy Ellipta 100-62.5-25 MCG/ACT inhaler, USE 1 INHALATION BY MOUTH ONCE  DAILY AT THE SAME TIME EACH DAY  RINSE MOUTH AFTER USE, Disp: 180 each, Rfl: 3    triamcinolone (KENALOG) 0.1 % ointment, Apply topically 2 (two) times 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, Disp: 454 g, Rfl: 3    Vitamins-Lipotropics (LIPOFLAVONOID PO), Take by mouth daily, Disp: , Rfl:     zolpidem (AMBIEN) 10 mg tablet, TAKE 1 TABLET BY MOUTH DAILY AT BEDTIME AS NEEDED FOR SLEEP, Disp: 30 tablet, Rfl: 1           Whom besides the patient is providing clinical information about today's encounter?   NO ADDITIONAL HISTORIAN (patient alone provided history)    Physical Exam and Assessment/Plan by Diagnosis:  HISTORY OF BASAL CELL CARCINOMA     Physical Exam:  Anatomic Location Affected:  Right Arm - 2015  Morphological Description of scar:  well healed  Suspected Recurrence: No  Pertinent Positives:  Pertinent Negatives:     Additional History of Present Condition:  History of basal cell carcinoma with no sign of recurrence     Assessment and Plan:  Based on a thorough discussion of this condition and the management approach to it (including a comprehensive discussion of the known risks, side effects and potential benefits of treatment), the patient (family) agrees to implement the following specific plan:  Monitor for change     HISTORY OF SQUAMOUS CELL CARCINOMA      Physical Exam:  Anatomic Location Affected:  Left Wrist - 04/2023  Left Forearm - 08/2021  Left Hand - 2020  Left Arm - 2018  Right Ear - 2016  Right Hand - 2015  Left Upper Arm - 2015  Morphological Description of Scar:  well healed  Suspected Recurrence: no  Regional adenopathy: no     Additional History of Present Condition:  History of multiple Squamous Cell Carcinoma, treated     Assessment and Plan:  Based on a thorough discussion of this condition and the management approach to it (including a comprehensive discussion of the known risks, side effects and potential benefits of treatment), the patient (family) agrees to implement the following specific plan:  Monitor for change    MYCOSIS FUNGOIDES     Physical Exam:  Anatomic Location Affected:  trunk and extremities  Morphological Description:  scaly erythematous patches  50% BSA   Pertinent Positives:  Pertinent Negatives:no hepatosplenomegaly or lymphadenopathy     Additional History of Present Condition:  Currently using Bexarotene 75mg daily     Assessment and Plan:  Based on a thorough discussion  of this condition and the management approach to it (including a comprehensive discussion of the known risks, side effects and potential benefits of treatment), the patient (family) agrees to implement the following specific plan:  Continue same regimen   Please complete lab work   Mycosis fungoides  Mycosis fungoides is a condition in which the skin is infiltrated by patches or lumps composed of white cells called lymphocytes. It is more common in men than women and is very rare in children. Its cause is unknown but in some patients, it is associated with a pre-existing contact allergic dermatitis or infection with a retrovirus.     Mycosis fungoides has an indolent (low-grade) clinical course, which means that it may persist in one stage or over years or sometimes decades, slowly progress to another stage (from patches to thicker plaques and eventually to tumors).     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  Patch stage  In patch stage mycosis fungoides, the skin lesions are flat. Most often there are oval or ring-shaped (annular) pink dry patches on covered skin. They may spontaneously disappear, remain the same size, or slowly enlarge. The skin may be atrophic (thinned), and may or may not itch. The patch stage of mycosis fungoides can be difficult to distinguish from psoriasis, discoid eczema or parapsoriasis.  Plaque stage  In plaque stage mycosis fungoides, the patches become thickened and may resemble psoriasis. They are usually itchy.  Tumor stage  In tumor stage mycosis fungoides, large irregular lumps develop from plaques, or de elliot. They may ulcerate. At this stage, spread to other organs is more likely than in earlier stages. The tumor type may transform into a large cell lymphoma.     MF variants and subtypes  There are a number of variants and subtypes of mycosis fungoides. Most follow the same clinical and  pathological features as MF but some have distinctive features as described below.     Folliculotropic MF (follicular cell lymphoma)  The localized form of cutaneous T-cell lymphoma in which there is a slowly enlarging solitary patch, plaque or a tumor in which biopsy shows characteristic lymphomatous change around hair follicles.  Most commonly found in the head and neck area.  Skin lesions are often associated with alopecia, and sometimes with mucinorrhea (see alopecia mucinosa).   Pagetoid Reticulosis  Localized patches or plaques with an intraepidermal growth of neoplastic T cells  Presents as a solitary psoriasis-like or hyperkeratotic patch or plaque, usually on the extremities  Granulomatous slack skin:  Extremely rare subtype characterized by the slow development of folds of lax skin in the major skin folds  Skin folds show a granulomatous infiltrate with clonal T cells  Occurs most commonly in the groin and underarm regions  Mycosis fungoides palmaris et plantaris  Confined to palms and soles           Scribe Attestation      I,:  Rachana Marques MA am acting as a scribe while in the presence of the attending physician.:       I,:  Harrison Rivers MD personally performed the services described in this documentation    as scribed in my presence.:

## 2024-08-01 NOTE — PATIENT INSTRUCTIONS
"MELANOCYTIC NEVI (\"Moles\")    Paste patient specific assessment and plan here *    Melanocytic nevi (\"moles\") are tan or brown, raised or flat areas of the skin which have an increased number of melanocytes. Melanocytes are the cells in our body which make pigment and account for skin color.    Some moles are present at birth (I.e., \"congenital nevi\"), while others come up later in life (i.e., \"acquired nevi\").  The sun can stimulate the body to make more moles.  Sunburns are not the only thing that triggers more moles.  Chronic sun exposure can do it too.     Clinically distinguishing a healthy mole from melanoma may be difficult, even for experienced dermatologists. The \"ABCDE's\" of moles have been suggested as a means of helping to alert a person to a suspicious mole and the possible increased risk of melanoma.  The suggestions for raising alert are as follows:    Asymmetry: Healthy moles tend to be symmetric, while melanomas are often asymmetric.  Asymmetry means if you draw a line through the mole, the two halves do not match in color, size, shape, or surface texture. Asymmetry can be a result of rapid enlargement of a mole, the development of a raised area on a previously flat lesion, scaling, ulceration, bleeding or scabbing within the mole.  Any mole that starts to demonstrate \"asymmetry\" should be examined promptly by a board certified dermatologist.     Border: Healthy moles tend to have discrete, even borders.  The border of a melanoma often blends into the normal skin and does not sharply delineate the mole from normal skin.  Any mole that starts to demonstrate \"uneven borders\" should be examined promptly by a board certified dermatologist.     Color: Healthy moles tend to be one color throughout.  Melanomas tend to be made up of different colors ranging from dark black, blue, white, or red.  Any mole that demonstrates a color change should be examined promptly by a board certified dermatologist. " "    Diameter: Healthy moles tend to be smaller than 0.6 cm in size; an exception are \"congenital nevi\" that can be larger.  Melanomas tend to grow and can often be greater than 0.6 cm (1/4 of an inch, or the size of a pencil eraser). This is only a guideline, and many normal moles may be larger than 0.6 cm without being unhealthy.  Any mole that starts to change in size (small to bigger or bigger to smaller) should be examined promptly by a board certified dermatologist.     Evolving: Healthy moles tend to \"stay the same.\"  Melanomas may often show signs of change or evolution such as a change in size, shape, color, or elevation.  Any mole that starts to itch, bleed, crust, burn, hurt, or ulcerate or demonstrate a change or evolution should be examined promptly by a board certified dermatologist.      Dysplastic Nevi  Dysplastic moles are moles that fit the ABCDE rules of melanoma but are not identified as melanomas when examined under the microscope.  They may indicate an increased risk of melanoma in that person. If there is a family history of melanoma, most experts agree that the person may be at an increased risk for developing a melanoma.  Experts still do not agree on what dysplastic moles mean in patients without a personal or family history of melanoma.  Dysplastic moles are usually larger than common moles and have different colors within it with irregular borders. The appearance can be very similar to a melanoma. Biopsies of dysplastic moles may show abnormalities which are different from a regular mole.      Melanoma  Malignant melanoma is a type of skin cancer that can be deadly if it spreads throughout the body. The incidence of melanoma in the [place] is growing faster than any other cancer. Melanoma usually grows near the surface of the skin for a period of time, and then begins to grow deeper into the skin. Once it grows deeper into the skin, the risk of spread to other organs greatly " "increases. Therefore, early detection and removal of a malignant melanoma may result in a better chance at a complete cure; removal after the tumor has spread may not be as effective, leading to worse clinical outcomes such as death.    The true rate of nevus transformation into a melanoma is unknown. It has been estimated that the lifetime risk for any acquired melanocytic nevus on any 20-year-old individual transforming into melanoma by age 80 is 0.03% (1 in 3,164) for men and 0.009% (1 in 10,800) for women.     The appearance of a \"new mole\" remains one of the most reliable methods for identifying a malignant melanoma.  Occasionally, melanomas appear as rapidly growing, blue-black, dome-shaped bumps within a previous mole or previous area of normal skin.  Other times, melanomas are suspected when a mole suddenly appears or changes. Itching, burning, or pain in a pigmented lesion should increase suspicion, but most patients with early melanoma have no skin discomfort whatsoever.  Melanoma can occur anywhere on the skin, including areas that are difficult for self-examination. Many melanomas are first noticed by other family members.  Suspicious-looking moles may be removed for microscopic examination.       You may be able to prevent death from melanoma by doing two simple things:    Try to avoid unnecessary sun exposure and protect your skin when it is exposed to the sun.  People who live near the equator, people who have intermittent exposures to large amounts of sun, and people who have had sunburns in childhood or adolescence have an increased risk for melanoma. Sun sense and vigilant sun protection may be keys to helping to prevent melanoma.  We recommend wearing UPF-rated sun protective clothing and sunglasses whenever possible and applying a moisturizer-sunscreen combination product (SPF 50+) such as Neutrogena Daily Defense to sun exposed areas of skin at least three times a day.    Have your moles " "regularly examined by a board certified dermatologist AND by yourself or a family member/friend at home.  We recommend that you have your moles examined at least once a year by a board certified dermatologist.  Use your birthday as an annual reminder to have your \"Birthday Suit\" (I.e., your skin) examined; it is a nice birthday gift to yourself to know that your skin is healthy appearing!  Additionally, at-home self examinations may be helpful for detecting a possible melanoma.  Use the ABCDEs we discussed and check your moles once a month at home.        SEBORRHEIC KERATOSIS  A seborrheic keratosis is a harmless warty spot that appears during adult life as a common sign of skin aging.  Seborrheic keratoses can arise on any area of skin, covered or uncovered, with the usual exception of the palms and soles. They do not arise from mucous membranes. Seborrheic keratoses can have highly variable appearance.      Seborrheic keratoses are extremely common. It has been estimated that over 90% of adults over the age of 60 years have one or more of them. They occur in males and females of all races, typically beginning to erupt in the 30s or 40s. They are uncommon under the age of 20 years.  The precise cause of seborrhoeic keratoses is not known.  Seborrhoeic keratoses are considered degenerative in nature. As time goes by, seborrheic keratoses tend to become more numerous. Some people inherit a tendency to develop a very large number of them; some people may have hundreds of them.    The name \"seborrheic keratosis\" is misleading, because these lesions are not limited to a seborrhoeic distribution (scalp, mid-face, chest, upper back), nor are they formed from sebaceous glands, nor are they associated with sebum -- which is greasy.  Seborrheic keratosis may also be called \"SK,\" \"Seb K,\" \"basal cell papilloma,\" \"senile wart,\" or \"barnacle.\"      There is no easy way to remove multiple lesions on a single occasion.  Unless a " "specific lesion is \"inflamed\" and is causing pain or stinging/burning or is bleeding, most insurance companies do not authorize treatment.      ANGIOMA (\"CHERRY ANGIOMA\")  Glass angiomas markedly increase in number from about the age of 40, so it has been estimated that 75% of people over 75 years of age have them. Although they also called \"senile angiomas,\" they can occur in young people too - 5% of adolescents have been found to have them.     Cherry angiomas are very common in males and females of any age or race, with no difference in sexes or races affected. They are however more noticeable in white skin than in skin of colour.  There may be a family history of similar lesions. Eruptive (very large number appearing in a short period of time) cherry angiomas have been rarely reported to be associated with internal malignancy and pregnancy.     MYCOSIS FUNGOIDES        Assessment and Plan:  Based on a thorough discussion of this condition and the management approach to it (including a comprehensive discussion of the known risks, side effects and potential benefits of treatment), the patient (family) agrees to implement the following specific plan:  Continue same regimen   Please complete lab work   Mycosis fungoides  Mycosis fungoides is a condition in which the skin is infiltrated by patches or lumps composed of white cells called lymphocytes. It is more common in men than women and is very rare in children. Its cause is unknown but in some patients, it is associated with a pre-existing contact allergic dermatitis or infection with a retrovirus.     Mycosis fungoides has an indolent (low-grade) clinical course, which means that it may persist in one stage or over years or sometimes decades, slowly progress to another stage (from patches to thicker plaques and eventually to tumors).     The name mycosis fungoides is historical and confusing: cutaneous T-cell lymphoma has nothing to do with fungal " infection.  Patch stage  In patch stage mycosis fungoides, the skin lesions are flat. Most often there are oval or ring-shaped (annular) pink dry patches on covered skin. They may spontaneously disappear, remain the same size, or slowly enlarge. The skin may be atrophic (thinned), and may or may not itch. The patch stage of mycosis fungoides can be difficult to distinguish from psoriasis, discoid eczema or parapsoriasis.  Plaque stage  In plaque stage mycosis fungoides, the patches become thickened and may resemble psoriasis. They are usually itchy.  Tumor stage  In tumor stage mycosis fungoides, large irregular lumps develop from plaques, or de elliot. They may ulcerate. At this stage, spread to other organs is more likely than in earlier stages. The tumor type may transform into a large cell lymphoma.     MF variants and subtypes  There are a number of variants and subtypes of mycosis fungoides. Most follow the same clinical and pathological features as MF but some have distinctive features as described below.     Folliculotropic MF (follicular cell lymphoma)  The localized form of cutaneous T-cell lymphoma in which there is a slowly enlarging solitary patch, plaque or a tumor in which biopsy shows characteristic lymphomatous change around hair follicles.  Most commonly found in the head and neck area.  Skin lesions are often associated with alopecia, and sometimes with mucinorrhea (see alopecia mucinosa).   Pagetoid Reticulosis  Localized patches or plaques with an intraepidermal growth of neoplastic T cells  Presents as a solitary psoriasis-like or hyperkeratotic patch or plaque, usually on the extremities  Granulomatous slack skin:  Extremely rare subtype characterized by the slow development of folds of lax skin in the major skin folds  Skin folds show a granulomatous infiltrate with clonal T cells  Occurs most commonly in the groin and underarm regions  Mycosis fungoides palmaris et plantaris  Confined to palms  and soles

## 2024-08-02 ENCOUNTER — TELEPHONE (OUTPATIENT)
Dept: UROLOGY | Facility: CLINIC | Age: 79
End: 2024-08-02

## 2024-08-02 DIAGNOSIS — R97.20 ELEVATED PSA: Primary | ICD-10-CM

## 2024-08-02 LAB — BACTERIA UR CULT: NORMAL

## 2024-08-02 NOTE — TELEPHONE ENCOUNTER
----- Message from Shyann Hernandez PA-C sent at 8/2/2024  7:21 AM EDT -----  PSA increased compared to most recent prior. Please have him repeat PSA in 6-8 weeks.

## 2024-08-02 NOTE — TELEPHONE ENCOUNTER
Spoke with patient to make him aware of recent PSA results as per Shyann MARSHALL and her recommendation to retest in 6-8 weeks. This nurse confirmed PSA order in patient chart. Patient verbalizes complete understanding at this time.

## 2024-08-05 ENCOUNTER — TELEPHONE (OUTPATIENT)
Dept: FAMILY MEDICINE CLINIC | Facility: MEDICAL CENTER | Age: 79
End: 2024-08-05

## 2024-08-05 ENCOUNTER — OFFICE VISIT (OUTPATIENT)
Dept: FAMILY MEDICINE CLINIC | Facility: MEDICAL CENTER | Age: 79
End: 2024-08-05
Payer: COMMERCIAL

## 2024-08-05 VITALS
OXYGEN SATURATION: 91 % | WEIGHT: 221 LBS | HEIGHT: 71 IN | DIASTOLIC BLOOD PRESSURE: 64 MMHG | HEART RATE: 71 BPM | RESPIRATION RATE: 20 BRPM | TEMPERATURE: 97.2 F | BODY MASS INDEX: 30.94 KG/M2 | SYSTOLIC BLOOD PRESSURE: 98 MMHG

## 2024-08-05 DIAGNOSIS — N20.0 NEPHROLITHIASIS: ICD-10-CM

## 2024-08-05 DIAGNOSIS — N40.1 BENIGN PROSTATIC HYPERPLASIA WITH LOWER URINARY TRACT SYMPTOMS, SYMPTOM DETAILS UNSPECIFIED: ICD-10-CM

## 2024-08-05 DIAGNOSIS — N39.0 ACUTE UTI: Primary | ICD-10-CM

## 2024-08-05 PROCEDURE — 99495 TRANSJ CARE MGMT MOD F2F 14D: CPT | Performed by: INTERNAL MEDICINE

## 2024-08-05 NOTE — PROGRESS NOTES
Transition of Care Visit  Name: Sherly Peña      : 1945      MRN: [id number]  Encounter Provider: Marino Lozada MD  Encounter Date: 2024   Encounter department: [facility]    Assessment & Plan   1. Acute UTI  Has completed antibiotics and presently has no symptoms.  Had an appointment last week with the urologist, no interventions done.    2. Benign prostatic hyperplasia with lower urinary tract symptoms, symptom details unspecified  Was told to continue the Flomax and follow-up with urology    3. Nephrolithiasis  Was advised to drink a lot of water      Depression Screening and Follow-up Plan: Patient was screened for depression during today's encounter. They screened negative with a PHQ-2 score of 2.        History of Present Illness     Transitional Care Management Review:   Sherly Peña is a 78 y.o. male here for TCM follow up.     During the TCM phone call patient stated:  TCM Call       Date and time call was made  2024  9:00 AM    Hospital care reviewed  Records reviewed    Patient was hospitialized at  [facility]    Date of Admission  24    Date of discharge  24    Diagnosis  Acute UTI    Disposition  Home; Home health services    Were the patients medications reviewed and updated  Yes    Current Symptoms  --  slight nasal congestion          TCM Call       Post hospital issues  None    Should patient be enrolled in anticoag monitoring?  No    Scheduled for follow up?  Yes    Patients specialists  Urologist    Pulmonologist name  Karishma Valle MD    Pulmonologist contact #  [phone]    Endocrinologist name  [phone]    Did you obtain your prescribed medications  Yes    Do you need help managing your prescriptions or medications  No    Is transportation to your appointment needed  No    I have advised the patient to call PCP with any new or worsening symptoms  Jackeline Abdullahi MA    Living Arrangements  Family members    Are you  recieving any outpatient services  No    What type of services  REMOTE DEVICE CHECK    Are you recieving home care services  No    Are you using any community resources  No    Current waiver services  No    Have you fallen in the last 12 months  No    Interperter language line needed  No    Counseling  Patient    Counseling topics  Importance of RX compliance          HPI  Patient is here for follow-up of his hospital visit.  He had a urinary tract infection and nephrolithiasis.  Has no symptoms at present and follows up with urology      Review of Systems   Constitutional:  Negative for chills, diaphoresis, fatigue and fever.   HENT:  Negative for congestion, ear discharge, ear pain, hearing loss, postnasal drip, rhinorrhea, sinus pressure, sinus pain, sneezing, sore throat and voice change.    Eyes:  Negative for pain, discharge, redness and visual disturbance.   Respiratory:  Negative for cough, chest tightness, shortness of breath and wheezing.    Cardiovascular:  Negative for chest pain, palpitations and leg swelling.   Gastrointestinal:  Negative for abdominal distention, abdominal pain, blood in stool, constipation, diarrhea, nausea and vomiting.   Endocrine: Negative for cold intolerance, heat intolerance, polydipsia, polyphagia and polyuria.   Genitourinary:  Negative for dysuria, flank pain, frequency, hematuria and urgency.   Musculoskeletal:  Negative for arthralgias, back pain, gait problem, joint swelling, myalgias, neck pain and neck stiffness.   Skin:  Negative for rash.   Neurological:  Negative for dizziness, tremors, syncope, facial asymmetry, speech difficulty, weakness, light-headedness, numbness and headaches.   Hematological:  Does not bruise/bleed easily.   Psychiatric/Behavioral:  Negative for behavioral problems, confusion and sleep disturbance. The patient is not nervous/anxious.      Objective     BP 98/64 (BP Location: Left arm, Patient Position: Sitting, Cuff Size: Standard)   Pulse 71  "  Temp (!) 97.2 °F (36.2 °C) (Temporal)   Resp 20   Ht 5' 11\" (1.803 m)   Wt 100 kg (221 lb)   SpO2 91%   BMI 30.82 kg/m²     Physical Exam  Constitutional:       General: He is not in acute distress.     Appearance: He is well-developed. He is not diaphoretic.   HENT:      Head: Normocephalic and atraumatic.      Right Ear: External ear normal.      Left Ear: External ear normal.      Nose: Nose normal.   Eyes:      General: No scleral icterus.        Right eye: No discharge.         Left eye: No discharge.      Conjunctiva/sclera: Conjunctivae normal.   Cardiovascular:      Rate and Rhythm: Normal rate and regular rhythm.      Heart sounds: Normal heart sounds. No murmur heard.     No friction rub. No gallop.   Pulmonary:      Effort: Pulmonary effort is normal. No respiratory distress.      Breath sounds: Normal breath sounds. No wheezing or rales.   Abdominal:      General: Bowel sounds are normal. There is no distension.      Palpations: Abdomen is soft.      Tenderness: There is no abdominal tenderness. There is no guarding or rebound.   Musculoskeletal:         General: No tenderness.   Skin:     General: Skin is warm and dry.      Findings: No erythema or rash.   Neurological:      Mental Status: He is alert and oriented to person, place, and time.      Cranial Nerves: No cranial nerve deficit.      Sensory: No sensory deficit.      Motor: No abnormal muscle tone.   Psychiatric:         Behavior: Behavior normal.       Medications have been reviewed by provider in current encounter    Administrative Statements         "

## 2024-08-05 NOTE — TELEPHONE ENCOUNTER
Left message   Vira Alberts presents to the clinic for Euflexxa #3 of the left knee. She reports mild improvement in her symptoms but continues to have pain in the posterior aspect of her knee.     Left knee demonstrates a moderate effusion.     I would recommend an aspiration prior to the injection.  I would recommend an aspiration prior to injection.  With her consent, the left knee was prepped with Betadine.  1% lidocaine was used for local anesthesia.  25 cc clear, angela colored joint fluid was removed and 2 cc of Euflexxa was injected.  She tolerated the procedure uneventfully.  Follow-up for repeat injections in at least 6 months.  If these are not helpful with her pain remaining treatment options would be cortisone injections every 3 months, genicular nerve RFA which may not be covered by her current insurance plan or a total knee arthroplasty.  She may follow up as needed.    BETSY Martinez

## 2024-08-06 PROCEDURE — 88112 CYTOPATH CELL ENHANCE TECH: CPT | Performed by: PATHOLOGY

## 2024-08-09 ENCOUNTER — NURSE TRIAGE (OUTPATIENT)
Dept: UROLOGY | Facility: CLINIC | Age: 79
End: 2024-08-09

## 2024-08-09 DIAGNOSIS — N40.1 BENIGN PROSTATIC HYPERPLASIA WITH LOWER URINARY TRACT SYMPTOMS, SYMPTOM DETAILS UNSPECIFIED: ICD-10-CM

## 2024-08-09 DIAGNOSIS — R30.0 DYSURIA: Primary | ICD-10-CM

## 2024-08-09 NOTE — TELEPHONE ENCOUNTER
Additional Information  • Negative: The patient has severe uncontrolled pain  • Negative: The patient has a fever of 101 or higher  • Negative: The patient has persistent vomiting    Answer Questions - Initial Assessment  When did your symptoms start: 2 days ago    Is burning with every void: yes    Do you have any other urinary symptoms, urinary frequency, urgency, incontinence: yes    Have you become unable to urinate: No: I do not feel as though I am retaining urine    Have you seen blood in your urine: no    Do you have any abdominal pain or flank pain: no    Do you have a fever of 101 or higher: yes    Protocols used: Dysuria

## 2024-08-09 NOTE — PATIENT COMMUNICATION
Patient called in states starting 2 days ago he began having dysuria, believes he has another UTI. Will go for urine testing tomorrow morning. Understands we should have UA results by Monday for review. Will try to increase fluid intake. Reviewed bladder irritants to avoid, and to stay hydrated with at least 64 oz. Of water/day as long as no fluid restrictions. ED precautions reviewed as well.    Verified pharmacy:  [id number] - [place], [place] - [address] [[id number]]

## 2024-08-10 ENCOUNTER — APPOINTMENT (OUTPATIENT)
Age: 79
End: 2024-08-10
Payer: COMMERCIAL

## 2024-08-10 DIAGNOSIS — N40.1 BENIGN PROSTATIC HYPERPLASIA WITH LOWER URINARY TRACT SYMPTOMS, SYMPTOM DETAILS UNSPECIFIED: ICD-10-CM

## 2024-08-10 DIAGNOSIS — R30.0 DYSURIA: ICD-10-CM

## 2024-08-10 LAB
BACTERIA UR QL AUTO: ABNORMAL /HPF
BILIRUB UR QL STRIP: NEGATIVE
CLARITY UR: ABNORMAL
COLOR UR: ABNORMAL
GLUCOSE UR STRIP-MCNC: NEGATIVE MG/DL
HGB UR QL STRIP.AUTO: ABNORMAL
KETONES UR STRIP-MCNC: ABNORMAL MG/DL
LEUKOCYTE ESTERASE UR QL STRIP: ABNORMAL
NITRITE UR QL STRIP: NEGATIVE
NON-SQ EPI CELLS URNS QL MICRO: ABNORMAL /HPF
PH UR STRIP.AUTO: 6 [PH]
PROT UR STRIP-MCNC: ABNORMAL MG/DL
RBC #/AREA URNS AUTO: ABNORMAL /HPF
SP GR UR STRIP.AUTO: 1.02 (ref 1–1.03)
UROBILINOGEN UR STRIP-ACNC: <2 MG/DL
WBC #/AREA URNS AUTO: ABNORMAL /HPF

## 2024-08-10 PROCEDURE — 81001 URINALYSIS AUTO W/SCOPE: CPT

## 2024-08-10 PROCEDURE — 87086 URINE CULTURE/COLONY COUNT: CPT | Performed by: PHYSICIAN ASSISTANT

## 2024-08-11 LAB — BACTERIA UR CULT: NORMAL

## 2024-08-12 ENCOUNTER — APPOINTMENT (EMERGENCY)
Dept: CT IMAGING | Facility: HOSPITAL | Age: 79
End: 2024-08-12
Payer: COMMERCIAL

## 2024-08-12 ENCOUNTER — HOSPITAL ENCOUNTER (EMERGENCY)
Facility: HOSPITAL | Age: 79
Discharge: HOME | End: 2024-08-12
Attending: EMERGENCY MEDICINE
Payer: COMMERCIAL

## 2024-08-12 VITALS
TEMPERATURE: 98.2 F | HEART RATE: 51 BPM | DIASTOLIC BLOOD PRESSURE: 66 MMHG | SYSTOLIC BLOOD PRESSURE: 118 MMHG | OXYGEN SATURATION: 97 % | RESPIRATION RATE: 16 BRPM

## 2024-08-12 DIAGNOSIS — N21.0 BLADDER STONES: ICD-10-CM

## 2024-08-12 DIAGNOSIS — N20.0 KIDNEY STONES: Primary | ICD-10-CM

## 2024-08-12 LAB
ALBUMIN SERPL BCG-MCNC: 3.9 G/DL (ref 3.5–5)
ALP SERPL-CCNC: 60 U/L (ref 34–104)
ALT SERPL W P-5'-P-CCNC: 11 U/L (ref 7–52)
ANION GAP SERPL CALCULATED.3IONS-SCNC: 11 MMOL/L (ref 4–13)
AST SERPL W P-5'-P-CCNC: 19 U/L (ref 13–39)
BACTERIA UR QL AUTO: ABNORMAL /HPF
BASOPHILS # BLD AUTO: 0.05 THOUSANDS/ÂΜL (ref 0–0.1)
BASOPHILS NFR BLD AUTO: 1 % (ref 0–1)
BILIRUB SERPL-MCNC: 0.39 MG/DL (ref 0.2–1)
BILIRUB UR QL STRIP: NEGATIVE
BUDDING YEAST: PRESENT
BUN SERPL-MCNC: 22 MG/DL (ref 5–25)
CALCIUM SERPL-MCNC: 8.2 MG/DL (ref 8.4–10.2)
CHLORIDE SERPL-SCNC: 109 MMOL/L (ref 96–108)
CLARITY UR: ABNORMAL
CO2 SERPL-SCNC: 21 MMOL/L (ref 21–32)
COLOR UR: YELLOW
CREAT SERPL-MCNC: 1.27 MG/DL (ref 0.6–1.3)
EOSINOPHIL # BLD AUTO: 0.14 THOUSAND/ÂΜL (ref 0–0.61)
EOSINOPHIL NFR BLD AUTO: 1 % (ref 0–6)
ERYTHROCYTE [DISTWIDTH] IN BLOOD BY AUTOMATED COUNT: 13.5 % (ref 11.6–15.1)
GFR SERPL CREATININE-BSD FRML MDRD: 54 ML/MIN/1.73SQ M
GLUCOSE SERPL-MCNC: 108 MG/DL (ref 65–140)
GLUCOSE UR STRIP-MCNC: NEGATIVE MG/DL
HCT VFR BLD AUTO: 37.6 % (ref 36.5–49.3)
HGB BLD-MCNC: 12.2 G/DL (ref 12–17)
HGB UR QL STRIP.AUTO: ABNORMAL
IMM GRANULOCYTES # BLD AUTO: 0.02 THOUSAND/UL (ref 0–0.2)
IMM GRANULOCYTES NFR BLD AUTO: 0 % (ref 0–2)
KETONES UR STRIP-MCNC: ABNORMAL MG/DL
LEUKOCYTE ESTERASE UR QL STRIP: ABNORMAL
LIPASE SERPL-CCNC: 44 U/L (ref 11–82)
LYMPHOCYTES # BLD AUTO: 1.61 THOUSANDS/ÂΜL (ref 0.6–4.47)
LYMPHOCYTES NFR BLD AUTO: 16 % (ref 14–44)
MCH RBC QN AUTO: 31 PG (ref 26.8–34.3)
MCHC RBC AUTO-ENTMCNC: 32.4 G/DL (ref 31.4–37.4)
MCV RBC AUTO: 96 FL (ref 82–98)
MONOCYTES # BLD AUTO: 0.44 THOUSAND/ÂΜL (ref 0.17–1.22)
MONOCYTES NFR BLD AUTO: 4 % (ref 4–12)
MUCOUS THREADS UR QL AUTO: ABNORMAL
NEUTROPHILS # BLD AUTO: 8.13 THOUSANDS/ÂΜL (ref 1.85–7.62)
NEUTS SEG NFR BLD AUTO: 78 % (ref 43–75)
NITRITE UR QL STRIP: NEGATIVE
NON-SQ EPI CELLS URNS QL MICRO: ABNORMAL /HPF
NRBC BLD AUTO-RTO: 0 /100 WBCS
PH UR STRIP.AUTO: 5.5 [PH]
PLATELET # BLD AUTO: 308 THOUSANDS/UL (ref 149–390)
PMV BLD AUTO: 9.5 FL (ref 8.9–12.7)
POTASSIUM SERPL-SCNC: 4.6 MMOL/L (ref 3.5–5.3)
PROT SERPL-MCNC: 7 G/DL (ref 6.4–8.4)
PROT UR STRIP-MCNC: ABNORMAL MG/DL
RBC # BLD AUTO: 3.93 MILLION/UL (ref 3.88–5.62)
RBC #/AREA URNS AUTO: ABNORMAL /HPF
SODIUM SERPL-SCNC: 141 MMOL/L (ref 135–147)
SP GR UR STRIP.AUTO: 1.02 (ref 1–1.03)
UROBILINOGEN UR STRIP-ACNC: <2 MG/DL
WBC # BLD AUTO: 10.39 THOUSAND/UL (ref 4.31–10.16)
WBC #/AREA URNS AUTO: ABNORMAL /HPF
WBC CLUMPS # UR AUTO: PRESENT /UL

## 2024-08-12 PROCEDURE — 87077 CULTURE AEROBIC IDENTIFY: CPT | Performed by: EMERGENCY MEDICINE

## 2024-08-12 PROCEDURE — 74177 CT ABD & PELVIS W/CONTRAST: CPT

## 2024-08-12 PROCEDURE — 96361 HYDRATE IV INFUSION ADD-ON: CPT

## 2024-08-12 PROCEDURE — 99284 EMERGENCY DEPT VISIT MOD MDM: CPT

## 2024-08-12 PROCEDURE — 83690 ASSAY OF LIPASE: CPT | Performed by: EMERGENCY MEDICINE

## 2024-08-12 PROCEDURE — 96360 HYDRATION IV INFUSION INIT: CPT

## 2024-08-12 PROCEDURE — 80053 COMPREHEN METABOLIC PANEL: CPT | Performed by: EMERGENCY MEDICINE

## 2024-08-12 PROCEDURE — 85025 COMPLETE CBC W/AUTO DIFF WBC: CPT | Performed by: EMERGENCY MEDICINE

## 2024-08-12 PROCEDURE — 36415 COLL VENOUS BLD VENIPUNCTURE: CPT

## 2024-08-12 PROCEDURE — 87186 SC STD MICRODIL/AGAR DIL: CPT | Performed by: EMERGENCY MEDICINE

## 2024-08-12 PROCEDURE — 81001 URINALYSIS AUTO W/SCOPE: CPT | Performed by: EMERGENCY MEDICINE

## 2024-08-12 PROCEDURE — 87147 CULTURE TYPE IMMUNOLOGIC: CPT | Performed by: EMERGENCY MEDICINE

## 2024-08-12 PROCEDURE — 87086 URINE CULTURE/COLONY COUNT: CPT | Performed by: EMERGENCY MEDICINE

## 2024-08-12 PROCEDURE — 99284 EMERGENCY DEPT VISIT MOD MDM: CPT | Performed by: EMERGENCY MEDICINE

## 2024-08-12 RX ORDER — TAMSULOSIN HYDROCHLORIDE 0.4 MG/1
0.4 CAPSULE ORAL ONCE
Status: COMPLETED | OUTPATIENT
Start: 2024-08-12 | End: 2024-08-12

## 2024-08-12 RX ORDER — TAMSULOSIN HYDROCHLORIDE 0.4 MG/1
0.4 CAPSULE ORAL
Qty: 10 CAPSULE | Refills: 0 | Status: SHIPPED | OUTPATIENT
Start: 2024-08-12 | End: 2024-08-23

## 2024-08-12 RX ORDER — OXYCODONE AND ACETAMINOPHEN 5; 325 MG/1; MG/1
1 TABLET ORAL EVERY 4 HOURS PRN
Qty: 15 TABLET | Refills: 0 | Status: SHIPPED | OUTPATIENT
Start: 2024-08-12 | End: 2024-08-17

## 2024-08-12 RX ADMIN — SODIUM CHLORIDE 1000 ML: 0.9 INJECTION, SOLUTION INTRAVENOUS at 16:24

## 2024-08-12 RX ADMIN — TAMSULOSIN HYDROCHLORIDE 0.4 MG: 0.4 CAPSULE ORAL at 18:16

## 2024-08-12 RX ADMIN — IOHEXOL 100 ML: 350 INJECTION, SOLUTION INTRAVENOUS at 16:08

## 2024-08-12 NOTE — ED NOTES
Patient ambulated out of the ED, AAOx4 resp even and unlabored with no S$S of distress.       Amanda Fung RN  08/12/24 1153

## 2024-08-12 NOTE — DISCHARGE INSTRUCTIONS
Please drink plent of fluid, and take flomax to help pass the bladder stones.   Follow up w urology  Return if you lose the ability to urinate.

## 2024-08-13 ENCOUNTER — VBI (OUTPATIENT)
Dept: FAMILY MEDICINE CLINIC | Facility: MEDICAL CENTER | Age: 79
End: 2024-08-13

## 2024-08-13 NOTE — TELEPHONE ENCOUNTER
08/13/24 9:13 AM    Patient contacted post ED visit, [facility] department spoke with patient/caregiver and outreach was successful.    Thank you.  OSCAR HUDSON MA  [facility]

## 2024-08-13 NOTE — ED PROVIDER NOTES
History  Chief Complaint   Patient presents with    Difficulty Urinating     Recently discharged from this facility after being admitted for UTI, patient reports painful urination, dark urine, and bright red blood from penis with penile pain.      78-year-old male, recent admission for urinary tract infection, completed course of antibiotics, presents to the emergency department with painful urination, some hematuria, but no longer has infectious symptoms.  No fevers or chills.  No suprapubic tenderness.  He has pain in his penis when he urinates.      Difficulty Urinating  Presenting symptoms: dysuria and penile pain    Associated symptoms: hematuria    Associated symptoms: no abdominal pain, no fever, no flank pain, no nausea and no vomiting        Prior to Admission Medications   Prescriptions Last Dose Informant Patient Reported? Taking?   Cholecalciferol 25 MCG (1000 UT) capsule  Self Yes No   Sig: Take 1 capsule by mouth daily   Entresto 49-51 MG TABS  Self No No   Sig: TAKE 1 TABLET BY MOUTH  TWICE DAILY   Multiple Vitamins-Minerals (OCUVITE ADULT 50+ PO)  Self Yes No   Sig: Take by mouth   Trelegy Ellipta 100-62.5-25 MCG/ACT inhaler  Self No No   Sig: USE 1 INHALATION BY MOUTH ONCE  DAILY AT THE SAME TIME EACH DAY  RINSE MOUTH AFTER USE   Vitamins-Lipotropics (LIPOFLAVONOID PO)  Self Yes No   Sig: Take by mouth daily   albuterol (2.5 mg/3 mL) 0.083 % nebulizer solution  Self No No   Sig: USE 1 VIAL VIA NEBULIZER 4 TIMES A DAY AS NEEDED   ammonium lactate (LAC-HYDRIN) 12 % lotion  Self No No   Sig: APPLY TO AFFECTED AREA TOPICALLY EVERY DAY   aspirin 81 MG tablet  Self Yes No   Sig: Take 81 mg by mouth daily   atorvastatin (LIPITOR) 40 mg tablet  Self No No   Sig: TAKE 1 TABLET BY MOUTH DAILY   bexarotene (TARGRETIN) 75 mg capsule  Self No No   Sig: TAKE 8 CAPSULES BY MOUTH DAILY   cyanocobalamin (VITAMIN B-12) 1,000 mcg tablet  Self Yes No   Sig: Take 1,000 mcg by mouth daily   diclofenac potassium  (CATAFLAM) 50 mg tablet  Self No No   Si tablet daily for severe pain.  Not to exceed 2 tablets a week.   ferrous sulfate 324 (65 Fe) mg  Self Yes No   Sig: Take 1 tablet by mouth Twice daily   gabapentin (NEURONTIN) 100 mg capsule  Self No No   Sig: Take 1 capsule (100 mg total) by mouth 3 (three) times a day   levothyroxine 100 mcg tablet  Self No No   Sig: TAKE 1 TABLET BY MOUTH DAILY   metoprolol succinate (TOPROL-XL) 50 mg 24 hr tablet  Self Yes No   montelukast (SINGULAIR) 10 mg tablet  Self No No   Sig: TAKE 1 TABLET BY MOUTH  DAILY AT BEDTIME   spironolactone (ALDACTONE) 25 mg tablet  Self No No   Sig: Take 0.5 tablets (12.5 mg total) by mouth daily   tamsulosin (FLOMAX) 0.4 mg   No No   Sig: Take 1 capsule (0.4 mg total) by mouth daily with dinner   traMADol (ULTRAM) 50 mg tablet  Self No No   Sig: Take 1 tablet (50 mg total) by mouth every 6 (six) hours as needed for moderate pain or severe pain   triamcinolone (KENALOG) 0.1 % ointment  Self No No   Sig: Apply topically 2 (two) times 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   zolpidem (AMBIEN) 10 mg tablet  Self No No   Sig: TAKE 1 TABLET BY MOUTH DAILY AT BEDTIME AS NEEDED FOR SLEEP      Facility-Administered Medications: None       Past Medical History:   Diagnosis Date    Agent orange exposure     Anemia     Benign colon polyp     BPH (benign prostatic hyperplasia)     Bradycardia     Cardiomyopathy (HCC)     Chest discomfort     CHF (congestive heart failure) (HCC)     Chronic bronchitis (HCC)     Chronic kidney disease     COPD (chronic obstructive pulmonary disease) (HCC)     LAST ASSESSED: 17    Coronary artery disease cardio myopthia    Emphysema of lung (HCC)     H/O nonmelanoma skin cancer     LAST ASSESSED: 17    HHD (hypertensive heart disease)     HTN (hypertension)     Hx of lymphoma     Hyperlipidemia     ICD (implantable cardioverter-defibrillator) in place     Insomnia     Mycosis fungoides (HCC)     PVC (premature ventricular contraction)      PVT (paroxysmal ventricular tachycardia) (HCC)     SOB (shortness of breath)        Past Surgical History:   Procedure Laterality Date    CARDIAC PACEMAKER PLACEMENT      SKIN SURGERY      skin cancer removal     TONSILLECTOMY         Family History   Problem Relation Age of Onset    Diabetes Mother     Colon cancer Father         DIAGNOSED IN HIS 80'S    Heart failure Father     Coronary artery disease Father     Cancer Father         Lorenzo    Diabetes Family         MELLITUS    Heart attack Neg Hx     Stroke Neg Hx     Anuerysm Neg Hx     Clotting disorder Neg Hx     Arrhythmia Neg Hx     Hypertension Neg Hx         pt unsure     Hyperlipidemia Neg Hx     Sudden death Neg Hx         scd     I have reviewed and agree with the history as documented.    E-Cigarette/Vaping    E-Cigarette Use Never User      E-Cigarette/Vaping Substances    Nicotine No     THC No     CBD No     Flavoring No     Other No     Unknown No      Social History     Tobacco Use    Smoking status: Every Day     Current packs/day: 1.00     Average packs/day: 1 pack/day for 63.6 years (63.6 ttl pk-yrs)     Types: Cigarettes     Start date: 1961     Passive exposure: Current    Smokeless tobacco: Never    Tobacco comments:     HALF A PACK PER DAY    Vaping Use    Vaping status: Never Used   Substance Use Topics    Alcohol use: Not Currently     Alcohol/week: 14.0 standard drinks of alcohol     Comment: two drinks every night over a 4 hour period    Drug use: Never       Review of Systems   Constitutional:  Negative for chills and fever.   Respiratory:  Negative for chest tightness and shortness of breath.    Cardiovascular:  Negative for chest pain and leg swelling.   Gastrointestinal:  Negative for abdominal pain, nausea and vomiting.   Genitourinary:  Positive for difficulty urinating, dysuria, hematuria and penile pain. Negative for flank pain and testicular pain.   Musculoskeletal:  Negative for back pain and neck pain.   Skin:  Negative  for wound.   Neurological:  Negative for dizziness and headaches.       Physical Exam  Physical Exam  Vitals reviewed.   Constitutional:       General: He is not in acute distress.     Appearance: He is well-developed. He is not diaphoretic.   HENT:      Head: Normocephalic and atraumatic.   Eyes:      General: No scleral icterus.        Right eye: No discharge.         Left eye: No discharge.      Extraocular Movements: EOM normal.      Conjunctiva/sclera: Conjunctivae normal.      Pupils: Pupils are equal, round, and reactive to light.   Neck:      Vascular: No JVD.   Cardiovascular:      Rate and Rhythm: Normal rate and regular rhythm.      Heart sounds: Normal heart sounds. No murmur heard.     No friction rub. No gallop.   Pulmonary:      Effort: Pulmonary effort is normal. No respiratory distress.      Breath sounds: Normal breath sounds. No wheezing, rhonchi or rales.   Chest:      Chest wall: No tenderness.   Abdominal:      General: Bowel sounds are normal. There is no distension.      Palpations: Abdomen is soft.      Tenderness: There is no abdominal tenderness. There is no right CVA tenderness, left CVA tenderness or guarding.   Musculoskeletal:         General: No tenderness, deformity or edema. Normal range of motion.      Cervical back: Normal range of motion and neck supple.   Skin:     General: Skin is warm and dry.      Coloration: Skin is not pale.      Findings: No erythema or rash.   Neurological:      Mental Status: He is alert and oriented to person, place, and time.      Cranial Nerves: No cranial nerve deficit.   Psychiatric:         Mood and Affect: Mood and affect normal.         Behavior: Behavior normal.         Vital Signs  ED Triage Vitals   Temperature Pulse Respirations Blood Pressure SpO2   08/12/24 1210 08/12/24 1210 08/12/24 1210 08/12/24 1210 08/12/24 1210   97.8 °F (36.6 °C) 73 20 115/62 96 %      Temp Source Heart Rate Source Patient Position - Orthostatic VS BP Location FiO2  (%)   08/12/24 1210 08/12/24 1210 08/12/24 1210 08/12/24 1210 --   Temporal Monitor Sitting Left arm       Pain Score       08/12/24 1513       6           Vitals:    08/12/24 1504 08/12/24 1513 08/12/24 1633 08/12/24 1743   BP: 125/66 125/66 122/65 118/66   Pulse: (!) 48 76 (!) 47 (!) 51   Patient Position - Orthostatic VS: Lying Lying Lying Sitting         Visual Acuity      ED Medications  Medications   sodium chloride 0.9 % bolus 1,000 mL (0 mL Intravenous Stopped 8/12/24 1832)   iohexol (OMNIPAQUE) 350 MG/ML injection (MULTI-DOSE) 100 mL (100 mL Intravenous Given 8/12/24 1608)   tamsulosin (FLOMAX) capsule 0.4 mg (0.4 mg Oral Given 8/12/24 1816)       Diagnostic Studies  Results Reviewed       Procedure Component Value Units Date/Time    Urine Microscopic [[id number]]  (Abnormal) Collected: 08/12/24 1500    Lab Status: Final result Specimen: Urine, Other Updated: 08/12/24 1519     RBC, UA Innumerable /hpf      WBC, UA Innumerable /hpf      Epithelial Cells None Seen /hpf      Bacteria, UA None Seen /hpf      MUCUS THREADS Occasional     WBC Clumps Present     Budding Yeast Present    Urine culture [[id number]] Collected: 08/12/24 1500    Lab Status: In process Specimen: Urine, Other Updated: 08/12/24 1519    UA w Reflex to Microscopic w Reflex to Culture [[id number]]  (Abnormal) Collected: 08/12/24 1500    Lab Status: Final result Specimen: Urine, Other Updated: 08/12/24 1515     Color, UA Yellow     Clarity, UA Extra Turbid     Specific Gravity, UA 1.023     pH, UA 5.5     Leukocytes, UA Large     Nitrite, UA Negative     Protein,  (2+) mg/dl      Glucose, UA Negative mg/dl      Ketones, UA Trace mg/dl      Urobilinogen, UA <2.0 mg/dl      Bilirubin, UA Negative     Occult Blood, UA Moderate    Comprehensive metabolic panel [[id number]]  (Abnormal) Collected: 08/12/24 1212    Lab Status: Final result Specimen: Blood from Arm, Left Updated: 08/12/24 1232     Sodium 141 mmol/L      Potassium 4.6 mmol/L       Chloride 109 mmol/L      CO2 21 mmol/L      ANION GAP 11 mmol/L      BUN 22 mg/dL      Creatinine 1.27 mg/dL      Glucose 108 mg/dL      Calcium 8.2 mg/dL      AST 19 U/L      ALT 11 U/L      Alkaline Phosphatase 60 U/L      Total Protein 7.0 g/dL      Albumin 3.9 g/dL      Total Bilirubin 0.39 mg/dL      eGFR 54 ml/min/1.73sq m     Narrative:      National Kidney Disease Foundation guidelines for Chronic Kidney Disease (CKD):     Stage 1 with normal or high GFR (GFR > 90 mL/min/1.73 square meters)    Stage 2 Mild CKD (GFR = 60-89 mL/min/1.73 square meters)    Stage 3A Moderate CKD (GFR = 45-59 mL/min/1.73 square meters)    Stage 3B Moderate CKD (GFR = 30-44 mL/min/1.73 square meters)    Stage 4 Severe CKD (GFR = 15-29 mL/min/1.73 square meters)    Stage 5 End Stage CKD (GFR <15 mL/min/1.73 square meters)  Note: GFR calculation is accurate only with a steady state creatinine    Lipase [[id number]]  (Normal) Collected: 08/12/24 1212    Lab Status: Final result Specimen: Blood from Arm, Left Updated: 08/12/24 1232     Lipase 44 u/L     CBC and differential [[id number]]  (Abnormal) Collected: 08/12/24 1212    Lab Status: Final result Specimen: Blood from Arm, Left Updated: 08/12/24 1218     WBC 10.39 Thousand/uL      RBC 3.93 Million/uL      Hemoglobin 12.2 g/dL      Hematocrit 37.6 %      MCV 96 fL      MCH 31.0 pg      MCHC 32.4 g/dL      RDW 13.5 %      MPV 9.5 fL      Platelets 308 Thousands/uL      nRBC 0 /100 WBCs      Segmented % 78 %      Immature Grans % 0 %      Lymphocytes % 16 %      Monocytes % 4 %      Eosinophils Relative 1 %      Basophils Relative 1 %      Absolute Neutrophils 8.13 Thousands/µL      Absolute Immature Grans 0.02 Thousand/uL      Absolute Lymphocytes 1.61 Thousands/µL      Absolute Monocytes 0.44 Thousand/µL      Eosinophils Absolute 0.14 Thousand/µL      Basophils Absolute 0.05 Thousands/µL                    CT abdomen pelvis with contrast   ED Interpretation by Lucie Aranda,  DO (08/12 1732)   1.  Persistent diffuse urinary bladder wall thickening and urothelial hyperemia. There are in numerable dependent small calculi within the bladder lumen. There is also hyperemia of the distal right ureteral urothelium, with thickening at the right   ureterovesicular junction. These changes may be all related to inflammation/cystitis and recurrent stone passage. It appears that calculi have likely passed from the lower pole calyx of the right kidney since the prior exam. Underlying malignancy cannot   be entirely excluded.  2.  Prominent nonspecific prostatic enlargement.  3.  Colonic diverticulosis.  4.  Small fat-containing right inguinal hernia.        Final Result by Sukhwinder Lakhani MD (08/12 1647)      1.  Persistent diffuse urinary bladder wall thickening and urothelial hyperemia. There are in numerable dependent small calculi within the bladder lumen. There is also hyperemia of the distal right ureteral urothelium, with thickening at the right    ureterovesicular junction. These changes may be all related to inflammation/cystitis and recurrent stone passage. It appears that calculi have likely passed from the lower pole calyx of the right kidney since the prior exam. Underlying malignancy cannot    be entirely excluded.   2.  Prominent nonspecific prostatic enlargement.   3.  Colonic diverticulosis.   4.  Small fat-containing right inguinal hernia.      Workstation performed: [id number]                    Procedures  Procedures         ED Course                                               Medical Decision Making  Urinary sx.  Concern for infection, cystitis, kidney stones.     Basic labs, IV fluid, CT scan.  Multiple kidney stones were seen, multiple bladder stones, likely causing the patient's symptoms.    Patient is advised increased intake of fluid and return to emergency department if worsening symptoms such as signs of infection or inability to urinate.  Advise follow-up with  urology.  Given Flomax and pain control for him to pass the bladder stones.    Amount and/or Complexity of Data Reviewed  Labs: ordered.  Radiology: ordered.    Risk  Prescription drug management.                 Disposition  Final diagnoses:   Kidney stones   Bladder stones     Time reflects when diagnosis was documented in both MDM as applicable and the Disposition within this note       Time User Action Codes Description Comment    8/12/2024  6:24 PM Lucie Aranda L Add [N20.0] Kidney stones     8/12/2024  6:27 PM Aryan Arandaa L Add [N21.0] Bladder stones           ED Disposition       ED Disposition   Discharge    Condition   Stable    Date/Time   Mon Aug 12, 2024  6:24 PM    Comment   Sherly Peña discharge to home/self care.                   Follow-up Information       Follow up With Specialties Details Why Contact Info Additional Information    Marino Lozada MD Internal Medicine Schedule an appointment as soon as possible for a visit  For follow up to ensure improvement, and for further testing and treatment as needed [address]  [address]  [facility] [address]  [phone]       [facility] Emergency Department Emergency Medicine  If symptoms worsen [address] [id number] [phone] [facility] Emergency Department, [address] [place], [place], [address]    [facility] Schedule an appointment as soon as possible for a visit  For follow up to ensure improvement, and for further testing and treatment as needed [address]  [address]  [facility] [address] [phone] [facility], [address], [place], [place], [address] [phone]            Discharge Medication List as of 8/12/2024  6:29 PM        START taking these medications    Details   !! tamsulosin (FLOMAX) 0.4 mg Take 1 capsule (0.4 mg  total) by mouth daily with dinner for 10 days, Starting Mon 8/12/2024, Until Thu 8/22/2024, Normal       !! - Potential duplicate medications found. Please discuss with provider.        CONTINUE these medications which have NOT CHANGED    Details   albuterol (2.5 mg/3 mL) 0.083 % nebulizer solution USE 1 VIAL VIA NEBULIZER 4 TIMES A DAY AS NEEDED, Normal      ammonium lactate (LAC-HYDRIN) 12 % lotion APPLY TO AFFECTED AREA TOPICALLY EVERY DAY, Normal      aspirin 81 MG tablet Take 81 mg by mouth daily, Historical Med      atorvastatin (LIPITOR) 40 mg tablet TAKE 1 TABLET BY MOUTH DAILY, Normal      bexarotene (TARGRETIN) 75 mg capsule TAKE 8 CAPSULES BY MOUTH DAILY, Normal      Cholecalciferol 25 MCG (1000 UT) capsule Take 1 capsule by mouth daily, Historical Med      cyanocobalamin (VITAMIN B-12) 1,000 mcg tablet Take 1,000 mcg by mouth daily, Historical Med      diclofenac potassium (CATAFLAM) 50 mg tablet 1 tablet daily for severe pain.  Not to exceed 2 tablets a week., Normal      Entresto 49-51 MG TABS TAKE 1 TABLET BY MOUTH  TWICE DAILY, Normal      ferrous sulfate 324 (65 Fe) mg Take 1 tablet by mouth Twice daily, Starting Mon 8/31/2015, Historical Med      gabapentin (NEURONTIN) 100 mg capsule Take 1 capsule (100 mg total) by mouth 3 (three) times a day, Starting Wed 2/21/2024, Normal      levothyroxine 100 mcg tablet TAKE 1 TABLET BY MOUTH DAILY, Starting Sat 6/22/2024, Normal      metoprolol succinate (TOPROL-XL) 50 mg 24 hr tablet Historical Med      montelukast (SINGULAIR) 10 mg tablet TAKE 1 TABLET BY MOUTH  DAILY AT BEDTIME, Normal      Multiple Vitamins-Minerals (OCUVITE ADULT 50+ PO) Take by mouth, Historical Med      spironolactone (ALDACTONE) 25 mg tablet Take 0.5 tablets (12.5 mg total) by mouth daily, Starting Thu 12/21/2023, Normal      !! tamsulosin (FLOMAX) 0.4 mg Take 1 capsule (0.4 mg total) by mouth daily with dinner, Starting Mon 7/29/2024, Normal      traMADol (ULTRAM) 50 mg tablet Take 1  tablet (50 mg total) by mouth every 6 (six) hours as needed for moderate pain or severe pain, Starting Thu 7/25/2024, Normal      Trelegy Ellipta 100-62.5-25 MCG/ACT inhaler USE 1 INHALATION BY MOUTH ONCE  DAILY AT THE SAME TIME EACH DAY  RINSE MOUTH AFTER USE, Normal      triamcinolone (KENALOG) 0.1 % ointment Apply topically 2 (two) times 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, Starting Tue 2/6/2018, Normal      Vitamins-Lipotropics (LIPOFLAVONOID PO) Take by mouth daily, Historical Med      zolpidem (AMBIEN) 10 mg tablet TAKE 1 TABLET BY MOUTH DAILY AT BEDTIME AS NEEDED FOR SLEEP, Starting Mon 6/10/2024, Normal       !! - Potential duplicate medications found. Please discuss with provider.          No discharge procedures on file.    PDMP Review         Value Time User    PDMP Reviewed  Yes 7/23/2024 12:37 AM SHRUTHI Kennedy            ED Provider  Electronically Signed by             Lucie Aranda DO  08/12/24 2039

## 2024-08-14 ENCOUNTER — TELEPHONE (OUTPATIENT)
Age: 79
End: 2024-08-14

## 2024-08-14 LAB
APOB+LDLR+PCSK9 GENE MUT ANL BLD/T: NOT DETECTED
BACTERIA UR CULT: ABNORMAL
BRCA1+BRCA2 DEL+DUP + FULL MUT ANL BLD/T: NOT DETECTED
MLH1+MSH2+MSH6+PMS2 GN DEL+DUP+FUL M: NOT DETECTED

## 2024-08-14 NOTE — TELEPHONE ENCOUNTER
Pt is being seen 8/23/2024 with April transfer provider from [facility] also f/u on ER visit kidney stones.

## 2024-08-15 ENCOUNTER — RA CDI HCC (OUTPATIENT)
Dept: OTHER | Facility: HOSPITAL | Age: 79
End: 2024-08-15

## 2024-08-15 ENCOUNTER — PROCEDURE VISIT (OUTPATIENT)
Dept: UROLOGY | Facility: CLINIC | Age: 79
End: 2024-08-15
Payer: COMMERCIAL

## 2024-08-15 ENCOUNTER — TELEPHONE (OUTPATIENT)
Age: 79
End: 2024-08-15

## 2024-08-15 VITALS
OXYGEN SATURATION: 96 % | TEMPERATURE: 97.4 F | WEIGHT: 221 LBS | HEIGHT: 71 IN | BODY MASS INDEX: 30.94 KG/M2 | DIASTOLIC BLOOD PRESSURE: 58 MMHG | SYSTOLIC BLOOD PRESSURE: 112 MMHG | HEART RATE: 68 BPM

## 2024-08-15 DIAGNOSIS — R30.0 DYSURIA: Primary | ICD-10-CM

## 2024-08-15 DIAGNOSIS — N39.0 ACUTE UTI: Primary | ICD-10-CM

## 2024-08-15 DIAGNOSIS — G47.00 INSOMNIA, UNSPECIFIED TYPE: ICD-10-CM

## 2024-08-15 LAB — POST-VOID RESIDUAL VOLUME, ML POC: 0 ML

## 2024-08-15 PROCEDURE — 51798 US URINE CAPACITY MEASURE: CPT

## 2024-08-15 NOTE — TELEPHONE ENCOUNTER
Pt managed by Shyann  Last seen on 7/29/24  PT was in the ER on 8/12/24. Please review for follow up    PT is scheduled for a cysto in February was looking for something sooner  nothing available.    Does pt need to be seen sooner    PT is complaining of constant pain in the tip of penis. PT has occasional blood in his urine  pt also stated that he is drinking water and not a lot of urine is coming out.    Please call pt back at [phone]

## 2024-08-15 NOTE — TELEPHONE ENCOUNTER
Spoke with patient, patient states he was seen recently in the ER. Patient is having blood in urine, states he is visibly passing stones and is in pain. Patient had imaging done and UC done in the ER. Patient states he is urinating but little bits at a time. Advised patient to increase water intake. Patient is not currently having any fevers or chills. I advised I will send a message to our providers in the office for any other recommendations for the patient. Pharmacy confirmed and verified in chart. Patient verbalized understanding and thankful for call.

## 2024-08-15 NOTE — PROGRESS NOTES
"8/15/2024  Sherly Peña is a 78 y.o. male  [id number]    Diagnosis:  Chief Complaint    dysuria         Patient presents for post void residual.    Plan:  Advised to start abx as prescribed per UC collected in the ER.  Increase water intake.  Follow up as scheduled for cystoscopy.  Contact the office with any questions or concerns.         Assessment:      Vitals:    08/15/24 1312   BP: 112/58   Pulse: 68   Temp: (!) 97.4 °F (36.3 °C)   TempSrc: Temporal   SpO2: 96%   Weight: 100 kg (221 lb)   Height: 5' 11\" (1.803 m)           Patient voided in the office.  Post void residual measured via bladder scan to be 0 mL. Advised patient to start abx, increase water intake. Patient to follow up as scheduled for cystoscopy. ER for any fevers or chills. Patient verbalized understanding and agreeable to plan.     Recent Results (from the past 6 hour(s))   POCT Measure PVR    Collection Time: 08/15/24  1:19 PM   Result Value Ref Range    POST-VOID RESIDUAL VOLUME, ML POC 0 mL         Portia Hopkins RN     "

## 2024-08-15 NOTE — TELEPHONE ENCOUNTER
Spoke with patient, advised of abx sent to pharmacy by provider. Advised to increase water intake. Offered patient a nurse visit for PVR to ensure patient is emptying bladder fully. Scheduled patient in BV for 1:00pm PVR. Will schedule sooner cysto at that time for patient. Advised patient to increase fluid intake in the meantime. Patient verbalized understanding and thankful for call.

## 2024-08-15 NOTE — TELEPHONE ENCOUNTER
Attempted several time to call the phone number of [phone], was unable to connect, phone instantly hung up.

## 2024-08-16 RX ORDER — ZOLPIDEM TARTRATE 10 MG/1
10 TABLET ORAL
Qty: 30 TABLET | Refills: 0 | Status: SHIPPED | OUTPATIENT
Start: 2024-08-16

## 2024-08-19 ENCOUNTER — REMOTE DEVICE CLINIC VISIT (OUTPATIENT)
Dept: CARDIOLOGY CLINIC | Facility: CLINIC | Age: 79
End: 2024-08-19
Payer: COMMERCIAL

## 2024-08-19 DIAGNOSIS — Z95.810 PRESENCE OF AUTOMATIC CARDIOVERTER/DEFIBRILLATOR (AICD): Primary | ICD-10-CM

## 2024-08-19 PROCEDURE — 93295 DEV INTERROG REMOTE 1/2/MLT: CPT | Performed by: INTERNAL MEDICINE

## 2024-08-19 PROCEDURE — 93296 REM INTERROG EVL PM/IDS: CPT | Performed by: INTERNAL MEDICINE

## 2024-08-19 NOTE — PROGRESS NOTES
Results for orders placed or performed in visit on 08/19/24   Cardiac EP device report    Narrative    MDT BIV-ICD/NOT MRI CONDITIONAL  CARELINK TRANSMISSION: BATTERY VOLTAGE NEARING LIZZY.  WILL SCHEDULE MONTHLY BATTERY CHECKS. (2 MONS) AP 97%  91%. ALL AVAILABLE LEAD PARAMETERS WITHIN NORMAL LIMITS. 17 NSVT EPISODES DETECTED MOST RECENT 6 BEATS @ 360ms. OPTI-VOL FLUID THRESHOLD CROSSED 8/3. VENTRICULAR SENSE EPISODES DETECTED. PATIENT IS ON METOPROLOL SUCC NORMAL DEVICE FUNCTION.---DINERO

## 2024-08-21 ENCOUNTER — TELEPHONE (OUTPATIENT)
Dept: CARDIOLOGY CLINIC | Facility: CLINIC | Age: 79
End: 2024-08-21

## 2024-08-21 PROBLEM — N39.0 ACUTE UTI: Status: RESOLVED | Noted: 2024-07-22 | Resolved: 2024-08-21

## 2024-08-21 NOTE — TELEPHONE ENCOUNTER
Spoke with patient relayed  response, patient stated that he is doing ok.    Pt stated that his weight is under control, no shortness of breath, and no edema.

## 2024-08-21 NOTE — TELEPHONE ENCOUNTER
----- Message from Laura Ramirez MD sent at 8/20/2024 10:40 AM EDT -----  Regarding: ICD check  Please call the patient.  ICD check suggests evidence of fluid buildup.  Please check with patient regarding his weight, any increasing shortness of breath as well as lower extremity edema.

## 2024-08-23 ENCOUNTER — OFFICE VISIT (OUTPATIENT)
Age: 79
End: 2024-08-23
Payer: COMMERCIAL

## 2024-08-23 VITALS
SYSTOLIC BLOOD PRESSURE: 110 MMHG | DIASTOLIC BLOOD PRESSURE: 52 MMHG | BODY MASS INDEX: 30.15 KG/M2 | HEART RATE: 81 BPM | WEIGHT: 215.4 LBS | OXYGEN SATURATION: 95 % | HEIGHT: 71 IN | TEMPERATURE: 98.3 F | RESPIRATION RATE: 16 BRPM

## 2024-08-23 DIAGNOSIS — Z76.89 ENCOUNTER TO ESTABLISH CARE: Primary | ICD-10-CM

## 2024-08-23 DIAGNOSIS — G47.00 INSOMNIA, UNSPECIFIED TYPE: ICD-10-CM

## 2024-08-23 DIAGNOSIS — N20.0 NEPHROLITHIASIS: ICD-10-CM

## 2024-08-23 DIAGNOSIS — I50.22 CHRONIC SYSTOLIC CONGESTIVE HEART FAILURE (HCC): ICD-10-CM

## 2024-08-23 DIAGNOSIS — J44.9 CHRONIC OBSTRUCTIVE PULMONARY DISEASE, UNSPECIFIED COPD TYPE (HCC): ICD-10-CM

## 2024-08-23 DIAGNOSIS — Q15.9 EYE ABNORMALITY: ICD-10-CM

## 2024-08-23 PROCEDURE — 99214 OFFICE O/P EST MOD 30 MIN: CPT

## 2024-08-23 NOTE — ASSESSMENT & PLAN NOTE
Patient has been on Ambien for many years.  Seems to be stable on it and has difficulty sleeping when he tried to come off of it in the past.  Had a discussion regarding the potential risks of long-term with this medication, especially as he is older and is on multiple medications.  Will be monitoring for confusion, dizziness, falls, or other adverse effects of this medication.  For now I will continue to refill it.

## 2024-08-23 NOTE — PROGRESS NOTES
Ambulatory Visit  Name: Sherly Peña      : 1945      MRN: [id number]  Encounter Provider: Maritza Armando PA-C  Encounter Date: 2024   Encounter department: [facility] [place]    Assessment & Plan   1. Encounter to establish care  2. Chronic obstructive pulmonary disease, unspecified COPD type (HCC)  Comments:  Stable, no complaints today.  Continue with Trelegy, albuterol nebulizer as needed, follow-up with pulmonology.  3. Chronic systolic congestive heart failure (HCC)  Comments:  Stable, blood pressure well-controlled, continue Entresto, metoprolol, spironolactone, statin.  Follows with cardiology.  4. Nephrolithiasis  Comments:  Doing well after discharge from hospital.  Has cystoscopy scheduled for September.  Follow-up with urology.  5. Eye abnormality  Comments:  Deformity is not painful and not affecting his eyesight.  Recommended he call his ophthalmologist and follow-up ASAP.  ED precautions given.  6. Insomnia, unspecified type  Assessment & Plan:  Patient has been on Ambien for many years.  Seems to be stable on it and has difficulty sleeping when he tried to come off of it in the past.  Had a discussion regarding the potential risks of long-term with this medication, especially as he is older and is on multiple medications.  Will be monitoring for confusion, dizziness, falls, or other adverse effects of this medication.  For now I will continue to refill it.       History of Present Illness     HPI    Pt is here to establish care, prior pt of Dr. Lzoada's.  He has a complex medical history.  Was recently in the hospital for kidney stones and UTI.  Has elevated PSA on 2024 at 5.72.  He has a cystoscopy scheduled in 2024.    See's Dr. Lozada from Cardiology, hx of nonischemic cardiomyopathy, CHF, HTN.  Pulmonary for COPD, nephrology for his CKD3.  Hx of lymphoma.  Follows with dermatology for skin cancer/mycosis fungoides.    Pt has been on ambien for  many years.  Whenever he has tried to stop taking it, he has a lot of difficulty sleeping. Had a lance discussion regarding the long term side effects and potential dangers of being on this medication.     Pt has different size pupils.  He noticed it about a month ago.  Painless, no changes in vision.  Has had cataract surgery in the past with lens implants.      Review of Systems   Constitutional: Negative.    HENT: Negative.     Eyes: Negative.    Respiratory:  Negative for cough and shortness of breath.    Cardiovascular:  Negative for chest pain and palpitations.   Gastrointestinal: Negative.    Genitourinary: Negative.    Musculoskeletal: Negative.    Skin: Negative.    Neurological:  Negative for dizziness, syncope, light-headedness and headaches.   Hematological:  Negative for adenopathy.   Psychiatric/Behavioral:  Positive for sleep disturbance.      Past Medical History:   Diagnosis Date   • Agent orange exposure    • Anemia    • Benign colon polyp    • BPH (benign prostatic hyperplasia)    • Bradycardia    • Cardiomyopathy (HCC)    • Chest discomfort    • CHF (congestive heart failure) ([facility])    • Chronic bronchitis ([facility])    • Chronic kidney disease    • COPD (chronic obstructive pulmonary disease) ([facility])     LAST ASSESSED: 9/9/17   • Coronary artery disease cardio myopthia   • Emphysema of lung ([facility]) 2013   • H/O nonmelanoma skin cancer     LAST ASSESSED: 11/7/17   • HHD (hypertensive heart disease)    • HTN (hypertension)    • Hx of lymphoma    • Hyperlipidemia    • ICD (implantable cardioverter-defibrillator) in place    • Insomnia    • Mycosis fungoides ([facility])    • PVC (premature ventricular contraction)    • PVT (paroxysmal ventricular tachycardia) ([facility])    • SOB (shortness of breath)      Past Surgical History:   Procedure Laterality Date   • CARDIAC PACEMAKER PLACEMENT     • SKIN SURGERY      skin cancer removal    • TONSILLECTOMY       Family History   Problem Relation Age of Onset   • Diabetes Mother     • Colon cancer Father         DIAGNOSED IN HIS 80'S   • Heart failure Father    • Coronary artery disease Father    • Cancer Father         Lorenzo   • Diabetes Family         MELLITUS   • Heart attack Neg Hx    • Stroke Neg Hx    • Anuerysm Neg Hx    • Clotting disorder Neg Hx    • Arrhythmia Neg Hx    • Hypertension Neg Hx         pt unsure    • Hyperlipidemia Neg Hx    • Sudden death Neg Hx         scd     Social History     Tobacco Use   • Smoking status: Every Day     Current packs/day: 1.00     Average packs/day: 1 pack/day for 63.6 years (63.6 ttl pk-yrs)     Types: Cigarettes     Start date: 1961     Passive exposure: Current   • Smokeless tobacco: Never   • Tobacco comments:     HALF A PACK PER DAY    Vaping Use   • Vaping status: Never Used   Substance and Sexual Activity   • Alcohol use: Not Currently     Alcohol/week: 14.0 standard drinks of alcohol     Comment: two drinks every night over a 4 hour period   • Drug use: Never   • Sexual activity: Not Currently     Partners: Female     Birth control/protection: Male Sterilization     Current Outpatient Medications on File Prior to Visit   Medication Sig   • albuterol (2.5 mg/3 mL) 0.083 % nebulizer solution USE 1 VIAL VIA NEBULIZER 4 TIMES A DAY AS NEEDED   • ammonium lactate (LAC-HYDRIN) 12 % lotion APPLY TO AFFECTED AREA TOPICALLY EVERY DAY   • aspirin 81 MG tablet Take 81 mg by mouth daily   • atorvastatin (LIPITOR) 40 mg tablet TAKE 1 TABLET BY MOUTH DAILY   • bexarotene (TARGRETIN) 75 mg capsule TAKE 8 CAPSULES BY MOUTH DAILY   • Cholecalciferol 25 MCG (1000 UT) capsule Take 1 capsule by mouth daily   • cyanocobalamin (VITAMIN B-12) 1,000 mcg tablet Take 1,000 mcg by mouth daily   • Entresto 49-51 MG TABS TAKE 1 TABLET BY MOUTH  TWICE DAILY   • ferrous sulfate 324 (65 Fe) mg Take 1 tablet by mouth Twice daily   • gabapentin (NEURONTIN) 100 mg capsule Take 1 capsule (100 mg total) by mouth 3 (three) times a day   • levothyroxine 100 mcg tablet TAKE 1  TABLET BY MOUTH DAILY   • metoprolol succinate (TOPROL-XL) 50 mg 24 hr tablet    • montelukast (SINGULAIR) 10 mg tablet TAKE 1 TABLET BY MOUTH  DAILY AT BEDTIME   • Multiple Vitamins-Minerals (OCUVITE ADULT 50+ PO) Take by mouth   • spironolactone (ALDACTONE) 25 mg tablet Take 0.5 tablets (12.5 mg total) by mouth daily   • tamsulosin (FLOMAX) 0.4 mg Take 1 capsule (0.4 mg total) by mouth daily with dinner   • Trelegy Ellipta 100-62.5-25 MCG/ACT inhaler USE 1 INHALATION BY MOUTH ONCE  DAILY AT THE SAME TIME EACH DAY  RINSE MOUTH AFTER USE   • triamcinolone (KENALOG) 0.1 % ointment Apply topically 2 (two) times 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   • Vitamins-Lipotropics (LIPOFLAVONOID PO) Take by mouth daily   • zolpidem (AMBIEN) 10 mg tablet TAKE 1 TABLET BY MOUTH DAILY AT BEDTIME AS NEEDED FOR SLEEP.   • [DISCONTINUED] amoxicillin-clavulanate (AUGMENTIN) 875-125 mg per tablet Take 1 tablet by mouth every 12 (twelve) hours for 7 days   • [DISCONTINUED] diclofenac potassium (CATAFLAM) 50 mg tablet 1 tablet daily for severe pain.  Not to exceed 2 tablets a week.   • [DISCONTINUED] traMADol (ULTRAM) 50 mg tablet Take 1 tablet (50 mg total) by mouth every 6 (six) hours as needed for moderate pain or severe pain   • [DISCONTINUED] nicotine (NICODERM CQ) 21 mg/24 hr TD 24 hr patch Place 1 patch on the skin every 24 hours   • [DISCONTINUED] tamsulosin (FLOMAX) 0.4 mg Take 1 capsule (0.4 mg total) by mouth daily with dinner for 10 days     No Known Allergies  Immunization History   Administered Date(s) Administered   • COVID-19 PFIZER VACCINE 0.3 ML IM 03/16/2021, 04/07/2021, 08/20/2021   • COVID-19 Pfizer Vac BIVALENT Maikel-sucrose 12 Yr+ IM 01/16/2023   • COVID-19 Pfizer vac (Maikel-sucrose, gray cap) 12 yr+ IM 04/15/2022, 04/15/2022   • INFLUENZA 10/14/2015, 10/17/2016, 09/24/2018, 09/08/2020, 10/25/2021, 10/20/2022, 11/04/2023   • Influenza Split High Dose Preservative Free IM 10/17/2016, 09/09/2017, 09/24/2018, 09/13/2019   •  "Influenza, high dose seasonal 0.7 mL 10/25/2021, 10/20/2022   • Influenza, seasonal, injectable 10/14/2015   • Pneumococcal Conjugate 13-Valent 07/27/2020   • Pneumococcal Conjugate PCV 7 10/14/2015   • Respiratory Syncytial Virus Vaccine (Recombinant, Adjuvanted) 11/04/2023   • Tdap 1945   • Zoster Vaccine Recombinant 03/31/2023     Objective     /52 (BP Location: Left arm, Patient Position: Sitting, Cuff Size: Standard)   Pulse 81   Temp 98.3 °F (36.8 °C) (Tympanic)   Resp 16   Ht 5' 11\" (1.803 m)   Wt 97.7 kg (215 lb 6.4 oz)   SpO2 95%   BMI 30.04 kg/m²     Physical Exam  Vitals and nursing note reviewed.   Constitutional:       General: He is not in acute distress.     Appearance: Normal appearance.   Eyes:      General: Lids are normal. Vision grossly intact.      Conjunctiva/sclera: Conjunctivae normal.      Pupils: Pupils are unequal.      Comments: The iris of both eyes have irregular edges.  The right eye pupil does not react to light, has a clear edge that is seen on the superior aspect of the pupil that could possibly be the lens implant.  Again, patient denies any pain or visual changes.   Cardiovascular:      Rate and Rhythm: Normal rate and regular rhythm.      Pulses:           Radial pulses are 2+ on the right side and 2+ on the left side.      Heart sounds: Normal heart sounds. No murmur heard.     Comments: Defibrillator noted  Pulmonary:      Effort: Pulmonary effort is normal. No respiratory distress.      Breath sounds: Normal breath sounds.   Abdominal:      General: Bowel sounds are normal.      Palpations: Abdomen is soft.      Tenderness: There is no abdominal tenderness.   Musculoskeletal:         General: No tenderness, deformity or signs of injury.   Skin:     General: Skin is warm and dry.   Neurological:      Mental Status: He is alert and oriented to person, place, and time.      Gait: Gait is intact.         "

## 2024-08-28 ENCOUNTER — TELEPHONE (OUTPATIENT)
Age: 79
End: 2024-08-28

## 2024-08-28 ENCOUNTER — APPOINTMENT (OUTPATIENT)
Age: 79
End: 2024-08-28
Payer: COMMERCIAL

## 2024-08-28 ENCOUNTER — TELEPHONE (OUTPATIENT)
Dept: UROLOGY | Facility: CLINIC | Age: 79
End: 2024-08-28

## 2024-08-28 DIAGNOSIS — R39.9 UTI SYMPTOMS: Primary | ICD-10-CM

## 2024-08-28 LAB
BACTERIA UR QL AUTO: ABNORMAL /HPF
BILIRUB UR QL STRIP: NEGATIVE
CLARITY UR: ABNORMAL
COLOR UR: YELLOW
GLUCOSE UR STRIP-MCNC: NEGATIVE MG/DL
HGB UR QL STRIP.AUTO: ABNORMAL
KETONES UR STRIP-MCNC: NEGATIVE MG/DL
LEUKOCYTE ESTERASE UR QL STRIP: ABNORMAL
MUCOUS THREADS UR QL AUTO: ABNORMAL
NITRITE UR QL STRIP: NEGATIVE
NON-SQ EPI CELLS URNS QL MICRO: ABNORMAL /HPF
PH UR STRIP.AUTO: 6 [PH]
PROT UR STRIP-MCNC: ABNORMAL MG/DL
RBC #/AREA URNS AUTO: ABNORMAL /HPF
RENAL EPI CELLS #/AREA URNS HPF: PRESENT /[HPF]
SP GR UR STRIP.AUTO: 1.02 (ref 1–1.03)
UROBILINOGEN UR STRIP-ACNC: <2 MG/DL
WBC #/AREA URNS AUTO: ABNORMAL /HPF

## 2024-08-28 PROCEDURE — 87086 URINE CULTURE/COLONY COUNT: CPT

## 2024-08-28 PROCEDURE — 87077 CULTURE AEROBIC IDENTIFY: CPT

## 2024-08-28 PROCEDURE — 87186 SC STD MICRODIL/AGAR DIL: CPT

## 2024-08-28 PROCEDURE — 87147 CULTURE TYPE IMMUNOLOGIC: CPT

## 2024-08-28 PROCEDURE — 81001 URINALYSIS AUTO W/SCOPE: CPT

## 2024-08-28 NOTE — TELEPHONE ENCOUNTER
Called pt and pt stated he felt like he still has the UTI. Informed pt per Margot MARSHALL , he can repeat Urine testing. Pt verbalized understanding an stated he will try and go today. No further assistance.     Placed orders in chart.

## 2024-08-28 NOTE — TELEPHONE ENCOUNTER
Please triage. Is he still having symptoms? He completed course of antibiotics recently. Can repeat urine testing if still having symptoms. Thanks

## 2024-08-30 DIAGNOSIS — N39.0 ACUTE UTI: ICD-10-CM

## 2024-08-30 DIAGNOSIS — R39.9 UTI SYMPTOMS: Primary | ICD-10-CM

## 2024-08-30 RX ORDER — LEVOFLOXACIN 500 MG/1
500 TABLET, FILM COATED ORAL EVERY 24 HOURS
Qty: 7 TABLET | Refills: 0 | Status: SHIPPED | OUTPATIENT
Start: 2024-08-30 | End: 2024-09-06

## 2024-08-30 NOTE — TELEPHONE ENCOUNTER
Called and spoke to the PT with results and Shyann MARSHALL recommendations. Also, let pt know medication might be changed depending on the final results. PT verbalized understanding.

## 2024-08-31 LAB — BACTERIA UR CULT: ABNORMAL

## 2024-09-12 DIAGNOSIS — J30.89 ENVIRONMENTAL AND SEASONAL ALLERGIES: ICD-10-CM

## 2024-09-12 RX ORDER — MONTELUKAST SODIUM 10 MG/1
TABLET ORAL
Qty: 90 TABLET | Refills: 1 | Status: SHIPPED | OUTPATIENT
Start: 2024-09-12

## 2024-09-19 ENCOUNTER — REMOTE DEVICE CLINIC VISIT (OUTPATIENT)
Dept: CARDIOLOGY CLINIC | Facility: CLINIC | Age: 79
End: 2024-09-19

## 2024-09-19 DIAGNOSIS — Z95.810 PRESENCE OF AUTOMATIC CARDIOVERTER/DEFIBRILLATOR (AICD): Primary | ICD-10-CM

## 2024-09-19 PROCEDURE — RECHECK: Performed by: INTERNAL MEDICINE

## 2024-09-19 NOTE — PROGRESS NOTES
Results for orders placed or performed in visit on 09/19/24   Cardiac EP device report    Narrative    MDT BIV-ICD/NOT MRI CONDITIONAL  CARELINK TRANSMISSION: N/B BATTERY CHECK BATTERY VOLTAGE NEARING LIZZY.  WILL SCHEDULE MONTHLY BATTERY CHECKS. (2 MONS) AP 97%  97%. ALL AVAILABLE LEAD PARAMETERS WITHIN NORMAL LIMITS. 5 NSVT EPISODES DETECTED MOST RECENT 5 BEATS @ 360ms. OPTI-VOL WITHIN NORMAL LIMITS.VENTRICULAR SENSE EPISODES DETECTED. PATIENT IS ON METOPROLOL SUCC NORMAL DEVICE FUNCTION.---DINERO

## 2024-09-21 DIAGNOSIS — G47.00 INSOMNIA, UNSPECIFIED TYPE: ICD-10-CM

## 2024-09-23 RX ORDER — ZOLPIDEM TARTRATE 10 MG/1
10 TABLET ORAL
Qty: 30 TABLET | Refills: 0 | Status: SHIPPED | OUTPATIENT
Start: 2024-09-23

## 2024-09-24 ENCOUNTER — TELEPHONE (OUTPATIENT)
Age: 79
End: 2024-09-24

## 2024-09-24 ENCOUNTER — APPOINTMENT (OUTPATIENT)
Age: 79
End: 2024-09-24
Payer: COMMERCIAL

## 2024-09-24 DIAGNOSIS — R39.9 UTI SYMPTOMS: Primary | ICD-10-CM

## 2024-09-24 DIAGNOSIS — R39.9 UTI SYMPTOMS: ICD-10-CM

## 2024-09-24 LAB
BACTERIA UR QL AUTO: ABNORMAL /HPF
BILIRUB UR QL STRIP: NEGATIVE
CLARITY UR: ABNORMAL
COLOR UR: YELLOW
GLUCOSE UR STRIP-MCNC: NEGATIVE MG/DL
HGB UR QL STRIP.AUTO: ABNORMAL
HYALINE CASTS #/AREA URNS LPF: ABNORMAL /LPF
KETONES UR STRIP-MCNC: NEGATIVE MG/DL
LEUKOCYTE ESTERASE UR QL STRIP: ABNORMAL
MUCOUS THREADS UR QL AUTO: ABNORMAL
NITRITE UR QL STRIP: NEGATIVE
NON-SQ EPI CELLS URNS QL MICRO: ABNORMAL /HPF
PH UR STRIP.AUTO: 6 [PH]
PROT UR STRIP-MCNC: ABNORMAL MG/DL
RBC #/AREA URNS AUTO: ABNORMAL /HPF
SP GR UR STRIP.AUTO: 1.02 (ref 1–1.03)
UROBILINOGEN UR STRIP-ACNC: <2 MG/DL
WBC #/AREA URNS AUTO: ABNORMAL /HPF

## 2024-09-24 PROCEDURE — 87086 URINE CULTURE/COLONY COUNT: CPT

## 2024-09-24 PROCEDURE — 87077 CULTURE AEROBIC IDENTIFY: CPT

## 2024-09-24 PROCEDURE — 81001 URINALYSIS AUTO W/SCOPE: CPT

## 2024-09-24 PROCEDURE — 87186 SC STD MICRODIL/AGAR DIL: CPT

## 2024-09-24 NOTE — TELEPHONE ENCOUNTER
Spoke to patient and he sent a message on my chart portal regarding concerns of a urinary tract infection starting again. He is voiding frequently and voiding little amounts. No hematuria or clots are present when voiding. Urinary stream is more hesitant but stream does not split. No abdominal, flank or penile pain. He is hydrating with water and cranberry juice. I gave him care advice to continue hydration measures and placed urine orders in epic to R/O infection. Reviewed ER precautions. Please advise of any further recommendations and/or does his November appointment need to be sooner?

## 2024-09-25 NOTE — TELEPHONE ENCOUNTER
Continue to monitor for urine culture results.   Patient did just have a PVR last month which was 0.  No other recommendations at this time    Okay to keep appointment for November as of now

## 2024-09-26 ENCOUNTER — TELEPHONE (OUTPATIENT)
Dept: UROLOGY | Facility: MEDICAL CENTER | Age: 79
End: 2024-09-26

## 2024-09-26 DIAGNOSIS — N39.0 ACUTE UTI: Primary | ICD-10-CM

## 2024-09-26 LAB — BACTERIA UR CULT: ABNORMAL

## 2024-09-26 RX ORDER — LEVOFLOXACIN 500 MG/1
500 TABLET, FILM COATED ORAL EVERY 24 HOURS
Qty: 5 TABLET | Refills: 0 | Status: SHIPPED | OUTPATIENT
Start: 2024-09-26 | End: 2024-10-01

## 2024-09-26 NOTE — TELEPHONE ENCOUNTER
Spoke w/ pt and made him aware of abx and Real Adame PA-C's recommendations to take the iron pills few hours after the abx. Pt verbalized understanding.

## 2024-09-26 NOTE — TELEPHONE ENCOUNTER
I reviewed the patient's recent urine culture and it returned positive for ongoing UTI.  I have sent in a 5-day course of levofloxacin which the patient can initiate today.  I would advise the patient to take the antibiotics separately from his ferrous sulfate as when these medications are taken together it can decrease the effect of the antibiotic.  Just note to the patient that they should be taken a few hours apart to avoid this effect.

## 2024-10-02 DIAGNOSIS — I10 PRIMARY HYPERTENSION: Primary | ICD-10-CM

## 2024-10-02 DIAGNOSIS — I42.8 NON-ISCHEMIC CARDIOMYOPATHY (HCC): ICD-10-CM

## 2024-10-03 RX ORDER — METOPROLOL SUCCINATE 50 MG/1
75 TABLET, EXTENDED RELEASE ORAL DAILY
Qty: 135 TABLET | Refills: 3 | Status: SHIPPED | OUTPATIENT
Start: 2024-10-03

## 2024-10-03 RX ORDER — SPIRONOLACTONE 25 MG/1
37.5 TABLET ORAL DAILY
Qty: 45 TABLET | Refills: 1 | Status: SHIPPED | OUTPATIENT
Start: 2024-10-03

## 2024-10-09 NOTE — PROGRESS NOTES
Pt  Tolerated Venofer infusion w/out adverse reaction  Confirmed pts  Next appt   Pt  Declined AVS  pt denies hx fo SA or SI  pt seeks help form  and knows to tyler her dr-s and go to [facility] ED or call 911 self/

## 2024-10-15 ENCOUNTER — TELEPHONE (OUTPATIENT)
Dept: UROLOGY | Facility: MEDICAL CENTER | Age: 79
End: 2024-10-15

## 2024-10-15 DIAGNOSIS — R39.9 UTI SYMPTOMS: Primary | ICD-10-CM

## 2024-10-15 NOTE — TELEPHONE ENCOUNTER
Call placed to pt and spoke with him. Informed him of the AP recommendations and plan for urine testing 1st.   Pt will go for urine testing tomorrow as he took AZO today and this can alter the result of the urine testing     
Patient called the Rx line stating that his UTI has come back and he is experiencing  burning when urinating for 5 days.Patient requesting if a prescription can be send for amoxicillin-clavulanate (AUGMENTIN) 875-125 mg per tablet. Please review and send in if appropriate.  
Yes, would have patient retest first  
Quality 130: Documentation Of Current Medications In The Medical Record: Current Medications Documented
Quality 402: Tobacco Use And Help With Quitting Among Adolescents: Patient screened for tobacco and never smoked
Quality 110: Preventive Care And Screening: Influenza Immunization: Influenza immunization was not ordered or administered, reason not given
Quality 226: Preventive Care And Screening: Tobacco Use: Screening And Cessation Intervention: Patient screened for tobacco use and is an ex/non-smoker
Detail Level: Detailed

## 2024-10-16 ENCOUNTER — APPOINTMENT (OUTPATIENT)
Age: 79
End: 2024-10-16
Payer: COMMERCIAL

## 2024-10-16 DIAGNOSIS — R39.9 UTI SYMPTOMS: ICD-10-CM

## 2024-10-16 LAB
BACTERIA UR QL AUTO: ABNORMAL /HPF
BILIRUB UR QL STRIP: NEGATIVE
CLARITY UR: ABNORMAL
COLOR UR: ABNORMAL
GLUCOSE UR STRIP-MCNC: NEGATIVE MG/DL
HGB UR QL STRIP.AUTO: ABNORMAL
HYALINE CASTS #/AREA URNS LPF: ABNORMAL /LPF
KETONES UR STRIP-MCNC: NEGATIVE MG/DL
LEUKOCYTE ESTERASE UR QL STRIP: ABNORMAL
MUCOUS THREADS UR QL AUTO: ABNORMAL
NITRITE UR QL STRIP: NEGATIVE
NON-SQ EPI CELLS URNS QL MICRO: ABNORMAL /HPF
PH UR STRIP.AUTO: 6 [PH]
PROT UR STRIP-MCNC: ABNORMAL MG/DL
RBC #/AREA URNS AUTO: ABNORMAL /HPF
SP GR UR STRIP.AUTO: 1.02 (ref 1–1.03)
UROBILINOGEN UR STRIP-ACNC: 2 MG/DL
WBC #/AREA URNS AUTO: ABNORMAL /HPF
WBC CLUMPS # UR AUTO: PRESENT /UL

## 2024-10-16 PROCEDURE — 81001 URINALYSIS AUTO W/SCOPE: CPT

## 2024-10-16 PROCEDURE — 87086 URINE CULTURE/COLONY COUNT: CPT

## 2024-10-16 PROCEDURE — 87077 CULTURE AEROBIC IDENTIFY: CPT

## 2024-10-16 PROCEDURE — 87186 SC STD MICRODIL/AGAR DIL: CPT

## 2024-10-16 PROCEDURE — 87147 CULTURE TYPE IMMUNOLOGIC: CPT

## 2024-10-17 ENCOUNTER — TELEPHONE (OUTPATIENT)
Dept: UROLOGY | Facility: CLINIC | Age: 79
End: 2024-10-17

## 2024-10-17 DIAGNOSIS — N39.0 ACUTE UTI: Primary | ICD-10-CM

## 2024-10-17 NOTE — TELEPHONE ENCOUNTER
Spoke with patient to make him aware of abx therapy sent to pharmacy by Shyann MARSHALL. Patient states he received notification from his pharmacy and will  medication tomorrow.         ----- Message from Shyann Hernandez PA-C sent at 10/17/2024 12:47 PM EDT -----  Prelim urine culture positive, abx sent to pharmacy

## 2024-10-18 LAB — BACTERIA UR CULT: ABNORMAL

## 2024-10-22 NOTE — PROGRESS NOTES
EPS Consultation/New Patient Evaluation   [facility]  [facility] [address], [place] [address]    Name: Sherly Peña  : 1945  MRN: [id number]      Assessment/Plan:  NICM s/p Medtronic BI-V ICD now at LIZZY  Implanted 2011 for primary prevention given NICM hx  Underwent gen change 2014  EKG QRS today 148ms  Per latest 10/17/24 Interrogation:  Reached RRT 10/17/24  All leads with appropriate impedence, capture, and sensitivity  AP- 98%  Programed DDR 75bpm  2023 ECHO: EF 30%  NSVT  Episodes of NSVT noted on prior device interrogations  Currently maintained on BB  Chronic systolic heart failure  Appears euvolemic in office today  To continue f/u w/ general cardiology team  HTN  BP in office today 112/62  HLD      Discussion/Plan:    Patient has a Medtronic BI-V which was placed for primary prevention on 2011 given history of NICM. This device later required a gen change 2014    This device went RRT/LIZZY 10/17/24    The patient is 98% AP/     All lead parameters are stable per latest device interrogation    He affirms the device is in a comfortable position at this time    Discussed the gen change procedure, complications, and postop restrictions/wound care with the patient in detail today    He will  be sent to procedure scheduling today after his visit  (he prefers the procedure be done in [place] if possible given that location is closer to his home)    Patient has been instructed to follow up in our EP office post procedure or as needed. He will call our office with any questions or concerns in the meantime.    Rhythm History:   Atrial fibrillation:      Atrial flutter:      SVT:      VT/VF/PVC:     Device history:   Pacemaker:     Defibrillator:     BIV PPM:     BIV ICD:     ILR:    Chief Complaint: MDT BI-V ICD at [facility]    HPI:   Sherly Peña is a 78 y.o. male with a PMH of NICM s/p MDT BI-V ICD, NSVT, CHF, HTN, and HLD.      He follows w/ general cardiology given his history as above and on routine device interrogation was noted to have had his BI-V ICD reach LIZZY on 10/17/24. Given this he was referred to the EP team for further discussion regarding the gen change procedure    Currently He reports feeling well, denies acute cardiac complaint, and remains agreeable to the gen change procedure.       EKG: AV-paced rhythm w/ ventricular rate 77bpm      Review of Systems   Constitutional:  Negative for activity change, appetite change, chills, fatigue and fever.   HENT:  Negative for nosebleeds.    Respiratory:  Negative for chest tightness and shortness of breath.    Cardiovascular:  Negative for chest pain, palpitations and leg swelling.   Neurological:  Negative for dizziness, syncope, weakness and light-headedness.       Objective:     Vitals: There were no vitals taken for this visit., There is no height or weight on file to calculate BMI.,        Physical Exam:   Physical Exam  Constitutional:       General: He is not in acute distress.     Appearance: Normal appearance. He is not toxic-appearing.   HENT:      Head: Normocephalic and atraumatic.   Eyes:      General:         Right eye: No discharge.         Left eye: No discharge.   Cardiovascular:      Rate and Rhythm: Normal rate and regular rhythm.      Pulses: Normal pulses.   Pulmonary:      Effort: Pulmonary effort is normal.      Breath sounds: Normal breath sounds.   Musculoskeletal:      Right lower leg: No edema.      Left lower leg: No edema.   Skin:     General: Skin is warm and dry.      Capillary Refill: Capillary refill takes less than 2 seconds.   Neurological:      Mental Status: He is alert.            Medications:      Current Outpatient Medications:     albuterol (2.5 mg/3 mL) 0.083 % nebulizer solution, USE 1 VIAL VIA NEBULIZER 4 TIMES A DAY AS NEEDED, Disp: 360 mL, Rfl: 2    ammonium lactate (LAC-HYDRIN) 12 % lotion, APPLY TO AFFECTED AREA TOPICALLY EVERY  DAY, Disp: 225 mL, Rfl: 2    amoxicillin-clavulanate (AUGMENTIN) 875-125 mg per tablet, Take 1 tablet by mouth every 12 (twelve) hours for 7 days, Disp: 14 tablet, Rfl: 0    aspirin 81 MG tablet, Take 81 mg by mouth daily, Disp: , Rfl:     atorvastatin (LIPITOR) 40 mg tablet, TAKE 1 TABLET BY MOUTH DAILY, Disp: 90 tablet, Rfl: 1    bexarotene (TARGRETIN) 75 mg capsule, TAKE 8 CAPSULES BY MOUTH DAILY, Disp: 240 capsule, Rfl: 5    Cholecalciferol 25 MCG (1000 UT) capsule, Take 1 capsule by mouth daily, Disp: , Rfl:     cyanocobalamin (VITAMIN B-12) 1,000 mcg tablet, Take 1,000 mcg by mouth daily, Disp: , Rfl:     Entresto 49-51 MG TABS, TAKE 1 TABLET BY MOUTH  TWICE DAILY, Disp: 180 tablet, Rfl: 3    ferrous sulfate 324 (65 Fe) mg, Take 1 tablet by mouth Twice daily, Disp: , Rfl:     gabapentin (NEURONTIN) 100 mg capsule, Take 1 capsule (100 mg total) by mouth 3 (three) times a day, Disp: 270 capsule, Rfl: 3    levothyroxine 100 mcg tablet, TAKE 1 TABLET BY MOUTH DAILY, Disp: 90 tablet, Rfl: 1    metoprolol succinate (TOPROL-XL) 50 mg 24 hr tablet, TAKE 1 AND 1/2 TABLETS BY MOUTH  DAILY, Disp: 135 tablet, Rfl: 3    montelukast (SINGULAIR) 10 mg tablet, TAKE 1 TABLET BY MOUTH DAILY AT  BEDTIME, Disp: 90 tablet, Rfl: 1    Multiple Vitamins-Minerals (OCUVITE ADULT 50+ PO), Take by mouth, Disp: , Rfl:     spironolactone (ALDACTONE) 25 mg tablet, TAKE ONE-HALF TABLET BY MOUTH  DAILY, Disp: 45 tablet, Rfl: 1    tamsulosin (FLOMAX) 0.4 mg, Take 1 capsule (0.4 mg total) by mouth daily with dinner, Disp: 90 capsule, Rfl: 3    Trelegy Ellipta 100-62.5-25 MCG/ACT inhaler, USE 1 INHALATION BY MOUTH ONCE  DAILY AT THE SAME TIME EACH DAY  RINSE MOUTH AFTER USE, Disp: 180 each, Rfl: 3    triamcinolone (KENALOG) 0.1 % ointment, Apply topically 2 (two) times 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, Disp: 454 g, Rfl: 3    Vitamins-Lipotropics (LIPOFLAVONOID PO), Take by mouth daily, Disp: , Rfl:     zolpidem (AMBIEN) 10 mg tablet, TAKE 1 TABLET BY  MOUTH DAILY AT BEDTIME AS NEEDED FOR SLEEP., Disp: 30 tablet, Rfl: 0       Historical Information   Past Medical History:   Diagnosis Date    Agent orange exposure     Anemia     Benign colon polyp     BPH (benign prostatic hyperplasia)     Bradycardia     Cardiomyopathy (HCC)     Chest discomfort     CHF (congestive heart failure) (HCC)     Chronic bronchitis (HCC)     Chronic kidney disease     COPD (chronic obstructive pulmonary disease) (HCC)     LAST ASSESSED: 9/9/17    Coronary artery disease cardio myopthia    Emphysema of lung (HCC) 2013    H/O nonmelanoma skin cancer     LAST ASSESSED: 11/7/17    HHD (hypertensive heart disease)     HTN (hypertension)     Hx of lymphoma     Hyperlipidemia     ICD (implantable cardioverter-defibrillator) in place     Insomnia     Mycosis fungoides (HCC)     PVC (premature ventricular contraction)     PVT (paroxysmal ventricular tachycardia) (HCC)     SOB (shortness of breath)        Past Surgical History:   Procedure Laterality Date    CARDIAC PACEMAKER PLACEMENT      SKIN SURGERY      skin cancer removal     TONSILLECTOMY         Social History     Substance and Sexual Activity   Alcohol Use Not Currently    Alcohol/week: 14.0 standard drinks of alcohol    Comment: two drinks every night over a 4 hour period     Social History     Substance and Sexual Activity   Drug Use Never     Social History     Tobacco Use   Smoking Status Every Day    Current packs/day: 1.00    Average packs/day: 1 pack/day for 63.8 years (63.8 ttl pk-yrs)    Types: Cigarettes    Start date: 1961    Passive exposure: Current   Smokeless Tobacco Never   Tobacco Comments    HALF A PACK PER DAY        Family History   Problem Relation Age of Onset    Diabetes Mother     Colon cancer Father         DIAGNOSED IN HIS 80'S    Heart failure Father     Coronary artery disease Father     Cancer Father         Lorenzo    Diabetes Family         MELLITUS    Heart attack Neg Hx     Stroke Neg Hx     Anuerysm Neg Hx      Clotting disorder Neg Hx     Arrhythmia Neg Hx     Hypertension Neg Hx         pt unsure     Hyperlipidemia Neg Hx     Sudden death Neg Hx         scd         Labs & Results:  Below is the patient's most recent value for Albumin, ALT, AST, BUN, Calcium, Chloride, Cholesterol, CO2, Creatinine, GFR, Glucose, HDL, Hematocrit, Hemoglobin, Hemoglobin A1C, LDL, Magnesium, Phosphorus, Platelets, Potassium, PSA, Sodium, Triglycerides, and WBC.   Lab Results   Component Value Date    ALT 11 08/12/2024    AST 19 08/12/2024    BUN 22 08/12/2024    CALCIUM 8.2 (L) 08/12/2024     (H) 08/12/2024    CHOL 197 06/01/2015    CO2 21 08/12/2024    CREATININE 1.27 08/12/2024    HDL 39 (L) 05/14/2024    HCT 37.6 08/12/2024    HGB 12.2 08/12/2024    HGBA1C 6.2 09/01/2017    MG 1.8 11/24/2020    PHOS 3.0 12/28/2023     08/12/2024    K 4.6 08/12/2024    PSA 5.721 (H) 08/01/2024     12/11/2015    TRIG 644 (H) 05/14/2024    WBC 10.39 (H) 08/12/2024     Note: for a comprehensive list of the patient's lab results, access the Results Review activity.

## 2024-10-24 ENCOUNTER — OFFICE VISIT (OUTPATIENT)
Dept: CARDIOLOGY CLINIC | Facility: CLINIC | Age: 79
End: 2024-10-24
Payer: COMMERCIAL

## 2024-10-24 VITALS
BODY MASS INDEX: 29.55 KG/M2 | DIASTOLIC BLOOD PRESSURE: 62 MMHG | HEART RATE: 77 BPM | WEIGHT: 211.1 LBS | HEIGHT: 71 IN | SYSTOLIC BLOOD PRESSURE: 112 MMHG

## 2024-10-24 DIAGNOSIS — I47.29 NSVT (NONSUSTAINED VENTRICULAR TACHYCARDIA) (HCC): Primary | ICD-10-CM

## 2024-10-24 PROCEDURE — 99214 OFFICE O/P EST MOD 30 MIN: CPT

## 2024-10-24 PROCEDURE — 93000 ELECTROCARDIOGRAM COMPLETE: CPT

## 2024-10-28 ENCOUNTER — HOSPITAL ENCOUNTER (OUTPATIENT)
Dept: CT IMAGING | Facility: CLINIC | Age: 79
Discharge: HOME | End: 2024-10-28

## 2024-10-28 DIAGNOSIS — F17.200 CURRENT EVERY DAY SMOKER: ICD-10-CM

## 2024-10-28 DIAGNOSIS — Z87.891 PERSONAL HISTORY OF NICOTINE DEPENDENCE: ICD-10-CM

## 2024-11-01 ENCOUNTER — TELEPHONE (OUTPATIENT)
Age: 79
End: 2024-11-01

## 2024-11-04 ENCOUNTER — OFFICE VISIT (OUTPATIENT)
Age: 79
End: 2024-11-04
Payer: COMMERCIAL

## 2024-11-04 VITALS
TEMPERATURE: 96.7 F | RESPIRATION RATE: 18 BRPM | HEIGHT: 71 IN | BODY MASS INDEX: 29.54 KG/M2 | SYSTOLIC BLOOD PRESSURE: 106 MMHG | DIASTOLIC BLOOD PRESSURE: 80 MMHG | HEART RATE: 91 BPM | WEIGHT: 211 LBS

## 2024-11-04 DIAGNOSIS — L82.1 SEBORRHEIC KERATOSIS: ICD-10-CM

## 2024-11-04 DIAGNOSIS — D18.01 CHERRY ANGIOMA: ICD-10-CM

## 2024-11-04 DIAGNOSIS — Z85.89 HISTORY OF SQUAMOUS CELL CARCINOMA: ICD-10-CM

## 2024-11-04 DIAGNOSIS — Z85.828 HISTORY OF BASAL CELL CARCINOMA: ICD-10-CM

## 2024-11-04 DIAGNOSIS — Z79.899 ENCOUNTER FOR LONG-TERM (CURRENT) USE OF HIGH-RISK MEDICATION: ICD-10-CM

## 2024-11-04 DIAGNOSIS — L57.0 KERATOSIS, ACTINIC: ICD-10-CM

## 2024-11-04 DIAGNOSIS — C84.00 MYCOSIS FUNGOIDES, UNSPECIFIED BODY REGION (HCC): Primary | ICD-10-CM

## 2024-11-04 DIAGNOSIS — C84.08 MYCOSIS FUNGOIDES INVOLVING LYMPH NODES OF MULTIPLE REGIONS (HCC): ICD-10-CM

## 2024-11-04 DIAGNOSIS — D22.9 MULTIPLE MELANOCYTIC NEVI: ICD-10-CM

## 2024-11-04 PROCEDURE — 99214 OFFICE O/P EST MOD 30 MIN: CPT | Performed by: DERMATOLOGY

## 2024-11-04 PROCEDURE — 17000 DESTRUCT PREMALG LESION: CPT | Performed by: DERMATOLOGY

## 2024-11-04 PROCEDURE — 17003 DESTRUCT PREMALG LES 2-14: CPT | Performed by: DERMATOLOGY

## 2024-11-04 RX ORDER — BEXAROTENE 75 MG/1
600 CAPSULE ORAL DAILY
Qty: 240 CAPSULE | Refills: 6 | Status: SHIPPED | OUTPATIENT
Start: 2024-11-04

## 2024-11-04 NOTE — PATIENT INSTRUCTIONS
ACTINIC KERATOSIS WITH CRYOSURGERY PROCEDURE    ACTINIC KERATOSIS      Assessment and Plan:  Based on a thorough discussion of this condition and the management approach to it (including a comprehensive discussion of the known risks, side effects and potential benefits of treatment), the patient (family) agrees to implement the following specific plan:    Treated with cryotherapy in office today   Consent form signed by patient in office   Monitor for any changes     Actinic keratoses are very common on sites repeatedly exposed to the sun, especially the backs of the hands and the face.  They are considered precancers and have a low risk of turning into squamous cell carcinoma. It is rare for a solitary actinic keratosis to evolve into a squamous cell carcinoma (SCC), but the risk is 10-15% when more than 10 actinic keratoses are present. A tender, thickened, ulcerated or enlarging actinic keratosis is suspicious of SCC.    Actinic keratoses may be prevented by strict sun protection. If already present, keratoses may improve with a very high sun protection factor (50+) broad-spectrum sunscreen applied at least daily to affected areas, year-round.  We recommend that sun protective clothing and hats and sunglasses be worn whenever possible.  Note that you can make you own UPF 30 rate clothing using just your own washing machine with a product called sun guard    There are several different options for treating actinic keratoses    Topical “medications such as 5-fluorouracil or Aldara  - good for field treatment ie treats what's seen and not seen    Cryotherapy - good for single spots but treats “only what we see” versus a field treatment    Photodynamic therapy - involves application of a light sensitizing medicine and then exposure to a special light, also a good field treatment        PROCEDURE:  DESTRUCTION OF PRE-MALIGNANT LESIONS  After a thorough discussion of treatment options and risk/benefits/alternatives  Saw client at [facility]. Confirmed appointment with Department of Aging for 7/11 at 3 meeting at [facility]. During this meeting an assessment will be made on what help the client is eligible for. Client is hoping to move to a nursing home. Will meet client at 3 pm on 7/11.   (including but not limited to local pain, scarring, dyspigmentation, blistering, and possible superinfection), verbal and written consent were obtained and the aforementioned lesions were treated on with cryotherapy using liquid nitrogen x 1 cycle for 5-10 seconds.      The patient tolerated the procedure well, and after-care instructions were provided.        MYCOSIS FUNGOIDES      Assessment and Plan:  Based on a thorough discussion of this condition and the management approach to it (including a comprehensive discussion of the known risks, side effects and potential benefits of treatment), the patient (family) agrees to implement the following specific plan:  Continue same regimen   Please complete lab work   Refill for Bexarotene placed   Mycosis fungoides  Mycosis fungoides is a condition in which the skin is infiltrated by patches or lumps composed of white cells called lymphocytes. It is more common in men than women and is very rare in children. Its cause is unknown but in some patients, it is associated with a pre-existing contact allergic dermatitis or infection with a retrovirus.     Mycosis fungoides has an indolent (low-grade) clinical course, which means that it may persist in one stage or over years or sometimes decades, slowly progress to another stage (from patches to thicker plaques and eventually to tumors).     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  Patch stage  In patch stage mycosis fungoides, the skin lesions are flat. Most often there are oval or ring-shaped (annular) pink dry patches on covered skin. They may spontaneously disappear, remain the same size, or slowly enlarge. The skin may be atrophic (thinned), and may or may not itch. The patch stage of mycosis fungoides can be difficult to distinguish from psoriasis, discoid eczema or parapsoriasis.  Plaque stage  In plaque stage mycosis fungoides, the  patches become thickened and may resemble psoriasis. They are usually itchy.  Tumor stage  In tumor stage mycosis fungoides, large irregular lumps develop from plaques, or de elliot. They may ulcerate. At this stage, spread to other organs is more likely than in earlier stages. The tumor type may transform into a large cell lymphoma.     MF variants and subtypes  There are a number of variants and subtypes of mycosis fungoides. Most follow the same clinical and pathological features as MF but some have distinctive features as described below.     Folliculotropic MF (follicular cell lymphoma)  The localized form of cutaneous T-cell lymphoma in which there is a slowly enlarging solitary patch, plaque or a tumor in which biopsy shows characteristic lymphomatous change around hair follicles.  Most commonly found in the head and neck area.  Skin lesions are often associated with alopecia, and sometimes with mucinorrhea (see alopecia mucinosa).   Pagetoid Reticulosis  Localized patches or plaques with an intraepidermal growth of neoplastic T cells  Presents as a solitary psoriasis-like or hyperkeratotic patch or plaque, usually on the extremities  Granulomatous slack skin:  Extremely rare subtype characterized by the slow development of folds of lax skin in the major skin folds  Skin folds show a granulomatous infiltrate with clonal T cells  Occurs most commonly in the groin and underarm regions  Mycosis fungoides palmaris et plantaris  Confined to palms and soles        HISTORY OF BASAL CELL CARCINOMA        Assessment and Plan:  Based on a thorough discussion of this condition and the management approach to it (including a comprehensive discussion of the known risks, side effects and potential benefits of treatment), the patient (family) agrees to implement the following specific plan:  Monitor for change  When outside we recommend using a wide brim hat, sunglasses, long sleeve and pants, sunscreen with SPF 30+ with  "reapplication every 2 hours, or SPF specific clothing    Continue yearly skin checks      HISTORY OF SQUAMOUS CELL CARCINOMA      Assessment and Plan:  Based on a thorough discussion of this condition and the management approach to it (including a comprehensive discussion of the known risks, side effects and potential benefits of treatment), the patient (family) agrees to implement the following specific plan:  Monitor for change  When outside we recommend using a wide brim hat, sunglasses, long sleeve and pants, sunscreen with SPF 30+ with reapplication every 2 hours, or SPF specific clothing    Continue yearly skin checks     MELANOCYTIC NEVI (\"Moles\")      Melanocytic nevi (\"moles\") are tan or brown, raised or flat areas of the skin which have an increased number of melanocytes. Melanocytes are the cells in our body which make pigment and account for skin color.    Some moles are present at birth (I.e., \"congenital nevi\"), while others come up later in life (i.e., \"acquired nevi\").  The sun can stimulate the body to make more moles.  Sunburns are not the only thing that triggers more moles.  Chronic sun exposure can do it too.     Clinically distinguishing a healthy mole from melanoma may be difficult, even for experienced dermatologists. The \"ABCDE's\" of moles have been suggested as a means of helping to alert a person to a suspicious mole and the possible increased risk of melanoma.  The suggestions for raising alert are as follows:    Asymmetry: Healthy moles tend to be symmetric, while melanomas are often asymmetric.  Asymmetry means if you draw a line through the mole, the two halves do not match in color, size, shape, or surface texture. Asymmetry can be a result of rapid enlargement of a mole, the development of a raised area on a previously flat lesion, scaling, ulceration, bleeding or scabbing within the mole.  Any mole that starts to demonstrate \"asymmetry\" should be examined promptly by a board " "certified dermatologist.     Border: Healthy moles tend to have discrete, even borders.  The border of a melanoma often blends into the normal skin and does not sharply delineate the mole from normal skin.  Any mole that starts to demonstrate \"uneven borders\" should be examined promptly by a board certified dermatologist.     Color: Healthy moles tend to be one color throughout.  Melanomas tend to be made up of different colors ranging from dark black, blue, white, or red.  Any mole that demonstrates a color change should be examined promptly by a board certified dermatologist.     Diameter: Healthy moles tend to be smaller than 0.6 cm in size; an exception are \"congenital nevi\" that can be larger.  Melanomas tend to grow and can often be greater than 0.6 cm (1/4 of an inch, or the size of a pencil eraser). This is only a guideline, and many normal moles may be larger than 0.6 cm without being unhealthy.  Any mole that starts to change in size (small to bigger or bigger to smaller) should be examined promptly by a board certified dermatologist.     Evolving: Healthy moles tend to \"stay the same.\"  Melanomas may often show signs of change or evolution such as a change in size, shape, color, or elevation.  Any mole that starts to itch, bleed, crust, burn, hurt, or ulcerate or demonstrate a change or evolution should be examined promptly by a board certified dermatologist.      Dysplastic Nevi  Dysplastic moles are moles that fit the ABCDE rules of melanoma but are not identified as melanomas when examined under the microscope.  They may indicate an increased risk of melanoma in that person. If there is a family history of melanoma, most experts agree that the person may be at an increased risk for developing a melanoma.  Experts still do not agree on what dysplastic moles mean in patients without a personal or family history of melanoma.  Dysplastic moles are usually larger than common moles and have different colors " "within it with irregular borders. The appearance can be very similar to a melanoma. Biopsies of dysplastic moles may show abnormalities which are different from a regular mole.      Melanoma  Malignant melanoma is a type of skin cancer that can be deadly if it spreads throughout the body. The incidence of melanoma in the [place] is growing faster than any other cancer. Melanoma usually grows near the surface of the skin for a period of time, and then begins to grow deeper into the skin. Once it grows deeper into the skin, the risk of spread to other organs greatly increases. Therefore, early detection and removal of a malignant melanoma may result in a better chance at a complete cure; removal after the tumor has spread may not be as effective, leading to worse clinical outcomes such as death.    The true rate of nevus transformation into a melanoma is unknown. It has been estimated that the lifetime risk for any acquired melanocytic nevus on any 20-year-old individual transforming into melanoma by age 80 is 0.03% (1 in 3,164) for men and 0.009% (1 in 10,800) for women.     The appearance of a \"new mole\" remains one of the most reliable methods for identifying a malignant melanoma.  Occasionally, melanomas appear as rapidly growing, blue-black, dome-shaped bumps within a previous mole or previous area of normal skin.  Other times, melanomas are suspected when a mole suddenly appears or changes. Itching, burning, or pain in a pigmented lesion should increase suspicion, but most patients with early melanoma have no skin discomfort whatsoever.  Melanoma can occur anywhere on the skin, including areas that are difficult for self-examination. Many melanomas are first noticed by other family members.  Suspicious-looking moles may be removed for microscopic examination.       You may be able to prevent death from melanoma by doing two simple things:    Try to avoid unnecessary sun exposure and protect your skin when " "it is exposed to the sun.  People who live near the equator, people who have intermittent exposures to large amounts of sun, and people who have had sunburns in childhood or adolescence have an increased risk for melanoma. Sun sense and vigilant sun protection may be keys to helping to prevent melanoma.  We recommend wearing UPF-rated sun protective clothing and sunglasses whenever possible and applying a moisturizer-sunscreen combination product (SPF 50+) such as Neutrogena Daily Defense to sun exposed areas of skin at least three times a day.    Have your moles regularly examined by a board certified dermatologist AND by yourself or a family member/friend at home.  We recommend that you have your moles examined at least once a year by a board certified dermatologist.  Use your birthday as an annual reminder to have your \"Birthday Suit\" (I.e., your skin) examined; it is a nice birthday gift to yourself to know that your skin is healthy appearing!  Additionally, at-home self examinations may be helpful for detecting a possible melanoma.  Use the ABCDEs we discussed and check your moles once a month at home.        SEBORRHEIC KERATOSIS  A seborrheic keratosis is a harmless warty spot that appears during adult life as a common sign of skin aging.  Seborrheic keratoses can arise on any area of skin, covered or uncovered, with the usual exception of the palms and soles. They do not arise from mucous membranes. Seborrheic keratoses can have highly variable appearance.      Seborrheic keratoses are extremely common. It has been estimated that over 90% of adults over the age of 60 years have one or more of them. They occur in males and females of all races, typically beginning to erupt in the 30s or 40s. They are uncommon under the age of 20 years.  The precise cause of seborrhoeic keratoses is not known.  Seborrhoeic keratoses are considered degenerative in nature. As time goes by, seborrheic keratoses tend to become more " "numerous. Some people inherit a tendency to develop a very large number of them; some people may have hundreds of them.    The name \"seborrheic keratosis\" is misleading, because these lesions are not limited to a seborrhoeic distribution (scalp, mid-face, chest, upper back), nor are they formed from sebaceous glands, nor are they associated with sebum -- which is greasy.  Seborrheic keratosis may also be called \"SK,\" \"Seb K,\" \"basal cell papilloma,\" \"senile wart,\" or \"barnacle.\"      There is no easy way to remove multiple lesions on a single occasion.  Unless a specific lesion is \"inflamed\" and is causing pain or stinging/burning or is bleeding, most insurance companies do not authorize treatment.      ANGIOMA (\"CHERRY ANGIOMA\")  Glass angiomas markedly increase in number from about the age of 40, so it has been estimated that 75% of people over 75 years of age have them. Although they also called \"senile angiomas,\" they can occur in young people too - 5% of adolescents have been found to have them.     Cherry angiomas are very common in males and females of any age or race, with no difference in sexes or races affected. They are however more noticeable in white skin than in skin of colour.  There may be a family history of similar lesions. Eruptive (very large number appearing in a short period of time) cherry angiomas have been rarely reported to be associated with internal malignancy and pregnancy.   "

## 2024-11-04 NOTE — PROGRESS NOTES
"[facility] Note     Patient Name: Sherly Peña  Encounter Date: 11/4/24     Have you been cared for by a [facility] Dermatologist in the last 3 years and, if so, which description applies to you?    Yes.  I have been here within the last 3 years, and my medical history has NOT changed since that time.  I am MALE/not capable of bearing children.    REVIEW OF SYSTEMS:  Have you recently had or currently have any of the following? No changes in my recent health.   PAST MEDICAL HISTORY:  Have you personally ever had or currently have any of the following?  If \"YES,\" then please provide more detail. No changes in my medical history.   HISTORY OF IMMUNOSUPPRESSION: Do you have a history of any of the following:  Systemic Immunosuppression such as Diabetes, Biologic or Immunotherapy, Chemotherapy, Organ Transplantation, Bone Marrow Transplantation or Prednisone?  No     Answering \"YES\" requires the addition of the dotphrase \"IMMUNOSUPPRESSED\" as the first diagnosis of the patient's visit.   FAMILY HISTORY:  Any \"first degree relatives\" (parent, brother, sister, or child) with the following?    No changes in my family's known health.   PATIENT EXPERIENCE:    Do you want the Dermatologist to perform a COMPLETE skin exam today including a clinical examination under the \"bra and underwear\" areas?  Yes  If necessary, do we have your permission to call and leave a detailed message on your Preferred Phone number that includes your specific medical information?  Yes      No Known Allergies   Current Outpatient Medications:     albuterol (2.5 mg/3 mL) 0.083 % nebulizer solution, USE 1 VIAL VIA NEBULIZER 4 TIMES A DAY AS NEEDED, Disp: 360 mL, Rfl: 2    ammonium lactate (LAC-HYDRIN) 12 % lotion, APPLY TO AFFECTED AREA TOPICALLY EVERY DAY, Disp: 225 mL, Rfl: 2    aspirin 81 MG tablet, Take 81 mg by mouth daily, Disp: , Rfl:     atorvastatin (LIPITOR) 40 mg tablet, TAKE 1 TABLET BY MOUTH DAILY, Disp: 90 tablet, " Rfl: 1    bexarotene (TARGRETIN) 75 mg capsule, TAKE 8 CAPSULES BY MOUTH DAILY, Disp: 240 capsule, Rfl: 5    Cholecalciferol 25 MCG (1000 UT) capsule, Take 1 capsule by mouth daily, Disp: , Rfl:     cyanocobalamin (VITAMIN B-12) 1,000 mcg tablet, Take 1,000 mcg by mouth daily, Disp: , Rfl:     Entresto 49-51 MG TABS, TAKE 1 TABLET BY MOUTH  TWICE DAILY, Disp: 180 tablet, Rfl: 3    ferrous sulfate 324 (65 Fe) mg, Take 1 tablet by mouth Twice daily, Disp: , Rfl:     gabapentin (NEURONTIN) 100 mg capsule, Take 1 capsule (100 mg total) by mouth 3 (three) times a day, Disp: 270 capsule, Rfl: 3    levothyroxine 100 mcg tablet, TAKE 1 TABLET BY MOUTH DAILY, Disp: 90 tablet, Rfl: 1    metoprolol succinate (TOPROL-XL) 50 mg 24 hr tablet, TAKE 1 AND 1/2 TABLETS BY MOUTH  DAILY, Disp: 135 tablet, Rfl: 3    montelukast (SINGULAIR) 10 mg tablet, TAKE 1 TABLET BY MOUTH DAILY AT  BEDTIME, Disp: 90 tablet, Rfl: 1    Multiple Vitamins-Minerals (OCUVITE ADULT 50+ PO), Take by mouth, Disp: , Rfl:     spironolactone (ALDACTONE) 25 mg tablet, TAKE ONE-HALF TABLET BY MOUTH  DAILY, Disp: 45 tablet, Rfl: 1    tamsulosin (FLOMAX) 0.4 mg, Take 1 capsule (0.4 mg total) by mouth daily with dinner, Disp: 90 capsule, Rfl: 3    Trelegy Ellipta 100-62.5-25 MCG/ACT inhaler, USE 1 INHALATION BY MOUTH ONCE  DAILY AT THE SAME TIME EACH DAY  RINSE MOUTH AFTER USE, Disp: 180 each, Rfl: 3    triamcinolone (KENALOG) 0.1 % ointment, Apply topically 2 (two) times 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, Disp: 454 g, Rfl: 3    Vitamins-Lipotropics (LIPOFLAVONOID PO), Take by mouth daily, Disp: , Rfl:     zolpidem (AMBIEN) 10 mg tablet, TAKE 1 TABLET BY MOUTH DAILY AT BEDTIME AS NEEDED FOR SLEEP., Disp: 30 tablet, Rfl: 0          Whom besides the patient is providing clinical information about today's encounter?   NO ADDITIONAL HISTORIAN (patient alone provided history)    Physical Exam and Assessment/Plan by Diagnosis:    HISTORY OF BASAL CELL CARCINOMA     Physical  Exam:  Anatomic Location Affected:  Right Arm - 2015  Morphological Description of scar:  well healed  Suspected Recurrence: No  Pertinent Positives:  Pertinent Negatives:     Additional History of Present Condition:  History of basal cell carcinoma with no sign of recurrence     Assessment and Plan:  Based on a thorough discussion of this condition and the management approach to it (including a comprehensive discussion of the known risks, side effects and potential benefits of treatment), the patient (family) agrees to implement the following specific plan:  Monitor for change  When outside we recommend using a wide brim hat, sunglasses, long sleeve and pants, sunscreen with SPF 30+ with reapplication every 2 hours, or SPF specific clothing    Continue yearly skin checks      HISTORY OF SQUAMOUS CELL CARCINOMA      Physical Exam:  Anatomic Location Affected:  Left Wrist - 04/2023  Left Forearm - 08/2021  Left Hand - 2020  Left Arm - 2018  Right Ear - 2016  Right Hand - 2015  Left Upper Arm - 2015  Morphological Description of Scar:  well healed  Suspected Recurrence: no  Regional adenopathy: no     Additional History of Present Condition:  History of multiple Squamous Cell Carcinoma, treated     Assessment and Plan:  Based on a thorough discussion of this condition and the management approach to it (including a comprehensive discussion of the known risks, side effects and potential benefits of treatment), the patient (family) agrees to implement the following specific plan:  Monitor for change  When outside we recommend using a wide brim hat, sunglasses, long sleeve and pants, sunscreen with SPF 30+ with reapplication every 2 hours, or SPF specific clothing    Continue yearly skin checks     ACTINIC KERATOSIS WITH CRYOSURGERY PROCEDURE    ACTINIC KERATOSIS    Physical Exam:  Anatomic Location Affected:  scalp, left ear   Morphological Description:  pink scaly patches     Additional History of Present Condition:   present on exam     Physical Exam  HENT:      Head:             Assessment and Plan:  Based on a thorough discussion of this condition and the management approach to it (including a comprehensive discussion of the known risks, side effects and potential benefits of treatment), the patient (family) agrees to implement the following specific plan:    Treated with cryotherapy in office today   Consent form signed by patient in office   Monitor for any changes     Actinic keratoses are very common on sites repeatedly exposed to the sun, especially the backs of the hands and the face.  They are considered precancers and have a low risk of turning into squamous cell carcinoma. It is rare for a solitary actinic keratosis to evolve into a squamous cell carcinoma (SCC), but the risk is 10-15% when more than 10 actinic keratoses are present. A tender, thickened, ulcerated or enlarging actinic keratosis is suspicious of SCC.    Actinic keratoses may be prevented by strict sun protection. If already present, keratoses may improve with a very high sun protection factor (50+) broad-spectrum sunscreen applied at least daily to affected areas, year-round.  We recommend that sun protective clothing and hats and sunglasses be worn whenever possible.  Note that you can make you own UPF 30 rate clothing using just your own washing machine with a product called sun guard    There are several different options for treating actinic keratoses    Topical “medications such as 5-fluorouracil or Aldara  - good for field treatment ie treats what's seen and not seen    Cryotherapy - good for single spots but treats “only what we see” versus a field treatment    Photodynamic therapy - involves application of a light sensitizing medicine and then exposure to a special light, also a good field treatment        PROCEDURE:  DESTRUCTION OF PRE-MALIGNANT LESIONS  After a thorough discussion of treatment options and risk/benefits/alternatives (including  but not limited to local pain, scarring, dyspigmentation, blistering, and possible superinfection), verbal and written consent were obtained and the aforementioned lesions were treated on with cryotherapy using liquid nitrogen x 1 cycle for 5-10 seconds.    TOTAL NUMBER of 4 pre-malignant lesions were treated today on the ANATOMIC LOCATION: scalp, left ear .     The patient tolerated the procedure well, and after-care instructions were provided.      MYCOSIS FUNGOIDES     Physical Exam:  Anatomic Location Affected:  trunk and extremities  Morphological Description:  scaly erythematous patches 50% BSA   Pertinent Positives:  Pertinent Negatives:no hepatosplenomegaly or lymphadenopathy     Additional History of Present Condition:  Currently using Bexarotene 75mg daily     Assessment and Plan:  Based on a thorough discussion of this condition and the management approach to it (including a comprehensive discussion of the known risks, side effects and potential benefits of treatment), the patient (family) agrees to implement the following specific plan:  Continue same regimen   Please complete lab work   Refill for Bexarotene placed   Mycosis fungoides  Mycosis fungoides is a condition in which the skin is infiltrated by patches or lumps composed of white cells called lymphocytes. It is more common in men than women and is very rare in children. Its cause is unknown but in some patients, it is associated with a pre-existing contact allergic dermatitis or infection with a retrovirus.     Mycosis fungoides has an indolent (low-grade) clinical course, which means that it may persist in one stage or over years or sometimes decades, slowly progress to another stage (from patches to thicker plaques and eventually to tumors).     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  Patch stage  In patch stage mycosis fungoides, the skin lesions are flat.  "Most often there are oval or ring-shaped (annular) pink dry patches on covered skin. They may spontaneously disappear, remain the same size, or slowly enlarge. The skin may be atrophic (thinned), and may or may not itch. The patch stage of mycosis fungoides can be difficult to distinguish from psoriasis, discoid eczema or parapsoriasis.  Plaque stage  In plaque stage mycosis fungoides, the patches become thickened and may resemble psoriasis. They are usually itchy.  Tumor stage  In tumor stage mycosis fungoides, large irregular lumps develop from plaques, or de elliot. They may ulcerate. At this stage, spread to other organs is more likely than in earlier stages. The tumor type may transform into a large cell lymphoma.     MF variants and subtypes  There are a number of variants and subtypes of mycosis fungoides. Most follow the same clinical and pathological features as MF but some have distinctive features as described below.     Folliculotropic MF (follicular cell lymphoma)  The localized form of cutaneous T-cell lymphoma in which there is a slowly enlarging solitary patch, plaque or a tumor in which biopsy shows characteristic lymphomatous change around hair follicles.  Most commonly found in the head and neck area.  Skin lesions are often associated with alopecia, and sometimes with mucinorrhea (see alopecia mucinosa).   Pagetoid Reticulosis  Localized patches or plaques with an intraepidermal growth of neoplastic T cells  Presents as a solitary psoriasis-like or hyperkeratotic patch or plaque, usually on the extremities  Granulomatous slack skin:  Extremely rare subtype characterized by the slow development of folds of lax skin in the major skin folds  Skin folds show a granulomatous infiltrate with clonal T cells  Occurs most commonly in the groin and underarm regions  Mycosis fungoides palmaris et plantaris  Confined to palms and soles    MELANOCYTIC NEVI (\"Moles\")    Physical Exam:  Anatomic Location " "Affected: Mostly on sun-exposed areas of the trunk and extremities   Morphological Description:  Scattered, 1-4mm round to ovoid, symmetrical-appearing, even bordered, skin colored to dark brown macules/papules, mostly in sun-exposed areas  Pertinent Positives:  Pertinent Negatives:    Additional History of Present Condition:  present on exam     Assessment and Plan:  Based on a thorough discussion of this condition and the management approach to it (including a comprehensive discussion of the known risks, side effects and potential benefits of treatment), the patient (family) agrees to implement the following specific plan:  Provided handout with information regarding the ABCDE's of moles   Recommend routine skin exams every year   Sun avoidance, protective clothing (known as UPF clothing), and the use of at least SPF 30 sunscreens is advised. Sunscreen should be reapplied every two hours when outside.       SEBORRHEIC KERATOSIS; NON-INFLAMED    Physical Exam:  Anatomic Location Affected:  scattered across trunk, extremities,  face  Morphological Description:  Flat and raised, waxy, smooth to warty textured, yellow to brownish-grey to dark brown to blackish, discrete, \"stuck-on\" appearing papules.  Pertinent Positives:  Pertinent Negatives:    Additional History of Present Condition:  Patient reports new bumps on the skin.  Denies itch, burn, pain, bleeding or ulceration.  Present constantly; nothing seems to make it worse or better.  No prior treatment.      Assessment and Plan:  Based on a thorough discussion of this condition and the management approach to it (including a comprehensive discussion of the known risks, side effects and potential benefits of treatment), the patient (family) agrees to implement the following specific plan:  Reassured benign        ANGIOMA (\"CHERRY ANGIOMA\")    Physical Exam:  Anatomic Location: scattered across sun exposed areas of the trunk and extremities   Morphologic Description: " Firm red to reddish-blue discrete papules  Pertinent Positives:  Pertinent Negatives:    Additional History of Present Condition:  Present on exam.     Assessment and Plan:  Reassured benign           Scribe Attestation      I,:  Jane Wells MA am acting as a scribe while in the presence of the attending physician.:       I,:  Harrison Rivers MD personally performed the services described in this documentation    as scribed in my presence.:

## 2024-11-05 NOTE — PROGRESS NOTES
"78-year-old male with possible bladder wall thickening, bladder stone    AC: [id number]    Reported history of negative prostate biopsy in the past    Seen in the ED in July 2024 noting gross hematuria and passing some sediments in urine.  Required IV antibiotics for urine culture growing Enterococcus at that time.    CT abdomen pelvis with contrast 8/12/2024: Persistent diffuse bladder wall thickening and urothelial hyperemia; innumerable dependent small stones within bladder lumen; hyperemia in the distal right ureteral urothelium with thickening at the right UVJ    PSA 5.721 on 8/1/2024    Random bladder scan 8 cc on 11/6/2024           Cystoscopy     Date/Time  11/6/2024 2:00 PM     Performed by  Isauro Russ DO   Authorized by  Isauro Russ DO     Universal Protocol:  procedure performed by consultantConsent: Verbal consent obtained. Written consent obtained.  Risks and benefits: risks, benefits and alternatives were discussed  Consent given by: patient  Time out: Immediately prior to procedure a \"time out\" was called to verify the correct patient, procedure, equipment, support staff and site/side marked as required.  Timeout called at: 11/6/2024 2:56 PM.  Patient understanding: patient states understanding of the procedure being performed  Patient consent: the patient's understanding of the procedure matches consent given  Procedure consent: procedure consent matches procedure scheduled  Relevant documents: relevant documents present and verified  Required items: required blood products, implants, devices, and special equipment available  Patient identity confirmed: verbally with patient      Procedure Details:  Procedure type: cystoscopy    Patient tolerance: Patient tolerated the procedure well with no immediate complications    Additional Procedure Details: Office Cystoscopy Procedure Note    Indication:    Hematuria    Informed consent   The risks, benefits, complications, treatment options, " and expected outcomes were discussed with the patient. The patient concurred with the proposed plan and provided informed consent.    Anesthesia  Lidocaine jelly 2%    Antibiotic prophylaxis   None    Procedure  The patient was placed in the supineposition, was prepped and draped in the usual manner using sterile technique, and 2% lidocaine jelly instilled into the urethra.  A 17 F flexible cystoscope was then inserted into the urethra and the urethra and bladder carefully examined.  The following findings were noted:    Findings:  Urethra:  Normal  Prostate:  mod lateral lobe hypertrophy, mild median lobe, no lesions  Bladder:  Severe trabeculations, cloudy urine made visualization difficult, mult erythematous spots in posterior bladder, about 3 separate 0.5 cm areas  Ureteral orifices:  orthotopic  Other findings:  None, retroflexed view confirms    Specimens: ucx, u cyto                 Complications:    None; patient tolerated the procedure well           Disposition: To home            Condition: Stable                    Patient was consented in office today for transurethral resection of bladder tumor, possible intravesical instillation of chemotherapy agent.    I explained that the procedure would be performed under general anesthesia in the operating room. I explained that during procedure, we would place scope into bladder. We would then look around bladder for any tumors or any areas of bladder that appeared suspicious for bladder cancer.     If the lesions or concerning areas were flat or generally small, we would remove tissue using biopsy forceps. This tissue would be sent for pathology. We would then coagulate the area with hot current device known as Bugbee to stop any bleeding. We would additionally use Bugbee to treat surrounding areas that appeared suspicious for cancer. I explained that this process could help kill superficial cancer cells and prevent recurrence.    If the lesions were larger,  they would resected with hot current loop that could more formally cut into bladder tissue for a more thorough resection of the lesion. I explained that these pieces of tumor would then be collected through the scope and sent for pathology as well. I explained that the loop could also be used to coagulate adjacent areas of the bladder that appeared suspicious for bladder cancer.    We reviewed the basic risks of procedure which include but are not limited to: risks of general anesthesia, bleeding, infection, need for repeat procedures, need for hospitalization, worsening voiding symptoms, need for prolonged Adamson catheter, bladder injury, urethral injury, injury to ureteral orifices, need for ureteral stent placement.    I explained that depending of the appearance of the tumor that I remove during surgery, I may opt to instill intravesical chemotherapy into the bladder. I explained that this means that at the end of the procedure while the patient is still asleep, I would place Adamson catheter into the bladder. I would then inject chemotherapy agent through the catheter and into the bladder. I would then clamp the catheter so that the medicine could sit inside. Patient would then be transferred to PACU with the chemotherapy agent inside the bladder. After an hour, the catheter would be unclamped and Adamson catheter would be removed. Patient would then have a trial of void prior to leaving hospital.    I explained that treatment with these chemotherapy agents in the bladder after surgery for low or intermediate risk bladder cancer has been shown to decrease rates of recurrence for bladder cancer.    I explained that if the resection for surgery was too deep into the bladder wall, we would not be able to give the intravesical chemotherapy.    I explained that the chemotherapy agents that would be placed in the bladder would be either gemcitabine or mitomycin. We reviewed the risks of these intravesical medications  which include but are not limited to: nausea, vomiting, flu-like symptoms, uncomfortable voiding symptoms, dermatitis, swelling, hematuria, breathing difficulties, bladder wall necrosis, damage to nearby structures.    We will plan to schedule patient for surgery. I explained to patient that we would schedule PATs for procedure.    He is reportedly due for exchange of cardiac pacemaker.  We will get cardiology clearance before surgery.    Patient verbalized understanding. All questions were answered.    I explained to patient that surgical scheduler would reach out to confirm a date.      In addition to the standard TURBT discussion, I also explained that he has some urothelial thickening possibly near his right ureteral orifice and right ureter.  I explained that at time of surgery, I would see if he still had redness in the area.  At the erythema resolved, I would not pursue ureteroscopy.  If the erythema was persistent, I would consider doing right ureteroscopy and possibly doing a right ureteral biopsy if need be.    We also talked about the possibility of doing a cystolitholapaxy in case there were bladder stones.  I explained that it was difficult to fully appreciate the bladder on cystoscopy today given the cloudy urine.              PLAN  -Fu u cyto  -Macrobid x7 days sent for UTI. Fu Ucx. Adjust abx if need be.  -Consented for cystoscopy, TURBT, bladder biopsy, possible cystolitholapaxy, possible right retrograde pyelogram, possible right ureteroscopy, possible ureteral lesion biopsy, possible right ureteral stent placement  -PATs  -Ucx  -Cards clearance  -Does not need to hold ASA81

## 2024-11-06 ENCOUNTER — PROCEDURE VISIT (OUTPATIENT)
Dept: UROLOGY | Facility: CLINIC | Age: 79
End: 2024-11-06
Payer: COMMERCIAL

## 2024-11-06 VITALS
TEMPERATURE: 98.3 F | SYSTOLIC BLOOD PRESSURE: 122 MMHG | BODY MASS INDEX: 29.4 KG/M2 | WEIGHT: 210 LBS | HEART RATE: 84 BPM | HEIGHT: 71 IN | DIASTOLIC BLOOD PRESSURE: 74 MMHG | OXYGEN SATURATION: 96 %

## 2024-11-06 DIAGNOSIS — N32.9 LESION OF BLADDER: ICD-10-CM

## 2024-11-06 DIAGNOSIS — R39.9 UTI SYMPTOMS: Primary | ICD-10-CM

## 2024-11-06 LAB — POST-VOID RESIDUAL VOLUME, ML POC: 8 ML

## 2024-11-06 PROCEDURE — 87086 URINE CULTURE/COLONY COUNT: CPT | Performed by: UROLOGY

## 2024-11-06 PROCEDURE — 87147 CULTURE TYPE IMMUNOLOGIC: CPT | Performed by: UROLOGY

## 2024-11-06 PROCEDURE — 87186 SC STD MICRODIL/AGAR DIL: CPT | Performed by: UROLOGY

## 2024-11-06 PROCEDURE — 51798 US URINE CAPACITY MEASURE: CPT | Performed by: UROLOGY

## 2024-11-06 PROCEDURE — 87077 CULTURE AEROBIC IDENTIFY: CPT | Performed by: UROLOGY

## 2024-11-06 PROCEDURE — 88112 CYTOPATH CELL ENHANCE TECH: CPT | Performed by: STUDENT IN AN ORGANIZED HEALTH CARE EDUCATION/TRAINING PROGRAM

## 2024-11-06 PROCEDURE — 52000 CYSTOURETHROSCOPY: CPT | Performed by: UROLOGY

## 2024-11-06 RX ORDER — SODIUM CHLORIDE, SODIUM LACTATE, POTASSIUM CHLORIDE, CALCIUM CHLORIDE 600; 310; 30; 20 MG/100ML; MG/100ML; MG/100ML; MG/100ML
75 INJECTION, SOLUTION INTRAVENOUS CONTINUOUS
OUTPATIENT
Start: 2024-11-06

## 2024-11-06 RX ORDER — NITROFURANTOIN 25; 75 MG/1; MG/1
100 CAPSULE ORAL 2 TIMES DAILY
Qty: 14 CAPSULE | Refills: 0 | Status: SHIPPED | OUTPATIENT
Start: 2024-11-06 | End: 2024-11-13

## 2024-11-06 NOTE — Clinical Note
Also forgot to mention in case request needs cards clearance, make sure OR has pirahna or ureteral biopsy forceps

## 2024-11-06 NOTE — PATIENT INSTRUCTIONS
You had cystoscopy done in the office today. This means that we looked inside your urethra and bladder with a camera.    You may see some blood in your urine for the next few days. This is normal. Please drink plenty of fluids. Call the office if you are passing large blood clots in your urine or if you are not able to urinate.    It may burn when you urinate for the next few days. This is normal.    Please call the office if you have fevers or chills in the next few days.    An antibiotic called Macrobid was sent to your pharmacy. Please pick this up and take medication as prescribed.    You will be getting a call from our surgical scheduler. She will help you set up a date for your upcoming procedure.

## 2024-11-07 ENCOUNTER — TELEPHONE (OUTPATIENT)
Dept: UROLOGY | Facility: CLINIC | Age: 79
End: 2024-11-07

## 2024-11-07 NOTE — TELEPHONE ENCOUNTER
Isauro Russ, DO Maryellen Morales MA  Also forgot to mention in case request needs cards clearance, make sure OR has pirahna or ureteral biopsy forceps

## 2024-11-08 ENCOUNTER — PREP FOR PROCEDURE (OUTPATIENT)
Dept: UROLOGY | Facility: CLINIC | Age: 79
End: 2024-11-08

## 2024-11-08 PROCEDURE — 88112 CYTOPATH CELL ENHANCE TECH: CPT | Performed by: STUDENT IN AN ORGANIZED HEALTH CARE EDUCATION/TRAINING PROGRAM

## 2024-11-08 NOTE — TELEPHONE ENCOUNTER
Spoke with patient and confirmed surgery date of: 01/27/25  Type of surgery:TRANSURETHRAL RESECTION OF BLADDER TUMOR (TURBT), possible right retrograde pyelogram, possible right ureteroscopy, possible ureteral lesion biopsy, possible cystolitholapaxy   Operating physician: Dr. Russ  Location of surgery: [facility]     Verbally went over prep with patient on: 11/08/24  NPO  Bowel prep? No  Hospital calls afternoon prior with arrival time -Calls Friday afternoon for Monday surgeries  Patient needs ride to and from surgery (outpatient/inpatient)   Pre-op testing to be done 2 weeks prior to surgery  Cbc bmp u/c  Blood thinners:   Pat will call a week prior   Clearances needed: cardiac  Apt with [facility] 11/22/24    Mailed/emailed to patient on:  Copy of packet scanned into Media on:  Labs in packet  Soap / Bowel prep in packet  Pre-op & Post-op in packet  Dates of H&P and post-op if needed    Consent: in Media

## 2024-11-08 NOTE — TELEPHONE ENCOUNTER
Patient will need a cardiac clearance appointment prior to surgical intervention scheduled on 01/27/25 with .  Can you please assist in scheduling.    All the doctor will have to do is document that patient is medically cleared for procedure

## 2024-11-09 LAB — BACTERIA UR CULT: ABNORMAL

## 2024-11-18 ENCOUNTER — NURSE TRIAGE (OUTPATIENT)
Age: 79
End: 2024-11-18

## 2024-11-18 NOTE — TELEPHONE ENCOUNTER
Ongoing recurrent UTI. Patient said he just finished Macrobid a week ago and symptoms never went away.      The last urine culture was done 11/6/24.  Urine Culture >100,000 cfu/ml Enterococcus faecalis Abnormal           The same bacteria has been seen in the last 5 urine cultures.     Patient has painful urination, burning with urination, urine frequency, urine urgency.     Denies fevers, hematuria    Please advise what patient needs to do next. Patient is very uncomfortable at this time.     Would you like more urine testing? Cystoscopy done in office 11/6/24    CB#[phone]          Regarding: painful urination/recurring uti  ----- Message from OMAR RODRIGUEZ sent at 11/18/2024  1:29 PM EST -----  Patient states he has been suffering with very painful urination for 2 to 3 days he is request and antibiotic. Please call patient at [phone]

## 2024-11-19 DIAGNOSIS — R39.9 UTI SYMPTOMS: Primary | ICD-10-CM

## 2024-11-19 RX ORDER — LEVOFLOXACIN 500 MG/1
500 TABLET, FILM COATED ORAL EVERY 24 HOURS
Qty: 7 TABLET | Refills: 0 | Status: SHIPPED | OUTPATIENT
Start: 2024-11-19 | End: 2024-11-26

## 2024-11-19 NOTE — TELEPHONE ENCOUNTER
Rx for Levaquin sent to pharmacy. If ongoing UTI symptoms despite this will need repeat urine testing

## 2024-11-20 ENCOUNTER — PREP FOR PROCEDURE (OUTPATIENT)
Dept: UROLOGY | Facility: CLINIC | Age: 79
End: 2024-11-20

## 2024-11-20 DIAGNOSIS — N40.1 BENIGN PROSTATIC HYPERPLASIA WITH LOWER URINARY TRACT SYMPTOMS, SYMPTOM DETAILS UNSPECIFIED: ICD-10-CM

## 2024-11-20 DIAGNOSIS — R39.89 SUSPECTED UTI: ICD-10-CM

## 2024-11-20 DIAGNOSIS — G47.00 INSOMNIA, UNSPECIFIED TYPE: ICD-10-CM

## 2024-11-20 DIAGNOSIS — Z01.818 OTHER SPECIFIED PRE-OPERATIVE EXAMINATION: ICD-10-CM

## 2024-11-20 DIAGNOSIS — Z01.812 PRE-OPERATIVE LABORATORY EXAMINATION: Primary | ICD-10-CM

## 2024-11-20 RX ORDER — ZOLPIDEM TARTRATE 10 MG/1
10 TABLET ORAL
Qty: 30 TABLET | Refills: 0 | Status: SHIPPED | OUTPATIENT
Start: 2024-11-20

## 2024-11-22 ENCOUNTER — TELEPHONE (OUTPATIENT)
Age: 79
End: 2024-11-22

## 2024-11-22 NOTE — TELEPHONE ENCOUNTER
Caller: Sherly Peña    Doctor: Dr. Lozada    Call back #: [phone]    Reason for call: Patient stated that he was expecting a call back from EP regarding a battery replacement for his defibrillator. Patient would like a call back from Thomas Lu to discuss directions for this appointment and when this needs to be replaced. Please advise. Thank you.

## 2024-11-25 ENCOUNTER — PREP FOR PROCEDURE (OUTPATIENT)
Dept: CARDIOLOGY CLINIC | Facility: CLINIC | Age: 79
End: 2024-11-25

## 2024-11-25 DIAGNOSIS — I47.29 NSVT (NONSUSTAINED VENTRICULAR TACHYCARDIA) (HCC): Primary | ICD-10-CM

## 2024-11-25 NOTE — TELEPHONE ENCOUNTER
S/w pt. Advised of ICD gen change scheduled for 12/11. Pt advised to hold spironolactone morning of procedure. Pt advised to obtain labs ordered ASAP. Message sent to pt via [employer] with instructions provided over the phone. Pt verbalized understanding.     Tam from [employer] notified of gen change

## 2024-11-26 ENCOUNTER — APPOINTMENT (OUTPATIENT)
Age: 79
End: 2024-11-26
Payer: COMMERCIAL

## 2024-11-26 ENCOUNTER — TELEPHONE (OUTPATIENT)
Age: 79
End: 2024-11-26

## 2024-11-26 DIAGNOSIS — I25.10 CORONARY ARTERY DISEASE INVOLVING NATIVE HEART WITHOUT ANGINA PECTORIS, UNSPECIFIED VESSEL OR LESION TYPE: ICD-10-CM

## 2024-11-26 DIAGNOSIS — Z01.812 PRE-OPERATIVE LABORATORY EXAMINATION: ICD-10-CM

## 2024-11-26 DIAGNOSIS — Z01.812 PRE-OPERATIVE LABORATORY EXAMINATION: Primary | ICD-10-CM

## 2024-11-26 LAB
ANION GAP SERPL CALCULATED.3IONS-SCNC: 9 MMOL/L (ref 4–13)
BASOPHILS # BLD AUTO: 0.1 THOUSANDS/ÂΜL (ref 0–0.1)
BASOPHILS NFR BLD AUTO: 1 % (ref 0–1)
BUN SERPL-MCNC: 22 MG/DL (ref 5–25)
CALCIUM SERPL-MCNC: 8.8 MG/DL (ref 8.4–10.2)
CHLORIDE SERPL-SCNC: 105 MMOL/L (ref 96–108)
CO2 SERPL-SCNC: 24 MMOL/L (ref 21–32)
CREAT SERPL-MCNC: 1.14 MG/DL (ref 0.6–1.3)
EOSINOPHIL # BLD AUTO: 0.18 THOUSAND/ÂΜL (ref 0–0.61)
EOSINOPHIL NFR BLD AUTO: 2 % (ref 0–6)
ERYTHROCYTE [DISTWIDTH] IN BLOOD BY AUTOMATED COUNT: 14.3 % (ref 11.6–15.1)
GFR SERPL CREATININE-BSD FRML MDRD: 61 ML/MIN/1.73SQ M
GLUCOSE P FAST SERPL-MCNC: 100 MG/DL (ref 65–99)
HCT VFR BLD AUTO: 37.5 % (ref 36.5–49.3)
HGB BLD-MCNC: 11.4 G/DL (ref 12–17)
IMM GRANULOCYTES # BLD AUTO: 0.04 THOUSAND/UL (ref 0–0.2)
IMM GRANULOCYTES NFR BLD AUTO: 0 % (ref 0–2)
INR PPP: 1.1 (ref 0.85–1.19)
LYMPHOCYTES # BLD AUTO: 2.56 THOUSANDS/ÂΜL (ref 0.6–4.47)
LYMPHOCYTES NFR BLD AUTO: 27 % (ref 14–44)
MCH RBC QN AUTO: 30 PG (ref 26.8–34.3)
MCHC RBC AUTO-ENTMCNC: 30.4 G/DL (ref 31.4–37.4)
MCV RBC AUTO: 99 FL (ref 82–98)
MONOCYTES # BLD AUTO: 0.44 THOUSAND/ÂΜL (ref 0.17–1.22)
MONOCYTES NFR BLD AUTO: 5 % (ref 4–12)
NEUTROPHILS # BLD AUTO: 6.03 THOUSANDS/ÂΜL (ref 1.85–7.62)
NEUTS SEG NFR BLD AUTO: 65 % (ref 43–75)
NRBC BLD AUTO-RTO: 0 /100 WBCS
PLATELET # BLD AUTO: 305 THOUSANDS/UL (ref 149–390)
PMV BLD AUTO: 10.7 FL (ref 8.9–12.7)
POTASSIUM SERPL-SCNC: 4.1 MMOL/L (ref 3.5–5.3)
PROTHROMBIN TIME: 14.5 SECONDS (ref 12.3–15)
RBC # BLD AUTO: 3.8 MILLION/UL (ref 3.88–5.62)
SODIUM SERPL-SCNC: 138 MMOL/L (ref 135–147)
WBC # BLD AUTO: 9.35 THOUSAND/UL (ref 4.31–10.16)

## 2024-11-26 PROCEDURE — 80048 BASIC METABOLIC PNL TOTAL CA: CPT

## 2024-11-26 PROCEDURE — 85610 PROTHROMBIN TIME: CPT

## 2024-11-26 PROCEDURE — 85025 COMPLETE CBC W/AUTO DIFF WBC: CPT

## 2024-11-26 PROCEDURE — 36415 COLL VENOUS BLD VENIPUNCTURE: CPT

## 2024-11-26 NOTE — TELEPHONE ENCOUNTER
Alvina, I did place orders for pre-op labs per the note from 11/22/24. This is my first time placing orders for labs. Can you please check them to verify they were entered correctly?   Thank you!

## 2024-11-26 NOTE — TELEPHONE ENCOUNTER
Contacted patient to let him know that lab orders have been entered for his procedure. Patient verbalized understanding.       Patient called inquiring about lab orders to be done asap prior to AICD generator change. Review of chart, no active orders seen. As per 11/22/24, labs needed BMP, CBC, INR. Orders placed.

## 2024-12-08 DIAGNOSIS — E78.5 HYPERLIPIDEMIA, UNSPECIFIED HYPERLIPIDEMIA TYPE: ICD-10-CM

## 2024-12-09 RX ORDER — ATORVASTATIN CALCIUM 40 MG/1
40 TABLET, FILM COATED ORAL DAILY
Qty: 90 TABLET | Refills: 1 | Status: SHIPPED | OUTPATIENT
Start: 2024-12-09

## 2024-12-10 ENCOUNTER — ANESTHESIA EVENT (OUTPATIENT)
Dept: NON INVASIVE DIAGNOSTICS | Facility: HOSPITAL | Age: 79
End: 2024-12-10
Payer: COMMERCIAL

## 2024-12-11 ENCOUNTER — ANESTHESIA (OUTPATIENT)
Dept: NON INVASIVE DIAGNOSTICS | Facility: HOSPITAL | Age: 79
End: 2024-12-11
Payer: COMMERCIAL

## 2024-12-11 ENCOUNTER — HOSPITAL ENCOUNTER (OUTPATIENT)
Facility: HOSPITAL | Age: 79
Setting detail: OUTPATIENT SURGERY
Discharge: HOME | End: 2024-12-11
Attending: INTERNAL MEDICINE | Admitting: INTERNAL MEDICINE
Payer: COMMERCIAL

## 2024-12-11 VITALS
DIASTOLIC BLOOD PRESSURE: 55 MMHG | SYSTOLIC BLOOD PRESSURE: 111 MMHG | TEMPERATURE: 97.8 F | OXYGEN SATURATION: 97 % | HEART RATE: 75 BPM | WEIGHT: 212.3 LBS | HEIGHT: 71 IN | RESPIRATION RATE: 34 BRPM | BODY MASS INDEX: 29.72 KG/M2

## 2024-12-11 DIAGNOSIS — I47.29 NSVT (NONSUSTAINED VENTRICULAR TACHYCARDIA) (HCC): ICD-10-CM

## 2024-12-11 DIAGNOSIS — Z95.810 CARDIAC RESYNCHRONIZATION THERAPY DEFIBRILLATOR (CRT-D) IN PLACE: Primary | ICD-10-CM

## 2024-12-11 PROBLEM — F17.200 SMOKING: Status: ACTIVE | Noted: 2024-12-11

## 2024-12-11 PROBLEM — Z99.81 DEPENDENCE ON NOCTURNAL OXYGEN THERAPY: Status: ACTIVE | Noted: 2024-12-11

## 2024-12-11 PROBLEM — IMO0001 SMOKING: Status: ACTIVE | Noted: 2024-12-11

## 2024-12-11 PROCEDURE — 33264 RMVL & RPLCMT DFB GEN MLT LD: CPT | Performed by: INTERNAL MEDICINE

## 2024-12-11 PROCEDURE — C1882 AICD, OTHER THAN SING/DUAL: HCPCS | Performed by: INTERNAL MEDICINE

## 2024-12-11 PROCEDURE — NC001 PR NO CHARGE

## 2024-12-11 DEVICE — CRTD DTPA2D1 COBALT XT HF MRI DF1 USA
Type: IMPLANTABLE DEVICE | Site: CHEST | Status: FUNCTIONAL
Brand: COBALT™ XT HF CRT-D MRI SURESCAN™

## 2024-12-11 DEVICE — ENVELOPE CMRM6133 ABSORB LRG MR
Type: IMPLANTABLE DEVICE | Site: CHEST | Status: FUNCTIONAL
Brand: TYRX™

## 2024-12-11 RX ORDER — PHENYLEPHRINE HCL IN 0.9% NACL 1 MG/10 ML
SYRINGE (ML) INTRAVENOUS AS NEEDED
Status: DISCONTINUED | OUTPATIENT
Start: 2024-12-11 | End: 2024-12-11

## 2024-12-11 RX ORDER — MIDAZOLAM HYDROCHLORIDE 2 MG/2ML
INJECTION, SOLUTION INTRAMUSCULAR; INTRAVENOUS AS NEEDED
Status: DISCONTINUED | OUTPATIENT
Start: 2024-12-11 | End: 2024-12-11

## 2024-12-11 RX ORDER — CHLORHEXIDINE GLUCONATE ORAL RINSE 1.2 MG/ML
15 SOLUTION DENTAL ONCE
Status: COMPLETED | OUTPATIENT
Start: 2024-12-11 | End: 2024-12-11

## 2024-12-11 RX ORDER — FENTANYL CITRATE 50 UG/ML
INJECTION, SOLUTION INTRAMUSCULAR; INTRAVENOUS AS NEEDED
Status: DISCONTINUED | OUTPATIENT
Start: 2024-12-11 | End: 2024-12-11

## 2024-12-11 RX ORDER — PROPOFOL 10 MG/ML
INJECTION, EMULSION INTRAVENOUS CONTINUOUS PRN
Status: DISCONTINUED | OUTPATIENT
Start: 2024-12-11 | End: 2024-12-11

## 2024-12-11 RX ORDER — EPHEDRINE SULFATE 50 MG/ML
INJECTION INTRAVENOUS AS NEEDED
Status: DISCONTINUED | OUTPATIENT
Start: 2024-12-11 | End: 2024-12-11

## 2024-12-11 RX ORDER — LIDOCAINE WITH 8.4% SOD BICARB 0.9%(10ML)
SYRINGE (ML) INJECTION CODE/TRAUMA/SEDATION MEDICATION
Status: DISCONTINUED | OUTPATIENT
Start: 2024-12-11 | End: 2024-12-11 | Stop reason: HOSPADM

## 2024-12-11 RX ORDER — CEFAZOLIN SODIUM 2 G/50ML
2000 SOLUTION INTRAVENOUS ONCE
Status: COMPLETED | OUTPATIENT
Start: 2024-12-11 | End: 2024-12-11

## 2024-12-11 RX ADMIN — FENTANYL CITRATE 25 MCG: 50 INJECTION, SOLUTION INTRAMUSCULAR; INTRAVENOUS at 10:55

## 2024-12-11 RX ADMIN — Medication 200 MCG: at 11:33

## 2024-12-11 RX ADMIN — PROPOFOL 20 MCG/KG/MIN: 10 INJECTION, EMULSION INTRAVENOUS at 10:46

## 2024-12-11 RX ADMIN — FENTANYL CITRATE 25 MCG: 50 INJECTION, SOLUTION INTRAMUSCULAR; INTRAVENOUS at 11:24

## 2024-12-11 RX ADMIN — FENTANYL CITRATE 50 MCG: 50 INJECTION, SOLUTION INTRAMUSCULAR; INTRAVENOUS at 10:44

## 2024-12-11 RX ADMIN — Medication 100 MCG: at 11:15

## 2024-12-11 RX ADMIN — EPHEDRINE SULFATE 5 MG: 50 INJECTION, SOLUTION INTRAVENOUS at 10:50

## 2024-12-11 RX ADMIN — CEFAZOLIN SODIUM 2000 MG: 2 SOLUTION INTRAVENOUS at 10:45

## 2024-12-11 RX ADMIN — EPHEDRINE SULFATE 15 MG: 50 INJECTION, SOLUTION INTRAVENOUS at 11:35

## 2024-12-11 RX ADMIN — CHLORHEXIDINE GLUCONATE 0.12% ORAL RINSE 15 ML: 1.2 LIQUID ORAL at 09:43

## 2024-12-11 RX ADMIN — DEXMEDETOMIDINE HYDROCHLORIDE 10 MCG: 100 INJECTION, SOLUTION, CONCENTRATE INTRAVENOUS at 12:01

## 2024-12-11 RX ADMIN — DEXMEDETOMIDINE HYDROCHLORIDE 10 MCG: 100 INJECTION, SOLUTION, CONCENTRATE INTRAVENOUS at 10:46

## 2024-12-11 RX ADMIN — EPHEDRINE SULFATE 10 MG: 50 INJECTION, SOLUTION INTRAVENOUS at 11:00

## 2024-12-11 RX ADMIN — MIDAZOLAM HYDROCHLORIDE 2 MG: 1 INJECTION, SOLUTION INTRAMUSCULAR; INTRAVENOUS at 10:44

## 2024-12-11 NOTE — NURSING NOTE
Patient alert and oriented x 4 and hemodynamically stable. Dr Villanueva and Rocael Chaves at bedside regarding patient discharge. Patient stated that his neighbor Shu Roca will be at his residence when he arrives home and will review patients AVS discharge instructions and will be available for patient Sherly Peña. Patient okay for discharge.

## 2024-12-11 NOTE — ANESTHESIA POSTPROCEDURE EVALUATION
Post-Op Assessment Note    CV Status:  Stable    Pain management: adequate       Mental Status:  Alert and awake   Hydration Status:  Euvolemic   PONV Controlled:  Controlled   Airway Patency:  Patent     Post Op Vitals Reviewed: Yes    No anethesia notable event occurred.    Staff: CRNA           Last Filed PACU Vitals:  Vitals Value Taken Time   Temp 97.7    Pulse 76    BP 97/57    Resp 16    SpO2 97        Modified Monik:  Activity: 2 (12/11/2024  9:23 AM)  Respiration: 2 (12/11/2024  9:23 AM)  Circulation: 2 (12/11/2024  9:23 AM)  Consciousness: 2 (12/11/2024  9:23 AM)  Oxygen Saturation: 2 (12/11/2024  9:23 AM)  Modified Monik Score: 10 (12/11/2024  9:23 AM)

## 2024-12-11 NOTE — H&P
History and Physical - Cardiology   Sherly Peña 79 y.o. male MRN: [id number]  Unit/Bed#: [facility] CATH LAB ROOM Encounter: [id number]  12/11/24  10:31 AM          Assessment:  RYANN s/p MDT CRT-D, now at [facility]  Chronic HFrEF  NSVT  Hypertension  Hyperlipidemia      Plan:  Discussed the indications, alternatives, risks and benefit of CRT-D GEN change.  Patient is alert and oriented x3 and wishes to proceed. All questions answered.         History of Present Illness       Principal Problem: JAMIEM s/p MDT CRT-D, now at [facility]    HPI: Sherly Peña is a 79 y.o. year old male with history of NICM s/p MDT CRT-D, chronic HFrEF, NSVT, hypertension, hyperlipidemia who presents for CRT-D GEN change.  Patient was recently evaluated by SOPHIA Lu PA-C.  Denies any additional complaints at this time.  All questions were answered.  Follows with Dr. Lozada his primary cardiologist.    Review of Systems:    Review of Systems   Constitutional:  Negative for chills and fever.   HENT:  Negative for ear pain and sore throat.    Eyes:  Negative for pain and visual disturbance.   Respiratory:  Negative for cough and shortness of breath.    Cardiovascular:  Negative for chest pain, palpitations and leg swelling.   Gastrointestinal:  Negative for abdominal pain and vomiting.   Genitourinary:  Negative for dysuria and hematuria.   Musculoskeletal:  Negative for arthralgias and back pain.   Skin:  Negative for color change and rash.   Neurological:  Negative for seizures and syncope.   All other systems reviewed and are negative.      Historical Information   Past Medical History:   Diagnosis Date    Agent orange exposure     Anemia     Benign colon polyp     BPH (benign prostatic hyperplasia)     Bradycardia     Cardiomyopathy (HCC)     Chest discomfort     CHF (congestive heart failure) (HCC)     Chronic bronchitis (HCC)     Chronic kidney disease     COPD (chronic obstructive pulmonary disease) (HCC)     LAST ASSESSED: 9/9/17    Coronary  "artery disease cardio myopthia    Emphysema of lung (HCC) 2013    H/O nonmelanoma skin cancer     LAST ASSESSED: 11/7/17    HHD (hypertensive heart disease)     HTN (hypertension)     Hx of lymphoma     Hyperlipidemia     ICD (implantable cardioverter-defibrillator) in place     Insomnia     Mycosis fungoides (HCC)     PVC (premature ventricular contraction)     PVT (paroxysmal ventricular tachycardia) (HCC)     SOB (shortness of breath)      Past Surgical History:   Procedure Laterality Date    CARDIAC PACEMAKER PLACEMENT      SKIN SURGERY      skin cancer removal     TONSILLECTOMY       Social History     Substance and Sexual Activity   Alcohol Use Not Currently    Alcohol/week: 14.0 standard drinks of alcohol    Comment: two drinks every night over a 4 hour period     Social History     Substance and Sexual Activity   Drug Use Never     Social History     Tobacco Use   Smoking Status Every Day    Current packs/day: 1.00    Average packs/day: 1 pack/day for 63.9 years (63.9 ttl pk-yrs)    Types: Cigarettes    Start date: 1961    Passive exposure: Current   Smokeless Tobacco Never   Tobacco Comments    HALF A PACK PER DAY        Family History:   Family History   Problem Relation Age of Onset    Diabetes Mother     Colon cancer Father         DIAGNOSED IN HIS 80'S    Heart failure Father     Coronary artery disease Father     Cancer Father         Lorenzo    Diabetes Family         MELLITUS    Heart attack Neg Hx     Stroke Neg Hx     Anuerysm Neg Hx     Clotting disorder Neg Hx     Arrhythmia Neg Hx     Hypertension Neg Hx         pt unsure     Hyperlipidemia Neg Hx     Sudden death Neg Hx         scd       Meds/Allergies   all current active meds have been reviewed  No Known Allergies    Objective   Vitals: Blood pressure 139/76, pulse 85, temperature (!) 97 °F (36.1 °C), temperature source Temporal, resp. rate 18, height 5' 11\" (1.803 m), weight 96.3 kg (212 lb 4.9 oz), SpO2 99%., Body mass index is 29.61 kg/m²., "   Orthostatic Blood Pressures      Flowsheet Row Most Recent Value   Blood Pressure 139/76 filed at 2024 0932            Systolic (24hrs), Av , Min:139 , Max:139     Diastolic (24hrs), Av, Min:76, Max:76      No intake or output data in the 24 hours ending 24 1031    Invasive Devices       Peripheral Intravenous Line  Duration             Peripheral IV 24 Left Wrist <1 day                        Physical Examination  General: Awake, alert. Responding to commands  Head: Normocephalic. Atraumatic  Eyes: Nonicteric  Neck: Supple. JVP not raised. Trachea central. No thyromegaly  Lungs: Bilateral bronchovascular breath sounds with no crackles or rhonchi  Chest wall: No tenderness  Cardiovascular: RRR. S1 and S2 normal. No murmur, rub or gallop  Gastrointestinal: Abdomen soft, nontender. No guarding or rigidity. Liver and spleen not palpable. Bowel sounds present  Neurologic: Patient is awake, alert. Responding to command. Moving all extremities  Integumentary:  No skin rash  Lymphatic: No cervical lymphadenopathy  Back: Symmetric. No CVA tenderness  Extremities: No clubbing, cyanosis or edema    Lab Results:   No visits with results within 1 Day(s) from this visit.   Latest known visit with results is:   Appointment on 2024   Component Date Value    WBC 2024 9.35     RBC 2024 3.80 (L)     Hemoglobin 2024 11.4 (L)     Hematocrit 2024 37.5     MCV 2024 99 (H)     MCH 2024 30.0     MCHC 2024 30.4 (L)     RDW 2024 14.3     MPV 2024 10.7     Platelets 2024 305     nRBC 2024 0     Segmented % 2024 65     Immature Grans % 2024 0     Lymphocytes % 2024 27     Monocytes % 2024 5     Eosinophils Relative 2024 2     Basophils Relative 2024 1     Absolute Neutrophils 2024 6.03     Absolute Immature Grans 2024 0.04     Absolute Lymphocytes 2024 2.56     Absolute Monocytes 2024  "0.44     Eosinophils Absolute 11/26/2024 0.18     Basophils Absolute 11/26/2024 0.10     Sodium 11/26/2024 138     Potassium 11/26/2024 4.1     Chloride 11/26/2024 105     CO2 11/26/2024 24     ANION GAP 11/26/2024 9     BUN 11/26/2024 22     Creatinine 11/26/2024 1.14     Glucose, Fasting 11/26/2024 100 (H)     Calcium 11/26/2024 8.8     eGFR 11/26/2024 61     Protime 11/26/2024 14.5     INR 11/26/2024 1.10              CBC with diff:       Invalid input(s): \"TOTALCELLSCOUNTED\", \"SEGS%\", \"GRANS%\", \"LYMPHS%\", \"EOS%\", \"BASO%\", \"ABNEUT\", \"ABGRANS\", \"ABLYMPHS\", \"ABMOMOS\", \"ABEOS\", \"ABBASO\"      CMP:      Invalid input(s): \"ALBUMIN\"          "

## 2024-12-11 NOTE — Clinical Note
The ICD COBALT XT HF  CRT-D MRI DF1 - [id number] device was inserted. The leads were placed into the connector and visually verified to be in correct position. Injury current obtained.

## 2024-12-11 NOTE — DISCHARGE INSTRUCTIONS
1) Aquacel patient guide and adhesive remover given.  2) Remove Aquacel dressing in 48 hours and leave steri strips intact.  3) Do not scrub incisional site for 7 days.

## 2024-12-11 NOTE — ANESTHESIA PREPROCEDURE EVALUATION
Procedure:  Cardiac biv icd generator change (Chest)    Relevant Problems   ANESTHESIA (within normal limits)      CARDIO   (+) Chronic systolic congestive heart failure (HCC)   (+) HTN (hypertension)   (+) Hyperlipidemia   (+) PVC (premature ventricular contraction)      ENDO   (+) Acquired hypothyroidism      GI/HEPATIC  Confirmed NPO appropriate      /RENAL   (+) BPH (benign prostatic hyperplasia)   (+) Chronic kidney disease-mineral and bone disorder   (+) Nephrolithiasis   (+) Stage 3 chronic kidney disease (HCC)      HEMATOLOGY   (+) Mycosis fungoides (HCC)      MUSCULOSKELETAL   (+) Lumbar pain      PULMONARY  60+ pack year smoking history  Uses rescue inhaler on average 1x per week  Uses 3L nc at night   (+) COPD (chronic obstructive pulmonary disease) (HCC)   (+) Chronic bronchitis (HCC)   (+) Dependence on nocturnal oxygen therapy   (+) Shortness of breath   (+) Smoking   (-) URI (upper respiratory infection)      Cardiovascular/Peripheral Vascular   (+) NSVT (nonsustained ventricular tachycardia) ([facility])        Physical Exam    Airway  Comment: Full beard  Mallampati score: II  TM Distance: >3 FB  Neck ROM: full     Dental        Cardiovascular  Rhythm: regular, Rate: normal    Pulmonary   Decreased breath sounds    Other Findings  Unequal pupils, R>L      Anesthesia Plan  ASA Score- 4     Anesthesia Type- IV sedation with anesthesia with ASA Monitors.         Additional Monitors:     Airway Plan:     Comment: I discussed the risks and benefits of IV sedation anesthesia including the possibility of the need to convert to general anesthesia and the potential risk of awareness.  The patient was given the opportunity to ask questions, which were answered..       Plan Factors-Exercise tolerance (METS): <4 METS.    Chart reviewed. EKG reviewed.  Existing labs reviewed.     Patient is a current smoker.              Induction- intravenous.    Postoperative Plan-         Informed Consent- Anesthetic plan and  risks discussed with patient.  I personally reviewed this patient with the CRNA. Discussed and agreed on the Anesthesia Plan with the CRNA..      TTE 12/27/23:  Interpretation Summary         Left Ventricle: Left ventricular cavity size is dilated. The left ventricular ejection fraction is 30%. Systolic function is severely reduced. There is global hypokinesis with regional variation.  Inferior wall appears severely hypokinetic. Diastolic function is mildly abnormal, consistent with grade I (abnormal) relaxation.    Left Atrium: The atrium is mildly dilated.    Mitral Valve: There is mild annular calcification. There is mild to moderate regurgitation.    Tricuspid Valve: There is trace regurgitation.  ICD lead noted.      Lab Results   Component Value Date    WBC 9.35 11/26/2024    HGB 11.4 (L) 11/26/2024    HCT 37.5 11/26/2024    MCV 99 (H) 11/26/2024     11/26/2024     Lab Results   Component Value Date    SODIUM 138 11/26/2024    K 4.1 11/26/2024     11/26/2024    CO2 24 11/26/2024    BUN 22 11/26/2024    CREATININE 1.14 11/26/2024    GLUC 108 08/12/2024    CALCIUM 8.8 11/26/2024     .lastlft  Lab Results   Component Value Date    ALT 11 08/12/2024    AST 19 08/12/2024    ALKPHOS 60 08/12/2024    BILITOT 0.3 06/01/2015     Lab Results   Component Value Date    INR 1.10 11/26/2024    INR 1.06 07/22/2024    INR 1.23 (H) 04/26/2019    PROTIME 14.5 11/26/2024    PROTIME 14.5 07/22/2024    PROTIME 15.6 (H) 04/26/2019     Lab Results   Component Value Date    HGBA1C 6.2 09/01/2017

## 2024-12-11 NOTE — DISCHARGE INSTR - AVS FIRST PAGE
Keep incision dry for one week. If it appears as though the bandage is coming off and/or there is any communication to the area of device incision, please then keep the whole area dry for the remaining week.  After the bandage is removed, you may then shower normally and get the area wet with soap and water, no scrubbing, and pat dry. Do not use lotions/powders/creams on incision.    No overhead reaching/pushing/pulling/lifting greater than 5-10lbs with left arm for six weeks. Please call the office if you notice redness, swelling, bleeding, or drainage from incision or if you develop fevers.       WHAT YOU SHOULD KNOW:   A pacemaker is a small, battery-powered device that is placed under your skin in your upper chest area with wires placed through a vein that lead directly into the heart. It helps regulate your heart rate and prevent your heart from beating too slowly.                 AFTER YOU LEAVE:     Medicines:     Pain medicine:  You may need medicine to take away or decrease pain.     Learn how to take your medicine. Ask what medicine and how much you should take. Be sure you know how, when, and how often to take it. Usually Over the counter pain medicine is sufficient to control pain (Acetominophen or Ibuprofen) Ask your doctor if you may take these. If this does not control your pain, narcotic pain killers may be prescribed, please call if you need prescription.     Do not wait until the pain is severe before you take your medicine. Tell caregivers if your pain does not decrease.    Pain medicine can make you dizzy or sleepy. Prevent falls by calling someone when you get out of bed or if you need help.        Take your medicine as directed.  Call your healthcare provider if you think your medicine is not helping or if you have side effects. Tell him if you are allergic to any medicine.    Follow up with your cardiologist after your procedure:  You will need a follow-up visit after you leave the hospital.  Your cardiologist will check your wound and make sure that your pacemaker is working correctly.     Follow the instructions to check your pacemaker:  Your cardiologist or primary healthcare provider will check your pacemaker and the battery regularly.  He will use a computer to check your pacemaker over the telephone or wireless device which will be given to you.     Pacemaker batteries usually last 8 to 10 years. The pacemaker unit will be replaced when the battery gets low. This is a simpler procedure than the original one to implant your pacemaker.    Wound care:  Keep your incision dry for one week.  Do not use lotions/powders/creams on incision.     Please call the office if you notice redness, swelling, bleeding, or drainage from incision or if you develop fevers.       Activity:   Arm movement and lifting:  Be careful using the arm on the side of your pacemaker. Do not move your arm for the first 24 hours after your procedure. Do not  lift your arm above your shoulder or lift more than 10 pounds for one month after your procedure. Avoid pushing, pulling, or repetitive arm movements for one month. This helps the leads stay in place and helps your wound heal. Ask your caregiver when you can drive after your procedure. You may move your arm side to side without lifting above your shoulder, and do not need to wear a sling at home.   Driving: you are ok to drive 48 hours after pacemaker is implanted   Sports:  Ask your caregiver when it is okay to play tennis, golf, basketball, or any sport that requires you to lift your arms. Do not play full contact sports, such as football, that could damage your pacemaker. Ask your cardiologist or primary healthcare provider how much and what kinds of physical activity are safe for you.    Living with a pacemaker:   Tell all caregivers you have a pacemaker:  This includes surgeons, radiologists, and medical technicians. You may want to wear a medical alert ID bracelet or  necklace that states that you have a pacemaker.    Carry your pacemaker ID card:  Make sure you receive a pacemaker ID card. Carry it with you at all times. It lists important information about your pacemaker. Show it to airport security if you travel.     Avoid electrical interference:  Avoid welding equipment and other equipment with large magnets or electric fields. These things could interfere with how your pacemaker works. Use your cell phone on the ear opposite from your pacemaker. Do not carry your cell phone in your shirt pocket over your chest.     Some Pacemakers are MRI safe. Ask you doctor if it is safe to proceed with MRI and let the radiologist and staff know you have a pacemaker.     Do not touch the skin around your pacemaker:  This can cause damage to the lead wires or move the pacemaker unit from where it should be.    Contact your cardiologist or primary healthcare provider if:   The area around your pacemaker has increasing amount of pain after surgery. The pain should improve over first few days after implantation.     The skin around your stitches has increasing redness, swelling, or has drainage. This may mean that you have an infection.     You have a fever.     You have chills, a cough, and feel weak or achy. These are also signs of infection.    Your feet or ankles are more swollen than your baseline.     Your Heart rate is less than 50 beats per minute     Seek care immediately if:   Your bandage becomes soaked with blood.     Your pacemaker is swelling rapidly    Your stitches open up.     You feel your heart suddenly beating very slowly or quickly.    You become too weak or dizzy to stand, or you pass out.     Your arm or leg feels warm, tender, and painful. It may look swollen and red.    You have chest pain that does not go away with rest or medicine.     You feel lightheaded, short of breath, and have chest pain.     You cough up blood.        © 2014 [employer]  Information is for End User's use only and may not be sold, redistributed or otherwise used for commercial purposes. All illustrations and images included in CareNotes® are the copyrighted property of [employer] or Guide.  The above information is an  only. It is not intended as medical advice for individual conditions or treatments. Talk to your doctor, nurse or pharmacist before following any medical regimen to see if it is safe and effective for you.

## 2024-12-12 ENCOUNTER — APPOINTMENT (OUTPATIENT)
Age: 79
End: 2024-12-12
Payer: COMMERCIAL

## 2024-12-12 DIAGNOSIS — R39.9 UTI SYMPTOMS: ICD-10-CM

## 2024-12-12 PROCEDURE — 87086 URINE CULTURE/COLONY COUNT: CPT

## 2024-12-12 PROCEDURE — 81001 URINALYSIS AUTO W/SCOPE: CPT

## 2024-12-12 NOTE — ANESTHESIA POSTPROCEDURE EVALUATION
Post-Op Assessment Note             Post Op Vitals Reviewed: Yes    No anethesia notable event occurred.    Staff: Anesthesiologist           Last Filed PACU Vitals:  Vitals Value Taken Time   Temp 97.8 °F (36.6 °C) 12/11/24 1248   Pulse 82 12/11/24 1451   /55 12/11/24 1445   Resp 16 12/11/24 1451   SpO2 97 % 12/11/24 1430   Vitals shown include unfiled device data.    Modified Monik:  Activity: 2 (12/11/2024  3:08 PM)  Respiration: 2 (12/11/2024  3:08 PM)  Circulation: 2 (12/11/2024  3:08 PM)  Consciousness: 2 (12/11/2024  3:08 PM)  Oxygen Saturation: 2 (12/11/2024  3:08 PM)  Modified Monik Score: 10 (12/11/2024  3:08 PM)

## 2024-12-13 LAB
BACTERIA UR CULT: NORMAL
BACTERIA UR QL AUTO: ABNORMAL /HPF
BILIRUB UR QL STRIP: NEGATIVE
CLARITY UR: ABNORMAL
COLOR UR: YELLOW
GLUCOSE UR STRIP-MCNC: NEGATIVE MG/DL
HGB UR QL STRIP.AUTO: ABNORMAL
KETONES UR STRIP-MCNC: NEGATIVE MG/DL
LEUKOCYTE ESTERASE UR QL STRIP: ABNORMAL
NITRITE UR QL STRIP: NEGATIVE
NON-SQ EPI CELLS URNS QL MICRO: ABNORMAL /HPF
PH UR STRIP.AUTO: 6 [PH]
PROT UR STRIP-MCNC: ABNORMAL MG/DL
RBC #/AREA URNS AUTO: ABNORMAL /HPF
SP GR UR STRIP.AUTO: 1.02 (ref 1–1.03)
UROBILINOGEN UR STRIP-ACNC: <2 MG/DL
WBC #/AREA URNS AUTO: ABNORMAL /HPF

## 2024-12-16 ENCOUNTER — OFFICE VISIT (OUTPATIENT)
Age: 79
End: 2024-12-16
Payer: COMMERCIAL

## 2024-12-16 VITALS
TEMPERATURE: 97.6 F | OXYGEN SATURATION: 95 % | DIASTOLIC BLOOD PRESSURE: 76 MMHG | HEART RATE: 91 BPM | BODY MASS INDEX: 29.54 KG/M2 | WEIGHT: 211 LBS | HEIGHT: 71 IN | SYSTOLIC BLOOD PRESSURE: 120 MMHG

## 2024-12-16 DIAGNOSIS — R91.1 LUNG NODULE: ICD-10-CM

## 2024-12-16 DIAGNOSIS — R10.9 ACUTE RIGHT FLANK PAIN: ICD-10-CM

## 2024-12-16 DIAGNOSIS — F17.210 CIGARETTE NICOTINE DEPENDENCE WITHOUT COMPLICATION: ICD-10-CM

## 2024-12-16 DIAGNOSIS — J41.0 SIMPLE CHRONIC BRONCHITIS (HCC): Primary | ICD-10-CM

## 2024-12-16 DIAGNOSIS — R39.9 UTI SYMPTOMS: Primary | ICD-10-CM

## 2024-12-16 PROCEDURE — G2211 COMPLEX E/M VISIT ADD ON: HCPCS | Performed by: PHYSICIAN ASSISTANT

## 2024-12-16 PROCEDURE — 99213 OFFICE O/P EST LOW 20 MIN: CPT | Performed by: PHYSICIAN ASSISTANT

## 2024-12-16 NOTE — PROGRESS NOTES
"Assessment/Plan:   Diagnoses and all orders for this visit:    Simple chronic bronchitis (HCC)    Lung nodule  -     CT chest without contrast; Future    Cigarette nicotine dependence without complication        Patient is here today for follow-up.  He is overall stable with his breathing.  Continues on Trelegy daily, uses nebulizer prior to the Trelegy.  He continues to smoke, not interested in quitting.    Recent CT lung screening showed a new 4 mm nodule, will repeat a CT scan in 6 months to follow-up for any changes.    He will follow-up with us in about 6 months after his CT scan, sooner if necessary.  Return in about 6 months (around 6/16/2025).  All questions are answered to the patient's satisfaction and understanding.  He verbalizes understanding.  He is encouraged to call with any further questions or concerns.    Portions of the record may have been created with voice recognition software.  Occasional wrong word or \"sound a like\" substitutions may have occurred due to the inherent limitations of voice recognition software.  Read the chart carefully and recognize, using context, where substitutions have occurred.    Electronically Signed by Tony Zapata PA-C    ______________________________________________________________________    Chief Complaint:   Chief Complaint   Patient presents with    Follow-up       Patient ID: Sherly is a 79 y.o. y.o. male has a past medical history of Agent orange exposure, Anemia, Benign colon polyp, BPH (benign prostatic hyperplasia), Bradycardia, Cardiomyopathy (HCC), Chest discomfort, CHF (congestive heart failure) (HCC), Chronic bronchitis (HCC), Chronic kidney disease, COPD (chronic obstructive pulmonary disease) (HCC), Coronary artery disease (cardio myopthia), Emphysema of lung (HCC) (2013), H/O nonmelanoma skin cancer, HHD (hypertensive heart disease), HTN (hypertension), lymphoma, Hyperlipidemia, ICD (implantable cardioverter-defibrillator) in place, Insomnia, " Mycosis fungoides (HCC), PVC (premature ventricular contraction), PVT (paroxysmal ventricular tachycardia) (HCC), and SOB (shortness of breath).    12/16/2024  Patient presents today for follow-up visit.  Patient is a 80 yo male current smoker with PMH of COPD, cardiomyopathy, HTN, lymphoma, squamous cell skin cancer.    He is here today for follow-up.  He is overall stable with his breathing, uses his nebulizer in the morning followed by his Trelegy with only occasional need for his rescue inhaler.  Continues to smoke and not interested in quitting.            Review of Systems   Constitutional: Negative.    HENT: Negative.     Respiratory: Negative.     Cardiovascular: Negative.    Gastrointestinal: Negative.    Genitourinary: Negative.    Musculoskeletal: Negative.    Skin: Negative.    Allergic/Immunologic: Negative.    Neurological: Negative.    Psychiatric/Behavioral: Negative.         Smoking history: He reports that he has been smoking cigarettes. He started smoking about 64 years ago. He has a 64 pack-year smoking history. He has been exposed to tobacco smoke. He has never used smokeless tobacco.    The following portions of the patient's history were reviewed and updated as appropriate: allergies, current medications, past family history, past medical history, past social history, past surgical history, and problem list.    Immunization History   Administered Date(s) Administered    COVID-19 PFIZER VACCINE 0.3 ML IM 03/16/2021, 04/07/2021, 08/20/2021    COVID-19 Pfizer Vac BIVALENT Maikel-sucrose 12 Yr+ IM 01/16/2023    COVID-19 Pfizer mRNA vacc PF maikel-sucrose 12 yr and older (Comirnaty) 10/15/2024    COVID-19 Pfizer vac (Maikel-sucrose, gray cap) 12 yr+ IM 04/15/2022, 04/15/2022    INFLUENZA 10/14/2015, 10/17/2016, 09/24/2018, 09/08/2020, 10/25/2021, 10/20/2022, 11/04/2023    Influenza Split High Dose Preservative Free IM 10/17/2016, 09/09/2017, 09/24/2018, 09/13/2019    Influenza, high dose seasonal 0.7 mL  10/25/2021, 10/20/2022    Influenza, seasonal, injectable 10/14/2015    Pneumococcal Conjugate 13-Valent 07/27/2020    Pneumococcal Conjugate PCV 7 10/14/2015    Respiratory Syncytial Virus Vaccine (Recombinant, Adjuvanted) 11/04/2023    Tdap 1945    Zoster Vaccine Recombinant 03/31/2023     Current Outpatient Medications   Medication Sig Dispense Refill    albuterol (2.5 mg/3 mL) 0.083 % nebulizer solution USE 1 VIAL VIA NEBULIZER 4 TIMES A DAY AS NEEDED 360 mL 2    ammonium lactate (LAC-HYDRIN) 12 % lotion APPLY TO AFFECTED AREA TOPICALLY EVERY  mL 2    aspirin 81 MG tablet Take 81 mg by mouth daily      atorvastatin (LIPITOR) 40 mg tablet TAKE 1 TABLET BY MOUTH DAILY 90 tablet 1    bexarotene (TARGRETIN) 75 mg capsule Take 8 capsules (600 mg total) by mouth daily 240 capsule 6    Cholecalciferol 25 MCG (1000 UT) capsule Take 1 capsule by mouth daily      cyanocobalamin (VITAMIN B-12) 1,000 mcg tablet Take 1,000 mcg by mouth daily      Entresto 49-51 MG TABS TAKE 1 TABLET BY MOUTH  TWICE DAILY 180 tablet 3    ferrous sulfate 324 (65 Fe) mg Take 1 tablet by mouth Twice daily      gabapentin (NEURONTIN) 100 mg capsule Take 1 capsule (100 mg total) by mouth 3 (three) times a day 270 capsule 3    levothyroxine 100 mcg tablet TAKE 1 TABLET BY MOUTH DAILY 90 tablet 1    metoprolol succinate (TOPROL-XL) 50 mg 24 hr tablet TAKE 1 AND 1/2 TABLETS BY MOUTH  DAILY 135 tablet 3    montelukast (SINGULAIR) 10 mg tablet TAKE 1 TABLET BY MOUTH DAILY AT  BEDTIME 90 tablet 1    Multiple Vitamins-Minerals (OCUVITE ADULT 50+ PO) Take by mouth      spironolactone (ALDACTONE) 25 mg tablet TAKE ONE-HALF TABLET BY MOUTH  DAILY 45 tablet 1    tamsulosin (FLOMAX) 0.4 mg Take 1 capsule (0.4 mg total) by mouth daily with dinner 90 capsule 3    Trelegy Ellipta 100-62.5-25 MCG/ACT inhaler USE 1 INHALATION BY MOUTH ONCE  DAILY AT THE SAME TIME EACH DAY  RINSE MOUTH AFTER  each 3    triamcinolone (KENALOG) 0.1 % ointment  "Apply topically 2 (two) times 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 454 g 3    Vitamins-Lipotropics (LIPOFLAVONOID PO) Take by mouth daily      zolpidem (AMBIEN) 10 mg tablet Take 1 tablet (10 mg total) by mouth daily at bedtime as needed for sleep 30 tablet 0     No current facility-administered medications for this visit.     Allergies: Patient has no known allergies.    Objective:  Vitals:    12/16/24 1314   BP: 120/76   Pulse: 91   Temp: 97.6 °F (36.4 °C)   SpO2: 95%   Weight: 95.7 kg (211 lb)   Height: 5' 11\" (1.803 m)   Oxygen Therapy  SpO2: 95 %  .  Wt Readings from Last 3 Encounters:   12/16/24 95.7 kg (211 lb)   12/11/24 96.3 kg (212 lb 4.9 oz)   11/06/24 95.3 kg (210 lb)     Body mass index is 29.43 kg/m².    Physical Exam  Constitutional:       General: He is not in acute distress.     Appearance: Normal appearance. He is well-developed. He is not ill-appearing.   HENT:      Head: Normocephalic and atraumatic.      Mouth/Throat:      Pharynx: Oropharynx is clear.   Eyes:      Pupils: Pupils are equal, round, and reactive to light.   Cardiovascular:      Rate and Rhythm: Normal rate and regular rhythm.   Pulmonary:      Effort: Pulmonary effort is normal. No respiratory distress.      Breath sounds: Normal breath sounds. No decreased breath sounds, wheezing, rhonchi or rales.   Abdominal:      General: Abdomen is flat. There is no distension.   Musculoskeletal:         General: Normal range of motion.      Cervical back: Normal range of motion.      Right lower leg: No edema.      Left lower leg: No edema.   Skin:     General: Skin is warm and dry.      Findings: No rash.   Neurological:      Mental Status: He is alert and oriented to person, place, and time.   Psychiatric:         Mood and Affect: Mood normal.         Behavior: Behavior normal.         Lab Review:   Lab Results   Component Value Date     12/11/2015    K 4.1 11/26/2024    K 4.1 12/11/2015     11/26/2024     12/11/2015    CO2 24 " 2024    CO2 28 2015    BUN 22 2024    BUN 27 (H) 2015    CREATININE 1.14 2024    CREATININE 1.02 2015    GLUCOSE 93 2015    CALCIUM 8.8 2024    CALCIUM 8.8 2015     Lab Results   Component Value Date    WBC 9.35 2024    WBC 5.7 2015    HGB 11.4 (L) 2024    HGB 13.9 2015    HCT 37.5 2024    HCT 41.6 2015    MCV 99 (H) 2024    MCV 88 2015     2024     2015       Diagnostics:    Reviewed CT scan  Office Spirometry Results:     ESS:    Cardiac ep lab eps/ablations  Result Date: 2024  Narrative: Images from the original result were not included.   Successful Medtronic dual Chamber CRT-D ICD generator change. ELECTROPHYSIOLOGY OPERATIVE REPORT PATIENT NAME: Sherly Peña :  1945 MRN: [id number] Date of surgery: 24 Surgeon: Yifan Villanueva MD Pt Location: Cath Lab PROCEDURE PERFORMED: 1) Medtronic Dual Chamber CRT-D ICD generator Change  Preoperative Medications: Ancef ANESTHESIA: conscious sedation by anesthesia service PREOPERATIVE DIAGNOSIS: ICD battery depletion Nonischemic cardiomyopathy, ventricular tachycardia POSTOPERATIVE DIAGNOSIS: Successful Medtronic dual Chamber CRT-D ICD generator change.  Informed Consent: Risks, benefits, and alternatives discussed with patient and any family present. He understands risks, which include but are not limited to life threatening  bleeding, infection, air around lungs, blood around the heart and reoperation dislodged or malfunctioning device. Procedure Description: After informed consent was obtained, the patient was brought to the electrophysiology laboratory and was NPO.  A time out was called and the patient was properly identified. The patient was pre-medicated as above. The left infraclavicular area was prepped and draped in the usual sterile fashion. After local anesthetic infiltration with 1% lidocaine, an incision was  made below above the ICD and the generator was removed with blunt dissection and electocautery. A partial capsulectomy was performed.  Bleeding was well controlled with electrocautery.  The pocket was flushed with antibiotic solution. The lead and pulse generator were placed in the pre-pectoral pocket. The pocket was then closed with 3 layers of 2-0, 3-0, 4-0 -Vicryl suture was used to close the skin. A Medtronic absorbable Antibiotic Pouch was used to prevent infection. EBL: Minimal Complications: None Findings: None The patient tolerated the procedure well. Plan: Routine postoperative care. Follow-up in 2 weeks with incision check and interrogation.  NCDR ICD REGISTRY INFO Heart Failure: Yes NYHA Functional Classification: Class II. Ischemic Cardiomyopathy: No. Non-Ischemic Cardiomyopathy: Yes. Timeframe: 0 >= 3 Months. Guideline Directed Medical Therapy Maximum Dose - Yes (for 3 Months).  Ejection Fraction: 30 QRS Morphology: IVCD, Width: 148 ms Ventricular Tachycardia: Yes. Ventricular Tachycardia Type - Non-Sustained VT. Indications for Permanent Pacemaker: No. ICD Indication: Primary Prevention. A formal shared decision making encounter has occurred with the patient using an evidence-based decision tool on ICDs prior to initial ICD implantation: NA Generator changes only- Patients clinical condition is unchanged and the LVEF will not be assessed: Yes Syndrome(s) w/Risk of Sudden Death: No.

## 2024-12-17 ENCOUNTER — PROCEDURE VISIT (OUTPATIENT)
Dept: UROLOGY | Facility: CLINIC | Age: 79
End: 2024-12-17
Payer: COMMERCIAL

## 2024-12-17 VITALS
SYSTOLIC BLOOD PRESSURE: 132 MMHG | OXYGEN SATURATION: 96 % | WEIGHT: 210 LBS | HEIGHT: 71 IN | HEART RATE: 78 BPM | BODY MASS INDEX: 29.4 KG/M2 | TEMPERATURE: 98.2 F | DIASTOLIC BLOOD PRESSURE: 78 MMHG

## 2024-12-17 DIAGNOSIS — N40.1 BENIGN PROSTATIC HYPERPLASIA WITH LOWER URINARY TRACT SYMPTOMS, SYMPTOM DETAILS UNSPECIFIED: Primary | ICD-10-CM

## 2024-12-17 LAB — POST-VOID RESIDUAL VOLUME, ML POC: 12 ML

## 2024-12-17 PROCEDURE — 51798 US URINE CAPACITY MEASURE: CPT

## 2024-12-18 NOTE — PROGRESS NOTES
"12/17/2024  Sherly Peña is a 79 y.o. male  [id number]    Diagnosis:  Chief Complaint    Urinary Retention         Patient presents for follow up post void residual s/p making c/o urinary frequency with urgency managed by our office    Plan:  Patient's Post void residual will be measured in office.   Patient is made aware of ordered CT renal study by Shyann MARSHALL that he is to get to see if he has passed any stones.   In the meantime patient is instructed to immediately contact office with any s/s of urinary retention or dysuria.  Patient agreeable with plan.       Assessment:      Vitals:    12/17/24 1538   BP: 132/78   Patient Position: Sitting   Cuff Size: Standard   Pulse: 78   Temp: 98.2 °F (36.8 °C)   TempSrc: Temporal   SpO2: 96%   Weight: 95.3 kg (210 lb)   Height: 5' 11\" (1.803 m)           Post void residual measured via bladder scanner to be 12 mL    Patient states that since last night after passing what he thinks are left over bladder stones, he is no longer experiencing urinary urgency with frequency. His urination has been normal.       Annette Knowles LPN      "

## 2024-12-19 ENCOUNTER — TELEPHONE (OUTPATIENT)
Age: 79
End: 2024-12-19

## 2024-12-19 NOTE — TELEPHONE ENCOUNTER
Pt called regarding his recent generator change 12/11 asking who he needed to go to to get stiches removed.  Advised pt he has a site check visit 1/2 for site to be assessed and any stiches will be removed at that time.

## 2024-12-20 DIAGNOSIS — I50.22 CHRONIC SYSTOLIC CONGESTIVE HEART FAILURE (HCC): ICD-10-CM

## 2024-12-20 DIAGNOSIS — J30.89 ENVIRONMENTAL AND SEASONAL ALLERGIES: ICD-10-CM

## 2024-12-20 DIAGNOSIS — E03.2 HYPOTHYROIDISM DUE TO MEDICATION: ICD-10-CM

## 2024-12-20 RX ORDER — SACUBITRIL AND VALSARTAN 49; 51 MG/1; MG/1
1 TABLET, FILM COATED ORAL 2 TIMES DAILY
Qty: 180 TABLET | Refills: 1 | Status: SHIPPED | OUTPATIENT
Start: 2024-12-20

## 2024-12-20 RX ORDER — LEVOTHYROXINE SODIUM 100 UG/1
100 TABLET ORAL DAILY
Qty: 90 TABLET | Refills: 3 | Status: SHIPPED | OUTPATIENT
Start: 2024-12-20

## 2024-12-23 RX ORDER — MONTELUKAST SODIUM 10 MG/1
10 TABLET ORAL
Qty: 90 TABLET | Refills: 1 | Status: SHIPPED | OUTPATIENT
Start: 2024-12-23

## 2025-01-02 ENCOUNTER — IN-CLINIC DEVICE VISIT (OUTPATIENT)
Dept: CARDIOLOGY CLINIC | Facility: CLINIC | Age: 80
End: 2025-01-02
Payer: COMMERCIAL

## 2025-01-02 ENCOUNTER — OFFICE VISIT (OUTPATIENT)
Dept: CARDIOLOGY CLINIC | Facility: CLINIC | Age: 80
End: 2025-01-02
Payer: COMMERCIAL

## 2025-01-02 VITALS
HEART RATE: 83 BPM | DIASTOLIC BLOOD PRESSURE: 80 MMHG | OXYGEN SATURATION: 95 % | BODY MASS INDEX: 29.29 KG/M2 | WEIGHT: 210 LBS | SYSTOLIC BLOOD PRESSURE: 130 MMHG | RESPIRATION RATE: 16 BRPM

## 2025-01-02 DIAGNOSIS — I50.22 CHRONIC SYSTOLIC CONGESTIVE HEART FAILURE (HCC): ICD-10-CM

## 2025-01-02 DIAGNOSIS — I10 PRIMARY HYPERTENSION: ICD-10-CM

## 2025-01-02 DIAGNOSIS — Z95.810 PRESENCE OF IMPLANTABLE CARDIOVERTER-DEFIBRILLATOR (ICD): Primary | ICD-10-CM

## 2025-01-02 DIAGNOSIS — I47.29 NSVT (NONSUSTAINED VENTRICULAR TACHYCARDIA) (HCC): ICD-10-CM

## 2025-01-02 DIAGNOSIS — I42.8 NON-ISCHEMIC CARDIOMYOPATHY (HCC): ICD-10-CM

## 2025-01-02 DIAGNOSIS — Z01.810 PREOP CARDIOVASCULAR EXAM: Primary | ICD-10-CM

## 2025-01-02 DIAGNOSIS — Z95.810 CARDIAC RESYNCHRONIZATION THERAPY DEFIBRILLATOR (CRT-D) IN PLACE: ICD-10-CM

## 2025-01-02 DIAGNOSIS — E78.2 MIXED HYPERLIPIDEMIA: ICD-10-CM

## 2025-01-02 PROCEDURE — 99214 OFFICE O/P EST MOD 30 MIN: CPT | Performed by: INTERNAL MEDICINE

## 2025-01-02 PROCEDURE — 99024 POSTOP FOLLOW-UP VISIT: CPT | Performed by: INTERNAL MEDICINE

## 2025-01-02 RX ORDER — SPIRONOLACTONE 25 MG/1
12.5 TABLET ORAL DAILY
Qty: 45 TABLET | Refills: 3 | Status: SHIPPED | OUTPATIENT
Start: 2025-01-02

## 2025-01-02 NOTE — PROGRESS NOTES
CARDIOLOGY OFFICE VISIT  [facility]  [address], [place] [id number]  Tel: [phone]      NAME: Sherly Peña  AGE: 79 y.o.  SEX: male  : 1945  MRN: [id number]      Chief Complaint:  Chief Complaint   Patient presents with    Pre-op Exam         History of Present Illness:   Patient comes for follow up s/p recent gen change of his CRT-D device.  He is here for preop cardiac risk assessment prior to urological surgery.  States he is doing okay from cardiac stand point and denies chest pain / pressure, SOB, palpitations, lightheadedness, syncope, swelling feet, orthopnea, PND, claudication.    Chronic systolic HF - States currently his wt is at his baseline. No SOB. On BB, Entretso, spironolactone. He is to start Jardiance after the urological surgery     NICMP s/p CRT-D - He had a [employer] CRT-D ICD implanted in 2011 and had it changed in 2014 and then again in Dec 2024. He has been following up with Dr Sampson for the devise interrogations. On BB, Entresto, spironolactone.  He is to start Jardiance after the urological surgery     Episodes of NSVT previously picked up on device tranmissions. On BB     Primary hypertension -  Has been hypertensive for many years.  Taking medications regularly.  Denies lightheadedness, headache, medication side effects.      Mixed hyperlipidemia -  Has had hyperlipidemia for many years.  Taking statin regularly along with diet control.  Denies myalgia.  PCP closely monitoring the blood work.    Past Medical History:  Past Medical History:   Diagnosis Date    Agent orange exposure     Anemia     Benign colon polyp     BPH (benign prostatic hyperplasia)     Bradycardia     Cardiomyopathy (HCC)     Chest discomfort     CHF (congestive heart failure) (HCC)     Chronic bronchitis (HCC)     Chronic kidney disease     COPD (chronic obstructive pulmonary disease) (HCC)     LAST  ASSESSED: 9/9/17    Coronary artery disease cardio myopthia    Emphysema of lung (HCC) 2013    H/O nonmelanoma skin cancer     LAST ASSESSED: 11/7/17    HHD (hypertensive heart disease)     HTN (hypertension)     Hx of lymphoma     Hyperlipidemia     ICD (implantable cardioverter-defibrillator) in place     Insomnia     Mycosis fungoides (HCC)     PVC (premature ventricular contraction)     PVT (paroxysmal ventricular tachycardia) (HCC)     SOB (shortness of breath)          Past Surgical History:  Past Surgical History:   Procedure Laterality Date    CARDIAC ELECTROPHYSIOLOGY PROCEDURE N/A 12/11/2024    Procedure: Cardiac biv icd generator change;  Surgeon: Yifan Villanueva MD;  Location: [facility];  Service: Cardiology    CARDIAC PACEMAKER PLACEMENT      SKIN SURGERY      skin cancer removal     TONSILLECTOMY           Family History:  Family History   Problem Relation Age of Onset    Diabetes Mother     Colon cancer Father         DIAGNOSED IN HIS 80'S    Heart failure Father     Coronary artery disease Father     Cancer Father         Lorenzo    Diabetes Family         MELLITUS    Heart attack Neg Hx     Stroke Neg Hx     Anuerysm Neg Hx     Clotting disorder Neg Hx     Arrhythmia Neg Hx     Hypertension Neg Hx         pt unsure     Hyperlipidemia Neg Hx     Sudden death Neg Hx         scd         Social History:  Social History     Socioeconomic History    Marital status:      Spouse name: None    Number of children: 0    Years of education: None    Highest education level: None   Occupational History    Occupation: retired   Tobacco Use    Smoking status: Every Day     Current packs/day: 1.00     Average packs/day: 1 pack/day for 64.0 years (64.0 ttl pk-yrs)     Types: Cigarettes     Start date: 1961     Passive exposure: Current    Smokeless tobacco: Never    Tobacco comments:     HALF A PACK PER DAY    Vaping Use    Vaping status: Never Used   Substance and Sexual Activity    Alcohol use:  Not Currently     Alcohol/week: 14.0 standard drinks of alcohol     Comment: two drinks every night over a 4 hour period    Drug use: Never    Sexual activity: Not Currently     Partners: Female     Birth control/protection: Male Sterilization   Other Topics Concern    None   Social History Narrative    None     Social Drivers of Health     Financial Resource Strain: Low Risk  (1/23/2023)    Overall Financial Resource Strain (CARDIA)     Difficulty of Paying Living Expenses: Not very hard   Food Insecurity: No Food Insecurity (7/24/2024)    Nursing - Inadequate Food Risk Classification     Worried About Running Out of Food in the Last Year: Never true     Ran Out of Food in the Last Year: Never true     Ran Out of Food in the Last Year: Not on file   Transportation Needs: No Transportation Needs (7/24/2024)    PRAPARE - Transportation     Lack of Transportation (Medical): No     Lack of Transportation (Non-Medical): No   Physical Activity: Inactive (5/14/2024)    Exercise Vital Sign     Days of Exercise per Week: 0 days     Minutes of Exercise per Session: 0 min   Stress: No Stress Concern Present (9/4/2020)    [employer] [place] [employer] - Occupational Stress Questionnaire     Feeling of Stress : Not at all   Social Connections: Not on file   Intimate Partner Violence: Not on file   Housing Stability: Low Risk  (7/24/2024)    Housing Stability Vital Sign     Unable to Pay for Housing in the Last Year: No     Number of Times Moved in the Last Year: 0     Homeless in the Last Year: No         Active Problems:  Patient Active Problem List   Diagnosis    Insomnia    Cardiac resynchronization therapy defibrillator (CRT-D) in place    Hyperlipidemia    Hx of lymphoma    HTN (hypertension)    COPD (chronic obstructive pulmonary disease) (HCC)    Chronic bronchitis (HCC)    Non-ischemic cardiomyopathy (HCC)    BPH (benign prostatic hyperplasia)    Seborrheic keratosis    Mycosis fungoides (HCC)    History of  skin cancer    Acquired hypothyroidism    HHD (hypertensive heart disease)    Squamous cell cancer of skin of forearm, left    PVC (premature ventricular contraction)    Chronic systolic congestive heart failure (HCC)    Hypomagnesemia    Chronic kidney disease-mineral and bone disorder    Stage 3 chronic kidney disease (HCC)    Shortness of breath    Lumbar pain    Tinnitus of both ears    Cigarette nicotine dependence without complication    NSVT (nonsustained ventricular tachycardia) (HCC)    Nephrolithiasis    Smoking    Dependence on nocturnal oxygen therapy         The following portions of the patient's history were reviewed and updated as appropriate: past medical history, past surgical history, past family history,  past social history, current medications, allergies and problem list.      Review of Systems:  Constitutional: Denies fever, chills  Eyes: Denies eye redness, eye discharge  ENT: Denies hearing loss, sneezing, nasal discharge, sore throat   Respiratory: Denies cough, expectoration, shortness of breath  Cardiovascular: Denies chest pain, palpitations, lower extremity swelling  Gastrointestinal: Denies abdominal pain, nausea, vomiting, diarrhea  Genito-Urinary: Denies incontinence  Musculoskeletal: Denies back pain, joint pain, muscle pain  Neurologic: Denies lightheadedness, syncope, headache, seizures  Endocrine: Denies polydipsia, temperature intolerance  Allergy and Immunology: Denies hives, insect bite sensitivity  Hematological and Lymphatic: Denies bleeding problems, swollen glands   Psychological: Denies depression, suicidal ideation, anxiety, panic  Dermatological: Denies pruritus, rash, skin lesion changes      Vitals:  Vitals:    01/02/25 1017   BP: 130/80   Pulse: 83   Resp: 16   SpO2: 95%       Body mass index is 29.29 kg/m².    Weight (last 2 days)       Date/Time Weight    01/02/25 1017 95.3 (210)              Physical Examination:  General: Patient is not in acute distress. Awake,  alert, oriented in time, place and person. Responding to commands  Head: Normocephalic. Atraumatic  Eyes: Both pupils normal sized, round and reactive to light. Nonicteric  ENT: Normal external ear canals  Neck: Supple. JVP not raised. Trachea central. No thyromegaly  Lungs: Bilateral bronchovascular breath sounds with no crackles or rhonchi  Chest wall: ICD site shows a well-healed scar  Cardiovascular: RRR. S1 and S2 normal. No murmur, rub or gallop  Gastrointestinal: Abdomen soft, nontender. No guarding or rigidity. Liver and spleen not palpable. Bowel sounds present  Neurologic: Patient is awake, alert, oriented in time, place and person. Responding to commands. Moving all extremities  Integumentary:  No skin rash  Lymphatic: No cervical lymphadenopathy  Back: Symmetric. No CVA tenderness  Extremities: No clubbing, cyanosis or edema      Laboratory Results:  CBC with diff:   Lab Results   Component Value Date    WBC 9.35 11/26/2024    WBC 5.7 06/01/2015    RBC 3.80 (L) 11/26/2024    RBC 4.70 06/01/2015    HGB 11.4 (L) 11/26/2024    HGB 13.9 06/01/2015    HCT 37.5 11/26/2024    HCT 41.6 06/01/2015    MCV 99 (H) 11/26/2024    MCV 88 06/01/2015    MCH 30.0 11/26/2024    MCH 29.5 06/01/2015    RDW 14.3 11/26/2024    RDW 12.6 06/01/2015     11/26/2024     06/01/2015       CMP:  Lab Results   Component Value Date    CREATININE 1.14 11/26/2024    CREATININE 1.02 12/11/2015    BUN 22 11/26/2024    BUN 27 (H) 12/11/2015     12/11/2015    K 4.1 11/26/2024    K 4.1 12/11/2015     11/26/2024     12/11/2015    CO2 24 11/26/2024    CO2 28 12/11/2015    GLUCOSE 93 12/11/2015    PROT 7.3 06/01/2015    ALKPHOS 60 08/12/2024    ALKPHOS 71 06/01/2015    ALT 11 08/12/2024    ALT 27 06/01/2015    AST 19 08/12/2024    AST 19 06/01/2015    BILIDIR 0.11 09/14/2017       Lab Results   Component Value Date    HGBA1C 6.2 09/01/2017    MG 1.8 11/24/2020    PHOS 3.0 12/28/2023       Lab Results   Component  Value Date    TROPONINI <0.02 11/21/2020    TROPONINI 0.07 (H) 09/01/2017    TROPONINI 0.08 (H) 09/01/2017    CKTOTAL 85 01/28/2016       Lipid Profile:   Lab Results   Component Value Date    CHOL 197 06/01/2015     Lab Results   Component Value Date    HDL 39 (L) 05/14/2024    HDL 46 12/28/2023    HDL 37 (L) 01/24/2020     Lab Results   Component Value Date    LDLCALC  05/14/2024      Comment:      Calculated LDL invalid, triglycerides >400 mg/dl  This screening LDL is a calculated result.   It does not have the accuracy of the Direct Measured LDL in the monitoring of patients with hyperlipidemia and/or statin therapy.   Direct Measure LDL (SCU471) must be ordered separately in these patients.    LDLCALC 110 (H) 12/28/2023    LDLCALC 133 (H) 01/24/2020     Lab Results   Component Value Date    TRIG 644 (H) 05/14/2024    TRIG 285 (H) 12/28/2023    TRIG 169 (H) 10/27/2022       Cardiac testing:   Results for orders placed during the hospital encounter of 12/27/23    Echo complete w/ contrast if indicated    Interpretation Summary    Left Ventricle: Left ventricular cavity size is dilated. The left ventricular ejection fraction is 30%. Systolic function is severely reduced. There is global hypokinesis with regional variation.  Inferior wall appears severely hypokinetic. Diastolic function is mildly abnormal, consistent with grade I (abnormal) relaxation.    Left Atrium: The atrium is mildly dilated.    Mitral Valve: There is mild annular calcification. There is mild to moderate regurgitation.    Tricuspid Valve: There is trace regurgitation.  ICD lead noted.      Medications:    Current Outpatient Medications:     albuterol (2.5 mg/3 mL) 0.083 % nebulizer solution, USE 1 VIAL VIA NEBULIZER 4 TIMES A DAY AS NEEDED, Disp: 360 mL, Rfl: 2    ammonium lactate (LAC-HYDRIN) 12 % lotion, APPLY TO AFFECTED AREA TOPICALLY EVERY DAY, Disp: 225 mL, Rfl: 2    aspirin 81 MG tablet, Take 81 mg by mouth daily, Disp: , Rfl:      atorvastatin (LIPITOR) 40 mg tablet, TAKE 1 TABLET BY MOUTH DAILY, Disp: 90 tablet, Rfl: 1    bexarotene (TARGRETIN) 75 mg capsule, Take 8 capsules (600 mg total) by mouth daily, Disp: 240 capsule, Rfl: 6    Cholecalciferol 25 MCG (1000 UT) capsule, Take 1 capsule by mouth daily, Disp: , Rfl:     cyanocobalamin (VITAMIN B-12) 1,000 mcg tablet, Take 1,000 mcg by mouth daily, Disp: , Rfl:     ferrous sulfate 324 (65 Fe) mg, Take 1 tablet by mouth Twice daily, Disp: , Rfl:     gabapentin (NEURONTIN) 100 mg capsule, Take 1 capsule (100 mg total) by mouth 3 (three) times a day, Disp: 270 capsule, Rfl: 3    levothyroxine 100 mcg tablet, TAKE 1 TABLET BY MOUTH DAILY, Disp: 90 tablet, Rfl: 3    metoprolol succinate (TOPROL-XL) 50 mg 24 hr tablet, TAKE 1 AND 1/2 TABLETS BY MOUTH  DAILY, Disp: 135 tablet, Rfl: 3    montelukast (SINGULAIR) 10 mg tablet, TAKE 1 TABLET BY MOUTH DAILY AT  BEDTIME, Disp: 90 tablet, Rfl: 1    Multiple Vitamins-Minerals (OCUVITE ADULT 50+ PO), Take by mouth, Disp: , Rfl:     sacubitril-valsartan (Entresto) 49-51 MG TABS, TAKE 1 TABLET BY MOUTH TWICE  DAILY, Disp: 180 tablet, Rfl: 1    spironolactone (ALDACTONE) 25 mg tablet, TAKE ONE-HALF TABLET BY MOUTH  DAILY, Disp: 45 tablet, Rfl: 1    tamsulosin (FLOMAX) 0.4 mg, Take 1 capsule (0.4 mg total) by mouth daily with dinner, Disp: 90 capsule, Rfl: 3    Trelegy Ellipta 100-62.5-25 MCG/ACT inhaler, USE 1 INHALATION BY MOUTH ONCE  DAILY AT THE SAME TIME EACH DAY  RINSE MOUTH AFTER USE, Disp: 180 each, Rfl: 3    triamcinolone (KENALOG) 0.1 % ointment, Apply topically 2 (two) times 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, Disp: 454 g, Rfl: 3    Vitamins-Lipotropics (LIPOFLAVONOID PO), Take by mouth daily, Disp: , Rfl:     zolpidem (AMBIEN) 10 mg tablet, Take 1 tablet (10 mg total) by mouth daily at bedtime as needed for sleep, Disp: 30 tablet, Rfl: 0      Allergies:  No Known Allergies      Assessment and Plan:  1. Preop cardiovascular exam (Primary)  Patient has no active  cardiac conditions (such as unstable cardiac syndrome, decompensated heart failure, significant arrhythmias, severe valvular disease).  Patient is at least a moderate cardiac risk patient going in for a urological surgery.  No further cardiac workup is needed at this time.  No cardiac contraindications for surgery.  Perioperative use of beta-blocker is highly recommended.      2. Chronic systolic congestive heart failure (HCC)  Currently euvolemic.  Continue Entresto 49-51 mg twice daily, metoprolol succinate 75 mg daily, spironolactone 12.5 mg daily.  Start Jardiance 10 mg daily a week after the urological surgery    3. Non-ischemic cardiomyopathy (HCC)  Continue Entresto, metoprolol succinate, spironolactone.  Start Jardiance 10 mg daily a week after the urological surgery    4. Cardiac resynchronization therapy defibrillator (CRT-D) in place  GEN change site has healed well.  ICD interrogation performed earlier today    5. NSVT (nonsustained ventricular tachycardia) (HCC)  Continue beta-blocker    6. Primary hypertension  Blood pressure stable.  Continue current medications.  Continue to monitor BP at home and call if abnormal    7. Mixed hyperlipidemia  Continue statin and diet control.  His PCP closely monitors blood work    Recommend aggressive risk factor modification and therapeutic lifestyle changes.  Low-salt, low-calorie, low-fat, low-cholesterol diet with regular exercise and to optimize weight.  I will defer the ordering and monitoring of necessity lab studies to you, but I am available and happy to review and manage any of the data at your request in the future.    Discussed concepts of atherosclerosis, including signs and symptoms of cardiac disease.    Previous studies were reviewed.    Safety measures were reviewed.  Questions were entertained and answered.  Patient was advised to report any problems requiring medical attention.    Follow-up with PCP and appropriate specialist and lab work as  discussed.    Return for follow up visit as scheduled or earlier, if needed.  Thank you for allowing me to participate in the care and evaluation of your patient.  Should you have any questions, please feel free to contact me.    Asher Lozada MD  1/2/2025,10:33 AM

## 2025-01-02 NOTE — PROGRESS NOTES
MDT BIV-ICD/NOT MRI CONDITIONAL   DEVICE INTERROGATED IN THE [place] OFFICE:  BATTERY VOLTAGE ADEQUATE (8.2YR.).   AP 98.3% BP 97.5% VSRP 1.8%.  ALL LEAD PARAMETERS WITHIN NORMAL LIMITS.  NO SIGNIFICANT HIGH RATE EPISODES.  DECREASE MADE TO RA/RV AMPLITUDES (NOT SAVED) TO PROMOTE DEVICE LONGEVITY WHILE MAINTAINING AN APPROPRIATE SAFETY MARGIN.  WOUND CHECK: INCISION CLEAN AND DRY WITH EDGES APPROXIMATED (SEE MEDIA).   WOUND CARE AND RESTRICTIONS REVIEWED WITH PATIENT.  NORMAL DEVICE FUNCTION.  RG

## 2025-01-02 NOTE — TELEPHONE ENCOUNTER
----- Message from Asher Lozada MD sent at 1/2/2025 10:45 AM EST -----  Please have patient start Jardiance 10 mg daily 1 week after the urological surgery    Please update his medication list    Thank you

## 2025-01-02 NOTE — TELEPHONE ENCOUNTER
Spoke with patient and he verbally understood advise and changes in medication         Patient med list is update please refill

## 2025-01-05 ENCOUNTER — RESULTS FOLLOW-UP (OUTPATIENT)
Dept: CARDIOLOGY CLINIC | Facility: CLINIC | Age: 80
End: 2025-01-05

## 2025-01-06 ENCOUNTER — APPOINTMENT (OUTPATIENT)
Dept: RADIOLOGY | Facility: HOSPITAL | Age: 80
DRG: 854 | End: 2025-01-06
Payer: COMMERCIAL

## 2025-01-06 ENCOUNTER — ANESTHESIA EVENT (OUTPATIENT)
Dept: PERIOP | Facility: HOSPITAL | Age: 80
DRG: 854 | End: 2025-01-06
Payer: COMMERCIAL

## 2025-01-06 ENCOUNTER — HOSPITAL ENCOUNTER (INPATIENT)
Facility: HOSPITAL | Age: 80
LOS: 3 days | Discharge: HOME | DRG: 854 | End: 2025-01-10
Attending: EMERGENCY MEDICINE | Admitting: FAMILY MEDICINE
Payer: COMMERCIAL

## 2025-01-06 ENCOUNTER — TELEPHONE (OUTPATIENT)
Dept: UROLOGY | Facility: CLINIC | Age: 80
End: 2025-01-06

## 2025-01-06 ENCOUNTER — ANESTHESIA (OUTPATIENT)
Dept: PERIOP | Facility: HOSPITAL | Age: 80
DRG: 854 | End: 2025-01-06
Payer: COMMERCIAL

## 2025-01-06 ENCOUNTER — APPOINTMENT (EMERGENCY)
Dept: CT IMAGING | Facility: HOSPITAL | Age: 80
DRG: 854 | End: 2025-01-06
Payer: COMMERCIAL

## 2025-01-06 DIAGNOSIS — M54.9 BACK PAIN: ICD-10-CM
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
## 2025-01-06 DIAGNOSIS — N20.0 KIDNEY STONE: Primary | ICD-10-CM

## 2025-01-06 DIAGNOSIS — N39.0 UTI (URINARY TRACT INFECTION): ICD-10-CM

## 2025-01-06 PROBLEM — A41.9 SEPSIS WITH ACUTE RENAL FAILURE (HCC): Status: ACTIVE | Noted: 2025-01-06

## 2025-01-06 PROBLEM — R65.10 SIRS (SYSTEMIC INFLAMMATORY RESPONSE SYNDROME) (HCC): Status: ACTIVE | Noted: 2025-01-06

## 2025-01-06 PROBLEM — R65.20 SEPSIS WITH ACUTE RENAL FAILURE (HCC): Status: ACTIVE | Noted: 2025-01-06

## 2025-01-06 PROBLEM — N18.9 ACUTE KIDNEY INJURY SUPERIMPOSED ON CHRONIC KIDNEY DISEASE  (HCC): Status: ACTIVE | Noted: 2020-11-21

## 2025-01-06 PROBLEM — N17.9 ACUTE KIDNEY INJURY SUPERIMPOSED ON CHRONIC KIDNEY DISEASE  (HCC): Status: ACTIVE | Noted: 2020-11-21

## 2025-01-06 PROBLEM — R74.8 ELEVATED CPK: Status: ACTIVE | Noted: 2025-01-06

## 2025-01-06 PROBLEM — N20.1 LEFT URETERAL STONE: Status: ACTIVE | Noted: 2025-01-06

## 2025-01-06 PROBLEM — N17.9 SEPSIS WITH ACUTE RENAL FAILURE (HCC): Status: ACTIVE | Noted: 2025-01-06

## 2025-01-06 LAB
2HR DELTA HS TROPONIN: 13 NG/L
4HR DELTA HS TROPONIN: 29 NG/L
ALBUMIN SERPL BCG-MCNC: 4 G/DL (ref 3.5–5)
ALP SERPL-CCNC: 68 U/L (ref 34–104)
ALT SERPL W P-5'-P-CCNC: 15 U/L (ref 7–52)
ANION GAP SERPL CALCULATED.3IONS-SCNC: 11 MMOL/L (ref 4–13)
APTT PPP: 36 SECONDS (ref 23–34)
AST SERPL W P-5'-P-CCNC: 27 U/L (ref 13–39)
BACTERIA UR QL AUTO: ABNORMAL /HPF
BASE EX.OXY STD BLDV CALC-SCNC: 84.3 % (ref 60–80)
BASE EXCESS BLDV CALC-SCNC: -8.2 MMOL/L
BASOPHILS # BLD AUTO: 0.05 THOUSANDS/ΜL (ref 0–0.1)
BASOPHILS NFR BLD AUTO: 0 % (ref 0–1)
BILIRUB SERPL-MCNC: 0.4 MG/DL (ref 0.2–1)
BILIRUB UR QL STRIP: NEGATIVE
BUDDING YEAST: ABNORMAL
BUN SERPL-MCNC: 39 MG/DL (ref 5–25)
CALCIUM SERPL-MCNC: 8.6 MG/DL (ref 8.4–10.2)
CARDIAC TROPONIN I PNL SERPL HS: 24 NG/L (ref ?–50)
CARDIAC TROPONIN I PNL SERPL HS: 37 NG/L (ref ?–50)
CARDIAC TROPONIN I PNL SERPL HS: 53 NG/L (ref ?–50)
CHLORIDE SERPL-SCNC: 111 MMOL/L (ref 96–108)
CK SERPL-CCNC: 713 U/L (ref 39–308)
CLARITY UR: ABNORMAL
CO2 SERPL-SCNC: 19 MMOL/L (ref 21–32)
COLOR UR: YELLOW
CREAT SERPL-MCNC: 1.8 MG/DL (ref 0.6–1.3)
EOSINOPHIL # BLD AUTO: 0.01 THOUSAND/ΜL (ref 0–0.61)
EOSINOPHIL NFR BLD AUTO: 0 % (ref 0–6)
ERYTHROCYTE [DISTWIDTH] IN BLOOD BY AUTOMATED COUNT: 13.7 % (ref 11.6–15.1)
GFR SERPL CREATININE-BSD FRML MDRD: 35 ML/MIN/1.73SQ M
GLUCOSE SERPL-MCNC: 89 MG/DL (ref 65–140)
GLUCOSE UR STRIP-MCNC: NEGATIVE MG/DL
HCO3 BLDV-SCNC: 16.6 MMOL/L (ref 24–30)
HCT VFR BLD AUTO: 37.6 % (ref 36.5–49.3)
HGB BLD-MCNC: 11.7 G/DL (ref 12–17)
HGB UR QL STRIP.AUTO: ABNORMAL
IMM GRANULOCYTES # BLD AUTO: 0.09 THOUSAND/UL (ref 0–0.2)
IMM GRANULOCYTES NFR BLD AUTO: 0 % (ref 0–2)
INR PPP: 1.09 (ref 0.85–1.19)
KETONES UR STRIP-MCNC: ABNORMAL MG/DL
LACTATE SERPL-SCNC: 1.9 MMOL/L (ref 0.5–2)
LEUKOCYTE ESTERASE UR QL STRIP: ABNORMAL
LYMPHOCYTES # BLD AUTO: 0.81 THOUSANDS/ΜL (ref 0.6–4.47)
LYMPHOCYTES NFR BLD AUTO: 4 % (ref 14–44)
MCH RBC QN AUTO: 30.9 PG (ref 26.8–34.3)
MCHC RBC AUTO-ENTMCNC: 31.1 G/DL (ref 31.4–37.4)
MCV RBC AUTO: 99 FL (ref 82–98)
MONOCYTES # BLD AUTO: 1.26 THOUSAND/ΜL (ref 0.17–1.22)
MONOCYTES NFR BLD AUTO: 6 % (ref 4–12)
MUCOUS THREADS UR QL AUTO: ABNORMAL
NEUTROPHILS # BLD AUTO: 20.22 THOUSANDS/ΜL (ref 1.85–7.62)
NEUTS SEG NFR BLD AUTO: 90 % (ref 43–75)
NITRITE UR QL STRIP: POSITIVE
NON-SQ EPI CELLS URNS QL MICRO: ABNORMAL /HPF
NRBC BLD AUTO-RTO: 0 /100 WBCS
O2 CT BLDV-SCNC: 14 ML/DL
PCO2 BLDV: 31.8 MM HG (ref 42–50)
PH BLDV: 7.33 [PH] (ref 7.3–7.4)
PH UR STRIP.AUTO: 6 [PH]
PLATELET # BLD AUTO: 376 THOUSANDS/UL (ref 149–390)
PMV BLD AUTO: 9.3 FL (ref 8.9–12.7)
PO2 BLDV: 54.2 MM HG (ref 35–45)
POTASSIUM SERPL-SCNC: 4.5 MMOL/L (ref 3.5–5.3)
PROCALCITONIN SERPL-MCNC: 0.13 NG/ML
PROT SERPL-MCNC: 7.4 G/DL (ref 6.4–8.4)
PROT UR STRIP-MCNC: ABNORMAL MG/DL
PROTHROMBIN TIME: 14.8 SECONDS (ref 12.3–15)
RBC # BLD AUTO: 3.79 MILLION/UL (ref 3.88–5.62)
RBC #/AREA URNS AUTO: ABNORMAL /HPF
SODIUM SERPL-SCNC: 141 MMOL/L (ref 135–147)
SP GR UR STRIP.AUTO: 1.02 (ref 1–1.03)
UROBILINOGEN UR QL STRIP.AUTO: 0.2 E.U./DL
WBC # BLD AUTO: 22.44 THOUSAND/UL (ref 4.31–10.16)
WBC #/AREA URNS AUTO: ABNORMAL /HPF
WBC CLUMPS # UR AUTO: PRESENT /UL

## 2025-01-06 PROCEDURE — 71260 CT THORAX DX C+: CPT

## 2025-01-06 PROCEDURE — 52332 CYSTOSCOPY AND TREATMENT: CPT | Performed by: UROLOGY

## 2025-01-06 PROCEDURE — 0T778DZ DILATION OF LEFT URETER WITH INTRALUMINAL DEVICE, VIA NATURAL OR ARTIFICIAL OPENING ENDOSCOPIC: ICD-10-PCS | Performed by: UROLOGY

## 2025-01-06 PROCEDURE — C1769 GUIDE WIRE: HCPCS | Performed by: UROLOGY

## 2025-01-06 PROCEDURE — 74420 UROGRAPHY RTRGR +-KUB: CPT

## 2025-01-06 PROCEDURE — 99285 EMERGENCY DEPT VISIT HI MDM: CPT | Performed by: EMERGENCY MEDICINE

## 2025-01-06 PROCEDURE — 97163 PT EVAL HIGH COMPLEX 45 MIN: CPT

## 2025-01-06 PROCEDURE — 87077 CULTURE AEROBIC IDENTIFY: CPT | Performed by: EMERGENCY MEDICINE

## 2025-01-06 PROCEDURE — 87040 BLOOD CULTURE FOR BACTERIA: CPT | Performed by: EMERGENCY MEDICINE

## 2025-01-06 PROCEDURE — 83605 ASSAY OF LACTIC ACID: CPT | Performed by: EMERGENCY MEDICINE

## 2025-01-06 PROCEDURE — 80053 COMPREHEN METABOLIC PANEL: CPT | Performed by: EMERGENCY MEDICINE

## 2025-01-06 PROCEDURE — 81001 URINALYSIS AUTO W/SCOPE: CPT | Performed by: EMERGENCY MEDICINE

## 2025-01-06 PROCEDURE — 99222 1ST HOSP IP/OBS MODERATE 55: CPT | Performed by: HOSPITALIST

## 2025-01-06 PROCEDURE — 87186 SC STD MICRODIL/AGAR DIL: CPT | Performed by: EMERGENCY MEDICINE

## 2025-01-06 PROCEDURE — 36415 COLL VENOUS BLD VENIPUNCTURE: CPT | Performed by: EMERGENCY MEDICINE

## 2025-01-06 PROCEDURE — 87086 URINE CULTURE/COLONY COUNT: CPT | Performed by: EMERGENCY MEDICINE

## 2025-01-06 PROCEDURE — 85730 THROMBOPLASTIN TIME PARTIAL: CPT | Performed by: EMERGENCY MEDICINE

## 2025-01-06 PROCEDURE — 74177 CT ABD & PELVIS W/CONTRAST: CPT

## 2025-01-06 PROCEDURE — C1894 INTRO/SHEATH, NON-LASER: HCPCS | Performed by: UROLOGY

## 2025-01-06 PROCEDURE — 85025 COMPLETE CBC W/AUTO DIFF WBC: CPT | Performed by: EMERGENCY MEDICINE

## 2025-01-06 PROCEDURE — 99285 EMERGENCY DEPT VISIT HI MDM: CPT

## 2025-01-06 PROCEDURE — 82805 BLOOD GASES W/O2 SATURATION: CPT | Performed by: EMERGENCY MEDICINE

## 2025-01-06 PROCEDURE — 84484 ASSAY OF TROPONIN QUANT: CPT | Performed by: EMERGENCY MEDICINE

## 2025-01-06 PROCEDURE — 99223 1ST HOSP IP/OBS HIGH 75: CPT | Performed by: UROLOGY

## 2025-01-06 PROCEDURE — 93005 ELECTROCARDIOGRAM TRACING: CPT

## 2025-01-06 PROCEDURE — 96365 THER/PROPH/DIAG IV INF INIT: CPT

## 2025-01-06 PROCEDURE — C2625 STENT, NON-COR, TEM W/DEL SY: HCPCS | Performed by: UROLOGY

## 2025-01-06 PROCEDURE — 84145 PROCALCITONIN (PCT): CPT | Performed by: EMERGENCY MEDICINE

## 2025-01-06 PROCEDURE — 72125 CT NECK SPINE W/O DYE: CPT

## 2025-01-06 PROCEDURE — C1758 CATHETER, URETERAL: HCPCS | Performed by: UROLOGY

## 2025-01-06 PROCEDURE — 84484 ASSAY OF TROPONIN QUANT: CPT | Performed by: HOSPITALIST

## 2025-01-06 PROCEDURE — BT1F1ZZ FLUOROSCOPY OF LEFT KIDNEY, URETER AND BLADDER USING LOW OSMOLAR CONTRAST: ICD-10-PCS | Performed by: UROLOGY

## 2025-01-06 PROCEDURE — 70450 CT HEAD/BRAIN W/O DYE: CPT

## 2025-01-06 PROCEDURE — 85610 PROTHROMBIN TIME: CPT | Performed by: EMERGENCY MEDICINE

## 2025-01-06 PROCEDURE — 82550 ASSAY OF CK (CPK): CPT | Performed by: EMERGENCY MEDICINE

## 2025-01-06 DEVICE — INLAY OPTIMA URETERAL STENT W/O GUIDEWIRE
Type: IMPLANTABLE DEVICE | Site: BLADDER | Status: FUNCTIONAL
Brand: BARD® INLAY OPTIMA® URETERAL STENT

## 2025-01-06 RX ORDER — ASPIRIN 81 MG/1
81 TABLET, CHEWABLE ORAL DAILY
Status: DISCONTINUED | OUTPATIENT
Start: 2025-01-06 | End: 2025-01-10 | Stop reason: HOSPADM

## 2025-01-06 RX ORDER — SPIRONOLACTONE 25 MG/1
12.5 TABLET ORAL DAILY
Status: DISCONTINUED | OUTPATIENT
Start: 2025-01-06 | End: 2025-01-10 | Stop reason: HOSPADM

## 2025-01-06 RX ORDER — GABAPENTIN 100 MG/1
100 CAPSULE ORAL 3 TIMES DAILY
Status: DISCONTINUED | OUTPATIENT
Start: 2025-01-06 | End: 2025-01-10 | Stop reason: HOSPADM

## 2025-01-06 RX ORDER — ONDANSETRON 2 MG/ML
4 INJECTION INTRAMUSCULAR; INTRAVENOUS EVERY 4 HOURS PRN
Status: DISCONTINUED | OUTPATIENT
Start: 2025-01-06 | End: 2025-01-10 | Stop reason: HOSPADM

## 2025-01-06 RX ORDER — ALBUTEROL SULFATE 0.83 MG/ML
2.5 SOLUTION RESPIRATORY (INHALATION) EVERY 4 HOURS PRN
Status: DISCONTINUED | OUTPATIENT
Start: 2025-01-06 | End: 2025-01-10 | Stop reason: HOSPADM

## 2025-01-06 RX ORDER — SODIUM CHLORIDE, SODIUM GLUCONATE, SODIUM ACETATE, POTASSIUM CHLORIDE, MAGNESIUM CHLORIDE, SODIUM PHOSPHATE, DIBASIC, AND POTASSIUM PHOSPHATE .53; .5; .37; .037; .03; .012; .00082 G/100ML; G/100ML; G/100ML; G/100ML; G/100ML; G/100ML; G/100ML
84 INJECTION, SOLUTION INTRAVENOUS CONTINUOUS
Status: DISCONTINUED | OUTPATIENT
Start: 2025-01-06 | End: 2025-01-06

## 2025-01-06 RX ORDER — METOCLOPRAMIDE HYDROCHLORIDE 5 MG/ML
10 INJECTION INTRAMUSCULAR; INTRAVENOUS ONCE AS NEEDED
Status: DISCONTINUED | OUTPATIENT
Start: 2025-01-06 | End: 2025-01-06 | Stop reason: HOSPADM

## 2025-01-06 RX ORDER — LEVOFLOXACIN 5 MG/ML
500 INJECTION, SOLUTION INTRAVENOUS
Status: COMPLETED | OUTPATIENT
Start: 2025-01-06 | End: 2025-01-06

## 2025-01-06 RX ORDER — SODIUM CHLORIDE, SODIUM GLUCONATE, SODIUM ACETATE, POTASSIUM CHLORIDE, MAGNESIUM CHLORIDE, SODIUM PHOSPHATE, DIBASIC, AND POTASSIUM PHOSPHATE .53; .5; .37; .037; .03; .012; .00082 G/100ML; G/100ML; G/100ML; G/100ML; G/100ML; G/100ML; G/100ML
75 INJECTION, SOLUTION INTRAVENOUS CONTINUOUS
Status: DISCONTINUED | OUTPATIENT
Start: 2025-01-06 | End: 2025-01-10

## 2025-01-06 RX ORDER — HEPARIN SODIUM 5000 [USP'U]/ML
5000 INJECTION, SOLUTION INTRAVENOUS; SUBCUTANEOUS EVERY 8 HOURS SCHEDULED
Status: DISCONTINUED | OUTPATIENT
Start: 2025-01-07 | End: 2025-01-10 | Stop reason: HOSPADM

## 2025-01-06 RX ORDER — SODIUM CHLORIDE, SODIUM LACTATE, POTASSIUM CHLORIDE, CALCIUM CHLORIDE 600; 310; 30; 20 MG/100ML; MG/100ML; MG/100ML; MG/100ML
INJECTION, SOLUTION INTRAVENOUS CONTINUOUS PRN
Status: DISCONTINUED | OUTPATIENT
Start: 2025-01-06 | End: 2025-01-06

## 2025-01-06 RX ORDER — ACETAMINOPHEN 325 MG/1
650 TABLET ORAL EVERY 4 HOURS PRN
Status: DISCONTINUED | OUTPATIENT
Start: 2025-01-06 | End: 2025-01-10 | Stop reason: HOSPADM

## 2025-01-06 RX ORDER — PROPOFOL 10 MG/ML
INJECTION, EMULSION INTRAVENOUS AS NEEDED
Status: DISCONTINUED | OUTPATIENT
Start: 2025-01-06 | End: 2025-01-06

## 2025-01-06 RX ORDER — EPHEDRINE SULFATE 50 MG/ML
INJECTION INTRAVENOUS AS NEEDED
Status: DISCONTINUED | OUTPATIENT
Start: 2025-01-06 | End: 2025-01-06

## 2025-01-06 RX ORDER — FENTANYL CITRATE 50 UG/ML
INJECTION, SOLUTION INTRAMUSCULAR; INTRAVENOUS AS NEEDED
Status: DISCONTINUED | OUTPATIENT
Start: 2025-01-06 | End: 2025-01-06

## 2025-01-06 RX ORDER — FLUTICASONE FUROATE AND VILANTEROL 200; 25 UG/1; UG/1
1 POWDER RESPIRATORY (INHALATION) DAILY
Status: DISCONTINUED | OUTPATIENT
Start: 2025-01-06 | End: 2025-01-08

## 2025-01-06 RX ORDER — ZOLPIDEM TARTRATE 5 MG/1
10 TABLET ORAL
Status: DISCONTINUED | OUTPATIENT
Start: 2025-01-06 | End: 2025-01-10 | Stop reason: HOSPADM

## 2025-01-06 RX ORDER — MONTELUKAST SODIUM 10 MG/1
10 TABLET ORAL
Status: DISCONTINUED | OUTPATIENT
Start: 2025-01-06 | End: 2025-01-10 | Stop reason: HOSPADM

## 2025-01-06 RX ORDER — HYDROMORPHONE HCL/PF 1 MG/ML
0.5 SYRINGE (ML) INJECTION
Status: DISCONTINUED | OUTPATIENT
Start: 2025-01-06 | End: 2025-01-06 | Stop reason: HOSPADM

## 2025-01-06 RX ORDER — FERROUS SULFATE 325(65) MG
325 TABLET ORAL
Status: DISCONTINUED | OUTPATIENT
Start: 2025-01-06 | End: 2025-01-10 | Stop reason: HOSPADM

## 2025-01-06 RX ORDER — HYDROMORPHONE HCL/PF 1 MG/ML
0.5 SYRINGE (ML) INJECTION EVERY 4 HOURS PRN
Status: DISCONTINUED | OUTPATIENT
Start: 2025-01-06 | End: 2025-01-10 | Stop reason: HOSPADM

## 2025-01-06 RX ORDER — LIDOCAINE HYDROCHLORIDE 10 MG/ML
INJECTION, SOLUTION EPIDURAL; INFILTRATION; INTRACAUDAL; PERINEURAL AS NEEDED
Status: DISCONTINUED | OUTPATIENT
Start: 2025-01-06 | End: 2025-01-06

## 2025-01-06 RX ORDER — TAMSULOSIN HYDROCHLORIDE 0.4 MG/1
0.4 CAPSULE ORAL
Status: DISCONTINUED | OUTPATIENT
Start: 2025-01-06 | End: 2025-01-10 | Stop reason: HOSPADM

## 2025-01-06 RX ORDER — FENTANYL CITRATE/PF 50 MCG/ML
50 SYRINGE (ML) INJECTION
Status: DISCONTINUED | OUTPATIENT
Start: 2025-01-06 | End: 2025-01-06 | Stop reason: HOSPADM

## 2025-01-06 RX ORDER — AMMONIUM LACTATE 12 G/100G
LOTION TOPICAL DAILY
Status: DISCONTINUED | OUTPATIENT
Start: 2025-01-06 | End: 2025-01-10 | Stop reason: HOSPADM

## 2025-01-06 RX ORDER — ATORVASTATIN CALCIUM 40 MG/1
40 TABLET, FILM COATED ORAL DAILY
Status: DISCONTINUED | OUTPATIENT
Start: 2025-01-06 | End: 2025-01-10 | Stop reason: HOSPADM

## 2025-01-06 RX ORDER — SODIUM CHLORIDE, SODIUM LACTATE, POTASSIUM CHLORIDE, CALCIUM CHLORIDE 600; 310; 30; 20 MG/100ML; MG/100ML; MG/100ML; MG/100ML
75 INJECTION, SOLUTION INTRAVENOUS CONTINUOUS
Status: DISCONTINUED | OUTPATIENT
Start: 2025-01-06 | End: 2025-01-07

## 2025-01-06 RX ORDER — LEVOTHYROXINE SODIUM 100 UG/1
100 TABLET ORAL DAILY
Status: DISCONTINUED | OUTPATIENT
Start: 2025-01-06 | End: 2025-01-10 | Stop reason: HOSPADM

## 2025-01-06 RX ORDER — LIDOCAINE 50 MG/G
2 PATCH TOPICAL DAILY
Status: DISCONTINUED | OUTPATIENT
Start: 2025-01-06 | End: 2025-01-10 | Stop reason: HOSPADM

## 2025-01-06 RX ORDER — BEXAROTENE 75 MG/1
600 CAPSULE ORAL DAILY
Status: DISCONTINUED | OUTPATIENT
Start: 2025-01-06 | End: 2025-01-08

## 2025-01-06 RX ADMIN — HYDROMORPHONE HYDROCHLORIDE 0.5 MG: 1 INJECTION, SOLUTION INTRAMUSCULAR; INTRAVENOUS; SUBCUTANEOUS at 22:08

## 2025-01-06 RX ADMIN — LEVOFLOXACIN: 500 INJECTION, SOLUTION INTRAVENOUS at 12:40

## 2025-01-06 RX ADMIN — LIDOCAINE HYDROCHLORIDE 50 MG: 10 INJECTION, SOLUTION EPIDURAL; INFILTRATION; INTRACAUDAL; PERINEURAL at 12:42

## 2025-01-06 RX ADMIN — LIDOCAINE 2 PATCH: 700 PATCH TOPICAL at 09:05

## 2025-01-06 RX ADMIN — CYANOCOBALAMIN TAB 500 MCG 1000 MCG: 500 TAB at 09:04

## 2025-01-06 RX ADMIN — GABAPENTIN 100 MG: 100 CAPSULE ORAL at 16:59

## 2025-01-06 RX ADMIN — GABAPENTIN 100 MG: 100 CAPSULE ORAL at 22:04

## 2025-01-06 RX ADMIN — FLUTICASONE FUROATE AND VILANTEROL TRIFENATATE 1 PUFF: 200; 25 POWDER RESPIRATORY (INHALATION) at 09:04

## 2025-01-06 RX ADMIN — TAMSULOSIN HYDROCHLORIDE 0.4 MG: 0.4 CAPSULE ORAL at 16:59

## 2025-01-06 RX ADMIN — SODIUM CHLORIDE, SODIUM GLUCONATE, SODIUM ACETATE, POTASSIUM CHLORIDE, MAGNESIUM CHLORIDE, SODIUM PHOSPHATE, DIBASIC, AND POTASSIUM PHOSPHATE 75 ML/HR: .53; .5; .37; .037; .03; .012; .00082 INJECTION, SOLUTION INTRAVENOUS at 06:53

## 2025-01-06 RX ADMIN — MORPHINE SULFATE 2 MG: 2 INJECTION, SOLUTION INTRAMUSCULAR; INTRAVENOUS at 06:46

## 2025-01-06 RX ADMIN — ONDANSETRON 4 MG: 2 INJECTION INTRAMUSCULAR; INTRAVENOUS at 12:54

## 2025-01-06 RX ADMIN — FENTANYL CITRATE 50 MCG: 50 INJECTION INTRAMUSCULAR; INTRAVENOUS at 13:35

## 2025-01-06 RX ADMIN — LEVOTHYROXINE SODIUM 100 MCG: 0.1 TABLET ORAL at 09:03

## 2025-01-06 RX ADMIN — EPHEDRINE SULFATE 10 MG: 50 INJECTION, SOLUTION INTRAVENOUS at 13:00

## 2025-01-06 RX ADMIN — FENTANYL CITRATE 50 MCG: 50 INJECTION, SOLUTION INTRAMUSCULAR; INTRAVENOUS at 13:05

## 2025-01-06 RX ADMIN — ATORVASTATIN CALCIUM 40 MG: 40 TABLET, FILM COATED ORAL at 09:13

## 2025-01-06 RX ADMIN — ONDANSETRON 4 MG: 2 INJECTION INTRAMUSCULAR; INTRAVENOUS at 06:45

## 2025-01-06 RX ADMIN — EPHEDRINE SULFATE 10 MG: 50 INJECTION, SOLUTION INTRAVENOUS at 12:49

## 2025-01-06 RX ADMIN — AMMONIUM LACTATE: 12 LOTION TOPICAL at 09:06

## 2025-01-06 RX ADMIN — SODIUM CHLORIDE, SODIUM LACTATE, POTASSIUM CHLORIDE, AND CALCIUM CHLORIDE 75 ML/HR: .6; .31; .03; .02 INJECTION, SOLUTION INTRAVENOUS at 17:01

## 2025-01-06 RX ADMIN — FENTANYL CITRATE 25 MCG: 50 INJECTION, SOLUTION INTRAMUSCULAR; INTRAVENOUS at 12:46

## 2025-01-06 RX ADMIN — FERROUS SULFATE TAB 325 MG (65 MG ELEMENTAL FE) 325 MG: 325 (65 FE) TAB at 06:42

## 2025-01-06 RX ADMIN — MONTELUKAST 10 MG: 10 TABLET, FILM COATED ORAL at 22:04

## 2025-01-06 RX ADMIN — FENTANYL CITRATE 50 MCG: 50 INJECTION INTRAMUSCULAR; INTRAVENOUS at 13:27

## 2025-01-06 RX ADMIN — SODIUM CHLORIDE, SODIUM LACTATE, POTASSIUM CHLORIDE, AND CALCIUM CHLORIDE: .6; .31; .03; .02 INJECTION, SOLUTION INTRAVENOUS at 12:42

## 2025-01-06 RX ADMIN — FENTANYL CITRATE 25 MCG: 50 INJECTION, SOLUTION INTRAMUSCULAR; INTRAVENOUS at 12:54

## 2025-01-06 RX ADMIN — EMPAGLIFLOZIN 10 MG: 10 TABLET, FILM COATED ORAL at 09:03

## 2025-01-06 RX ADMIN — IOHEXOL 100 ML: 350 INJECTION, SOLUTION INTRAVENOUS at 04:49

## 2025-01-06 RX ADMIN — CEFTRIAXONE SODIUM 2000 MG: 10 INJECTION, POWDER, FOR SOLUTION INTRAVENOUS at 05:48

## 2025-01-06 RX ADMIN — UMECLIDINIUM 1 PUFF: 62.5 AEROSOL, POWDER ORAL at 09:04

## 2025-01-06 RX ADMIN — EPHEDRINE SULFATE 10 MG: 50 INJECTION, SOLUTION INTRAVENOUS at 12:54

## 2025-01-06 RX ADMIN — GABAPENTIN 100 MG: 100 CAPSULE ORAL at 09:04

## 2025-01-06 RX ADMIN — METOPROLOL SUCCINATE 75 MG: 25 TABLET, EXTENDED RELEASE ORAL at 09:04

## 2025-01-06 RX ADMIN — SODIUM CHLORIDE 500 ML: 0.9 INJECTION, SOLUTION INTRAVENOUS at 06:44

## 2025-01-06 RX ADMIN — SODIUM CHLORIDE, SODIUM GLUCONATE, SODIUM ACETATE, POTASSIUM CHLORIDE, MAGNESIUM CHLORIDE, SODIUM PHOSPHATE, DIBASIC, AND POTASSIUM PHOSPHATE 75 ML/HR: .53; .5; .37; .037; .03; .012; .00082 INJECTION, SOLUTION INTRAVENOUS at 14:18

## 2025-01-06 RX ADMIN — PROPOFOL 150 MG: 10 INJECTION, EMULSION INTRAVENOUS at 12:42

## 2025-01-06 NOTE — PLAN OF CARE
Problem: Potential for Falls  Goal: Patient will remain free of falls  Description: INTERVENTIONS:  - Educate patient/family on patient safety including physical limitations  - Instruct patient to call for assistance with activity   - Consult OT/PT to assist with strengthening/mobility   - Keep Call bell within reach  - Keep bed low and locked with side rails adjusted as appropriate  - Keep care items and personal belongings within reach  - Initiate and maintain comfort rounds  - Make Fall Risk Sign visible to staff  - Offer Toileting every 2  Hours, in advance of need  - Initiate/Maintain bed alarm  - Obtain necessary fall risk management equipment: daily  - Apply yellow socks and bracelet for high fall risk patients  - Consider moving patient to room near nurses station  Outcome: Progressing

## 2025-01-06 NOTE — ASSESSMENT & PLAN NOTE
"CT abdomen/pelvis showing \"0.2 cm distal left ureteral stone. Mild ureteronephrosis.\"  Urology consulted,  NPO for OR today for cystoureteroscopy and likely left ureteral stent  Pain control, IV fluids, IV antibiotics  "
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
## 2025-01-06 NOTE — ASSESSMENT & PLAN NOTE
Probably due to mild rhabdo   Patient could only get gentle IV fluid hydration given his cardiac status  Repeat CPK in a.m.

## 2025-01-06 NOTE — TELEPHONE ENCOUNTER
I have sent a message to Dr Russ regarding today stent placement and the plan for surgery as previously planned later this month for his outlet and bladder stones

## 2025-01-06 NOTE — ASSESSMENT & PLAN NOTE
ER discussed the case with urology who suggested that the patient be kept n.p.o. for possible intervention  Continue IV ceftriaxone  Tylenol as needed for mild pain and for fevers  Will order IV morphine as needed for moderate pain and IV Dilaudid for severe pain  Continue gentle IV fluid hydration

## 2025-01-06 NOTE — PLAN OF CARE
Problem: PHYSICAL THERAPY ADULT  Goal: Performs mobility at highest level of function for planned discharge setting.  See evaluation for individualized goals.  Description: Treatment/Interventions: Functional transfer training, LE strengthening/ROM, Therapeutic exercise, Endurance training, Patient/family training, Equipment eval/education, Bed mobility, Gait training, Continued evaluation, Spoke to nursing, OT  Equipment Recommended: Walker (RW)       See flowsheet documentation for full assessment, interventions and recommendations.  Note: Prognosis: Good  Problem List: Decreased range of motion, Decreased strength, Decreased endurance, Impaired balance, Decreased mobility, Pain (spinal precautions)  Assessment: Sherly Peña is a 79 y.o. male admitted to [facility] on 1/6/2025 for Sepsis with acute renal failure (HCC), ELLYN, left ureteral stone, SOPD, HTN. Pt  has a past medical history of Agent orange exposure, Anemia, Benign colon polyp, BPH (benign prostatic hyperplasia), Bradycardia, Cardiomyopathy ([facility]), Chest discomfort, CHF (congestive heart failure) ([facility]), Chronic bronchitis ([facility]), Chronic kidney disease, COPD (chronic obstructive pulmonary disease) (HCC), Coronary artery disease (cardio myopthia), Emphysema of lung (HCC) (2013), H/O nonmelanoma skin cancer, HHD (hypertensive heart disease), HTN (hypertension), lymphoma, Hyperlipidemia, ICD (implantable cardioverter-defibrillator) in place, Insomnia, Mycosis fungoides ([facility]), PVC (premature ventricular contraction), PVT (paroxysmal ventricular tachycardia) ([facility]), and SOB (shortness of breath).. Order placed for PT eval and tx. PT was consulted and pt was seen on 1/6/2025 for mobility assessment and d/c planning. Chart review and two person identifiers were completed.   Currently pt presents with decreased strength , decreased static sitting balance , decreased dynamic sitting balance , decreased static standing balance, decreased dynamic standing balance ,  decreased gait speed, decreased step length , and decreased muscular endurance . Due to these impairments, they will require assistance to perform bed mobility, sit to stand , ambulation, and transfers. Pt is currently functioning at a minimum assistance x1 level for bed mobility, minimum assistance x1 level for transfers, minimum assistance x1 level for ambulation with Rolling Walker.. These activity limitations significantly impact their ability to participate in previous home and community roles and responsibilities  and ambulation in home. The patient's AM-[facility] Basic Mobility Inpatient Short Form Raw Score is 17. PT recommends level II moderate resource intensity. They will benefit from skilled therapy to to reduce the risk of falls, to allow for safe ambulation, and to maximize functional potential.  Barriers to Discharge: Inaccessible home environment, Decreased caregiver support, Other (Comment) (pt reports decreased social support)     Rehab Resource Intensity Level, PT: II (Moderate Resource Intensity)    See flowsheet documentation for full assessment.

## 2025-01-06 NOTE — ANESTHESIA PREPROCEDURE EVALUATION
Procedure:  CYSTOSCOPY RETROGRADE PYELOGRAM WITH INSERTION STENT URETERAL (Left: Bladder)    Relevant Problems   CARDIO   (+) Chronic systolic congestive heart failure (HCC)   (+) HTN (hypertension)   (+) Hyperlipidemia   (+) PVC (premature ventricular contraction)      ENDO   (+) Acquired hypothyroidism      /RENAL   (+) Acute kidney injury superimposed on chronic kidney disease  (HCC)   (+) BPH (benign prostatic hyperplasia)   (+) Chronic kidney disease-mineral and bone disorder   (+) Nephrolithiasis   (+) Stage 3 chronic kidney disease (HCC)      HEMATOLOGY   (+) Mycosis fungoides (HCC)      MUSCULOSKELETAL   (+) Lumbar pain      PULMONARY   (+) COPD (chronic obstructive pulmonary disease) (HCC)   (+) Chronic bronchitis (HCC)   (+) Dependence on nocturnal oxygen therapy   (+) Shortness of breath   (+) Smoking             Anesthesia Plan  ASA Score-     Anesthesia Type-     Plan Factors-    Induction-     Postoperative Plan-     Perioperative Resuscitation Plan - Level 1 - Full Code.       Informed Consent-

## 2025-01-06 NOTE — ASSESSMENT & PLAN NOTE
CT scan 2 mm distal left ureteral stone, mild ureteronephrosis  WBC 22  Afebrile, tachycardia on admission resolved  Creatinine 1.8, baseline 1.4  UA positive  Back and left-sided flank pain-likely more related to his fall, ureteral stone can be contributing  Discussed with patient cystoscopy,, retrograde pyelogram, insertion of left ureteral stent  Patient will undergo definitive ureteroscopy and TURBT, cystolitholopaxy later this month with Dr. Russ

## 2025-01-06 NOTE — ASSESSMENT & PLAN NOTE
Lab Results   Component Value Date    EGFR 35 01/06/2025    EGFR 61 11/26/2024    EGFR 54 08/12/2024    CREATININE 1.80 (H) 01/06/2025    CREATININE 1.14 11/26/2024    CREATININE 1.27 08/12/2024   Fall and obstructing stone  Repeat this AM

## 2025-01-06 NOTE — OP NOTE
OPERATIVE REPORT  PATIENT NAME: Sherly Peña    :  1945  MRN: [id number]  Pt Location: [facility] OR ROOM 03    SURGERY DATE: 2025    Surgeons and Role:     * Sanjeev Boswell MD - Primary    Preop Diagnosis:  Kidney stone [N20.0]    Post-Op Diagnosis Codes:     * Kidney stone [N20.0]    Procedure(s):  Left - CYSTOSCOPY RETROGRADE PYELOGRAM WITH INSERTION STENT URETERAL    Specimen(s):  * No specimens in log *    Estimated Blood Loss:   Minimal    Drains:  * No LDAs found *    Anesthesia Type:   General    Operative Indications:  Kidney stone [N20.0]      Operative Findings:  Obstructing prostate, multiple bladder stones.  Successful placement of a 6 Cambodian by 26 cm left ureteral stent with drainage of turbid urine.  A Adamson catheter was placed for maximal drainage of the left renal unit.  He will plan to follow-up with Dr. Santiago for cystoscopy with transurethral resection of prostate and cystolitholapaxy with all indicated procedures to include possible bilateral ureteroscopy with left ureteroscopy and laser lithotripsy      Complications:   None    Procedure and Technique:          PROCEDURES PERFORMED:  1) Cystoscopy  2) left retrograde pyelography with fluoroscopic interpretation  3) left ureteral stent placement (6F x 26cm)    SURGEON:  Sanjeev Boswell MD    ASSISTANTS:  none    NOTE:  There were no qualified teaching residents to assist with this case    ANESTHESIA: General     COMPLICATIONS:   None    ANTIBIOTICS:  levaquin    INTRAOPERATIVE THROMBOEMBOLISM PROPHYLAXIS:  Pneumatic compression stockings     RADIOLOGIC FINDINGS:    1. Retrograde pyelogram was performed on the left side using a 5 Fr open ended catheter.  Approximately 16 mL contrast was used today in aliquots of 4 mL    2. The following findings were noted: J hooking of the left ureter with mild to moderate hydronephrosis is noted        INDICATIONS FOR PROCEDURE:  Sherly Peña is an 79 y.o. old male with acute kidney injury,  leukocytosis, and complicated UTI with a left ureteral stone.  After discussing the options, the patient elected to undergo ureteral stent placement.  We discussed the procedure in detail, the alternatives, and the risks, and they signed informed consent to proceed.  The appropriate laterality was marked    PROCEDURE IN DETAIL:   The patient was identified and brought to the OR.  Antibiotic prophylaxis and DVT prophylaxis were administered.  They were placed in the lithotomy position with care to pad all pressure points.  They were prepared and draped in the usual sterile fashion.      A surgical time out was performed with all in the room in agreement with the correct patient, procedure, indications, and laterality.  A 21-Maltese rigid cystoscope was used to enter the bladder.  The bladder was inspected in its entirety and there were no lesions noted.  The ureteral orifices were identified in their orthotopic positions.  There are multiple bladder stones and an obstructed bladder outlet as well    The left ureteral orifice was identified and a 5 Fr open ended catheter was placed into the ureteral orifice.   The stone was not visible on  fluoroscopic guidance.  A retrograde pyelogram was performed with injection of contrast which demonstrated mild to moderate hydroureteronephrosis and J hooking of the left ureter.  A wire was passed up to the kidney under fluoroscopic guidance.    A 6 Fr x 26 cm JJ stent was then passed up the wire  under fluoroscopic guidance into the left kidney with a good curl noted in the kidney and in the bladder.   The bladder was drained.        The patient was placed back in the supine position, awakened from general anesthesia and brought to the recovery room in stable condition.    SPECIMENS:   No specimens collected during this procedure.     IMPLANTS:   Implant Name Type Inv. Item Serial No.  Lot No. LRB No. Used Action   STENT URETERAL 6FR 26CM INLAY OPTIMA - [id number]   STENT URETERAL 6FR 26CM INLAY [facility] [employer] [id number] Left 1 Implanted        COMPLICATIONS: None    DISPOSITION: PACU     PLAN: Status post recent of a left ureteral stent.  Ongoing management of complicated UTI by the [facility] internal medicine team.  I will coordinate with Dr. Russ for ureteroscopy with removal of his left ureteral stone at the time of his planned outlet procedure with removal of bladder stones later this month.       I was present for the entire procedure. and A qualified resident physician was not available.    Patient Disposition:  PACU              SIGNATURE: Sanjeev Boswell MD  DATE: January 6, 2025  TIME: 1:09 PM

## 2025-01-06 NOTE — ANESTHESIA POSTPROCEDURE EVALUATION
Post-Op Assessment Note    CV Status:  Stable    Pain management: adequate       Mental Status:  Alert and awake   Hydration Status:  Euvolemic   PONV Controlled:  Controlled   Airway Patency:  Patent     Post Op Vitals Reviewed: Yes    No anethesia notable event occurred.    Staff: CRNA, Anesthesiologist           Last Filed PACU Vitals:  Vitals Value Taken Time   Temp 97.6    Pulse 91 01/06/25 1319   BP 97/56    Resp 16    SpO2 97 % 01/06/25 1319   Vitals shown include unfiled device data.
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
## 2025-01-06 NOTE — DISCHARGE INSTR - AVS FIRST PAGE
Sherly,    Today you underwent placement of a left ureteral stent for unblocking of your kidney and ureter.  This went well.  After surgery like this it is not uncommon to have urgency and frequency of urination along with some blood in the urine.  You may also feel some discomfort in your kidney when urinating.    Please plan to keep your date for surgery with Dr. Russ as planned..    Please stay well-hydrated as you recover.      We wish you a rapid and uneventful recovery,    Dr. Boswell       Please be aware I prescribed antibiotic called Levaquin.  This antibiotic should be taken for the next 5 days starting tomorrow.  Please follow-up with PCP for continuity care within 1 week.
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
## 2025-01-06 NOTE — INTERVAL H&P NOTE
H&P reviewed. After examining the patient I find no changes in the patients condition since the H&P had been written.    Vitals:    01/06/25 1135   BP: 116/56   Pulse: 102   Resp: 18   Temp: 99.7 °F (37.6 °C)   SpO2: 98%     Proceed to cystoscopy and left retrograde pyelography with ureteral stent placement  Marked on left arm  Consent signed
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
## 2025-01-06 NOTE — ANESTHESIA PREPROCEDURE EVALUATION
Procedure:  CYSTOSCOPY RETROGRADE PYELOGRAM WITH INSERTION STENT URETERAL (Left: Bladder)    Relevant Problems   CARDIO   (+) Chronic systolic congestive heart failure (HCC)   (+) HTN (hypertension)   (+) Hyperlipidemia   (+) PVC (premature ventricular contraction)      ENDO   (+) Acquired hypothyroidism      /RENAL   (+) Acute kidney injury superimposed on chronic kidney disease  (HCC)   (+) BPH (benign prostatic hyperplasia)   (+) Chronic kidney disease-mineral and bone disorder   (+) Nephrolithiasis   (+) Stage 3 chronic kidney disease (HCC)      HEMATOLOGY   (+) Mycosis fungoides (HCC)      MUSCULOSKELETAL   (+) Lumbar pain      PULMONARY   (+) COPD (chronic obstructive pulmonary disease) (HCC)   (+) Chronic bronchitis (HCC)   (+) Dependence on nocturnal oxygen therapy   (+) Shortness of breath   (+) Smoking        Physical Exam    Airway    Mallampati score: II  TM Distance: >3 FB  Neck ROM: full     Dental   No notable dental hx     Cardiovascular      Pulmonary      Other Findings        Anesthesia Plan  ASA Score- 3     Anesthesia Type- general with ASA Monitors.         Additional Monitors:     Airway Plan: LMA.           Plan Factors-Exercise tolerance (METS): >4 METS.    Chart reviewed. EKG reviewed.  Existing labs reviewed. Patient summary reviewed.    Patient is a current smoker.  Patient instructed to abstain from smoking on day of procedure. Patient did not smoke on day of surgery.    There is medical exclusion for perioperative obstructive sleep apnea risk education.        Induction- intravenous.    Postoperative Plan- Plan for postoperative opioid use.     Perioperative Resuscitation Plan - Level 1 - Full Code.       Informed Consent- Anesthetic plan and risks discussed with patient.  I personally reviewed this patient with the CRNA. Discussed and agreed on the Anesthesia Plan with the CRNA..
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
## 2025-01-06 NOTE — ASSESSMENT & PLAN NOTE
Presenting Cr 1.8, baseline recently 1.1-1.2  Continue IV fluid hydration, retention protocol with bladder scans and strict I/Os  BMP daily  Avoid nephrotoxins, hypotension
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
## 2025-01-06 NOTE — ASSESSMENT & PLAN NOTE
Lab Results   Component Value Date    EGFR 35 01/06/2025    EGFR 61 11/26/2024    EGFR 54 08/12/2024    CREATININE 1.80 (H) 01/06/2025    CREATININE 1.14 11/26/2024    CREATININE 1.27 08/12/2024     Continue IV fluid hydration  Repeat labs in a.m.

## 2025-01-06 NOTE — ANESTHESIA POSTPROCEDURE EVALUATION
Post-Op Assessment Note    CV Status:  Stable    Pain management: adequate       Mental Status:  Alert and awake   Hydration Status:  Euvolemic   PONV Controlled:  Controlled   Airway Patency:  Patent     Post Op Vitals Reviewed: Yes    No anethesia notable event occurred.    Staff: CRNA, Anesthesiologist           Last Filed PACU Vitals:  Vitals Value Taken Time   Temp     Pulse 91 01/06/25 1319   BP     Resp     SpO2 97 % 01/06/25 1319   Vitals shown include unfiled device data.

## 2025-01-06 NOTE — ASSESSMENT & PLAN NOTE
POA as evidenced by leukocytosis, tachycardia, tachypnea; lactic 1.9, procal 0.13  Source Urinary tract infection    Plan: suspect obstructive UTI in setting of left ureteral stone; urology consulted for stenting, continue antibiosis  Follow up blood and urine culture  Trend WBC, procal, lactic, fever trend  IV Fluid hydration  Continue IV ceftriaxone

## 2025-01-07 LAB
ALBUMIN SERPL BCG-MCNC: 3.2 G/DL (ref 3.5–5)
ALP SERPL-CCNC: 50 U/L (ref 34–104)
ALT SERPL W P-5'-P-CCNC: 19 U/L (ref 7–52)
ANION GAP SERPL CALCULATED.3IONS-SCNC: 10 MMOL/L (ref 4–13)
AST SERPL W P-5'-P-CCNC: 48 U/L (ref 13–39)
ATRIAL RATE: 111 BPM
BILIRUB SERPL-MCNC: 0.32 MG/DL (ref 0.2–1)
BUN SERPL-MCNC: 35 MG/DL (ref 5–25)
CALCIUM ALBUM COR SERPL-MCNC: 8.2 MG/DL (ref 8.3–10.1)
CALCIUM SERPL-MCNC: 7.6 MG/DL (ref 8.4–10.2)
CHLORIDE SERPL-SCNC: 108 MMOL/L (ref 96–108)
CK SERPL-CCNC: 1628 U/L (ref 39–308)
CO2 SERPL-SCNC: 18 MMOL/L (ref 21–32)
CREAT SERPL-MCNC: 1.7 MG/DL (ref 0.6–1.3)
ERYTHROCYTE [DISTWIDTH] IN BLOOD BY AUTOMATED COUNT: 13.7 % (ref 11.6–15.1)
GFR SERPL CREATININE-BSD FRML MDRD: 38 ML/MIN/1.73SQ M
GLUCOSE SERPL-MCNC: 126 MG/DL (ref 65–140)
HCT VFR BLD AUTO: 31.8 % (ref 36.5–49.3)
HGB BLD-MCNC: 10 G/DL (ref 12–17)
MAGNESIUM SERPL-MCNC: 1.8 MG/DL (ref 1.9–2.7)
MCH RBC QN AUTO: 31.2 PG (ref 26.8–34.3)
MCHC RBC AUTO-ENTMCNC: 31.4 G/DL (ref 31.4–37.4)
MCV RBC AUTO: 99 FL (ref 82–98)
PLATELET # BLD AUTO: 293 THOUSANDS/UL (ref 149–390)
PMV BLD AUTO: 9.3 FL (ref 8.9–12.7)
POTASSIUM SERPL-SCNC: 4.2 MMOL/L (ref 3.5–5.3)
PROT SERPL-MCNC: 6.1 G/DL (ref 6.4–8.4)
QRS AXIS: -77 DEGREES
QRSD INTERVAL: 126 MS
QT INTERVAL: 172 MS
QTC INTERVAL: 284 MS
RBC # BLD AUTO: 3.21 MILLION/UL (ref 3.88–5.62)
SODIUM SERPL-SCNC: 136 MMOL/L (ref 135–147)
T WAVE AXIS: -53 DEGREES
VENTRICULAR RATE: 165 BPM
WBC # BLD AUTO: 17.53 THOUSAND/UL (ref 4.31–10.16)

## 2025-01-07 PROCEDURE — 80053 COMPREHEN METABOLIC PANEL: CPT | Performed by: UROLOGY

## 2025-01-07 PROCEDURE — 93010 ELECTROCARDIOGRAM REPORT: CPT | Performed by: INTERNAL MEDICINE

## 2025-01-07 PROCEDURE — 99232 SBSQ HOSP IP/OBS MODERATE 35: CPT | Performed by: PHYSICIAN ASSISTANT

## 2025-01-07 PROCEDURE — 83735 ASSAY OF MAGNESIUM: CPT | Performed by: UROLOGY

## 2025-01-07 PROCEDURE — 99232 SBSQ HOSP IP/OBS MODERATE 35: CPT

## 2025-01-07 PROCEDURE — 82550 ASSAY OF CK (CPK): CPT | Performed by: UROLOGY

## 2025-01-07 PROCEDURE — 85027 COMPLETE CBC AUTOMATED: CPT | Performed by: UROLOGY

## 2025-01-07 RX ADMIN — HEPARIN SODIUM 5000 UNITS: 5000 INJECTION INTRAVENOUS; SUBCUTANEOUS at 22:10

## 2025-01-07 RX ADMIN — CYANOCOBALAMIN TAB 500 MCG 1000 MCG: 500 TAB at 09:31

## 2025-01-07 RX ADMIN — TAMSULOSIN HYDROCHLORIDE 0.4 MG: 0.4 CAPSULE ORAL at 16:27

## 2025-01-07 RX ADMIN — AMPICILLIN SODIUM 1000 MG: 2 INJECTION, POWDER, FOR SOLUTION INTRAMUSCULAR; INTRAVENOUS at 16:36

## 2025-01-07 RX ADMIN — HYDROMORPHONE HYDROCHLORIDE 0.5 MG: 1 INJECTION, SOLUTION INTRAMUSCULAR; INTRAVENOUS; SUBCUTANEOUS at 18:48

## 2025-01-07 RX ADMIN — ACETAMINOPHEN 650 MG: 325 TABLET, FILM COATED ORAL at 02:29

## 2025-01-07 RX ADMIN — GABAPENTIN 100 MG: 100 CAPSULE ORAL at 09:31

## 2025-01-07 RX ADMIN — ATORVASTATIN CALCIUM 40 MG: 40 TABLET, FILM COATED ORAL at 09:31

## 2025-01-07 RX ADMIN — LIDOCAINE 2 PATCH: 700 PATCH TOPICAL at 09:28

## 2025-01-07 RX ADMIN — SODIUM CHLORIDE, SODIUM GLUCONATE, SODIUM ACETATE, POTASSIUM CHLORIDE, MAGNESIUM CHLORIDE, SODIUM PHOSPHATE, DIBASIC, AND POTASSIUM PHOSPHATE 75 ML/HR: .53; .5; .37; .037; .03; .012; .00082 INJECTION, SOLUTION INTRAVENOUS at 09:27

## 2025-01-07 RX ADMIN — HEPARIN SODIUM 5000 UNITS: 5000 INJECTION INTRAVENOUS; SUBCUTANEOUS at 16:30

## 2025-01-07 RX ADMIN — ACETAMINOPHEN 650 MG: 325 TABLET, FILM COATED ORAL at 22:06

## 2025-01-07 RX ADMIN — HEPARIN SODIUM 5000 UNITS: 5000 INJECTION INTRAVENOUS; SUBCUTANEOUS at 06:18

## 2025-01-07 RX ADMIN — FERROUS SULFATE TAB 325 MG (65 MG ELEMENTAL FE) 325 MG: 325 (65 FE) TAB at 07:39

## 2025-01-07 RX ADMIN — AMPICILLIN SODIUM 1000 MG: 2 INJECTION, POWDER, FOR SOLUTION INTRAMUSCULAR; INTRAVENOUS at 22:11

## 2025-01-07 RX ADMIN — MONTELUKAST 10 MG: 10 TABLET, FILM COATED ORAL at 22:09

## 2025-01-07 RX ADMIN — HYDROMORPHONE HYDROCHLORIDE 0.5 MG: 1 INJECTION, SOLUTION INTRAMUSCULAR; INTRAVENOUS; SUBCUTANEOUS at 07:39

## 2025-01-07 RX ADMIN — METOPROLOL SUCCINATE 75 MG: 25 TABLET, EXTENDED RELEASE ORAL at 09:31

## 2025-01-07 RX ADMIN — CEFTRIAXONE SODIUM 1000 MG: 10 INJECTION, POWDER, FOR SOLUTION INTRAVENOUS at 05:56

## 2025-01-07 RX ADMIN — LEVOTHYROXINE SODIUM 100 MCG: 0.1 TABLET ORAL at 09:31

## 2025-01-07 RX ADMIN — GABAPENTIN 100 MG: 100 CAPSULE ORAL at 22:08

## 2025-01-07 RX ADMIN — SODIUM CHLORIDE, SODIUM GLUCONATE, SODIUM ACETATE, POTASSIUM CHLORIDE, MAGNESIUM CHLORIDE, SODIUM PHOSPHATE, DIBASIC, AND POTASSIUM PHOSPHATE 75 ML/HR: .53; .5; .37; .037; .03; .012; .00082 INJECTION, SOLUTION INTRAVENOUS at 18:45

## 2025-01-07 RX ADMIN — GABAPENTIN 100 MG: 100 CAPSULE ORAL at 16:27

## 2025-01-07 NOTE — ASSESSMENT & PLAN NOTE
Lab Results   Component Value Date    EGFR 38 01/07/2025    EGFR 35 01/06/2025    EGFR 61 11/26/2024    CREATININE 1.70 (H) 01/07/2025    CREATININE 1.80 (H) 01/06/2025    CREATININE 1.14 11/26/2024   S/p l ureteral stent   Cr improving 1.7

## 2025-01-07 NOTE — PROGRESS NOTES
Progress Note - Urology   Name: Sherly Peña 79 y.o. male I MRN: [id number]  Unit/Bed#: -01 I Date of Admission: 1/6/2025   Date of Service: 1/7/2025 I Hospital Day: 0     Assessment & Plan  Left ureteral stone  POD1 cystoscopy, retrograde pyelogram, insertion of Left ureteral stent by Dr. Boswell  VSS, afebrile  Downtrending leukocytosis 17  Cr 1.7, baseline 1.4  Urine enterococcus faecalis  Blood cx no growth to date  Adamson placed for maximal drainage. Can perform TOV prior to DC  Patient will undergo definitive ureteroscopy and TURBT, cystolitholopaxy later this month with Dr. Russ  No further  intervention  Urology will sign off but remain available for any further inpatient needs. Please feel free to contact the provider currently covering the [facility] role for this campus with questions or concerns.   Acute kidney injury superimposed on chronic kidney disease  (HCC)  Lab Results   Component Value Date    EGFR 38 01/07/2025    EGFR 35 01/06/2025    EGFR 61 11/26/2024    CREATININE 1.70 (H) 01/07/2025    CREATININE 1.80 (H) 01/06/2025    CREATININE 1.14 11/26/2024   S/p l ureteral stent   Cr improving 1.7  Sepsis with acute renal failure (HCC)  Present on admission    Subjective:   HPI: seen at bedside. Reports back pain. No left sided abdominal or flank pain. Adamson draining celeste urine.     Review of Systems   Constitutional:  Negative for chills and fever.   Respiratory:  Negative for cough and shortness of breath.    Cardiovascular:  Negative for chest pain and palpitations.   Gastrointestinal:  Negative for abdominal pain and vomiting.   Genitourinary:  Negative for dysuria and hematuria.   Musculoskeletal:  Positive for back pain. Negative for arthralgias.   Skin:  Negative for color change and rash.   Neurological:  Negative for seizures and syncope.   All other systems reviewed and are negative.      Objective:    Vitals: Blood pressure 112/60, pulse 100, temperature 98.6 °F (37  "°C), resp. rate 16, height 5' 11\" (1.803 m), weight 95.2 kg (209 lb 14.1 oz), SpO2 96%.,Body mass index is 29.27 kg/m².    Physical Exam  Constitutional:       General: He is not in acute distress.     Appearance: Normal appearance. He is normal weight. He is not ill-appearing or toxic-appearing.   HENT:      Head: Normocephalic and atraumatic.      Right Ear: External ear normal.      Left Ear: External ear normal.      Nose: Nose normal.      Mouth/Throat:      Mouth: Mucous membranes are moist.   Eyes:      General: No scleral icterus.     Conjunctiva/sclera: Conjunctivae normal.   Cardiovascular:      Rate and Rhythm: Normal rate.      Pulses: Normal pulses.   Pulmonary:      Effort: Pulmonary effort is normal.   Abdominal:      General: There is no distension.      Tenderness: There is no abdominal tenderness. There is no guarding.   Neurological:      General: No focal deficit present.      Mental Status: He is alert and oriented to person, place, and time. Mental status is at baseline.   Psychiatric:         Mood and Affect: Mood normal.         Behavior: Behavior normal.         Thought Content: Thought content normal.         Judgment: Judgment normal.             Labs:  Recent Labs     01/06/25  0425 01/07/25  0509   WBC 22.44* 17.53*       Recent Labs     01/06/25  0425 01/07/25  0509   HGB 11.7* 10.0*     Recent Labs     01/06/25  0425 01/07/25  0509   HCT 37.6 31.8*     Recent Labs     01/06/25  0425 01/07/25  0509   CREATININE 1.80* 1.70*         History:    Past Medical History:   Diagnosis Date    Agent orange exposure     Anemia     Benign colon polyp     BPH (benign prostatic hyperplasia)     Bradycardia     Cardiomyopathy (HCC)     Chest discomfort     CHF (congestive heart failure) (HCC)     Chronic bronchitis (HCC)     Chronic kidney disease     COPD (chronic obstructive pulmonary disease) (HCC)     LAST ASSESSED: 9/9/17    Coronary artery disease cardio myopthia    Emphysema of lung (HCC) 2013 "    H/O nonmelanoma skin cancer     LAST ASSESSED: 11/7/17    HHD (hypertensive heart disease)     HTN (hypertension)     Hx of lymphoma     Hyperlipidemia     ICD (implantable cardioverter-defibrillator) in place     Insomnia     Mycosis fungoides (HCC)     PVC (premature ventricular contraction)     PVT (paroxysmal ventricular tachycardia) (HCC)     SOB (shortness of breath)      Social History     Socioeconomic History    Marital status:      Spouse name: None    Number of children: 0    Years of education: None    Highest education level: None   Occupational History    Occupation: retired   Tobacco Use    Smoking status: Every Day     Current packs/day: 1.00     Average packs/day: 1 pack/day for 64.0 years (64.0 ttl pk-yrs)     Types: Cigarettes     Start date: 1961     Passive exposure: Current    Smokeless tobacco: Never    Tobacco comments:     HALF A PACK PER DAY    Vaping Use    Vaping status: Never Used   Substance and Sexual Activity    Alcohol use: Not Currently     Alcohol/week: 14.0 standard drinks of alcohol     Comment: two drinks every night over a 4 hour period    Drug use: Never    Sexual activity: Not Currently     Partners: Female     Birth control/protection: Male Sterilization   Other Topics Concern    None   Social History Narrative    None     Social Drivers of Health     Financial Resource Strain: Low Risk  (1/23/2023)    Overall Financial Resource Strain (CARDIA)     Difficulty of Paying Living Expenses: Not very hard   Food Insecurity: No Food Insecurity (1/6/2025)    Nursing - Inadequate Food Risk Classification     Worried About Running Out of Food in the Last Year: Never true     Ran Out of Food in the Last Year: Never true     Ran Out of Food in the Last Year: Never true   Transportation Needs: No Transportation Needs (1/6/2025)    Nursing - Transportation Risk Classification     Lack of Transportation: Not on file     Lack of Transportation: No   Physical Activity: Inactive  (2024)    Exercise Vital Sign     Days of Exercise per Week: 0 days     Minutes of Exercise per Session: 0 min   Stress: No Stress Concern Present (2020)    [employer] - Occupational Stress Questionnaire     Feeling of Stress : Not at all   Social Connections: Not on file   Intimate Partner Violence: Unknown (2025)    Nursing IPS     Feels Physically and Emotionally Safe: Not on file     Physically Hurt by Someone: Not on file     Humiliated or Emotionally Abused by Someone: Not on file     Physically Hurt by Someone: No     Hurt or Threatened by Someone: No   Housing Stability: Unknown (2025)    Nursing: Inadequate Housing Risk Classification     Has Housing: Not on file     Worried About Losing Housing: Not on file     Unable to Get Utilities: Not on file     Unable to Pay for Housing in the Last Year: No     Has Housin     Past Surgical History:   Procedure Laterality Date    CARDIAC ELECTROPHYSIOLOGY PROCEDURE N/A 2024    Procedure: Cardiac biv icd generator change;  Surgeon: Yifan Villanueva MD;  Location: [facility];  Service: Cardiology    CARDIAC PACEMAKER PLACEMENT      AL CYSTOURETHROSCOPY W/URETERAL CATHETERIZATION Left 2025    Procedure: CYSTOSCOPY RETROGRADE PYELOGRAM WITH INSERTION STENT URETERAL;  Surgeon: Sanjeev Boswell MD;  Location: [facility];  Service: Urology    SKIN SURGERY      skin cancer removal     TONSILLECTOMY       Family History   Problem Relation Age of Onset    Diabetes Mother     Colon cancer Father         DIAGNOSED IN HIS 80'S    Heart failure Father     Coronary artery disease Father     Cancer Father         Lorenzo    Diabetes Family         MELLITUS    Heart attack Neg Hx     Stroke Neg Hx     Anuerysm Neg Hx     Clotting disorder Neg Hx     Arrhythmia Neg Hx     Hypertension Neg Hx         pt unsure     Hyperlipidemia Neg Hx     Sudden death Neg Hx         scd       Luzmaria Cole PA-C  Date: 2025  Time: 9:37 AM

## 2025-01-07 NOTE — ASSESSMENT & PLAN NOTE
POD1 cystoscopy, retrograde pyelogram, insertion of Left ureteral stent by Dr. Boswell  VSS, afebrile  Downtrending leukocytosis 17  Cr 1.7, baseline 1.4  Urine enterococcus faecalis  Blood cx no growth to date  Adamson placed for maximal drainage. Can perform TOV prior to DC  Patient will undergo definitive ureteroscopy and TURBT, cystolitholopaxy later this month with Dr. Russ  No further  intervention  Urology will sign off but remain available for any further inpatient needs. Please feel free to contact the provider currently covering the Urology [facility] role for this campus with questions or concerns.

## 2025-01-07 NOTE — PLAN OF CARE
Problem: Potential for Falls  Goal: Patient will remain free of falls  Description: INTERVENTIONS:  - Educate patient/family on patient safety including physical limitations  - Instruct patient to call for assistance with activity   - Consult OT/PT to assist with strengthening/mobility   - Keep Call bell within reach  - Keep bed low and locked with side rails adjusted as appropriate  - Keep care items and personal belongings within reach  - Initiate and maintain comfort rounds  - Make Fall Risk Sign visible to staff  - Offer Toileting every 2  Hours, in advance of need  - Initiate/Maintain bed/chair alarm  - Obtain necessary fall risk management equipment: daily  - Apply yellow socks and bracelet for high fall risk patients  - Consider moving patient to room near nurses station  Outcome: Progressing
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
## 2025-01-08 LAB
ANION GAP SERPL CALCULATED.3IONS-SCNC: 8 MMOL/L (ref 4–13)
BACTERIA UR CULT: ABNORMAL
BASOPHILS # BLD AUTO: 0.02 THOUSANDS/ΜL (ref 0–0.1)
BASOPHILS NFR BLD AUTO: 0 % (ref 0–1)
BUN SERPL-MCNC: 33 MG/DL (ref 5–25)
CALCIUM SERPL-MCNC: 7.6 MG/DL (ref 8.4–10.2)
CHLORIDE SERPL-SCNC: 108 MMOL/L (ref 96–108)
CK SERPL-CCNC: 1232 U/L (ref 39–308)
CO2 SERPL-SCNC: 22 MMOL/L (ref 21–32)
CREAT SERPL-MCNC: 1.66 MG/DL (ref 0.6–1.3)
EOSINOPHIL # BLD AUTO: 0.04 THOUSAND/ΜL (ref 0–0.61)
EOSINOPHIL NFR BLD AUTO: 0 % (ref 0–6)
ERYTHROCYTE [DISTWIDTH] IN BLOOD BY AUTOMATED COUNT: 13.7 % (ref 11.6–15.1)
GFR SERPL CREATININE-BSD FRML MDRD: 39 ML/MIN/1.73SQ M
GLUCOSE SERPL-MCNC: 129 MG/DL (ref 65–140)
HCT VFR BLD AUTO: 30.5 % (ref 36.5–49.3)
HGB BLD-MCNC: 9.8 G/DL (ref 12–17)
IMM GRANULOCYTES # BLD AUTO: 0.06 THOUSAND/UL (ref 0–0.2)
IMM GRANULOCYTES NFR BLD AUTO: 1 % (ref 0–2)
LYMPHOCYTES # BLD AUTO: 0.94 THOUSANDS/ΜL (ref 0.6–4.47)
LYMPHOCYTES NFR BLD AUTO: 8 % (ref 14–44)
MCH RBC QN AUTO: 30.8 PG (ref 26.8–34.3)
MCHC RBC AUTO-ENTMCNC: 32.1 G/DL (ref 31.4–37.4)
MCV RBC AUTO: 96 FL (ref 82–98)
MONOCYTES # BLD AUTO: 0.79 THOUSAND/ΜL (ref 0.17–1.22)
MONOCYTES NFR BLD AUTO: 7 % (ref 4–12)
NEUTROPHILS # BLD AUTO: 9.85 THOUSANDS/ΜL (ref 1.85–7.62)
NEUTS SEG NFR BLD AUTO: 84 % (ref 43–75)
NRBC BLD AUTO-RTO: 0 /100 WBCS
PLATELET # BLD AUTO: 259 THOUSANDS/UL (ref 149–390)
PMV BLD AUTO: 9 FL (ref 8.9–12.7)
POTASSIUM SERPL-SCNC: 4.1 MMOL/L (ref 3.5–5.3)
RBC # BLD AUTO: 3.18 MILLION/UL (ref 3.88–5.62)
SODIUM SERPL-SCNC: 138 MMOL/L (ref 135–147)
WBC # BLD AUTO: 11.7 THOUSAND/UL (ref 4.31–10.16)

## 2025-01-08 PROCEDURE — 82550 ASSAY OF CK (CPK): CPT | Performed by: PHYSICIAN ASSISTANT

## 2025-01-08 PROCEDURE — 80048 BASIC METABOLIC PNL TOTAL CA: CPT | Performed by: PHYSICIAN ASSISTANT

## 2025-01-08 PROCEDURE — 99232 SBSQ HOSP IP/OBS MODERATE 35: CPT | Performed by: PHYSICIAN ASSISTANT

## 2025-01-08 PROCEDURE — 97116 GAIT TRAINING THERAPY: CPT

## 2025-01-08 PROCEDURE — 85025 COMPLETE CBC W/AUTO DIFF WBC: CPT | Performed by: PHYSICIAN ASSISTANT

## 2025-01-08 PROCEDURE — 97110 THERAPEUTIC EXERCISES: CPT

## 2025-01-08 RX ORDER — BEXAROTENE 75 MG/1
600 CAPSULE ORAL DAILY
Status: DISCONTINUED | OUTPATIENT
Start: 2025-01-08 | End: 2025-01-10 | Stop reason: HOSPADM

## 2025-01-08 RX ORDER — BISACODYL 5 MG/1
10 TABLET, DELAYED RELEASE ORAL ONCE
Status: COMPLETED | OUTPATIENT
Start: 2025-01-08 | End: 2025-01-08

## 2025-01-08 RX ADMIN — SODIUM CHLORIDE, SODIUM GLUCONATE, SODIUM ACETATE, POTASSIUM CHLORIDE, MAGNESIUM CHLORIDE, SODIUM PHOSPHATE, DIBASIC, AND POTASSIUM PHOSPHATE 75 ML/HR: .53; .5; .37; .037; .03; .012; .00082 INJECTION, SOLUTION INTRAVENOUS at 08:50

## 2025-01-08 RX ADMIN — AMPICILLIN SODIUM 1000 MG: 2 INJECTION, POWDER, FOR SOLUTION INTRAMUSCULAR; INTRAVENOUS at 12:00

## 2025-01-08 RX ADMIN — FERROUS SULFATE TAB 325 MG (65 MG ELEMENTAL FE) 325 MG: 325 (65 FE) TAB at 08:35

## 2025-01-08 RX ADMIN — ATORVASTATIN CALCIUM 40 MG: 40 TABLET, FILM COATED ORAL at 08:35

## 2025-01-08 RX ADMIN — GABAPENTIN 100 MG: 100 CAPSULE ORAL at 22:20

## 2025-01-08 RX ADMIN — TAMSULOSIN HYDROCHLORIDE 0.4 MG: 0.4 CAPSULE ORAL at 17:07

## 2025-01-08 RX ADMIN — AMPICILLIN SODIUM 1000 MG: 2 INJECTION, POWDER, FOR SOLUTION INTRAMUSCULAR; INTRAVENOUS at 04:05

## 2025-01-08 RX ADMIN — HEPARIN SODIUM 5000 UNITS: 5000 INJECTION INTRAVENOUS; SUBCUTANEOUS at 22:29

## 2025-01-08 RX ADMIN — FLUTICASONE FUROATE AND VILANTEROL TRIFENATATE 1 PUFF: 200; 25 POWDER RESPIRATORY (INHALATION) at 08:38

## 2025-01-08 RX ADMIN — UMECLIDINIUM 1 PUFF: 62.5 AEROSOL, POWDER ORAL at 08:39

## 2025-01-08 RX ADMIN — BEXAROTENE 600 MG: 75 CAPSULE, LIQUID FILLED ORAL at 17:07

## 2025-01-08 RX ADMIN — HYDROMORPHONE HYDROCHLORIDE 0.5 MG: 1 INJECTION, SOLUTION INTRAMUSCULAR; INTRAVENOUS; SUBCUTANEOUS at 22:24

## 2025-01-08 RX ADMIN — BISACODYL 10 MG: 5 TABLET, COATED ORAL at 14:27

## 2025-01-08 RX ADMIN — HEPARIN SODIUM 5000 UNITS: 5000 INJECTION INTRAVENOUS; SUBCUTANEOUS at 05:45

## 2025-01-08 RX ADMIN — CYANOCOBALAMIN TAB 500 MCG 1000 MCG: 500 TAB at 08:35

## 2025-01-08 RX ADMIN — HEPARIN SODIUM 5000 UNITS: 5000 INJECTION INTRAVENOUS; SUBCUTANEOUS at 14:27

## 2025-01-08 RX ADMIN — AMPICILLIN SODIUM 1000 MG: 2 INJECTION, POWDER, FOR SOLUTION INTRAMUSCULAR; INTRAVENOUS at 17:09

## 2025-01-08 RX ADMIN — LEVOTHYROXINE SODIUM 100 MCG: 0.1 TABLET ORAL at 08:36

## 2025-01-08 RX ADMIN — GABAPENTIN 100 MG: 100 CAPSULE ORAL at 08:35

## 2025-01-08 RX ADMIN — GABAPENTIN 100 MG: 100 CAPSULE ORAL at 17:07

## 2025-01-08 RX ADMIN — SODIUM CHLORIDE, SODIUM GLUCONATE, SODIUM ACETATE, POTASSIUM CHLORIDE, MAGNESIUM CHLORIDE, SODIUM PHOSPHATE, DIBASIC, AND POTASSIUM PHOSPHATE 75 ML/HR: .53; .5; .37; .037; .03; .012; .00082 INJECTION, SOLUTION INTRAVENOUS at 22:37

## 2025-01-08 RX ADMIN — AMPICILLIN SODIUM 1000 MG: 2 INJECTION, POWDER, FOR SOLUTION INTRAMUSCULAR; INTRAVENOUS at 22:27

## 2025-01-08 RX ADMIN — MONTELUKAST 10 MG: 10 TABLET, FILM COATED ORAL at 22:20

## 2025-01-08 RX ADMIN — ASPIRIN 81 MG: 81 TABLET, CHEWABLE ORAL at 08:36

## 2025-01-08 NOTE — ASSESSMENT & PLAN NOTE
POA as evidenced by leukocytosis, tachycardia, tachypnea; lactic 1.9, procal 0.13  Source Urinary tract infection    Plan: suspect obstructive UTI in setting of left ureteral stone; urology consulted for stenting, continue antibiosis  IV Fluid hydration

## 2025-01-08 NOTE — PLAN OF CARE
Problem: Potential for Falls  Goal: Patient will remain free of falls  Description: INTERVENTIONS:  - Educate patient/family on patient safety including physical limitations  - Instruct patient to call for assistance with activity   - Consult OT/PT to assist with strengthening/mobility   - Keep Call bell within reach  - Keep bed low and locked with side rails adjusted as appropriate  - Keep care items and personal belongings within reach  - Initiate and maintain comfort rounds  - Make Fall Risk Sign visible to staff  - Initiate/Maintain chair/ bed alarm  - Obtain necessary fall risk management equipment: Walker   - Apply yellow socks and bracelet for high fall risk patients  - Consider moving patient to room near nurses station  Outcome: Progressing
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
## 2025-01-08 NOTE — PHYSICAL THERAPY NOTE
PHYSICAL THERAPY NOTE          Patient Name: Shrely Peña  Today's Date: 1/8/2025 01/08/25 1045   PT Last Visit   PT Visit Date 01/08/25   Note Type   Note Type Treatment   Pain Assessment   Pain Assessment Tool 0-10   Pain Score 6   Pain Location/Orientation Location: Back   Pain Onset/Description Onset: Ongoing   Multiple Pain Sites Yes   Pain 2   Pain Score 2 9   Pain Location/Orientation 2   (penis tip  cochran cath)   Restrictions/Precautions   Weight Bearing Precautions Per Order No   Other Precautions Chair Alarm;Bed Alarm;Fall Risk;Pain;Telemetry   General   Chart Reviewed Yes   Response to Previous Treatment Patient with no complaints from previous session.   Family/Caregiver Present No   Cognition   Overall Cognitive Status WFL   Arousal/Participation Alert;Cooperative;Responsive   Attention Within functional limits   Orientation Level Oriented X4   Memory Decreased recall of recent events;Decreased short term memory   Following Commands Follows all commands and directions without difficulty   Comments Pt agreeable to PT session   Bed Mobility   Additional Comments Pt OOB at start of session and end of session   Transfers   Sit to Stand   (CGA)   Additional items Assist x 1;Armrests;Increased time required;Verbal cues   Stand to Sit   (CGA)   Additional items Assist x 1;Increased time required;Armrests;Verbal cues   Additional Comments with RW   Ambulation/Elevation   Gait pattern Decreased hip extension;Decreased heel strike;Decreased toe off;Short stride;Excessively slow;Step through pattern   Gait Assistance   (CGA)   Additional items Assist x 1;Verbal cues   Assistive Device Rolling walker   Distance 550'   Stair Management Assistance   (CGA)   Additional items Assist x 1;Verbal cues;Increased time required   Stair Management Technique Two rails;Alternating pattern  (4 inch practice stairs)   Number of Stairs 4    Balance   Static Sitting Good   Dynamic Sitting Fair +   Static Standing Fair   Dynamic Standing Fair   Ambulatory Fair   Endurance Deficit   Endurance Deficit Yes   Endurance Deficit Description SOB/SCOTT   Activity Tolerance   Activity Tolerance Patient limited by fatigue   Medical Staff Made Aware Shital Robert PA-C   Nurse Made Aware Spoke to RN   Exercises   Hip Adduction Sitting;20 reps;AROM;Bilateral   Knee AROM Long Arc Quad Sitting;20 reps;AROM;Bilateral   Ankle Pumps Sitting;20 reps;AROM;Bilateral   Marching Sitting;20 reps;AROM;Bilateral   Assessment   Prognosis Good   Problem List Decreased strength;Decreased endurance;Impaired balance;Decreased mobility;Pain  (spinal precautions)   Assessment Chart review and two person identifiers were completed. Pt seen for PT treatment session this date, consisting of ther ex focused on strengthening, gt training on level surfaces to improve pt safety in household environment, and elevation training for enter/exit home. Since previous session, pt has made good progress in terms of increased distance ambulated. Pt greeted in recliner and agreeable to PT session. Pt completed STS with CGA and RW. Pt ambulated 550' with RW and CGA and 4 standing rest breaks due to SOB/SCOTT. Pt completed stair navigation with CGA and b/l HR. Pt required seated rest break after stairs and ambulation. Pt completed seated LE exercises with rest breaks between exercises. Pt ended session seated in recliner with alarm activated and all needs within reach. Spoke to RN about session outcomes. Pertinent barriers during this session include SOB. Current goals and POC established on IE remain appropriate. Pt prognosis for achieving goals is good, pending pt progress with hospitalization/medical status improvements, and indicated by ability to follow directions and previous response to intervention. Pt limited d/t medical instability. Pt continues to be functioning below baseline level, and remains  limited due to factors listed above. PT will continue to see pt during current hospitalization in order to address the deficits listed above and provide interventions consistent w/ POC in effort to achieve STGs. PT recommends level 2, moderate resource intensity upon discharge.   Barriers to Discharge Inaccessible home environment;Decreased caregiver support;Other (Comment)  (decreased social support)   Goals   STG Expiration Date 01/16/25   Short Term Goal #1 Within 10 days patient will complete: 1) Bed mobility skills with modified independent assistance to facilitate safe return to previous living environment 2) Functional transfers with modified independent assistance to facilitate safe return to previous living environment 3) Ambulation with least restrictive AD modified independent assistance without LOB and stable vitals for safe ambulation home/ community distances. 4) Stair training up/down flight 1 step/s with appropriate rail/s and supervision assistance x1 for safe access to previous living environment. 5) Improve balance grades to fair + to reduce risk of falls. 6)Improve LE strength grades by 1 to increase independence w/ transfers and gait. 7) PT for ongoing pt and family education; DME needs and D/C planning to promote highest level of function in least restrictive environment.   PT Treatment Day 1   Plan   Treatment/Interventions Functional transfer training;LE strengthening/ROM;Elevations;Therapeutic exercise;Endurance training;Patient/family training;Equipment eval/education;Bed mobility;Gait training;Continued evaluation;Spoke to nursing;Spoke to advanced practitioner   Progress Progressing toward goals   PT Frequency 3-5x/wk   Discharge Recommendation   Rehab Resource Intensity Level, PT II (Moderate Resource Intensity)   Equipment Recommended Walker  (RW)   AM-PAC Basic Mobility Inpatient   Turning in Flat Bed Without Bedrails 3   Lying on Back to Sitting on Edge of Flat Bed Without Bedrails 3    Moving Bed to Chair 3   Standing Up From Chair Using Arms 3   Walk in Room 3   Climb 3-5 Stairs With Railing 3   Basic Mobility Inpatient Raw Score 18   Basic Mobility Standardized Score 41.05   [facility] Highest Level Of Mobility   -HL Goal 6: Walk 10 steps or more   -HLM Achieved 8: Walk 250 feet ot more   Education   Education Provided Mobility training;Home exercise program;Assistive device   Patient Demonstrates acceptance/verbal understanding   End of Consult   Patient Position at End of Consult Bedside chair;Bed/Chair alarm activated;All needs within reach     Juan Selby, PT, DPT

## 2025-01-08 NOTE — ASSESSMENT & PLAN NOTE
"CT abdomen/pelvis showing \"0.2 cm distal left ureteral stone. Mild ureteronephrosis.\"  Urology consulted,  POD#2 cystocopy with left ureteral stent  E. Facecalis, sensitive to ampicillin, continue   "

## 2025-01-08 NOTE — PLAN OF CARE
Problem: PHYSICAL THERAPY ADULT  Goal: Performs mobility at highest level of function for planned discharge setting.  See evaluation for individualized goals.  Description: Treatment/Interventions: Functional transfer training, LE strengthening/ROM, Therapeutic exercise, Endurance training, Patient/family training, Equipment eval/education, Bed mobility, Gait training, Continued evaluation, Spoke to nursing, OT  Equipment Recommended: Walker (RW)       See flowsheet documentation for full assessment, interventions and recommendations.  Outcome: Progressing  Note: Prognosis: Good  Problem List: Decreased strength, Decreased endurance, Impaired balance, Decreased mobility, Pain (spinal precautions)  Assessment: Chart review and two person identifiers were completed. Pt seen for PT treatment session this date, consisting of ther ex focused on strengthening, gt training on level surfaces to improve pt safety in household environment, and elevation training for enter/exit home. Since previous session, pt has made good progress in terms of increased distance ambulated. Pt greeted in recliner and agreeable to PT session. Pt completed STS with CGA and RW. Pt ambulated 550' with RW and CGA and 4 standing rest breaks due to SOB/SCOTT. Pt completed stair navigation with CGA and b/l HR. Pt required seated rest break after stairs and ambulation. Pt completed seated LE exercises with rest breaks between exercises. Pt ended session seated in recliner with alarm activated and all needs within reach. Spoke to RN about session outcomes. Pertinent barriers during this session include SOB. Current goals and POC established on IE remain appropriate. Pt prognosis for achieving goals is good, pending pt progress with hospitalization/medical status improvements, and indicated by ability to follow directions and previous response to intervention. Pt limited d/t medical instability. Pt continues to be functioning below baseline level, and  remains limited due to factors listed above. PT will continue to see pt during current hospitalization in order to address the deficits listed above and provide interventions consistent w/ POC in effort to achieve STGs. PT recommends level 2, moderate resource intensity upon discharge.  Barriers to Discharge: Inaccessible home environment, Decreased caregiver support, Other (Comment) (decreased social support)     Rehab Resource Intensity Level, PT: II (Moderate Resource Intensity)    See flowsheet documentation for full assessment.

## 2025-01-08 NOTE — ASSESSMENT & PLAN NOTE
Presenting Cr 1.8, baseline recently 1.1-1.2  Continue IV fluid hydration, cochran remains in place  TOV in am 1/9  BMP daily  Avoid nephrotoxins, hypotension

## 2025-01-09 LAB
ANION GAP SERPL CALCULATED.3IONS-SCNC: 6 MMOL/L (ref 4–13)
BUN SERPL-MCNC: 30 MG/DL (ref 5–25)
CALCIUM SERPL-MCNC: 7.3 MG/DL (ref 8.4–10.2)
CHLORIDE SERPL-SCNC: 110 MMOL/L (ref 96–108)
CK SERPL-CCNC: 1045 U/L (ref 39–308)
CO2 SERPL-SCNC: 23 MMOL/L (ref 21–32)
CREAT SERPL-MCNC: 1.18 MG/DL (ref 0.6–1.3)
ERYTHROCYTE [DISTWIDTH] IN BLOOD BY AUTOMATED COUNT: 13.6 % (ref 11.6–15.1)
GFR SERPL CREATININE-BSD FRML MDRD: 59 ML/MIN/1.73SQ M
GLUCOSE SERPL-MCNC: 106 MG/DL (ref 65–140)
HCT VFR BLD AUTO: 28.3 % (ref 36.5–49.3)
HGB BLD-MCNC: 8.8 G/DL (ref 12–17)
MCH RBC QN AUTO: 30 PG (ref 26.8–34.3)
MCHC RBC AUTO-ENTMCNC: 31.1 G/DL (ref 31.4–37.4)
MCV RBC AUTO: 97 FL (ref 82–98)
PLATELET # BLD AUTO: 276 THOUSANDS/UL (ref 149–390)
PMV BLD AUTO: 9.3 FL (ref 8.9–12.7)
POTASSIUM SERPL-SCNC: 3.9 MMOL/L (ref 3.5–5.3)
RBC # BLD AUTO: 2.93 MILLION/UL (ref 3.88–5.62)
SODIUM SERPL-SCNC: 139 MMOL/L (ref 135–147)
WBC # BLD AUTO: 6.89 THOUSAND/UL (ref 4.31–10.16)

## 2025-01-09 PROCEDURE — 99232 SBSQ HOSP IP/OBS MODERATE 35: CPT | Performed by: NURSE PRACTITIONER

## 2025-01-09 PROCEDURE — 85027 COMPLETE CBC AUTOMATED: CPT | Performed by: PHYSICIAN ASSISTANT

## 2025-01-09 PROCEDURE — 82550 ASSAY OF CK (CPK): CPT | Performed by: PHYSICIAN ASSISTANT

## 2025-01-09 PROCEDURE — 97165 OT EVAL LOW COMPLEX 30 MIN: CPT

## 2025-01-09 PROCEDURE — 80048 BASIC METABOLIC PNL TOTAL CA: CPT | Performed by: PHYSICIAN ASSISTANT

## 2025-01-09 RX ORDER — LIDOCAINE 40 MG/G
CREAM TOPICAL ONCE
Status: COMPLETED | OUTPATIENT
Start: 2025-01-09 | End: 2025-01-09

## 2025-01-09 RX ADMIN — SACUBITRIL AND VALSARTAN 1 TABLET: 49; 51 TABLET, FILM COATED ORAL at 09:01

## 2025-01-09 RX ADMIN — HEPARIN SODIUM 5000 UNITS: 5000 INJECTION INTRAVENOUS; SUBCUTANEOUS at 05:11

## 2025-01-09 RX ADMIN — SODIUM CHLORIDE, SODIUM GLUCONATE, SODIUM ACETATE, POTASSIUM CHLORIDE, MAGNESIUM CHLORIDE, SODIUM PHOSPHATE, DIBASIC, AND POTASSIUM PHOSPHATE 75 ML/HR: .53; .5; .37; .037; .03; .012; .00082 INJECTION, SOLUTION INTRAVENOUS at 11:14

## 2025-01-09 RX ADMIN — GABAPENTIN 100 MG: 100 CAPSULE ORAL at 16:12

## 2025-01-09 RX ADMIN — AMPICILLIN SODIUM 1000 MG: 2 INJECTION, POWDER, FOR SOLUTION INTRAMUSCULAR; INTRAVENOUS at 23:16

## 2025-01-09 RX ADMIN — HYDROMORPHONE HYDROCHLORIDE 0.5 MG: 1 INJECTION, SOLUTION INTRAMUSCULAR; INTRAVENOUS; SUBCUTANEOUS at 18:42

## 2025-01-09 RX ADMIN — HEPARIN SODIUM 5000 UNITS: 5000 INJECTION INTRAVENOUS; SUBCUTANEOUS at 23:14

## 2025-01-09 RX ADMIN — BEXAROTENE 600 MG: 75 CAPSULE, LIQUID FILLED ORAL at 08:59

## 2025-01-09 RX ADMIN — ZOLPIDEM TARTRATE 10 MG: 5 TABLET, COATED ORAL at 23:14

## 2025-01-09 RX ADMIN — AMPICILLIN SODIUM 1000 MG: 2 INJECTION, POWDER, FOR SOLUTION INTRAMUSCULAR; INTRAVENOUS at 05:11

## 2025-01-09 RX ADMIN — AMPICILLIN SODIUM 1000 MG: 2 INJECTION, POWDER, FOR SOLUTION INTRAMUSCULAR; INTRAVENOUS at 11:10

## 2025-01-09 RX ADMIN — GABAPENTIN 100 MG: 100 CAPSULE ORAL at 23:14

## 2025-01-09 RX ADMIN — AMPICILLIN SODIUM 1000 MG: 2 INJECTION, POWDER, FOR SOLUTION INTRAMUSCULAR; INTRAVENOUS at 16:12

## 2025-01-09 RX ADMIN — FERROUS SULFATE TAB 325 MG (65 MG ELEMENTAL FE) 325 MG: 325 (65 FE) TAB at 08:53

## 2025-01-09 RX ADMIN — SACUBITRIL AND VALSARTAN 1 TABLET: 49; 51 TABLET, FILM COATED ORAL at 23:21

## 2025-01-09 RX ADMIN — TAMSULOSIN HYDROCHLORIDE 0.4 MG: 0.4 CAPSULE ORAL at 16:12

## 2025-01-09 RX ADMIN — LEVOTHYROXINE SODIUM 100 MCG: 0.1 TABLET ORAL at 08:53

## 2025-01-09 RX ADMIN — CYANOCOBALAMIN TAB 500 MCG 1000 MCG: 500 TAB at 08:53

## 2025-01-09 RX ADMIN — ASPIRIN 81 MG: 81 TABLET, CHEWABLE ORAL at 08:54

## 2025-01-09 RX ADMIN — GABAPENTIN 100 MG: 100 CAPSULE ORAL at 08:53

## 2025-01-09 RX ADMIN — LIDOCAINE: 40 CREAM TOPICAL at 16:44

## 2025-01-09 RX ADMIN — HYDROMORPHONE HYDROCHLORIDE 0.5 MG: 1 INJECTION, SOLUTION INTRAMUSCULAR; INTRAVENOUS; SUBCUTANEOUS at 23:14

## 2025-01-09 RX ADMIN — MONTELUKAST 10 MG: 10 TABLET, FILM COATED ORAL at 23:14

## 2025-01-09 RX ADMIN — ATORVASTATIN CALCIUM 40 MG: 40 TABLET, FILM COATED ORAL at 08:53

## 2025-01-09 RX ADMIN — HEPARIN SODIUM 5000 UNITS: 5000 INJECTION INTRAVENOUS; SUBCUTANEOUS at 14:03

## 2025-01-09 NOTE — PLAN OF CARE
Problem: Potential for Falls  Goal: Patient will remain free of falls  Description: INTERVENTIONS:  - Educate patient/family on patient safety including physical limitations  - Instruct patient to call for assistance with activity   - Consult OT/PT to assist with strengthening/mobility   - Keep Call bell within reach  - Keep bed low and locked with side rails adjusted as appropriate  - Keep care items and personal belongings within reach  - Initiate and maintain comfort rounds  - Make Fall Risk Sign visible to staff  - Offer Toileting every 2 Hours, in advance of need  - Initiate/Maintain bed/chair alarm  - Apply yellow socks and bracelet for high fall risk patients  - Consider moving patient to room near nurses station  Outcome: Progressing     Problem: Prexisting or High Potential for Compromised Skin Integrity  Goal: Skin integrity is maintained or improved  Description: INTERVENTIONS:  - Identify patients at risk for skin breakdown  - Assess and monitor skin integrity  - Assess and monitor nutrition and hydration status  - Monitor labs   - Assess for incontinence   - Turn and reposition patient  - Assist with mobility/ambulation  - Relieve pressure over bony prominences  - Avoid friction and shearing  - Provide appropriate hygiene as needed including keeping skin clean and dry  - Evaluate need for skin moisturizer/barrier cream  - Collaborate with interdisciplinary team   - Patient/family teaching  - Consider wound care consult   Outcome: Progressing
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
## 2025-01-09 NOTE — PLAN OF CARE
Problem: Potential for Falls  Goal: Patient will remain free of falls  Description: INTERVENTIONS:  - Educate patient/family on patient safety including physical limitations  - Instruct patient to call for assistance with activity   - Consult OT/PT to assist with strengthening/mobility   - Keep Call bell within reach  - Keep bed low and locked with side rails adjusted as appropriate  - Keep care items and personal belongings within reach  - Initiate and maintain comfort rounds  - Make Fall Risk Sign visible to staff  - Initiate/Maintain bed alarm  - Obtain necessary fall risk management equipment: Walker   - Apply yellow socks and bracelet for high fall risk patients  - Consider moving patient to room near nurses station  Outcome: Progressing     Problem: Knowledge Deficit  Goal: Patient/family/caregiver demonstrates understanding of disease process, treatment plan, medications, and discharge instructions  Description: Complete learning assessment and assess knowledge base.  Interventions:  - Provide teaching at level of understanding  - Provide teaching via preferred learning methods  Outcome: Progressing

## 2025-01-09 NOTE — ASSESSMENT & PLAN NOTE
Presenting Cr 1.8, baseline recently 1.1-1.2  Now resolved with IVF cr. 1.18  TOV in am 1/9  BMP daily  Avoid nephrotoxins, hypotension

## 2025-01-09 NOTE — ASSESSMENT & PLAN NOTE
"CT abdomen/pelvis showing \"0.2 cm distal left ureteral stone. Mild ureteronephrosis.\"  Urology consulted,  POD#3 cystocopy with left ureteral stent  E. Facecalis, sensitive to ampicillin, continue   Patient with voiding trial today   "

## 2025-01-09 NOTE — PLAN OF CARE
Problem: OCCUPATIONAL THERAPY ADULT  Goal: Performs self-care activities at highest level of function for planned discharge setting.  See evaluation for individualized goals.  Description: Treatment Interventions: ADL retraining, Functional transfer training, Endurance training, Neuromuscular reeducation, Compensatory technique education, Energy conservation, Activityengagement          See flowsheet documentation for full assessment, interventions and recommendations.   Outcome: Progressing  Note: Limitation: Decreased ADL status, Decreased Safe judgement during ADL, Decreased endurance, Decreased self-care trans, Decreased high-level ADLs  Prognosis: Good  Assessment: Patient is a 79 y.o. male seen for OT evaluation s/p admit to [facility] on 1/6/2025 w/Sepsis with acute renal failure (HCC). Prior Medical History and commorbidities affecting patient's functional performance at time of assessment include: Left ureteral stone, elevated CPK, Acute kidney injury, CRT-D in place, hyperlipidemia, lymphoma, HTN, COPD, and BPH. Orders placed for OT evaluation and treatment.  Performed at least two patient identifiers during session including name and wristband.  Pt reported living alone in one story home with 1 ELIAS. Prior to admission, pt reported being independent with ADLs and IADLs with no AD at baseline. Personal factors affecting patient at time of initial evaluation include: difficulty performing ADLs and difficulty performing IADLs. Upon evaluation, patient requires supervision assist for UB ADLs, minimal  assist for LB ADLs, transfers and functional ambulation in room and bathroom with contact guard assist, with the use of Rolling Walker.  Patient is oriented x 4. Occupational performance is affected by the following deficits: dynamic sit/ stand balance deficit with poor standing tolerance time for self care and functional mobility, decreased activity tolerance, impaired problem solving, and  decreased safety awareness.  Patient to benefit from continued Occupational Therapy treatment while in the hospital to address deficits as defined above and maximize level of functional independence with ADLs and functional mobility. Occupational Performance areas to address include: grooming , bathing/ shower, dressing, toilet hygiene, transfer to all surfaces, and functional mobility. From OT standpoint, recommendation at time of d/c would be Level 2.     Rehab Resource Intensity Level, OT: II (Moderate Resource Intensity)

## 2025-01-09 NOTE — OCCUPATIONAL THERAPY NOTE
Occupational Therapy Evaluation        Patient Name: Sherly Peañ  Today's Date: 1/9/2025 01/09/25 1107   OT Last Visit   OT Visit Date 01/09/25   Note Type   Note type Evaluation   Pain Assessment   Pain Assessment Tool 0-10   Pain Score No Pain   Restrictions/Precautions   Weight Bearing Precautions Per Order No   Braces or Orthoses Other (Comment)  (none reported)   Other Precautions Chair Alarm;Bed Alarm;Fall Risk;Multiple lines   Home Living   Type of Home House   Home Layout One level;Stairs to enter with rails;Able to live on main level with bedroom/bathroom;Performs ADLs on one level  (1 ELIAS)   Bathroom Shower/Tub Tub/shower unit   Bathroom Toilet Standard   Bathroom Equipment Shower chair   Bathroom Accessibility Accessible   Home Equipment Cane;Walker   Additional Comments no AD at baseline   Prior Function   Level of [place] Independent with ADLs;Independent with functional mobility;Independent with IADLS   Lives With Alone   Receives Help From Neighbor   IADLs Independent with driving;Independent with meal prep;Independent with medication management  (uses instacart for groceries)   Falls in the last 6 months 1 to 4  (1 PTA)   Vocational Retired  ( in [place])   Lifestyle   Autonomy Pt reported living alone in one story home with 1 ELIAS. Prior to admission, pt reported being independent with ADLs and IADLs with no AD at baseline.   General   Family/Caregiver Present No   ADL   Where Assessed Chair   Eating Assistance 7  Independent   Grooming Assistance 5  Supervision/Setup   UB Bathing Assistance 5  Supervision/Setup   LB Bathing Assistance 4  Minimal Assistance   UB Dressing Assistance 5  Supervision/Setup   LB Dressing Assistance 4  Minimal Assistance   Toileting Assistance  5  Supervision/Setup   Functional Assistance   (Contact guard assist)   Bed Mobility   Additional Comments Pt recieved OOB in chair upon arrival and at end of session   Transfers   Sit to Stand    (Contact guard assist)   Additional items Assist x 1;Armrests;Increased time required;Verbal cues   Stand to Sit   (Contact guard assist)   Additional items Assist x 1;Armrests;Increased time required;Verbal cues   Additional Comments w/ RW   Functional Mobility   Functional Mobility   (Contact guard assist)   Additional Comments assist x1 w/ RW   Balance   Static Sitting Fair +   Dynamic Sitting Fair   Static Standing Fair   Dynamic Standing Fair -   Activity Tolerance   Activity Tolerance Patient limited by fatigue   Nurse Made Aware LILLY Viveros   RUE Assessment   RUE Assessment WFL   LUE Assessment   LUE Assessment WFL   Hand Function   Gross Motor Coordination Functional   Fine Motor Coordination Functional   Sensation   Light Touch No apparent deficits  (BUEs)   Cognition   Overall Cognitive Status WFL   Arousal/Participation Alert;Responsive;Cooperative   Attention Within functional limits   Orientation Level Oriented X4   Memory Decreased short term memory;Decreased recall of recent events   Following Commands Follows all commands and directions without difficulty   Assessment   Limitation Decreased ADL status;Decreased Safe judgement during ADL;Decreased endurance;Decreased self-care trans;Decreased high-level ADLs   Prognosis Good   Assessment Patient is a 79 y.o. male seen for OT evaluation s/p admit to [facility] on 1/6/2025 w/Sepsis with acute renal failure (HCC). Prior Medical History and commorbidities affecting patient's functional performance at time of assessment include: Left ureteral stone, elevated CPK, Acute kidney injury, CRT-D in place, hyperlipidemia, lymphoma, HTN, COPD, and BPH. Orders placed for OT evaluation and treatment.  Performed at least two patient identifiers during session including name and wristband.  Pt reported living alone in one story home with 1 ELIAS. Prior to admission, pt reported being independent with ADLs and IADLs with no AD at baseline. Personal factors  affecting patient at time of initial evaluation include: difficulty performing ADLs and difficulty performing IADLs. Upon evaluation, patient requires supervision assist for UB ADLs, minimal  assist for LB ADLs, transfers and functional ambulation in room and bathroom with contact guard assist, with the use of Rolling Walker.  Patient is oriented x 4. Occupational performance is affected by the following deficits: dynamic sit/ stand balance deficit with poor standing tolerance time for self care and functional mobility, decreased activity tolerance, impaired problem solving, and decreased safety awareness.  Patient to benefit from continued Occupational Therapy treatment while in the hospital to address deficits as defined above and maximize level of functional independence with ADLs and functional mobility. Occupational Performance areas to address include: grooming , bathing/ shower, dressing, toilet hygiene, transfer to all surfaces, and functional mobility. From OT standpoint, recommendation at time of d/c would be Level 2.   Plan  Treatment Interventions ADL retraining;Functional transfer training;Endurance training;Neuromuscular reeducation;Compensatory technique education;Energy conservation;Activityengagement;    Goal Expiration Date 1/32025   OT Frequency 3-5x/wk      Discharge Recommendation   Rehab Resource Intensity Level, OT II (Moderate Resource Intensity)   AM-PAC Daily Activity Inpatient   Lower Body Dressing 2   Bathing 3   Toileting 3   Upper Body Dressing 3   Grooming 4   Eating 4   Daily Activity Raw Score 19   Daily Activity Standardized Score (Calc for Raw Score >=11) 40.22   AM-PAC Applied Cognition Inpatient   Following a Speech/Presentation 4   Understanding Ordinary Conversation 4   Taking Medications 4   Remembering Where Things Are Placed or Put Away 3   Remembering List of 4-5 Errands 3   Taking Care of Complicated Tasks 2   Applied Cognition Raw Score 20   Applied Cognition Standardized  Score 41.76   Barthel Index   Feeding 10   Bathing 0   Grooming Score 0   Dressing Score 5   Bladder Score 10   Bowels Score 10   Toilet Use Score 5   Transfers (Bed/Chair) Score 10   Mobility (Level Surface) Score 0   Stairs Score 0   Barthel Index Score 50   Modified [place] Scale   Modified [place] Scale 3   End of Consult   Patient Position at End of Consult Bedside chair;Bed/Chair alarm activated;All needs within reach       Occupational therapy Goals: In 2-4 days    1- Patient will verbalize and demonstrate use of energy conservation/ deep breathing technique and work simplification skills during functional activity with no verbal cues.  2- Patient will verbalize and demonstrate good body mechanics and joint protection techniques during ADLs/ IADLs with no verbal cues.  3- Patient will increase OOB/ sitting tolerance to 4-6 hours per day for increased participation in self care and leisure tasks with no s/s of exertion.  4- Patient will identify s/s of exertion during ADL and functional mobility with no verbal cues.  5-Patient will verbalize/ demonstrate compensatory strategies to recover from exertion with no verbal cues.   6-Patient will increase standing tolerance time to 10 minutes with No UE support to complete sink level ADLs @ Mod I level   7- Patient will increase sitting tolerance at edge of bed to 30 minutes to complete UB ADLs @ Indep. level.   8-- Patient/ Family will demonstrate competency with UE Home Exercise Program.      TIM Cotto, OTR/L

## 2025-01-10 VITALS
DIASTOLIC BLOOD PRESSURE: 62 MMHG | HEIGHT: 71 IN | SYSTOLIC BLOOD PRESSURE: 112 MMHG | RESPIRATION RATE: 20 BRPM | TEMPERATURE: 98.3 F | OXYGEN SATURATION: 96 % | HEART RATE: 102 BPM | WEIGHT: 209.88 LBS | BODY MASS INDEX: 29.38 KG/M2

## 2025-01-10 LAB
ANION GAP SERPL CALCULATED.3IONS-SCNC: 7 MMOL/L (ref 4–13)
BUN SERPL-MCNC: 30 MG/DL (ref 5–25)
CALCIUM SERPL-MCNC: 8 MG/DL (ref 8.4–10.2)
CHLORIDE SERPL-SCNC: 108 MMOL/L (ref 96–108)
CK SERPL-CCNC: 1211 U/L (ref 39–308)
CO2 SERPL-SCNC: 25 MMOL/L (ref 21–32)
CREAT SERPL-MCNC: 1.29 MG/DL (ref 0.6–1.3)
GFR SERPL CREATININE-BSD FRML MDRD: 53 ML/MIN/1.73SQ M
GLUCOSE SERPL-MCNC: 139 MG/DL (ref 65–140)
POTASSIUM SERPL-SCNC: 4.2 MMOL/L (ref 3.5–5.3)
SODIUM SERPL-SCNC: 140 MMOL/L (ref 135–147)

## 2025-01-10 PROCEDURE — 80048 BASIC METABOLIC PNL TOTAL CA: CPT

## 2025-01-10 PROCEDURE — 99239 HOSP IP/OBS DSCHRG MGMT >30: CPT

## 2025-01-10 PROCEDURE — 82550 ASSAY OF CK (CPK): CPT

## 2025-01-10 RX ORDER — LEVOFLOXACIN 750 MG/1
750 TABLET, FILM COATED ORAL EVERY 24 HOURS
Qty: 5 TABLET | Refills: 0 | Status: SHIPPED | OUTPATIENT
Start: 2025-01-10 | End: 2025-01-15

## 2025-01-10 RX ORDER — OXYCODONE AND ACETAMINOPHEN 2.5; 325 MG/1; MG/1
1 TABLET ORAL EVERY 4 HOURS PRN
Qty: 3 TABLET | Refills: 0 | Status: SHIPPED | OUTPATIENT
Start: 2025-01-10 | End: 2025-01-20

## 2025-01-10 RX ADMIN — AMPICILLIN SODIUM 1000 MG: 2 INJECTION, POWDER, FOR SOLUTION INTRAMUSCULAR; INTRAVENOUS at 11:38

## 2025-01-10 RX ADMIN — AMPICILLIN SODIUM 1000 MG: 2 INJECTION, POWDER, FOR SOLUTION INTRAMUSCULAR; INTRAVENOUS at 05:35

## 2025-01-10 RX ADMIN — GABAPENTIN 100 MG: 100 CAPSULE ORAL at 09:22

## 2025-01-10 RX ADMIN — BEXAROTENE 600 MG: 75 CAPSULE, LIQUID FILLED ORAL at 09:23

## 2025-01-10 RX ADMIN — HEPARIN SODIUM 5000 UNITS: 5000 INJECTION INTRAVENOUS; SUBCUTANEOUS at 05:36

## 2025-01-10 RX ADMIN — ASPIRIN 81 MG: 81 TABLET, CHEWABLE ORAL at 09:21

## 2025-01-10 RX ADMIN — SACUBITRIL AND VALSARTAN 1 TABLET: 49; 51 TABLET, FILM COATED ORAL at 09:24

## 2025-01-10 RX ADMIN — LEVOTHYROXINE SODIUM 100 MCG: 0.1 TABLET ORAL at 09:22

## 2025-01-10 RX ADMIN — HYDROMORPHONE HYDROCHLORIDE 0.5 MG: 1 INJECTION, SOLUTION INTRAMUSCULAR; INTRAVENOUS; SUBCUTANEOUS at 06:44

## 2025-01-10 RX ADMIN — FERROUS SULFATE TAB 325 MG (65 MG ELEMENTAL FE) 325 MG: 325 (65 FE) TAB at 09:22

## 2025-01-10 RX ADMIN — CYANOCOBALAMIN TAB 500 MCG 1000 MCG: 500 TAB at 09:22

## 2025-01-10 RX ADMIN — METOPROLOL SUCCINATE 75 MG: 25 TABLET, EXTENDED RELEASE ORAL at 09:21

## 2025-01-10 RX ADMIN — ATORVASTATIN CALCIUM 40 MG: 40 TABLET, FILM COATED ORAL at 09:22

## 2025-01-10 RX ADMIN — MORPHINE SULFATE 2 MG: 2 INJECTION, SOLUTION INTRAMUSCULAR; INTRAVENOUS at 09:36

## 2025-01-10 RX ADMIN — SPIRONOLACTONE 12.5 MG: 25 TABLET ORAL at 09:22

## 2025-01-10 NOTE — NURSING NOTE
Pt refused bed alarm at this time. Pt educated on importance of bed alarm for safety reasons. LPN Paget B made aware.

## 2025-01-10 NOTE — ASSESSMENT & PLAN NOTE
"CT abdomen/pelvis showing \"0.2 cm distal left ureteral stone. Mild ureteronephrosis.\"  Urology consulted,  POD#4 cystocopy with left ureteral stent  E. Facecalis, sensitive to ampicillin, continue   Completed for a trial yesterday  "
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
## 2025-01-10 NOTE — NURSING NOTE
Patient has been ambulating to the bathroom with out a walker. Patient requested to have chair and bed alarm off. Patient does feel he does not  need it. Patient signed a refusal form.

## 2025-01-11 LAB
BACTERIA BLD CULT: NORMAL
BACTERIA BLD CULT: NORMAL

## 2025-01-13 ENCOUNTER — PATIENT OUTREACH (OUTPATIENT)
Dept: CASE MANAGEMENT | Facility: OTHER | Age: 80
End: 2025-01-13

## 2025-01-13 ENCOUNTER — TRANSITIONAL CARE MANAGEMENT (OUTPATIENT)
Age: 80
End: 2025-01-13

## 2025-01-13 DIAGNOSIS — Z71.89 COMPLEX CARE COORDINATION: Primary | ICD-10-CM

## 2025-01-13 DIAGNOSIS — G47.00 INSOMNIA, UNSPECIFIED TYPE: ICD-10-CM

## 2025-01-13 RX ORDER — ZOLPIDEM TARTRATE 10 MG/1
10 TABLET ORAL
Qty: 30 TABLET | Refills: 0 | Status: SHIPPED | OUTPATIENT
Start: 2025-01-13

## 2025-01-13 NOTE — TELEPHONE ENCOUNTER
Post Op Note    Sherly Peña is a 79 y.o. male s/p  CYSTOSCOPY RETROGRADE PYELOGRAM WITH INSERTION STENT URETERAL (Left: Bladder)  performed 1/6/25.  Sherly Peña was seen on call by Dr. Boswell and is seen at the [place] office.     How would you rate your pain on a scale from 1 to 10, 10 being the worst pain ever? 2- complains of dysuria   Have you had a fever? No  Have your bowel movements been regular? No Patient has been having diarrhea since leaving the hospital. Diarrhea has been so bad he has been incontinent   Do you have any difficulty urinating? Yes    Do you have any other questions or concerns that I can address at this time?   -Went over potential/expected post op symptoms   -discussed ER precautions  -Confirmed post op plan. Knows he will proceed with surgery as scheduled on 1/27   -Went over medication      Patient has been having diarrhea since leaving the hospital. Diarrhea has been so bad he has been incontinent and awake up to 4x at night. Advised him to eat yogurt with probiotics or if he has OTC probiotics at home to trial those. Advised I would speak with providers regarding symptoms and call back with any other suggestions
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
## 2025-01-14 NOTE — PROGRESS NOTES
Called and spoke with pt. Relayed Margot's message below. Pt says he is seeing his PCP on Friday and will let them know about his bowel incontinence. He states that the incontinence only happens at night. He is also reporting pain but is not currently taking pain medication. Advised pt to use heating pad or OTC pain relievers. Pt verbalized understanding. No further complaints from pt.             Margot Wilson PA-C to [facility]       1/14/25  7:38 AM  Note     Agree with probiotics. Patient needs to reach out to PCP for C. Diff testing. Thanks

## 2025-01-14 NOTE — UTILIZATION REVIEW
NOTIFICATION OF ADMISSION DISCHARGE   This is a Notification of Discharge from [facility]. Please be advised that this patient has been discharge from our facility. Below you will find the admission and discharge date and time including the patient’s disposition.   UTILIZATION REVIEW CONTACT:  Claudine Clements  Utilization   Network Utilization Review Department  Phone: [phone] x carefully listen to the prompts. All voicemails are confidential.  Email: [email or web address]     ADMISSION INFORMATION  PRESENTATION DATE: 1/6/2025  3:52 AM  OBERVATION ADMISSION DATE: 01/06/2025 0611  INPATIENT ADMISSION DATE: 1/7/25  3:27 PM   DISCHARGE DATE: 1/10/2025  6:01 PM   DISPOSITION:Home/Self Care    Network Utilization Review Department  ATTENTION: Please call with any questions or concerns to [phone] and carefully listen to the prompts so that you are directed to the right person. All voicemails are confidential.   For Discharge needs, contact Care Management DC Support Team at [phone] opt. 2  Send all requests for admission clinical reviews, approved or denied determinations and any other requests to dedicated fax number below belonging to the campus where the patient is receiving treatment. List of dedicated fax numbers for the Facilities:  FACILITY NAME UR FAX NUMBER   ADMISSION DENIALS (Administrative/Medical Necessity) [phone]   DISCHARGE SUPPORT TEAM ([facility]) [phone]   PARENT CHILD HEALTH (Maternity/NICU/Pediatrics) [phone]   [facility] [phone]   [facility] [phone]   [facility] [phone]   [facility] [phone]   [facility] [phone]   [facility] [phone]   [facility] [phone]   [facility]  [place] [phone]   [facility] [phone]   [facility] [phone]   [facility] [phone]   [facility] [phone]

## 2025-01-17 ENCOUNTER — APPOINTMENT (OUTPATIENT)
Age: 80
End: 2025-01-17
Payer: COMMERCIAL

## 2025-01-17 ENCOUNTER — OFFICE VISIT (OUTPATIENT)
Age: 80
End: 2025-01-17
Payer: COMMERCIAL

## 2025-01-17 VITALS
HEART RATE: 93 BPM | WEIGHT: 214.2 LBS | OXYGEN SATURATION: 98 % | SYSTOLIC BLOOD PRESSURE: 118 MMHG | RESPIRATION RATE: 16 BRPM | DIASTOLIC BLOOD PRESSURE: 58 MMHG | TEMPERATURE: 98.6 F | BODY MASS INDEX: 29.99 KG/M2 | HEIGHT: 71 IN

## 2025-01-17 DIAGNOSIS — N20.0 NEPHROLITHIASIS: ICD-10-CM

## 2025-01-17 DIAGNOSIS — I50.22 CHRONIC SYSTOLIC CONGESTIVE HEART FAILURE (HCC): ICD-10-CM

## 2025-01-17 DIAGNOSIS — N18.9 ACUTE KIDNEY INJURY SUPERIMPOSED ON CHRONIC KIDNEY DISEASE  (HCC): ICD-10-CM

## 2025-01-17 DIAGNOSIS — Z09 ENCOUNTER FOR EXAMINATION FOLLOWING TREATMENT AT HOSPITAL: Primary | ICD-10-CM

## 2025-01-17 DIAGNOSIS — T79.6XXD TRAUMATIC RHABDOMYOLYSIS, SUBSEQUENT ENCOUNTER: ICD-10-CM

## 2025-01-17 DIAGNOSIS — N17.9 ACUTE KIDNEY INJURY SUPERIMPOSED ON CHRONIC KIDNEY DISEASE  (HCC): ICD-10-CM

## 2025-01-17 DIAGNOSIS — A41.9 SEPSIS, DUE TO UNSPECIFIED ORGANISM, UNSPECIFIED WHETHER ACUTE ORGAN DYSFUNCTION PRESENT (HCC): ICD-10-CM

## 2025-01-17 LAB
ALBUMIN SERPL BCG-MCNC: 3.7 G/DL (ref 3.5–5)
ALP SERPL-CCNC: 52 U/L (ref 34–104)
ALT SERPL W P-5'-P-CCNC: 19 U/L (ref 7–52)
ANION GAP SERPL CALCULATED.3IONS-SCNC: 9 MMOL/L (ref 4–13)
AST SERPL W P-5'-P-CCNC: 17 U/L (ref 13–39)
BASOPHILS # BLD AUTO: 0.07 THOUSANDS/ΜL (ref 0–0.1)
BASOPHILS NFR BLD AUTO: 1 % (ref 0–1)
BILIRUB SERPL-MCNC: 0.33 MG/DL (ref 0.2–1)
BUN SERPL-MCNC: 20 MG/DL (ref 5–25)
CALCIUM SERPL-MCNC: 8.4 MG/DL (ref 8.4–10.2)
CHLORIDE SERPL-SCNC: 108 MMOL/L (ref 96–108)
CK SERPL-CCNC: 148 U/L (ref 39–308)
CO2 SERPL-SCNC: 25 MMOL/L (ref 21–32)
CREAT SERPL-MCNC: 1.2 MG/DL (ref 0.6–1.3)
EOSINOPHIL # BLD AUTO: 0.1 THOUSAND/ΜL (ref 0–0.61)
EOSINOPHIL NFR BLD AUTO: 1 % (ref 0–6)
ERYTHROCYTE [DISTWIDTH] IN BLOOD BY AUTOMATED COUNT: 13.8 % (ref 11.6–15.1)
GFR SERPL CREATININE-BSD FRML MDRD: 57 ML/MIN/1.73SQ M
GLUCOSE SERPL-MCNC: 93 MG/DL (ref 65–140)
HCT VFR BLD AUTO: 34.5 % (ref 36.5–49.3)
HGB BLD-MCNC: 10.3 G/DL (ref 12–17)
IMM GRANULOCYTES # BLD AUTO: 0.04 THOUSAND/UL (ref 0–0.2)
IMM GRANULOCYTES NFR BLD AUTO: 0 % (ref 0–2)
LYMPHOCYTES # BLD AUTO: 2.16 THOUSANDS/ΜL (ref 0.6–4.47)
LYMPHOCYTES NFR BLD AUTO: 20 % (ref 14–44)
MCH RBC QN AUTO: 30.7 PG (ref 26.8–34.3)
MCHC RBC AUTO-ENTMCNC: 29.9 G/DL (ref 31.4–37.4)
MCV RBC AUTO: 103 FL (ref 82–98)
MONOCYTES # BLD AUTO: 0.58 THOUSAND/ΜL (ref 0.17–1.22)
MONOCYTES NFR BLD AUTO: 5 % (ref 4–12)
NEUTROPHILS # BLD AUTO: 8.07 THOUSANDS/ΜL (ref 1.85–7.62)
NEUTS SEG NFR BLD AUTO: 73 % (ref 43–75)
NRBC BLD AUTO-RTO: 0 /100 WBCS
PLATELET # BLD AUTO: 357 THOUSANDS/UL (ref 149–390)
PMV BLD AUTO: 10.4 FL (ref 8.9–12.7)
POTASSIUM SERPL-SCNC: 4.1 MMOL/L (ref 3.5–5.3)
PROT SERPL-MCNC: 6.9 G/DL (ref 6.4–8.4)
RBC # BLD AUTO: 3.35 MILLION/UL (ref 3.88–5.62)
SODIUM SERPL-SCNC: 142 MMOL/L (ref 135–147)
WBC # BLD AUTO: 11.02 THOUSAND/UL (ref 4.31–10.16)

## 2025-01-17 PROCEDURE — 85025 COMPLETE CBC W/AUTO DIFF WBC: CPT

## 2025-01-17 PROCEDURE — 82550 ASSAY OF CK (CPK): CPT

## 2025-01-17 PROCEDURE — 80053 COMPREHEN METABOLIC PANEL: CPT

## 2025-01-17 PROCEDURE — 36415 COLL VENOUS BLD VENIPUNCTURE: CPT

## 2025-01-17 PROCEDURE — 99495 TRANSJ CARE MGMT MOD F2F 14D: CPT

## 2025-01-17 NOTE — ASSESSMENT & PLAN NOTE
Patient has an upcoming procedure, had a left ureteral stent put in.  Continue on the Flomax.  Patient denies any pain at this time.  Orders:  •  Comprehensive metabolic panel; Future  •  CBC and differential; Future

## 2025-01-17 NOTE — ASSESSMENT & PLAN NOTE
Lab Results   Component Value Date    EGFR 53 01/10/2025    EGFR 59 01/09/2025    EGFR 39 01/08/2025    CREATININE 1.29 01/10/2025    CREATININE 1.18 01/09/2025    CREATININE 1.66 (H) 01/08/2025        GFR was improving by the time of his discharge.  Will get a repeat CMP today to evaluate.  Continue to drink plenty of fluids.

## 2025-01-17 NOTE — ASSESSMENT & PLAN NOTE
Lab Results   Component Value Date    EGFR 53 01/10/2025    EGFR 59 01/09/2025    EGFR 39 01/08/2025    CREATININE 1.29 01/10/2025    CREATININE 1.18 01/09/2025    CREATININE 1.66 (H) 01/08/2025
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
## 2025-01-17 NOTE — ASSESSMENT & PLAN NOTE
Wt Readings from Last 3 Encounters:   01/17/25 97.2 kg (214 lb 3.2 oz)   01/06/25 95.2 kg (209 lb 14.1 oz)   01/02/25 95.3 kg (210 lb)     Patient was started on Jardiance by his cardiologist in the hospital.  Discussed the need to drink plenty of fluids as this puts him at higher risk for a UTI and yeast infections.  Patient is taking this for his CHF.

## 2025-01-20 ENCOUNTER — RESULTS FOLLOW-UP (OUTPATIENT)
Age: 80
End: 2025-01-20

## 2025-01-20 ENCOUNTER — ANESTHESIA EVENT (OUTPATIENT)
Dept: PERIOP | Facility: HOSPITAL | Age: 80
End: 2025-01-20
Payer: COMMERCIAL

## 2025-01-20 NOTE — TELEPHONE ENCOUNTER
----- Message from Kyle Soto MD sent at 1/20/2025 12:46 PM EST -----  Please let patient know that his labs showed his CK (which was previously elevated due to rhabdomyolysis) has normalized. Recommend continuing to hydrate well. His WBC is a little elevated but better than at the hospital. May be related to recent illness. If having new symptoms concerning for infection, should present for reevaluation.

## 2025-01-21 ENCOUNTER — PATIENT OUTREACH (OUTPATIENT)
Dept: CASE MANAGEMENT | Facility: OTHER | Age: 80
End: 2025-01-21

## 2025-01-21 ENCOUNTER — TELEPHONE (OUTPATIENT)
Dept: NEPHROLOGY | Facility: CLINIC | Age: 80
End: 2025-01-21

## 2025-01-21 NOTE — TELEPHONE ENCOUNTER
LM to get labs done prior to 02/04 appt. Asked pt to call us back if any other questions or concerns.

## 2025-01-21 NOTE — PROGRESS NOTES
"Outpatient Care Management note- follow up call    Chart review completed.    Last RN CM outreach on 1/13/25, patient identified new onset incontinent diarrhea. He had not started the Levaquin. Urology was made aware of patient reporting pressure needed to be applies for urine to flow. Urology office contacted the patient and recommended a probiotic for the diarrhea and follow up with PCP if no improvement. Sherly had follow up with his PCP on 1/17/25 and labs were ordered. Patient has since completed those labs.     Spoke with Sherly who reports he is \"much better\" since out last call. He reports the incontinence only last 3 days since coming home from the hospital and he did not need to take the probiotic. He reports he is \"back to normal.\" He confirms he finished the Levaquin. He plans to have additional labs drawn tomorrow in preparation for his procedure on 1/27/25- TURBT.     Patient declined further need for RN CM outreach, will close referral.   "

## 2025-01-23 ENCOUNTER — APPOINTMENT (OUTPATIENT)
Age: 80
End: 2025-01-23
Payer: COMMERCIAL

## 2025-01-23 DIAGNOSIS — Z79.899 ENCOUNTER FOR LONG-TERM CURRENT USE OF HIGH RISK MEDICATION: ICD-10-CM

## 2025-01-23 DIAGNOSIS — Z01.812 PRE-OPERATIVE LABORATORY EXAMINATION: ICD-10-CM

## 2025-01-23 DIAGNOSIS — Z79.899 ENCOUNTER FOR LONG-TERM (CURRENT) USE OF HIGH-RISK MEDICATION: ICD-10-CM

## 2025-01-23 DIAGNOSIS — M89.9 CHRONIC KIDNEY DISEASE-MINERAL AND BONE DISORDER: ICD-10-CM

## 2025-01-23 DIAGNOSIS — C84.00 MYCOSIS FUNGOIDES, UNSPECIFIED BODY REGION (HCC): ICD-10-CM

## 2025-01-23 DIAGNOSIS — R97.20 ELEVATED PSA: ICD-10-CM

## 2025-01-23 DIAGNOSIS — N18.31 STAGE 3A CHRONIC KIDNEY DISEASE (HCC): ICD-10-CM

## 2025-01-23 DIAGNOSIS — E83.9 CHRONIC KIDNEY DISEASE-MINERAL AND BONE DISORDER: ICD-10-CM

## 2025-01-23 DIAGNOSIS — N18.9 CHRONIC KIDNEY DISEASE-MINERAL AND BONE DISORDER: ICD-10-CM

## 2025-01-23 DIAGNOSIS — Z01.818 OTHER SPECIFIED PRE-OPERATIVE EXAMINATION: ICD-10-CM

## 2025-01-23 DIAGNOSIS — N40.1 BENIGN PROSTATIC HYPERPLASIA WITH LOWER URINARY TRACT SYMPTOMS, SYMPTOM DETAILS UNSPECIFIED: ICD-10-CM

## 2025-01-23 DIAGNOSIS — R39.89 SUSPECTED UTI: ICD-10-CM

## 2025-01-23 LAB
ALBUMIN SERPL BCG-MCNC: 3.6 G/DL (ref 3.5–5)
ALP SERPL-CCNC: 54 U/L (ref 34–104)
ALT SERPL W P-5'-P-CCNC: 11 U/L (ref 7–52)
ANION GAP SERPL CALCULATED.3IONS-SCNC: 10 MMOL/L (ref 4–13)
AST SERPL W P-5'-P-CCNC: 14 U/L (ref 13–39)
BACTERIA UR QL AUTO: ABNORMAL /HPF
BASOPHILS # BLD AUTO: 0.06 THOUSANDS/ΜL (ref 0–0.1)
BASOPHILS NFR BLD AUTO: 1 % (ref 0–1)
BILIRUB SERPL-MCNC: 0.42 MG/DL (ref 0.2–1)
BILIRUB UR QL STRIP: NEGATIVE
BUN SERPL-MCNC: 25 MG/DL (ref 5–25)
CALCIUM SERPL-MCNC: 8.3 MG/DL (ref 8.4–10.2)
CHLORIDE SERPL-SCNC: 106 MMOL/L (ref 96–108)
CHOLEST SERPL-MCNC: 160 MG/DL (ref ?–200)
CLARITY UR: ABNORMAL
CO2 SERPL-SCNC: 25 MMOL/L (ref 21–32)
COLOR UR: YELLOW
CREAT SERPL-MCNC: 1.06 MG/DL (ref 0.6–1.3)
CREAT UR-MCNC: 107.2 MG/DL
EOSINOPHIL # BLD AUTO: 0.11 THOUSAND/ΜL (ref 0–0.61)
EOSINOPHIL NFR BLD AUTO: 1 % (ref 0–6)
ERYTHROCYTE [DISTWIDTH] IN BLOOD BY AUTOMATED COUNT: 13.7 % (ref 11.6–15.1)
GFR SERPL CREATININE-BSD FRML MDRD: 67 ML/MIN/1.73SQ M
GLUCOSE P FAST SERPL-MCNC: 113 MG/DL (ref 65–99)
GLUCOSE UR STRIP-MCNC: NEGATIVE MG/DL
HCT VFR BLD AUTO: 34.1 % (ref 36.5–49.3)
HDLC SERPL-MCNC: 48 MG/DL
HGB BLD-MCNC: 10.3 G/DL (ref 12–17)
HGB UR QL STRIP.AUTO: ABNORMAL
IMM GRANULOCYTES # BLD AUTO: 0.04 THOUSAND/UL (ref 0–0.2)
IMM GRANULOCYTES NFR BLD AUTO: 0 % (ref 0–2)
KETONES UR STRIP-MCNC: NEGATIVE MG/DL
LDLC SERPL CALC-MCNC: 79 MG/DL (ref 0–100)
LEUKOCYTE ESTERASE UR QL STRIP: ABNORMAL
LYMPHOCYTES # BLD AUTO: 1.92 THOUSANDS/ΜL (ref 0.6–4.47)
LYMPHOCYTES NFR BLD AUTO: 22 % (ref 14–44)
MCH RBC QN AUTO: 30.4 PG (ref 26.8–34.3)
MCHC RBC AUTO-ENTMCNC: 30.2 G/DL (ref 31.4–37.4)
MCV RBC AUTO: 101 FL (ref 82–98)
MONOCYTES # BLD AUTO: 0.5 THOUSAND/ΜL (ref 0.17–1.22)
MONOCYTES NFR BLD AUTO: 6 % (ref 4–12)
MUCOUS THREADS UR QL AUTO: ABNORMAL
NEUTROPHILS # BLD AUTO: 6.27 THOUSANDS/ΜL (ref 1.85–7.62)
NEUTS SEG NFR BLD AUTO: 70 % (ref 43–75)
NITRITE UR QL STRIP: NEGATIVE
NON-SQ EPI CELLS URNS QL MICRO: ABNORMAL /HPF
NONHDLC SERPL-MCNC: 112 MG/DL
NRBC BLD AUTO-RTO: 0 /100 WBCS
PH UR STRIP.AUTO: 6 [PH]
PLATELET # BLD AUTO: 348 THOUSANDS/UL (ref 149–390)
PMV BLD AUTO: 10.5 FL (ref 8.9–12.7)
POTASSIUM SERPL-SCNC: 3.8 MMOL/L (ref 3.5–5.3)
PROT SERPL-MCNC: 6.6 G/DL (ref 6.4–8.4)
PROT UR STRIP-MCNC: ABNORMAL MG/DL
PROT UR-MCNC: 166.2 MG/DL
PROT/CREAT UR: 1.6 MG/G{CREAT} (ref 0–0.1)
PSA SERPL-MCNC: 8.18 NG/ML (ref 0–4)
RBC # BLD AUTO: 3.39 MILLION/UL (ref 3.88–5.62)
RBC #/AREA URNS AUTO: ABNORMAL /HPF
SODIUM SERPL-SCNC: 141 MMOL/L (ref 135–147)
SP GR UR STRIP.AUTO: 1.02 (ref 1–1.03)
T3 SERPL-MCNC: 1.2 NG/ML (ref 0.9–1.8)
T4 FREE SERPL-MCNC: 1.03 NG/DL (ref 0.61–1.12)
TRIGL SERPL-MCNC: 163 MG/DL (ref ?–150)
UROBILINOGEN UR STRIP-ACNC: <2 MG/DL
WBC # BLD AUTO: 8.9 THOUSAND/UL (ref 4.31–10.16)
WBC #/AREA URNS AUTO: ABNORMAL /HPF

## 2025-01-23 PROCEDURE — 84156 ASSAY OF PROTEIN URINE: CPT

## 2025-01-23 PROCEDURE — 36415 COLL VENOUS BLD VENIPUNCTURE: CPT

## 2025-01-23 PROCEDURE — 84480 ASSAY TRIIODOTHYRONINE (T3): CPT

## 2025-01-23 PROCEDURE — 82570 ASSAY OF URINE CREATININE: CPT

## 2025-01-23 PROCEDURE — 80053 COMPREHEN METABOLIC PANEL: CPT

## 2025-01-23 PROCEDURE — 84439 ASSAY OF FREE THYROXINE: CPT

## 2025-01-23 PROCEDURE — 80061 LIPID PANEL: CPT

## 2025-01-23 PROCEDURE — 81001 URINALYSIS AUTO W/SCOPE: CPT

## 2025-01-23 PROCEDURE — 85025 COMPLETE CBC W/AUTO DIFF WBC: CPT

## 2025-01-23 PROCEDURE — 84153 ASSAY OF PSA TOTAL: CPT

## 2025-01-23 PROCEDURE — 87086 URINE CULTURE/COLONY COUNT: CPT

## 2025-01-23 NOTE — PRE-PROCEDURE INSTRUCTIONS
Discussion/Summary   Needs additional views and US for R breast to clarify some lymph nodes and asymmetry.        Verified Results  MA FFDM SCREEN ELLY W RUDY W CAD 28Nov2017 08:50AM JOSEFINA MORALES   Ordering Provider: SAURABH GUAJARDO.    Reason For Study: yearly screening mammogram,yearly screening mammogram.     Test Name Result Flag Reference   MA FFDM SCREEN ELLY W RUDY W CAD (Report)     Accession #    [id number]    #[id number] - MA FFDM SCREEN ELLY W RUDY W CAD    BILATERAL DIGITAL SCREENING MAMMOGRAM 3D/2D WITH CAD: 11/28/2017    CLINICAL HISTORY:Routine screening mammogram - baseline.    COMPARISON:   No prior exams were available for comparison.    FINDINGS:  The tissue of both breasts is heterogeneously dense. This may lower the sensitivity of   mammography.    There are multiple lymph nodes in the right breast posterior depth superior region seen on the   mediolateral oblique view only.  There also is a lymph node in the right breast at 11 o'clock posterior depth.  Additionally, there is a focal asymmetry in the right breast at 11 o'clock posterior depth.    No other significant masses, calcifications, or other findings are seen in either breast.  Current study was also evaluated with a Computer Aided Detection (CAD) system. Digital Breast   Tomosynthesis (DBT) images were obtained and used to assist in the interpretation of this   examination.    IMPRESSION: INCOMPLETE NEED ADDITIONAL IMAGING EVALUATION    The multiple lymph nodes in the right breast posterior depth superior region seen on the   mediolateral oblique view only are indeterminate. An ultrasound is recommended.  The lymph node in the right breast at 11 o'clock posterior depth is indeterminate. An ultrasound   is recommended.  The focal asymmetry in the right breast at 11 o'clock posterior depth is indeterminate.   Additional views as well as an ultrasound are recommended.    MAMMOGRAPHY BI-RADS: 0 Incomplete Need Additional Imaging  Pre-Surgery Instructions:   Medication Instructions    albuterol (2.5 mg/3 mL) 0.083 % nebulizer solution Uses PRN- OK to take day of surgery    ammonium lactate (LAC-HYDRIN) 12 % lotion Hold day of surgery.    aspirin 81 MG tablet Hold day of surgery.    atorvastatin (LIPITOR) 40 mg tablet Take day of surgery.    bexarotene (TARGRETIN) 75 mg capsule Take night before surgery    Cholecalciferol 25 MCG (1000 UT) capsule Hold starting 1/23/25    cyanocobalamin (VITAMIN B-12) 1,000 mcg tablet   Hold starting 1/23/25    ferrous sulfate 324 (65 Fe) mg Hold day of surgery.    gabapentin (NEURONTIN) 100 mg capsule Take day of surgery.    levothyroxine 100 mcg tablet Take day of surgery.    metoprolol succinate (TOPROL-XL) 50 mg 24 hr tablet Take day of surgery.    montelukast (SINGULAIR) 10 mg tablet Take day of surgery.    Multiple Vitamins-Minerals (OCUVITE ADULT 50+ PO) Hold starting 1/23/25    sacubitril-valsartan (Entresto) 49-51 MG TABS Hold day of surgery.    spironolactone (ALDACTONE) 25 mg tablet Hold day of surgery.    tamsulosin (FLOMAX) 0.4 mg Take night before surgery    Trelegy Ellipta 100-62.5-25 MCG/ACT inhaler Uses PRN- OK to take day of surgery    triamcinolone (KENALOG) 0.1 % ointment Hold day of surgery.    Vitamins-Lipotropics (LIPOFLAVONOID PO) Hold starting 1/23/25    zolpidem (AMBIEN) 10 mg tablet Take night before surgery   Medication instructions for day surgery reviewed. Please use only a sip of water to take your instructed medications. Avoid all over the counter vitamins, supplements and NSAIDS for one week prior to surgery per anesthesia guidelines. Tylenol is ok to take as needed.     You will receive a call one business day prior to surgery with an arrival time and hospital directions. If your surgery is scheduled on a Monday, the hospital will be calling you on the Friday prior to your surgery. If you have not heard from anyone by 8pm, please call the hospital supervisor through the  Evaluation    Ileana Cavanaugh M.D., md/harriet:11/28/2017 09:36:13    Imaging Technologist: Meryl Sauceda, RT(R)(M), [facility]  letter sent: Addl Imaging Single Exam   **** FINAL ****    Dictated By:   ILEANA EARL    Electronically Reviewed and Approved By:  ILEANA EARL        hospital  at [phone].  or [place] [phone]).    Do not eat or drink anything after midnight the night before your surgery, including candy, mints, lifesavers, or chewing gum. Do not drink alcohol 24hrs before your surgery. Try not to smoke at least 24hrs before your surgery.       Follow the pre surgery showering instructions as listed in the “My Surgical Experience Booklet” or otherwise provided by your surgeon's office. Do not use a blade to shave the surgical area 1 week before surgery. It is okay to use a clean electric clippers up to 24 hours before surgery. Do not apply any lotions, creams, including makeup, cologne, deodorant, or perfumes after showering on the day of your surgery. Do not use dry shampoo, hair spray, hair gel, or any type of hair products.     No contact lenses, eye make-up, or artificial eyelashes. Remove nail polish, including gel polish, and any artificial, gel, or acrylic nails if possible. Remove all jewelry including rings and body piercing jewelry.     Wear causal clothing that is easy to take on and off. Consider your type of surgery.    Keep any valuables, jewelry, piercings at home. Please bring any specially ordered equipment (sling, braces) if indicated.    Arrange for a responsible person to drive you to and from the hospital on the day of your surgery. Please confirm the visitor policy for the day of your procedure when you receive your phone call with an arrival time.     Call the surgeon's office with any new illnesses, exposures, or additional questions prior to surgery.    Please reference your “My Surgical Experience Booklet” for additional information to prepare for your upcoming surgery.

## 2025-01-24 ENCOUNTER — RESULTS FOLLOW-UP (OUTPATIENT)
Dept: UROLOGY | Facility: CLINIC | Age: 80
End: 2025-01-24

## 2025-01-24 LAB — BACTERIA UR CULT: NORMAL

## 2025-01-27 ENCOUNTER — HOSPITAL ENCOUNTER (OUTPATIENT)
Facility: HOSPITAL | Age: 80
Setting detail: OUTPATIENT SURGERY
Discharge: HOME | End: 2025-01-27
Attending: UROLOGY | Admitting: UROLOGY
Payer: COMMERCIAL

## 2025-01-27 ENCOUNTER — APPOINTMENT (OUTPATIENT)
Dept: RADIOLOGY | Facility: HOSPITAL | Age: 80
End: 2025-01-27
Payer: COMMERCIAL

## 2025-01-27 ENCOUNTER — TELEPHONE (OUTPATIENT)
Dept: UROLOGY | Facility: CLINIC | Age: 80
End: 2025-01-27

## 2025-01-27 ENCOUNTER — ANESTHESIA (OUTPATIENT)
Dept: PERIOP | Facility: HOSPITAL | Age: 80
End: 2025-01-27
Payer: COMMERCIAL

## 2025-01-27 VITALS
HEART RATE: 82 BPM | OXYGEN SATURATION: 97 % | HEIGHT: 71 IN | BODY MASS INDEX: 29.63 KG/M2 | TEMPERATURE: 97.5 F | SYSTOLIC BLOOD PRESSURE: 111 MMHG | DIASTOLIC BLOOD PRESSURE: 55 MMHG | RESPIRATION RATE: 20 BRPM | WEIGHT: 211.64 LBS

## 2025-01-27 DIAGNOSIS — N32.9 LESION OF BLADDER: ICD-10-CM

## 2025-01-27 PROCEDURE — 88342 IMHCHEM/IMCYTCHM 1ST ANTB: CPT | Performed by: STUDENT IN AN ORGANIZED HEALTH CARE EDUCATION/TRAINING PROGRAM

## 2025-01-27 PROCEDURE — 74420 UROGRAPHY RTRGR +-KUB: CPT

## 2025-01-27 PROCEDURE — 82360 CALCULUS ASSAY QUANT: CPT | Performed by: UROLOGY

## 2025-01-27 PROCEDURE — 52352 CYSTOURETERO W/STONE REMOVE: CPT | Performed by: UROLOGY

## 2025-01-27 PROCEDURE — C1758 CATHETER, URETERAL: HCPCS | Performed by: UROLOGY

## 2025-01-27 PROCEDURE — 52332 CYSTOSCOPY AND TREATMENT: CPT | Performed by: UROLOGY

## 2025-01-27 PROCEDURE — 52317 REMOVE BLADDER STONE: CPT | Performed by: UROLOGY

## 2025-01-27 PROCEDURE — NC001 PR NO CHARGE: Performed by: UROLOGY

## 2025-01-27 PROCEDURE — 52204 CYSTOSCOPY W/BIOPSY(S): CPT | Performed by: UROLOGY

## 2025-01-27 PROCEDURE — 88305 TISSUE EXAM BY PATHOLOGIST: CPT | Performed by: STUDENT IN AN ORGANIZED HEALTH CARE EDUCATION/TRAINING PROGRAM

## 2025-01-27 PROCEDURE — C2625 STENT, NON-COR, TEM W/DEL SY: HCPCS | Performed by: UROLOGY

## 2025-01-27 PROCEDURE — 88341 IMHCHEM/IMCYTCHM EA ADD ANTB: CPT | Performed by: STUDENT IN AN ORGANIZED HEALTH CARE EDUCATION/TRAINING PROGRAM

## 2025-01-27 PROCEDURE — C1769 GUIDE WIRE: HCPCS | Performed by: UROLOGY

## 2025-01-27 RX ORDER — ONDANSETRON 2 MG/ML
4 INJECTION INTRAMUSCULAR; INTRAVENOUS ONCE AS NEEDED
Status: DISCONTINUED | OUTPATIENT
Start: 2025-01-27 | End: 2025-01-27 | Stop reason: HOSPADM

## 2025-01-27 RX ORDER — EPHEDRINE SULFATE 50 MG/ML
INJECTION INTRAVENOUS AS NEEDED
Status: DISCONTINUED | OUTPATIENT
Start: 2025-01-27 | End: 2025-01-27

## 2025-01-27 RX ORDER — FENTANYL CITRATE 50 UG/ML
INJECTION, SOLUTION INTRAMUSCULAR; INTRAVENOUS AS NEEDED
Status: DISCONTINUED | OUTPATIENT
Start: 2025-01-27 | End: 2025-01-27

## 2025-01-27 RX ORDER — ACETAMINOPHEN 325 MG/1
650 TABLET ORAL EVERY 6 HOURS PRN
Status: DISCONTINUED | OUTPATIENT
Start: 2025-01-27 | End: 2025-01-27 | Stop reason: HOSPADM

## 2025-01-27 RX ORDER — MAGNESIUM HYDROXIDE 1200 MG/15ML
LIQUID ORAL AS NEEDED
Status: DISCONTINUED | OUTPATIENT
Start: 2025-01-27 | End: 2025-01-27 | Stop reason: HOSPADM

## 2025-01-27 RX ORDER — PROPOFOL 10 MG/ML
INJECTION, EMULSION INTRAVENOUS AS NEEDED
Status: DISCONTINUED | OUTPATIENT
Start: 2025-01-27 | End: 2025-01-27

## 2025-01-27 RX ORDER — DIPHENHYDRAMINE HYDROCHLORIDE 50 MG/ML
12.5 INJECTION INTRAMUSCULAR; INTRAVENOUS ONCE AS NEEDED
Status: DISCONTINUED | OUTPATIENT
Start: 2025-01-27 | End: 2025-01-27 | Stop reason: HOSPADM

## 2025-01-27 RX ORDER — LEVOFLOXACIN 5 MG/ML
750 INJECTION, SOLUTION INTRAVENOUS
Status: COMPLETED | OUTPATIENT
Start: 2025-01-27 | End: 2025-01-27

## 2025-01-27 RX ORDER — LIDOCAINE HYDROCHLORIDE 20 MG/ML
INJECTION, SOLUTION EPIDURAL; INFILTRATION; INTRACAUDAL; PERINEURAL AS NEEDED
Status: DISCONTINUED | OUTPATIENT
Start: 2025-01-27 | End: 2025-01-27

## 2025-01-27 RX ORDER — ONDANSETRON 2 MG/ML
INJECTION INTRAMUSCULAR; INTRAVENOUS AS NEEDED
Status: DISCONTINUED | OUTPATIENT
Start: 2025-01-27 | End: 2025-01-27

## 2025-01-27 RX ORDER — PHENYLEPHRINE HCL IN 0.9% NACL 1 MG/10 ML
SYRINGE (ML) INTRAVENOUS AS NEEDED
Status: DISCONTINUED | OUTPATIENT
Start: 2025-01-27 | End: 2025-01-27

## 2025-01-27 RX ORDER — SODIUM CHLORIDE, SODIUM LACTATE, POTASSIUM CHLORIDE, CALCIUM CHLORIDE 600; 310; 30; 20 MG/100ML; MG/100ML; MG/100ML; MG/100ML
INJECTION, SOLUTION INTRAVENOUS CONTINUOUS PRN
Status: DISCONTINUED | OUTPATIENT
Start: 2025-01-27 | End: 2025-01-27

## 2025-01-27 RX ORDER — FENTANYL CITRATE/PF 50 MCG/ML
25 SYRINGE (ML) INJECTION
Status: DISCONTINUED | OUTPATIENT
Start: 2025-01-27 | End: 2025-01-27 | Stop reason: HOSPADM

## 2025-01-27 RX ADMIN — SODIUM CHLORIDE, SODIUM LACTATE, POTASSIUM CHLORIDE, AND CALCIUM CHLORIDE: .6; .31; .03; .02 INJECTION, SOLUTION INTRAVENOUS at 11:42

## 2025-01-27 RX ADMIN — FENTANYL CITRATE 25 MCG: 0.05 INJECTION, SOLUTION INTRAMUSCULAR; INTRAVENOUS at 11:52

## 2025-01-27 RX ADMIN — FENTANYL CITRATE 25 MCG: 0.05 INJECTION, SOLUTION INTRAMUSCULAR; INTRAVENOUS at 12:24

## 2025-01-27 RX ADMIN — Medication 100 MCG: at 11:50

## 2025-01-27 RX ADMIN — NOREPINEPHRINE BITARTRATE 8 MCG: 1 SOLUTION INTRAVENOUS at 12:42

## 2025-01-27 RX ADMIN — EPHEDRINE SULFATE 5 MG: 50 INJECTION, SOLUTION INTRAVENOUS at 11:59

## 2025-01-27 RX ADMIN — NOREPINEPHRINE BITARTRATE 12 MCG: 1 SOLUTION INTRAVENOUS at 12:37

## 2025-01-27 RX ADMIN — LIDOCAINE HYDROCHLORIDE 100 MG: 20 INJECTION, SOLUTION EPIDURAL; INFILTRATION; INTRACAUDAL at 11:45

## 2025-01-27 RX ADMIN — NOREPINEPHRINE BITARTRATE 4 MCG: 1 SOLUTION INTRAVENOUS at 12:17

## 2025-01-27 RX ADMIN — FENTANYL CITRATE 50 MCG: 0.05 INJECTION, SOLUTION INTRAMUSCULAR; INTRAVENOUS at 12:09

## 2025-01-27 RX ADMIN — FENTANYL CITRATE 25 MCG: 0.05 INJECTION, SOLUTION INTRAMUSCULAR; INTRAVENOUS at 12:41

## 2025-01-27 RX ADMIN — EPHEDRINE SULFATE 10 MG: 50 INJECTION, SOLUTION INTRAVENOUS at 12:07

## 2025-01-27 RX ADMIN — ONDANSETRON 4 MG: 2 INJECTION INTRAMUSCULAR; INTRAVENOUS at 12:47

## 2025-01-27 RX ADMIN — PROPOFOL 160 MG: 10 INJECTION, EMULSION INTRAVENOUS at 11:46

## 2025-01-27 RX ADMIN — Medication 100 MCG: at 11:45

## 2025-01-27 RX ADMIN — Medication 100 MCG: at 11:55

## 2025-01-27 RX ADMIN — Medication 50 MCG: at 12:42

## 2025-01-27 RX ADMIN — NOREPINEPHRINE BITARTRATE 8 MCG: 1 SOLUTION INTRAVENOUS at 12:22

## 2025-01-27 RX ADMIN — Medication 100 MCG: at 12:07

## 2025-01-27 RX ADMIN — LEVOFLOXACIN 750 MG: 750 INJECTION, SOLUTION INTRAVENOUS at 10:56

## 2025-01-27 RX ADMIN — FENTANYL CITRATE 50 MCG: 0.05 INJECTION, SOLUTION INTRAMUSCULAR; INTRAVENOUS at 11:44

## 2025-01-27 RX ADMIN — FENTANYL CITRATE 25 MCG: 0.05 INJECTION, SOLUTION INTRAMUSCULAR; INTRAVENOUS at 13:25

## 2025-01-27 RX ADMIN — Medication 150 MCG: at 12:14

## 2025-01-27 RX ADMIN — FENTANYL CITRATE 25 MCG: 0.05 INJECTION, SOLUTION INTRAMUSCULAR; INTRAVENOUS at 13:30

## 2025-01-27 RX ADMIN — ACETAMINOPHEN 650 MG: 325 TABLET, FILM COATED ORAL at 14:00

## 2025-01-27 RX ADMIN — FENTANYL CITRATE 25 MCG: 0.05 INJECTION, SOLUTION INTRAMUSCULAR; INTRAVENOUS at 12:50

## 2025-01-27 NOTE — ANESTHESIA POSTPROCEDURE EVALUATION
Post-Op Assessment Note    CV Status:  Stable  Pain Score: 2    Pain management: adequate       Mental Status:  Alert and awake   Hydration Status:  Euvolemic   PONV Controlled:  Controlled   Airway Patency:  Patent  Two or more mitigation strategies used for obstructive sleep apnea   Post Op Vitals Reviewed: Yes    No anethesia notable event occurred.    Staff: CRNA           Last Filed PACU Vitals:  Vitals Value Taken Time   Temp     Pulse     BP     Resp     SpO2
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
## 2025-01-27 NOTE — TELEPHONE ENCOUNTER
Patient has been scheduled for left stent removal 2/5/25 11:00 am with Margot MARSHALL in the [place] office. Appointment will be confirmed with patient during post op call.
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
## 2025-01-27 NOTE — DISCHARGE INSTR - AVS FIRST PAGE
Mr. Peña,      I looked inside your left ureter and took out the small conglomeration of stones that was blocking it.  You have a new stent in place.  The office will call you for an appointment to have the stent removed.    You also had many stones in your bladder.  I was able to laser the stones and take them out.    You had that 1 red spot in the back your bladder that we saw in the office.  I do not see any other concerning lesions.  I biopsied the area.  We will see what the pathology shows.    Please see the postoperative instructions below for additional details.    Please call the office with any questions or concerns.        Sincerely,  Isauro Russ          You had procedure on your ureter and kidney.      You had ureteral stent placed.  This is a tube that is placed in your ureter to keep urine draining from your kidney to your bladder.  It may cause discomfort in the form of back spasms or lower abdominal spasms.  This is normal and can be treated with medications if need be.      You may see some blood in your urine.  This is normal, especially since we took biopsies of your bladder as well.  Please drink plenty of fluids.  Generally speaking, as long as your urine is fruit punch color or lighter, that is okay.  You may even pass small blood clots in your urine.  That is also okay.  If you notice that you are passing large blood clots, if it is becoming more difficult to urinate, or if your urine is very dark like the color of Merlot wine, please call the office for further instruction.      You already have a prescription for Flomax.  Please continue to take this daily while your ureteral stent is in place. This medication can occasionally cause lightheadedness. Please stop medication if you experience this or any other concerning side effects.    You may take Tylenol as needed for pain.      Most importantly, please drink lots of fluids, as this is the best way to avoid pain with your  stent!      You may shower and bathe as usual when you get home.    You do not have any incisions and can resume typical physical activities, as long as you are not having too much discomfort with stent in place.    Please call the office or present to the ED if you experience concerning signs or symptoms including but not limited to: fevers, chills, nausea, vomiting, worsening flank pain, worsening abdominal pain, passage of large blood clots in the urine, inability to urinate.    You will follow up in the office with me on 2/18/2025 at 1:45 PM to go over your pathology.  You also will get a call from my office to set up an appointment to have your stent removed.

## 2025-01-27 NOTE — TELEPHONE ENCOUNTER
Patient status post bladder biopsy, cystolitholapaxy, left ureteroscopy at [facility] on 1/27/2025    Patient has new left 6 x 26 double-J ureteral stent.  No string.    He radios pathology follow-up with me on 2/18/2025.  He should keep this appointment.    He also should get an appointment in about 1 to 2 weeks to have his stent removed

## 2025-01-27 NOTE — ANESTHESIA PREPROCEDURE EVALUATION
Procedure:  TRANSURETHRAL RESECTION OF BLADDER TUMOR (TURBT), possible right retrograde pyelogram, possible right ureteroscopy, possible ureteral lesion biopsy, possible cystolitholapaxy (Right: Bladder)    Relevant Problems   CARDIO   (+) Chronic systolic congestive heart failure (HCC)   (+) HTN (hypertension)   (+) Hyperlipidemia   (+) PVC (premature ventricular contraction)      ENDO   (+) Acquired hypothyroidism      /RENAL   (+) Acute kidney injury superimposed on chronic kidney disease  (HCC)   (+) BPH (benign prostatic hyperplasia)   (+) Chronic kidney disease-mineral and bone disorder   (+) Nephrolithiasis   (+) Stage 3 chronic kidney disease (HCC)      HEMATOLOGY   (+) Mycosis fungoides (HCC)      MUSCULOSKELETAL   (+) Lumbar pain      PULMONARY   (+) COPD (chronic obstructive pulmonary disease) (HCC)   (+) Chronic bronchitis (HCC)   (+) Dependence on nocturnal oxygen therapy   (+) Shortness of breath   (+) Smoking      Behavioral Health   (+) Cigarette nicotine dependence without complication      Cardiovascular/Peripheral Vascular   (+) NSVT (nonsustained ventricular tachycardia) (HCC)   (+) Non-ischemic cardiomyopathy (HCC)      Other   (+) Cardiac resynchronization therapy defibrillator (CRT-D) in place      Anemia    BPH (benign prostatic hyperplasia)    Bradycardia    Cardiomyopathy (HCC)    Chronic bronchitis (HCC)    Hyperlipidemia    HTN (hypertension)    Insomnia    Hx of lymphoma    COPD (chronic obstructive pulmonary disease) (HCC) LAST ASSESSED: 9/9/17   ICD (implantable cardioverter-defibrillator) in place    Mycosis fungoides (HCC)    Agent orange exposure    Chest discomfort    HHD (hypertensive heart disease)    PVT (paroxysmal ventricular tachycardia) (HCC)    PVC (premature ventricular contraction)    SOB (shortness of breath)    Benign colon polyp    H/O nonmelanoma skin cancer LAST ASSESSED: 11/7/17   Chronic kidney disease    CHF (congestive heart failure) (HCC)    Emphysema of lung  (HCC)    Coronary artery disease    Cancer (HCC) non hodgkins   Disease of thyroid gland    Kidney stone    Hypertension      Physical Exam    Airway    Mallampati score: III  TM Distance: >3 FB  Neck ROM: full     Dental       Cardiovascular  Cardiovascular exam normal    Pulmonary  Pulmonary exam normal     Other Findings      Left Ventricle: Left ventricular cavity size is dilated. The left ventricular ejection fraction is 30%. Systolic function is severely reduced. There is global hypokinesis with regional variation.  Inferior wall appears severely hypokinetic. Diastolic function is mildly abnormal, consistent with grade I (abnormal) relaxation.    Left Atrium: The atrium is mildly dilated.    Mitral Valve: There is mild annular calcification. There is mild to moderate regurgitation.    Tricuspid Valve: There is trace regurgitation.  ICD lead noted.    Anesthesia Plan  ASA Score- 3     Anesthesia Type- general with ASA Monitors.         Additional Monitors:     Airway Plan: LMA.           Plan Factors-Exercise tolerance (METS): >4 METS.    Chart reviewed. EKG reviewed. Imaging results reviewed. Existing labs reviewed. Patient summary reviewed.                  Induction- intravenous.    Postoperative Plan- Plan for postoperative opioid use. Planned trial extubation        Informed Consent- Anesthetic plan and risks discussed with patient.  I personally reviewed this patient with the CRNA. Discussed and agreed on the Anesthesia Plan with the CRNA..      NPO Status:  Vitals Value Taken Time   Date of last liquid 01/26/25 01/27/25 1014   Time of last liquid 2100 01/27/25 1014   Date of last solid 01/26/25 01/27/25 1014   Time of last solid 2100 01/27/25 1014

## 2025-01-29 ENCOUNTER — TELEPHONE (OUTPATIENT)
Dept: UROLOGY | Facility: CLINIC | Age: 80
End: 2025-01-29

## 2025-01-29 DIAGNOSIS — R10.9 ACUTE RIGHT FLANK PAIN: Primary | ICD-10-CM

## 2025-01-29 RX ORDER — OXYBUTYNIN CHLORIDE 5 MG/1
5 TABLET ORAL 3 TIMES DAILY
Qty: 60 TABLET | Refills: 0 | Status: SHIPPED | OUTPATIENT
Start: 2025-01-29

## 2025-01-29 NOTE — TELEPHONE ENCOUNTER
This nurse called patient to make him aware of Oxybutynin sent to local pharmacy by Margot MARSHALL. Pharmacy confirmed, education reinforced on increasing water intake. No further assistance needed at this time.

## 2025-01-29 NOTE — TELEPHONE ENCOUNTER
"Post Op Note    Sherly Peña is a 79 y.o. male s/p bladder biopsy, cystolitholapaxy, left ureteroscopy  performed 1/27/25. Sherly Peña is a patient of Dr. Dr. Russ and is seen at the [place] office.     How would you rate your pain on a scale from 1 to 10, 10 being the worst pain ever? 4, Patient makes c/o of some pain/discomfort to his left lower back. Patient is encouraged to take Tylenol every 6-8 hours, apply heat therapy 20 minutes on 20 minutes off, and place pillows to lower back as an added support to help manage pain. Patient verbalizes understanding to given recommendations.    Have you had a fever? No, body temp not obtained while on the call, but patient denies experiencing any s/s of fever at this time.    Have your bowel movements been regular? Yes    Do you have any difficulty urinating? No, patient is making c/o some discomfort and pink tinged urine at times. Patient is reminded that some bleeding with urination and/or urinary frequency with urgency is to be expected as long as stent remains in place. Patient is instructed to immediately report passing blood clots or if urine becomes a dark \"merlot\" color. Patient is educated on maintaining adequate hydration, avoiding bladder irritants such as caffeine, alcohol and spicy/acidic drinks and food.    Do you have any other questions or concerns that I can address at this time? Patient is encouraged to be mindful of his body mechanics as his bladder is also trying to heal from having bx done. Patient verbalizes understanding. Patient is requesting if medication can be sent to pharmacy to help relieve stent pain. This nurse will check with Provider and get back to patient.     Patient is aware of upcoming stent removal on 2/5/25 in the [place] office.       "

## 2025-01-30 PROCEDURE — 88342 IMHCHEM/IMCYTCHM 1ST ANTB: CPT | Performed by: STUDENT IN AN ORGANIZED HEALTH CARE EDUCATION/TRAINING PROGRAM

## 2025-01-30 PROCEDURE — 88341 IMHCHEM/IMCYTCHM EA ADD ANTB: CPT | Performed by: STUDENT IN AN ORGANIZED HEALTH CARE EDUCATION/TRAINING PROGRAM

## 2025-01-30 PROCEDURE — 88305 TISSUE EXAM BY PATHOLOGIST: CPT | Performed by: STUDENT IN AN ORGANIZED HEALTH CARE EDUCATION/TRAINING PROGRAM

## 2025-02-03 LAB
COLOR STONE: NORMAL
COM MFR STONE: 10 %
COMMENT-STONE3: NORMAL
COMPOSITION: NORMAL
LABORATORY COMMENT REPORT: NORMAL
PHOTO: NORMAL
SIZE STONE: NORMAL MM
SPEC SOURCE SUBJ: NORMAL
STONE ANALYSIS-IMP: NORMAL
URATE MFR STONE: 90 %
WT STONE: 1268 MG

## 2025-02-04 ENCOUNTER — OFFICE VISIT (OUTPATIENT)
Dept: NEPHROLOGY | Facility: CLINIC | Age: 80
End: 2025-02-04
Payer: COMMERCIAL

## 2025-02-04 VITALS
HEART RATE: 84 BPM | TEMPERATURE: 97.2 F | DIASTOLIC BLOOD PRESSURE: 70 MMHG | BODY MASS INDEX: 28.98 KG/M2 | OXYGEN SATURATION: 96 % | HEIGHT: 71 IN | RESPIRATION RATE: 16 BRPM | WEIGHT: 207 LBS | SYSTOLIC BLOOD PRESSURE: 110 MMHG

## 2025-02-04 DIAGNOSIS — N18.9 ACUTE KIDNEY INJURY SUPERIMPOSED ON CHRONIC KIDNEY DISEASE  (HCC): Primary | ICD-10-CM

## 2025-02-04 DIAGNOSIS — N17.9 ACUTE KIDNEY INJURY SUPERIMPOSED ON CHRONIC KIDNEY DISEASE  (HCC): Primary | ICD-10-CM

## 2025-02-04 DIAGNOSIS — N18.31 STAGE 3A CHRONIC KIDNEY DISEASE (HCC): ICD-10-CM

## 2025-02-04 DIAGNOSIS — N18.9 CHRONIC KIDNEY DISEASE-MINERAL AND BONE DISORDER: ICD-10-CM

## 2025-02-04 DIAGNOSIS — E83.9 CHRONIC KIDNEY DISEASE-MINERAL AND BONE DISORDER: ICD-10-CM

## 2025-02-04 DIAGNOSIS — M89.9 CHRONIC KIDNEY DISEASE-MINERAL AND BONE DISORDER: ICD-10-CM

## 2025-02-04 DIAGNOSIS — I42.8 NON-ISCHEMIC CARDIOMYOPATHY (HCC): ICD-10-CM

## 2025-02-04 DIAGNOSIS — N20.1 LEFT URETERAL STONE: ICD-10-CM

## 2025-02-04 PROCEDURE — 99214 OFFICE O/P EST MOD 30 MIN: CPT | Performed by: INTERNAL MEDICINE

## 2025-02-04 NOTE — ASSESSMENT & PLAN NOTE
Patient still has a stent and will be seen by urologist for removal of the stent.  Advise hydration for now as this is the first stone

## 2025-02-04 NOTE — ASSESSMENT & PLAN NOTE
Lab Results   Component Value Date    EGFR 67 01/23/2025    EGFR 57 01/17/2025    EGFR 53 01/10/2025    CREATININE 1.06 01/23/2025    CREATININE 1.20 01/17/2025    CREATININE 1.29 01/10/2025     Had worsening kidney function but stable now

## 2025-02-04 NOTE — ASSESSMENT & PLAN NOTE
Lab Results   Component Value Date    EGFR 67 01/23/2025    EGFR 57 01/17/2025    EGFR 53 01/10/2025    CREATININE 1.06 01/23/2025    CREATININE 1.20 01/17/2025    CREATININE 1.29 01/10/2025     PTH and phosphorus along with vitamin D are within acceptable range

## 2025-02-04 NOTE — PROGRESS NOTES
Name: Sherly Peña      : 1945      MRN: [id number]  Encounter Provider: Michael Stanford MD  Encounter Date: 2025   Encounter department: [facility]  :  Assessment & Plan  Acute kidney injury superimposed on chronic kidney disease  (HCC)  Lab Results   Component Value Date    EGFR 67 2025    EGFR 57 2025    EGFR 53 01/10/2025    CREATININE 1.06 2025    CREATININE 1.20 2025    CREATININE 1.29 01/10/2025   Overall doing well kidney function is better.  In between patient was hospitalized with kidney stone.  Had obstruct uropathy with acute kidney injury.  Still has a stent kidney function improved         Stage 3a chronic kidney disease (HCC)  Lab Results   Component Value Date    EGFR 67 2025    EGFR 57 2025    EGFR 53 01/10/2025    CREATININE 1.06 2025    CREATININE 1.20 2025    CREATININE 1.29 01/10/2025     Had worsening kidney function but stable now       Left ureteral stone  Patient still has a stent and will be seen by urologist for removal of the stent.  Advise hydration for now as this is the first stone       Chronic kidney disease-mineral and bone disorder  Lab Results   Component Value Date    EGFR 67 2025    EGFR 57 2025    EGFR 53 01/10/2025    CREATININE 1.06 2025    CREATININE 1.20 2025    CREATININE 1.29 01/10/2025     PTH and phosphorus along with vitamin D are within acceptable range       Non-ischemic cardiomyopathy (HCC)  Quite asymptomatic       Everything discussed with the patient at length.  I will see him back in 1 year again.  Will get blood and urine test before that visit.  If patient get recurrent kidney stone we will do metabolic workup for kidney stones    History of Present Illness   HPI  Sherly Peña is a 79 y.o. male who presents CKD follow-up    Patient doing well overall.  Since I saw him last patient was hospitalized a kidney stone    Has ureteral stent  will be removed tomorrow    Asymptomatic at this point    No shortness of breath no chest pain no palpitation    No nausea no vomiting    Denies any urinary complaint        Review of Systems   Constitutional:  Negative for fatigue.   HENT:  Negative for congestion.    Eyes:  Negative for photophobia and pain.   Respiratory:  Negative for chest tightness and shortness of breath.    Cardiovascular:  Negative for chest pain and palpitations.   Gastrointestinal:  Negative for abdominal distention, abdominal pain and blood in stool.   Endocrine: Negative for polydipsia.   Genitourinary:  Negative for difficulty urinating, dysuria, flank pain, hematuria and urgency.   Musculoskeletal:  Negative for arthralgias and back pain.   Skin:  Negative for rash.   Neurological:  Negative for dizziness, light-headedness and headaches.   Hematological:  Does not bruise/bleed easily.   Psychiatric/Behavioral:  Negative for behavioral problems. The patient is not nervous/anxious.      Current Outpatient Medications on File Prior to Visit   Medication Sig Dispense Refill    albuterol (2.5 mg/3 mL) 0.083 % nebulizer solution USE 1 VIAL VIA NEBULIZER 4 TIMES A DAY AS NEEDED 360 mL 2    ammonium lactate (LAC-HYDRIN) 12 % lotion APPLY TO AFFECTED AREA TOPICALLY EVERY  mL 2    aspirin 81 MG tablet Take 81 mg by mouth daily      atorvastatin (LIPITOR) 40 mg tablet TAKE 1 TABLET BY MOUTH DAILY 90 tablet 1    bexarotene (TARGRETIN) 75 mg capsule Take 8 capsules (600 mg total) by mouth daily 240 capsule 6    Cholecalciferol 25 MCG (1000 UT) capsule Take 1 capsule by mouth daily      cyanocobalamin (VITAMIN B-12) 1,000 mcg tablet Take 1,000 mcg by mouth daily      Empagliflozin (Jardiance) 10 MG TABS tablet Take 1 tablet (10 mg total) by mouth every morning 90 tablet 3    ferrous sulfate 324 (65 Fe) mg Take 1 tablet by mouth Twice daily      gabapentin (NEURONTIN) 100 mg capsule Take 1 capsule (100 mg total) by mouth 3 (three) times a day  "270 capsule 3    levothyroxine 100 mcg tablet TAKE 1 TABLET BY MOUTH DAILY 90 tablet 3    metoprolol succinate (TOPROL-XL) 50 mg 24 hr tablet TAKE 1 AND 1/2 TABLETS BY MOUTH  DAILY 135 tablet 3    montelukast (SINGULAIR) 10 mg tablet TAKE 1 TABLET BY MOUTH DAILY AT  BEDTIME 90 tablet 1    Multiple Vitamins-Minerals (OCUVITE ADULT 50+ PO) Take by mouth      oxybutynin (DITROPAN) 5 mg tablet Take 1 tablet (5 mg total) by mouth 3 (three) times a day 60 tablet 0    sacubitril-valsartan (Entresto) 49-51 MG TABS TAKE 1 TABLET BY MOUTH TWICE  DAILY 180 tablet 1    spironolactone (ALDACTONE) 25 mg tablet Take 0.5 tablets (12.5 mg total) by mouth daily 45 tablet 3    tamsulosin (FLOMAX) 0.4 mg Take 1 capsule (0.4 mg total) by mouth daily with dinner 90 capsule 3    Trelegy Ellipta 100-62.5-25 MCG/ACT inhaler USE 1 INHALATION BY MOUTH ONCE  DAILY AT THE SAME TIME EACH DAY  RINSE MOUTH AFTER  each 3    triamcinolone (KENALOG) 0.1 % ointment Apply topically 2 (two) times 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 454 g 3    Vitamins-Lipotropics (LIPOFLAVONOID PO) Take by mouth daily      zolpidem (AMBIEN) 10 mg tablet Take 1 tablet (10 mg total) by mouth daily at bedtime as needed for sleep 30 tablet 0    [DISCONTINUED] nicotine (NICODERM CQ) 21 mg/24 hr TD 24 hr patch Place 1 patch on the skin every 24 hours (Patient not taking: Reported on 2/4/2025)       No current facility-administered medications on file prior to visit.         Objective   Pulse 84   Temp (!) 97.2 °F (36.2 °C) (Temporal)   Resp 16   Ht 5' 11\" (1.803 m)   Wt 93.9 kg (207 lb)   SpO2 96%   BMI 28.87 kg/m²      Physical Exam  Constitutional:       General: He is not in acute distress.     Appearance: He is well-developed.   HENT:      Head: Normocephalic.      Mouth/Throat:      Mouth: Mucous membranes are moist.   Eyes:      General: No scleral icterus.     Conjunctiva/sclera: Conjunctivae normal.   Neck:      Vascular: No JVD.   Cardiovascular:      Rate and " Rhythm: Normal rate.      Heart sounds: Normal heart sounds.   Pulmonary:      Effort: Pulmonary effort is normal.      Breath sounds: No wheezing.   Abdominal:      Palpations: Abdomen is soft.      Tenderness: There is no abdominal tenderness.   Musculoskeletal:         General: Normal range of motion.      Cervical back: Neck supple.   Skin:     General: Skin is warm.      Findings: No rash.   Neurological:      Mental Status: He is alert and oriented to person, place, and time.   Psychiatric:         Behavior: Behavior normal.

## 2025-02-04 NOTE — ASSESSMENT & PLAN NOTE
Lab Results   Component Value Date    EGFR 67 01/23/2025    EGFR 57 01/17/2025    EGFR 53 01/10/2025    CREATININE 1.06 01/23/2025    CREATININE 1.20 01/17/2025    CREATININE 1.29 01/10/2025   Overall doing well kidney function is better.  In between patient was hospitalized with kidney stone.  Had obstruct uropathy with acute kidney injury.  Still has a stent kidney function improved

## 2025-02-05 ENCOUNTER — PROCEDURE VISIT (OUTPATIENT)
Dept: UROLOGY | Facility: CLINIC | Age: 80
End: 2025-02-05
Payer: COMMERCIAL

## 2025-02-05 VITALS
TEMPERATURE: 98 F | WEIGHT: 209 LBS | OXYGEN SATURATION: 99 % | HEART RATE: 62 BPM | HEIGHT: 71 IN | BODY MASS INDEX: 29.26 KG/M2 | DIASTOLIC BLOOD PRESSURE: 88 MMHG | SYSTOLIC BLOOD PRESSURE: 130 MMHG

## 2025-02-05 DIAGNOSIS — R39.9 UTI SYMPTOMS: Primary | ICD-10-CM

## 2025-02-05 PROBLEM — R65.20 SEPSIS WITH ACUTE RENAL FAILURE (HCC): Status: RESOLVED | Noted: 2025-01-06 | Resolved: 2025-02-05

## 2025-02-05 PROBLEM — A41.9 SEPSIS WITH ACUTE RENAL FAILURE (HCC): Status: RESOLVED | Noted: 2025-01-06 | Resolved: 2025-02-05

## 2025-02-05 PROBLEM — N17.9 SEPSIS WITH ACUTE RENAL FAILURE (HCC): Status: RESOLVED | Noted: 2025-01-06 | Resolved: 2025-02-05

## 2025-02-05 PROCEDURE — 52310 CYSTOSCOPY AND TREATMENT: CPT | Performed by: PHYSICIAN ASSISTANT

## 2025-02-05 RX ORDER — CEPHALEXIN 500 MG/1
500 CAPSULE ORAL EVERY 12 HOURS SCHEDULED
Qty: 20 CAPSULE | Refills: 0 | Status: SHIPPED | OUTPATIENT
Start: 2025-02-05 | End: 2025-02-15

## 2025-02-05 NOTE — PROGRESS NOTES
Cystoscopy     Date/Time  2/5/2025 11:00 AM     Performed by  Margot Wilson PA-C   Authorized by  Margot Wilson PA-C     Universal Protocol:  Consent: Verbal consent obtained. Written consent obtained.  Consent given by: patient  Patient understanding: patient states understanding of the procedure being performed  Patient consent: the patient's understanding of the procedure matches consent given  Procedure consent: procedure consent matches procedure scheduled  Patient identity confirmed: verbally with patient      Procedure Details:  Procedure type: simple removal of a foreign body, stone, or stent    Patient tolerance: Patient tolerated the procedure well with no immediate complications    Additional Procedure Details: Patient cleaned and draped in sterile fashion. Urojet injected into urethra. Cystoscope introduced in urethra and advanced into bladder with visualization and then subsequent removal of ureteral stent using stent grasper device.            Assessment and Plan:  Nephrolithiasis  - UA today unable to be obtained   - Keflex prophylaxis sent  - Follow up as scheduled 2/18/25 for pathology review  - ER precautions      Margot Wilson PA-C

## 2025-02-10 DIAGNOSIS — G47.00 INSOMNIA, UNSPECIFIED TYPE: ICD-10-CM

## 2025-02-10 DIAGNOSIS — G62.9 NEUROPATHY: ICD-10-CM

## 2025-02-11 RX ORDER — ZOLPIDEM TARTRATE 10 MG/1
10 TABLET ORAL
Qty: 30 TABLET | Refills: 0 | Status: SHIPPED | OUTPATIENT
Start: 2025-02-11

## 2025-02-11 RX ORDER — GABAPENTIN 100 MG/1
100 CAPSULE ORAL 3 TIMES DAILY
Qty: 270 CAPSULE | Refills: 1 | Status: SHIPPED | OUTPATIENT
Start: 2025-02-11

## 2025-02-13 ENCOUNTER — OFFICE VISIT (OUTPATIENT)
Age: 80
End: 2025-02-13
Payer: COMMERCIAL

## 2025-02-13 VITALS
SYSTOLIC BLOOD PRESSURE: 118 MMHG | OXYGEN SATURATION: 98 % | HEIGHT: 71 IN | BODY MASS INDEX: 28.98 KG/M2 | HEART RATE: 91 BPM | WEIGHT: 207 LBS | DIASTOLIC BLOOD PRESSURE: 74 MMHG | TEMPERATURE: 97.8 F

## 2025-02-13 DIAGNOSIS — C84.00 MYCOSIS FUNGOIDES, UNSPECIFIED BODY REGION (HCC): Primary | ICD-10-CM

## 2025-02-13 PROCEDURE — 99213 OFFICE O/P EST LOW 20 MIN: CPT | Performed by: STUDENT IN AN ORGANIZED HEALTH CARE EDUCATION/TRAINING PROGRAM

## 2025-02-13 NOTE — PROGRESS NOTES
"[facility] Note     Patient Name: Sherly Peña  Encounter Date: February 13,2025     Have you been cared for by a [facility] Dermatologist in the last 3 years and, if so, which description applies to you?    Yes.  I have been here within the last 3 years, and my medical history has NOT changed since that time.  I am MALE/not capable of bearing children.    REVIEW OF SYSTEMS:  Have you recently had or currently have any of the following? No changes in my recent health.   PAST MEDICAL HISTORY:  Have you personally ever had or currently have any of the following?  If \"YES,\" then please provide more detail. No changes in my medical history.   HISTORY OF IMMUNOSUPPRESSION: Do you have a history of any of the following:  Systemic Immunosuppression such as Diabetes, Biologic or Immunotherapy, Chemotherapy, Organ Transplantation, Bone Marrow Transplantation or Prednisone?  YES,      Answering \"YES\" requires the addition of the dotphrase \"IMMUNOSUPPRESSED\" as the first diagnosis of the patient's visit.   FAMILY HISTORY:  Any \"first degree relatives\" (parent, brother, sister, or child) with the following?    No changes in my family's known health.   PATIENT EXPERIENCE:    Do you want the Dermatologist to perform a COMPLETE skin exam today including a clinical examination under the \"bra and underwear\" areas?  Yes  If necessary, do we have your permission to call and leave a detailed message on your Preferred Phone number that includes your specific medical information?  Yes      No Known Allergies   Current Outpatient Medications:   •  albuterol (2.5 mg/3 mL) 0.083 % nebulizer solution, USE 1 VIAL VIA NEBULIZER 4 TIMES A DAY AS NEEDED, Disp: 360 mL, Rfl: 2  •  ammonium lactate (LAC-HYDRIN) 12 % lotion, APPLY TO AFFECTED AREA TOPICALLY EVERY DAY, Disp: 225 mL, Rfl: 2  •  aspirin 81 MG tablet, Take 81 mg by mouth daily, Disp: , Rfl:   •  atorvastatin (LIPITOR) 40 mg tablet, TAKE 1 TABLET BY MOUTH DAILY, " Disp: 90 tablet, Rfl: 1  •  bexarotene (TARGRETIN) 75 mg capsule, Take 8 capsules (600 mg total) by mouth daily, Disp: 240 capsule, Rfl: 6  •  cephalexin (KEFLEX) 500 mg capsule, Take 1 capsule (500 mg total) by mouth every 12 (twelve) hours for 10 days, Disp: 20 capsule, Rfl: 0  •  Cholecalciferol 25 MCG (1000 UT) capsule, Take 1 capsule by mouth daily, Disp: , Rfl:   •  cyanocobalamin (VITAMIN B-12) 1,000 mcg tablet, Take 1,000 mcg by mouth daily, Disp: , Rfl:   •  ferrous sulfate 324 (65 Fe) mg, Take 1 tablet by mouth Twice daily, Disp: , Rfl:   •  gabapentin (NEURONTIN) 100 mg capsule, TAKE 1 CAPSULE BY MOUTH THREE TIMES A DAY, Disp: 270 capsule, Rfl: 1  •  levothyroxine 100 mcg tablet, TAKE 1 TABLET BY MOUTH DAILY, Disp: 90 tablet, Rfl: 3  •  Mechlorethamine HCl (Valchlor) 0.016 % GEL, Apply daily in the morning to the affected areas., Disp: 60 g, Rfl: 6  •  metoprolol succinate (TOPROL-XL) 50 mg 24 hr tablet, TAKE 1 AND 1/2 TABLETS BY MOUTH  DAILY, Disp: 135 tablet, Rfl: 3  •  montelukast (SINGULAIR) 10 mg tablet, TAKE 1 TABLET BY MOUTH DAILY AT  BEDTIME, Disp: 90 tablet, Rfl: 1  •  oxybutynin (DITROPAN) 5 mg tablet, Take 1 tablet (5 mg total) by mouth 3 (three) times a day, Disp: 60 tablet, Rfl: 0  •  sacubitril-valsartan (Entresto) 49-51 MG TABS, TAKE 1 TABLET BY MOUTH TWICE  DAILY, Disp: 180 tablet, Rfl: 1  •  spironolactone (ALDACTONE) 25 mg tablet, Take 0.5 tablets (12.5 mg total) by mouth daily, Disp: 45 tablet, Rfl: 3  •  tamsulosin (FLOMAX) 0.4 mg, Take 1 capsule (0.4 mg total) by mouth daily with dinner, Disp: 90 capsule, Rfl: 3  •  Trelegy Ellipta 100-62.5-25 MCG/ACT inhaler, USE 1 INHALATION BY MOUTH ONCE  DAILY AT THE SAME TIME EACH DAY  RINSE MOUTH AFTER USE, Disp: 180 each, Rfl: 3  •  triamcinolone (KENALOG) 0.1 % ointment, Apply topically 2 (two) times 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, Disp: 454 g, Rfl: 3  •  Vitamins-Lipotropics (LIPOFLAVONOID PO), Take by mouth daily, Disp: , Rfl:   •  zolpidem (AMBIEN)  10 mg tablet, TAKE 1 TABLET BY MOUTH DAILY AT BEDTIME AS NEEDED FOR SLEEP, Disp: 30 tablet, Rfl: 0  •  Empagliflozin (Jardiance) 10 MG TABS tablet, Take 1 tablet (10 mg total) by mouth every morning, Disp: 90 tablet, Rfl: 3  •  Multiple Vitamins-Minerals (OCUVITE ADULT 50+ PO), Take by mouth, Disp: , Rfl:           Whom besides the patient is providing clinical information about today's encounter?   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    Physical Exam:  Anatomic Location Affected:  trunk and extremities  Morphological Description:  erythematous and scaling patches  Pertinent Positives:  Pertinent Negatives: No lymphadenopathy on exam     Additional History of Present Condition:  currently on Targretin 75mg    Assessment and Plan:  Based on a thorough discussion of this condition and the management approach to it (including a comprehensive discussion of the known risks, side effects and potential benefits of treatment), the patient (family) agrees to implement the following specific plan:  Continue bexarotene 75 mg daily as the patient has been tolerating this well.   Patient admits to having better control with topical nitrogen mustard in the past.   Will send for valchlor in addition to topical steroids  Continue topical steroids once daily as directed   Patient did not tolerate phototherapy in the past   Prior labs/workup from [facility] dermatologist not able to obtained at this time.   Will order CBC w/ differential, Flow cytometry, CMP, LDH   Flow Cytometry needed at this time to fully stage this patient  Patient with CT CAP 1/6/25 for fall that showed no signs of systemic disease.     Mycosis fungoides  Mycosis fungoides is a condition in which the skin is infiltrated by patches or lumps composed of white cells called lymphocytes.  It is more common in men than women and is very rare in children. Its cause is unknown but in some patients, it is associated with a pre-existing contact allergic dermatitis or infection with a retrovirus.    Mycosis fungoides has an indolent (low-grade) clinical course, which means that it may persist in one stage or over years or sometimes decades, slowly progress to another stage (from patches to thicker plaques and eventually to tumors).    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  Patch stage  In patch stage mycosis fungoides, the skin lesions are flat. Most often there are oval or ring-shaped (annular) pink dry patches on covered skin. They may spontaneously disappear, remain the same size, or slowly enlarge. The skin may be atrophic (thinned), and may or may not itch. The patch stage of mycosis fungoides can be difficult to distinguish from psoriasis, discoid eczema or parapsoriasis.  Plaque stage  In plaque stage mycosis fungoides, the patches become thickened and may resemble psoriasis. They are usually itchy.  Tumor stage  In tumor stage mycosis fungoides, large irregular lumps develop from plaques, or de elliot. They may ulcerate. At this stage, spread to other organs is more likely than in earlier stages. The tumor type may transform into a large cell lymphoma.    MF variants and subtypes  There are a number of variants and subtypes of mycosis fungoides. Most follow the same clinical and pathological features as MF but some have distinctive features as described below.    Folliculotropic MF (follicular cell lymphoma)  The localized form of cutaneous T-cell lymphoma in which there is a slowly enlarging solitary patch, plaque or a tumor in which biopsy shows characteristic lymphomatous change around hair follicles.  Most commonly found in the head and neck area.  Skin lesions are often associated with alopecia, and sometimes  with mucinorrhea (see alopecia mucinosa).   Pagetoid Reticulosis  Localized patches or plaques with an intraepidermal growth of neoplastic T cells  Presents as a solitary psoriasis-like or hyperkeratotic patch or plaque, usually on the extremities  Granulomatous slack skin:  Extremely rare subtype characterized by the slow development of folds of lax skin in the major skin folds  Skin folds show a granulomatous infiltrate with clonal T cells  Occurs most commonly in the groin and underarm regions  Mycosis fungoides palmaris et plantaris  Confined to palms and soles

## 2025-02-14 DIAGNOSIS — I50.22 CHRONIC SYSTOLIC CONGESTIVE HEART FAILURE (HCC): ICD-10-CM

## 2025-02-14 DIAGNOSIS — J41.0 SIMPLE CHRONIC BRONCHITIS (HCC): ICD-10-CM
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
## 2025-02-15 RX ORDER — MECHLORETHAMINE HYDROCHLORIDE 0.01 G/60G
GEL TOPICAL
Qty: 60 G | Refills: 6 | Status: SHIPPED | OUTPATIENT
Start: 2025-02-15

## 2025-02-17 RX ORDER — FLUTICASONE FUROATE, UMECLIDINIUM BROMIDE AND VILANTEROL TRIFENATATE 100; 62.5; 25 UG/1; UG/1; UG/1
POWDER RESPIRATORY (INHALATION)
Qty: 180 EACH | Refills: 3 | Status: SHIPPED | OUTPATIENT
Start: 2025-02-17

## 2025-02-18 ENCOUNTER — TELEPHONE (OUTPATIENT)
Age: 80
End: 2025-02-18

## 2025-02-18 ENCOUNTER — OFFICE VISIT (OUTPATIENT)
Dept: UROLOGY | Facility: CLINIC | Age: 80
End: 2025-02-18
Payer: COMMERCIAL

## 2025-02-18 VITALS
OXYGEN SATURATION: 97 % | SYSTOLIC BLOOD PRESSURE: 115 MMHG | HEIGHT: 71 IN | BODY MASS INDEX: 28.7 KG/M2 | WEIGHT: 205 LBS | HEART RATE: 88 BPM | DIASTOLIC BLOOD PRESSURE: 72 MMHG

## 2025-02-18 DIAGNOSIS — N20.0 KIDNEY STONE: Primary | ICD-10-CM

## 2025-02-18 DIAGNOSIS — N32.89 BLADDER WALL THICKENING: ICD-10-CM

## 2025-02-18 DIAGNOSIS — N21.0 BLADDER STONE: ICD-10-CM

## 2025-02-18 PROCEDURE — 99213 OFFICE O/P EST LOW 20 MIN: CPT | Performed by: UROLOGY

## 2025-02-18 PROCEDURE — 87086 URINE CULTURE/COLONY COUNT: CPT | Performed by: UROLOGY

## 2025-02-18 NOTE — TELEPHONE ENCOUNTER
Earlier in the Morning Dr. Causey rounded. Updated about overnight event of Vtach, converted to NSR after x1 lido push. Per Dr. Causey increase lidocaine gtt to 1.5mg/min. Orders also received to maintain K ~ 4.5 and Mg ~ 2.5 to prevent further arrhythmias. If ok, start heparin gtt. Heparin gtt was started for a little bit before lab calling letting me know PF4 (+). Padilla paged, told me to contact hematology. Orders received to stop heparin and start argatroban by Dr. Arellano. Dr. Causey updated.   PA for Valchlor 0.016%SUBMITTED to [employer] R/x     via    [x]CMM-KEY: [id number]  []Surescripts-Case ID #   []Availity-Auth ID # NDC #   []Faxed to plan   []Other website   []Phone call Case ID #     [x]PA sent as URGENT    All office notes, labs and other pertaining documents and studies sent. Clinical questions answered. Awaiting determination from insurance company.     Turnaround time for your insurance to make a decision on your Prior Authorization can take 7-21 business days.

## 2025-02-19 LAB — BACTERIA UR CULT: NORMAL

## 2025-02-20 NOTE — TELEPHONE ENCOUNTER
PA for Valchlor 0.016% gel  APPROVED     Date(s) approved until 02/18/2026    Case #[id number]    Patient advised by          []TempMinehart Message  [x]Phone call   []LMOM  []L/M to call office as no active Communication consent on file  []Unable to leave detailed message as VM not approved on Communication consent       Pharmacy advised by    [x]Fax  []Phone call       Approval letter scanned into Media Yes

## 2025-02-26 ENCOUNTER — OFFICE VISIT (OUTPATIENT)
Age: 80
End: 2025-02-26
Payer: COMMERCIAL

## 2025-02-26 VITALS
DIASTOLIC BLOOD PRESSURE: 59 MMHG | WEIGHT: 207.6 LBS | OXYGEN SATURATION: 98 % | BODY MASS INDEX: 28.95 KG/M2 | HEART RATE: 88 BPM | RESPIRATION RATE: 18 BRPM | TEMPERATURE: 96.8 F | SYSTOLIC BLOOD PRESSURE: 123 MMHG

## 2025-02-26 DIAGNOSIS — N30.01 ACUTE CYSTITIS WITH HEMATURIA: Primary | ICD-10-CM

## 2025-02-26 LAB
SL AMB  POCT GLUCOSE, UA: ABNORMAL
SL AMB LEUKOCYTE ESTERASE,UA: ABNORMAL
SL AMB POCT BILIRUBIN,UA: ABNORMAL
SL AMB POCT BLOOD,UA: ABNORMAL
SL AMB POCT CLARITY,UA: ABNORMAL
SL AMB POCT COLOR,UA: YELLOW
SL AMB POCT KETONES,UA: ABNORMAL
SL AMB POCT NITRITE,UA: ABNORMAL
SL AMB POCT PH,UA: 5
SL AMB POCT SPECIFIC GRAVITY,UA: 1.02
SL AMB POCT URINE PROTEIN: 30
SL AMB POCT UROBILINOGEN: 0.2

## 2025-02-26 PROCEDURE — 99213 OFFICE O/P EST LOW 20 MIN: CPT | Performed by: NURSE PRACTITIONER

## 2025-02-26 PROCEDURE — 81002 URINALYSIS NONAUTO W/O SCOPE: CPT | Performed by: NURSE PRACTITIONER

## 2025-02-26 PROCEDURE — 87086 URINE CULTURE/COLONY COUNT: CPT | Performed by: NURSE PRACTITIONER

## 2025-02-26 PROCEDURE — S9083 URGENT CARE CENTER GLOBAL: HCPCS | Performed by: NURSE PRACTITIONER

## 2025-02-26 RX ORDER — NITROFURANTOIN 25; 75 MG/1; MG/1
100 CAPSULE ORAL 2 TIMES DAILY
Qty: 14 CAPSULE | Refills: 0 | Status: SHIPPED | OUTPATIENT
Start: 2025-02-26 | End: 2025-03-05

## 2025-02-26 NOTE — PATIENT INSTRUCTIONS
Previous urine culture from January + for e. Faecalis susceptible to most agents   Dipstick positive for cloudy appearance, trace ketones, moderate blood, urobilinogen and moderate leukocytes  Start macrobid BID x 7 days   Continue proper hydration   F/u with PCP as needed  Proceed to ER should symptoms worsen

## 2025-02-26 NOTE — PROGRESS NOTES
[facility]  Name: Sherly Peña      : 1945      MRN: [id number]  Encounter Provider: SHRUTHI Ryan  Encounter Date: 2025   Encounter department: [facility]  :  Assessment & Plan  Acute cystitis with hematuria    Orders:    POCT urine dip    Urine culture    nitrofurantoin (MACROBID) 100 mg capsule; Take 1 capsule (100 mg total) by mouth 2 (two) times a day for 7 days      Patient Instructions  Previous urine culture from January + for e. Faecalis susceptible to most agents   Dipstick positive for cloudy appearance, trace ketones, moderate blood, urobilinogen and moderate leukocytes  Start macrobid BID x 7 days   Continue proper hydration   F/u with PCP as needed  Proceed to ER should symptoms worsen     If tests are performed, our office will contact you with results only if changes need to made to the care plan discussed with you at the visit. You can review your full results on [facility].    Chief Complaint:   Chief Complaint   Patient presents with    Possible UTI     Symptoms started 3 days ago.  C/o frequent/burning when urinating.      History of Present Illness   78 y/o male with PMH of kidney stone and frequent UTIs presents for dysuria, urinary frequeny and urgency and incomplete emptying x 3 days. Patient recently started jardiance recommended by his cardiologist and read a pamphlet that stated UTIs were common with this medication.       History obtained from: patient    Review of Systems   Constitutional: Negative.    HENT: Negative.     Eyes: Negative.    Respiratory: Negative.     Cardiovascular: Negative.    Gastrointestinal: Negative.    Endocrine: Negative.    Genitourinary:  Positive for dysuria, flank pain, frequency and urgency.   Musculoskeletal:  Positive for back pain.   Skin: Negative.    Allergic/Immunologic: Negative.    Neurological: Negative.    Hematological: Negative.    Psychiatric/Behavioral: Negative.       Past  Medical History   Past Medical History:   Diagnosis Date    Agent orange exposure     Anemia     Benign colon polyp     BPH (benign prostatic hyperplasia)     Bradycardia     Cancer (HCC)     non hodgkins    Cardiomyopathy (HCC)     Chest discomfort     CHF (congestive heart failure) (HCC)     Chronic bronchitis (HCC)     Chronic kidney disease     COPD (chronic obstructive pulmonary disease) (HCC)     LAST ASSESSED: 9/9/17    Coronary artery disease cardio myopthia    Disease of thyroid gland     Emphysema of lung (HCC) 2013    H/O nonmelanoma skin cancer     LAST ASSESSED: 11/7/17    HHD (hypertensive heart disease)     HTN (hypertension)     Hx of lymphoma     Hyperlipidemia     Hypertension     ICD (implantable cardioverter-defibrillator) in place     Insomnia     Kidney stone     Mycosis fungoides (HCC)     PVC (premature ventricular contraction)     PVT (paroxysmal ventricular tachycardia) (HCC)     SOB (shortness of breath)      Past Surgical History:   Procedure Laterality Date    CARDIAC ELECTROPHYSIOLOGY PROCEDURE N/A 12/11/2024    Procedure: Cardiac biv icd generator change;  Surgeon: Yifan Villanueva MD;  Location: [facility];  Service: Cardiology    CARDIAC PACEMAKER PLACEMENT      FL RETROGRADE PYELOGRAM  1/6/2025    FL RETROGRADE PYELOGRAM  1/27/2025    [place] CYSTO W/REMOVAL OF LESIONS SMALL Right 1/27/2025    Procedure: left retrograde pyelogram, left ureteroscopy,  bladder lesion biopsy, cystolitholapaxy, stone basket extraction, left ureteral stent exchange;  Surgeon: Isauro Russ DO;  Location[facility];  Service: Urology    MT CYSTOURETHROSCOPY W/URETERAL CATHETERIZATION Left 1/6/2025    Procedure: CYSTOSCOPY RETROGRADE PYELOGRAM WITH INSERTION STENT URETERAL;  Surgeon: Sanjeev Boswell MD;  Location[facility];  Service: Urology    SKIN SURGERY      skin cancer removal     TONSILLECTOMY       Family History   Problem Relation Age of Onset    Diabetes Mother     Colon cancer  Father         DIAGNOSED IN HIS 80'S    Heart failure Father     Coronary artery disease Father     Cancer Father         Lorenzo    Diabetes Family         MELLITUS    Heart attack Neg Hx     Stroke Neg Hx     Anuerysm Neg Hx     Clotting disorder Neg Hx     Arrhythmia Neg Hx     Hypertension Neg Hx         pt unsure     Hyperlipidemia Neg Hx     Sudden death Neg Hx         scd     he reports that he has been smoking cigarettes. He started smoking about 64 years ago. He has a 64.2 pack-year smoking history. He has been exposed to tobacco smoke. He has never used smokeless tobacco. He reports that he does not currently use alcohol after a past usage of about 14.0 standard drinks of alcohol per week. He reports that he does not use drugs.  Current Outpatient Medications   Medication Instructions    albuterol (2.5 mg/3 mL) 0.083 % nebulizer solution USE 1 VIAL VIA NEBULIZER 4 TIMES A DAY AS NEEDED    ammonium lactate (LAC-HYDRIN) 12 % lotion APPLY TO AFFECTED AREA TOPICALLY EVERY DAY    aspirin 81 mg, Daily    atorvastatin (LIPITOR) 40 mg, Oral, Daily    bexarotene (TARGRETIN) 600 mg, Oral, Daily    Cholecalciferol 25 MCG (1000 UT) capsule 1 capsule, Daily    Empagliflozin (JARDIANCE) 10 mg, Oral, Every morning    ferrous sulfate 324 (65 Fe) mg 1 tablet, 2 times daily    gabapentin (NEURONTIN) 100 mg, Oral, 3 times daily    levothyroxine 100 mcg, Oral, Daily    Mechlorethamine HCl (Valchlor) 0.016 % GEL Apply daily in the morning to the affected areas.    metoprolol succinate (TOPROL-XL) 75 mg, Oral, Daily    montelukast (SINGULAIR) 10 mg, Oral, Daily at bedtime    Multiple Vitamins-Minerals (OCUVITE ADULT 50+ PO) Take by mouth    nitrofurantoin (MACROBID) 100 mg, Oral, 2 times daily    oxybutynin (DITROPAN) 5 mg, Oral, 3 times daily    sacubitril-valsartan (Entresto) 49-51 MG TABS 1 tablet, Oral, 2 times daily    spironolactone (ALDACTONE) 12.5 mg, Oral, Daily    tamsulosin (FLOMAX) 0.4 mg, Oral, Daily with dinner     Trelegy Ellipta 100-62.5-25 MCG/ACT inhaler USE 1 INHALATION BY MOUTH ONCE  DAILY AT THE SAME TIME EACH DAY  RINSE MOUTH AFTER USE    triamcinolone (KENALOG) 0.1 % ointment Topical, 2 times daily, To skin lymphoma    vitamin B-12 (VITAMIN B-12) 1,000 mcg, Daily    Vitamins-Lipotropics (LIPOFLAVONOID PO) Daily    zolpidem (AMBIEN) 10 mg, Oral, Daily at bedtime PRN   No Known Allergies   Objective   /59   Pulse 88   Temp (!) 96.8 °F (36 °C)   Resp 18   Wt 94.2 kg (207 lb 9.6 oz)   SpO2 98%   BMI 28.95 kg/m²      Physical Exam  Constitutional:       Appearance: Normal appearance.   HENT:      Head: Normocephalic and atraumatic.      Right Ear: External ear normal.      Left Ear: External ear normal.      Nose: Nose normal.      Mouth/Throat:      Pharynx: Oropharynx is clear.   Eyes:      Conjunctiva/sclera: Conjunctivae normal.   Cardiovascular:      Rate and Rhythm: Normal rate and regular rhythm.   Pulmonary:      Effort: Pulmonary effort is normal.   Abdominal:      General: Bowel sounds are normal. There is no distension.      Palpations: Abdomen is soft.      Tenderness: There is no abdominal tenderness. There is no right CVA tenderness, left CVA tenderness or rebound.   Musculoskeletal:         General: Normal range of motion.      Cervical back: Normal range of motion.   Skin:     General: Skin is warm and dry.      Capillary Refill: Capillary refill takes less than 2 seconds.   Neurological:      General: No focal deficit present.      Mental Status: He is alert and oriented to person, place, and time.   Psychiatric:         Behavior: Behavior normal.         Thought Content: Thought content normal.

## 2025-02-27 ENCOUNTER — RESULTS FOLLOW-UP (OUTPATIENT)
Dept: OTHER | Facility: HOSPITAL | Age: 80
End: 2025-02-27

## 2025-02-27 LAB — BACTERIA UR CULT: NORMAL

## 2025-03-06 DIAGNOSIS — R10.9 ACUTE RIGHT FLANK PAIN: ICD-10-CM

## 2025-03-07 RX ORDER — OXYBUTYNIN CHLORIDE 5 MG/1
5 TABLET ORAL 3 TIMES DAILY
Qty: 60 TABLET | Refills: 5 | Status: SHIPPED | OUTPATIENT
Start: 2025-03-07

## 2025-04-08 ENCOUNTER — REMOTE DEVICE CLINIC VISIT (OUTPATIENT)
Dept: CARDIOLOGY CLINIC | Facility: CLINIC | Age: 80
End: 2025-04-08
Payer: COMMERCIAL

## 2025-04-08 DIAGNOSIS — Z95.810 AICD (AUTOMATIC CARDIOVERTER/DEFIBRILLATOR) PRESENT: Primary | ICD-10-CM

## 2025-04-08 PROCEDURE — 93296 REM INTERROG EVL PM/IDS: CPT | Performed by: INTERNAL MEDICINE

## 2025-04-08 PROCEDURE — 93295 DEV INTERROG REMOTE 1/2/MLT: CPT | Performed by: INTERNAL MEDICINE

## 2025-04-08 NOTE — PROGRESS NOTES
Results for orders placed or performed in visit on 04/08/25   Cardiac EP device report    Narrative    MDT BIV-ICD/NOT MRI CONDITIONAL  CARELINK TRANSMISSION: BATTERY VOLTAGE ADEQUATE (4.5 YRS). AP-95%, BVP-98% (TOTAL -95.3%+VSR PACE-2.7%). ALL AVAILABLE LEAD PARAMETERS WITHIN NORMAL LIMITS. 32 DEVICE CLASSIFIED NSVT EPISODES, NSVT & SVT ON AVAILABLE EGM'S, MOST RECENT WAS 25 BEAT NSVT, AVG CL~345MS. 1 SVT-ST EPISODE @ 150 BPM LASTING 11 SEC. PT ON ASA 81MG & METOPROLOL. 272 VENT SENSING EPISODES- NSVT & SVT; FUSION BEATS. OPTI-VOL WITHIN NORMAL LIMITS. NORMAL DEVICE FUNCTION. GV

## 2025-04-13 ENCOUNTER — RESULTS FOLLOW-UP (OUTPATIENT)
Dept: NON INVASIVE DIAGNOSTICS | Facility: HOSPITAL | Age: 80
End: 2025-04-13

## 2025-05-12 ENCOUNTER — RA CDI HCC (OUTPATIENT)
Dept: OTHER | Facility: HOSPITAL | Age: 80
End: 2025-05-12

## 2025-05-12 DIAGNOSIS — E78.5 HYPERLIPIDEMIA, UNSPECIFIED HYPERLIPIDEMIA TYPE: ICD-10-CM

## 2025-05-12 RX ORDER — ATORVASTATIN CALCIUM 40 MG/1
40 TABLET, FILM COATED ORAL DAILY
Qty: 90 TABLET | Refills: 1 | Status: SHIPPED | OUTPATIENT
Start: 2025-05-12

## 2025-05-14 DIAGNOSIS — I50.22 CHRONIC SYSTOLIC CONGESTIVE HEART FAILURE (HCC): ICD-10-CM

## 2025-05-14 RX ORDER — SACUBITRIL AND VALSARTAN 49; 51 MG/1; MG/1
1 TABLET, FILM COATED ORAL 2 TIMES DAILY
Qty: 180 TABLET | Refills: 1 | Status: SHIPPED | OUTPATIENT
Start: 2025-05-14

## 2025-05-20 ENCOUNTER — OFFICE VISIT (OUTPATIENT)
Age: 80
End: 2025-05-20
Payer: COMMERCIAL

## 2025-05-20 ENCOUNTER — APPOINTMENT (OUTPATIENT)
Age: 80
End: 2025-05-20
Payer: COMMERCIAL

## 2025-05-20 VITALS
DIASTOLIC BLOOD PRESSURE: 58 MMHG | OXYGEN SATURATION: 97 % | SYSTOLIC BLOOD PRESSURE: 120 MMHG | HEART RATE: 97 BPM | RESPIRATION RATE: 20 BRPM | WEIGHT: 204.8 LBS | BODY MASS INDEX: 28.67 KG/M2 | HEIGHT: 71 IN | TEMPERATURE: 96.4 F

## 2025-05-20 DIAGNOSIS — E83.9 CHRONIC KIDNEY DISEASE-MINERAL AND BONE DISORDER: ICD-10-CM

## 2025-05-20 DIAGNOSIS — N18.9 CHRONIC KIDNEY DISEASE-MINERAL AND BONE DISORDER: ICD-10-CM

## 2025-05-20 DIAGNOSIS — F17.210 CIGARETTE NICOTINE DEPENDENCE WITHOUT COMPLICATION: ICD-10-CM

## 2025-05-20 DIAGNOSIS — Z00.00 MEDICARE ANNUAL WELLNESS VISIT, SUBSEQUENT: Primary | ICD-10-CM

## 2025-05-20 DIAGNOSIS — J44.9 CHRONIC OBSTRUCTIVE PULMONARY DISEASE, UNSPECIFIED COPD TYPE (HCC): ICD-10-CM

## 2025-05-20 DIAGNOSIS — I50.22 CHRONIC SYSTOLIC CONGESTIVE HEART FAILURE (HCC): ICD-10-CM

## 2025-05-20 DIAGNOSIS — R63.0 DECREASED APPETITE: ICD-10-CM

## 2025-05-20 DIAGNOSIS — E03.9 ACQUIRED HYPOTHYROIDISM: ICD-10-CM

## 2025-05-20 DIAGNOSIS — I42.8 NON-ISCHEMIC CARDIOMYOPATHY (HCC): ICD-10-CM

## 2025-05-20 DIAGNOSIS — M89.9 CHRONIC KIDNEY DISEASE-MINERAL AND BONE DISORDER: ICD-10-CM

## 2025-05-20 DIAGNOSIS — Z12.11 SCREENING FOR COLORECTAL CANCER: ICD-10-CM

## 2025-05-20 DIAGNOSIS — I10 PRIMARY HYPERTENSION: ICD-10-CM

## 2025-05-20 DIAGNOSIS — Z85.72 HX OF LYMPHOMA: ICD-10-CM

## 2025-05-20 DIAGNOSIS — G47.00 INSOMNIA, UNSPECIFIED TYPE: ICD-10-CM

## 2025-05-20 DIAGNOSIS — D51.8 OTHER VITAMIN B12 DEFICIENCY ANEMIAS: ICD-10-CM

## 2025-05-20 DIAGNOSIS — E78.2 MIXED HYPERLIPIDEMIA: ICD-10-CM

## 2025-05-20 DIAGNOSIS — Z12.12 SCREENING FOR COLORECTAL CANCER: ICD-10-CM

## 2025-05-20 LAB
FERRITIN SERPL-MCNC: 253 NG/ML (ref 30–336)
IRON SATN MFR SERPL: 16 % (ref 15–50)
IRON SERPL-MCNC: 61 UG/DL (ref 50–212)
TIBC SERPL-MCNC: 383.6 UG/DL (ref 250–450)
TRANSFERRIN SERPL-MCNC: 274 MG/DL (ref 203–362)
TSH SERPL DL<=0.05 MIU/L-ACNC: 0.88 UIU/ML (ref 0.45–4.5)
UIBC SERPL-MCNC: 323 UG/DL (ref 155–355)
VIT B12 SERPL-MCNC: 224 PG/ML (ref 180–914)

## 2025-05-20 PROCEDURE — 84443 ASSAY THYROID STIM HORMONE: CPT

## 2025-05-20 PROCEDURE — 82728 ASSAY OF FERRITIN: CPT

## 2025-05-20 PROCEDURE — 99397 PER PM REEVAL EST PAT 65+ YR: CPT

## 2025-05-20 PROCEDURE — 83540 ASSAY OF IRON: CPT

## 2025-05-20 PROCEDURE — G0439 PPPS, SUBSEQ VISIT: HCPCS

## 2025-05-20 PROCEDURE — 36415 COLL VENOUS BLD VENIPUNCTURE: CPT

## 2025-05-20 PROCEDURE — 83550 IRON BINDING TEST: CPT

## 2025-05-20 PROCEDURE — 82607 VITAMIN B-12: CPT

## 2025-05-20 RX ORDER — ZOLPIDEM TARTRATE 10 MG/1
10 TABLET ORAL
Qty: 30 TABLET | Refills: 0 | Status: SHIPPED | OUTPATIENT
Start: 2025-05-20

## 2025-05-20 NOTE — ASSESSMENT & PLAN NOTE
Lab Results   Component Value Date    EGFR 67 01/23/2025    EGFR 57 01/17/2025    EGFR 53 01/10/2025    CREATININE 1.06 01/23/2025    CREATININE 1.20 01/17/2025    CREATININE 1.29 01/10/2025     Stablized after obstructive uropathy and ELLYN resolved.  Follows with urology for L ureteral stent placement.  Orders:  •  Vitamin B12; Future  •  Iron Panel (Includes Ferritin, Iron Sat%, Iron, and TIBC); Future

## 2025-05-20 NOTE — PATIENT INSTRUCTIONS
Medicare Preventive Visit Patient Instructions  Thank you for completing your Welcome to Medicare Visit or Medicare Annual Wellness Visit today. Your next wellness visit will be due in one year (5/21/2026).  The screening/preventive services that you may require over the next 5-10 years are detailed below. Some tests may not apply to you based off risk factors and/or age. Screening tests ordered at today's visit but not completed yet may show as past due. Also, please note that scanned in results may not display below.  Preventive Screenings:  Service Recommendations Previous Testing/Comments   Colorectal Cancer Screening  Colonoscopy    Fecal Occult Blood Test (FOBT)/Fecal Immunochemical Test (FIT)  Fecal DNA/Cologuard Test  Flexible Sigmoidoscopy Age: 45-75 years old   Colonoscopy: every 10 years (May be performed more frequently if at higher risk)  OR  FOBT/FIT: every 1 year  OR  Cologuard: every 3 years  OR  Sigmoidoscopy: every 5 years  Screening may be recommended earlier than age 45 if at higher risk for colorectal cancer. Also, an individualized decision between you and your healthcare provider will decide whether screening between the ages of 76-85 would be appropriate. Colonoscopy: 02/17/2015  FOBT/FIT: Not on file  Cologuard: 07/26/2022  Sigmoidoscopy: Not on file          Prostate Cancer Screening Individualized decision between patient and health care provider in men between ages of 55-69   Medicare will cover every 12 months beginning on the day after your 50th birthday PSA: 8.177 ng/mL     Screening Not Indicated     Hepatitis C Screening Once for adults born between 1945 and 1965  More frequently in patients at high risk for Hepatitis C Hep C Antibody: 02/15/2018    Screening Current   Diabetes Screening 1-2 times per year if you're at risk for diabetes or have pre-diabetes Fasting glucose: 113 mg/dL (1/23/2025)  A1C: No results in last 5 years (No results in last 5 years)  Screening Current    Cholesterol Screening Once every 5 years if you don't have a lipid disorder. May order more often based on risk factors. Lipid panel: 01/23/2025  Screening Not Indicated  History Lipid Disorder      Other Preventive Screenings Covered by Medicare:  Abdominal Aortic Aneurysm (AAA) Screening: covered once if your at risk. You're considered to be at risk if you have a family history of AAA or a male between the age of 65-75 who smoking at least 100 cigarettes in your lifetime.  Lung Cancer Screening: covers low dose CT scan once per year if you meet all of the following conditions: (1) Age 55-77; (2) No signs or symptoms of lung cancer; (3) Current smoker or have quit smoking within the last 15 years; (4) You have a tobacco smoking history of at least 20 pack years (packs per day x number of years you smoked); (5) You get a written order from a healthcare provider.  Glaucoma Screening: covered annually if you're considered high risk: (1) You have diabetes OR (2) Family history of glaucoma OR (3)  aged 50 and older OR (4)  American aged 65 and older  Osteoporosis Screening: covered every 2 years if you meet one of the following conditions: (1) Have a vertebral abnormality; (2) On glucocorticoid therapy for more than 3 months; (3) Have primary hyperparathyroidism; (4) On osteoporosis medications and need to assess response to drug therapy.  HIV Screening: covered annually if you're between the age of 15-65. Also covered annually if you are younger than 15 and older than 65 with risk factors for HIV infection. For pregnant patients, it is covered up to 3 times per pregnancy.    Immunizations:  Immunization Recommendations   Influenza Vaccine Annual influenza vaccination during flu season is recommended for all persons aged >= 6 months who do not have contraindications   Pneumococcal Vaccine   * Pneumococcal conjugate vaccine = PCV13 (Prevnar 13), PCV15 (Vaxneuvance), PCV20 (Prevnar 20)  *  Pneumococcal polysaccharide vaccine = PPSV23 (Pneumovax) Adults 19-63 yo with certain risk factors or if 65+ yo  If never received any pneumonia vaccine: recommend Prevnar 20 (PCV20)  Give PCV20 if previously received 1 dose of PCV13 or PPSV23   Hepatitis B Vaccine 3 dose series if at intermediate or high risk (ex: diabetes, end stage renal disease, liver disease)   Respiratory syncytial virus (RSV) Vaccine - COVERED BY MEDICARE PART D  * RSVPreF3 (Arexvy) CDC recommends that adults 60 years of age and older may receive a single dose of RSV vaccine using shared clinical decision-making (SCDM)   Tetanus (Td) Vaccine - COST NOT COVERED BY MEDICARE PART B Following completion of primary series, a booster dose should be given every 10 years to maintain immunity against tetanus. Td may also be given as tetanus wound prophylaxis.   Tdap Vaccine - COST NOT COVERED BY MEDICARE PART B Recommended at least once for all adults. For pregnant patients, recommended with each pregnancy.   Shingles Vaccine (Shingrix) - COST NOT COVERED BY MEDICARE PART B  2 shot series recommended in those 19 years and older who have or will have weakened immune systems or those 50 years and older     Health Maintenance Due:      Topic Date Due   • Colorectal Cancer Screening  07/27/2022   • Lung Cancer Screening  01/06/2026   • Hepatitis C Screening  Completed     Immunizations Due:      Topic Date Due   • COVID-19 Vaccine (8 - Pfizer risk 2024-25 season) 04/15/2025     Advance Directives   What are advance directives?  Advance directives are legal documents that state your wishes and plans for medical care. These plans are made ahead of time in case you lose your ability to make decisions for yourself. Advance directives can apply to any medical decision, such as the treatments you want, and if you want to donate organs.   What are the types of advance directives?  There are many types of advance directives, and each state has rules about how to  use them. You may choose a combination of any of the following:  Living will:  This is a written record of the treatment you want. You can also choose which treatments you do not want, which to limit, and which to stop at a certain time. This includes surgery, medicine, IV fluid, and tube feedings.   Durable power of  for healthcare (DPAHC):  This is a written record that states who you want to make healthcare choices for you when you are unable to make them for yourself. This person, called a proxy, is usually a family member or a friend. You may choose more than 1 proxy.  Do not resuscitate (DNR) order:  A DNR order is used in case your heart stops beating or you stop breathing. It is a request not to have certain forms of treatment, such as CPR. A DNR order may be included in other types of advance directives.  Medical directive:  This covers the care that you want if you are in a coma, near death, or unable to make decisions for yourself. You can list the treatments you want for each condition. Treatment may include pain medicine, surgery, blood transfusions, dialysis, IV or tube feedings, and a ventilator (breathing machine).  Values history:  This document has questions about your views, beliefs, and how you feel and think about life. This information can help others choose the care that you would choose.  Why are advance directives important?  An advance directive helps you control your care. Although spoken wishes may be used, it is better to have your wishes written down. Spoken wishes can be misunderstood, or not followed. Treatments may be given even if you do not want them. An advance directive may make it easier for your family to make difficult choices about your care.   Fall Prevention    Fall prevention  includes ways to make your home and other areas safer. It also includes ways you can move more carefully to prevent a fall. Health conditions that cause changes in your blood pressure,  vision, or muscle strength and coordination may increase your risk for falls. Medicines may also increase your risk for falls if they make you dizzy, weak, or sleepy.   Fall prevention tips:   Stand or sit up slowly.    Use assistive devices as directed.    Wear shoes that fit well and have soles that .    Wear a personal alarm.    Stay active.    Manage your medical conditions.    Home Safety Tips:  Add items to prevent falls in the bathroom.    Keep paths clear.    Install bright lights in your home.    Keep items you use often on shelves within reach.    Paint or place reflective tape on the edges of your stairs.    Cigarette Smoking and Your Health   Risks to your health if you smoke:  Nicotine and other chemicals found in tobacco damage every cell in your body. Even if you are a light smoker, you have an increased risk for cancer, heart disease, and lung disease. If you are pregnant or have diabetes, smoking increases your risk for complications.   Benefits to your health if you stop smoking:   You decrease respiratory symptoms such as coughing, wheezing, and shortness of breath.   You reduce your risk for cancers of the lung, mouth, throat, kidney, bladder, pancreas, stomach, and cervix. If you already have cancer, you increase the benefits of chemotherapy. You also reduce your risk for cancer returning or a second cancer from developing.   You reduce your risk for heart disease, blood clots, heart attack, and stroke.   You reduce your risk for lung infections, and diseases such as pneumonia, asthma, chronic bronchitis, and emphysema.  Your circulation improves. More oxygen can be delivered to your body. If you have diabetes, you lower your risk for complications, such as kidney, artery, and eye diseases. You also lower your risk for nerve damage. Nerve damage can lead to amputations, poor vision, and blindness.  You improve your body's ability to heal and to fight infections.  For more information and  support to stop smoking:   [employer]  Phone: 7- 057 - 102-6087  Web Address: www.[employer]  Weight Management   Why it is important to manage your weight:  Being overweight increases your risk of health conditions such as heart disease, high blood pressure, type 2 diabetes, and certain types of cancer. It can also increase your risk for osteoarthritis, sleep apnea, and other respiratory problems. Aim for a slow, steady weight loss. Even a small amount of weight loss can lower your risk of health problems.  How to lose weight safely:  A safe and healthy way to lose weight is to eat fewer calories and get regular exercise. You can lose up about 1 pound a week by decreasing the number of calories you eat by 500 calories each day.   Healthy meal plan for weight management:  A healthy meal plan includes a variety of foods, contains fewer calories, and helps you stay healthy. A healthy meal plan includes the following:  Eat whole-grain foods more often.  A healthy meal plan should contain fiber. Fiber is the part of grains, fruits, and vegetables that is not broken down by your body. Whole-grain foods are healthy and provide extra fiber in your diet. Some examples of whole-grain foods are whole-wheat breads and pastas, oatmeal, brown rice, and bulgur.  Eat a variety of vegetables every day.  Include dark, leafy greens such as spinach, kale, day greens, and mustard greens. Eat yellow and orange vegetables such as carrots, sweet potatoes, and winter squash.   Eat a variety of fruits every day.  Choose fresh or canned fruit (canned in its own juice or light syrup) instead of juice. Fruit juice has very little or no fiber.  Eat low-fat dairy foods.  Drink fat-free (skim) milk or 1% milk. Eat fat-free yogurt and low-fat cottage cheese. Try low-fat cheeses such as mozzarella and other reduced-fat cheeses.  Choose meat and other protein foods that are low in fat.  Choose beans or other legumes such as split peas or  lentils. Choose fish, skinless poultry (chicken or turkey), or lean cuts of red meat (beef or pork). Before you cook meat or poultry, cut off any visible fat.   Use less fat and oil.  Try baking foods instead of frying them. Add less fat, such as margarine, sour cream, regular salad dressing and mayonnaise to foods. Eat fewer high-fat foods. Some examples of high-fat foods include french fries, doughnuts, ice cream, and cakes.  Eat fewer sweets.  Limit foods and drinks that are high in sugar. This includes candy, cookies, regular soda, and sweetened drinks.  Exercise:  Exercise at least 30 minutes per day on most days of the week. Some examples of exercise include walking, biking, dancing, and swimming. You can also fit in more physical activity by taking the stairs instead of the elevator or parking farther away from stores. Ask your healthcare provider about the best exercise plan for you.    © Copyright [employer] 2018 Information is for End User's use only and may not be sold, redistributed or otherwise used for commercial purposes. All illustrations and images included in CareNotes® are the copyrighted property of [employer] or [facility]

## 2025-05-20 NOTE — ASSESSMENT & PLAN NOTE
Controlled, continue current antihypertensives, low salt diet.  His weight loss of 20 lbs also helped.

## 2025-05-20 NOTE — ASSESSMENT & PLAN NOTE
Uses Ambien on occasion.  Last fill was in February 2025.  [place] or [place]  reviewed: No red flags were identified; safe to proceed with prescription    Orders:  •  zolpidem (AMBIEN) 10 mg tablet; Take 1 tablet (10 mg total) by mouth daily at bedtime as needed for sleep

## 2025-05-20 NOTE — ASSESSMENT & PLAN NOTE
Wt Readings from Last 3 Encounters:   05/20/25 92.9 kg (204 lb 12.8 oz)   02/26/25 94.2 kg (207 lb 9.6 oz)   02/18/25 93 kg (205 lb)   Had to stop Jardiance due to recurrent UTIs.  Pt is on Entresto, spirinolactone, BB.  Stable, appears euvolemic today.  VSS.  Continue current GDMT aside from SGLT2 and follow up with cardiology

## 2025-05-20 NOTE — ASSESSMENT & PLAN NOTE
Secondary to Targretin.  With increased fatigue over last six months will repeat TSH.  Orders:  •  TSH, 3rd generation with Free T4 reflex; Future

## 2025-05-20 NOTE — PROGRESS NOTES
Name: Sherly Peña      : 1945      MRN: [id number]  Encounter Provider: Maritza Armando PA-C  Encounter Date: 2025   Encounter department: [facility]  :  Assessment & Plan  Medicare annual wellness visit, subsequent    Annual physical exam completed today.  - Medical history reviewed, including existing medical conditions, medications, and surgeries.   - Labs discussed to evaluate cholesterol, blood sugar, kidney function, liver function, and other important markers of health.  - BMI evaluated and discussed.  - Cancer screenings discussed: CT lung/colonoscopy.   - Vaccination status reviewed and pertinent immunizations and booster shots discussed.   - Bone health reviewed.   - Skin examination.  Discussed importance of sunscreen and other preventative measures for skin cancer.    - Lifestyle and health counseling completed including diet, exercise habits, smoking status, alcohol consumption.   - Mental health and wellbeing evaluated and discussed.  - Family history obtained to identify any of hereditary health risks.          Insomnia, unspecified type  Uses Ambien on occasion.  Last fill was in 2025.  [place] or [place]  reviewed: No red flags were identified; safe to proceed with prescription    Orders:  •  zolpidem (AMBIEN) 10 mg tablet; Take 1 tablet (10 mg total) by mouth daily at bedtime as needed for sleep    Chronic obstructive pulmonary disease, unspecified COPD type (HCC)  Has CT chest on  to follow up on lung nodules.  Stable on current inhalers.  Follows with pulmonary.         Chronic systolic congestive heart failure (HCC)  Wt Readings from Last 3 Encounters:   25 92.9 kg (204 lb 12.8 oz)   25 94.2 kg (207 lb 9.6 oz)   25 93 kg (205 lb)   Had to stop Jardiance due to recurrent UTIs.  Pt is on Entresto, spirinolactone, BB.  Stable, appears euvolemic today.  VSS.  Continue current GDMT aside from SGLT2 and follow up with  cardiology       Non-ischemic cardiomyopathy (HCC)  Stable, asymptomatic, BP well controlled on current medications.  Follow up with cardiology.       Hx of lymphoma  Pt's understand is there is no cure, it is slow growing, and Targretin keeps it under control.       Decreased appetite  Unknown cause.  No fevers, chills, night sweats, or unintentional weight loss.  Has weight loss, but he is eating less.  He is down about 20 lbs over the past year.  Pt already has labs ordered through other specialties.  Hx of lymphoma, CHF, cardiomyopathy, COPD.  Pt to have labs done today and will call with any concerning results.    Suspect this is from his chronic illnesses.  Pt lives alone, doesn't cook much, tries to stay busy.  He denies depression or other mood symptoms.  He doesn't have other B symptoms and his weight loss can be correlated with less intake of food.       Mixed hyperlipidemia  Mildly elevated triglycerides.  Currently on atorvastatin.       Primary hypertension  Controlled, continue current antihypertensives, low salt diet.  His weight loss of 20 lbs also helped.       Acquired hypothyroidism  Secondary to Targretin.  With increased fatigue over last six months will repeat TSH.  Orders:  •  TSH, 3rd generation with Free T4 reflex; Future    Chronic kidney disease-mineral and bone disorder  Lab Results   Component Value Date    EGFR 67 01/23/2025    EGFR 57 01/17/2025    EGFR 53 01/10/2025    CREATININE 1.06 01/23/2025    CREATININE 1.20 01/17/2025    CREATININE 1.29 01/10/2025     Stablized after obstructive uropathy and ELLYN resolved.  Follows with urology for L ureteral stent placement.  Orders:  •  Vitamin B12; Future  •  Iron Panel (Includes Ferritin, Iron Sat%, Iron, and TIBC); Future    Cigarette nicotine dependence without complication  Not interested in cessation.       Other vitamin B12 deficiency anemias  With increased fatigue and weight loss, will check B12 levels.      Orders:  •  Vitamin B12;  Future    Screening for colorectal cancer  Discussed options, pt wishes to do cologuard again.  Aware if it is positive, it will be recommended he do a colonoscopy.  Orders:  •  Cologuard      Depression Screening and Follow-up Plan: Patient was screened for depression during today's encounter. They screened negative with a PHQ-2 score of 0.      Tobacco Cessation Counseling: The patient is sincerely urged to quit consumption of tobacco. He is not ready to quit tobacco.       Preventive health issues were discussed with patient, and age appropriate screening tests were ordered as noted in patient's After Visit Summary. Personalized health advice and appropriate referrals for health education or preventive services given if needed, as noted in patient's After Visit Summary.    History of Present Illness     Reports decreased appetite with 20 lb weight loss over past year.  He is eating less.  He eats alone often and tends to eat convenience foods.  Does not feel his chronic diseases have worsened. Stays busy with caring for his animals as well.  He was in hospital several times over past year for obstructive uropathy, sepsis, and ELLYN, which all seems to have greatly improved after left ureteral stent, Flomax, and ditropan.       Patient Care Team:  Maritza Armando PA-C as PCP - General (Emergency Medicine)  MD Harrison Monique MD Ramani Gosala, MD Abhishek Singh, MD as Cardiologist (Cardiology)  Michael Stanford MD (Nephrology)    Review of Systems   Constitutional:  Positive for appetite change. Negative for chills, diaphoresis, fatigue, fever and unexpected weight change.   HENT: Negative.     Eyes: Negative.    Respiratory:  Positive for shortness of breath. Negative for cough.    Cardiovascular:  Negative for chest pain, palpitations and leg swelling.   Gastrointestinal: Negative.    Genitourinary: Negative.    Musculoskeletal: Negative.    Skin: Negative.    Neurological:  Negative for  dizziness, syncope, light-headedness and headaches.   Hematological:  Negative for adenopathy.   Psychiatric/Behavioral:  Negative for behavioral problems, confusion, dysphoric mood, sleep disturbance and suicidal ideas. The patient is not nervous/anxious.      Medical History Reviewed by provider this encounter:  Tobacco  Allergies  Meds  Problems  Med Hx  Surg Hx  Fam Hx       Annual Wellness Visit Questionnaire   Sherly is here for his Subsequent Wellness visit.     Health Risk Assessment:   Patient rates overall health as fair. Patient feels that their physical health rating is slightly worse. Patient is dissatisfied with their life. Eyesight was rated as same. Hearing was rated as slightly worse. Patient feels that their emotional and mental health rating is same. Patients states they are never, rarely angry. Patient states they are often unusually tired/fatigued. Pain experienced in the last 7 days has been some. Patient's pain rating has been 4/10. Patient states that he has experienced weight loss or gain in last 6 months.     Depression Screening:   PHQ-2 Score: 0      Fall Risk Screening:   In the past year, patient has experienced: history of falling in past year    Number of falls: 1  Injured during fall?: No    Feels unsteady when standing or walking?: No    Worried about falling?: No      Home Safety:  Patient does not have trouble with stairs inside or outside of their home. Patient has working smoke alarms and has working carbon monoxide detector. Home safety hazards include: household clutter.     Nutrition:   Current diet is Regular and Limited junk food.     Medications:   Patient is not currently taking any over-the-counter supplements. Patient is able to manage medications.     Activities of Daily Living (ADLs)/Instrumental Activities of Daily Living (IADLs):   Walk and transfer into and out of bed and chair?: Yes  Dress and groom yourself?: Yes    Bathe or shower yourself?: Yes    Feed  yourself? Yes  Do your laundry/housekeeping?: Yes  Manage your money, pay your bills and track your expenses?: Yes  Make your own meals?: Yes    Do your own shopping?: Yes    Previous Hospitalizations:   Any hospitalizations or ED visits within the last 12 months?: Yes    How many hospitalizations have you had in the last year?: 3-4    Hospitalization Comments: Bladder lesion, kidney stone with sepsis, fall    Advance Care Planning:   Living will: Yes    Durable POA for healthcare: Yes    Advanced directive: Yes      Cognitive Screening:   Provider or family/friend/caregiver concerned regarding cognition?: No    Preventive Screenings      Cardiovascular Screening:    General: Screening Not Indicated and History Lipid Disorder      Diabetes Screening:     General: Screening Current      Colorectal Cancer Screening:     General: Risks and Benefits Discussed    Due for: Cologuard      Prostate Cancer Screening:    General: Screening Not Indicated      Osteoporosis Screening:    General: Screening Not Indicated      Abdominal Aortic Aneurysm (AAA) Screening:    Risk factors include: tobacco use        Lung Cancer Screening:     General: Screening Current      Hepatitis C Screening:    General: Screening Current    Immunizations:  - Immunizations due: Zoster (Shingrix)  - Risks/benefits immunizations discussed      Screening, Brief Intervention, and Referral to Treatment (SBIRT)     Screening  Typical number of drinks in a day: 0  Typical number of drinks in a week: 0  Interpretation: Low risk drinking behavior.    AUDIT-C Screenin) How often did you have a drink containing alcohol in the past year? never  2) How many drinks did you have on a typical day when you were drinking in the past year? 0  3) How often did you have 6 or more drinks on one occasion in the past year? never    AUDIT-C Score: 0  Interpretation: Score 0-3 (male): Negative screen for alcohol misuse    Single Item Drug Screening:  How often have  "you used an illegal drug (including marijuana) or a prescription medication for non-medical reasons in the past year? never    Single Item Drug Screen Score: 0  Interpretation: Negative screen for possible drug use disorder    Other Counseling Topics:   Car/seat belt/driving safety, skin self-exam, sunscreen and regular weightbearing exercise.     Social Drivers of Health     Financial Resource Strain: Low Risk  (1/23/2023)    Overall Financial Resource Strain (CARDIA)    • Difficulty of Paying Living Expenses: Not very hard   Food Insecurity: No Food Insecurity (5/15/2025)    Nursing - Inadequate Food Risk Classification    • Worried About Running Out of Food in the Last Year: Never true    • Ran Out of Food in the Last Year: Never true    • Ran Out of Food in the Last Year: Never true   Transportation Needs: No Transportation Needs (5/15/2025)    PRAPARE - Transportation    • Lack of Transportation (Medical): No    • Lack of Transportation (Non-Medical): No   Housing Stability: Low Risk  (5/20/2025)    Housing Stability Vital Sign    • Unable to Pay for Housing in the Last Year: No    • Number of Times Moved in the Last Year: 0    • Homeless in the Last Year: No   Utilities: Not At Risk (5/15/2025)    [facility] Utilities    • Threatened with loss of utilities: No     No results found.    Objective   /58 (BP Location: Left arm, Patient Position: Sitting, Cuff Size: Standard)   Pulse 97   Temp (!) 96.4 °F (35.8 °C) (Tympanic)   Resp 20   Ht 5' 11\" (1.803 m)   Wt 92.9 kg (204 lb 12.8 oz)   SpO2 97%   BMI 28.56 kg/m²     Physical Exam  Vitals and nursing note reviewed.   Constitutional:       General: He is not in acute distress.     Appearance: Normal appearance. He is not ill-appearing or toxic-appearing.   HENT:      Head: Normocephalic and atraumatic.      Right Ear: Hearing, tympanic membrane, ear canal and external ear normal.      Left Ear: Hearing, tympanic membrane, ear canal and external ear normal. "      Nose: Nose normal.      Mouth/Throat:      Lips: Pink.      Mouth: Mucous membranes are moist.   Neck:      Thyroid: No thyroid mass, thyromegaly or thyroid tenderness.      Vascular: No carotid bruit.     Cardiovascular:      Rate and Rhythm: Normal rate and regular rhythm.      Pulses:           Radial pulses are 2+ on the right side and 2+ on the left side.      Heart sounds: Normal heart sounds.   Pulmonary:      Effort: Pulmonary effort is normal. No respiratory distress.      Breath sounds: Normal breath sounds.   Abdominal:      General: Bowel sounds are normal. There is no distension.      Palpations: Abdomen is soft.      Tenderness: There is no abdominal tenderness. There is no right CVA tenderness or left CVA tenderness.     Musculoskeletal:         General: No tenderness, deformity or signs of injury.      Cervical back: Full passive range of motion without pain.   Lymphadenopathy:      Cervical: No cervical adenopathy.     Skin:     General: Skin is warm and dry.      Coloration: Skin is not jaundiced.      Findings: Rash present.      Comments: Very dry, flaky skin with dark red rash seen over upper extremitites.     Neurological:      Mental Status: He is alert and oriented to person, place, and time.      Gait: Gait is intact.     Psychiatric:         Mood and Affect: Mood normal.         Behavior: Behavior normal. Behavior is cooperative.

## 2025-05-20 NOTE — ASSESSMENT & PLAN NOTE
Has CT chest on June 2nd to follow up on lung nodules.  Stable on current inhalers.  Follows with pulmonary.

## 2025-05-21 ENCOUNTER — RESULTS FOLLOW-UP (OUTPATIENT)
Age: 80
End: 2025-05-21

## 2025-05-29 DIAGNOSIS — J30.89 ENVIRONMENTAL AND SEASONAL ALLERGIES: ICD-10-CM

## 2025-05-29 DIAGNOSIS — N40.0 BPH (BENIGN PROSTATIC HYPERPLASIA): ICD-10-CM

## 2025-05-29 RX ORDER — TAMSULOSIN HYDROCHLORIDE 0.4 MG/1
0.4 CAPSULE ORAL
Qty: 90 CAPSULE | Refills: 1 | Status: SHIPPED | OUTPATIENT
Start: 2025-05-29

## 2025-05-29 RX ORDER — MONTELUKAST SODIUM 10 MG/1
10 TABLET ORAL
Qty: 90 TABLET | Refills: 1 | Status: SHIPPED | OUTPATIENT
Start: 2025-05-29

## 2025-06-02 ENCOUNTER — HOSPITAL ENCOUNTER (OUTPATIENT)
Dept: CT IMAGING | Facility: CLINIC | Age: 80
Discharge: HOME | End: 2025-06-02
Payer: COMMERCIAL

## 2025-06-02 DIAGNOSIS — R91.1 LUNG NODULE: ICD-10-CM

## 2025-06-02 PROCEDURE — 71250 CT THORAX DX C-: CPT

## 2025-06-03 ENCOUNTER — HOSPITAL ENCOUNTER (OUTPATIENT)
Dept: ULTRASOUND IMAGING | Facility: CLINIC | Age: 80
Discharge: HOME | End: 2025-06-03
Payer: COMMERCIAL

## 2025-06-03 DIAGNOSIS — N20.0 KIDNEY STONE: ICD-10-CM

## 2025-06-03 PROCEDURE — 76775 US EXAM ABDO BACK WALL LIM: CPT

## 2025-06-09 ENCOUNTER — RESULTS FOLLOW-UP (OUTPATIENT)
Age: 80
End: 2025-06-09

## 2025-06-17 ENCOUNTER — OFFICE VISIT (OUTPATIENT)
Age: 80
End: 2025-06-17
Payer: COMMERCIAL

## 2025-06-17 VITALS
TEMPERATURE: 97.5 F | HEIGHT: 71 IN | BODY MASS INDEX: 28.7 KG/M2 | HEART RATE: 81 BPM | WEIGHT: 205 LBS | SYSTOLIC BLOOD PRESSURE: 126 MMHG | DIASTOLIC BLOOD PRESSURE: 68 MMHG | OXYGEN SATURATION: 98 %

## 2025-06-17 DIAGNOSIS — C84.00 MYCOSIS FUNGOIDES, UNSPECIFIED BODY REGION (HCC): Primary | ICD-10-CM

## 2025-06-17 PROCEDURE — 99213 OFFICE O/P EST LOW 20 MIN: CPT | Performed by: STUDENT IN AN ORGANIZED HEALTH CARE EDUCATION/TRAINING PROGRAM

## 2025-06-17 NOTE — PROGRESS NOTES
"[facility]atology Clinic Note     Patient Name: Sherly Peña  Encounter Date: 6/17/25       Have you been cared for by a [facility] Dermatologist in the last 3 years and, if so, which description applies to you? Yes. I have been here within the last 3 years, and my medical history has NOT changed since that time. I am not of child-bearing potential.     REVIEW OF SYSTEMS:  Have you recently had or currently have any of the following? No changes in my recent health.   PAST MEDICAL HISTORY:  Have you personally ever had or currently have any of the following?  If \"YES,\" then please provide more detail. No changes in my medical history.   HISTORY OF IMMUNOSUPPRESSION: Do you have a history of any of the following:  Systemic Immunosuppression such as Diabetes, Biologic or Immunotherapy, Chemotherapy, Organ Transplantation, Bone Marrow Transplantation or Prednisone?  No     Answering \"YES\" requires the addition of the dotphrase \"IMMUNOSUPPRESSED\" as the first diagnosis of the patient's visit.   FAMILY HISTORY:  Any \"first degree relatives\" (parent, brother, sister, or child) with the following?    No changes in my family's known health.   PATIENT EXPERIENCE:    Do you want the Dermatologist to perform a COMPLETE skin exam today including a clinical examination under the \"bra and underwear\" areas?  NO  If necessary, do we have your permission to call and leave a detailed message on your Preferred Phone number that includes your specific medical information?  Yes      Allergies[1] Current Medications[2]              Whom besides the patient is providing clinical information about today's encounter?   NO ADDITIONAL HISTORIAN (patient alone provided history)    Physical Exam and Assessment/Plan by Diagnosis:    MYCOSIS FUNGOIDES     Physical Exam:  Anatomic Location Affected:  trunk and extremities   Morphological Description: T2A N0 M0 Bx  erythematous and scaling patches   Pertinent Positives:  Pertinent " Negatives:    Additional History of Present Condition:  currently on bexarotene 75 mg daily, and tolerating well. Patient had lab work completed and has been on topical steroids and Valchlor since February.      Assessment and Plan:  Based on a thorough discussion of this condition and the management approach to it (including a comprehensive discussion of the known risks, side effects and potential benefits of treatment), the patient (family) agrees to implement the following specific plan:  Well controlled at this time without disease progression.   Continue same regimen with Bexarotene, Valchlor and topical steroids.   6 month follow up.     Mycosis fungoides  Mycosis fungoides is a condition in which the skin is infiltrated by patches or lumps composed of white cells called lymphocytes. It is more common in men than women and is very rare in children. Its cause is unknown but in some patients, it is associated with a pre-existing contact allergic dermatitis or infection with a retrovirus.    Mycosis fungoides has an indolent (low-grade) clinical course, which means that it may persist in one stage or over years or sometimes decades, slowly progress to another stage (from patches to thicker plaques and eventually to tumors).    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  Patch stage  In patch stage mycosis fungoides, the skin lesions are flat. Most often there are oval or ring-shaped (annular) pink dry patches on covered skin. They may spontaneously disappear, remain the same size, or slowly enlarge. The skin may be atrophic (thinned), and may or may not itch. The patch stage of mycosis fungoides can be difficult to distinguish from psoriasis, discoid eczema or parapsoriasis.  Plaque stage  In plaque stage mycosis fungoides, the patches become thickened and may resemble psoriasis. They are usually itchy.  Tumor stage  In tumor stage  mycosis fungoides, large irregular lumps develop from plaques, or de elliot. They may ulcerate. At this stage, spread to other organs is more likely than in earlier stages. The tumor type may transform into a large cell lymphoma.    MF variants and subtypes  There are a number of variants and subtypes of mycosis fungoides. Most follow the same clinical and pathological features as MF but some have distinctive features as described below.    Folliculotropic MF (follicular cell lymphoma)  The localized form of cutaneous T-cell lymphoma in which there is a slowly enlarging solitary patch, plaque or a tumor in which biopsy shows characteristic lymphomatous change around hair follicles.  Most commonly found in the head and neck area.  Skin lesions are often associated with alopecia, and sometimes with mucinorrhea (see alopecia mucinosa).   Pagetoid Reticulosis  Localized patches or plaques with an intraepidermal growth of neoplastic T cells  Presents as a solitary psoriasis-like or hyperkeratotic patch or plaque, usually on the extremities  Granulomatous slack skin:  Extremely rare subtype characterized by the slow development of folds of lax skin in the major skin folds  Skin folds show a granulomatous infiltrate with clonal T cells  Occurs most commonly in the groin and underarm regions  Mycosis fungoides palmaris et plantaris  Confined to palms and soles     Scribe Attestation    I,:  Kasey Yang am acting as a scribe while in the presence of the attending physician.:       I,:  Alexi Raymond DO personally performed the services described in this documentation    as scribed in my presence.:                    [1]  No Known Allergies[2]    Current Outpatient Medications:   •  albuterol (2.5 mg/3 mL) 0.083 % nebulizer solution, USE 1 VIAL VIA NEBULIZER 4 TIMES A DAY AS NEEDED, Disp: 360 mL, Rfl: 2  •  ammonium lactate (LAC-HYDRIN) 12 % lotion, APPLY TO AFFECTED AREA TOPICALLY EVERY DAY, Disp: 225 mL, Rfl: 2  •   aspirin 81 MG tablet, Take 81 mg by mouth in the morning., Disp: , Rfl:   •  atorvastatin (LIPITOR) 40 mg tablet, TAKE 1 TABLET BY MOUTH DAILY, Disp: 90 tablet, Rfl: 1  •  bexarotene (TARGRETIN) 75 mg capsule, Take 8 capsules (600 mg total) by mouth daily, Disp: 240 capsule, Rfl: 6  •  Cholecalciferol 25 MCG (1000 UT) capsule, Take 1 capsule by mouth in the morning., Disp: , Rfl:   •  cyanocobalamin (VITAMIN B-12) 1,000 mcg tablet, Take 1,000 mcg by mouth in the morning., Disp: , Rfl:   •  ferrous sulfate 324 (65 Fe) mg, Take 1 tablet by mouth in the morning and 1 tablet in the evening., Disp: , Rfl:   •  gabapentin (NEURONTIN) 100 mg capsule, TAKE 1 CAPSULE BY MOUTH THREE TIMES A DAY, Disp: 270 capsule, Rfl: 1  •  levothyroxine 100 mcg tablet, TAKE 1 TABLET BY MOUTH DAILY, Disp: 90 tablet, Rfl: 3  •  Mechlorethamine HCl (Valchlor) 0.016 % GEL, Apply daily in the morning to the affected areas., Disp: 60 g, Rfl: 6  •  metoprolol succinate (TOPROL-XL) 50 mg 24 hr tablet, TAKE 1 AND 1/2 TABLETS BY MOUTH  DAILY, Disp: 135 tablet, Rfl: 3  •  montelukast (SINGULAIR) 10 mg tablet, TAKE 1 TABLET BY MOUTH DAILY AT  BEDTIME, Disp: 90 tablet, Rfl: 1  •  Multiple Vitamins-Minerals (OCUVITE ADULT 50+ PO), Take by mouth, Disp: , Rfl:   •  oxybutynin (DITROPAN) 5 mg tablet, TAKE 1 TABLET BY MOUTH THREE TIMES A DAY, Disp: 60 tablet, Rfl: 5  •  sacubitril-valsartan (Entresto) 49-51 MG TABS, TAKE 1 TABLET BY MOUTH TWICE  DAILY, Disp: 180 tablet, Rfl: 1  •  spironolactone (ALDACTONE) 25 mg tablet, Take 0.5 tablets (12.5 mg total) by mouth daily, Disp: 45 tablet, Rfl: 3  •  Trelegy Ellipta 100-62.5-25 MCG/ACT inhaler, USE 1 INHALATION BY MOUTH ONCE  DAILY AT THE SAME TIME EACH DAY  RINSE MOUTH AFTER USE, Disp: 180 each, Rfl: 3  •  triamcinolone (KENALOG) 0.1 % ointment, Apply topically 2 (two) times 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, Disp: 454 g, Rfl: 3  •  Vitamins-Lipotropics (LIPOFLAVONOID PO), Take by mouth in the morning., Disp: , Rfl:   •   zolpidem (AMBIEN) 10 mg tablet, Take 1 tablet (10 mg total) by mouth daily at bedtime as needed for sleep, Disp: 30 tablet, Rfl: 0  •  tamsulosin (FLOMAX) 0.4 mg, TAKE 1 CAPSULE BY MOUTH DAILY  WITH DINNER, Disp: 90 capsule, Rfl: 1

## 2025-06-17 NOTE — PATIENT INSTRUCTIONS
MYCOSIS FUNGOIDES   Assessment and Plan:  Based on a thorough discussion of this condition and the management approach to it (including a comprehensive discussion of the known risks, side effects and potential benefits of treatment), the patient (family) agrees to implement the following specific plan:  Continue same regimen with Bexarotene, Valchlor and topical steroids.   6 month follow up.      Mycosis fungoides  Mycosis fungoides is a condition in which the skin is infiltrated by patches or lumps composed of white cells called lymphocytes. It is more common in men than women and is very rare in children. Its cause is unknown but in some patients, it is associated with a pre-existing contact allergic dermatitis or infection with a retrovirus.     Mycosis fungoides has an indolent (low-grade) clinical course, which means that it may persist in one stage or over years or sometimes decades, slowly progress to another stage (from patches to thicker plaques and eventually to tumors).     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  Patch stage  In patch stage mycosis fungoides, the skin lesions are flat. Most often there are oval or ring-shaped (annular) pink dry patches on covered skin. They may spontaneously disappear, remain the same size, or slowly enlarge. The skin may be atrophic (thinned), and may or may not itch. The patch stage of mycosis fungoides can be difficult to distinguish from psoriasis, discoid eczema or parapsoriasis.  Plaque stage  In plaque stage mycosis fungoides, the patches become thickened and may resemble psoriasis. They are usually itchy.  Tumor stage  In tumor stage mycosis fungoides, large irregular lumps develop from plaques, or de elliot. They may ulcerate. At this stage, spread to other organs is more likely than in earlier stages. The tumor type may transform into a large cell lymphoma.     MF variants and  subtypes  There are a number of variants and subtypes of mycosis fungoides. Most follow the same clinical and pathological features as MF but some have distinctive features as described below.     Folliculotropic MF (follicular cell lymphoma)  The localized form of cutaneous T-cell lymphoma in which there is a slowly enlarging solitary patch, plaque or a tumor in which biopsy shows characteristic lymphomatous change around hair follicles.  Most commonly found in the head and neck area.  Skin lesions are often associated with alopecia, and sometimes with mucinorrhea (see alopecia mucinosa).   Pagetoid Reticulosis  Localized patches or plaques with an intraepidermal growth of neoplastic T cells  Presents as a solitary psoriasis-like or hyperkeratotic patch or plaque, usually on the extremities  Granulomatous slack skin:  Extremely rare subtype characterized by the slow development of folds of lax skin in the major skin folds  Skin folds show a granulomatous infiltrate with clonal T cells  Occurs most commonly in the groin and underarm regions  Mycosis fungoides palmaris et plantaris  Confined to palms and soles

## 2025-07-08 ENCOUNTER — REMOTE DEVICE CLINIC VISIT (OUTPATIENT)
Dept: CARDIOLOGY CLINIC | Facility: CLINIC | Age: 80
End: 2025-07-08
Payer: COMMERCIAL

## 2025-07-08 DIAGNOSIS — Z95.810 AICD (AUTOMATIC CARDIOVERTER/DEFIBRILLATOR) PRESENT: Primary | ICD-10-CM

## 2025-07-08 PROCEDURE — 93295 DEV INTERROG REMOTE 1/2/MLT: CPT | Performed by: INTERNAL MEDICINE

## 2025-07-08 PROCEDURE — 93296 REM INTERROG EVL PM/IDS: CPT | Performed by: INTERNAL MEDICINE

## 2025-07-08 NOTE — PROGRESS NOTES
"Results for orders placed or performed in visit on 07/08/25   Cardiac EP device report    Narrative    MDT BIV-ICD/NOT MRI CONDITIONAL  CARELINK TRANSMISSION: PRESENTING RHYTHM SHOWS AP BVP@ 75 BPM. BATTERY STATUS \"6 YRS.\" AP 92% CRT PACING (BIV) 69% (LV) 31%. ALL AVAILABLE LEAD PARAMETERS WITHIN NORMAL LIMITS. 37 VHRS & 1 SVT NOTED; NO THERAPIES GIVEN. AVAIL EGRAMS PRESENT AS RVR & SVT. CAN'T EXCLUDE NSVT. 1 AT/AF NOTED; 45 MINS LONG; 0% BURDEN; rATP ON. EGRAMS SHOWING AF. OPTI-VOL WITHIN NORMAL LIMITS. APPROPRIATELY FUNCTIONING ICD. NC         "

## 2025-07-21 ENCOUNTER — TELEPHONE (OUTPATIENT)
Dept: NEPHROLOGY | Facility: CLINIC | Age: 80
End: 2025-07-21

## 2025-07-21 NOTE — TELEPHONE ENCOUNTER
Called spoke with patient advised I was calling to schedule follow-up appointment from February 2026 recall list with our nephrology provider .   Patient has been scheduled for 02/04/26@ 1:00pm. Patient verbalized understanding and is okay with it.

## 2025-08-06 DIAGNOSIS — G62.9 NEUROPATHY: ICD-10-CM

## 2025-08-08 RX ORDER — GABAPENTIN 100 MG/1
100 CAPSULE ORAL 3 TIMES DAILY
Qty: 270 CAPSULE | Refills: 1 | Status: SHIPPED | OUTPATIENT
Start: 2025-08-08

## (undated) DEVICE — SPECIMEN CONTAINER STERILE PEEL PACK

## (undated) DEVICE — 4-PORT MANIFOLD: Brand: NEPTUNE 2

## (undated) DEVICE — GLOVE INDICATOR PI UNDERGLOVE SZ 8 BLUE

## (undated) DEVICE — INVIEW CLEAR LEGGINGS: Brand: CONVERTORS

## (undated) DEVICE — LUBRICANT JELLY SURGILUBE TUBE 4OZ FLIP TOP

## (undated) DEVICE — CHLORHEXIDINE 4PCT 4 OZ

## (undated) DEVICE — GUIDEWIRE STRGHT TIP 0.035 IN  SOLO PLUS

## (undated) DEVICE — GLOVE SRG BIOGEL 7

## (undated) DEVICE — SPONGE 4 X 4 XRAY 16 PLY STRL LF RFD

## (undated) DEVICE — CATH URETERAL 5FR X 70 CM FLEX TIP POLYUR BARD

## (undated) DEVICE — GLOVE SRG BIOGEL ECLIPSE 7.5

## (undated) DEVICE — UROLOGIC DRAIN BAG: Brand: UNBRANDED

## (undated) DEVICE — PACK TUR

## (undated) DEVICE — 365 MICROMETER LASER FIBER: Brand: OPTICAL INTEGRITY LASER FIBER

## (undated) DEVICE — BASKET SPECIMEN RETRIVAL 1.9FR 120CM

## (undated) DEVICE — PAD GROUNDING DUAL ADULT

## (undated) DEVICE — SHEATH URETERAL ACCESS 12/14FR 35CM PROXIS

## (undated) DEVICE — BAG URINE DRAINAGE 2000ML ANTI RFLX LF